# Patient Record
Sex: FEMALE | Race: WHITE | NOT HISPANIC OR LATINO | Employment: OTHER | URBAN - NONMETROPOLITAN AREA
[De-identification: names, ages, dates, MRNs, and addresses within clinical notes are randomized per-mention and may not be internally consistent; named-entity substitution may affect disease eponyms.]

---

## 2017-01-11 RX ORDER — TIOTROPIUM BROMIDE 18 UG/1
CAPSULE ORAL; RESPIRATORY (INHALATION)
Qty: 30 CAPSULE | Refills: 0 | Status: SHIPPED | OUTPATIENT
Start: 2017-01-11 | End: 2017-02-12 | Stop reason: SDUPTHER

## 2017-01-30 RX ORDER — PREDNISONE 10 MG/1
TABLET ORAL
Qty: 21 TABLET | Refills: 0 | OUTPATIENT
Start: 2017-01-30

## 2017-01-30 RX ORDER — AZITHROMYCIN 250 MG/1
TABLET, FILM COATED ORAL
Qty: 6 TABLET | Refills: 0 | OUTPATIENT
Start: 2017-01-30

## 2017-02-01 ENCOUNTER — OFFICE VISIT (OUTPATIENT)
Dept: FAMILY MEDICINE CLINIC | Facility: CLINIC | Age: 62
End: 2017-02-01

## 2017-02-01 VITALS
WEIGHT: 146 LBS | BODY MASS INDEX: 25.87 KG/M2 | HEIGHT: 63 IN | SYSTOLIC BLOOD PRESSURE: 124 MMHG | DIASTOLIC BLOOD PRESSURE: 80 MMHG

## 2017-02-01 DIAGNOSIS — L03.116 CELLULITIS OF LEFT LEG: Primary | ICD-10-CM

## 2017-02-01 PROCEDURE — 96372 THER/PROPH/DIAG INJ SC/IM: CPT | Performed by: NURSE PRACTITIONER

## 2017-02-01 PROCEDURE — 99213 OFFICE O/P EST LOW 20 MIN: CPT | Performed by: NURSE PRACTITIONER

## 2017-02-01 RX ORDER — METHYLPREDNISOLONE 4 MG/1
TABLET ORAL
Qty: 21 TABLET | Refills: 0 | Status: SHIPPED | OUTPATIENT
Start: 2017-02-01 | End: 2017-04-22 | Stop reason: SDUPTHER

## 2017-02-01 RX ORDER — CEPHALEXIN 500 MG/1
500 CAPSULE ORAL 4 TIMES DAILY
Qty: 40 CAPSULE | Refills: 0 | Status: SHIPPED | OUTPATIENT
Start: 2017-02-01 | End: 2017-03-29

## 2017-02-01 RX ORDER — CEFTRIAXONE 1 G/1
1 INJECTION, POWDER, FOR SOLUTION INTRAMUSCULAR; INTRAVENOUS ONCE
Status: COMPLETED | OUTPATIENT
Start: 2017-02-01 | End: 2017-02-01

## 2017-02-01 RX ORDER — PREDNISONE 10 MG/1
10 TABLET ORAL DAILY
Qty: 21 TABLET | Refills: 0 | Status: SHIPPED | OUTPATIENT
Start: 2017-02-01 | End: 2017-03-20 | Stop reason: SDUPTHER

## 2017-02-01 RX ADMIN — CEFTRIAXONE 1 G: 1 INJECTION, POWDER, FOR SOLUTION INTRAMUSCULAR; INTRAVENOUS at 10:24

## 2017-02-01 NOTE — PROGRESS NOTES
"  Chief Complaint   Patient presents with   • Follow-up     left leg lesion , scraped 2 weeks ago     Subjective   Monisha Brasher is a 61 y.o. female.     Abrasion   This is a new (hit leg on the side of the bed 2 weeks ago now painful and swollen ) problem. The current episode started 1 to 4 weeks ago. The problem occurs constantly. The problem has been gradually worsening. Pertinent negatives include no abdominal pain, arthralgias, change in bowel habit, chest pain, chills, congestion, coughing, diaphoresis, fatigue, headaches, joint swelling, myalgias, nausea, neck pain, numbness, rash, urinary symptoms, visual change, vomiting or weakness. Nothing aggravates the symptoms. She has tried nothing for the symptoms.        The following portions of the patient's history were reviewed and updated as appropriate: allergies, current medications, past social history and problem list.    Review of Systems   Constitutional: Negative.  Negative for chills, diaphoresis and fatigue.   HENT: Negative.  Negative for congestion.    Eyes: Negative.    Respiratory: Negative.  Negative for cough.    Cardiovascular: Negative.  Negative for chest pain.   Gastrointestinal: Negative.  Negative for abdominal distention, abdominal pain, anal bleeding, change in bowel habit, nausea and vomiting.   Endocrine: Negative.    Genitourinary: Negative.    Musculoskeletal: Negative.  Negative for arthralgias, joint swelling, myalgias and neck pain.   Skin: Negative.  Negative for rash.        Swollen left lower leg tender and red to touch    Neurological: Negative.  Negative for weakness, numbness and headaches.   Hematological: Negative.    Psychiatric/Behavioral: Negative.        Objective   Visit Vitals   • /80 (BP Location: Left arm, Patient Position: Sitting, Cuff Size: Adult)   • Ht 63\" (160 cm)   • Wt 146 lb (66.2 kg)   • BMI 25.86 kg/m2     Physical Exam   Constitutional: She is oriented to person, place, and time. She appears " well-developed and well-nourished. She is not intubated.   HENT:   Head: Normocephalic and atraumatic.   Eyes: EOM are normal. Pupils are equal, round, and reactive to light.   Neck: Normal range of motion. Neck supple.   Cardiovascular: Normal rate and regular rhythm.    Pulmonary/Chest: No accessory muscle usage. No apnea, no tachypnea and no bradypnea. She is not intubated. No respiratory distress. She has no decreased breath sounds. She has no wheezes. She has no rhonchi. She has no rales.   Abdominal: Soft. Bowel sounds are normal.   Neurological: She is alert and oriented to person, place, and time.   Skin: Skin is warm and dry.   Redness and tender area left lower leg warm tender to touch no distal edema    Nursing note and vitals reviewed.      Assessment/Plan   Problem List Items Addressed This Visit        Other    Cellulitis of left leg - Primary        rx for steriod to hold onto in case she needs it for her copd-leaving for [place] for 1 month      New Medications Ordered This Visit   Medications   • predniSONE (DELTASONE) 10 MG tablet     Sig: Take 1 tablet by mouth Daily.     Dispense:  21 tablet     Refill:  0   • mupirocin (BACTROBAN) 2 % ointment     Sig: Apply  topically 3 (Three) Times a Day.     Dispense:  30 g     Refill:  0   • cephalexin (KEFLEX) 500 MG capsule     Sig: Take 1 capsule by mouth 4 (Four) Times a Day.     Dispense:  40 capsule     Refill:  0   • MethylPREDNISolone (MEDROL, MJ,) 4 MG tablet     Sig: Take as directed on package instructions.     Dispense:  21 tablet     Refill:  0

## 2017-02-13 RX ORDER — TIOTROPIUM BROMIDE 18 UG/1
CAPSULE ORAL; RESPIRATORY (INHALATION)
Qty: 30 CAPSULE | Refills: 0 | Status: SHIPPED | OUTPATIENT
Start: 2017-02-13 | End: 2017-08-17

## 2017-03-20 RX ORDER — ESCITALOPRAM OXALATE 20 MG/1
TABLET ORAL
Qty: 30 TABLET | Refills: 5 | Status: SHIPPED | OUTPATIENT
Start: 2017-03-20 | End: 2017-09-12 | Stop reason: SDUPTHER

## 2017-03-20 RX ORDER — PREDNISONE 10 MG/1
TABLET ORAL
Qty: 21 TABLET | Refills: 0 | Status: SHIPPED | OUTPATIENT
Start: 2017-03-20 | End: 2017-05-31

## 2017-03-27 RX ORDER — AMLODIPINE BESYLATE 10 MG/1
TABLET ORAL
Qty: 30 TABLET | Refills: 5 | Status: SHIPPED | OUTPATIENT
Start: 2017-03-27 | End: 2017-10-23 | Stop reason: SDUPTHER

## 2017-03-29 ENCOUNTER — OFFICE VISIT (OUTPATIENT)
Dept: PULMONOLOGY | Facility: CLINIC | Age: 62
End: 2017-03-29

## 2017-03-29 VITALS
SYSTOLIC BLOOD PRESSURE: 136 MMHG | WEIGHT: 143 LBS | DIASTOLIC BLOOD PRESSURE: 86 MMHG | HEIGHT: 63 IN | BODY MASS INDEX: 25.34 KG/M2

## 2017-03-29 DIAGNOSIS — J20.9 ACUTE BRONCHITIS, UNSPECIFIED ORGANISM: ICD-10-CM

## 2017-03-29 DIAGNOSIS — J44.1 CHRONIC OBSTRUCTIVE PULMONARY DISEASE WITH ACUTE EXACERBATION (HCC): Primary | ICD-10-CM

## 2017-03-29 PROCEDURE — 96372 THER/PROPH/DIAG INJ SC/IM: CPT | Performed by: INTERNAL MEDICINE

## 2017-03-29 PROCEDURE — 99213 OFFICE O/P EST LOW 20 MIN: CPT | Performed by: INTERNAL MEDICINE

## 2017-03-29 RX ORDER — AZITHROMYCIN 250 MG/1
TABLET, FILM COATED ORAL
Qty: 6 TABLET | Refills: 1 | Status: SHIPPED | OUTPATIENT
Start: 2017-03-29 | End: 2017-05-31

## 2017-03-29 RX ORDER — METHYLPREDNISOLONE ACETATE 40 MG/ML
80 INJECTION, SUSPENSION INTRA-ARTICULAR; INTRALESIONAL; INTRAMUSCULAR; SOFT TISSUE ONCE
Status: COMPLETED | OUTPATIENT
Start: 2017-03-29 | End: 2017-03-29

## 2017-03-29 RX ORDER — METHYLPREDNISOLONE 8 MG/1
TABLET ORAL
Qty: 21 TABLET | Refills: 0 | Status: SHIPPED | OUTPATIENT
Start: 2017-03-29 | End: 2017-05-31

## 2017-03-29 RX ADMIN — METHYLPREDNISOLONE ACETATE 80 MG: 40 INJECTION, SUSPENSION INTRA-ARTICULAR; INTRALESIONAL; INTRAMUSCULAR; SOFT TISSUE at 15:02

## 2017-03-29 NOTE — PROGRESS NOTES
"This lady has long-standing COPD and for the last several days has noted cough wheeze shortness of breath and purulent sputum    ROS    Constitutional-no night sweats weight loss headaches  GI no abdominal pain nausea or diarrhea  Neuro no seizure or neurologic deficits  Musculoskeletal no deformity or joint pain   no dysuria or hematuria  Skin no rash or other lesions  All other systems reviewed and were negative except for the above.      Physical Exam  /86  Ht 63\" (160 cm)  Wt 143 lb (64.9 kg)  BMI 25.33 kg/m2  Vital signs as above  Pupils equally round and reactive to light and accommodation, neck no JVD or adenopathy.  Cardiovascular regular rhythm and rate no murmur or gallop.  Abdomen soft no organomegaly tenderness.  Extremities no clubbing cyanosis or edema.  No cervical adenopathy.  No skin rash.  Neurologic good strength bilaterally without deficits  Lungs reveal mild wheeze and rhonchi    Impression COPD exacerbation, acute bronchitis    Plan Depo-Medrol 2 cc IM, Medrol tapering dose, Zithromax, continue present medications, return as needed  "

## 2017-04-24 RX ORDER — METHYLPREDNISOLONE 4 MG/1
TABLET ORAL
Qty: 21 TABLET | Refills: 0 | Status: SHIPPED | OUTPATIENT
Start: 2017-04-24 | End: 2017-05-31

## 2017-05-15 RX ORDER — MONTELUKAST SODIUM 10 MG/1
TABLET ORAL
Qty: 90 TABLET | Refills: 0 | Status: SHIPPED | OUTPATIENT
Start: 2017-05-15 | End: 2017-08-14 | Stop reason: SDUPTHER

## 2017-05-31 ENCOUNTER — OFFICE VISIT (OUTPATIENT)
Dept: PULMONOLOGY | Facility: CLINIC | Age: 62
End: 2017-05-31

## 2017-05-31 VITALS
HEIGHT: 63 IN | SYSTOLIC BLOOD PRESSURE: 121 MMHG | HEART RATE: 95 BPM | BODY MASS INDEX: 25.16 KG/M2 | WEIGHT: 142 LBS | OXYGEN SATURATION: 97 % | DIASTOLIC BLOOD PRESSURE: 78 MMHG

## 2017-05-31 DIAGNOSIS — J44.1 CHRONIC OBSTRUCTIVE PULMONARY DISEASE WITH ACUTE EXACERBATION (HCC): Primary | ICD-10-CM

## 2017-05-31 PROCEDURE — 96372 THER/PROPH/DIAG INJ SC/IM: CPT | Performed by: INTERNAL MEDICINE

## 2017-05-31 PROCEDURE — 99212 OFFICE O/P EST SF 10 MIN: CPT | Performed by: INTERNAL MEDICINE

## 2017-05-31 RX ORDER — AZITHROMYCIN 250 MG/1
TABLET, FILM COATED ORAL
Qty: 6 TABLET | Refills: 1 | Status: SHIPPED | OUTPATIENT
Start: 2017-05-31 | End: 2017-07-26 | Stop reason: SDUPTHER

## 2017-05-31 RX ORDER — PREDNISONE 10 MG/1
TABLET ORAL
Qty: 21 TABLET | Refills: 0 | Status: SHIPPED | OUTPATIENT
Start: 2017-05-31 | End: 2017-06-29 | Stop reason: SDUPTHER

## 2017-05-31 RX ORDER — METHYLPREDNISOLONE ACETATE 40 MG/ML
80 INJECTION, SUSPENSION INTRA-ARTICULAR; INTRALESIONAL; INTRAMUSCULAR; SOFT TISSUE ONCE
Status: COMPLETED | OUTPATIENT
Start: 2017-05-31 | End: 2017-05-31

## 2017-05-31 RX ADMIN — METHYLPREDNISOLONE ACETATE 80 MG: 40 INJECTION, SUSPENSION INTRA-ARTICULAR; INTRALESIONAL; INTRAMUSCULAR; SOFT TISSUE at 13:08

## 2017-06-29 RX ORDER — PREDNISONE 10 MG/1
TABLET ORAL
Qty: 21 TABLET | Refills: 0 | Status: SHIPPED | OUTPATIENT
Start: 2017-06-29 | End: 2017-07-26 | Stop reason: SDUPTHER

## 2017-07-26 RX ORDER — PREDNISONE 10 MG/1
TABLET ORAL
Qty: 21 TABLET | Refills: 0 | Status: SHIPPED | OUTPATIENT
Start: 2017-07-26 | End: 2017-09-19

## 2017-07-26 RX ORDER — AZITHROMYCIN 250 MG/1
TABLET, FILM COATED ORAL
Qty: 6 TABLET | Refills: 0 | Status: SHIPPED | OUTPATIENT
Start: 2017-07-26 | End: 2017-08-17

## 2017-08-14 RX ORDER — MONTELUKAST SODIUM 10 MG/1
TABLET ORAL
Qty: 90 TABLET | Refills: 0 | Status: SHIPPED | OUTPATIENT
Start: 2017-08-14 | End: 2017-11-19 | Stop reason: SDUPTHER

## 2017-08-16 RX ORDER — METHYLPREDNISOLONE 8 MG/1
TABLET ORAL
Qty: 21 TABLET | Refills: 0 | Status: SHIPPED | OUTPATIENT
Start: 2017-08-16 | End: 2017-09-19

## 2017-08-17 ENCOUNTER — OFFICE VISIT (OUTPATIENT)
Dept: CARDIOLOGY | Facility: CLINIC | Age: 62
End: 2017-08-17

## 2017-08-17 VITALS
HEART RATE: 90 BPM | WEIGHT: 143 LBS | DIASTOLIC BLOOD PRESSURE: 78 MMHG | HEIGHT: 63 IN | SYSTOLIC BLOOD PRESSURE: 120 MMHG | BODY MASS INDEX: 25.34 KG/M2

## 2017-08-17 DIAGNOSIS — Z72.0 NICOTINE ABUSE: ICD-10-CM

## 2017-08-17 DIAGNOSIS — R06.02 SOB (SHORTNESS OF BREATH): ICD-10-CM

## 2017-08-17 DIAGNOSIS — J42 CHRONIC BRONCHITIS, UNSPECIFIED CHRONIC BRONCHITIS TYPE (HCC): Primary | ICD-10-CM

## 2017-08-17 DIAGNOSIS — I10 ESSENTIAL HYPERTENSION: ICD-10-CM

## 2017-08-17 PROCEDURE — 99213 OFFICE O/P EST LOW 20 MIN: CPT | Performed by: INTERNAL MEDICINE

## 2017-08-17 NOTE — PROGRESS NOTES
[facility]  OFFICE NOTE    Monisha Brasher  62 y.o. female    08/17/2017  1. Chronic bronchitis, unspecified chronic bronchitis type    2. SOB (shortness of breath)    3. Nicotine abuse    4. Essential hypertension        Chief complaint -Shortness of breath      History of present Illness- 62-year-old lady who has history of COPD with chronic bronchitis still smoking, she had quit smoking and now re started again.  She is on steroids and she is actively wheezing.  She had a nuclear stress test and echo in June 2016 and there were unremarkable.  She had an episode of chest pain 4 months ago and has not had any more episodes.  She is on steroids she is on singular and multiple inhalers and she is seeing Dr. Gigi Ac.  I again reemphasized the need to quit tobacco use as it increases the risk of heart attacks and strokes in addition to the lung problems.              No Known Allergies      Past Medical History:   Diagnosis Date   • Acute bronchitis    • Acute upper respiratory infection    • Adjustment disorder with mixed emotional features    • Agoraphobia with panic attacks    • Allergic rhinitis due to pollen    • Anxiety    • Asthma    • Backache    • Blood in urine    • Candidiasis of mouth    • Chronic bronchitis    • Chronic obstructive lung disease    • Emphysema lung    • Essential hypertension    • Extrinsic asthma with status asthmaticus    • Fatigue    • H/O echocardiogram     EF 55-60%. LV systolic function normal. Impaired LV relaxation. [facility]   • Hypoxemia    • Malaise and fatigue    • Non-IgE mediated allergic asthma    • Nonvenomous insect bite    • Pneumonia    • Postmenopausal state    • Urinary tract infectious disease          Past Surgical History:   Procedure Laterality Date   • INJECTION OF MEDICATION  03/20/2015    celestone(1)   • INJECTION OF MEDICATION  04/05/2016    DEPO MEDROL(16)         Family History   Problem Relation Age of  Onset   • Heart attack Mother    • Heart disease Mother    • Heart disease Father    • Heart disease Paternal Grandmother          Social History     Social History   • Marital status:      Spouse name: N/A   • Number of children: N/A   • Years of education: N/A     Occupational History   • Not on file.     Social History Main Topics   • Smoking status: Current Every Day Smoker   • Smokeless tobacco: Never Used   • Alcohol use Not on file   • Drug use: Not on file   • Sexual activity: Not on file     Other Topics Concern   • Not on file     Social History Narrative         Current Outpatient Prescriptions   Medication Sig Dispense Refill   • ADVAIR DISKUS 500-50 MCG/DOSE DISKUS INHALE 1 PUFF BY MOUTH TWICE DAILY 1 each 5   • albuterol (PROVENTIL) (2.5 MG/3ML) 0.083% nebulizer solution Take 2.5 mg by nebulization Every 4 (Four) Hours As Needed for wheezing. Nebulizer treatments 3 times a day as needed 90 vial 5   • albuterol (VENTOLIN HFA) 108 (90 BASE) MCG/ACT inhaler 2 puffs every 4 hours as needed for breathing 1 inhaler 5   • amLODIPine (NORVASC) 10 MG tablet TAKE 1 TABLET BY MOUTH DAILY 30 tablet 5   • escitalopram (LEXAPRO) 20 MG tablet TAKE 1 TABLET BY MOUTH ONCE DAILY 30 tablet 5   • methylPREDNISolone (MEDROL) 8 MG tablet TAKE 2 TABLETS BY MOUTH EVERY DAY FOR 1 WEEK THEN 1 TABLET DAILY FOR 1 WEEK 21 tablet 0   • montelukast (SINGULAIR) 10 MG tablet TAKE 1 TABLET BY MOUTH DAILY 90 tablet 0   • predniSONE (DELTASONE) 10 MG tablet TAKE 2 TABLETS BY MOUTH DAILY FOR 1 WEEK THEN TAKE 1 TABLET BY MOUTH DAILY FOR 1 WEEK 21 tablet 0   • promethazine (PHENERGAN) 25 MG tablet Take 25 mg by mouth every 6 (six) hours as needed for nausea or vomiting.     • tiotropium (SPIRIVA HANDIHALER) 18 MCG per inhalation capsule Place 1 capsule into inhaler and inhale Daily. 30 capsule 5   • VENTOLIN  (90 BASE) MCG/ACT inhaler Inhale 2 puffs every 4 (four) hours. 1 inhaler 5     No current facility-administered  "medications for this visit.          Review of Systems     Constitution: Denies any fatigue, fever or chills    HENT: Denies any headache, hearing impairment,     Eyes: Denies any blurring of vision, or photophobia     Cardivascular - As per history of present illness     Respiratory system-COPD with chronic bronchitis       Endocrine:  No history of hyperlipidemia, diabetes,                      Hypothyrodism       Musculoskeletal:  Arthritis    Gastrointestinal: No nausea, vomiting, or melena    Genitourinary: No dysuria or hematuria    Neurological:   No history of seizure disorder, stroke, memory problems    Psychiatric/Behavioral:        No history of depression,      Hematological- no history of easy bruising or any bleeding diathesis            OBJECTIVE    /78  Pulse 90  Ht 63\" (160 cm)  Wt 143 lb (64.9 kg)  BMI 25.33 kg/m2      Physical Exam     Constitutional: is oriented to person, place, and time.     Skin-warm and dry    Well developed and nourished in no acute distress      Head: Normocephalic and atraumatic.     Eyes: Pupils are equal, round, and reactive to light.     Neck: Neck supple. No bruit in the carotids, no elevation of JVD    Cardiovascular: [place] in the fifth intercostal space   Regular rate, and  Rhythm,    S1 greater than S2, no S3 or S4, no gallop     Pulmonary/Chest:   Air  Entry is equal on both sides  There is diffuse scattered rhonchi      Abdominal: Soft.  No hepatosplenomegaly    Musculoskeletal: No kyphoscoliosis    Neurological: is alert and oriented to person, place, and time.    cranial nerve are intact .   No motor or sensory deficit    Extremities-no edema, no radial femoral delay      Psychiatric: He has a normal mood and affect.                  His behavior is normal.           Procedures          A/P    Shortness of breath secondary to COPD with active wheezing, on steroids and multiple inhalers I again reemphasized the need to quit tobacco abuse because of the " risk associated with it.    Hypertension we'll continue amlodipine and is controlling the blood pressure.    One episode of chest pain negative stress test a year ago, she does not want to do any further testing at this point that she has been fine for the past 4 months.    Nicotine abuse-needs risk factor modification.    Follow-up in 1 year or earlier if any problems            This document has been electronically signed by Gregg Izaguirre MD on August 17, 2017 8:49 AM       EMR Dragon/Transcription disclaimer:   Some of this note may be an electronic transcription/translation of spoken language to printed text. The electronic translation of spoken language may permit erroneous, or at times, nonsensical words or phrases to be inadvertently transcribed; Although I have reviewed the note for such errors, some may still exist.

## 2017-09-05 RX ORDER — ALBUTEROL SULFATE 2.5 MG/3ML
SOLUTION RESPIRATORY (INHALATION)
Qty: 360 ML | Refills: 0 | Status: SHIPPED | OUTPATIENT
Start: 2017-09-05 | End: 2017-10-21 | Stop reason: SDUPTHER

## 2017-09-05 RX ORDER — AZITHROMYCIN 250 MG/1
TABLET, FILM COATED ORAL
Qty: 6 TABLET | Refills: 0 | Status: SHIPPED | OUTPATIENT
Start: 2017-09-05 | End: 2017-11-06

## 2017-09-13 RX ORDER — TIOTROPIUM BROMIDE 18 UG/1
CAPSULE ORAL; RESPIRATORY (INHALATION)
Qty: 30 CAPSULE | Refills: 0 | Status: SHIPPED | OUTPATIENT
Start: 2017-09-13 | End: 2017-10-23 | Stop reason: SDUPTHER

## 2017-09-13 RX ORDER — ESCITALOPRAM OXALATE 20 MG/1
TABLET ORAL
Qty: 30 TABLET | Refills: 0 | Status: SHIPPED | OUTPATIENT
Start: 2017-09-13 | End: 2017-10-11 | Stop reason: SDUPTHER

## 2017-09-19 ENCOUNTER — OFFICE VISIT (OUTPATIENT)
Dept: PULMONOLOGY | Facility: CLINIC | Age: 62
End: 2017-09-19

## 2017-09-19 VITALS
SYSTOLIC BLOOD PRESSURE: 119 MMHG | WEIGHT: 142 LBS | DIASTOLIC BLOOD PRESSURE: 76 MMHG | HEIGHT: 63 IN | BODY MASS INDEX: 25.16 KG/M2 | HEART RATE: 60 BPM | OXYGEN SATURATION: 93 %

## 2017-09-19 DIAGNOSIS — J41.8 MIXED SIMPLE AND MUCOPURULENT CHRONIC BRONCHITIS (HCC): Primary | ICD-10-CM

## 2017-09-19 DIAGNOSIS — J44.9 CHRONIC OBSTRUCTIVE PULMONARY DISEASE, UNSPECIFIED COPD TYPE (HCC): ICD-10-CM

## 2017-09-19 PROCEDURE — 96372 THER/PROPH/DIAG INJ SC/IM: CPT | Performed by: INTERNAL MEDICINE

## 2017-09-19 PROCEDURE — 99214 OFFICE O/P EST MOD 30 MIN: CPT | Performed by: INTERNAL MEDICINE

## 2017-09-19 RX ORDER — AZITHROMYCIN 250 MG/1
TABLET, FILM COATED ORAL
Qty: 6 TABLET | Refills: 1 | Status: SHIPPED | OUTPATIENT
Start: 2017-09-19 | End: 2017-11-06

## 2017-09-19 RX ORDER — PREDNISONE 10 MG/1
TABLET ORAL
Qty: 21 TABLET | Refills: 0 | Status: SHIPPED | OUTPATIENT
Start: 2017-09-19 | End: 2017-10-03 | Stop reason: SDUPTHER

## 2017-09-19 RX ORDER — METHYLPREDNISOLONE ACETATE 40 MG/ML
80 INJECTION, SUSPENSION INTRA-ARTICULAR; INTRALESIONAL; INTRAMUSCULAR; SOFT TISSUE ONCE
Status: COMPLETED | OUTPATIENT
Start: 2017-09-19 | End: 2017-09-19

## 2017-09-19 RX ADMIN — METHYLPREDNISOLONE ACETATE 80 MG: 40 INJECTION, SUSPENSION INTRA-ARTICULAR; INTRALESIONAL; INTRAMUSCULAR; SOFT TISSUE at 10:18

## 2017-09-19 NOTE — PROGRESS NOTES
"This lady has COPD with chronic bronchitis and asthmatic component.  She is still smoking some.  Over the last few weeks she has noted cough wheeze shortness of breath and dyspnea on exertion.    ROS    Constitutional-no night sweats weight loss headaches  GI no abdominal pain nausea or diarrhea  Neuro no seizure or neurologic deficits  Musculoskeletal no deformity or joint pain   no dysuria or hematuria  Skin no rash or other lesions  All other systems reviewed and were negative except for the above.      Physical Exam  /76 (BP Location: Left arm, Patient Position: Sitting, Cuff Size: Adult)  Pulse 60  Ht 63\" (160 cm)  Wt 142 lb (64.4 kg)  SpO2 93%  BMI 25.15 kg/m2  Vital signs as above  Pupils equally round and reactive to light and accommodation, neck no JVD or adenopathy.  Cardiovascular regular rhythm and rate no murmur or gallop.  Abdomen soft no organomegaly tenderness.  Extremities no clubbing cyanosis or edema.  No cervical adenopathy.  No skin rash.  Neurologic good strength bilaterally without deficits  Dyspneic white female with an active cough, lungs reveal greatly diminished breath sounds with wheezes and rhonchi    Impression COPD exacerbation, acute bronchitis    Plan Depo-Medrol, Zithromax, prednisone if needed, continue bronchodilators, refrain from smoking, return in 2 weeks          This document has been electronically signed by Gigi Ac MD on September 19, 2017 10:05 AM      "

## 2017-10-02 RX ORDER — ALBUTEROL SULFATE 90 UG/1
AEROSOL, METERED RESPIRATORY (INHALATION)
Qty: 18 G | Refills: 0 | Status: SHIPPED | OUTPATIENT
Start: 2017-10-02 | End: 2017-11-06 | Stop reason: SDUPTHER

## 2017-10-04 RX ORDER — PREDNISONE 10 MG/1
TABLET ORAL
Qty: 21 TABLET | Refills: 0 | Status: SHIPPED | OUTPATIENT
Start: 2017-10-04 | End: 2017-10-21 | Stop reason: SDUPTHER

## 2017-10-11 RX ORDER — ESCITALOPRAM OXALATE 20 MG/1
TABLET ORAL
Qty: 30 TABLET | Refills: 1 | Status: SHIPPED | OUTPATIENT
Start: 2017-10-11 | End: 2017-11-15 | Stop reason: SDUPTHER

## 2017-10-23 RX ORDER — TIOTROPIUM BROMIDE 18 UG/1
CAPSULE ORAL; RESPIRATORY (INHALATION)
Qty: 30 CAPSULE | Refills: 0 | Status: SHIPPED | OUTPATIENT
Start: 2017-10-23 | End: 2017-11-26 | Stop reason: SDUPTHER

## 2017-10-23 RX ORDER — PREDNISONE 10 MG/1
TABLET ORAL
Qty: 21 TABLET | Refills: 0 | OUTPATIENT
Start: 2017-10-23 | End: 2017-12-22

## 2017-10-23 RX ORDER — ALBUTEROL SULFATE 90 UG/1
AEROSOL, METERED RESPIRATORY (INHALATION)
Qty: 18 G | Refills: 0 | Status: SHIPPED | OUTPATIENT
Start: 2017-10-23 | End: 2017-11-06 | Stop reason: SDUPTHER

## 2017-10-23 RX ORDER — METHYLPREDNISOLONE 8 MG/1
TABLET ORAL
Qty: 21 TABLET | Refills: 0 | Status: SHIPPED | OUTPATIENT
Start: 2017-10-23 | End: 2017-12-08 | Stop reason: SDUPTHER

## 2017-10-23 RX ORDER — ALBUTEROL SULFATE 2.5 MG/3ML
SOLUTION RESPIRATORY (INHALATION)
Qty: 360 ML | Refills: 0 | Status: SHIPPED | OUTPATIENT
Start: 2017-10-23 | End: 2018-01-31 | Stop reason: SDUPTHER

## 2017-10-24 RX ORDER — AMLODIPINE BESYLATE 10 MG/1
TABLET ORAL
Qty: 30 TABLET | Refills: 0 | Status: SHIPPED | OUTPATIENT
Start: 2017-10-24 | End: 2017-11-06 | Stop reason: SDUPTHER

## 2017-11-03 ENCOUNTER — TRANSCRIBE ORDERS (OUTPATIENT)
Dept: CARDIAC REHAB | Facility: HOSPITAL | Age: 62
End: 2017-11-03

## 2017-11-03 DIAGNOSIS — J44.9 CHRONIC OBSTRUCTIVE PULMONARY DISEASE, UNSPECIFIED COPD TYPE (HCC): Primary | ICD-10-CM

## 2017-11-06 ENCOUNTER — HOSPITAL ENCOUNTER (OUTPATIENT)
Dept: PULMONOLOGY | Facility: HOSPITAL | Age: 62
Discharge: HOME | End: 2017-11-06
Attending: INTERNAL MEDICINE | Admitting: INTERNAL MEDICINE

## 2017-11-06 ENCOUNTER — TRANSCRIBE ORDERS (OUTPATIENT)
Dept: PULMONOLOGY | Facility: HOSPITAL | Age: 62
End: 2017-11-06

## 2017-11-06 ENCOUNTER — OFFICE VISIT (OUTPATIENT)
Dept: FAMILY MEDICINE CLINIC | Facility: CLINIC | Age: 62
End: 2017-11-06

## 2017-11-06 VITALS
WEIGHT: 144 LBS | HEIGHT: 63 IN | SYSTOLIC BLOOD PRESSURE: 120 MMHG | BODY MASS INDEX: 25.52 KG/M2 | DIASTOLIC BLOOD PRESSURE: 78 MMHG

## 2017-11-06 DIAGNOSIS — J44.9 CHRONIC OBSTRUCTIVE PULMONARY DISEASE, UNSPECIFIED COPD TYPE (HCC): ICD-10-CM

## 2017-11-06 DIAGNOSIS — J44.9 CHRONIC OBSTRUCTIVE PULMONARY DISEASE, UNSPECIFIED COPD TYPE (HCC): Primary | ICD-10-CM

## 2017-11-06 DIAGNOSIS — I10 ESSENTIAL HYPERTENSION: Primary | ICD-10-CM

## 2017-11-06 DIAGNOSIS — Z12.31 ENCOUNTER FOR SCREENING MAMMOGRAM FOR MALIGNANT NEOPLASM OF BREAST: ICD-10-CM

## 2017-11-06 PROCEDURE — 90732 PPSV23 VACC 2 YRS+ SUBQ/IM: CPT | Performed by: NURSE PRACTITIONER

## 2017-11-06 PROCEDURE — 94010 BREATHING CAPACITY TEST: CPT

## 2017-11-06 PROCEDURE — 90471 IMMUNIZATION ADMIN: CPT | Performed by: NURSE PRACTITIONER

## 2017-11-06 PROCEDURE — 94010 BREATHING CAPACITY TEST: CPT | Performed by: INTERNAL MEDICINE

## 2017-11-06 PROCEDURE — 99213 OFFICE O/P EST LOW 20 MIN: CPT | Performed by: NURSE PRACTITIONER

## 2017-11-06 RX ORDER — AMLODIPINE BESYLATE 10 MG/1
10 TABLET ORAL DAILY
Qty: 30 TABLET | Refills: 3 | Status: SHIPPED | OUTPATIENT
Start: 2017-11-06 | End: 2018-06-05 | Stop reason: SDUPTHER

## 2017-11-06 RX ORDER — AMLODIPINE BESYLATE 10 MG/1
10 TABLET ORAL DAILY
Qty: 90 TABLET | Refills: 3 | Status: SHIPPED | OUTPATIENT
Start: 2017-11-06 | End: 2017-11-06 | Stop reason: SDUPTHER

## 2017-11-06 NOTE — PROGRESS NOTES
Chief Complaint   Patient presents with   • Follow-up     check up    • Med Refill     Subjective   Monisha Brasher is a 62 y.o. female.     Hypertension   This is a recurrent problem. The current episode started more than 1 year ago. The problem has been rapidly worsening since onset. The problem is controlled. Associated symptoms include malaise/fatigue and shortness of breath. Pertinent negatives include no anxiety, blurred vision, chest pain, orthopnea, palpitations, peripheral edema, PND or sweats. Risk factors for coronary artery disease include sedentary lifestyle. Past treatments include calcium channel blockers. The current treatment provides mild improvement. Compliance problems include diet.  There is no history of angina, kidney disease, CAD/MI, CVA, heart failure, left ventricular hypertrophy, PVD, renovascular disease, retinopathy or a thyroid problem. There is no history of chronic renal disease, coarctation of the aorta, hyperaldosteronism, hypercortisolism, hyperparathyroidism, a hypertension causing med, pheochromocytoma or sleep apnea.        The following portions of the patient's history were reviewed and updated as appropriate: allergies, current medications, past social history and problem list.    Review of Systems   Constitutional: Positive for malaise/fatigue. Negative for activity change, appetite change, chills, diaphoresis, fatigue, fever and unexpected weight change.   HENT: Negative.    Eyes: Negative.  Negative for blurred vision, photophobia, pain, discharge, redness, itching and visual disturbance.   Respiratory: Positive for shortness of breath and wheezing. Negative for apnea, choking, chest tightness and stridor.    Cardiovascular: Negative.  Negative for chest pain, palpitations, orthopnea, leg swelling and PND.   Gastrointestinal: Negative.    Endocrine: Negative.  Negative for cold intolerance, heat intolerance, polydipsia, polyphagia and polyuria.   Genitourinary:  "Negative.    Musculoskeletal: Negative.    Skin: Negative.    Allergic/Immunologic: Negative.  Negative for environmental allergies, food allergies and immunocompromised state.   Neurological: Negative.  Negative for dizziness and facial asymmetry.   Hematological: Negative.  Negative for adenopathy. Does not bruise/bleed easily.   Psychiatric/Behavioral: Negative.  Negative for agitation, behavioral problems and confusion.   All other systems reviewed and are negative.      Objective   /78  Ht 63\" (160 cm)  Wt 144 lb (65.3 kg)  BMI 25.51 kg/m2  Physical Exam   Constitutional: She is oriented to person, place, and time. She appears well-developed and well-nourished. She is not intubated.   HENT:   Head: Normocephalic and atraumatic.   Mouth/Throat: No oropharyngeal exudate.   Eyes: EOM are normal. Pupils are equal, round, and reactive to light. Right eye exhibits no discharge. Left eye exhibits no discharge. No scleral icterus.   Neck: Normal range of motion. Neck supple. No JVD present. No tracheal deviation present. No thyromegaly present.   Cardiovascular: Normal rate and regular rhythm.  Exam reveals no gallop and no friction rub.    No murmur heard.  Pulmonary/Chest: No accessory muscle usage or stridor. No apnea, no tachypnea and no bradypnea. She is not intubated. No respiratory distress. She has no decreased breath sounds. She has no wheezes. She has no rhonchi. She has no rales. She exhibits no tenderness.   Abdominal: Soft. Bowel sounds are normal. She exhibits no distension and no mass. There is no tenderness. There is no rebound and no guarding. No hernia.   Musculoskeletal: Normal range of motion. She exhibits no edema, tenderness or deformity.   Lymphadenopathy:     She has no cervical adenopathy.   Neurological: She is alert and oriented to person, place, and time. She has normal reflexes. She displays normal reflexes. No cranial nerve deficit. She exhibits normal muscle tone. Coordination " normal.   Skin: Skin is warm and dry. No rash noted. No erythema. No pallor.   Nursing note and vitals reviewed.      Assessment/Plan   Problem List Items Addressed This Visit        Cardiovascular and Mediastinum    Essential hypertension - Primary    Relevant Medications    amLODIPine (NORVASC) 10 MG tablet       Other    Encounter for screening mammogram for malignant neoplasm of breast    Relevant Orders    Mammo Screening Digital Tomosynthesis Bilateral With CAD           New Medications Ordered This Visit   Medications   • amLODIPine (NORVASC) 10 MG tablet     Sig: Take 1 tablet by mouth Daily.     Dispense:  30 tablet     Refill:  3       It's not just what you eat, but when you eat  Eat breakfast, and eat smaller meals throughout the day. A healthy breakfast can jumpstart your metabolism, while eating small, healthy meals (rather than the standard three large meals) keeps your energy up.   Avoid eating at night. Try to eat dinner earlier and fast for 14-16 hours until breakfast the next morning. Studies suggest that eating only when you’re most active and giving your digestive system a long break each day may help to regulate weight.

## 2017-11-09 ENCOUNTER — HOSPITAL ENCOUNTER (OUTPATIENT)
Dept: PULMONOLOGY | Facility: HOSPITAL | Age: 62
Setting detail: THERAPIES SERIES
Discharge: HOME | End: 2017-11-09
Attending: INTERNAL MEDICINE

## 2017-11-09 VITALS
BODY MASS INDEX: 25.87 KG/M2 | SYSTOLIC BLOOD PRESSURE: 111 MMHG | WEIGHT: 146 LBS | HEIGHT: 63 IN | HEART RATE: 90 BPM | OXYGEN SATURATION: 95 % | DIASTOLIC BLOOD PRESSURE: 71 MMHG

## 2017-11-09 DIAGNOSIS — J44.9 CHRONIC OBSTRUCTIVE PULMONARY DISEASE, UNSPECIFIED COPD TYPE (HCC): Primary | ICD-10-CM

## 2017-11-09 PROCEDURE — G0424 PULMONARY REHAB W EXER: HCPCS

## 2017-11-09 NOTE — PROGRESS NOTES
"Pulmonary Rehab Initial Assessment      Name: Monisha Brasher  :1955 Allergies:Review of patient's allergies indicates no known allergies.   MRN: [id number] 62 y.o. Physician: HEATHER Johnson   Primary Diagnosis:    Diagnosis Plan   1. Chronic obstructive pulmonary disease, unspecified COPD type      Event Date: 2017 Specialist: Pulmonary Rehab   Secondary Diagnosis: Chronic Bronchitis,   Asthma  Note Author: Vernell Steward RRT     Cardiovascular History: HTN     EXERCISE AT HOME  No  Walks dogs          Ambulatory Status:AMB  Ambulatory Fall Risk Assessed on Initial Visit: Yes  Score:40 6 Minute Walk Pre- Pulmonary Rehab:  [id number]      [id number]        RPD: 1              MPH: 2.1mph  Max. HR: 108        SPO2:92      Resting BP: 111/71     Peak BP: 146/84  Recovery BP: 115/76        NUTRITION      Weight Management:                 Weight:146lb  Height: 63\"                                   BMI: 25.5       Alcohol Use: Yes socially Diabetes:No           SOCIAL HISTORY  Social History     Social History   • Marital status:      Spouse name: N/A   • Number of children: N/A   • Years of education: N/A     Social History Main Topics   • Smoking status: Current Every Day Smoker   • Smokeless tobacco: Current User   • Alcohol use No   • Drug use: No   • Sexual activity: Not on file     Other Topics Concern   • Not on file     Social History Narrative    Learning Barriers: No  Family Support:{Yes  Living Arrangement: Lives at home with  Tobacco Adjunct: Yes, [employer] ecigt    Do you live with a smoker: Yes, who smokes in house     PSYCHOSOCIAL  Clinical Depression: No    Stress: None     Assess presence or absence of depression using a valid screening tool:Yes  PHQ9  Score: 0      Are you being hurt, hit, or freightened by anyone at home or in your life? No    Are you being neglected by a caregiver? No Family attends IA:No      COMORBIDITIES  Sleep Apnea: No    Cancer: No    Stroke: " No   Pneumonia: Yes If yes, how many times 1    Osteoporosis: No    GI Problems: No Frequent colds/allergies: No       PAIN:  Are you having pain? No  If yes where is pain? NA If yes, pain scale: 0      PULMONARY:  Do you use a nebulizer?: Yes  If yes,   Albuterol QID prn    Do you use oxygen at home?: No   Do you have a daily cough?: Yes  If yes, choose: Less than 1 TBSP/day    Do you every notice yourself wheezing?: Yes  If yes, when: Lying down     Breath sounds:Crackles in left base   OTHER:  Do you have physical limitations?: No      Do you need assistance with ADLs?: No Do you climb stairs at home?:Yes  5 steps into house    Have you ever attended a pulmonary rehab?: No [facility] Dyspnea Scale: 0 - 4:  1 = shortness of breath when hurrying on the level or walking up a slight hill     Patient Goals: Increase strength and stamina     DISCHARGE PLANNING:  Do you have any home exercise equipment?: No    What are you plans for continuing exercise after completion of pulmonary rehab? Walk and hike.     EDUCATION:  A/P of the Pulmonary System and Diseases  Breathing Techniques  Equipment safety       PRE-PROGRAM ASSESSMENT:  PFT Date: 11-6-2017    FEV1/FVC: 40% FEV1: 36%    FVC: 73% DLCO: NA     Time of arrival: 1010    Time of departure: 1110           11/9/2017  8:13 AM  Vernell Steward, RRT

## 2017-11-14 ENCOUNTER — APPOINTMENT (OUTPATIENT)
Dept: PULMONOLOGY | Facility: HOSPITAL | Age: 62
End: 2017-11-14
Attending: INTERNAL MEDICINE

## 2017-11-15 RX ORDER — ALBUTEROL SULFATE 90 UG/1
AEROSOL, METERED RESPIRATORY (INHALATION)
Qty: 18 G | Refills: 0 | OUTPATIENT
Start: 2017-11-15 | End: 2017-12-22

## 2017-11-15 RX ORDER — AZITHROMYCIN 250 MG/1
TABLET, FILM COATED ORAL
Qty: 6 TABLET | Refills: 0 | OUTPATIENT
Start: 2017-11-15 | End: 2017-12-22

## 2017-11-15 RX ORDER — ESCITALOPRAM OXALATE 20 MG/1
TABLET ORAL
Qty: 30 TABLET | Refills: 5 | Status: SHIPPED | OUTPATIENT
Start: 2017-11-15 | End: 2018-08-17

## 2017-11-16 ENCOUNTER — APPOINTMENT (OUTPATIENT)
Dept: PULMONOLOGY | Facility: HOSPITAL | Age: 62
End: 2017-11-16
Attending: INTERNAL MEDICINE

## 2017-11-20 RX ORDER — MONTELUKAST SODIUM 10 MG/1
TABLET ORAL
Qty: 90 TABLET | Refills: 3 | Status: SHIPPED | OUTPATIENT
Start: 2017-11-20 | End: 2018-10-29 | Stop reason: SDUPTHER

## 2017-11-21 ENCOUNTER — APPOINTMENT (OUTPATIENT)
Dept: PULMONOLOGY | Facility: HOSPITAL | Age: 62
End: 2017-11-21
Attending: INTERNAL MEDICINE

## 2017-11-23 ENCOUNTER — APPOINTMENT (OUTPATIENT)
Dept: PULMONOLOGY | Facility: HOSPITAL | Age: 62
End: 2017-11-23
Attending: INTERNAL MEDICINE

## 2017-11-27 RX ORDER — AMLODIPINE BESYLATE 10 MG/1
TABLET ORAL
Qty: 30 TABLET | Refills: 3 | OUTPATIENT
Start: 2017-11-27 | End: 2017-12-22

## 2017-11-27 RX ORDER — ALBUTEROL SULFATE 90 UG/1
AEROSOL, METERED RESPIRATORY (INHALATION)
Qty: 18 G | Refills: 0 | Status: SHIPPED | OUTPATIENT
Start: 2017-11-27 | End: 2018-06-05 | Stop reason: SDUPTHER

## 2017-11-27 RX ORDER — TIOTROPIUM BROMIDE 18 UG/1
CAPSULE ORAL; RESPIRATORY (INHALATION)
Qty: 30 CAPSULE | Refills: 0 | Status: SHIPPED | OUTPATIENT
Start: 2017-11-27 | End: 2017-12-20 | Stop reason: SDUPTHER

## 2017-11-28 ENCOUNTER — HOSPITAL ENCOUNTER (OUTPATIENT)
Dept: PULMONOLOGY | Facility: HOSPITAL | Age: 62
Setting detail: THERAPIES SERIES
Discharge: HOME | End: 2017-11-28
Attending: INTERNAL MEDICINE

## 2017-11-28 VITALS — OXYGEN SATURATION: 90 % | HEART RATE: 104 BPM | DIASTOLIC BLOOD PRESSURE: 69 MMHG | SYSTOLIC BLOOD PRESSURE: 146 MMHG

## 2017-11-28 DIAGNOSIS — J44.9 CHRONIC OBSTRUCTIVE PULMONARY DISEASE, UNSPECIFIED COPD TYPE (HCC): Primary | ICD-10-CM

## 2017-11-28 PROCEDURE — G0424 PULMONARY REHAB W EXER: HCPCS

## 2017-11-30 ENCOUNTER — HOSPITAL ENCOUNTER (OUTPATIENT)
Dept: PULMONOLOGY | Facility: HOSPITAL | Age: 62
Setting detail: THERAPIES SERIES
Discharge: HOME | End: 2017-11-30
Attending: INTERNAL MEDICINE

## 2017-11-30 VITALS — SYSTOLIC BLOOD PRESSURE: 136 MMHG | HEART RATE: 104 BPM | DIASTOLIC BLOOD PRESSURE: 80 MMHG | OXYGEN SATURATION: 94 %

## 2017-11-30 DIAGNOSIS — J44.9 CHRONIC OBSTRUCTIVE PULMONARY DISEASE, UNSPECIFIED COPD TYPE (HCC): Primary | ICD-10-CM

## 2017-11-30 PROCEDURE — G0424 PULMONARY REHAB W EXER: HCPCS

## 2017-12-08 RX ORDER — METHYLPREDNISOLONE 8 MG/1
TABLET ORAL
Qty: 21 TABLET | Refills: 0 | OUTPATIENT
Start: 2017-12-08 | End: 2017-12-22

## 2017-12-12 PROCEDURE — 87660 TRICHOMONAS VAGIN DIR PROBE: CPT | Performed by: FAMILY MEDICINE

## 2017-12-12 PROCEDURE — 87086 URINE CULTURE/COLONY COUNT: CPT | Performed by: FAMILY MEDICINE

## 2017-12-12 PROCEDURE — 87480 CANDIDA DNA DIR PROBE: CPT | Performed by: FAMILY MEDICINE

## 2017-12-12 PROCEDURE — 87510 GARDNER VAG DNA DIR PROBE: CPT | Performed by: FAMILY MEDICINE

## 2017-12-20 RX ORDER — TIOTROPIUM BROMIDE 18 UG/1
CAPSULE ORAL; RESPIRATORY (INHALATION)
Qty: 30 CAPSULE | Refills: 0 | Status: SHIPPED | OUTPATIENT
Start: 2017-12-20 | End: 2018-01-14 | Stop reason: SDUPTHER

## 2017-12-21 ENCOUNTER — HOSPITAL ENCOUNTER (OUTPATIENT)
Dept: PULMONOLOGY | Facility: HOSPITAL | Age: 62
Setting detail: THERAPIES SERIES
Discharge: HOME | End: 2017-12-21
Attending: INTERNAL MEDICINE

## 2017-12-21 VITALS — DIASTOLIC BLOOD PRESSURE: 80 MMHG | OXYGEN SATURATION: 97 % | SYSTOLIC BLOOD PRESSURE: 136 MMHG | HEART RATE: 100 BPM

## 2017-12-21 DIAGNOSIS — J44.9 CHRONIC OBSTRUCTIVE PULMONARY DISEASE, UNSPECIFIED COPD TYPE (HCC): Primary | ICD-10-CM

## 2017-12-21 PROCEDURE — G0424 PULMONARY REHAB W EXER: HCPCS

## 2017-12-22 RX ORDER — ALBUTEROL SULFATE 90 UG/1
AEROSOL, METERED RESPIRATORY (INHALATION)
Qty: 18 G | Refills: 0 | Status: SHIPPED | OUTPATIENT
Start: 2017-12-22 | End: 2018-05-01

## 2018-01-04 RX ORDER — AZITHROMYCIN 250 MG/1
TABLET, FILM COATED ORAL
Qty: 6 TABLET | Refills: 0 | Status: SHIPPED | OUTPATIENT
Start: 2018-01-04 | End: 2018-05-01

## 2018-01-04 RX ORDER — METHYLPREDNISOLONE 8 MG/1
TABLET ORAL
Qty: 21 TABLET | Refills: 0 | Status: SHIPPED | OUTPATIENT
Start: 2018-01-04 | End: 2018-05-01

## 2018-01-04 RX ORDER — ALBUTEROL SULFATE 90 UG/1
AEROSOL, METERED RESPIRATORY (INHALATION)
Qty: 18 G | Refills: 0 | Status: SHIPPED | OUTPATIENT
Start: 2018-01-04 | End: 2018-05-01

## 2018-01-15 RX ORDER — TIOTROPIUM BROMIDE 18 UG/1
CAPSULE ORAL; RESPIRATORY (INHALATION)
Qty: 30 CAPSULE | Refills: 0 | Status: SHIPPED | OUTPATIENT
Start: 2018-01-15 | End: 2018-02-09 | Stop reason: SDUPTHER

## 2018-01-17 RX ORDER — ALBUTEROL SULFATE 90 UG/1
AEROSOL, METERED RESPIRATORY (INHALATION)
Qty: 18 G | Refills: 0 | Status: SHIPPED | OUTPATIENT
Start: 2018-01-17 | End: 2018-05-01

## 2018-02-01 ENCOUNTER — OFFICE VISIT (OUTPATIENT)
Dept: PULMONOLOGY | Facility: CLINIC | Age: 63
End: 2018-02-01

## 2018-02-01 VITALS
DIASTOLIC BLOOD PRESSURE: 99 MMHG | OXYGEN SATURATION: 96 % | HEIGHT: 63 IN | WEIGHT: 146.8 LBS | HEART RATE: 93 BPM | BODY MASS INDEX: 26.01 KG/M2 | SYSTOLIC BLOOD PRESSURE: 150 MMHG

## 2018-02-01 DIAGNOSIS — J44.9 CHRONIC OBSTRUCTIVE PULMONARY DISEASE, UNSPECIFIED COPD TYPE (HCC): ICD-10-CM

## 2018-02-01 DIAGNOSIS — J45.40 MODERATE PERSISTENT ASTHMA WITHOUT COMPLICATION: ICD-10-CM

## 2018-02-01 DIAGNOSIS — J41.8 MIXED SIMPLE AND MUCOPURULENT CHRONIC BRONCHITIS (HCC): Primary | ICD-10-CM

## 2018-02-01 PROCEDURE — 96372 THER/PROPH/DIAG INJ SC/IM: CPT | Performed by: INTERNAL MEDICINE

## 2018-02-01 PROCEDURE — 99213 OFFICE O/P EST LOW 20 MIN: CPT | Performed by: INTERNAL MEDICINE

## 2018-02-01 RX ORDER — BETAMETHASONE SODIUM PHOSPHATE AND BETAMETHASONE ACETATE 3; 3 MG/ML; MG/ML
6 INJECTION, SUSPENSION INTRA-ARTICULAR; INTRALESIONAL; INTRAMUSCULAR; SOFT TISSUE ONCE
Status: COMPLETED | OUTPATIENT
Start: 2018-02-01 | End: 2018-02-01

## 2018-02-01 RX ORDER — AZITHROMYCIN 250 MG/1
TABLET, FILM COATED ORAL
Qty: 6 TABLET | Refills: 1 | Status: SHIPPED | OUTPATIENT
Start: 2018-02-01 | End: 2018-04-09 | Stop reason: SDUPTHER

## 2018-02-01 RX ORDER — PREDNISONE 10 MG/1
TABLET ORAL
Qty: 21 TABLET | Refills: 0 | Status: SHIPPED | OUTPATIENT
Start: 2018-02-01 | End: 2018-03-17 | Stop reason: SDUPTHER

## 2018-02-01 RX ORDER — ALBUTEROL SULFATE 2.5 MG/3ML
SOLUTION RESPIRATORY (INHALATION)
Qty: 360 ML | Refills: 0 | Status: SHIPPED | OUTPATIENT
Start: 2018-02-01 | End: 2018-05-06 | Stop reason: SDUPTHER

## 2018-02-01 RX ADMIN — BETAMETHASONE SODIUM PHOSPHATE AND BETAMETHASONE ACETATE 6 MG: 3; 3 INJECTION, SUSPENSION INTRA-ARTICULAR; INTRALESIONAL; INTRAMUSCULAR; SOFT TISSUE at 10:58

## 2018-02-01 NOTE — PROGRESS NOTES
"This lady has COPD with a components of asthma and chronic bronchitis.  She is trying to quit smoking.  She complains of cough chest congestion and purulent drainage for several days    ROS    Constitutional-no night sweats weight loss headaches  GI no abdominal pain nausea or diarrhea  Neuro no seizure or neurologic deficits  Musculoskeletal no deformity or joint pain   no dysuria or hematuria  Skin no rash or other lesions  All other systems reviewed and were negative except for the above.      Physical Exam  /99 (BP Location: Left arm, Patient Position: Sitting, Cuff Size: Small Adult)  Pulse 93  Ht 158.8 cm (62.5\")  Wt 66.6 kg (146 lb 12.8 oz)  SpO2 96%  BMI 26.42 kg/m2  Vital signs as above  Pupils equally round and reactive to light and accommodation, neck no JVD or adenopathy.  Cardiovascular regular rhythm and rate no murmur or gallop.  Abdomen soft no organomegaly tenderness.  Extremities no clubbing cyanosis or edema.  No cervical adenopathy.  No skin rash.  Neurologic good strength bilaterally without deficits  Nose is mildly congested lungs reveal wheezes and rhonchi    Impression COPD and asthma with mild exacerbation    Plan Celestone 2 cc IM, Zithromax if needed, prednisone if needed, continue routine meds, return in 3 months          This document has been electronically signed by Gigi Ac MD on February 1, 2018 10:56 AM      "

## 2018-02-06 RX ORDER — ALBUTEROL SULFATE 90 UG/1
AEROSOL, METERED RESPIRATORY (INHALATION)
Qty: 18 G | Refills: 0 | Status: SHIPPED | OUTPATIENT
Start: 2018-02-06 | End: 2018-05-01

## 2018-02-08 ENCOUNTER — APPOINTMENT (OUTPATIENT)
Dept: PULMONOLOGY | Facility: HOSPITAL | Age: 63
End: 2018-02-08
Attending: INTERNAL MEDICINE

## 2018-02-12 RX ORDER — TIOTROPIUM BROMIDE 18 UG/1
CAPSULE ORAL; RESPIRATORY (INHALATION)
Qty: 30 CAPSULE | Refills: 0 | Status: SHIPPED | OUTPATIENT
Start: 2018-02-12 | End: 2018-03-06 | Stop reason: SDUPTHER

## 2018-02-13 ENCOUNTER — APPOINTMENT (OUTPATIENT)
Dept: PULMONOLOGY | Facility: HOSPITAL | Age: 63
End: 2018-02-13
Attending: INTERNAL MEDICINE

## 2018-02-15 ENCOUNTER — APPOINTMENT (OUTPATIENT)
Dept: PULMONOLOGY | Facility: HOSPITAL | Age: 63
End: 2018-02-15
Attending: INTERNAL MEDICINE

## 2018-02-19 RX ORDER — ALBUTEROL SULFATE 90 UG/1
AEROSOL, METERED RESPIRATORY (INHALATION)
Qty: 18 G | Refills: 0 | Status: SHIPPED | OUTPATIENT
Start: 2018-02-19 | End: 2018-05-01

## 2018-02-20 ENCOUNTER — APPOINTMENT (OUTPATIENT)
Dept: PULMONOLOGY | Facility: HOSPITAL | Age: 63
End: 2018-02-20
Attending: INTERNAL MEDICINE

## 2018-02-22 ENCOUNTER — APPOINTMENT (OUTPATIENT)
Dept: PULMONOLOGY | Facility: HOSPITAL | Age: 63
End: 2018-02-22
Attending: INTERNAL MEDICINE

## 2018-02-27 ENCOUNTER — APPOINTMENT (OUTPATIENT)
Dept: PULMONOLOGY | Facility: HOSPITAL | Age: 63
End: 2018-02-27
Attending: INTERNAL MEDICINE

## 2018-03-01 ENCOUNTER — APPOINTMENT (OUTPATIENT)
Dept: PULMONOLOGY | Facility: HOSPITAL | Age: 63
End: 2018-03-01
Attending: INTERNAL MEDICINE

## 2018-03-06 ENCOUNTER — APPOINTMENT (OUTPATIENT)
Dept: PULMONOLOGY | Facility: HOSPITAL | Age: 63
End: 2018-03-06
Attending: INTERNAL MEDICINE

## 2018-03-06 RX ORDER — ALBUTEROL SULFATE 90 UG/1
AEROSOL, METERED RESPIRATORY (INHALATION)
Qty: 18 G | Refills: 0 | Status: SHIPPED | OUTPATIENT
Start: 2018-03-06 | End: 2018-05-01

## 2018-03-06 RX ORDER — TIOTROPIUM BROMIDE 18 UG/1
CAPSULE ORAL; RESPIRATORY (INHALATION)
Qty: 30 CAPSULE | Refills: 0 | Status: SHIPPED | OUTPATIENT
Start: 2018-03-06 | End: 2018-03-30 | Stop reason: SDUPTHER

## 2018-03-06 RX ORDER — AMLODIPINE BESYLATE 10 MG/1
TABLET ORAL
Qty: 30 TABLET | Refills: 3 | Status: SHIPPED | OUTPATIENT
Start: 2018-03-06 | End: 2018-07-04 | Stop reason: SDUPTHER

## 2018-03-08 ENCOUNTER — APPOINTMENT (OUTPATIENT)
Dept: PULMONOLOGY | Facility: HOSPITAL | Age: 63
End: 2018-03-08
Attending: INTERNAL MEDICINE

## 2018-03-13 ENCOUNTER — APPOINTMENT (OUTPATIENT)
Dept: PULMONOLOGY | Facility: HOSPITAL | Age: 63
End: 2018-03-13
Attending: INTERNAL MEDICINE

## 2018-03-15 ENCOUNTER — APPOINTMENT (OUTPATIENT)
Dept: PULMONOLOGY | Facility: HOSPITAL | Age: 63
End: 2018-03-15
Attending: INTERNAL MEDICINE

## 2018-03-19 RX ORDER — PREDNISONE 10 MG/1
TABLET ORAL
Qty: 21 TABLET | Refills: 0 | Status: SHIPPED | OUTPATIENT
Start: 2018-03-19 | End: 2018-04-09 | Stop reason: SDUPTHER

## 2018-03-23 RX ORDER — ALBUTEROL SULFATE 90 UG/1
AEROSOL, METERED RESPIRATORY (INHALATION)
Qty: 18 G | Refills: 0 | Status: SHIPPED | OUTPATIENT
Start: 2018-03-23 | End: 2018-05-01

## 2018-03-27 RX ORDER — ALBUTEROL SULFATE 90 UG/1
AEROSOL, METERED RESPIRATORY (INHALATION)
Qty: 18 G | Refills: 0 | Status: SHIPPED | OUTPATIENT
Start: 2018-03-27 | End: 2018-05-01

## 2018-03-30 RX ORDER — TIOTROPIUM BROMIDE 18 UG/1
CAPSULE ORAL; RESPIRATORY (INHALATION)
Qty: 30 CAPSULE | Refills: 0 | Status: SHIPPED | OUTPATIENT
Start: 2018-03-30 | End: 2018-05-01 | Stop reason: SDUPTHER

## 2018-04-04 RX ORDER — ALBUTEROL SULFATE 90 UG/1
AEROSOL, METERED RESPIRATORY (INHALATION)
Qty: 18 G | Refills: 0 | Status: SHIPPED | OUTPATIENT
Start: 2018-04-04 | End: 2018-05-01

## 2018-04-10 RX ORDER — PREDNISONE 10 MG/1
TABLET ORAL
Qty: 21 TABLET | Refills: 0 | Status: SHIPPED | OUTPATIENT
Start: 2018-04-10 | End: 2018-04-17 | Stop reason: SDUPTHER

## 2018-04-10 RX ORDER — AZITHROMYCIN 250 MG/1
TABLET, FILM COATED ORAL
Qty: 6 TABLET | Refills: 0 | Status: SHIPPED | OUTPATIENT
Start: 2018-04-10 | End: 2018-04-17 | Stop reason: SDUPTHER

## 2018-04-18 RX ORDER — AZITHROMYCIN 250 MG/1
TABLET, FILM COATED ORAL
Qty: 6 TABLET | Refills: 0 | Status: SHIPPED | OUTPATIENT
Start: 2018-04-18 | End: 2018-05-01

## 2018-04-18 RX ORDER — PREDNISONE 10 MG/1
TABLET ORAL
Qty: 21 TABLET | Refills: 0 | Status: SHIPPED | OUTPATIENT
Start: 2018-04-18 | End: 2018-06-05

## 2018-05-01 ENCOUNTER — OFFICE VISIT (OUTPATIENT)
Dept: PULMONOLOGY | Facility: CLINIC | Age: 63
End: 2018-05-01

## 2018-05-01 VITALS
HEIGHT: 63 IN | SYSTOLIC BLOOD PRESSURE: 150 MMHG | BODY MASS INDEX: 26.06 KG/M2 | HEART RATE: 107 BPM | DIASTOLIC BLOOD PRESSURE: 98 MMHG | OXYGEN SATURATION: 95 % | WEIGHT: 147.1 LBS

## 2018-05-01 DIAGNOSIS — J41.8 MIXED SIMPLE AND MUCOPURULENT CHRONIC BRONCHITIS (HCC): Primary | ICD-10-CM

## 2018-05-01 DIAGNOSIS — J45.21 MILD INTERMITTENT ASTHMA WITH ACUTE EXACERBATION: ICD-10-CM

## 2018-05-01 PROCEDURE — 99214 OFFICE O/P EST MOD 30 MIN: CPT | Performed by: INTERNAL MEDICINE

## 2018-05-01 PROCEDURE — 96372 THER/PROPH/DIAG INJ SC/IM: CPT | Performed by: INTERNAL MEDICINE

## 2018-05-01 RX ORDER — TIOTROPIUM BROMIDE 18 UG/1
CAPSULE ORAL; RESPIRATORY (INHALATION)
Qty: 30 CAPSULE | Refills: 0 | Status: SHIPPED | OUTPATIENT
Start: 2018-05-01 | End: 2018-06-11 | Stop reason: SDUPTHER

## 2018-05-01 RX ORDER — BETAMETHASONE SODIUM PHOSPHATE AND BETAMETHASONE ACETATE 3; 3 MG/ML; MG/ML
6 INJECTION, SUSPENSION INTRA-ARTICULAR; INTRALESIONAL; INTRAMUSCULAR; SOFT TISSUE ONCE
Status: COMPLETED | OUTPATIENT
Start: 2018-05-01 | End: 2018-05-01

## 2018-05-01 RX ADMIN — BETAMETHASONE SODIUM PHOSPHATE AND BETAMETHASONE ACETATE 6 MG: 3; 3 INJECTION, SUSPENSION INTRA-ARTICULAR; INTRALESIONAL; INTRAMUSCULAR; SOFT TISSUE at 10:46

## 2018-05-01 NOTE — PROGRESS NOTES
"This lady has COPD with persistent cough wheeze and dyspnea on minimal exertion.  She is not producing purulent sputum and she denies chest pain.  She has an asthmatic component to her COPD.    ROS    Constitutional-no night sweats weight loss headaches  GI no abdominal pain nausea or diarrhea  Neuro no seizure or neurologic deficits  Musculoskeletal no deformity or joint pain   no dysuria or hematuria  Skin no rash or other lesions  All other systems reviewed and were negative except for the above.      Physical Exam  /98 (BP Location: Left arm, Patient Position: Sitting, Cuff Size: Adult)   Pulse 107   Ht 158.8 cm (62.5\")   Wt 66.7 kg (147 lb 1.6 oz)   SpO2 95%   BMI 26.48 kg/m²   Vital signs as above  Pupils equally round and reactive to light and accommodation, neck no JVD or adenopathy.  Cardiovascular regular rhythm and rate no murmur or gallop.  Abdomen soft no organomegaly tenderness.  Extremities no clubbing cyanosis or edema.  No cervical adenopathy.  No skin rash.  Neurologic good strength bilaterally without deficits  Dyspneic white female lungs reveal mild wheeze diminished breath sounds and prolonged expiration    COPD exacerbation, asthmatic component    Plan refrain from smoking, Celestone 2 cc IM, a trial of Symbicort instead of Advair, return in 2 weeks          This document has been electronically signed by Gigi Ac MD on May 1, 2018 10:42 AM      "

## 2018-05-07 RX ORDER — ALBUTEROL SULFATE 90 UG/1
AEROSOL, METERED RESPIRATORY (INHALATION)
Qty: 18 G | Refills: 0 | Status: SHIPPED | OUTPATIENT
Start: 2018-05-07 | End: 2018-06-05 | Stop reason: SDUPTHER

## 2018-05-07 RX ORDER — AZITHROMYCIN 250 MG/1
TABLET, FILM COATED ORAL
Qty: 6 TABLET | Refills: 0 | Status: SHIPPED | OUTPATIENT
Start: 2018-05-07 | End: 2018-06-05

## 2018-05-07 RX ORDER — ALBUTEROL SULFATE 2.5 MG/3ML
SOLUTION RESPIRATORY (INHALATION)
Qty: 360 ML | Refills: 0 | Status: SHIPPED | OUTPATIENT
Start: 2018-05-07 | End: 2018-06-11 | Stop reason: SDUPTHER

## 2018-05-17 ENCOUNTER — OFFICE VISIT (OUTPATIENT)
Dept: PULMONOLOGY | Facility: CLINIC | Age: 63
End: 2018-05-17

## 2018-05-17 ENCOUNTER — HOSPITAL ENCOUNTER (OUTPATIENT)
Dept: GENERAL RADIOLOGY | Facility: HOSPITAL | Age: 63
Discharge: HOME | End: 2018-05-17
Attending: INTERNAL MEDICINE | Admitting: INTERNAL MEDICINE

## 2018-05-17 VITALS
OXYGEN SATURATION: 96 % | WEIGHT: 148.4 LBS | BODY MASS INDEX: 24.72 KG/M2 | DIASTOLIC BLOOD PRESSURE: 95 MMHG | HEART RATE: 102 BPM | HEIGHT: 65 IN | SYSTOLIC BLOOD PRESSURE: 166 MMHG

## 2018-05-17 DIAGNOSIS — J41.8 MIXED SIMPLE AND MUCOPURULENT CHRONIC BRONCHITIS (HCC): ICD-10-CM

## 2018-05-17 DIAGNOSIS — J43.1 PANLOBULAR EMPHYSEMA (HCC): Primary | ICD-10-CM

## 2018-05-17 DIAGNOSIS — J45.909 PERSISTENT ASTHMA WITHOUT COMPLICATION, UNSPECIFIED ASTHMA SEVERITY: ICD-10-CM

## 2018-05-17 PROCEDURE — 96372 THER/PROPH/DIAG INJ SC/IM: CPT | Performed by: INTERNAL MEDICINE

## 2018-05-17 PROCEDURE — 99214 OFFICE O/P EST MOD 30 MIN: CPT | Performed by: INTERNAL MEDICINE

## 2018-05-17 PROCEDURE — 71046 X-RAY EXAM CHEST 2 VIEWS: CPT

## 2018-05-17 RX ORDER — PREDNISONE 20 MG/1
20 TABLET ORAL DAILY
Qty: 30 TABLET | Refills: 0 | Status: SHIPPED | OUTPATIENT
Start: 2018-05-17 | End: 2018-06-01 | Stop reason: SDUPTHER

## 2018-05-17 RX ORDER — AZITHROMYCIN 250 MG/1
TABLET, FILM COATED ORAL
Qty: 6 TABLET | Refills: 1 | Status: SHIPPED | OUTPATIENT
Start: 2018-05-17 | End: 2018-06-05 | Stop reason: SDUPTHER

## 2018-05-17 RX ORDER — BUDESONIDE AND FORMOTEROL FUMARATE DIHYDRATE 160; 4.5 UG/1; UG/1
AEROSOL RESPIRATORY (INHALATION)
Qty: 1 INHALER | Refills: 5 | Status: SHIPPED | OUTPATIENT
Start: 2018-05-17 | End: 2018-11-23 | Stop reason: HOSPADM

## 2018-05-17 RX ORDER — BETAMETHASONE SODIUM PHOSPHATE AND BETAMETHASONE ACETATE 3; 3 MG/ML; MG/ML
6 INJECTION, SUSPENSION INTRA-ARTICULAR; INTRALESIONAL; INTRAMUSCULAR; SOFT TISSUE ONCE
Status: COMPLETED | OUTPATIENT
Start: 2018-05-17 | End: 2018-05-17

## 2018-05-17 RX ADMIN — BETAMETHASONE SODIUM PHOSPHATE AND BETAMETHASONE ACETATE 6 MG: 3; 3 INJECTION, SUSPENSION INTRA-ARTICULAR; INTRALESIONAL; INTRAMUSCULAR; SOFT TISSUE at 11:12

## 2018-05-17 NOTE — PROGRESS NOTES
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  She complains of persistent cough wheeze purulent sputum and purulent nasal drainage.  She has dyspnea at rest and minimal exertion.  She also complains of a several day history of left pleuritic chest pain after coughing.  She believes Symbicort helped.    ROS    Constitutional-no night sweats weight loss headaches  GI no abdominal pain nausea or diarrhea  Neuro no seizure or neurologic deficits  Musculoskeletal no deformity or joint pain   no dysuria or hematuria  Skin no rash or other lesions  All other systems reviewed and were negative except for the above.      Physical Exam  /95 (BP Location: Left arm, Patient Position: Sitting, Cuff Size: Adult)   Pulse 102   Ht 165.1 cm (65\")   Wt 67.3 kg (148 lb 6.4 oz)   SpO2 96%   BMI 24.70 kg/m²   Vital signs as above  Pupils equally round and reactive to light and accommodation, neck no JVD or adenopathy.  Cardiovascular regular rhythm and rate no murmur or gallop.  Abdomen soft no organomegaly tenderness.  Extremities no clubbing cyanosis or edema.  No cervical adenopathy.  No skin rash.  Neurologic good strength bilaterally without deficits  Dyspneic white female nose is congested lungs reveal wheezes and rhonchi diminished breath sounds or prolonged expiration.  She is splinting her left chest with coughing    Impression emphysema, COPD exacerbation, pleuritic chest pain, acute bronchitis    Plan Celestone 2 cc IM, Zithromax, prednisone 20 mg tapered, Symbicort, chest x-ray today, return in 2 weeks or so          This document has been electronically signed by Gigi Ac MD on May 17, 2018 10:57 AM      "

## 2018-05-29 RX ORDER — ALBUTEROL SULFATE 90 UG/1
AEROSOL, METERED RESPIRATORY (INHALATION)
Qty: 18 G | Refills: 0 | Status: SHIPPED | OUTPATIENT
Start: 2018-05-29 | End: 2018-08-17 | Stop reason: SDUPTHER

## 2018-06-01 RX ORDER — PREDNISONE 20 MG/1
20 TABLET ORAL DAILY
Qty: 30 TABLET | Refills: 0 | Status: SHIPPED | OUTPATIENT
Start: 2018-06-01 | End: 2018-08-15 | Stop reason: SDUPTHER

## 2018-06-05 ENCOUNTER — OFFICE VISIT (OUTPATIENT)
Dept: PULMONOLOGY | Facility: CLINIC | Age: 63
End: 2018-06-05

## 2018-06-05 VITALS
OXYGEN SATURATION: 96 % | HEIGHT: 63 IN | BODY MASS INDEX: 26.19 KG/M2 | SYSTOLIC BLOOD PRESSURE: 132 MMHG | DIASTOLIC BLOOD PRESSURE: 85 MMHG | WEIGHT: 147.8 LBS | HEART RATE: 106 BPM

## 2018-06-05 DIAGNOSIS — J45.41 MODERATE PERSISTENT ASTHMA WITH ACUTE EXACERBATION: Primary | ICD-10-CM

## 2018-06-05 PROCEDURE — 99214 OFFICE O/P EST MOD 30 MIN: CPT | Performed by: INTERNAL MEDICINE

## 2018-06-05 PROCEDURE — 96372 THER/PROPH/DIAG INJ SC/IM: CPT | Performed by: INTERNAL MEDICINE

## 2018-06-05 RX ORDER — PREDNISONE 20 MG/1
20 TABLET ORAL DAILY
Qty: 30 TABLET | Refills: 0 | Status: SHIPPED | OUTPATIENT
Start: 2018-06-05 | End: 2018-08-17

## 2018-06-05 RX ORDER — METHYLPREDNISOLONE ACETATE 40 MG/ML
80 INJECTION, SUSPENSION INTRA-ARTICULAR; INTRALESIONAL; INTRAMUSCULAR; SOFT TISSUE ONCE
Status: COMPLETED | OUTPATIENT
Start: 2018-06-05 | End: 2018-06-05

## 2018-06-05 RX ADMIN — METHYLPREDNISOLONE ACETATE 80 MG: 40 INJECTION, SUSPENSION INTRA-ARTICULAR; INTRALESIONAL; INTRAMUSCULAR; SOFT TISSUE at 11:18

## 2018-06-05 NOTE — PROGRESS NOTES
"This lady has COPD and emphysema with an asthmatic component and allergic rhinitis.  For the past several days she has noted increasing shortness of breath cough wheeze dyspnea on exertion and increased nasal congestion.  She denies purulent nasal drainage or chest pain.    ROS    Constitutional-no night sweats weight loss headaches  GI no abdominal pain nausea or diarrhea  Neuro no seizure or neurologic deficits  Musculoskeletal no deformity or joint pain   no dysuria or hematuria  Skin no rash or other lesions  All other systems reviewed and were negative except for the above.      Physical Exam  /85 (BP Location: Left arm, Patient Position: Sitting, Cuff Size: Adult)   Pulse 106   Ht 158.8 cm (62.5\")   Wt 67 kg (147 lb 12.8 oz)   SpO2 96%   BMI 26.60 kg/m²   Vital signs as above  Pupils equally round and reactive to light and accommodation, neck no JVD or adenopathy.  Cardiovascular regular rhythm and rate no murmur or gallop.  Abdomen soft no organomegaly tenderness.  Extremities no clubbing cyanosis or edema.  No cervical adenopathy.  No skin rash.  Neurologic good strength bilaterally without deficits  Lungs reveal diminished breath sounds with wheezes and rhonchi, nose is congested    Impression asthma with status asthmaticus, COPD, rhinitis exacerbation    Celestone 2 cc IM, prednisone 20 mg take until improved then discontinue, continue routine medications, return in 3 weeks          This document has been electronically signed by Gigi Ac MD on June 5, 2018 11:04 AM      "

## 2018-06-06 RX ORDER — ALBUTEROL SULFATE 90 UG/1
AEROSOL, METERED RESPIRATORY (INHALATION)
Qty: 18 G | Refills: 0 | Status: SHIPPED | OUTPATIENT
Start: 2018-06-06 | End: 2018-08-17 | Stop reason: SDUPTHER

## 2018-06-11 RX ORDER — ALBUTEROL SULFATE 2.5 MG/3ML
SOLUTION RESPIRATORY (INHALATION)
Qty: 360 ML | Refills: 0 | Status: SHIPPED | OUTPATIENT
Start: 2018-06-11 | End: 2018-08-16 | Stop reason: SDUPTHER

## 2018-06-11 RX ORDER — TIOTROPIUM BROMIDE 18 UG/1
CAPSULE ORAL; RESPIRATORY (INHALATION)
Qty: 30 CAPSULE | Refills: 5 | Status: SHIPPED | OUTPATIENT
Start: 2018-06-11 | End: 2018-11-30 | Stop reason: SDUPTHER

## 2018-06-11 RX ORDER — AZITHROMYCIN 250 MG/1
TABLET, FILM COATED ORAL
Qty: 6 TABLET | Refills: 0 | Status: SHIPPED | OUTPATIENT
Start: 2018-06-11 | End: 2018-07-05 | Stop reason: SDUPTHER

## 2018-06-26 RX ORDER — ALBUTEROL SULFATE 90 UG/1
AEROSOL, METERED RESPIRATORY (INHALATION)
Qty: 18 G | Refills: 0 | Status: SHIPPED | OUTPATIENT
Start: 2018-06-26 | End: 2018-08-17 | Stop reason: SDUPTHER

## 2018-07-05 RX ORDER — AMLODIPINE BESYLATE 10 MG/1
10 TABLET ORAL DAILY
Qty: 30 TABLET | Refills: 0 | Status: SHIPPED | OUTPATIENT
Start: 2018-07-05 | End: 2018-08-06 | Stop reason: SDUPTHER

## 2018-07-05 RX ORDER — AMLODIPINE BESYLATE 10 MG/1
TABLET ORAL
Qty: 30 TABLET | Refills: 0 | Status: SHIPPED | OUTPATIENT
Start: 2018-07-05 | End: 2018-07-05 | Stop reason: SDUPTHER

## 2018-07-09 RX ORDER — ALBUTEROL SULFATE 90 UG/1
AEROSOL, METERED RESPIRATORY (INHALATION)
Qty: 18 G | Refills: 0 | Status: SHIPPED | OUTPATIENT
Start: 2018-07-09 | End: 2018-08-17 | Stop reason: SDUPTHER

## 2018-07-09 RX ORDER — AZITHROMYCIN 250 MG/1
TABLET, FILM COATED ORAL
Qty: 6 TABLET | Refills: 0 | Status: SHIPPED | OUTPATIENT
Start: 2018-07-09 | End: 2018-08-17

## 2018-08-06 RX ORDER — AMLODIPINE BESYLATE 10 MG/1
TABLET ORAL
Qty: 30 TABLET | Refills: 0 | Status: SHIPPED | OUTPATIENT
Start: 2018-08-06 | End: 2018-09-02 | Stop reason: SDUPTHER

## 2018-08-08 RX ORDER — ALBUTEROL SULFATE 90 UG/1
AEROSOL, METERED RESPIRATORY (INHALATION)
Qty: 18 G | Refills: 0 | Status: SHIPPED | OUTPATIENT
Start: 2018-08-08 | End: 2018-11-23 | Stop reason: HOSPADM

## 2018-08-16 ENCOUNTER — TELEPHONE (OUTPATIENT)
Dept: FAMILY MEDICINE CLINIC | Facility: CLINIC | Age: 63
End: 2018-08-16

## 2018-08-16 RX ORDER — PREDNISONE 20 MG/1
20 TABLET ORAL DAILY
Qty: 30 TABLET | Refills: 0 | Status: SHIPPED | OUTPATIENT
Start: 2018-08-16 | End: 2018-11-23 | Stop reason: HOSPADM

## 2018-08-17 ENCOUNTER — APPOINTMENT (OUTPATIENT)
Dept: LAB | Facility: HOSPITAL | Age: 63
End: 2018-08-17

## 2018-08-17 ENCOUNTER — OFFICE VISIT (OUTPATIENT)
Dept: FAMILY MEDICINE CLINIC | Facility: CLINIC | Age: 63
End: 2018-08-17

## 2018-08-17 VITALS
DIASTOLIC BLOOD PRESSURE: 90 MMHG | SYSTOLIC BLOOD PRESSURE: 130 MMHG | WEIGHT: 152 LBS | BODY MASS INDEX: 26.93 KG/M2 | HEIGHT: 63 IN

## 2018-08-17 DIAGNOSIS — Z13.820 SCREENING FOR OSTEOPOROSIS: ICD-10-CM

## 2018-08-17 DIAGNOSIS — R73.9 HYPERGLYCEMIA: ICD-10-CM

## 2018-08-17 DIAGNOSIS — Z00.00 GENERAL MEDICAL EXAM: ICD-10-CM

## 2018-08-17 DIAGNOSIS — Z12.31 ENCOUNTER FOR SCREENING MAMMOGRAM FOR MALIGNANT NEOPLASM OF BREAST: ICD-10-CM

## 2018-08-17 DIAGNOSIS — F41.9 ANXIETY: Primary | ICD-10-CM

## 2018-08-17 DIAGNOSIS — R53.81 MALAISE: ICD-10-CM

## 2018-08-17 LAB
25(OH)D3 SERPL-MCNC: 41.8 NG/ML (ref 30–100)
ALBUMIN SERPL-MCNC: 4.6 G/DL (ref 3.4–4.8)
ALBUMIN/GLOB SERPL: 1.4 G/DL (ref 1.1–1.8)
ALP SERPL-CCNC: 79 U/L (ref 38–126)
ALT SERPL W P-5'-P-CCNC: 51 U/L (ref 9–52)
ANION GAP SERPL CALCULATED.3IONS-SCNC: 8 MMOL/L (ref 5–15)
ARTICHOKE IGE QN: 145 MG/DL (ref 1–129)
AST SERPL-CCNC: 32 U/L (ref 14–36)
BASOPHILS # BLD AUTO: 0.01 10*3/MM3 (ref 0–0.2)
BASOPHILS NFR BLD AUTO: 0.1 % (ref 0–2)
BILIRUB SERPL-MCNC: 0.8 MG/DL (ref 0.2–1.3)
BUN BLD-MCNC: 11 MG/DL (ref 7–21)
BUN/CREAT SERPL: 19.6 (ref 7–25)
CALCIUM SPEC-SCNC: 9.2 MG/DL (ref 8.4–10.2)
CHLORIDE SERPL-SCNC: 102 MMOL/L (ref 95–110)
CHOLEST SERPL-MCNC: 238 MG/DL (ref 0–199)
CO2 SERPL-SCNC: 28 MMOL/L (ref 22–31)
CREAT BLD-MCNC: 0.56 MG/DL (ref 0.5–1)
DEPRECATED RDW RBC AUTO: 48.7 FL (ref 36.4–46.3)
EOSINOPHIL # BLD AUTO: 0.09 10*3/MM3 (ref 0–0.7)
EOSINOPHIL NFR BLD AUTO: 1 % (ref 0–7)
ERYTHROCYTE [DISTWIDTH] IN BLOOD BY AUTOMATED COUNT: 13.8 % (ref 11.5–14.5)
GFR SERPL CREATININE-BSD FRML MDRD: 110 ML/MIN/1.73 (ref 45–104)
GLOBULIN UR ELPH-MCNC: 3.2 GM/DL (ref 2.3–3.5)
GLUCOSE BLD-MCNC: 128 MG/DL (ref 60–100)
HBA1C MFR BLD: 6.3 % (ref 4–5.6)
HCT VFR BLD AUTO: 48.4 % (ref 35–45)
HCV AB SER DONR QL: NEGATIVE
HDLC SERPL-MCNC: 55 MG/DL (ref 60–200)
HGB BLD-MCNC: 16.3 G/DL (ref 12–15.5)
IMM GRANULOCYTES # BLD: 0.04 10*3/MM3 (ref 0–0.02)
IMM GRANULOCYTES NFR BLD: 0.4 % (ref 0–0.5)
IRON 24H UR-MRATE: 83 MCG/DL (ref 37–170)
LDLC/HDLC SERPL: 2.45 {RATIO} (ref 0–3.22)
LYMPHOCYTES # BLD AUTO: 1.25 10*3/MM3 (ref 0.6–4.2)
LYMPHOCYTES NFR BLD AUTO: 13.9 % (ref 10–50)
MAGNESIUM SERPL-MCNC: 2.3 MG/DL (ref 1.6–2.3)
MCH RBC QN AUTO: 32.3 PG (ref 26.5–34)
MCHC RBC AUTO-ENTMCNC: 33.7 G/DL (ref 31.4–36)
MCV RBC AUTO: 96 FL (ref 80–98)
MONOCYTES # BLD AUTO: 0.58 10*3/MM3 (ref 0–0.9)
MONOCYTES NFR BLD AUTO: 6.5 % (ref 0–12)
NEUTROPHILS # BLD AUTO: 7.02 10*3/MM3 (ref 2–8.6)
NEUTROPHILS NFR BLD AUTO: 78.1 % (ref 37–80)
NRBC BLD MANUAL-RTO: 0 /100 WBC (ref 0–0)
PLATELET # BLD AUTO: 272 10*3/MM3 (ref 150–450)
PMV BLD AUTO: 8.9 FL (ref 8–12)
POTASSIUM BLD-SCNC: 3.9 MMOL/L (ref 3.5–5.1)
PROT SERPL-MCNC: 7.8 G/DL (ref 6.3–8.6)
RBC # BLD AUTO: 5.04 10*6/MM3 (ref 3.77–5.16)
SODIUM BLD-SCNC: 138 MMOL/L (ref 137–145)
TRIGL SERPL-MCNC: 241 MG/DL (ref 20–199)
TSH SERPL DL<=0.05 MIU/L-ACNC: 0.63 MIU/ML (ref 0.46–4.68)
VIT B12 BLD-MCNC: 753 PG/ML (ref 239–931)
WBC NRBC COR # BLD: 8.99 10*3/MM3 (ref 3.2–9.8)

## 2018-08-17 PROCEDURE — 85025 COMPLETE CBC W/AUTO DIFF WBC: CPT | Performed by: NURSE PRACTITIONER

## 2018-08-17 PROCEDURE — 80061 LIPID PANEL: CPT | Performed by: NURSE PRACTITIONER

## 2018-08-17 PROCEDURE — 86803 HEPATITIS C AB TEST: CPT | Performed by: NURSE PRACTITIONER

## 2018-08-17 PROCEDURE — 83540 ASSAY OF IRON: CPT | Performed by: NURSE PRACTITIONER

## 2018-08-17 PROCEDURE — 83036 HEMOGLOBIN GLYCOSYLATED A1C: CPT | Performed by: NURSE PRACTITIONER

## 2018-08-17 PROCEDURE — 36415 COLL VENOUS BLD VENIPUNCTURE: CPT | Performed by: NURSE PRACTITIONER

## 2018-08-17 PROCEDURE — 99213 OFFICE O/P EST LOW 20 MIN: CPT | Performed by: NURSE PRACTITIONER

## 2018-08-17 PROCEDURE — 80053 COMPREHEN METABOLIC PANEL: CPT | Performed by: NURSE PRACTITIONER

## 2018-08-17 PROCEDURE — 83735 ASSAY OF MAGNESIUM: CPT | Performed by: NURSE PRACTITIONER

## 2018-08-17 PROCEDURE — 82607 VITAMIN B-12: CPT | Performed by: NURSE PRACTITIONER

## 2018-08-17 PROCEDURE — 84443 ASSAY THYROID STIM HORMONE: CPT | Performed by: NURSE PRACTITIONER

## 2018-08-17 PROCEDURE — 82306 VITAMIN D 25 HYDROXY: CPT | Performed by: NURSE PRACTITIONER

## 2018-08-17 RX ORDER — PAROXETINE 10 MG/1
10 TABLET, FILM COATED ORAL EVERY MORNING
Qty: 30 TABLET | Refills: 11 | Status: SHIPPED | OUTPATIENT
Start: 2018-08-17 | End: 2019-02-06

## 2018-08-17 RX ORDER — BLOOD-GLUCOSE METER
1 KIT MISCELLANEOUS DAILY
Qty: 1 EACH | Refills: 0 | Status: SHIPPED | OUTPATIENT
Start: 2018-08-17

## 2018-08-17 RX ORDER — ALPRAZOLAM 0.25 MG/1
0.25 TABLET ORAL 3 TIMES DAILY PRN
Qty: 90 TABLET | Refills: 0 | Status: SHIPPED | OUTPATIENT
Start: 2018-08-17 | End: 2019-01-28 | Stop reason: SDUPTHER

## 2018-08-17 RX ORDER — ALBUTEROL SULFATE 2.5 MG/3ML
SOLUTION RESPIRATORY (INHALATION)
Qty: 360 ML | Refills: 0 | Status: SHIPPED | OUTPATIENT
Start: 2018-08-17 | End: 2018-09-18 | Stop reason: SDUPTHER

## 2018-08-17 RX ORDER — LANCETS 28 GAUGE
EACH MISCELLANEOUS
Qty: 100 EACH | Refills: 12 | Status: SHIPPED | OUTPATIENT
Start: 2018-08-17 | End: 2019-11-06 | Stop reason: SDUPTHER

## 2018-08-17 NOTE — PROGRESS NOTES
Chief Complaint   Patient presents with   • Anxiety     wanting to go back on meds     Subjective   Monisha Brasher is a 63 y.o. female.     Anxiety   Presents for follow-up visit. Symptoms include decreased concentration, depressed mood, excessive worry, irritability, nervous/anxious behavior and panic. Patient reports no chest pain, compulsions, confusion, dizziness, feeling of choking, hyperventilation, impotence, insomnia, malaise, muscle tension, obsessions, palpitations, restlessness, shortness of breath or suicidal ideas. Symptoms occur most days. The severity of symptoms is moderate. The quality of sleep is good. Nighttime awakenings: none.     Compliance with medications is %.   Blood Sugar Problem   This is a recurrent problem. The current episode started 1 to 4 weeks ago. The problem occurs every several days. The problem has been gradually worsening. Pertinent negatives include no abdominal pain, anorexia, arthralgias, change in bowel habit, chest pain, chills, coughing, diaphoresis, fatigue, fever, headaches, joint swelling, rash, sore throat, swollen glands or urinary symptoms. Exacerbated by: stress  The treatment provided mild relief.        The following portions of the patient's history were reviewed and updated as appropriate: allergies, current medications, past social history and problem list.    Review of Systems   Constitutional: Positive for activity change and irritability. Negative for appetite change, chills, diaphoresis, fatigue and fever.   HENT: Negative.  Negative for sore throat.    Eyes: Negative.  Negative for photophobia, pain, discharge, redness, itching and visual disturbance.   Respiratory: Negative.  Negative for apnea, cough, choking, chest tightness, shortness of breath and stridor.    Cardiovascular: Negative.  Negative for chest pain and palpitations.   Gastrointestinal: Negative.  Negative for abdominal pain, anorexia and change in bowel habit.   Endocrine:  "Negative.    Genitourinary: Negative.  Negative for impotence.   Musculoskeletal: Negative.  Negative for arthralgias and joint swelling.   Skin: Negative.  Negative for rash.   Allergic/Immunologic: Negative.    Neurological: Negative.  Negative for dizziness and headaches.   Hematological: Negative.    Psychiatric/Behavioral: Positive for decreased concentration and sleep disturbance. Negative for agitation, behavioral problems, confusion, dysphoric mood, hallucinations, self-injury and suicidal ideas. The patient is nervous/anxious. The patient does not have insomnia and is not hyperactive.        Objective   /90   Ht 158.8 cm (62.5\")   Wt 68.9 kg (152 lb)   BMI 27.36 kg/m²   Physical Exam   Constitutional: She is oriented to person, place, and time. She appears well-developed and well-nourished.   HENT:   Head: Normocephalic and atraumatic.   Eyes: Pupils are equal, round, and reactive to light. EOM are normal.   Neck: Normal range of motion. Neck supple.   Cardiovascular: Normal rate, regular rhythm and normal heart sounds.    Pulmonary/Chest: Effort normal and breath sounds normal.   Abdominal: Soft. Bowel sounds are normal.   Musculoskeletal: Normal range of motion.   Neurological: She is alert and oriented to person, place, and time. No cranial nerve deficit. Coordination normal.   Skin: Skin is warm.   Nursing note and vitals reviewed.      Assessment/Plan   Problem List Items Addressed This Visit        Other    Screening for osteoporosis    Relevant Orders    dexa bone density axial    Anxiety - Primary    Relevant Orders    Ambulatory Referral to Behavioral Health    CBC & Differential (Completed)    Comprehensive Metabolic Panel (Completed)    Hemoglobin A1c (Completed)    Hepatitis C Antibody (Completed)    Iron (Completed)    Lipid Panel (Completed)    Vitamin D 25 Hydroxy (Completed)    Vitamin B12 (Completed)    TSH (Completed)    Magnesium (Completed)    CBC Auto Differential (Completed) "    General medical exam    Relevant Orders    CBC & Differential (Completed)    Comprehensive Metabolic Panel (Completed)    Hemoglobin A1c (Completed)    Hepatitis C Antibody (Completed)    Iron (Completed)    Lipid Panel (Completed)    Vitamin D 25 Hydroxy (Completed)    Vitamin B12 (Completed)    TSH (Completed)    Magnesium (Completed)    CBC Auto Differential (Completed)    Malaise    Relevant Orders    CBC & Differential (Completed)    Comprehensive Metabolic Panel (Completed)    Hemoglobin A1c (Completed)    Hepatitis C Antibody (Completed)    Iron (Completed)    Lipid Panel (Completed)    Vitamin D 25 Hydroxy (Completed)    Vitamin B12 (Completed)    TSH (Completed)    Magnesium (Completed)    CBC Auto Differential (Completed)    Hyperglycemia           New Medications Ordered This Visit   Medications   • PARoxetine (PAXIL) 10 MG tablet     Sig: Take 1 tablet by mouth Every Morning.     Dispense:  30 tablet     Refill:  11   • ALPRAZolam (XANAX) 0.25 MG tablet     Sig: Take 1 tablet by mouth 3 (Three) Times a Day As Needed for Anxiety.     Dispense:  90 tablet     Refill:  0   • metFORMIN (GLUCOPHAGE) 500 MG tablet     Sig: Take 1 tablet by mouth Daily With Breakfast.     Dispense:  30 tablet     Refill:  11       Patient understands the risks associated with this controlled medication, including tolerance and addiction.  she also agrees to only obtain this medication from me, and not from a another provider, unless that provider is covering for me in my absence.  she also agrees to be compliant in dosing, and not self adjust the dose of medication.  A signed controlled substance agreement is on file, and she has received a controlled substance education sheet at this a previous visit.  she has also signed a consent for treatment with a controlled substance as per [facility] policy. KAYLAN was obtained.      Stress relief discussed in length. Consider therapy, suggest yoga, exercise, meditation -patient  is agrees-will call back if worsen for referral

## 2018-09-04 RX ORDER — AMLODIPINE BESYLATE 10 MG/1
TABLET ORAL
Qty: 30 TABLET | Refills: 3 | Status: SHIPPED | OUTPATIENT
Start: 2018-09-04 | End: 2018-11-23 | Stop reason: HOSPADM

## 2018-09-04 RX ORDER — ALBUTEROL SULFATE 90 UG/1
AEROSOL, METERED RESPIRATORY (INHALATION)
Qty: 18 G | Refills: 0 | Status: SHIPPED | OUTPATIENT
Start: 2018-09-04 | End: 2018-11-23 | Stop reason: HOSPADM

## 2018-09-05 ENCOUNTER — OFFICE VISIT (OUTPATIENT)
Dept: PULMONOLOGY | Facility: CLINIC | Age: 63
End: 2018-09-05

## 2018-09-05 VITALS
OXYGEN SATURATION: 90 % | WEIGHT: 152.7 LBS | DIASTOLIC BLOOD PRESSURE: 88 MMHG | HEIGHT: 63 IN | BODY MASS INDEX: 27.05 KG/M2 | HEART RATE: 109 BPM | SYSTOLIC BLOOD PRESSURE: 137 MMHG

## 2018-09-05 DIAGNOSIS — J43.1 PANLOBULAR EMPHYSEMA (HCC): Primary | ICD-10-CM

## 2018-09-05 DIAGNOSIS — J45.30 MILD PERSISTENT ASTHMA WITHOUT COMPLICATION: ICD-10-CM

## 2018-09-05 PROCEDURE — 99213 OFFICE O/P EST LOW 20 MIN: CPT | Performed by: INTERNAL MEDICINE

## 2018-09-05 RX ORDER — PREDNISONE 10 MG/1
10 TABLET ORAL DAILY
Qty: 30 TABLET | Refills: 0 | Status: SHIPPED | OUTPATIENT
Start: 2018-09-05 | End: 2018-10-05

## 2018-09-05 NOTE — PROGRESS NOTES
"This lady has emphysema with an asthmatic component.  She has chronic wheezing.  She is also trying to quit smoking.  She is not producing any purulent sputum    ROS    Constitutional-no night sweats weight loss headaches  GI no abdominal pain nausea or diarrhea  Neuro no seizure or neurologic deficits  Musculoskeletal no deformity or joint pain   no dysuria or hematuria  Skin no rash or other lesions  All other systems reviewed and were negative except for the above.      Physical Exam  /88 (BP Location: Left arm, Patient Position: Sitting, Cuff Size: Adult)   Pulse 109   Ht 158.8 cm (62.5\")   Wt 69.3 kg (152 lb 11.2 oz)   SpO2 90%   BMI 27.48 kg/m²   Vital signs as above  Pupils equally round and reactive to light and accommodation, neck no JVD or adenopathy.  Cardiovascular regular rhythm and rate no murmur or gallop.  Abdomen soft no organomegaly tenderness.  Extremities no clubbing cyanosis or edema.  No cervical adenopathy.  No skin rash.  Neurologic good strength bilaterally without deficits  Alert no distress, lungs reveal mild wheezes    Impression COPD with chronic status asthmaticus improving    Recommendations cessation of cigarette smoking, continue present medications, prednisone 10 mg a day, return in 1 month        This document has been produced with the assistance of Dragon dictation  This document has been electronically signed by Gigi Ac MD on September 5, 2018 11:17 AM      "

## 2018-09-18 RX ORDER — ALBUTEROL SULFATE 90 UG/1
AEROSOL, METERED RESPIRATORY (INHALATION)
Qty: 18 G | Refills: 0 | Status: SHIPPED | OUTPATIENT
Start: 2018-09-18 | End: 2018-11-23 | Stop reason: HOSPADM

## 2018-09-18 RX ORDER — ALBUTEROL SULFATE 2.5 MG/3ML
SOLUTION RESPIRATORY (INHALATION)
Qty: 360 ML | Refills: 0 | Status: SHIPPED | OUTPATIENT
Start: 2018-09-18 | End: 2018-10-13 | Stop reason: SDUPTHER

## 2018-09-19 ENCOUNTER — OFFICE VISIT (OUTPATIENT)
Dept: FAMILY MEDICINE CLINIC | Facility: CLINIC | Age: 63
End: 2018-09-19

## 2018-09-19 VITALS
DIASTOLIC BLOOD PRESSURE: 80 MMHG | BODY MASS INDEX: 26.93 KG/M2 | HEIGHT: 63 IN | WEIGHT: 152 LBS | SYSTOLIC BLOOD PRESSURE: 110 MMHG

## 2018-09-19 DIAGNOSIS — F41.9 ANXIETY: Primary | ICD-10-CM

## 2018-09-19 PROCEDURE — 99213 OFFICE O/P EST LOW 20 MIN: CPT | Performed by: NURSE PRACTITIONER

## 2018-09-19 NOTE — PROGRESS NOTES
Chief Complaint   Patient presents with   • Follow-up     1 month check      Subjective   oMnisha Brasher is a 63 y.o. female.     Anxiety   Presents for follow-up visit. Patient reports no chest pain, compulsions, confusion, decreased concentration, depressed mood, dizziness, dry mouth, excessive worry, feeling of choking, hyperventilation, impotence, insomnia, irritability, malaise, muscle tension, nausea, nervous/anxious behavior, obsessions, palpitations, panic, restlessness, shortness of breath or suicidal ideas. Symptoms occur most days. The severity of symptoms is moderate. The quality of sleep is good. Nighttime awakenings: none.     Compliance with medications is %.   Blood Sugar Problem   This is a recurrent problem. The current episode started 1 to 4 weeks ago. The problem occurs every several days. The problem has been gradually improving. Pertinent negatives include no abdominal pain, anorexia, arthralgias, change in bowel habit, chest pain, chills, congestion, coughing, diaphoresis, fatigue, fever, headaches, joint swelling, myalgias, nausea, neck pain, numbness, rash, sore throat, swollen glands, urinary symptoms, vertigo, visual change, vomiting or weakness. Exacerbated by: stress  The treatment provided mild relief.        The following portions of the patient's history were reviewed and updated as appropriate: allergies, current medications, past social history and problem list.    Review of Systems   Constitutional: Negative for activity change, appetite change, chills, diaphoresis, fatigue, fever and irritability.   HENT: Negative.  Negative for congestion, dental problem and sore throat.    Respiratory: Negative.  Negative for cough and shortness of breath.    Cardiovascular: Negative for chest pain and palpitations.   Gastrointestinal: Negative for abdominal pain, anorexia, change in bowel habit, nausea and vomiting.   Genitourinary: Negative for impotence.   Musculoskeletal:  "Negative for arthralgias, back pain, gait problem, joint swelling, myalgias, neck pain and neck stiffness.   Skin: Negative for rash.   Allergic/Immunologic: Negative.  Negative for environmental allergies, food allergies and immunocompromised state.   Neurological: Negative.  Negative for dizziness, vertigo, weakness, numbness and headaches.   Hematological: Negative.    Psychiatric/Behavioral: Positive for agitation, dysphoric mood and sleep disturbance. Negative for confusion, decreased concentration and suicidal ideas. The patient is not nervous/anxious and does not have insomnia.        Objective   /80   Ht 158.8 cm (62.5\")   Wt 68.9 kg (152 lb)   BMI 27.36 kg/m²   Physical Exam   Constitutional: She is oriented to person, place, and time. She appears well-developed and well-nourished. No distress.   HENT:   Head: Normocephalic and atraumatic.   Right Ear: External ear normal.   Left Ear: External ear normal.   Mouth/Throat: Oropharynx is clear and moist. No oropharyngeal exudate.   Eyes: Pupils are equal, round, and reactive to light. EOM are normal. Right eye exhibits no discharge. Left eye exhibits no discharge. No scleral icterus.   Neck: Normal range of motion. Neck supple.   Cardiovascular: Normal rate, regular rhythm, normal heart sounds and intact distal pulses.  Exam reveals no gallop and no friction rub.    No murmur heard.  Pulmonary/Chest: Effort normal and breath sounds normal. No respiratory distress. She has no wheezes. She has no rales. She exhibits no tenderness.   Abdominal: Soft. Bowel sounds are normal. She exhibits no distension and no mass. There is no tenderness. There is no rebound and no guarding. No hernia.   Musculoskeletal: Normal range of motion. She exhibits no edema, tenderness or deformity.   Neurological: She is alert and oriented to person, place, and time. She displays normal reflexes. No cranial nerve deficit or sensory deficit. She exhibits normal muscle tone. " Coordination normal.   Skin: Skin is warm and dry. No rash noted. She is not diaphoretic. No erythema. No pallor.   Nursing note and vitals reviewed.      Assessment/Plan   Problem List Items Addressed This Visit        Other    Anxiety - Primary         No orders of the defined types were placed in this encounter.      It's not just what you eat, but when you eat  Eat breakfast, and eat smaller meals throughout the day. A healthy breakfast can jumpstart your metabolism, while eating small, healthy meals (rather than the standard three large meals) keeps your energy up.   Avoid eating at night. Try to eat dinner earlier and fast for 14-16 hours until breakfast the next morning. Studies suggest that eating only when you’re most active and giving your digestive system a long break each day may help to regulate weight.     Continue meds as directed, no changes made at this time

## 2018-09-20 RX ORDER — AZITHROMYCIN 250 MG/1
TABLET, FILM COATED ORAL
Qty: 6 TABLET | Refills: 0 | Status: SHIPPED | OUTPATIENT
Start: 2018-09-20 | End: 2018-11-23 | Stop reason: HOSPADM

## 2018-10-11 RX ORDER — AZITHROMYCIN 250 MG/1
TABLET, FILM COATED ORAL
Qty: 6 TABLET | Refills: 0 | OUTPATIENT
Start: 2018-10-11

## 2018-10-11 RX ORDER — ALBUTEROL SULFATE 90 UG/1
AEROSOL, METERED RESPIRATORY (INHALATION)
Qty: 18 G | Refills: 0 | Status: SHIPPED | OUTPATIENT
Start: 2018-10-11 | End: 2018-11-23 | Stop reason: HOSPADM

## 2018-10-15 RX ORDER — ALBUTEROL SULFATE 2.5 MG/3ML
SOLUTION RESPIRATORY (INHALATION)
Qty: 360 ML | Refills: 0 | Status: SHIPPED | OUTPATIENT
Start: 2018-10-15 | End: 2018-12-11 | Stop reason: SDUPTHER

## 2018-10-15 RX ORDER — AZITHROMYCIN 250 MG/1
TABLET, FILM COATED ORAL
Qty: 6 TABLET | Refills: 0 | Status: SHIPPED | OUTPATIENT
Start: 2018-10-15 | End: 2018-11-23 | Stop reason: HOSPADM

## 2018-10-23 ENCOUNTER — OFFICE VISIT (OUTPATIENT)
Dept: PULMONOLOGY | Facility: CLINIC | Age: 63
End: 2018-10-23

## 2018-10-23 VITALS
OXYGEN SATURATION: 98 % | BODY MASS INDEX: 26.65 KG/M2 | DIASTOLIC BLOOD PRESSURE: 80 MMHG | HEIGHT: 63 IN | WEIGHT: 150.4 LBS | SYSTOLIC BLOOD PRESSURE: 120 MMHG | HEART RATE: 88 BPM

## 2018-10-23 DIAGNOSIS — J45.40 MODERATE PERSISTENT ASTHMA, UNSPECIFIED WHETHER COMPLICATED: Primary | ICD-10-CM

## 2018-10-23 PROCEDURE — 99213 OFFICE O/P EST LOW 20 MIN: CPT | Performed by: INTERNAL MEDICINE

## 2018-10-23 RX ORDER — PREDNISONE 10 MG/1
10 TABLET ORAL DAILY
Qty: 30 TABLET | Refills: 3 | Status: SHIPPED | OUTPATIENT
Start: 2018-10-23 | End: 2018-11-23 | Stop reason: HOSPADM

## 2018-10-23 RX ORDER — AZITHROMYCIN 250 MG/1
TABLET, FILM COATED ORAL
Qty: 6 TABLET | Refills: 1 | Status: SHIPPED | OUTPATIENT
Start: 2018-10-23 | End: 2018-11-23 | Stop reason: HOSPADM

## 2018-10-23 RX ORDER — ALBUTEROL SULFATE 90 UG/1
AEROSOL, METERED RESPIRATORY (INHALATION)
Qty: 1 INHALER | Refills: 5 | Status: SHIPPED | OUTPATIENT
Start: 2018-10-23 | End: 2019-02-15 | Stop reason: SDUPTHER

## 2018-10-23 NOTE — PROGRESS NOTES
"This lady has COPD with prolonged status asthmaticus.  Her breathing has improved.  She still short of breath and is been unable to taper off of prednisone entirely.  She's taking 10 mg a day.  She is not producing discolored sputum at this time    ROS    Constitutional-no night sweats weight loss headaches  GI no abdominal pain nausea or diarrhea  Neuro no seizure or neurologic deficits  Musculoskeletal no deformity or joint pain   no dysuria or hematuria  Skin no rash or other lesions  All other systems reviewed and were negative except for the above.      Physical Exam  /80   Pulse 88   Ht 158.8 cm (62.5\")   Wt 68.2 kg (150 lb 6.4 oz)   SpO2 98%   BMI 27.07 kg/m²   Vital signs as above  Pupils equally round and reactive to light and accommodation, neck no JVD or adenopathy.  Cardiovascular regular rhythm and rate no murmur or gallop.  Abdomen soft no organomegaly tenderness.  Extremities no clubbing cyanosis or edema.  No cervical adenopathy.  No skin rash.  Neurologic good strength bilaterally without deficits  Lungs reveal minimal wheeze with prolonged expiration    Impression COPD with chronic status asthmaticus improving    Plan continue routine meds including prednisone 10 mg a day.  She will attempt to wean off of prednisone depending on her breathing.  Return in 3 months        This document has been produced with the assistance of Harry dictation  This document has been electronically signed by Gigi Ac MD on October 23, 2018 11:25 AM      "

## 2018-10-29 RX ORDER — MONTELUKAST SODIUM 10 MG/1
TABLET ORAL
Qty: 90 TABLET | Refills: 1 | Status: SHIPPED | OUTPATIENT
Start: 2018-10-29 | End: 2019-04-29 | Stop reason: SDUPTHER

## 2018-10-29 RX ORDER — MONTELUKAST SODIUM 10 MG/1
TABLET ORAL
Qty: 90 TABLET | Refills: 0 | OUTPATIENT
Start: 2018-10-29

## 2018-11-06 RX ORDER — AZITHROMYCIN 250 MG/1
TABLET, FILM COATED ORAL
Qty: 6 TABLET | Refills: 0 | Status: SHIPPED | OUTPATIENT
Start: 2018-11-06 | End: 2018-11-23 | Stop reason: HOSPADM

## 2018-11-15 ENCOUNTER — APPOINTMENT (OUTPATIENT)
Dept: GENERAL RADIOLOGY | Facility: HOSPITAL | Age: 63
End: 2018-11-15

## 2018-11-15 ENCOUNTER — HOSPITAL ENCOUNTER (INPATIENT)
Facility: HOSPITAL | Age: 63
LOS: 8 days | Discharge: HOME HEALTH | End: 2018-11-23
Attending: EMERGENCY MEDICINE | Admitting: INTERNAL MEDICINE

## 2018-11-15 DIAGNOSIS — J42 CHRONIC BRONCHITIS WITH ACUTE EXACERBATION (HCC): Chronic | ICD-10-CM

## 2018-11-15 DIAGNOSIS — I21.29: ICD-10-CM

## 2018-11-15 DIAGNOSIS — Z78.9 IMPAIRED MOBILITY AND ACTIVITIES OF DAILY LIVING: ICD-10-CM

## 2018-11-15 DIAGNOSIS — Z74.09 IMPAIRED FUNCTIONAL MOBILITY, BALANCE, GAIT, AND ENDURANCE: ICD-10-CM

## 2018-11-15 DIAGNOSIS — I10 HTN (HYPERTENSION), BENIGN: ICD-10-CM

## 2018-11-15 DIAGNOSIS — Z74.09 IMPAIRED MOBILITY AND ACTIVITIES OF DAILY LIVING: ICD-10-CM

## 2018-11-15 DIAGNOSIS — J20.9 CHRONIC BRONCHITIS WITH ACUTE EXACERBATION (HCC): Chronic | ICD-10-CM

## 2018-11-15 DIAGNOSIS — I21.3 ACUTE ST ELEVATION MYOCARDIAL INFARCTION (STEMI), UNSPECIFIED ARTERY (HCC): Primary | ICD-10-CM

## 2018-11-15 LAB
ANION GAP SERPL CALCULATED.3IONS-SCNC: 10 MMOL/L (ref 5–15)
BASOPHILS # BLD AUTO: 0.05 10*3/MM3 (ref 0–0.2)
BASOPHILS NFR BLD AUTO: 0.5 % (ref 0–2)
BUN BLD-MCNC: 15 MG/DL (ref 7–21)
BUN/CREAT SERPL: 18.5 (ref 7–25)
CALCIUM SPEC-SCNC: 8.2 MG/DL (ref 8.4–10.2)
CHLORIDE SERPL-SCNC: 102 MMOL/L (ref 95–110)
CK MB SERPL-CCNC: 303 NG/ML (ref 0–5)
CK SERPL-CCNC: 3625 U/L (ref 30–135)
CO2 SERPL-SCNC: 23 MMOL/L (ref 22–31)
CREAT BLD-MCNC: 0.81 MG/DL (ref 0.5–1)
DEPRECATED RDW RBC AUTO: 44.4 FL (ref 36.4–46.3)
DEPRECATED RDW RBC AUTO: 44.5 FL (ref 36.4–46.3)
EOSINOPHIL # BLD AUTO: 0.43 10*3/MM3 (ref 0–0.7)
EOSINOPHIL NFR BLD AUTO: 4.2 % (ref 0–7)
ERYTHROCYTE [DISTWIDTH] IN BLOOD BY AUTOMATED COUNT: 13.6 % (ref 11.5–14.5)
ERYTHROCYTE [DISTWIDTH] IN BLOOD BY AUTOMATED COUNT: 13.7 % (ref 11.5–14.5)
GFR SERPL CREATININE-BSD FRML MDRD: 72 ML/MIN/1.73 (ref 45–104)
GLUCOSE BLD-MCNC: 200 MG/DL (ref 60–100)
HCT VFR BLD AUTO: 41.9 % (ref 35–45)
HCT VFR BLD AUTO: 42.2 % (ref 35–45)
HGB BLD-MCNC: 14.3 G/DL (ref 12–15.5)
HGB BLD-MCNC: 14.4 G/DL (ref 12–15.5)
HOLD SPECIMEN: NORMAL
HOLD SPECIMEN: NORMAL
IMM GRANULOCYTES # BLD: 0.05 10*3/MM3 (ref 0–0.02)
IMM GRANULOCYTES NFR BLD: 0.5 % (ref 0–0.5)
INR PPP: 0.92 (ref 0.8–1.2)
LYMPHOCYTES # BLD AUTO: 2.52 10*3/MM3 (ref 0.6–4.2)
LYMPHOCYTES NFR BLD AUTO: 24.3 % (ref 10–50)
MCH RBC QN AUTO: 30.9 PG (ref 26.5–34)
MCH RBC QN AUTO: 31.2 PG (ref 26.5–34)
MCHC RBC AUTO-ENTMCNC: 33.9 G/DL (ref 31.4–36)
MCHC RBC AUTO-ENTMCNC: 34.4 G/DL (ref 31.4–36)
MCV RBC AUTO: 90.9 FL (ref 80–98)
MCV RBC AUTO: 91.1 FL (ref 80–98)
MONOCYTES # BLD AUTO: 0.83 10*3/MM3 (ref 0–0.9)
MONOCYTES NFR BLD AUTO: 8 % (ref 0–12)
NEUTROPHILS # BLD AUTO: 6.48 10*3/MM3 (ref 2–8.6)
NEUTROPHILS NFR BLD AUTO: 62.5 % (ref 37–80)
NT-PROBNP SERPL-MCNC: 34.8 PG/ML (ref 0–900)
PLATELET # BLD AUTO: 271 10*3/MM3 (ref 150–450)
PLATELET # BLD AUTO: 285 10*3/MM3 (ref 150–450)
PMV BLD AUTO: 8.9 FL (ref 8–12)
PMV BLD AUTO: 9 FL (ref 8–12)
POTASSIUM BLD-SCNC: 4.3 MMOL/L (ref 3.5–5.1)
PROTHROMBIN TIME: 12.2 SECONDS (ref 11.1–15.3)
RBC # BLD AUTO: 4.61 10*6/MM3 (ref 3.77–5.16)
RBC # BLD AUTO: 4.63 10*6/MM3 (ref 3.77–5.16)
SODIUM BLD-SCNC: 135 MMOL/L (ref 137–145)
TROPONIN I SERPL-MCNC: 66.1 NG/ML
TROPONIN I SERPL-MCNC: 69.8 NG/ML
TROPONIN I SERPL-MCNC: 78.5 NG/ML
TROPONIN I SERPL-MCNC: <0.012 NG/ML
WBC NRBC COR # BLD: 10.36 10*3/MM3 (ref 3.2–9.8)
WBC NRBC COR # BLD: 12.3 10*3/MM3 (ref 3.2–9.8)

## 2018-11-15 PROCEDURE — C1887 CATHETER, GUIDING: HCPCS | Performed by: INTERNAL MEDICINE

## 2018-11-15 PROCEDURE — 4A023N7 MEASUREMENT OF CARDIAC SAMPLING AND PRESSURE, LEFT HEART, PERCUTANEOUS APPROACH: ICD-10-PCS | Performed by: INTERNAL MEDICINE

## 2018-11-15 PROCEDURE — 94640 AIRWAY INHALATION TREATMENT: CPT

## 2018-11-15 PROCEDURE — 25010000002 ONDANSETRON PER 1 MG: Performed by: INTERNAL MEDICINE

## 2018-11-15 PROCEDURE — 25010000002 MAGNESIUM SULFATE 2 GM/50ML SOLUTION: Performed by: EMERGENCY MEDICINE

## 2018-11-15 PROCEDURE — C9601 PERC DRUG-EL COR STENT BRAN: HCPCS | Performed by: INTERNAL MEDICINE

## 2018-11-15 PROCEDURE — 82550 ASSAY OF CK (CPK): CPT | Performed by: INTERNAL MEDICINE

## 2018-11-15 PROCEDURE — 25010000002 ONDANSETRON PER 1 MG: Performed by: EMERGENCY MEDICINE

## 2018-11-15 PROCEDURE — 84484 ASSAY OF TROPONIN QUANT: CPT | Performed by: INTERNAL MEDICINE

## 2018-11-15 PROCEDURE — C1874 STENT, COATED/COV W/DEL SYS: HCPCS | Performed by: INTERNAL MEDICINE

## 2018-11-15 PROCEDURE — 82553 CREATINE MB FRACTION: CPT | Performed by: INTERNAL MEDICINE

## 2018-11-15 PROCEDURE — C1769 GUIDE WIRE: HCPCS | Performed by: INTERNAL MEDICINE

## 2018-11-15 PROCEDURE — 25010000002 AMIODARONE IN DEXTROSE 5% 150-4.21 MG/100ML-% SOLUTION: Performed by: EMERGENCY MEDICINE

## 2018-11-15 PROCEDURE — 93010 ELECTROCARDIOGRAM REPORT: CPT | Performed by: INTERNAL MEDICINE

## 2018-11-15 PROCEDURE — 94799 UNLISTED PULMONARY SVC/PX: CPT

## 2018-11-15 PROCEDURE — C1894 INTRO/SHEATH, NON-LASER: HCPCS | Performed by: INTERNAL MEDICINE

## 2018-11-15 PROCEDURE — 0 IOPAMIDOL PER 1 ML: Performed by: INTERNAL MEDICINE

## 2018-11-15 PROCEDURE — 93454 CORONARY ARTERY ANGIO S&I: CPT | Performed by: INTERNAL MEDICINE

## 2018-11-15 PROCEDURE — 25010000002 PHENYLEPHRINE PER 1 ML: Performed by: INTERNAL MEDICINE

## 2018-11-15 PROCEDURE — 85027 COMPLETE CBC AUTOMATED: CPT | Performed by: INTERNAL MEDICINE

## 2018-11-15 PROCEDURE — 027136Z DILATION OF CORONARY ARTERY, TWO ARTERIES WITH THREE DRUG-ELUTING INTRALUMINAL DEVICES, PERCUTANEOUS APPROACH: ICD-10-PCS | Performed by: INTERNAL MEDICINE

## 2018-11-15 PROCEDURE — 85610 PROTHROMBIN TIME: CPT | Performed by: EMERGENCY MEDICINE

## 2018-11-15 PROCEDURE — 25010000002 BIVALIRUDIN TRIFLUOROACETATE 250 MG RECONSTITUTED SOLUTION: Performed by: INTERNAL MEDICINE

## 2018-11-15 PROCEDURE — 02C03ZZ EXTIRPATION OF MATTER FROM CORONARY ARTERY, ONE ARTERY, PERCUTANEOUS APPROACH: ICD-10-PCS | Performed by: INTERNAL MEDICINE

## 2018-11-15 PROCEDURE — 25010000002 DOPAMINE PER 40 MG: Performed by: INTERNAL MEDICINE

## 2018-11-15 PROCEDURE — B2111ZZ FLUOROSCOPY OF MULTIPLE CORONARY ARTERIES USING LOW OSMOLAR CONTRAST: ICD-10-PCS | Performed by: INTERNAL MEDICINE

## 2018-11-15 PROCEDURE — 83880 ASSAY OF NATRIURETIC PEPTIDE: CPT | Performed by: EMERGENCY MEDICINE

## 2018-11-15 PROCEDURE — 80048 BASIC METABOLIC PNL TOTAL CA: CPT | Performed by: INTERNAL MEDICINE

## 2018-11-15 PROCEDURE — C1725 CATH, TRANSLUMIN NON-LASER: HCPCS | Performed by: INTERNAL MEDICINE

## 2018-11-15 PROCEDURE — C1757 CATH, THROMBECTOMY/EMBOLECT: HCPCS | Performed by: INTERNAL MEDICINE

## 2018-11-15 PROCEDURE — C9606 PERC D-E COR REVASC W AMI S: HCPCS | Performed by: INTERNAL MEDICINE

## 2018-11-15 PROCEDURE — 25010000002 HYDRALAZINE PER 20 MG: Performed by: INTERNAL MEDICINE

## 2018-11-15 PROCEDURE — 93005 ELECTROCARDIOGRAM TRACING: CPT | Performed by: EMERGENCY MEDICINE

## 2018-11-15 PROCEDURE — 25010000002 EPTIFIBATIDE 20 MG/10ML SOLUTION: Performed by: INTERNAL MEDICINE

## 2018-11-15 PROCEDURE — 84484 ASSAY OF TROPONIN QUANT: CPT | Performed by: EMERGENCY MEDICINE

## 2018-11-15 PROCEDURE — 71045 X-RAY EXAM CHEST 1 VIEW: CPT

## 2018-11-15 PROCEDURE — 94760 N-INVAS EAR/PLS OXIMETRY 1: CPT

## 2018-11-15 PROCEDURE — 25010000002 BIVALIRUDIN TRIFLUOROACETATE 250 MG RECONSTITUTED SOLUTION 1 EACH VIAL: Performed by: INTERNAL MEDICINE

## 2018-11-15 PROCEDURE — 85025 COMPLETE CBC W/AUTO DIFF WBC: CPT | Performed by: EMERGENCY MEDICINE

## 2018-11-15 PROCEDURE — 99285 EMERGENCY DEPT VISIT HI MDM: CPT

## 2018-11-15 DEVICE — XIENCE SIERRA™ EVEROLIMUS ELUTING CORONARY STENT SYSTEM 3.00 MM X 12 MM / RAPID-EXCHANGE
Type: IMPLANTABLE DEVICE | Status: FUNCTIONAL
Brand: XIENCE SIERRA™

## 2018-11-15 DEVICE — XIENCE SIERRA™ EVEROLIMUS ELUTING CORONARY STENT SYSTEM 2.50 MM X 15 MM / RAPID-EXCHANGE
Type: IMPLANTABLE DEVICE | Status: FUNCTIONAL
Brand: XIENCE SIERRA™

## 2018-11-15 DEVICE — XIENCE SIERRA™ EVEROLIMUS ELUTING CORONARY STENT SYSTEM 2.75 MM X 08 MM / RAPID-EXCHANGE
Type: IMPLANTABLE DEVICE | Status: FUNCTIONAL
Brand: XIENCE SIERRA™

## 2018-11-15 RX ORDER — NITROGLYCERIN 20 MG/100ML
INJECTION INTRAVENOUS
Status: DISCONTINUED
Start: 2018-11-15 | End: 2018-11-23 | Stop reason: HOSPADM

## 2018-11-15 RX ORDER — ASPIRIN 81 MG/1
81 TABLET, CHEWABLE ORAL DAILY
Status: DISCONTINUED | OUTPATIENT
Start: 2018-11-15 | End: 2018-11-23 | Stop reason: HOSPADM

## 2018-11-15 RX ORDER — BIVALIRUDIN 250 MG/5ML
INJECTION, POWDER, LYOPHILIZED, FOR SOLUTION INTRAVENOUS AS NEEDED
Status: DISCONTINUED | OUTPATIENT
Start: 2018-11-15 | End: 2018-11-15 | Stop reason: HOSPADM

## 2018-11-15 RX ORDER — IPRATROPIUM BROMIDE AND ALBUTEROL SULFATE 2.5; .5 MG/3ML; MG/3ML
3 SOLUTION RESPIRATORY (INHALATION) EVERY 6 HOURS PRN
Status: DISCONTINUED | OUTPATIENT
Start: 2018-11-15 | End: 2018-11-17 | Stop reason: SDUPTHER

## 2018-11-15 RX ORDER — ONDANSETRON 2 MG/ML
INJECTION INTRAMUSCULAR; INTRAVENOUS AS NEEDED
Status: DISCONTINUED | OUTPATIENT
Start: 2018-11-15 | End: 2018-11-15 | Stop reason: HOSPADM

## 2018-11-15 RX ORDER — MAGNESIUM SULFATE HEPTAHYDRATE 40 MG/ML
2 INJECTION, SOLUTION INTRAVENOUS ONCE
Status: COMPLETED | OUTPATIENT
Start: 2018-11-15 | End: 2018-11-15

## 2018-11-15 RX ORDER — CARVEDILOL 3.12 MG/1
3.12 TABLET ORAL EVERY 12 HOURS SCHEDULED
Status: DISCONTINUED | OUTPATIENT
Start: 2018-11-15 | End: 2018-11-15

## 2018-11-15 RX ORDER — BUDESONIDE AND FORMOTEROL FUMARATE DIHYDRATE 160; 4.5 UG/1; UG/1
2 AEROSOL RESPIRATORY (INHALATION)
Status: DISCONTINUED | OUTPATIENT
Start: 2018-11-15 | End: 2018-11-17

## 2018-11-15 RX ORDER — DILTIAZEM HYDROCHLORIDE 5 MG/ML
10 INJECTION INTRAVENOUS ONCE
Status: COMPLETED | OUTPATIENT
Start: 2018-11-15 | End: 2018-11-15

## 2018-11-15 RX ORDER — ONDANSETRON 2 MG/ML
4 INJECTION INTRAMUSCULAR; INTRAVENOUS ONCE
Status: COMPLETED | OUTPATIENT
Start: 2018-11-15 | End: 2018-11-15

## 2018-11-15 RX ORDER — SODIUM CHLORIDE 9 MG/ML
INJECTION, SOLUTION INTRAVENOUS
Status: DISPENSED
Start: 2018-11-15 | End: 2018-11-15

## 2018-11-15 RX ORDER — ATORVASTATIN CALCIUM 40 MG/1
40 TABLET, FILM COATED ORAL NIGHTLY
Status: DISCONTINUED | OUTPATIENT
Start: 2018-11-15 | End: 2018-11-23 | Stop reason: HOSPADM

## 2018-11-15 RX ORDER — ALBUTEROL SULFATE 2.5 MG/3ML
2.5 SOLUTION RESPIRATORY (INHALATION) EVERY 4 HOURS PRN
Status: DISCONTINUED | OUTPATIENT
Start: 2018-11-15 | End: 2018-11-15 | Stop reason: SDUPTHER

## 2018-11-15 RX ORDER — SODIUM CHLORIDE 9 MG/ML
100 INJECTION, SOLUTION INTRAVENOUS CONTINUOUS
Status: DISCONTINUED | OUTPATIENT
Start: 2018-11-15 | End: 2018-11-16

## 2018-11-15 RX ORDER — EPTIFIBATIDE 2 MG/ML
INJECTION, SOLUTION INTRAVENOUS AS NEEDED
Status: DISCONTINUED | OUTPATIENT
Start: 2018-11-15 | End: 2018-11-15 | Stop reason: HOSPADM

## 2018-11-15 RX ORDER — DOPAMINE HYDROCHLORIDE 160 MG/100ML
INJECTION, SOLUTION INTRAVENOUS CONTINUOUS PRN
Status: COMPLETED | OUTPATIENT
Start: 2018-11-15 | End: 2018-11-15

## 2018-11-15 RX ORDER — SODIUM CHLORIDE 9 MG/ML
INJECTION, SOLUTION INTRAVENOUS
Status: DISCONTINUED
Start: 2018-11-15 | End: 2018-11-23 | Stop reason: HOSPADM

## 2018-11-15 RX ORDER — HYDRALAZINE HYDROCHLORIDE 20 MG/ML
10 INJECTION INTRAMUSCULAR; INTRAVENOUS ONCE AS NEEDED
Status: COMPLETED | OUTPATIENT
Start: 2018-11-15 | End: 2018-11-15

## 2018-11-15 RX ORDER — LOSARTAN POTASSIUM 25 MG/1
12.5 TABLET ORAL DAILY
Status: DISCONTINUED | OUTPATIENT
Start: 2018-11-15 | End: 2018-11-23 | Stop reason: HOSPADM

## 2018-11-15 RX ORDER — AMIODARONE HYDROCHLORIDE 200 MG/1
200 TABLET ORAL EVERY 12 HOURS SCHEDULED
Status: DISCONTINUED | OUTPATIENT
Start: 2018-11-15 | End: 2018-11-19

## 2018-11-15 RX ORDER — LIDOCAINE HYDROCHLORIDE 20 MG/ML
INJECTION, SOLUTION INFILTRATION; PERINEURAL AS NEEDED
Status: DISCONTINUED | OUTPATIENT
Start: 2018-11-15 | End: 2018-11-15 | Stop reason: HOSPADM

## 2018-11-15 RX ADMIN — HYDRALAZINE HYDROCHLORIDE 10 MG: 20 INJECTION INTRAMUSCULAR; INTRAVENOUS at 17:58

## 2018-11-15 RX ADMIN — Medication 12.5 MG: at 08:36

## 2018-11-15 RX ADMIN — AMIODARONE HYDROCHLORIDE 150 MG: 1.5 INJECTION, SOLUTION INTRAVENOUS at 03:54

## 2018-11-15 RX ADMIN — METOPROLOL TARTRATE 25 MG: 25 TABLET ORAL at 17:57

## 2018-11-15 RX ADMIN — DILTIAZEM HYDROCHLORIDE 5 MG: 5 INJECTION INTRAVENOUS at 02:30

## 2018-11-15 RX ADMIN — SODIUM CHLORIDE 100 ML/HR: 9 INJECTION, SOLUTION INTRAVENOUS at 08:37

## 2018-11-15 RX ADMIN — ASPIRIN 81 MG CHEWABLE TABLET 81 MG: 81 TABLET CHEWABLE at 08:36

## 2018-11-15 RX ADMIN — CARVEDILOL 3.12 MG: 3.12 TABLET, FILM COATED ORAL at 08:37

## 2018-11-15 RX ADMIN — TICAGRELOR 90 MG: 90 TABLET ORAL at 21:10

## 2018-11-15 RX ADMIN — MAGNESIUM SULFATE HEPTAHYDRATE 2 G: 40 INJECTION, SOLUTION INTRAVENOUS at 03:40

## 2018-11-15 RX ADMIN — IPRATROPIUM BROMIDE AND ALBUTEROL SULFATE 3 ML: 2.5; .5 SOLUTION RESPIRATORY (INHALATION) at 20:28

## 2018-11-15 RX ADMIN — ALBUTEROL SULFATE 2.5 MG: 2.5 SOLUTION RESPIRATORY (INHALATION) at 12:23

## 2018-11-15 RX ADMIN — SODIUM CHLORIDE 100 ML/HR: 9 INJECTION, SOLUTION INTRAVENOUS at 19:30

## 2018-11-15 RX ADMIN — SODIUM CHLORIDE 500 ML: 9 INJECTION, SOLUTION INTRAVENOUS at 02:15

## 2018-11-15 RX ADMIN — BUDESONIDE AND FORMOTEROL FUMARATE DIHYDRATE 2 PUFF: 160; 4.5 AEROSOL RESPIRATORY (INHALATION) at 21:11

## 2018-11-15 RX ADMIN — AMIODARONE HYDROCHLORIDE 200 MG: 200 TABLET ORAL at 17:56

## 2018-11-15 RX ADMIN — SODIUM CHLORIDE 100 ML/HR: 9 INJECTION, SOLUTION INTRAVENOUS at 05:23

## 2018-11-15 RX ADMIN — TICAGRELOR 90 MG: 90 TABLET ORAL at 08:36

## 2018-11-15 RX ADMIN — ONDANSETRON 4 MG: 2 INJECTION INTRAMUSCULAR; INTRAVENOUS at 02:41

## 2018-11-15 RX ADMIN — DILTIAZEM HYDROCHLORIDE 10 MG: 5 INJECTION INTRAVENOUS at 02:14

## 2018-11-15 RX ADMIN — AMIODARONE HYDROCHLORIDE 150 MG: 1.5 INJECTION, SOLUTION INTRAVENOUS at 03:37

## 2018-11-15 RX ADMIN — SODIUM CHLORIDE 500 ML: 9 INJECTION, SOLUTION INTRAVENOUS at 02:48

## 2018-11-15 RX ADMIN — ATORVASTATIN CALCIUM 40 MG: 40 TABLET, FILM COATED ORAL at 21:10

## 2018-11-15 NOTE — H&P
Cardiology History and Physical Note        Patient Name: Monisha Brasher  Age/Sex: 63 y.o. female  : 1955  MRN: [id number]    Date of Admission : 11/15/2018    Primary care Physician: Provider, No Known    Reason for Admission:  Chest pain and ST segment depression in lead V2 and V3 suggestive of posterolateral wall myocardial infarction  Subjective:       Chief Complaint: Chest pain.    History of Present Illness:  Monisha Brasher is a 63 y.o. female     Body mass index is 28.72 kg/m².  With a past medical history significant for chronic obstructive lung disease, tobacco abuse, allergic rhinitis, anxiety, preserved left ventricular systolic function with an ejection fraction of 55-60%, negative dobutamine stress strong family history for coronary artery disease and previous history of non-IgE mediated allergic asthmatic bronchitis.    Patient presented to the emergency room waking up from sleep with symptoms of chest pain.  Patient had associated symptoms of shortness of breath and dizziness.  Patient initial evaluation in the emergency room revealed a resting electrocardiogram with atrial fibrillation with a rapid ventricular response along with ST segment depression in lead V1 and V2 V3 along with subtle ST elevation in lead V5 and V6.  Patient had ongoing symptoms of chest pain.  Patient repeat electrocardiogram after the administration of IV fluids and Cardizem had revealed persistent ST segment depression in lead V1 and V2 V3 suggestive of posterior MI injury current patent..    Patient on further questioning denies previous history of documented atrial fibrillation.  Patient denies recent cardiac evaluation.  Patient has not had any symptoms of severe substernal chest pain prior to this episode.  Patient denies any nausea or vomiting.    Patient denies any bleeding episodes of any bleeding diastasis.    Patient 10 point review of system except for what is stated in the history of present  illness is negative.      Past medical history:   1.  Chest pain.  2.  Negative dobutamine stress echocardiogram done in 2016 for any evidence of an stress-induced ischemia    3.  Arterial hypertension.  4.  Preserved left ventricular systolic function with an ejection fraction of 55%.  5.  Chronic back pain.  6.  Allergic rhinitis.  7.  Chronic obstructive lung disease with tobacco abuse.  8.  Non-IgE mediated allergic asthmatic bronchitis.  9.  Strong family history for coronary artery disease.  10.  History of emphysema.        Past Surgical History:  1.  Bilateral ankle surgery.  2.   section.  3.  Hysterectomy.  4.  Kenalog injection.        Family History: Mother and father with coronary artery disease      Social History: Smokes up to half to one pack per day social alcohol intake patient is not working       Cardiac Risk factor:   1.  Postmenopausal.  2.  Tobacco abuse.  3.  Arterial hypertension.  4.  Family history for coronary artery disease    Allergies:  No Known Allergies    Home Medication::  Prior to Admission medications    Medication Sig Start Date End Date Taking? Authorizing Provider   ADVAIR DISKUS 500-50 MCG/DOSE DISKUS INHALE 1 PUFF BY MOUTH TWICE DAILY 18   Gigi Ac MD   albuterol (PROVENTIL) (2.5 MG/3ML) 0.083% nebulizer solution USE 1 VIAL PER NEBULIZER EVERY 4 HOURS AS NEEDED FOR WHEEZING 10/15/18   Gigi Ac MD   albuterol (VENTOLIN HFA) 108 (90 Base) MCG/ACT inhaler 2 puffs every 4 hours as needed for breathing 10/23/18   Gigi Ac MD   ALPRAZolam (XANAX) 0.25 MG tablet Take 1 tablet by mouth 3 (Three) Times a Day As Needed for Anxiety. 18   Tiffanie Elise APRN   amLODIPine (NORVASC) 10 MG tablet TAKE 1 TABLET BY MOUTH EVERY DAY 18   Tiffanie Elise APRN   azithromycin (ZITHROMAX) 250 MG tablet TAKE AS DIRECTED 18   Gigi Ac MD   azithromycin (ZITHROMAX) 250 MG tablet TAKE 2 TABLETS BY MOUTH ON DAY 1, THEN 1 TABLET DAILY FOR 4 DAYS  10/15/18   Gigi Ac MD   azithromycin (ZITHROMAX) 250 MG tablet Take 2 tablets the first day, then 1 tablet daily for 4 days. 10/23/18   Gigi Ac MD   azithromycin (ZITHROMAX) 250 MG tablet TAKE 2 TABLET BY MOUTH THE FIRST DAY THEN1 TABLET BY MOUTH DAILY FOR 4 DAYS 11/6/18   Gigi Ac MD   budesonide-formoterol (SYMBICORT) 160-4.5 MCG/ACT inhaler 2 puffs twice a day 5/17/18   Gigi Ac MD   glucose blood test strip Use as instructed 8/17/18   Tiffanie Elise APRN   glucose monitor monitoring kit 1 each Daily. Testing blood sugar once a day    ICD10 - R73.9 8/17/18   Tiffanie Elise APRN   Lancets (FREESTYLE) lancets Testing blood sugar once a day 8/17/18   Tiffanie Elise APRN   metFORMIN (GLUCOPHAGE) 500 MG tablet Take 1 tablet by mouth Daily With Breakfast. 8/17/18   Tiffanie Elise APRN   montelukast (SINGULAIR) 10 MG tablet TAKE 1 TABLET BY MOUTH DAILY 10/29/18   Tiffanie Elise APRN   nystatin (MYCOSTATIN) 991335 UNIT/ML suspension Take 5 mL by mouth 4 (Four) Times a Day. 5/1/18   Gigi Ac MD   PARoxetine (PAXIL) 10 MG tablet Take 1 tablet by mouth Every Morning. 8/17/18   Tiffanie Elise APRN   predniSONE (DELTASONE) 10 MG tablet Take 1 tablet by mouth Daily for 30 doses. 1 by mouth daily 10/23/18 11/22/18  Gigi Ac MD   predniSONE (DELTASONE) 20 MG tablet TAKE 1 TABLET BY MOUTH DAILY 8/16/18   Gigi Ac MD   SPIRIVA HANDIHALER 18 MCG per inhalation capsule INHALE 1 PUFF BY MOUTH DAILY 6/11/18   Gigi Ac MD   VENTOLIN  (90 Base) MCG/ACT inhaler INHALE 2 PUFFS BY MOUTH EVERY 4 HOURS AS NEEDED FOR BREATHING 8/8/18   Gigi Ac MD   VENTOLIN  (90 Base) MCG/ACT inhaler INHALE 2 PUFFS BY MOUTH EVERY 4 HOURS AS NEEDED FOR BREATHING 9/4/18   Gigi Ac MD VENTOLIN  (90 Base) MCG/ACT inhaler INHALE 2 PUFFS BY MOUTH EVERY 4 HOURS AS NEEDED FOR BREATHING 9/18/18   Gigi Ac MD   VENTROBERTO  (90 Base) MCG/ACT  inhaler INHALE 2 PUFFS BY MOUTH EVERY 4 HOURS AS NEEDED FOR BREATHING 10/11/18   Gigi Ac MD         Review of Systems   Constitutional: Negative for chills, fever and unexpected weight change.   HENT: Negative for hearing loss and nosebleeds.    Eyes: Negative for visual disturbance.   Respiratory: Positive for chest tightness and shortness of breath. Negative for cough and wheezing.    Cardiovascular: Positive for chest pain and palpitations. Negative for leg swelling.   Gastrointestinal: Negative for abdominal pain, blood in stool, constipation, diarrhea, nausea and vomiting.   Endocrine: Negative for cold intolerance, heat intolerance, polydipsia, polyphagia and polyuria.   Genitourinary: Negative for hematuria.   Musculoskeletal: Negative for joint swelling, myalgias and neck pain.   Skin: Negative for color change, rash and wound.   Neurological: Positive for light-headedness. Negative for dizziness, seizures, syncope, numbness and headaches.   Hematological: Does not bruise/bleed easily.         Objective:     Objective:  Temp:  [98.2 °F (36.8 °C)] 98.2 °F (36.8 °C)  Heart Rate:  [] 54  Resp:  [14] 14  BP: ()/(50-76) 83/50      Body mass index is 28.72 kg/m².       Physical Exam   Constitutional: She is oriented to person, place, and time. She appears well-developed and well-nourished.   HENT:   Head: Normocephalic and atraumatic.   Left Ear: External ear normal.   Nose: Nose normal.   Mouth/Throat: Oropharynx is clear and moist.   Eyes: Conjunctivae and EOM are normal. Pupils are equal, round, and reactive to light. No scleral icterus.   Neck: Normal range of motion. Neck supple. No JVD present. No tracheal deviation present. No thyromegaly present.   Cardiovascular: Normal rate and regular rhythm.   Irregularly irregular S1 and S2 with a soft systolic murmur best heard at the apex.   Pulmonary/Chest: Breath sounds normal. No stridor.   Abdominal: Bowel sounds are normal.    Musculoskeletal: Normal range of motion.   Lymphadenopathy:     She has no cervical adenopathy.   Neurological: She is alert and oriented to person, place, and time. She has normal reflexes.   Skin: Skin is warm and dry.   Psychiatric: She has a normal mood and affect. Her behavior is normal. Judgment and thought content normal.         Lab Review:           Invalid input(s): LABALBU, PROT          Results from last 7 days   Lab Units  11/15/18   0214   WBC 10*3/mm3  10.36*   HEMOGLOBIN g/dL  14.4   HEMATOCRIT %  41.9   PLATELETS 10*3/mm3  271     Results from last 7 days   Lab Units  11/15/18   0214   INR   0.92                       EKG:   ECG/EMG Results (last 24 hours)     Procedure Component Value Units Date/Time    ECG 12 Lead [[id number]] Collected:  11/15/18 0246     Updated:  11/15/18 0248    ECG 12 Lead [[id number]] Resulted:  11/15/18 0253     Updated:  11/15/18 0253          Imaging:  Imaging Results (last 24 hours)     Procedure Component Value Units Date/Time    XR Chest 1 View [[id number]] Collected:  11/15/18 0227     Updated:  11/15/18 0239    Narrative:       Exam: AP portable chest    INDICATION: Chest pain    COMPARISON: 5/17/2018    FINDINGS: AP portable chest. The bony structures are intact. The  cardiomediastinal silhouette is unremarkable. Mild plaque is  present in the aorta. Mild parenchymal scarring is present in the  right lung base. No acute infiltrate, pneumothorax or pleural  effusion.      Impression:       No acute cardiopulmonary abnormality.    Electronically signed by:  Maicol Oliver MD  11/15/2018 2:38 AM  CST Workstation: [id number]          I personally viewed and interpreted the patient's EKG/Telemetry data.    Assessment:   1.  Chest pain with electrocardiographic changes suggestive of posterior wall myocardial infarction.  2.  Hypotension.  3.  Chronic obstructive lung disease with tobacco abuse.          Plan:   1.  Chest pain with electrocardiographic changes  suggestive of ischemia.  Patient on initial presentation was in atrial fibrillation with subsequent EKG which had revealed sinus rhythm with persistent ST segment depression in lead V1 and V2 V3 suggestive of true posterior wall myocardial infarction versus anterior wall sub endocardial ischemia.  Patient has been recommended an emergent coronary angiogram and rescue PTCA.  Risk-benefit treatment option were discussed with the patient and the family and an informed consent was obtained.  Patient was continued on IV fluids and taken emergently to the cardiac catheterization.  2.  Paroxysmal atrial fibrillation.  Patient has no previous history of documented atrial fibrillation.  Patient would be started on IV amiodarone infusion.  Patient would undergo a transthoracic echocardiogram.    Further recommendations to follow after the coronary angiogram.      Time: time spent in face-to-face evaluation of greater than 55  minutes and interacting and formulating examining and discussing the plan with the patient with 50% of greater time spent in face-to-face interaction.    Thaddeus Huber MD  11/15/18  2:56 AM    Dictated utilizing Dragon dictation.

## 2018-11-15 NOTE — ED PROVIDER NOTES
Subjective   63-year-old white female arrives to the emergency department via EMS with chief complaint of chest pain.  Patient complains of a severe aching pain in her chest that radiates to her neck both arms and back that woke her up from sleep approximately one hour ago.  She's had shortness of breath nausea vomiting and diaphoresis associated with it.  She relates she had similar symptoms once before 6 months ago but the symptoms were not as bad and she did not seek medical attention at that time.  Patient was given aspirin and nitroglycerin prior to arrival by EMS.  Patient denies history of heart disease.            Review of Systems   Unable to perform ROS: Acuity of condition       Past Medical History:   Diagnosis Date   • Acute bronchitis    • Acute upper respiratory infection    • Adjustment disorder with mixed emotional features    • Agoraphobia with panic attacks    • Allergic rhinitis due to pollen    • Anxiety    • Asthma    • Backache    • Blood in urine    • Candidiasis of mouth    • Chronic bronchitis (CMS/HCC)    • Chronic obstructive lung disease (CMS/HCC)    • Emphysema lung (CMS/HCC)    • Essential hypertension    • Extrinsic asthma with status asthmaticus    • Fatigue    • H/O echocardiogram     EF 55-60%. LV systolic function normal. Impaired LV relaxation. [facility]   • Hypoxemia    • Malaise and fatigue    • Non-IgE mediated allergic asthma    • Nonvenomous insect bite    • Pneumonia    • Postmenopausal state    • Urinary tract infectious disease        No Known Allergies    Past Surgical History:   Procedure Laterality Date   • INJECTION OF MEDICATION  03/20/2015    celestone(1)   • INJECTION OF MEDICATION  04/05/2016    DEPO MEDROL(16)       Family History   Problem Relation Age of Onset   • Heart attack Mother    • Heart disease Mother    • Heart disease Father    • Heart disease Paternal Grandmother        Social History     Socioeconomic History   • Marital status:       Spouse name: Not on file   • Number of children: Not on file   • Years of education: Not on file   • Highest education level: Not on file   Tobacco Use   • Smoking status: Former Smoker     Packs/day: 1.00     Years: 48.00     Pack years: 48.00     Types: Cigarettes     Start date:      Last attempt to quit: 2017     Years since quittin.2   • Smokeless tobacco: Current User   Substance and Sexual Activity   • Alcohol use: Yes     Alcohol/week: 1.2 - 1.8 oz     Types: 1 - 2 Glasses of wine, 1 Cans of beer per week     Comment: Socially   • Drug use: No           Objective   Physical Exam   Constitutional: She is oriented to person, place, and time. She appears well-developed and well-nourished. She appears distressed.   HENT:   Head: Normocephalic and atraumatic.   Left Ear: External ear normal.   Nose: Nose normal.   Mouth/Throat: Oropharynx is clear and moist.   Eyes: Conjunctivae and EOM are normal. Pupils are equal, round, and reactive to light.   Neck: Normal range of motion. Neck supple.   Cardiovascular:   Tachycardic with irregular rhythm.  Heart sounds normal.  Distal pulses are intact.   Pulmonary/Chest: Effort normal and breath sounds normal.   Abdominal: Soft. She exhibits no distension and no mass. There is no tenderness. There is no guarding.   Musculoskeletal: She exhibits no edema or tenderness.   Neurological: She is alert and oriented to person, place, and time. No cranial nerve deficit or sensory deficit. She exhibits normal muscle tone.   Skin: Skin is warm and dry.   Psychiatric: She has a normal mood and affect. Her behavior is normal.   Nursing note and vitals reviewed.      ECG 12 Lead    Date/Time: 11/15/2018 2:01 AM  Performed by: Reese Greer MD  Authorized by: Reese Greer MD   Interpreted by physician  Comments: Atrial fibrillation with rapid ventricular response rate of 136.  There is ST depression V1 through V4 and ST elevation in V5 and V6 which appears to be  consistent with acute postero-lateral STEMI      Critical Care  Performed by: Reese Greer MD  Authorized by: Reese Greer MD     Critical care provider statement:     Critical care time (minutes):  30    Critical care time was exclusive of:  Separately billable procedures and treating other patients    Critical care was necessary to treat or prevent imminent or life-threatening deterioration of the following conditions:  Cardiac failure    Critical care was time spent personally by me on the following activities:  Discussions with consultants, evaluation of patient's response to treatment, examination of patient, obtaining history from patient or surrogate, ordering and performing treatments and interventions, ordering and review of laboratory studies, ordering and review of radiographic studies, re-evaluation of patient's condition and review of old charts    I assumed direction of critical care for this patient from another provider in my specialty: no                 ED Course  ED Course as of Nov 15 0253   Thu Nov 15, 2018   0213 STEMI protocol activated immediately after I reviewed the EKG.  Case discussed with cardiologist Dr. Huber and he is en route to the emergency department  [DR]   0248  is here and he will be taking the patient to the cardiac Cath Lab  [DR]      ED Course User Index  [DR] Reese Greer MD        Labs Reviewed   CBC WITH AUTO DIFFERENTIAL - Abnormal; Notable for the following components:       Result Value    WBC 10.36 (*)     Immature Grans, Absolute 0.05 (*)     All other components within normal limits   PROTIME-INR - Normal    Narrative:     Therapeutic range for most indications is 2.0-3.0 INR,  or 2.5-3.5 for mechanical heart valves.   TROPONIN (IN-HOUSE)   BNP (IN-HOUSE)   CBC AND DIFFERENTIAL    Narrative:     The following orders were created for panel order CBC & Differential.  Procedure                               Abnormality         Status                      ---------                               -----------         ------                     CBC Auto Differential[[id number]]        Abnormal            Final result                 Please view results for these tests on the individual orders.   EXTRA TUBES    Narrative:     The following orders were created for panel order Extra Tubes.  Procedure                               Abnormality         Status                     ---------                               -----------         ------                     Gold Top - SST[[id number]]                                   In process                   Please view results for these tests on the individual orders.   GOLD TOP - SST     Xr Chest 1 View    Result Date: 11/15/2018  Narrative: Exam: AP portable chest INDICATION: Chest pain COMPARISON: 5/17/2018 FINDINGS: AP portable chest. The bony structures are intact. The cardiomediastinal silhouette is unremarkable. Mild plaque is present in the aorta. Mild parenchymal scarring is present in the right lung base. No acute infiltrate, pneumothorax or pleural effusion.     Impression: No acute cardiopulmonary abnormality. Electronically signed by:  Maicol Oliver MD  11/15/2018 2:38 AM [facility] Workstation: [id number]            [facility]      Final diagnoses:   Acute ST elevation myocardial infarction (STEMI), unspecified artery (CMS/HCC)            Reese Greer MD  11/15/18 0253

## 2018-11-15 NOTE — CONSULTS
[facility] ER Medicine Admission      Date of Admission: 11/15/2018      Primary Care Physician: Provider, No Known      Chief Complaint: Consult for medical management    HPI: 63-year-old  female who presented to the ER with intermittent symptoms of chest pain.  EKG revealed posterior lateral wall MI.  Patient was urgently taken to the catheter lab and received 3 stents.  Hospitalist team was consulted for medical management    Concurrent Medical History:  has a past medical history of Acute bronchitis, Acute upper respiratory infection, Adjustment disorder with mixed emotional features, Agoraphobia with panic attacks, Allergic rhinitis due to pollen, Anxiety, Asthma, Backache, Blood in urine, Candidiasis of mouth, Chronic bronchitis (CMS/HCC), Chronic obstructive lung disease (CMS/HCC), Emphysema lung (CMS/HCC), Essential hypertension, Extrinsic asthma with status asthmaticus, Fatigue, H/O echocardiogram, Hypoxemia, Malaise and fatigue, Non-IgE mediated allergic asthma, Nonvenomous insect bite, Pneumonia, Postmenopausal state, and Urinary tract infectious disease.    Past Surgical History:  has a past surgical history that includes Injection of Medication (03/20/2015); Injection of Medication (04/05/2016); and Left Heart Cath (N/A, 11/15/2018).    Family History: family history includes Heart attack in her mother; Heart disease in her father, mother, and paternal grandmother.     Social History:  reports that she quit smoking about 15 months ago. Her smoking use included cigarettes. She started smoking about 49 years ago. She has a 48.00 pack-year smoking history. She uses smokeless tobacco. She reports that she drinks about 1.2 - 1.8 oz of alcohol per week. She reports that she does not use drugs.    Allergies: No Known Allergies    Medications:   Medications Prior to Admission   Medication Sig Dispense Refill Last Dose   • ADVAIR DISKUS 500-50 MCG/DOSE DISKUS  INHALE 1 PUFF BY MOUTH TWICE DAILY 1 each 5 Taking   • albuterol (PROVENTIL) (2.5 MG/3ML) 0.083% nebulizer solution USE 1 VIAL PER NEBULIZER EVERY 4 HOURS AS NEEDED FOR WHEEZING 360 mL 0 Taking   • albuterol (VENTOLIN HFA) 108 (90 Base) MCG/ACT inhaler 2 puffs every 4 hours as needed for breathing 1 inhaler 5    • ALPRAZolam (XANAX) 0.25 MG tablet Take 1 tablet by mouth 3 (Three) Times a Day As Needed for Anxiety. 90 tablet 0 Taking   • amLODIPine (NORVASC) 10 MG tablet TAKE 1 TABLET BY MOUTH EVERY DAY 30 tablet 3 Taking   • azithromycin (ZITHROMAX) 250 MG tablet TAKE AS DIRECTED 6 tablet 0 Taking   • azithromycin (ZITHROMAX) 250 MG tablet TAKE 2 TABLETS BY MOUTH ON DAY 1, THEN 1 TABLET DAILY FOR 4 DAYS 6 tablet 0 Not Taking   • azithromycin (ZITHROMAX) 250 MG tablet Take 2 tablets the first day, then 1 tablet daily for 4 days. 6 tablet 1    • azithromycin (ZITHROMAX) 250 MG tablet TAKE 2 TABLET BY MOUTH THE FIRST DAY THEN1 TABLET BY MOUTH DAILY FOR 4 DAYS 6 tablet 0    • budesonide-formoterol (SYMBICORT) 160-4.5 MCG/ACT inhaler 2 puffs twice a day 1 inhaler 5 Taking   • glucose blood test strip Use as instructed 50 each 12 Taking   • glucose monitor monitoring kit 1 each Daily. Testing blood sugar once a day    ICD10 - R73.9 1 each 0 Taking   • Lancets (FREESTYLE) lancets Testing blood sugar once a day 100 each 12 Taking   • metFORMIN (GLUCOPHAGE) 500 MG tablet Take 1 tablet by mouth Daily With Breakfast. 30 tablet 11 Taking   • montelukast (SINGULAIR) 10 MG tablet TAKE 1 TABLET BY MOUTH DAILY 90 tablet 1    • nystatin (MYCOSTATIN) 123193 UNIT/ML suspension Take 5 mL by mouth 4 (Four) Times a Day. 280 mL 0 Taking   • PARoxetine (PAXIL) 10 MG tablet Take 1 tablet by mouth Every Morning. 30 tablet 11 Taking   • predniSONE (DELTASONE) 10 MG tablet Take 1 tablet by mouth Daily for 30 doses. 1 by mouth daily 30 tablet 3    • predniSONE (DELTASONE) 20 MG tablet TAKE 1 TABLET BY MOUTH DAILY 30 tablet 0 Taking   • SPIRIVA  HANDIHALER 18 MCG per inhalation capsule INHALE 1 PUFF BY MOUTH DAILY 30 capsule 5 Taking   • VENTOLIN  (90 Base) MCG/ACT inhaler INHALE 2 PUFFS BY MOUTH EVERY 4 HOURS AS NEEDED FOR BREATHING 18 g 0 Taking   • VENTOLIN  (90 Base) MCG/ACT inhaler INHALE 2 PUFFS BY MOUTH EVERY 4 HOURS AS NEEDED FOR BREATHING 18 g 0 Taking   • VENTOLIN  (90 Base) MCG/ACT inhaler INHALE 2 PUFFS BY MOUTH EVERY 4 HOURS AS NEEDED FOR BREATHING 18 g 0 Taking   • VENTOLIN  (90 Base) MCG/ACT inhaler INHALE 2 PUFFS BY MOUTH EVERY 4 HOURS AS NEEDED FOR BREATHING 18 g 0 Taking       Review of Systems:  Review of Systems   Constitutional: Negative for fever.   HENT: Negative for ear pain.    Eyes: Negative for pain.   Respiratory: Negative for shortness of breath and wheezing.    Cardiovascular: Negative for chest pain.   Gastrointestinal: Negative for abdominal pain.   Genitourinary: Negative for dysuria.   Musculoskeletal: Negative for neck pain.   Skin: Negative for rash.   Neurological: Negative for headaches.   Psychiatric/Behavioral: Negative for agitation.      Otherwise complete ROS is negative except as mentioned above.    Physical Exam:   Temp:  [97.5 °F (36.4 °C)-98.9 °F (37.2 °C)] 98.4 °F (36.9 °C)  Heart Rate:  [] 97  Resp:  [14-20] 20  BP: ()/(50-91) 142/86  Arterial Line BP: (125-161)/(70-91) 149/82  Physical Exam   Constitutional: She appears well-developed.   HENT:   Head: Normocephalic and atraumatic.   Eyes: Pupils are equal, round, and reactive to light.   Neck: Normal range of motion.   Cardiovascular: Normal rate.   Pulmonary/Chest: She has decreased breath sounds. She has no wheezes.   Abdominal: Soft. There is no tenderness.   Musculoskeletal: She exhibits no edema.   Neurological: She is alert.   Skin: Skin is warm and dry.   Psychiatric: She has a normal mood and affect.         Results Reviewed:  I have personally reviewed current lab, radiology, and data and agree with  results.  Lab Results (last 24 hours)     Procedure Component Value Units Date/Time    Troponin [[id number]]  (Abnormal) Collected:  11/15/18 1358    Specimen:  Blood Updated:  11/15/18 1433     Troponin I 78.500 ng/mL     CK [[id number]]  (Abnormal) Collected:  11/15/18 1041    Specimen:  Blood Updated:  11/15/18 1113     Creatine Kinase 3,625 U/L     Troponin [[id number]]  (Abnormal) Collected:  11/15/18 1041    Specimen:  Blood Updated:  11/15/18 1113     Troponin I 69.800 ng/mL     CK-MB [[id number]]  (Abnormal) Collected:  11/15/18 1041    Specimen:  Blood Updated:  11/15/18 1111     CKMB 303.00 ng/mL     Extra Tubes [[id number]] Collected:  11/15/18 0556    Specimen:  Blood, Venous Line Updated:  11/15/18 0700    Narrative:       The following orders were created for panel order Extra Tubes.  Procedure                               Abnormality         Status                     ---------                               -----------         ------                     Gold Top - SST[[id number]]                                   Final result                 Please view results for these tests on the individual orders.    Gold Top - SST [[id number]] Collected:  11/15/18 0556    Specimen:  Blood Updated:  11/15/18 0700     Extra Tube Hold for add-ons.     Comment: Auto resulted.       Basic Metabolic Panel [[id number]]  (Abnormal) Collected:  11/15/18 0556    Specimen:  Blood Updated:  11/15/18 0652     Glucose 200 mg/dL      BUN 15 mg/dL      Creatinine 0.81 mg/dL      Sodium 135 mmol/L      Potassium 4.3 mmol/L      Chloride 102 mmol/L      CO2 23.0 mmol/L      Calcium 8.2 mg/dL      eGFR Non African Amer 72 mL/min/1.73      BUN/Creatinine Ratio 18.5     Anion Gap 10.0 mmol/L     CBC (No Diff) [[id number]]  (Abnormal) Collected:  11/15/18 0555    Specimen:  Blood Updated:  11/15/18 0612     WBC 12.30 10*3/mm3      RBC 4.63 10*6/mm3      Hemoglobin 14.3 g/dL      Hematocrit 42.2 %      MCV 91.1 fL      MCH 30.9 pg      MCHC  33.9 g/dL      RDW 13.7 %      RDW-SD 44.4 fl      MPV 9.0 fL      Platelets 285 10*3/mm3     Extra Tubes [[id number]] Collected:  11/15/18 0214    Specimen:  Blood, Venous Line Updated:  11/15/18 0315    Narrative:       The following orders were created for panel order Extra Tubes.  Procedure                               Abnormality         Status                     ---------                               -----------         ------                     Gold Top - SST[[id number]]                                   Final result                 Please view results for these tests on the individual orders.    Gold Top - SST [[id number]] Collected:  11/15/18 0214    Specimen:  Blood Updated:  11/15/18 0315     Extra Tube Hold for add-ons.     Comment: Auto resulted.       BNP [[id number]]  (Normal) Collected:  11/15/18 0214    Specimen:  Blood from Arm, Right Updated:  11/15/18 0256     proBNP 34.8 pg/mL     Troponin [[id number]]  (Normal) Collected:  11/15/18 0214    Specimen:  Blood from Arm, Right Updated:  11/15/18 0256     Troponin I <0.012 ng/mL     Protime-INR [[id number]]  (Normal) Collected:  11/15/18 0214    Specimen:  Blood from Arm, Right Updated:  11/15/18 0244     Protime 12.2 Seconds      INR 0.92    Narrative:       Therapeutic range for most indications is 2.0-3.0 INR,  or 2.5-3.5 for mechanical heart valves.    CBC & Differential [[id number]] Collected:  11/15/18 0214    Specimen:  Blood Updated:  11/15/18 0229    Narrative:       The following orders were created for panel order CBC & Differential.  Procedure                               Abnormality         Status                     ---------                               -----------         ------                     CBC Auto Differential[[id number]]        Abnormal            Final result                 Please view results for these tests on the individual orders.    CBC Auto Differential [[id number]]  (Abnormal) Collected:  11/15/18 0214    Specimen:   Blood from Arm, Right Updated:  11/15/18 0229     WBC 10.36 10*3/mm3      RBC 4.61 10*6/mm3      Hemoglobin 14.4 g/dL      Hematocrit 41.9 %      MCV 90.9 fL      MCH 31.2 pg      MCHC 34.4 g/dL      RDW 13.6 %      RDW-SD 44.5 fl      MPV 8.9 fL      Platelets 271 10*3/mm3      Neutrophil % 62.5 %      Lymphocyte % 24.3 %      Monocyte % 8.0 %      Eosinophil % 4.2 %      Basophil % 0.5 %      Immature Grans % 0.5 %      Neutrophils, Absolute 6.48 10*3/mm3      Lymphocytes, Absolute 2.52 10*3/mm3      Monocytes, Absolute 0.83 10*3/mm3      Eosinophils, Absolute 0.43 10*3/mm3      Basophils, Absolute 0.05 10*3/mm3      Immature Grans, Absolute 0.05 10*3/mm3         Imaging Results (last 24 hours)     Procedure Component Value Units Date/Time    XR Chest 1 View [[id number]] Collected:  11/15/18 0227     Updated:  11/15/18 0239    Narrative:       Exam: AP portable chest    INDICATION: Chest pain    COMPARISON: 5/17/2018    FINDINGS: AP portable chest. The bony structures are intact. The  cardiomediastinal silhouette is unremarkable. Mild plaque is  present in the aorta. Mild parenchymal scarring is present in the  right lung base. No acute infiltrate, pneumothorax or pleural  effusion.      Impression:       No acute cardiopulmonary abnormality.    Electronically signed by:  Maicol Oliver MD  11/15/2018 2:38 AM  CST Workstation: [id number]            Assessment:    Active Hospital Problems    Diagnosis   • Acute ST elevation myocardial infarction (STEMI) (CMS/[facility])             Plan:  #1 chest pain with status post PTCA for posterior wall MI: New, improved.  Received 3 stents.  Treatment per primary  #2 COPD: stable.  start pt on home meds and prn meds  #3 PAF: new. Echo per cards    Signed     Dr Danyel Jacobs DO   11/15/2018  3:03 PM

## 2018-11-15 NOTE — PROGRESS NOTES
Discharge Planning Assessment  [facility]     Patient Name: Monisha Brasher  MRN: [id number]  Today's Date: 11/15/2018    Admit Date: 11/15/2018    Discharge Needs Assessment     Row Name 11/15/18 1435       Living Environment    Lives With  spouse    Current Living Arrangements  home/apartment/condo    Primary Care Provided by  self    Provides Primary Care For  no one    Family Caregiver if Needed  none    Quality of Family Relationships  helpful;involved;supportive    Able to Return to Prior Arrangements  yes    Living Arrangement Comments  Pt resides at home with spouse. Pt appears to have good support system.        Resource/Environmental Concerns    Resource/Environmental Concerns  none    Transportation Concerns  car, none       Transition Planning    Patient/Family Anticipates Transition to  home    Patient/Family Anticipated Services at Transition  none    Transportation Anticipated  family or friend will provide       Discharge Needs Assessment    Concerns to be Addressed  adjustment to diagnosis/illness    Equipment Currently Used at Home  nebulizer    Anticipated Changes Related to Illness  none    Equipment Needed After Discharge  -- Awaiting therapy recomendations    Discharge Facility/Level of Care Needs  home with home health    Current Discharge Risk  chronically ill        Discharge Plan     Row Name 11/15/18 0328       Plan    Plan Comments  LSW assesment complete. Pt resides at home with spouse. pt has good support system. Pt reports being independent prior to hospitalization. Her goal is to return home at d/c. She does not anticipate any needs at this time however may benefit from transition visit. LSW awaiting additional recomendations from MD and therapy. LSW/case mgt will follow up as consulted and complete arrangements as ordered.         Destination      No service coordination in this encounter.      Durable Medical Equipment      No service coordination in this encounter.       Dialysis/Infusion      No service coordination in this encounter.      Home Medical Care      No service coordination in this encounter.      Community Resources      No service coordination in this encounter.          Demographic Summary     Row Name 11/15/18 1434       General Information    Admission Type  inpatient    Referral Source  high risk screening    Reason for Consult  discharge planning    Preferred Language  English     Used During This Interaction  no       Contact Information    Contact Information Obtained for          Functional Status     Row Name 11/15/18 1435       Functional Status    Usual Activity Tolerance  moderate    Current Activity Tolerance  fair       Functional Status, IADL    Medications  independent    Meal Preparation  independent    Housekeeping  independent    Laundry  independent    Shopping  independent       Mental Status Summary    Recent Changes in Mental Status/Cognitive Functioning  no changes        Psychosocial    No documentation.       Abuse/Neglect    No documentation.       Legal    No documentation.       Substance Abuse    No documentation.       Patient Forms    No documentation.           COMPA Pineda

## 2018-11-15 NOTE — PROGRESS NOTES
Cardiology Progress Note     LOS: 0 days   Patient Care Team:  Provider, No Known as PCP - Tiffanie Ivan APRN as PCP - Family Medicine (Family Medicine)    Subjective:    Chart reviewed. Patient seen and examined. Patient denies any chest pain, shortness of breath, or palpitation.  Patient is status post aborted posterior wall myocardial infarction with Pronto thrombectomy and PTCA and stenting of the circumflex obtuse marginal branch.  Patient has had recurrence of atrial fibrillation.  Patient has not had any symptoms of chest pain.  Patient troponin is trending down.  Have discussed with the patient the possible need for percutaneous intervention to the left anterior descending artery.  Patient currently is not having any symptoms of chest pain.      Objective:  Temp:  [97.5 °F (36.4 °C)-98.9 °F (37.2 °C)] 98.4 °F (36.9 °C)  Heart Rate:  [] 97  Resp:  [14-20] 20  BP: ()/(50-91) 142/86  Arterial Line BP: (125-161)/(70-91) 149/82    Intake/Output Summary (Last 24 hours) at 11/15/2018 1708  Last data filed at 11/15/2018 1600  Gross per 24 hour   Intake 1481 ml   Output 1900 ml   Net -419 ml       Physical Exam:   General Appearance:    Alert, oriented, cooperative, in no acute distress.   Head:    Normocephalic, atraumatic, without obvious abnormality   Eyes:              ARI. Lids and lashes normal, conjunctivae and sclerae normal, no icterus, no pallor.   Ears:    Ears appear intact with no abnormalities noted.   Throat:   Mucous membranes pink and moist.   Neck:   Supple, trachea midline, no carotid bruit, no organomegaly or JVD.   Lungs:     Clear to auscultation and percussion, respirations. regular, even and unlabored. No wheezes, rales, or rhonchi.    Heart:    Regular rhythm and normal rate, normal S1 and S2, no      murmur, no gallop, no rub, no click.   Abdomen:     Soft, non-tender, non-distended, no guarding, no rebound tenderness. Normal bowel sounds in all four quadrants, no  masses, liver and spleen nonpalpable.    Genitalia:    Deferred.   Extremities:   Moves all extremities well, no edema, no cyanosis, no       redness, no clubbing.   Pulses:   Pulses palpable and equal bilaterally.   Skin:   Moist and warm. No bleeding, bruising or rash.   Neurologic/Psychiatric:   Alert and oriented to person, place, and time.  Motor, power and tone in upper and lower extremities are grossly intact.  No focal neurological deficits. Normal cognitive function. No psychomotor reaction or tangential thought. No depression, homicidal ideations and suicidal ideations.          Results Review:    Results from last 7 days   Lab Units  11/15/18   0556   SODIUM mmol/L  135*   POTASSIUM mmol/L  4.3   CHLORIDE mmol/L  102   CO2 mmol/L  23.0   BUN mg/dL  15   CREATININE mg/dL  0.81   CALCIUM mg/dL  8.2*   GLUCOSE mg/dL  200*     Results from last 7 days   Lab Units  11/15/18   1624  11/15/18   1358  11/15/18   1041   CK TOTAL U/L   --    --   3,625*   TROPONIN I ng/mL  66.100*  78.500*  69.800*         Results from last 7 days   Lab Units  11/15/18   0555   WBC 10*3/mm3  12.30*   HEMOGLOBIN g/dL  14.3   HEMATOCRIT %  42.2   PLATELETS 10*3/mm3  285     Results from last 7 days   Lab Units  11/15/18   0214   INR   0.92                       ECHO:  Results for orders placed in visit on 06/15/16   SCANNED - ECHOCARDIOGRAM       ECG 12 Lead   Final Result   Test Reason : CP   Blood Pressure : **/** mmHG   Vent. Rate : 053 BPM     Atrial Rate : 053 BPM      P-R Int : 080 ms          QRS Dur : 088 ms       QT Int : 468 ms       P-R-T Axes : 068 041 066 degrees      QTc Int : 439 ms      Sinus bradycardia with short ND   Marked ST abnormality, possible septal subendocardial injury   Posterior myocardial infarction   ** ** ACUTE MI ** **   Abnormal ECG      Confirmed by NEVAEH COWAN MD ([id number]) on 11/15/2018 8:21:50 AM      Referred By:             Confirmed By:NEVAEH COWAN MD      ECG 12 Lead   ED  Interpretation   Reese Greer MD     11/15/2018  2:53 AM   ECG 12 Lead      Date/Time: 11/15/2018 2:01 AM   Performed by: Reese Greer MD   Authorized by: Reese Greer MD    Interpreted by physician   Comments: Atrial fibrillation with rapid ventricular response rate of 136.     There is ST depression V1 through V4 and ST elevation in V5 and V6 which    appears to be consistent with acute postero-lateral STEMI         Final Result   Test Reason : chest pain   Blood Pressure : **/** mmHG   Vent. Rate : 136 BPM     Atrial Rate : 147 BPM      P-R Int : 000 ms          QRS Dur : 086 ms       QT Int : 280 ms       P-R-T Axes : 000 026 069 degrees      QTc Int : 421 ms      Atrial fibrillation with rapid ventricular response   ST elevation, consider lateral injury or acute infarct   Posterior myocardial injury   ** ** ACUTE MI ** **   Abnormal ECG      Confirmed by NEVAEH COWAN MD ([id number]) on 11/15/2018 8:21:25 AM      Referred By:             Confirmed By:NEVAEH COWAN MD           Medication Review:   Current Facility-Administered Medications   Medication Dose Route Frequency Provider Last Rate Last Dose   • albuterol (PROVENTIL) nebulizer solution 0.083% 2.5 mg/3mL  2.5 mg Nebulization Q4H PRN Thaddeus Huber MD   2.5 mg at 11/15/18 1223   • amiodarone (PACERONE) tablet 200 mg  200 mg Oral Q12H Thaddeus Huber MD       • aspirin chewable tablet 81 mg  81 mg Oral Daily Thaddeus Huber MD   81 mg at 11/15/18 0836   • atorvastatin (LIPITOR) tablet 40 mg  40 mg Oral Nightly Thaddeus Huber MD       • budesonide-formoterol (SYMBICORT) 160-4.5 MCG/ACT inhaler 2 puff  2 puff Inhalation BID - RT Danyel Jacobs, DO       • hydrALAZINE (APRESOLINE) injection 10 mg  10 mg Intravenous Once PRN Thaddeus Huber MD       • ipratropium-albuterol (DUO-NEB) nebulizer solution 3 mL  3 mL Nebulization Q6H PRN Danyel Jacobs, DO       • losartan (COZAAR) half tablet 12.5 mg  12.5 mg Oral Daily Thaddeus Huber MD    12.5 mg at 11/15/18 0836   • metoprolol tartrate (LOPRESSOR) tablet 25 mg  25 mg Oral Q12H Thaddeus Huber MD       • nitroglycerin (TRIDIL) 200-5 MCG/ML-% infusion  - ADS Override Pull            • sodium chloride 0.9 % infusion  100 mL/hr Intravenous Continuous Thaddeus Huber  mL/hr at 11/15/18 0837 100 mL/hr at 11/15/18 0837   • ticagrelor (BRILINTA) tablet 90 mg  90 mg Oral BID Thaddeus Huber MD   90 mg at 11/15/18 0836       Assessment and Plan:      Acute ST elevation myocardial infarction (STEMI) (CMS/[facility])  1.  Atherosclerotic coronary artery disease.  Patient is status post PTCA and stenting of the circumflex artery.  Patient also has proximal left anterior descending artery stenosis which would be intervened at a later time after the patient recovers from this posterior wall myocardial infarction.  Patient arterial line would be pulled tonight.  Patient would be continued on aspirin and Brilinta.  2.  Paroxysmal atrial fibrillation.  Patient would be started on oral amiodarone and would change the Coreg to metoprolol.  3.  Arterial hypertension.  Patient blood pressure has been elevated.  Would use hydralazine on a when necessary basis.  4.  Risk factor modification.  Patient has been counseled to quit smoking.  Patient would undergo a lipid profile check.    The above plan of management was discussed with the patient and the nursing staff.  If the patient is stable overnight patient would be transferred to the floor.            Thaddeus Huber MD  11/15/18  5:38 PM      Time: Time spent on face-to-face interaction 20 minutes    Dictated utilizing Dragon dictation.

## 2018-11-15 NOTE — PAYOR COMM NOTE
"Monisha Brasher (63 y.o. Female)     Date of Birth Social Security Number Address Home Phone MRN    1955  [address] [place] [address] [phone] [id number]    Scientologist Marital Status          Christian        Admission Date Admission Type Admitting Provider Attending Provider Department, Room/Bed    11/15/18 Emergency Thaddeus Huber MD Kapadia, Deepak, MD [facility] CRITICAL CARE, 16/A    Discharge Date Discharge Disposition Discharge Destination                       Attending Provider:  Thaddeus Huber MD    Allergies:  No Known Allergies    Isolation:  None   Infection:  None   Code Status:  CPR    Ht:  157.5 cm (62\")   Wt:  70.5 kg (155 lb 6.8 oz)    Admission Cmt:  None   Principal Problem:  None                Active Insurance as of 11/15/2018     Primary Coverage     Payor Plan Insurance Group Employer/Plan Group    [facility]     Payor Plan Address Payor Plan Phone Number Payor Plan Fax Number Effective Dates    [address] [address]   2017 - None Entered    [facility] [phone]       Subscriber Name Subscriber Birth Date Member ID       SURJIT BRASHER 1952 [id number]                 Emergency Contacts      (Rel.) Home Phone Work Phone Mobile Phone    Parish Brasher (Spouse) [phone] -- --               History & Physical      Thaddeus Huber MD at 11/15/2018  2:56 AM          Cardiology History and Physical Note        Patient Name: Monisha Brasher  Age/Sex: 63 y.o. female  : 1955  MRN: [id number]    Date of Admission : 11/15/2018    Primary care Physician: Provider, No Known    Reason for Admission:  Chest pain and ST segment depression in lead V2 and V3 suggestive of posterolateral wall myocardial infarction  Subjective:       Chief Complaint: Chest pain.    History of Present Illness:  Monisha Brasher is a 63 y.o. female     Body mass index is 28.72 kg/m².  With a past medical history significant for " chronic obstructive lung disease, tobacco abuse, allergic rhinitis, anxiety, preserved left ventricular systolic function with an ejection fraction of 55-60%, negative dobutamine stress strong family history for coronary artery disease and previous history of non-IgE mediated allergic asthmatic bronchitis.    Patient presented to the emergency room waking up from sleep with symptoms of chest pain.  Patient had associated symptoms of shortness of breath and dizziness.  Patient initial evaluation in the emergency room revealed a resting electrocardiogram with atrial fibrillation with a rapid ventricular response along with ST segment depression in lead V1 and V2 V3 along with subtle ST elevation in lead V5 and V6.  Patient had ongoing symptoms of chest pain.  Patient repeat electrocardiogram after the administration of IV fluids and Cardizem had revealed persistent ST segment depression in lead V1 and V2 V3 suggestive of posterior MI injury current patent..    Patient on further questioning denies previous history of documented atrial fibrillation.  Patient denies recent cardiac evaluation.  Patient has not had any symptoms of severe substernal chest pain prior to this episode.  Patient denies any nausea or vomiting.    Patient denies any bleeding episodes of any bleeding diastasis.    Patient 10 point review of system except for what is stated in the history of present illness is negative.      Past medical history:   1.  Chest pain.  2.  Negative dobutamine stress echocardiogram done in 2016 for any evidence of an stress-induced ischemia    3.  Arterial hypertension.  4.  Preserved left ventricular systolic function with an ejection fraction of 55%.  5.  Chronic back pain.  6.  Allergic rhinitis.  7.  Chronic obstructive lung disease with tobacco abuse.  8.  Non-IgE mediated allergic asthmatic bronchitis.  9.  Strong family history for coronary artery disease.  10.  History of emphysema.        Past Surgical  History:  1.  Bilateral ankle surgery.  2.   section.  3.  Hysterectomy.  4.  Kenalog injection.        Family History: Mother and father with coronary artery disease      Social History: Smokes up to half to one pack per day social alcohol intake patient is not working       Cardiac Risk factor:   1.  Postmenopausal.  2.  Tobacco abuse.  3.  Arterial hypertension.  4.  Family history for coronary artery disease    Allergies:  No Known Allergies    Home Medication::  Prior to Admission medications    Medication Sig Start Date End Date Taking? Authorizing Provider   ADVAIR DISKUS 500-50 MCG/DOSE DISKUS INHALE 1 PUFF BY MOUTH TWICE DAILY 18   Gigi Ac MD   albuterol (PROVENTIL) (2.5 MG/3ML) 0.083% nebulizer solution USE 1 VIAL PER NEBULIZER EVERY 4 HOURS AS NEEDED FOR WHEEZING 10/15/18   Gigi Ac MD   albuterol (VENTOLIN HFA) 108 (90 Base) MCG/ACT inhaler 2 puffs every 4 hours as needed for breathing 10/23/18   Gigi Ac MD   ALPRAZolam (XANAX) 0.25 MG tablet Take 1 tablet by mouth 3 (Three) Times a Day As Needed for Anxiety. 18   Tiffanie Elise APRN   amLODIPine (NORVASC) 10 MG tablet TAKE 1 TABLET BY MOUTH EVERY DAY 18   Tiffanie Elise APRN   azithromycin (ZITHROMAX) 250 MG tablet TAKE AS DIRECTED 18   Gigi Ac MD   azithromycin (ZITHROMAX) 250 MG tablet TAKE 2 TABLETS BY MOUTH ON DAY 1, THEN 1 TABLET DAILY FOR 4 DAYS 10/15/18   Gigi Ac MD   azithromycin (ZITHROMAX) 250 MG tablet Take 2 tablets the first day, then 1 tablet daily for 4 days. 10/23/18   Gigi Ac MD   azithromycin (ZITHROMAX) 250 MG tablet TAKE 2 TABLET BY MOUTH THE FIRST DAY THEN1 TABLET BY MOUTH DAILY FOR 4 DAYS 18   Gigi Ac MD   budesonide-formoterol (SYMBICORT) 160-4.5 MCG/ACT inhaler 2 puffs twice a day 18   Gigi Ac MD   glucose blood test strip Use as instructed 18   Tiffanie Elise APRN   glucose monitor monitoring kit 1 each Daily.  Testing blood sugar once a day    ICD10 - R73.9 8/17/18   Tiffanie Elise APRN   Lancets (FREESTYLE) lancets Testing blood sugar once a day 8/17/18   Tiffanie Elise APRN   metFORMIN (GLUCOPHAGE) 500 MG tablet Take 1 tablet by mouth Daily With Breakfast. 8/17/18   Tiffanie Elise APRN   montelukast (SINGULAIR) 10 MG tablet TAKE 1 TABLET BY MOUTH DAILY 10/29/18   Tiffanie Elise APRN   nystatin (MYCOSTATIN) 913317 UNIT/ML suspension Take 5 mL by mouth 4 (Four) Times a Day. 5/1/18   Gigi Ac MD   PARoxetine (PAXIL) 10 MG tablet Take 1 tablet by mouth Every Morning. 8/17/18   Tiffanie Elise APRN   predniSONE (DELTASONE) 10 MG tablet Take 1 tablet by mouth Daily for 30 doses. 1 by mouth daily 10/23/18 11/22/18  Gigi Ac MD   predniSONE (DELTASONE) 20 MG tablet TAKE 1 TABLET BY MOUTH DAILY 8/16/18   Gigi Ac MD   SPIRIVA HANDIHALER 18 MCG per inhalation capsule INHALE 1 PUFF BY MOUTH DAILY 6/11/18   Gigi Ac MD   VENTOLIN  (90 Base) MCG/ACT inhaler INHALE 2 PUFFS BY MOUTH EVERY 4 HOURS AS NEEDED FOR BREATHING 8/8/18   Gigi Ac MD   VENTOLIN  (90 Base) MCG/ACT inhaler INHALE 2 PUFFS BY MOUTH EVERY 4 HOURS AS NEEDED FOR BREATHING 9/4/18   Gigi Ac MD   VENTOLIN  (90 Base) MCG/ACT inhaler INHALE 2 PUFFS BY MOUTH EVERY 4 HOURS AS NEEDED FOR BREATHING 9/18/18   Gigi Ac MD   VENTOLIN  (90 Base) MCG/ACT inhaler INHALE 2 PUFFS BY MOUTH EVERY 4 HOURS AS NEEDED FOR BREATHING 10/11/18   Gigi Ac MD         Review of Systems   Constitutional: Negative for chills, fever and unexpected weight change.   HENT: Negative for hearing loss and nosebleeds.    Eyes: Negative for visual disturbance.   Respiratory: Positive for chest tightness and shortness of breath. Negative for cough and wheezing.    Cardiovascular: Positive for chest pain and palpitations. Negative for leg swelling.   Gastrointestinal: Negative for abdominal pain, blood in stool,  constipation, diarrhea, nausea and vomiting.   Endocrine: Negative for cold intolerance, heat intolerance, polydipsia, polyphagia and polyuria.   Genitourinary: Negative for hematuria.   Musculoskeletal: Negative for joint swelling, myalgias and neck pain.   Skin: Negative for color change, rash and wound.   Neurological: Positive for light-headedness. Negative for dizziness, seizures, syncope, numbness and headaches.   Hematological: Does not bruise/bleed easily.         Objective:     Objective:  Temp:  [98.2 °F (36.8 °C)] 98.2 °F (36.8 °C)  Heart Rate:  [] 54  Resp:  [14] 14  BP: ()/(50-76) 83/50      Body mass index is 28.72 kg/m².       Physical Exam   Constitutional: She is oriented to person, place, and time. She appears well-developed and well-nourished.   HENT:   Head: Normocephalic and atraumatic.   Left Ear: External ear normal.   Nose: Nose normal.   Mouth/Throat: Oropharynx is clear and moist.   Eyes: Conjunctivae and EOM are normal. Pupils are equal, round, and reactive to light. No scleral icterus.   Neck: Normal range of motion. Neck supple. No JVD present. No tracheal deviation present. No thyromegaly present.   Cardiovascular: Normal rate and regular rhythm.   Irregularly irregular S1 and S2 with a soft systolic murmur best heard at the apex.   Pulmonary/Chest: Breath sounds normal. No stridor.   Abdominal: Bowel sounds are normal.   Musculoskeletal: Normal range of motion.   Lymphadenopathy:     She has no cervical adenopathy.   Neurological: She is alert and oriented to person, place, and time. She has normal reflexes.   Skin: Skin is warm and dry.   Psychiatric: She has a normal mood and affect. Her behavior is normal. Judgment and thought content normal.         Lab Review:           Invalid input(s): LABALBU, PROT          Results from last 7 days   Lab Units  11/15/18   0214   WBC 10*3/mm3  10.36*   HEMOGLOBIN g/dL  14.4   HEMATOCRIT %  41.9   PLATELETS 10*3/mm3  271     Results  from last 7 days   Lab Units  11/15/18   0214   INR   0.92                       EKG:   ECG/EMG Results (last 24 hours)     Procedure Component Value Units Date/Time    ECG 12 Lead [[id number]] Collected:  11/15/18 0246     Updated:  11/15/18 0248    ECG 12 Lead [[id number]] Resulted:  11/15/18 0253     Updated:  11/15/18 0253          Imaging:  Imaging Results (last 24 hours)     Procedure Component Value Units Date/Time    XR Chest 1 View [[id number]] Collected:  11/15/18 0227     Updated:  11/15/18 0239    Narrative:       Exam: AP portable chest    INDICATION: Chest pain    COMPARISON: 5/17/2018    FINDINGS: AP portable chest. The bony structures are intact. The  cardiomediastinal silhouette is unremarkable. Mild plaque is  present in the aorta. Mild parenchymal scarring is present in the  right lung base. No acute infiltrate, pneumothorax or pleural  effusion.      Impression:       No acute cardiopulmonary abnormality.    Electronically signed by:  Maicol Oliver MD  11/15/2018 2:38 AM  CST Workstation: [employer] personally viewed and interpreted the patient's EKG/Telemetry data.    Assessment:   1.  Chest pain with electrocardiographic changes suggestive of posterior wall myocardial infarction.  2.  Hypotension.  3.  Chronic obstructive lung disease with tobacco abuse.          Plan:   1.  Chest pain with electrocardiographic changes suggestive of ischemia.  Patient on initial presentation was in atrial fibrillation with subsequent EKG which had revealed sinus rhythm with persistent ST segment depression in lead V1 and V2 V3 suggestive of true posterior wall myocardial infarction versus anterior wall sub endocardial ischemia.  Patient has been recommended an emergent coronary angiogram and rescue PTCA.  Risk-benefit treatment option were discussed with the patient and the family and an informed consent was obtained.  Patient was continued on IV fluids and taken emergently to the cardiac  catheterization.  2.  Paroxysmal atrial fibrillation.  Patient has no previous history of documented atrial fibrillation.  Patient would be started on IV amiodarone infusion.  Patient would undergo a transthoracic echocardiogram.    Further recommendations to follow after the coronary angiogram.      Time: time spent in face-to-face evaluation of greater than 55  minutes and interacting and formulating examining and discussing the plan with the patient with 50% of greater time spent in face-to-face interaction.    Thaddeus Huber MD  11/15/18  2:56 AM    Dictated utilizing Dragon dictation.         Electronically signed by Thaddeus Huber MD at 11/15/2018  4:45 AM          Emergency Department Notes      Reese Greer MD at 11/15/2018  2:16 AM      Procedure Orders    1. ECG 12 Lead [[id number]] ordered by Reese Greer MD at 11/15/18 0211     2. Critical Care [[id number]] ordered by Reese Greer MD at 11/15/18 0252                Subjective   63-year-old white female arrives to the emergency department via EMS with chief complaint of chest pain.  Patient complains of a severe aching pain in her chest that radiates to her neck both arms and back that woke her up from sleep approximately one hour ago.  She's had shortness of breath nausea vomiting and diaphoresis associated with it.  She relates she had similar symptoms once before 6 months ago but the symptoms were not as bad and she did not seek medical attention at that time.  Patient was given aspirin and nitroglycerin prior to arrival by EMS.  Patient denies history of heart disease.            Review of Systems   Unable to perform ROS: Acuity of condition       Past Medical History:   Diagnosis Date   • Acute bronchitis    • Acute upper respiratory infection    • Adjustment disorder with mixed emotional features    • Agoraphobia with panic attacks    • Allergic rhinitis due to pollen    • Anxiety    • Asthma    • Backache    • Blood in urine    • Candidiasis of  mouth    • Chronic bronchitis (CMS/HCC)    • Chronic obstructive lung disease (CMS/HCC)    • Emphysema lung (CMS/HCC)    • Essential hypertension    • Extrinsic asthma with status asthmaticus    • Fatigue    • H/O echocardiogram     EF 55-60%. LV systolic function normal. Impaired LV relaxation. [facility]   • Hypoxemia    • Malaise and fatigue    • Non-IgE mediated allergic asthma    • Nonvenomous insect bite    • Pneumonia    • Postmenopausal state    • Urinary tract infectious disease        No Known Allergies    Past Surgical History:   Procedure Laterality Date   • INJECTION OF MEDICATION  2015    celestone(1)   • INJECTION OF MEDICATION  2016    DEPO MEDROL(16)       Family History   Problem Relation Age of Onset   • Heart attack Mother    • Heart disease Mother    • Heart disease Father    • Heart disease Paternal Grandmother        Social History     Socioeconomic History   • Marital status:      Spouse name: Not on file   • Number of children: Not on file   • Years of education: Not on file   • Highest education level: Not on file   Tobacco Use   • Smoking status: Former Smoker     Packs/day: 1.00     Years: 48.00     Pack years: 48.00     Types: Cigarettes     Start date:      Last attempt to quit: 2017     Years since quittin.2   • Smokeless tobacco: Current User   Substance and Sexual Activity   • Alcohol use: Yes     Alcohol/week: 1.2 - 1.8 oz     Types: 1 - 2 Glasses of wine, 1 Cans of beer per week     Comment: Socially   • Drug use: No           Objective   Physical Exam   Constitutional: She is oriented to person, place, and time. She appears well-developed and well-nourished. She appears distressed.   HENT:   Head: Normocephalic and atraumatic.   Left Ear: External ear normal.   Nose: Nose normal.   Mouth/Throat: Oropharynx is clear and moist.   Eyes: Conjunctivae and EOM are normal. Pupils are equal, round, and reactive to light.   Neck: Normal range of  motion. Neck supple.   Cardiovascular:   Tachycardic with irregular rhythm.  Heart sounds normal.  Distal pulses are intact.   Pulmonary/Chest: Effort normal and breath sounds normal.   Abdominal: Soft. She exhibits no distension and no mass. There is no tenderness. There is no guarding.   Musculoskeletal: She exhibits no edema or tenderness.   Neurological: She is alert and oriented to person, place, and time. No cranial nerve deficit or sensory deficit. She exhibits normal muscle tone.   Skin: Skin is warm and dry.   Psychiatric: She has a normal mood and affect. Her behavior is normal.   Nursing note and vitals reviewed.      ECG 12 Lead    Date/Time: 11/15/2018 2:01 AM  Performed by: Reese Greer MD  Authorized by: Reese Greer MD   Interpreted by physician  Comments: Atrial fibrillation with rapid ventricular response rate of 136.  There is ST depression V1 through V4 and ST elevation in V5 and V6 which appears to be consistent with acute postero-lateral STEMI      Critical Care  Performed by: Reese Greer MD  Authorized by: Reese Greer MD     Critical care provider statement:     Critical care time (minutes):  30    Critical care time was exclusive of:  Separately billable procedures and treating other patients    Critical care was necessary to treat or prevent imminent or life-threatening deterioration of the following conditions:  Cardiac failure    Critical care was time spent personally by me on the following activities:  Discussions with consultants, evaluation of patient's response to treatment, examination of patient, obtaining history from patient or surrogate, ordering and performing treatments and interventions, ordering and review of laboratory studies, ordering and review of radiographic studies, re-evaluation of patient's condition and review of old charts    I assumed direction of critical care for this patient from another provider in my specialty: no                ED Course  ED Course as   Nov 15 0253   Thu Nov 15, 2018   0213 STEMI protocol activated immediately after I reviewed the EKG.  Case discussed with cardiologist Dr. Huber and he is en route to the emergency department  [DR]   0248  is here and he will be taking the patient to the cardiac Cath Lab  [DR]      ED Course User Index  [DR] Reese Greer MD        Labs Reviewed   CBC WITH AUTO DIFFERENTIAL - Abnormal; Notable for the following components:       Result Value    WBC 10.36 (*)     Immature Grans, Absolute 0.05 (*)     All other components within normal limits   PROTIME-INR - Normal    Narrative:     Therapeutic range for most indications is 2.0-3.0 INR,  or 2.5-3.5 for mechanical heart valves.   TROPONIN (IN-HOUSE)   BNP (IN-HOUSE)   CBC AND DIFFERENTIAL    Narrative:     The following orders were created for panel order CBC & Differential.  Procedure                               Abnormality         Status                     ---------                               -----------         ------                     CBC Auto Differential[[id number]]        Abnormal            Final result                 Please view results for these tests on the individual orders.   EXTRA TUBES    Narrative:     The following orders were created for panel order Extra Tubes.  Procedure                               Abnormality         Status                     ---------                               -----------         ------                     Gold Top - SST[[id number]]                                   In process                   Please view results for these tests on the individual orders.   GOLD TOP - SST     Xr Chest 1 View    Result Date: 11/15/2018  Narrative: Exam: AP portable chest INDICATION: Chest pain COMPARISON: 5/17/2018 FINDINGS: AP portable chest. The bony structures are intact. The cardiomediastinal silhouette is unremarkable. Mild plaque is present in the aorta. Mild parenchymal scarring is present in the right lung base.  No acute infiltrate, pneumothorax or pleural effusion.     Impression: No acute cardiopulmonary abnormality. Electronically signed by:  Maicol Oliver MD  11/15/2018 2:38 AM CST Workstation: [id number]            [facility]      Final diagnoses:   Acute ST elevation myocardial infarction (STEMI), unspecified artery (CMS/HCC)            Reese Greer MD  11/15/18 0253      Electronically signed by Reese Greer MD at 11/15/2018  2:53 AM     Rosa Marr, RN at 11/15/2018  2:43 AM        Dr Huber at pt bedside at this time.      Rosa Marr, RN  11/15/18 0243      Electronically signed by Rosa Marr, RN at 11/15/2018  2:43 AM       Hospital Medications (all)       Dose Frequency Start End    amiodarone in dextrose 5% (NEXTERONE) loading dose 150mg/100mL 150 mg Once 11/15/2018 11/15/2018    Sig - Route: Infuse 100 mL into a venous catheter 1 (One) Time. - Intravenous    amiodarone in dextrose 5% (NEXTERONE) loading dose 150mg/100mL 150 mg Once 11/15/2018 11/15/2018    Sig - Route: Infuse 100 mL into a venous catheter 1 (One) Time. - Intravenous    aspirin chewable tablet 81 mg 81 mg Daily 11/15/2018     Sig - Route: Chew 1 tablet Daily. - Oral    atorvastatin (LIPITOR) tablet 40 mg 40 mg Nightly 11/15/2018     Sig - Route: Take 1 tablet by mouth Every Night. - Oral    Bivalirudin Trifluoroacetate (ANGIOMAX) 250 mg in sodium chloride 0.9 % 50 mL (5 mg/mL) infusion  Continuous PRN 11/15/2018 11/15/2018    Sig: Continuous As Needed.    carvedilol (COREG) tablet 3.125 mg 3.125 mg Every 12 Hours Scheduled 11/15/2018     Sig - Route: Take 1 tablet by mouth Every 12 (Twelve) Hours. - Oral    diltiaZEM (CARDIZEM) injection 10 mg 10 mg Once 11/15/2018 11/15/2018    Sig - Route: Infuse 2 mL into a venous catheter 1 (One) Time. - Intravenous    diltiaZEM (CARDIZEM) injection 10 mg 10 mg Once 11/15/2018 11/15/2018    Sig - Route: Infuse 2 mL into a venous catheter 1 (One) Time. - Intravenous    DOPamine 400 mg/250 mL (1.6  mg/mL) infusion  Continuous PRN 11/15/2018 11/15/2018    Sig: Continuous As Needed.    losartan (COZAAR) half tablet 12.5 mg 12.5 mg Daily 11/15/2018     Sig - Route: Take 1 half tablet by mouth Daily. - Oral    magnesium sulfate 2g/50 mL (PREMIX) infusion 2 g Once 11/15/2018 11/15/2018    Sig - Route: Infuse 50 mL into a venous catheter 1 (One) Time. - Intravenous    Notes to Pharmacy: Given in cath lab    nitroglycerin (TRIDIL) 200-5 MCG/ML-% infusion  - ADS Override Pull   11/15/2018     Notes to Pharmacy: VAZQUEZ LAMBERT: cabinet override    ondansetron (ZOFRAN) injection 4 mg 4 mg Once 11/15/2018 11/15/2018    Sig - Route: Infuse 2 mL into a venous catheter 1 (One) Time. - Intravenous    sodium chloride 0.9 % bolus 500 mL 500 mL Once 11/15/2018 11/15/2018    Sig - Route: Infuse 500 mL into a venous catheter 1 (One) Time. - Intravenous    sodium chloride 0.9 % bolus 500 mL 500 mL Once 11/15/2018 11/15/2018    Sig - Route: Infuse 500 mL into a venous catheter 1 (One) Time. - Intravenous    sodium chloride 0.9 % infusion  - ADS Override Pull   11/15/2018 11/15/2018    Notes to Pharmacy: PILLO BANEGAS: cabinet override    sodium chloride 0.9 % infusion 100 mL/hr Continuous 11/15/2018     Sig - Route: Infuse 100 mL/hr into a venous catheter Continuous. - Intravenous    ticagrelor (BRILINTA) tablet 90 mg 90 mg 2 Times Daily 11/15/2018     Sig - Route: Take 1 tablet by mouth 2 (Two) Times a Day. - Oral    Bivalirudin Trifluoroacetate (ANGIOMAX) injection (Discontinued)  As Needed 11/15/2018 11/15/2018    Sig: As Needed.    Reason for Discontinue: Patient Discharge    diltiaZEM (CARDIZEM) 125 mg in 100 mL sodium chloride 0.9% (total volume 125 mL) (Discontinued) 5-15 mg/hr Titrated 11/15/2018 11/15/2018    Sig - Route: Infuse 5-15 mg/hr into a venous catheter Dose Adjusted By Provider As Needed. - Intravenous    eptifibatide (INTEGRILIN) injection (Discontinued)  As Needed 11/15/2018 11/15/2018    Sig: As Needed.     Reason for Discontinue: Patient Discharge    iopamidol (ISOVUE-370) 76 % injection (Discontinued)  As Needed 11/15/2018 11/15/2018    Sig: As Needed.    Reason for Discontinue: Patient Discharge    lidocaine (XYLOCAINE) 2% injection (Discontinued)  As Needed 11/15/2018 11/15/2018    Sig: As Needed.    Reason for Discontinue: Patient Discharge    ondansetron (ZOFRAN) injection (Discontinued)  As Needed 11/15/2018 11/15/2018    Sig: As Needed.    Reason for Discontinue: Patient Discharge    phenylephrine (BECKY-SYNEPHRINE) injection (Discontinued)  As Needed 11/15/2018 11/15/2018    Sig: As Needed.    Reason for Discontinue: Patient Discharge    phenylephrine (BECKY-SYNEPHRINE) injection (Discontinued)  As Needed 11/15/2018 11/15/2018    Sig: As Needed.    Reason for Discontinue: Patient Discharge            Lab Results (last 24 hours)     Procedure Component Value Units Date/Time    Troponin [[id number]] Collected:  11/15/18 1041    Specimen:  Blood Updated:  11/15/18 1043    CK [[id number]] Collected:  11/15/18 1041    Specimen:  Blood Updated:  11/15/18 1043    CK-MB [[id number]] Collected:  11/15/18 1041    Specimen:  Blood Updated:  11/15/18 1043    Extra Tubes [[id number]] Collected:  11/15/18 0556    Specimen:  Blood, Venous Line Updated:  11/15/18 0700    Narrative:       The following orders were created for panel order Extra Tubes.  Procedure                               Abnormality         Status                     ---------                               -----------         ------                     Gold Top - SST[[id number]]                                   Final result                 Please view results for these tests on the individual orders.    Gold Top - SST [[id number]] Collected:  11/15/18 0556    Specimen:  Blood Updated:  11/15/18 0700     Extra Tube Hold for add-ons.     Comment: Auto resulted.       Basic Metabolic Panel [[id number]]  (Abnormal) Collected:  11/15/18 0556    Specimen:  Blood Updated:   11/15/18 0652     Glucose 200 mg/dL      BUN 15 mg/dL      Creatinine 0.81 mg/dL      Sodium 135 mmol/L      Potassium 4.3 mmol/L      Chloride 102 mmol/L      CO2 23.0 mmol/L      Calcium 8.2 mg/dL      eGFR Non African Amer 72 mL/min/1.73      BUN/Creatinine Ratio 18.5     Anion Gap 10.0 mmol/L     CBC (No Diff) [[id number]]  (Abnormal) Collected:  11/15/18 0555    Specimen:  Blood Updated:  11/15/18 0612     WBC 12.30 10*3/mm3      RBC 4.63 10*6/mm3      Hemoglobin 14.3 g/dL      Hematocrit 42.2 %      MCV 91.1 fL      MCH 30.9 pg      MCHC 33.9 g/dL      RDW 13.7 %      RDW-SD 44.4 fl      MPV 9.0 fL      Platelets 285 10*3/mm3     Extra Tubes [[id number]] Collected:  11/15/18 0214    Specimen:  Blood, Venous Line Updated:  11/15/18 0315    Narrative:       The following orders were created for panel order Extra Tubes.  Procedure                               Abnormality         Status                     ---------                               -----------         ------                     Gold Top - SST[[id number]]                                   Final result                 Please view results for these tests on the individual orders.    Gold Top - SST [[id number]] Collected:  11/15/18 0214    Specimen:  Blood Updated:  11/15/18 0315     Extra Tube Hold for add-ons.     Comment: Auto resulted.       BNP [[id number]]  (Normal) Collected:  11/15/18 0214    Specimen:  Blood from Arm, Right Updated:  11/15/18 0256     proBNP 34.8 pg/mL     Troponin [[id number]]  (Normal) Collected:  11/15/18 0214    Specimen:  Blood from Arm, Right Updated:  11/15/18 0256     Troponin I <0.012 ng/mL     Protime-INR [[id number]]  (Normal) Collected:  11/15/18 0214    Specimen:  Blood from Arm, Right Updated:  11/15/18 0244     Protime 12.2 Seconds      INR 0.92    Narrative:       Therapeutic range for most indications is 2.0-3.0 INR,  or 2.5-3.5 for mechanical heart valves.    CBC & Differential [[id number]] Collected:  11/15/18 0214     Specimen:  Blood Updated:  11/15/18 0229    Narrative:       The following orders were created for panel order CBC & Differential.  Procedure                               Abnormality         Status                     ---------                               -----------         ------                     CBC Auto Differential[[id number]]        Abnormal            Final result                 Please view results for these tests on the individual orders.    CBC Auto Differential [[id number]]  (Abnormal) Collected:  11/15/18 0214    Specimen:  Blood from Arm, Right Updated:  11/15/18 0229     WBC 10.36 10*3/mm3      RBC 4.61 10*6/mm3      Hemoglobin 14.4 g/dL      Hematocrit 41.9 %      MCV 90.9 fL      MCH 31.2 pg      MCHC 34.4 g/dL      RDW 13.6 %      RDW-SD 44.5 fl      MPV 8.9 fL      Platelets 271 10*3/mm3      Neutrophil % 62.5 %      Lymphocyte % 24.3 %      Monocyte % 8.0 %      Eosinophil % 4.2 %      Basophil % 0.5 %      Immature Grans % 0.5 %      Neutrophils, Absolute 6.48 10*3/mm3      Lymphocytes, Absolute 2.52 10*3/mm3      Monocytes, Absolute 0.83 10*3/mm3      Eosinophils, Absolute 0.43 10*3/mm3      Basophils, Absolute 0.05 10*3/mm3      Immature Grans, Absolute 0.05 10*3/mm3         Imaging Results (last 24 hours)     Procedure Component Value Units Date/Time    XR Chest 1 View [[id number]] Collected:  11/15/18 0227     Updated:  11/15/18 0239    Narrative:       Exam: AP portable chest    INDICATION: Chest pain    COMPARISON: 5/17/2018    FINDINGS: AP portable chest. The bony structures are intact. The  cardiomediastinal silhouette is unremarkable. Mild plaque is  present in the aorta. Mild parenchymal scarring is present in the  right lung base. No acute infiltrate, pneumothorax or pleural  effusion.      Impression:       No acute cardiopulmonary abnormality.    Electronically signed by:  Maicol Oliver MD  11/15/2018 2:38 AM  [facility] Workstation: [id number]        ECG/EMG Results (last 24  hours)     Procedure Component Value Units Date/Time    ECG 12 Lead [[id number]] Collected:  11/15/18 0201     Updated:  11/15/18 0821    Narrative:       Test Reason : chest pain  Blood Pressure : **/** mmHG  Vent. Rate : 136 BPM     Atrial Rate : 147 BPM     P-R Int : 000 ms          QRS Dur : 086 ms      QT Int : 280 ms       P-R-T Axes : 000 026 069 degrees     QTc Int : 421 ms    Atrial fibrillation with rapid ventricular response  ST elevation, consider lateral injury or acute infarct  Posterior myocardial injury  ** ** ACUTE MI ** **  Abnormal ECG    Confirmed by NEVAEH COWAN MD ([id number]) on 11/15/2018 8:21:25 AM    Referred By:             Confirmed By:NEVAEH COWAN MD    ECG 12 Lead [[id number]] Collected:  11/15/18 0246     Updated:  11/15/18 0822    Narrative:       Test Reason : CP  Blood Pressure : **/** mmHG  Vent. Rate : 053 BPM     Atrial Rate : 053 BPM     P-R Int : 080 ms          QRS Dur : 088 ms      QT Int : 468 ms       P-R-T Axes : 068 041 066 degrees     QTc Int : 439 ms    Sinus bradycardia with short CO  Marked ST abnormality, possible septal subendocardial injury  Posterior myocardial infarction  ** ** ACUTE MI ** **  Abnormal ECG    Confirmed by NEVAEH COWAN MD ([id number]) on 11/15/2018 8:21:50 AM    Referred By:             Confirmed By:NEVAEH COWAN MD

## 2018-11-16 ENCOUNTER — APPOINTMENT (OUTPATIENT)
Dept: CARDIOLOGY | Facility: HOSPITAL | Age: 63
End: 2018-11-16
Attending: INTERNAL MEDICINE

## 2018-11-16 LAB
ARTICHOKE IGE QN: 89 MG/DL (ref 1–129)
CHOLEST SERPL-MCNC: 137 MG/DL (ref 0–199)
HDLC SERPL-MCNC: 33 MG/DL (ref 60–200)
LDLC/HDLC SERPL: 1.99 {RATIO} (ref 0–3.22)
MAXIMAL PREDICTED HEART RATE: 159 BPM
STRESS TARGET HR: 135 BPM
TRIGL SERPL-MCNC: 191 MG/DL (ref 20–199)
TROPONIN I SERPL-MCNC: 39.9 NG/ML
WHOLE BLOOD HOLD SPECIMEN: NORMAL

## 2018-11-16 PROCEDURE — 93010 ELECTROCARDIOGRAM REPORT: CPT | Performed by: INTERNAL MEDICINE

## 2018-11-16 PROCEDURE — 94760 N-INVAS EAR/PLS OXIMETRY 1: CPT

## 2018-11-16 PROCEDURE — 25010000002 ENOXAPARIN PER 10 MG: Performed by: HOSPITALIST

## 2018-11-16 PROCEDURE — 25010000002 THIAMINE PER 100 MG: Performed by: HOSPITALIST

## 2018-11-16 PROCEDURE — 94799 UNLISTED PULMONARY SVC/PX: CPT

## 2018-11-16 PROCEDURE — 84484 ASSAY OF TROPONIN QUANT: CPT | Performed by: INTERNAL MEDICINE

## 2018-11-16 PROCEDURE — 25010000002 LORAZEPAM PER 2 MG: Performed by: HOSPITALIST

## 2018-11-16 PROCEDURE — 93005 ELECTROCARDIOGRAM TRACING: CPT | Performed by: INTERNAL MEDICINE

## 2018-11-16 PROCEDURE — 80061 LIPID PANEL: CPT | Performed by: INTERNAL MEDICINE

## 2018-11-16 PROCEDURE — 93306 TTE W/DOPPLER COMPLETE: CPT

## 2018-11-16 RX ORDER — LORAZEPAM 2 MG/ML
1 INJECTION INTRAMUSCULAR EVERY 8 HOURS
Status: COMPLETED | OUTPATIENT
Start: 2018-11-17 | End: 2018-11-18

## 2018-11-16 RX ORDER — LORAZEPAM 2 MG/ML
1 INJECTION INTRAMUSCULAR EVERY 6 HOURS
Status: DISPENSED | OUTPATIENT
Start: 2018-11-16 | End: 2018-11-17

## 2018-11-16 RX ADMIN — Medication 12.5 MG: at 08:49

## 2018-11-16 RX ADMIN — TICAGRELOR 90 MG: 90 TABLET ORAL at 08:48

## 2018-11-16 RX ADMIN — FOLIC ACID 100 ML/HR: 5 INJECTION, SOLUTION INTRAMUSCULAR; INTRAVENOUS; SUBCUTANEOUS at 21:20

## 2018-11-16 RX ADMIN — TICAGRELOR 90 MG: 90 TABLET ORAL at 20:45

## 2018-11-16 RX ADMIN — AMIODARONE HYDROCHLORIDE 200 MG: 200 TABLET ORAL at 08:48

## 2018-11-16 RX ADMIN — ATORVASTATIN CALCIUM 40 MG: 40 TABLET, FILM COATED ORAL at 20:45

## 2018-11-16 RX ADMIN — METOPROLOL TARTRATE 25 MG: 25 TABLET ORAL at 08:48

## 2018-11-16 RX ADMIN — ASPIRIN 81 MG CHEWABLE TABLET 81 MG: 81 TABLET CHEWABLE at 08:48

## 2018-11-16 RX ADMIN — AMIODARONE HYDROCHLORIDE 200 MG: 200 TABLET ORAL at 20:45

## 2018-11-16 RX ADMIN — SODIUM CHLORIDE 100 ML/HR: 9 INJECTION, SOLUTION INTRAVENOUS at 03:38

## 2018-11-16 RX ADMIN — LORAZEPAM 1 MG: 2 INJECTION INTRAMUSCULAR; INTRAVENOUS at 20:45

## 2018-11-16 RX ADMIN — ENOXAPARIN SODIUM 40 MG: 40 INJECTION SUBCUTANEOUS at 20:47

## 2018-11-16 RX ADMIN — IPRATROPIUM BROMIDE AND ALBUTEROL SULFATE 3 ML: 2.5; .5 SOLUTION RESPIRATORY (INHALATION) at 08:26

## 2018-11-16 RX ADMIN — IPRATROPIUM BROMIDE AND ALBUTEROL SULFATE 3 ML: 2.5; .5 SOLUTION RESPIRATORY (INHALATION) at 02:39

## 2018-11-16 RX ADMIN — BUDESONIDE AND FORMOTEROL FUMARATE DIHYDRATE 2 PUFF: 160; 4.5 AEROSOL RESPIRATORY (INHALATION) at 19:38

## 2018-11-16 RX ADMIN — METOPROLOL TARTRATE 25 MG: 25 TABLET ORAL at 20:45

## 2018-11-16 RX ADMIN — IPRATROPIUM BROMIDE AND ALBUTEROL SULFATE 3 ML: 2.5; .5 SOLUTION RESPIRATORY (INHALATION) at 19:44

## 2018-11-16 RX ADMIN — BUDESONIDE AND FORMOTEROL FUMARATE DIHYDRATE 2 PUFF: 160; 4.5 AEROSOL RESPIRATORY (INHALATION) at 07:58

## 2018-11-16 NOTE — PROGRESS NOTES
Cardiology Progress Note     LOS: 1 day   Patient Care Team:  Provider, No Known as PCP - Tiffanie Ivan APRN as PCP - Family Medicine (Family Medicine)    Subjective:    Chart reviewed. Patient seen and examined. Patient complains of symptoms of dizziness and shortness of breath.  Patient blood pressure has remained stable.  Discussing with the family it is apparent that patient does have history of heavy alcohol abuse prior to coming in to the hospital.  On my questioning on admission) to the coronary angiogram patient had denied any alcohol abuse.  Family has requested further psychiatric evaluation to help the patient from alcohol abuse.  Clinically at the present time patient does not show any sign of alcohol withdrawal.  Patient has not complained of having any symptoms of chest pain.        Objective:  Temp:  [97.8 °F (36.6 °C)-99.5 °F (37.5 °C)] 99.5 °F (37.5 °C)  Heart Rate:  [] 97  Resp:  [15-24] 20  BP: ()/(55-90) 142/75  Arterial Line BP: (143-146)/(79-80) 143/79    Intake/Output Summary (Last 24 hours) at 11/16/2018 1727  Last data filed at 11/16/2018 0500  Gross per 24 hour   Intake 945 ml   Output 1275 ml   Net -330 ml       Physical Exam:   General Appearance:    Alert, oriented, cooperative, in no acute distress.   Head:    Normocephalic, atraumatic, without obvious abnormality   Eyes:              ARI. Lids and lashes normal, conjunctivae and sclerae normal, no icterus, no pallor.   Ears:    Ears appear intact with no abnormalities noted.   Throat:   Mucous membranes pink and moist.   Neck:   Supple, trachea midline, no carotid bruit, no organomegaly or JVD.   Lungs:     Clear to auscultation and percussion, respirations. regular, even and unlabored. No wheezes, rales, or rhonchi.    Heart:    Regular rhythm and normal rate, normal S1 and S2, no      murmur, no gallop, no rub, no click.   Abdomen:     Soft, non-tender, non-distended, no guarding, no rebound tenderness.  Normal bowel sounds in all four quadrants, no masses, liver and spleen nonpalpable.    Genitalia:    Deferred.   Extremities:   Moves all extremities well, no edema, no cyanosis, no       redness, no clubbing.   Pulses:   Pulses palpable and equal bilaterally.   Skin:   Moist and warm. No bleeding, bruising or rash.   Neurologic/Psychiatric:   Alert and oriented to person, place, and time.  Motor, power and tone in upper and lower extremities are grossly intact.  No focal neurological deficits. Normal cognitive function. No psychomotor reaction or tangential thought. No depression, homicidal ideations and suicidal ideations.          Results Review:    Results from last 7 days   Lab Units  11/15/18   0556   SODIUM mmol/L  135*   POTASSIUM mmol/L  4.3   CHLORIDE mmol/L  102   CO2 mmol/L  23.0   BUN mg/dL  15   CREATININE mg/dL  0.81   CALCIUM mg/dL  8.2*   GLUCOSE mg/dL  200*     Results from last 7 days   Lab Units  11/16/18   0333  11/15/18   1624  11/15/18   1358  11/15/18   1041   CK TOTAL U/L   --    --    --   3,625*   TROPONIN I ng/mL  39.900*  66.100*  78.500*  69.800*         Results from last 7 days   Lab Units  11/15/18   0555   WBC 10*3/mm3  12.30*   HEMOGLOBIN g/dL  14.3   HEMATOCRIT %  42.2   PLATELETS 10*3/mm3  285     Results from last 7 days   Lab Units  11/15/18   0214   INR   0.92         Results from last 7 days   Lab Units  11/16/18   0333   CHOLESTEROL mg/dL  137   TRIGLYCERIDES mg/dL  191   HDL CHOL mg/dL  33*   LDL CHOL mg/dL  89               ECHO:  Results for orders placed in visit on 06/15/16   SCANNED - ECHOCARDIOGRAM       SCANNED EKG   Final Result      SCANNED EKG   Final Result      SCANNED EKG   Final Result      ECG 12 Lead   Final Result   Test Reason : CP   Blood Pressure : **/** mmHG   Vent. Rate : 053 BPM     Atrial Rate : 053 BPM      P-R Int : 080 ms          QRS Dur : 088 ms       QT Int : 468 ms       P-R-T Axes : 068 041 066 degrees      QTc Int : 439 ms      Sinus  bradycardia with short GA   Marked ST abnormality, possible septal subendocardial injury   Posterior myocardial infarction   ** ** ACUTE MI ** **   Abnormal ECG      Confirmed by NEVAEH COWAN MD ([id number]) on 11/15/2018 8:21:50 AM      Referred By:             Confirmed By:NEVAEH COWAN MD      ECG 12 Lead   ED Interpretation   Reese Greer MD     11/15/2018  2:53 AM   ECG 12 Lead      Date/Time: 11/15/2018 2:01 AM   Performed by: Reese Greer MD   Authorized by: Reese Greer MD    Interpreted by physician   Comments: Atrial fibrillation with rapid ventricular response rate of 136.     There is ST depression V1 through V4 and ST elevation in V5 and V6 which    appears to be consistent with acute postero-lateral STEMI         Final Result   Test Reason : chest pain   Blood Pressure : **/** mmHG   Vent. Rate : 136 BPM     Atrial Rate : 147 BPM      P-R Int : 000 ms          QRS Dur : 086 ms       QT Int : 280 ms       P-R-T Axes : 000 026 069 degrees      QTc Int : 421 ms      Atrial fibrillation with rapid ventricular response   ST elevation, consider lateral injury or acute infarct   Posterior myocardial injury   ** ** ACUTE MI ** **   Abnormal ECG      Confirmed by NEVAEH COWAN MD ([id number]) on 11/15/2018 8:21:25 AM      Referred By:             Confirmed By:NEVAEH COWAN MD           Medication Review:   Current Facility-Administered Medications   Medication Dose Route Frequency Provider Last Rate Last Dose   • amiodarone (PACERONE) tablet 200 mg  200 mg Oral Q12H Thaddeus Huber MD   200 mg at 11/16/18 0848   • aspirin chewable tablet 81 mg  81 mg Oral Daily Thaddeus Huber MD   81 mg at 11/16/18 0848   • atorvastatin (LIPITOR) tablet 40 mg  40 mg Oral Nightly Thaddeus Huber MD   40 mg at 11/15/18 2110   • budesonide-formoterol (SYMBICORT) 160-4.5 MCG/ACT inhaler 2 puff  2 puff Inhalation BID - RT Danyel Jacobs, DO   2 puff at 11/16/18 0758   • ipratropium-albuterol (DUO-NEB) nebulizer  solution 3 mL  3 mL Nebulization Q6H PRN Danyel Jacobs DO   3 mL at 11/16/18 0826   • losartan (COZAAR) half tablet 12.5 mg  12.5 mg Oral Daily Thaddeus Huber MD   12.5 mg at 11/16/18 0849   • metoprolol tartrate (LOPRESSOR) tablet 25 mg  25 mg Oral Q12H Thaddeus Huber MD   25 mg at 11/16/18 0848   • nitroglycerin (TRIDIL) 200-5 MCG/ML-% infusion  - ADS Override Pull            • ticagrelor (BRILINTA) tablet 90 mg  90 mg Oral BID Thaddeus Huber MD   90 mg at 11/16/18 0848       Assessment and Plan:      Acute ST elevation myocardial infarction (STEMI) (CMS/[facility])  1.  Chest pain.  Aborted posterior wall myocardial infarction with a rescue PTCA and stenting of the 100% occluded circumflex artery.  Patient currently is not having any symptoms of chest pain.  Patient does have proximal left anterior descending artery stenosis along with the diagonal branch stenosis which would be intervened at a later time.  Patient transthoracic echocardiogram had revealed preserved left ventricular systolic function.  Patient would be continued on the aspirin and the Brilinta.  2.  Arterial hypertension.  Patient blood pressure is mildly elevated along with elevated heart rate.  Patient metoprolol would be increased to 50 mg twice a day for blood pressure and heart rate control.  3.  Paroxysmal atrial fibrillation.  Patient has remained in normal sinus rhythm patient would be continued on the amiodarone 200 mg twice a day.  Patient will be started on magnesium oxide 400 mg twice a day.   4.  Alcohol abuse.  Patient would be evaluated by the psychiatrist for further rehabilitation.  5.  Chronic obstructive lung disease with tobacco abuse.  Patient has been counseled to quit smoking.    The above plan of management was discussed with the patient and the family at length.            Thaddeus Huber MD  11/16/18  5:27 PM      Time: Time spent on face-to-face interaction 20 minutes      Dr. Toribio to cover for cardiology if  needed.

## 2018-11-16 NOTE — PLAN OF CARE
Problem: Patient Care Overview  Goal: Plan of Care Review  Outcome: Ongoing (interventions implemented as appropriate)   11/16/18 0454   Coping/Psychosocial   Plan of Care Reviewed With patient;daughter   Plan of Care Review   Progress improving   OTHER   Outcome Summary  11/16/18 0454   Coping/Psychosocial   Plan of Care Reviewed With patient;daughter   Plan of Care Review   Progress improving   OTHER   Outcome Summary Patient alert and oriented, vitals stable throughout shift. Angioseal procedure completed at bedside at around 1900 with no issues throughout shift. Patient was able to void using bedside commode, so no li catheter was needed for previous urine hesitancy issues. Patient has had some bilateral wheezing present, increased more so with activity. RT adminstered two duo-neb treatments along with scheduled symbicort. Continue to monitor.          Problem: Skin Injury Risk (Adult)  Goal: Identify Related Risk Factors and Signs and Symptoms  Outcome: Ongoing (interventions implemented as appropriate)    Goal: Skin Health and Integrity  Outcome: Outcome(s) achieved Date Met: 11/16/18      Problem: Cardiac: ACS (Acute Coronary Syndrome) (Adult)  Goal: Signs and Symptoms of Listed Potential Problems Will be Absent, Minimized or Managed (Cardiac: ACS)  Outcome: Ongoing (interventions implemented as appropriate)      Problem: Fall Risk (Adult)  Goal: Identify Related Risk Factors and Signs and Symptoms  Outcome: Ongoing (interventions implemented as appropriate)    Goal: Absence of Fall  Outcome: Outcome(s) achieved Date Met: 11/16/18      Problem: Chronic Obstructive Pulmonary Disease (Adult)  Goal: Signs and Symptoms of Listed Potential Problems Will be Absent, Minimized or Managed (Chronic Obstructive Pulmonary Disease)  Outcome: Ongoing (interventions implemented as appropriate)

## 2018-11-16 NOTE — PROGRESS NOTES
[facility] Medicine Services  INPATIENT PROGRESS NOTE    Length of Stay: 1  Date of Admission: 11/15/2018  Primary Care Physician: Provider, No Known    Subjective   Chief Complaint: Consult for medical management, ST elevation MI  HPI:  Patient denies any chest pain.  Patient hypoxic on ambulation but asymptomatic.  The patient has been told in the past that she needs oxygen she never bothered about it.  Cardiology okay with transfer from ICU.    Review of Systems   Respiratory: Positive for shortness of breath.    Cardiovascular: Negative for chest pain.   Gastrointestinal: Negative for abdominal pain.        All pertinent negatives and positives are as above. All other systems have been reviewed and are negative unless otherwise stated.     Objective    Temp:  [98.3 °F (36.8 °C)-99.5 °F (37.5 °C)] 98.3 °F (36.8 °C)  Heart Rate:  [] 83  Resp:  [15-24] 18  BP: ()/(55-90) 143/82  Arterial Line BP: (143-149)/(79-81) 143/79    Physical Exam   Constitutional: She appears well-developed.   HENT:   Head: Normocephalic and atraumatic.   Eyes: Pupils are equal, round, and reactive to light.   Neck: Normal range of motion.   Cardiovascular: Normal rate.   Pulmonary/Chest: She has decreased breath sounds. She has wheezes.   Abdominal: Soft. There is no tenderness.   Musculoskeletal: She exhibits no edema.   Neurological: She is alert.   Skin: Skin is warm and dry.   Psychiatric: She has a normal mood and affect.           Results Review:  I have reviewed the labs, radiology results, and diagnostic studies.    Laboratory Data:   Results from last 7 days   Lab Units  11/15/18   0556   SODIUM mmol/L  135*   POTASSIUM mmol/L  4.3   CHLORIDE mmol/L  102   CO2 mmol/L  23.0   BUN mg/dL  15   CREATININE mg/dL  0.81   GLUCOSE mg/dL  200*   CALCIUM mg/dL  8.2*   ANION GAP mmol/L  10.0     Estimated Creatinine Clearance: 67.9 mL/min (by C-G formula based on SCr of 0.81 mg/dL).           Results from last 7 days   Lab Units  11/15/18   0555  11/15/18   0214   WBC 10*3/mm3  12.30*  10.36*   HEMOGLOBIN g/dL  14.3  14.4   HEMATOCRIT %  42.2  41.9   PLATELETS 10*3/mm3  285  271     Results from last 7 days   Lab Units  11/15/18   0214   INR   0.92       Culture Data:   No results found for: BLOODCX  No results found for: URINECX  No results found for: RESPCX  No results found for: WOUNDCX  No results found for: STOOLCX  No components found for: BODYFLD    Radiology Data:   Imaging Results (last 24 hours)     ** No results found for the last 24 hours. **          I have reviewed the patient's current medications.     Assessment/Plan     Active Hospital Problems    Diagnosis   • Acute ST elevation myocardial infarction (STEMI) (CMS/[facility])       Plan:    #1 Acute ST elevation MI: Improved.  Status post PTCA with 3 stents.  Per primary.  #2 COPD: Stable.  Continue current treatment for now.  Patient will need to be evaluated for oxygen before discharge.  #3 paroxysmal atrial fibrillation: New.  Per cardiology     Signed     Dr Danyel Jacobs DO   11/16/2018  2:47 PM

## 2018-11-17 ENCOUNTER — APPOINTMENT (OUTPATIENT)
Dept: GENERAL RADIOLOGY | Facility: HOSPITAL | Age: 63
End: 2018-11-17

## 2018-11-17 LAB
DEPRECATED RDW RBC AUTO: 46 FL (ref 36.4–46.3)
ERYTHROCYTE [DISTWIDTH] IN BLOOD BY AUTOMATED COUNT: 13.8 % (ref 11.5–14.5)
HCT VFR BLD AUTO: 35.9 % (ref 35–45)
HGB BLD-MCNC: 11.9 G/DL (ref 12–15.5)
MCH RBC QN AUTO: 30.5 PG (ref 26.5–34)
MCHC RBC AUTO-ENTMCNC: 33.1 G/DL (ref 31.4–36)
MCV RBC AUTO: 92.1 FL (ref 80–98)
PLATELET # BLD AUTO: 161 10*3/MM3 (ref 150–450)
PMV BLD AUTO: 9 FL (ref 8–12)
RBC # BLD AUTO: 3.9 10*6/MM3 (ref 3.77–5.16)
WBC NRBC COR # BLD: 10.57 10*3/MM3 (ref 3.2–9.8)

## 2018-11-17 PROCEDURE — 25010000002 CEFTRIAXONE PER 250 MG: Performed by: FAMILY MEDICINE

## 2018-11-17 PROCEDURE — 94799 UNLISTED PULMONARY SVC/PX: CPT

## 2018-11-17 PROCEDURE — 25010000002 THIAMINE PER 100 MG: Performed by: HOSPITALIST

## 2018-11-17 PROCEDURE — 99232 SBSQ HOSP IP/OBS MODERATE 35: CPT | Performed by: PSYCHIATRY & NEUROLOGY

## 2018-11-17 PROCEDURE — 25010000002 FUROSEMIDE PER 20 MG: Performed by: FAMILY MEDICINE

## 2018-11-17 PROCEDURE — 25010000002 LORAZEPAM PER 2 MG: Performed by: HOSPITALIST

## 2018-11-17 PROCEDURE — 99232 SBSQ HOSP IP/OBS MODERATE 35: CPT | Performed by: INTERNAL MEDICINE

## 2018-11-17 PROCEDURE — 94760 N-INVAS EAR/PLS OXIMETRY 1: CPT

## 2018-11-17 PROCEDURE — 25010000002 ENOXAPARIN PER 10 MG: Performed by: HOSPITALIST

## 2018-11-17 PROCEDURE — 85027 COMPLETE CBC AUTOMATED: CPT | Performed by: FAMILY MEDICINE

## 2018-11-17 PROCEDURE — 71046 X-RAY EXAM CHEST 2 VIEWS: CPT

## 2018-11-17 PROCEDURE — 25010000002 METHYLPREDNISOLONE PER 125 MG: Performed by: FAMILY MEDICINE

## 2018-11-17 PROCEDURE — 71045 X-RAY EXAM CHEST 1 VIEW: CPT

## 2018-11-17 RX ORDER — MONTELUKAST SODIUM 10 MG/1
10 TABLET ORAL NIGHTLY
Status: DISCONTINUED | OUTPATIENT
Start: 2018-11-17 | End: 2018-11-23 | Stop reason: HOSPADM

## 2018-11-17 RX ORDER — IPRATROPIUM BROMIDE AND ALBUTEROL SULFATE 2.5; .5 MG/3ML; MG/3ML
3 SOLUTION RESPIRATORY (INHALATION)
Status: DISCONTINUED | OUTPATIENT
Start: 2018-11-17 | End: 2018-11-23 | Stop reason: HOSPADM

## 2018-11-17 RX ORDER — METHYLPREDNISOLONE SODIUM SUCCINATE 125 MG/2ML
60 INJECTION, POWDER, LYOPHILIZED, FOR SOLUTION INTRAMUSCULAR; INTRAVENOUS EVERY 8 HOURS
Status: DISCONTINUED | OUTPATIENT
Start: 2018-11-17 | End: 2018-11-20

## 2018-11-17 RX ORDER — BUDESONIDE 0.5 MG/2ML
0.5 INHALANT ORAL
Status: DISCONTINUED | OUTPATIENT
Start: 2018-11-17 | End: 2018-11-23 | Stop reason: HOSPADM

## 2018-11-17 RX ORDER — FUROSEMIDE 10 MG/ML
40 INJECTION INTRAMUSCULAR; INTRAVENOUS ONCE
Status: COMPLETED | OUTPATIENT
Start: 2018-11-17 | End: 2018-11-17

## 2018-11-17 RX ORDER — CHLORDIAZEPOXIDE HYDROCHLORIDE 5 MG/1
5 CAPSULE, GELATIN COATED ORAL
Status: DISCONTINUED | OUTPATIENT
Start: 2018-11-17 | End: 2018-11-18

## 2018-11-17 RX ADMIN — FOLIC ACID 100 ML/HR: 5 INJECTION, SOLUTION INTRAMUSCULAR; INTRAVENOUS; SUBCUTANEOUS at 08:25

## 2018-11-17 RX ADMIN — TICAGRELOR 90 MG: 90 TABLET ORAL at 08:24

## 2018-11-17 RX ADMIN — IPRATROPIUM BROMIDE AND ALBUTEROL SULFATE 3 ML: 2.5; .5 SOLUTION RESPIRATORY (INHALATION) at 15:15

## 2018-11-17 RX ADMIN — FUROSEMIDE 40 MG: 10 INJECTION, SOLUTION INTRAMUSCULAR; INTRAVENOUS at 14:44

## 2018-11-17 RX ADMIN — IPRATROPIUM BROMIDE AND ALBUTEROL SULFATE 3 ML: 2.5; .5 SOLUTION RESPIRATORY (INHALATION) at 11:19

## 2018-11-17 RX ADMIN — ASPIRIN 81 MG CHEWABLE TABLET 81 MG: 81 TABLET CHEWABLE at 08:24

## 2018-11-17 RX ADMIN — LORAZEPAM 1 MG: 2 INJECTION INTRAMUSCULAR; INTRAVENOUS at 03:18

## 2018-11-17 RX ADMIN — TICAGRELOR 90 MG: 90 TABLET ORAL at 20:33

## 2018-11-17 RX ADMIN — BUDESONIDE AND FORMOTEROL FUMARATE DIHYDRATE 2 PUFF: 160; 4.5 AEROSOL RESPIRATORY (INHALATION) at 07:43

## 2018-11-17 RX ADMIN — CHLORDIAZEPOXIDE HYDROCHLORIDE 5 MG: 5 CAPSULE ORAL at 17:23

## 2018-11-17 RX ADMIN — LORAZEPAM 1 MG: 2 INJECTION, SOLUTION INTRAMUSCULAR; INTRAVENOUS at 20:35

## 2018-11-17 RX ADMIN — IPRATROPIUM BROMIDE AND ALBUTEROL SULFATE 3 ML: 2.5; .5 SOLUTION RESPIRATORY (INHALATION) at 07:43

## 2018-11-17 RX ADMIN — METHYLPREDNISOLONE SODIUM SUCCINATE 60 MG: 125 INJECTION, POWDER, FOR SOLUTION INTRAMUSCULAR; INTRAVENOUS at 20:34

## 2018-11-17 RX ADMIN — Medication 12.5 MG: at 08:24

## 2018-11-17 RX ADMIN — CEFTRIAXONE SODIUM 1 G: 1 INJECTION, POWDER, FOR SOLUTION INTRAMUSCULAR; INTRAVENOUS at 14:53

## 2018-11-17 RX ADMIN — MONTELUKAST SODIUM 10 MG: 10 TABLET, FILM COATED ORAL at 20:33

## 2018-11-17 RX ADMIN — AMIODARONE HYDROCHLORIDE 200 MG: 200 TABLET ORAL at 20:33

## 2018-11-17 RX ADMIN — METOPROLOL TARTRATE 25 MG: 25 TABLET ORAL at 20:33

## 2018-11-17 RX ADMIN — LORAZEPAM 1 MG: 2 INJECTION INTRAMUSCULAR; INTRAVENOUS at 13:10

## 2018-11-17 RX ADMIN — AMIODARONE HYDROCHLORIDE 200 MG: 200 TABLET ORAL at 08:24

## 2018-11-17 RX ADMIN — ENOXAPARIN SODIUM 40 MG: 40 INJECTION SUBCUTANEOUS at 20:34

## 2018-11-17 RX ADMIN — ATORVASTATIN CALCIUM 40 MG: 40 TABLET, FILM COATED ORAL at 20:33

## 2018-11-17 RX ADMIN — BUDESONIDE 0.5 MG: 0.5 INHALANT RESPIRATORY (INHALATION) at 20:49

## 2018-11-17 RX ADMIN — IPRATROPIUM BROMIDE AND ALBUTEROL SULFATE 3 ML: 2.5; .5 SOLUTION RESPIRATORY (INHALATION) at 20:49

## 2018-11-17 RX ADMIN — METOPROLOL TARTRATE 25 MG: 25 TABLET ORAL at 08:24

## 2018-11-17 RX ADMIN — METHYLPREDNISOLONE SODIUM SUCCINATE 60 MG: 125 INJECTION, POWDER, FOR SOLUTION INTRAMUSCULAR; INTRAVENOUS at 14:44

## 2018-11-17 NOTE — PLAN OF CARE
Problem: Patient Care Overview  Goal: Plan of Care Review  Outcome: Ongoing (interventions implemented as appropriate)   11/17/18 0632   Coping/Psychosocial   Plan of Care Reviewed With patient   Plan of Care Review   Progress improving       Problem: Cardiac: ACS (Acute Coronary Syndrome) (Adult)  Goal: Signs and Symptoms of Listed Potential Problems Will be Absent, Minimized or Managed (Cardiac: ACS)  Outcome: Ongoing (interventions implemented as appropriate)      Problem: Fall Risk (Adult)  Goal: Identify Related Risk Factors and Signs and Symptoms  Outcome: Ongoing (interventions implemented as appropriate)      Problem: Chronic Obstructive Pulmonary Disease (Adult)  Goal: Signs and Symptoms of Listed Potential Problems Will be Absent, Minimized or Managed (Chronic Obstructive Pulmonary Disease)  Outcome: Ongoing (interventions implemented as appropriate)

## 2018-11-17 NOTE — PROGRESS NOTES
[facility] Medicine Services  INPATIENT PROGRESS NOTE    Length of Stay: 2  Date of Admission: 11/15/2018  Primary Care Physician: Provider, No Known    Subjective   Chief Complaint: shortness of air  HPI:    Has some wheezing and shortness of air.  Has been coughing.      Review of Systems   Gastrointestinal: Negative for diarrhea.        All pertinent negatives and positives are as above. All other systems have been reviewed and are negative unless otherwise stated.     Objective    Temp:  [97.8 °F (36.6 °C)-101.5 °F (38.6 °C)] 98.8 °F (37.1 °C)  Heart Rate:  [83-97] 87  Resp:  [18-20] 18  BP: (124-144)/(58-86) 129/81  Physical Exam   Constitutional: She appears well-developed and well-nourished. No distress.   HENT:   Head: Normocephalic and atraumatic.   Cardiovascular: Normal rate.   Pulmonary/Chest: Effort normal. No respiratory distress. She has no wheezes.   Abdominal: Soft. She exhibits no distension.   Musculoskeletal: Normal range of motion. She exhibits no edema.   Neurological: She is alert. No cranial nerve deficit.   Skin: Skin is warm and dry. She is not diaphoretic.   Psychiatric: She has a normal mood and affect. Her behavior is normal. Judgment and thought content normal.   Vitals reviewed.          Results Review:  I have reviewed the labs, radiology results, and diagnostic studies.    Laboratory Data:   Lab Results (last 24 hours)     Procedure Component Value Units Date/Time    CBC (No Diff) [[id number]]  (Abnormal) Collected:  11/17/18 0139    Specimen:  Blood Updated:  11/17/18 0147     WBC 10.57 10*3/mm3      RBC 3.90 10*6/mm3      Hemoglobin 11.9 g/dL      Hematocrit 35.9 %      MCV 92.1 fL      MCH 30.5 pg      MCHC 33.1 g/dL      RDW 13.8 %      RDW-SD 46.0 fl      MPV 9.0 fL      Platelets 161 10*3/mm3           Culture Data:   No results found for: BLOODCX  No results found for: URINECX  No results found for: RESPCX  No results found for:  WOUNDCX  No results found for: STOOLCX  No components found for: BODYFLD    Radiology Data:   Imaging Results (last 24 hours)     Procedure Component Value Units Date/Time    XR Chest PA & Lateral [[id number]] Updated:  11/17/18 1356    XR Chest 1 View [[id number]] Collected:  11/17/18 0120     Updated:  11/17/18 0204    Narrative:       Exam: AP portable chest    INDICATION: shortness of breath    COMPARISON: 11/15/2018    FINDINGS: The bony structures are intact. The cardiomediastinal  silhouette is unremarkable. There is questionable increased  opacity in retrocardiac region. No pneumothorax or pleural  effusion.      Impression:       Questionable increased opacity retrocardiac region,  atelectasis and/or infiltrate are not excluded    Electronically signed by:  Maicol Oliver MD  11/17/2018 2:03 AM  CST Workstation: [employer]          I have reviewed the patient's current medications.     Assessment/Plan     Active Hospital Problems    Diagnosis   • Acute ST elevation myocardial infarction (STEMI) (CMS/[facility])     #1 Acute ST elevation MI: Improved.  Status post PTCA with 3 stents.  Per primary.  #2 COPD: exacerbation - start solumedrol  #3 paroxysmal atrial fibrillation: New.  Per cardiology   #4 pneumonia - start antibiotics                 Sanjeev Betancourt MD   11/17/18   2:07 PM

## 2018-11-17 NOTE — PROGRESS NOTES
CARDIOLOGY PROGRESS NOTE      LOS: 2 days   Patient Care Team:  Provider, No Known as PCP - Tiffanie Ivan APRN as PCP - Family Medicine (Family Medicine)    Problems/Diagnoses: Acute MI    Subjective     Interval History: We are seeing the patient for an acute MI. Pt is s/p PCI of LCx. Pt also has COPD and has been hypoxic with exertion post MI. Pt denies angina. Pt does report SOB with activity. Pt currently on nasal cannula.       Objective     Vital Signs  Temp:  [97.8 °F (36.6 °C)-101.5 °F (38.6 °C)] 98.8 °F (37.1 °C)  Heart Rate:  [80-97] 89  Resp:  [18-20] 18  BP: (114-144)/(58-86) 133/72    Physical Exam:    Constitutional: Cooperative, alert and oriented, well-developed, well-nourished, in no acute distress.       Cardiovascular: Regular rhythm, S1 and S2 normal, no S3 or S4. Apical impulse not displaced. No murmurs, gallops, or rubs detected.     Pulmonary/Chest: Chest: normal symmetry, no tenderness to palpation, normal respiratory excursion, no intercostal retraction, no use of accessory muscles.              Pulmonary: Normal breath sounds. No rales or rhonchi.    Abdominal: Abdomen soft, bowel sounds normoactive, no masses, no hepatosplenomegaly, non-tender, no bruits.     Musculoskeletal: No deformities, clubbing, cyanosis, erythema, or edema observed.     Neurological: No gross motor or sensory deficits noted, affect appropriate, oriented to time, person, place.     Skin: Warm and dry to the touch, no apparent skin lesions or masses noted.     Psychiatric: She has a normal mood and affect. Her behavior is normal. Judgment and thought content normal.     Results Review:    Lab Results (last 24 hours)     Procedure Component Value Units Date/Time    CBC (No Diff) [[id number]]  (Abnormal) Collected:  11/17/18 0139    Specimen:  Blood Updated:  11/17/18 0147     WBC 10.57 10*3/mm3      RBC 3.90 10*6/mm3      Hemoglobin 11.9 g/dL      Hematocrit 35.9 %      MCV 92.1 fL      MCH 30.5 pg      MCHC  33.1 g/dL      RDW 13.8 %      RDW-SD 46.0 fl      MPV 9.0 fL      Platelets 161 10*3/mm3         Imaging Results (last 24 hours)     Procedure Component Value Units Date/Time    XR Chest 1 View [[id number]] Collected:  11/17/18 0120     Updated:  11/17/18 0204    Narrative:       Exam: AP portable chest    INDICATION: shortness of breath    COMPARISON: 11/15/2018    FINDINGS: The bony structures are intact. The cardiomediastinal  silhouette is unremarkable. There is questionable increased  opacity in retrocardiac region. No pneumothorax or pleural  effusion.      Impression:       Questionable increased opacity retrocardiac region,  atelectasis and/or infiltrate are not excluded    Electronically signed by:  Maicol Oliver MD  11/17/2018 2:03 AM  [employer] Workstation: [id number]          Medication Review:   Current Facility-Administered Medications   Medication Dose Route Frequency Provider Last Rate Last Dose   • amiodarone (PACERONE) tablet 200 mg  200 mg Oral Q12H Thaddeus Huber MD   200 mg at 11/17/18 0824   • aspirin chewable tablet 81 mg  81 mg Oral Daily Thaddeus Huber MD   81 mg at 11/17/18 0824   • atorvastatin (LIPITOR) tablet 40 mg  40 mg Oral Nightly Thaddeus Huber MD   40 mg at 11/16/18 2045   • budesonide-formoterol (SYMBICORT) 160-4.5 MCG/ACT inhaler 2 puff  2 puff Inhalation BID - RT Danyel Jacobs DO   2 puff at 11/17/18 0743   • enoxaparin (LOVENOX) syringe 40 mg  40 mg Subcutaneous Nightly Danyel Jacobs DO   40 mg at 11/16/18 2047   • ipratropium-albuterol (DUO-NEB) nebulizer solution 3 mL  3 mL Nebulization Q6H PRN Danyel Jacobs DO   3 mL at 11/17/18 0743   • LORazepam (ATIVAN) injection 1 mg  1 mg Intravenous Q6H Danyel Jacobs DO   1 mg at 11/17/18 0318    Followed by   • LORazepam (ATIVAN) injection 1 mg  1 mg Intravenous Q8H Danyel Jacobs DO       • losartan (COZAAR) half tablet 12.5 mg  12.5 mg Oral Daily Thaddeus Huber MD   12.5 mg at 11/17/18 0824   •  metoprolol tartrate (LOPRESSOR) tablet 25 mg  25 mg Oral Q12H Thaddeus Huber MD   25 mg at 11/17/18 0824   • multiple vitamin (M.V.I. Adult) 10 mL, thiamine (B-1) 100 mg, folic acid 1 mg in sodium chloride 0.9 % 1,000 mL infusion  100 mL/hr Intravenous Daily Danyel Jacobs  mL/hr at 11/17/18 0825 100 mL/hr at 11/17/18 0825   • nitroglycerin (TRIDIL) 200-5 MCG/ML-% infusion  - ADS Override Pull            • ticagrelor (BRILINTA) tablet 90 mg  90 mg Oral BID Thaddeus Huber MD   90 mg at 11/17/18 0824         Assessment/Plan       Acute ST elevation myocardial infarction (STEMI) (CMS/[facility])        Acute ST elevation myocardial infarction (STEMI) (CMS/[facility])  1.  Chest pain.  Aborted posterior wall myocardial infarction with a rescue PTCA and stenting of the 100% occluded circumflex artery.  Patient currently is not having any symptoms of chest pain.  Patient does have proximal left anterior descending artery stenosis along with the diagonal branch stenosis which would be intervened at a later time.  Patient transthoracic echocardiogram had revealed preserved left ventricular systolic function. Patient will be continued on the aspirin and the Brilinta.  Selective beta blocker therapy.  Statin therapy.    2.  Arterial hypertension.   clinically stable, no change in treatment plan.    3.  Paroxysmal atrial fibrillation.  Patient has remained in normal sinus rhythm patient would be continued on the amiodarone 200 mg twice a day.      4.  Chronic obstructive lung disease with tobacco abuse.  Patient has been counseled to quit smoking.  Will be evaluated for home O2 requirements.        Paul Toribio MD  11/17/18  10:08 AM

## 2018-11-18 LAB
ALBUMIN SERPL-MCNC: 3.3 G/DL (ref 3.4–4.8)
ALBUMIN/GLOB SERPL: 1.3 G/DL (ref 1.1–1.8)
ALP SERPL-CCNC: 70 U/L (ref 38–126)
ALT SERPL W P-5'-P-CCNC: 46 U/L (ref 9–52)
ANION GAP SERPL CALCULATED.3IONS-SCNC: 6 MMOL/L (ref 5–15)
AST SERPL-CCNC: 64 U/L (ref 14–36)
BASOPHILS # BLD AUTO: 0 10*3/MM3 (ref 0–0.2)
BASOPHILS NFR BLD AUTO: 0 % (ref 0–2)
BILIRUB SERPL-MCNC: 0.6 MG/DL (ref 0.2–1.3)
BILIRUB UR QL STRIP: NEGATIVE
BUN BLD-MCNC: 10 MG/DL (ref 7–21)
BUN/CREAT SERPL: 16.7 (ref 7–25)
CALCIUM SPEC-SCNC: 8.3 MG/DL (ref 8.4–10.2)
CHLORIDE SERPL-SCNC: 100 MMOL/L (ref 95–110)
CLARITY UR: CLEAR
CO2 SERPL-SCNC: 28 MMOL/L (ref 22–31)
COLOR UR: YELLOW
CREAT BLD-MCNC: 0.6 MG/DL (ref 0.5–1)
DEPRECATED RDW RBC AUTO: 43.8 FL (ref 36.4–46.3)
EOSINOPHIL # BLD AUTO: 0 10*3/MM3 (ref 0–0.7)
EOSINOPHIL NFR BLD AUTO: 0 % (ref 0–7)
ERYTHROCYTE [DISTWIDTH] IN BLOOD BY AUTOMATED COUNT: 13.4 % (ref 11.5–14.5)
GFR SERPL CREATININE-BSD FRML MDRD: 102 ML/MIN/1.73 (ref 45–104)
GLOBULIN UR ELPH-MCNC: 2.5 GM/DL (ref 2.3–3.5)
GLUCOSE BLD-MCNC: 165 MG/DL (ref 60–100)
GLUCOSE UR STRIP-MCNC: NEGATIVE MG/DL
HCT VFR BLD AUTO: 33.7 % (ref 35–45)
HGB BLD-MCNC: 11.4 G/DL (ref 12–15.5)
HGB UR QL STRIP.AUTO: NEGATIVE
IMM GRANULOCYTES # BLD: 0.02 10*3/MM3 (ref 0–0.02)
IMM GRANULOCYTES NFR BLD: 0.2 % (ref 0–0.5)
KETONES UR QL STRIP: NEGATIVE
LEUKOCYTE ESTERASE UR QL STRIP.AUTO: NEGATIVE
LYMPHOCYTES # BLD AUTO: 0.37 10*3/MM3 (ref 0.6–4.2)
LYMPHOCYTES NFR BLD AUTO: 3.5 % (ref 10–50)
MCH RBC QN AUTO: 30.6 PG (ref 26.5–34)
MCHC RBC AUTO-ENTMCNC: 33.8 G/DL (ref 31.4–36)
MCV RBC AUTO: 90.3 FL (ref 80–98)
MONOCYTES # BLD AUTO: 0.24 10*3/MM3 (ref 0–0.9)
MONOCYTES NFR BLD AUTO: 2.3 % (ref 0–12)
NEUTROPHILS # BLD AUTO: 9.85 10*3/MM3 (ref 2–8.6)
NEUTROPHILS NFR BLD AUTO: 94 % (ref 37–80)
NITRITE UR QL STRIP: NEGATIVE
PH UR STRIP.AUTO: 6 [PH] (ref 5–9)
PLATELET # BLD AUTO: 160 10*3/MM3 (ref 150–450)
PMV BLD AUTO: 9.4 FL (ref 8–12)
POTASSIUM BLD-SCNC: 4 MMOL/L (ref 3.5–5.1)
PROT SERPL-MCNC: 5.8 G/DL (ref 6.3–8.6)
PROT UR QL STRIP: NEGATIVE
RBC # BLD AUTO: 3.73 10*6/MM3 (ref 3.77–5.16)
SODIUM BLD-SCNC: 134 MMOL/L (ref 137–145)
SP GR UR STRIP: 1.01 (ref 1–1.03)
UROBILINOGEN UR QL STRIP: NORMAL
WBC NRBC COR # BLD: 10.48 10*3/MM3 (ref 3.2–9.8)

## 2018-11-18 PROCEDURE — 94799 UNLISTED PULMONARY SVC/PX: CPT

## 2018-11-18 PROCEDURE — 25010000002 LORAZEPAM PER 2 MG: Performed by: HOSPITALIST

## 2018-11-18 PROCEDURE — 80053 COMPREHEN METABOLIC PANEL: CPT | Performed by: FAMILY MEDICINE

## 2018-11-18 PROCEDURE — 94760 N-INVAS EAR/PLS OXIMETRY 1: CPT

## 2018-11-18 PROCEDURE — 25010000002 ENOXAPARIN PER 10 MG: Performed by: HOSPITALIST

## 2018-11-18 PROCEDURE — 85025 COMPLETE CBC W/AUTO DIFF WBC: CPT | Performed by: FAMILY MEDICINE

## 2018-11-18 PROCEDURE — 25010000002 FUROSEMIDE PER 20 MG: Performed by: FAMILY MEDICINE

## 2018-11-18 PROCEDURE — 25010000002 CEFTRIAXONE PER 250 MG: Performed by: FAMILY MEDICINE

## 2018-11-18 PROCEDURE — 81003 URINALYSIS AUTO W/O SCOPE: CPT | Performed by: FAMILY MEDICINE

## 2018-11-18 PROCEDURE — 94618 PULMONARY STRESS TESTING: CPT

## 2018-11-18 PROCEDURE — 25010000002 METHYLPREDNISOLONE PER 125 MG: Performed by: FAMILY MEDICINE

## 2018-11-18 RX ORDER — CHLORDIAZEPOXIDE HYDROCHLORIDE 5 MG/1
5 CAPSULE, GELATIN COATED ORAL EVERY 8 HOURS SCHEDULED
Status: DISCONTINUED | OUTPATIENT
Start: 2018-11-18 | End: 2018-11-20

## 2018-11-18 RX ORDER — FUROSEMIDE 10 MG/ML
20 INJECTION INTRAMUSCULAR; INTRAVENOUS DAILY
Status: DISCONTINUED | OUTPATIENT
Start: 2018-11-18 | End: 2018-11-19

## 2018-11-18 RX ADMIN — METHYLPREDNISOLONE SODIUM SUCCINATE 60 MG: 125 INJECTION, POWDER, FOR SOLUTION INTRAMUSCULAR; INTRAVENOUS at 05:45

## 2018-11-18 RX ADMIN — LORAZEPAM 1 MG: 2 INJECTION, SOLUTION INTRAMUSCULAR; INTRAVENOUS at 13:45

## 2018-11-18 RX ADMIN — Medication 12.5 MG: at 08:52

## 2018-11-18 RX ADMIN — TICAGRELOR 90 MG: 90 TABLET ORAL at 20:35

## 2018-11-18 RX ADMIN — IPRATROPIUM BROMIDE AND ALBUTEROL SULFATE 3 ML: 2.5; .5 SOLUTION RESPIRATORY (INHALATION) at 15:00

## 2018-11-18 RX ADMIN — BUDESONIDE 0.5 MG: 0.5 INHALANT RESPIRATORY (INHALATION) at 07:13

## 2018-11-18 RX ADMIN — IPRATROPIUM BROMIDE AND ALBUTEROL SULFATE 3 ML: 2.5; .5 SOLUTION RESPIRATORY (INHALATION) at 10:47

## 2018-11-18 RX ADMIN — LORAZEPAM 1 MG: 2 INJECTION, SOLUTION INTRAMUSCULAR; INTRAVENOUS at 05:46

## 2018-11-18 RX ADMIN — CHLORDIAZEPOXIDE HYDROCHLORIDE 5 MG: 5 CAPSULE ORAL at 13:45

## 2018-11-18 RX ADMIN — METOPROLOL TARTRATE 25 MG: 25 TABLET ORAL at 20:35

## 2018-11-18 RX ADMIN — AMIODARONE HYDROCHLORIDE 200 MG: 200 TABLET ORAL at 08:52

## 2018-11-18 RX ADMIN — BUDESONIDE 0.5 MG: 0.5 INHALANT RESPIRATORY (INHALATION) at 19:52

## 2018-11-18 RX ADMIN — FUROSEMIDE 20 MG: 10 INJECTION, SOLUTION INTRAMUSCULAR; INTRAVENOUS at 11:10

## 2018-11-18 RX ADMIN — CHLORDIAZEPOXIDE HYDROCHLORIDE 5 MG: 5 CAPSULE ORAL at 08:55

## 2018-11-18 RX ADMIN — TICAGRELOR 90 MG: 90 TABLET ORAL at 08:52

## 2018-11-18 RX ADMIN — METHYLPREDNISOLONE SODIUM SUCCINATE 60 MG: 125 INJECTION, POWDER, FOR SOLUTION INTRAMUSCULAR; INTRAVENOUS at 13:45

## 2018-11-18 RX ADMIN — METOPROLOL TARTRATE 25 MG: 25 TABLET ORAL at 08:53

## 2018-11-18 RX ADMIN — ENOXAPARIN SODIUM 40 MG: 40 INJECTION SUBCUTANEOUS at 20:35

## 2018-11-18 RX ADMIN — AMIODARONE HYDROCHLORIDE 200 MG: 200 TABLET ORAL at 20:35

## 2018-11-18 RX ADMIN — ATORVASTATIN CALCIUM 40 MG: 40 TABLET, FILM COATED ORAL at 20:35

## 2018-11-18 RX ADMIN — ASPIRIN 81 MG CHEWABLE TABLET 81 MG: 81 TABLET CHEWABLE at 08:53

## 2018-11-18 RX ADMIN — MONTELUKAST SODIUM 10 MG: 10 TABLET, FILM COATED ORAL at 20:35

## 2018-11-18 RX ADMIN — METHYLPREDNISOLONE SODIUM SUCCINATE 60 MG: 125 INJECTION, POWDER, FOR SOLUTION INTRAMUSCULAR; INTRAVENOUS at 22:10

## 2018-11-18 RX ADMIN — IPRATROPIUM BROMIDE AND ALBUTEROL SULFATE 3 ML: 2.5; .5 SOLUTION RESPIRATORY (INHALATION) at 07:13

## 2018-11-18 RX ADMIN — CEFTRIAXONE SODIUM 1 G: 1 INJECTION, POWDER, FOR SOLUTION INTRAMUSCULAR; INTRAVENOUS at 13:45

## 2018-11-18 RX ADMIN — IPRATROPIUM BROMIDE AND ALBUTEROL SULFATE 3 ML: 2.5; .5 SOLUTION RESPIRATORY (INHALATION) at 19:52

## 2018-11-18 RX ADMIN — CHLORDIAZEPOXIDE HYDROCHLORIDE 5 MG: 5 CAPSULE ORAL at 20:35

## 2018-11-18 NOTE — PLAN OF CARE
Problem: Patient Care Overview  Goal: Plan of Care Review  Outcome: Ongoing (interventions implemented as appropriate)   11/17/18 9538   Coping/Psychosocial   Plan of Care Reviewed With patient   Plan of Care Review   Progress declining   OTHER   Outcome Summary Patient has been having a lot of shortness of breath, expiratory wheezes, and has been coughing. A chest x ray was done and was shown to have pleural effusions and some vascular congestion. 40 of lasix was given, solumedrol and antibiotics. Breathing treatments were changed to scheduled and singulair was restarted for tonight. Patient's resp status seems improved.      Goal: Individualization and Mutuality  Outcome: Ongoing (interventions implemented as appropriate)    Goal: Discharge Needs Assessment  Outcome: Ongoing (interventions implemented as appropriate)      Problem: Skin Injury Risk (Adult)  Goal: Identify Related Risk Factors and Signs and Symptoms  Outcome: Ongoing (interventions implemented as appropriate)      Problem: Cardiac: ACS (Acute Coronary Syndrome) (Adult)  Goal: Signs and Symptoms of Listed Potential Problems Will be Absent, Minimized or Managed (Cardiac: ACS)  Outcome: Ongoing (interventions implemented as appropriate)      Problem: Fall Risk (Adult)  Goal: Identify Related Risk Factors and Signs and Symptoms  Outcome: Ongoing (interventions implemented as appropriate)

## 2018-11-18 NOTE — PROGRESS NOTES
Exercise Oximetry    Patient Name:Monisha Brasher   MRN: [id number]   Date: 11/18/18             ROOM AIR BASELINE   SpO2%    90%   Heart Rate    85   Blood Pressure      EXERCISE ON ROOM AIR SpO2% EXERCISE ON O2 @  2   LPM SpO2%   1 MINUTE 90 1 MINUTE 90%   2 MINUTES 88 2 MINUTES 90%   3 MINUTES 89 3 MINUTES 91%   4 MINUTES 87 4 MINUTES 90%   5 MINUTES 89 5 MINUTES 90%   6 MINUTES 88 6 MINUTES 90%              Distance Walked    Distance Walked                  358 feet   Dyspnea (Yohana Scale) Dyspnea (Yohana Scale)   Fatigue (Yohana Scale)   Fatigue (Yohana Scale)   SpO2% Post Exercise   SpO2% Post Exercise   HR Post Exercise   HR Post Exercise   Time to Recovery   Time to Recovery     2-3 minutes     Comments:   Resting SPO2 on 2lpm = 93%  Resting SPO2 om Room Air = 90%  Pt ambulated in hallway approximately 358 feet - SPO2 dropped to 87% on Room Air - placed back on O2 @ 2lpm, pt rested for about 3 minutes, SPO2 returned to 93% @ rest on O2 @ 2lpm. SPO2 90% on O2 @ 2lpm while ambulating. SPO2 returned to 93% @ rest on O2 @ 2lpm after 2 minutes.    Pt tolerated well, stopping to rest frequently - minimal SOB reported.    RStallins, RRT

## 2018-11-18 NOTE — PLAN OF CARE
Problem: Patient Care Overview  Goal: Plan of Care Review  Outcome: Ongoing (interventions implemented as appropriate)   11/18/18 1245   Coping/Psychosocial   Plan of Care Reviewed With patient;family   Plan of Care Review   Progress no change   OTHER   Outcome Summary pt oxygen was able to be decreased today and pt went for a walk; no c/o chest pain     Goal: Individualization and Mutuality  Outcome: Ongoing (interventions implemented as appropriate)    Goal: Discharge Needs Assessment  Outcome: Ongoing (interventions implemented as appropriate)    Goal: Interprofessional Rounds/Family Conf  Outcome: Ongoing (interventions implemented as appropriate)      Problem: Skin Injury Risk (Adult)  Goal: Identify Related Risk Factors and Signs and Symptoms  Outcome: Ongoing (interventions implemented as appropriate)      Problem: Cardiac: ACS (Acute Coronary Syndrome) (Adult)  Goal: Signs and Symptoms of Listed Potential Problems Will be Absent, Minimized or Managed (Cardiac: ACS)  Outcome: Ongoing (interventions implemented as appropriate)      Problem: Fall Risk (Adult)  Goal: Identify Related Risk Factors and Signs and Symptoms  Outcome: Ongoing (interventions implemented as appropriate)      Problem: Chronic Obstructive Pulmonary Disease (Adult)  Goal: Signs and Symptoms of Listed Potential Problems Will be Absent, Minimized or Managed (Chronic Obstructive Pulmonary Disease)  Outcome: Ongoing (interventions implemented as appropriate)

## 2018-11-18 NOTE — PLAN OF CARE
Problem: Patient Care Overview  Goal: Plan of Care Review  Outcome: Ongoing (interventions implemented as appropriate)   11/18/18 0320   Coping/Psychosocial   Plan of Care Reviewed With patient   Plan of Care Review   Progress declining   OTHER   Outcome Summary sats 94-95 on 4L        Problem: Chronic Obstructive Pulmonary Disease (Adult)  Goal: Signs and Symptoms of Listed Potential Problems Will be Absent, Minimized or Managed (Chronic Obstructive Pulmonary Disease)  Outcome: Ongoing (interventions implemented as appropriate)

## 2018-11-18 NOTE — CONSULTS
Inpatient Consult to Psychiatry    2018    Referring Provider: Dr. Huber  Reason for Consultation: alcohol addiction    Source of History:  the patient    HPI:    Patient is a 63 y.o. female who seen for concerns by daughter about her excessive drinking.  She notes she is not addicted to ETOH.  She minimizes use and stated she has not drank in 6 months but then later states some drink with  watching a horse race.    She does not think she has an issue now.  She believes she did in the past when her  had an affair.    She denies any other mental health concerns currently.    Psychiatric Review Of Systems:  etoh use    History  Past psychiatric history:    Psychiatric Hospitalizations: Patient has had no prior hospitalizations.    Suicide Attempts: Patient has had no prior suicide attempts.    Prior Treatment and Medications Tried: paxil for possibly the last 3 months currently for depression and anxiety.  Xanax in the past.  No rehab history.    Social History:    Social History     Socioeconomic History   • Marital status:      Spouse name: Not on file   • Number of children: Not on file   • Years of education: Not on file   • Highest education level: Not on file   Social Needs   • Financial resource strain: Not on file   • Food insecurity - worry: Not on file   • Food insecurity - inability: Not on file   • Transportation needs - medical: Not on file   • Transportation needs - non-medical: Not on file   Occupational History   • Not on file   Tobacco Use   • Smoking status: Former Smoker     Packs/day: 1.00     Years: 48.00     Pack years: 48.00     Types: Cigarettes     Start date:      Last attempt to quit: 2017     Years since quittin.2   • Smokeless tobacco: Current User   Substance and Sexual Activity   • Alcohol use: Yes     Alcohol/week: 1.2 - 1.8 oz     Types: 1 - 2 Glasses of wine, 1 Cans of beer per week     Comment: Socially   • Drug use: No   • Sexual activity: Not  on file   Other Topics Concern   • Not on file   Social History Narrative   • Not on file         Family History:    Family History   Problem Relation Age of Onset   • Heart attack Mother    • Heart disease Mother    • Heart disease Father    • Heart disease Paternal Grandmother        Past Medical and Surgical History:    Past Medical History:   Diagnosis Date   • Acute bronchitis    • Acute upper respiratory infection    • Adjustment disorder with mixed emotional features    • Agoraphobia with panic attacks    • Allergic rhinitis due to pollen    • Anxiety    • Asthma    • Backache    • Blood in urine    • Candidiasis of mouth    • Chronic bronchitis (CMS/HCC)    • Chronic obstructive lung disease (CMS/HCC)    • Emphysema lung (CMS/HCC)    • Essential hypertension    • Extrinsic asthma with status asthmaticus    • Fatigue    • H/O echocardiogram     EF 55-60%. LV systolic function normal. Impaired LV relaxation. [facility]   • Hypoxemia    • Malaise and fatigue    • Non-IgE mediated allergic asthma    • Nonvenomous insect bite    • Pneumonia    • Postmenopausal state    • Urinary tract infectious disease      Past Surgical History:   Procedure Laterality Date   • INJECTION OF MEDICATION  03/20/2015    celestone(1)   • INJECTION OF MEDICATION  04/05/2016    DEPO MEDROL(16)     Allergies:  Patient has no known allergies.  Medications Prior to Admission   Medication Sig Dispense Refill Last Dose   • ADVAIR DISKUS 500-50 MCG/DOSE DISKUS INHALE 1 PUFF BY MOUTH TWICE DAILY 1 each 5 Taking   • albuterol (PROVENTIL) (2.5 MG/3ML) 0.083% nebulizer solution USE 1 VIAL PER NEBULIZER EVERY 4 HOURS AS NEEDED FOR WHEEZING 360 mL 0 Taking   • albuterol (VENTOLIN HFA) 108 (90 Base) MCG/ACT inhaler 2 puffs every 4 hours as needed for breathing 1 inhaler 5    • ALPRAZolam (XANAX) 0.25 MG tablet Take 1 tablet by mouth 3 (Three) Times a Day As Needed for Anxiety. 90 tablet 0 Taking   • amLODIPine (NORVASC) 10 MG tablet TAKE  1 TABLET BY MOUTH EVERY DAY 30 tablet 3 Taking   • azithromycin (ZITHROMAX) 250 MG tablet TAKE AS DIRECTED 6 tablet 0 Taking   • azithromycin (ZITHROMAX) 250 MG tablet TAKE 2 TABLETS BY MOUTH ON DAY 1, THEN 1 TABLET DAILY FOR 4 DAYS 6 tablet 0 Not Taking   • azithromycin (ZITHROMAX) 250 MG tablet Take 2 tablets the first day, then 1 tablet daily for 4 days. 6 tablet 1    • azithromycin (ZITHROMAX) 250 MG tablet TAKE 2 TABLET BY MOUTH THE FIRST DAY THEN1 TABLET BY MOUTH DAILY FOR 4 DAYS 6 tablet 0    • budesonide-formoterol (SYMBICORT) 160-4.5 MCG/ACT inhaler 2 puffs twice a day 1 inhaler 5 Taking   • glucose blood test strip Use as instructed 50 each 12 Taking   • glucose monitor monitoring kit 1 each Daily. Testing blood sugar once a day    ICD10 - R73.9 1 each 0 Taking   • Lancets (FREESTYLE) lancets Testing blood sugar once a day 100 each 12 Taking   • metFORMIN (GLUCOPHAGE) 500 MG tablet Take 1 tablet by mouth Daily With Breakfast. 30 tablet 11 Taking   • montelukast (SINGULAIR) 10 MG tablet TAKE 1 TABLET BY MOUTH DAILY 90 tablet 1    • nystatin (MYCOSTATIN) 813257 UNIT/ML suspension Take 5 mL by mouth 4 (Four) Times a Day. 280 mL 0 Taking   • PARoxetine (PAXIL) 10 MG tablet Take 1 tablet by mouth Every Morning. 30 tablet 11 Taking   • predniSONE (DELTASONE) 10 MG tablet Take 1 tablet by mouth Daily for 30 doses. 1 by mouth daily 30 tablet 3    • predniSONE (DELTASONE) 20 MG tablet TAKE 1 TABLET BY MOUTH DAILY 30 tablet 0 Taking   • SPIRIVA HANDIHALER 18 MCG per inhalation capsule INHALE 1 PUFF BY MOUTH DAILY 30 capsule 5 Taking   • VENTOLIN  (90 Base) MCG/ACT inhaler INHALE 2 PUFFS BY MOUTH EVERY 4 HOURS AS NEEDED FOR BREATHING 18 g 0 Taking   • VENTOLIN  (90 Base) MCG/ACT inhaler INHALE 2 PUFFS BY MOUTH EVERY 4 HOURS AS NEEDED FOR BREATHING 18 g 0 Taking   • VENTOLIN  (90 Base) MCG/ACT inhaler INHALE 2 PUFFS BY MOUTH EVERY 4 HOURS AS NEEDED FOR BREATHING 18 g 0 Taking   • VENTOLIN   (90 Base) MCG/ACT inhaler INHALE 2 PUFFS BY MOUTH EVERY 4 HOURS AS NEEDED FOR BREATHING 18 g 0 Taking       Medical Review Of Systems:  Reviewed review of systems from  Dr. Betancourt's progress note from today:  Gastrointestinal: Negative for diarrhea.         All pertinent negatives and positives are as above. All other systems have been reviewed and are negative unless otherwise stated.         Objective     Vital Signs    Temp:  [98.8 °F (37.1 °C)-101.5 °F (38.6 °C)] 99.4 °F (37.4 °C)  Heart Rate:  [] 90  Resp:  [18-20] 18  BP: (123-135)/(58-81) 123/70    Physical Exam:   General Appearance: alert, appears stated age and cooperative,  Hygiene:   good  Gait & Station: in bed  Musculoskeletal: No tremors or abnormal involuntary movements    Mental Status Exam:   Cooperation:  Cooperative  Eye Contact:  Good  Psychomotor Behavior:  Appropriate  Mood: Euthymic  Affect:  normal  Speech:  Normal  Thought Process:  Coherent  Associations: Goal Directed  Thought Content:     Normal   Suicidal:  None   Homicidal:  None   Hallucinations:  None   Delusion:  None  Cognitive Functioning:   Consciousness: awake and alert   Orientation:  Person, Place, Time and Situation   Attention: normal Concentration: Normal   Language:  Intact Vocabulary: Average   Short Term Memory: Intact   Long Term Memory: Intact   Fund of Knowledge: Average  Reliability:  fair  Insight:  Fair  Judgement:  Fair  Impulse Control:  Good    Diagnostic Data:    Lab Results (last 72 hours)     Procedure Component Value Units Date/Time    CBC (No Diff) [[id number]]  (Abnormal) Collected:  11/17/18 0139    Specimen:  Blood Updated:  11/17/18 0147     WBC 10.57 10*3/mm3      RBC 3.90 10*6/mm3      Hemoglobin 11.9 g/dL      Hematocrit 35.9 %      MCV 92.1 fL      MCH 30.5 pg      MCHC 33.1 g/dL      RDW 13.8 %      RDW-SD 46.0 fl      MPV 9.0 fL      Platelets 161 10*3/mm3     Troponin [[id number]]  (Abnormal) Collected:  11/16/18 0333    Specimen:  Blood  Updated:  11/16/18 0616     Troponin I 39.900 ng/mL     Lipid Panel [[id number]]  (Abnormal) Collected:  11/16/18 0333    Specimen:  Blood Updated:  11/16/18 0542     Total Cholesterol 137 mg/dL      Triglycerides 191 mg/dL      HDL Cholesterol 33 mg/dL      LDL Cholesterol  89 mg/dL      LDL/HDL Ratio 1.99    Extra Tubes [[id number]] Collected:  11/16/18 0333    Specimen:  Blood, Venous Line Updated:  11/16/18 0445    Narrative:       The following orders were created for panel order Extra Tubes.  Procedure                               Abnormality         Status                     ---------                               -----------         ------                     Lavender Top[[id number]]                                     Final result                 Please view results for these tests on the individual orders.    Lavender Top [[id number]] Collected:  11/16/18 0333    Specimen:  Blood Updated:  11/16/18 0445     Extra Tube hold for add-on     Comment: Auto resulted       Troponin [[id number]]  (Abnormal) Collected:  11/15/18 1624    Specimen:  Blood Updated:  11/15/18 1713     Troponin I 66.100 ng/mL     Troponin [[id number]]  (Abnormal) Collected:  11/15/18 1358    Specimen:  Blood Updated:  11/15/18 1433     Troponin I 78.500 ng/mL     CK [[id number]]  (Abnormal) Collected:  11/15/18 1041    Specimen:  Blood Updated:  11/15/18 1113     Creatine Kinase 3,625 U/L     Troponin [[id number]]  (Abnormal) Collected:  11/15/18 1041    Specimen:  Blood Updated:  11/15/18 1113     Troponin I 69.800 ng/mL     CK-MB [[id number]]  (Abnormal) Collected:  11/15/18 1041    Specimen:  Blood Updated:  11/15/18 1111     CKMB 303.00 ng/mL     Extra Tubes [[id number]] Collected:  11/15/18 0556    Specimen:  Blood, Venous Line Updated:  11/15/18 0700    Narrative:       The following orders were created for panel order Extra Tubes.  Procedure                               Abnormality         Status                     ---------                                -----------         ------                     Gold Top - SST[[id number]]                                   Final result                 Please view results for these tests on the individual orders.    [facility] - SST [[id number]] Collected:  11/15/18 0556    Specimen:  Blood Updated:  11/15/18 0700     Extra Tube Hold for add-ons.     Comment: Auto resulted.       Basic Metabolic Panel [[id number]]  (Abnormal) Collected:  11/15/18 0556    Specimen:  Blood Updated:  11/15/18 0652     Glucose 200 mg/dL      BUN 15 mg/dL      Creatinine 0.81 mg/dL      Sodium 135 mmol/L      Potassium 4.3 mmol/L      Chloride 102 mmol/L      CO2 23.0 mmol/L      Calcium 8.2 mg/dL      eGFR Non African Amer 72 mL/min/1.73      BUN/Creatinine Ratio 18.5     Anion Gap 10.0 mmol/L     CBC (No Diff) [[id number]]  (Abnormal) Collected:  11/15/18 0555    Specimen:  Blood Updated:  11/15/18 0612     WBC 12.30 10*3/mm3      RBC 4.63 10*6/mm3      Hemoglobin 14.3 g/dL      Hematocrit 42.2 %      MCV 91.1 fL      MCH 30.9 pg      MCHC 33.9 g/dL      RDW 13.7 %      RDW-SD 44.4 fl      MPV 9.0 fL      Platelets 285 10*3/mm3     Extra Tubes [[id number]] Collected:  11/15/18 0214    Specimen:  Blood, Venous Line Updated:  11/15/18 0315    Narrative:       The following orders were created for panel order Extra Tubes.  Procedure                               Abnormality         Status                     ---------                               -----------         ------                     Gold Top - SST[[id number]]                                   Final result                 Please view results for these tests on the individual orders.    Gold Top - SST [[id number]] Collected:  11/15/18 0214    Specimen:  Blood Updated:  11/15/18 0315     Extra Tube Hold for add-ons.     Comment: Auto resulted.       BNP [[id number]]  (Normal) Collected:  11/15/18 0214    Specimen:  Blood from Arm, Right Updated:  11/15/18 0256     proBNP 34.8 pg/mL      Troponin [[id number]]  (Normal) Collected:  11/15/18 0214    Specimen:  Blood from Arm, Right Updated:  11/15/18 0256     Troponin I <0.012 ng/mL     Protime-INR [[id number]]  (Normal) Collected:  11/15/18 0214    Specimen:  Blood from Arm, Right Updated:  11/15/18 0244     Protime 12.2 Seconds      INR 0.92    Narrative:       Therapeutic range for most indications is 2.0-3.0 INR,  or 2.5-3.5 for mechanical heart valves.    CBC & Differential [[id number]] Collected:  11/15/18 0214    Specimen:  Blood Updated:  11/15/18 0229    Narrative:       The following orders were created for panel order CBC & Differential.  Procedure                               Abnormality         Status                     ---------                               -----------         ------                     CBC Auto Differential[[id number]]        Abnormal            Final result                 Please view results for these tests on the individual orders.    CBC Auto Differential [[id number]]  (Abnormal) Collected:  11/15/18 0214    Specimen:  Blood from Arm, Right Updated:  11/15/18 0229     WBC 10.36 10*3/mm3      RBC 4.61 10*6/mm3      Hemoglobin 14.4 g/dL      Hematocrit 41.9 %      MCV 90.9 fL      MCH 31.2 pg      MCHC 34.4 g/dL      RDW 13.6 %      RDW-SD 44.5 fl      MPV 8.9 fL      Platelets 271 10*3/mm3      Neutrophil % 62.5 %      Lymphocyte % 24.3 %      Monocyte % 8.0 %      Eosinophil % 4.2 %      Basophil % 0.5 %      Immature Grans % 0.5 %      Neutrophils, Absolute 6.48 10*3/mm3      Lymphocytes, Absolute 2.52 10*3/mm3      Monocytes, Absolute 0.83 10*3/mm3      Eosinophils, Absolute 0.43 10*3/mm3      Basophils, Absolute 0.05 10*3/mm3      Immature Grans, Absolute 0.05 10*3/mm3         Imaging Results (last 72 hours)     Procedure Component Value Units Date/Time    XR Chest PA & Lateral [[id number]] Collected:  11/17/18 1350     Updated:  11/17/18 1409    Narrative:         TWO VIEW CHEST    HISTORY: Shortness of  air    Frontal and lateral films of the chest were obtained.  COMPARISON: Portable chest 1:13 AM November 17, 2018    EKG leads.  Cardiomegaly with minimal pulmonary vascular congestion and  interstitial edema.  Small bilateral pleural effusions.  There may be underlying subsegmental atelectasis, infiltrate or  edema in the lower lobes.  No pneumothorax.  No acute osseous abnormality.  Degenerative changes are present in the thoracic spine.      Impression:       CONCLUSION:  Cardiomegaly with minimal pulmonary vascular congestion and  interstitial edema.  Small bilateral pleural effusions.  There may be underlying subsegmental atelectasis, infiltrate or  edema in the lower lobes.    [id number]    Electronically signed by:  Jg Callahan MD  11/17/2018 2:08 PM  CST Workstation: [employer]    XR Chest 1 View [[id number]] Collected:  11/17/18 0120     Updated:  11/17/18 0204    Narrative:       Exam: AP portable chest    INDICATION: shortness of breath    COMPARISON: 11/15/2018    FINDINGS: The bony structures are intact. The cardiomediastinal  silhouette is unremarkable. There is questionable increased  opacity in retrocardiac region. No pneumothorax or pleural  effusion.      Impression:       Questionable increased opacity retrocardiac region,  atelectasis and/or infiltrate are not excluded    Electronically signed by:  Maicol Oliver MD  11/17/2018 2:03 AM  CST Workstation: [id number]    XR Chest 1 View [[id number]] Collected:  11/15/18 0227     Updated:  11/15/18 0239    Narrative:       Exam: AP portable chest    INDICATION: Chest pain    COMPARISON: 5/17/2018    FINDINGS: AP portable chest. The bony structures are intact. The  cardiomediastinal silhouette is unremarkable. Mild plaque is  present in the aorta. Mild parenchymal scarring is present in the  right lung base. No acute infiltrate, pneumothorax or pleural  effusion.      Impression:       No acute cardiopulmonary abnormality.    Electronically signed  by:  Maicol Oliver MD  11/15/2018 2:38 AM  CST Workstation: [id number]          Assessment/Plan       Acute ST elevation myocardial infarction (STEMI) (CMS/HCC)  Depression unspecified  ETOH use disorder    Recommendations:    Patient is not currently believing that she has an issue.  If there is evidence of current use then encouraging her to recognize the consequences of her drinking would be helpful.  Would recommend continuing the paxil.    Eliazar Ojeda MD  11/17/18  6:44 PM

## 2018-11-18 NOTE — PROGRESS NOTES
[facility] Medicine Services  INPATIENT PROGRESS NOTE    Length of Stay: 3  Date of Admission: 11/15/2018  Primary Care Physician: Provider, No Known    Subjective   Chief Complaint: wheezing  HPI:    Has some cough, wheezing.  Still on supplemental oxygen.  Reports breathing is slightly improved today.  Requiring less oxygen today at 3 L.      Review of Systems   Respiratory: Positive for shortness of breath and wheezing.         All pertinent negatives and positives are as above. All other systems have been reviewed and are negative unless otherwise stated.     Objective    Temp:  [96.9 °F (36.1 °C)-99.4 °F (37.4 °C)] 97 °F (36.1 °C)  Heart Rate:  [] 77  Resp:  [16-18] 16  BP: (109-129)/(62-81) 122/68  Physical Exam   Constitutional: She appears well-developed and well-nourished. No distress.   HENT:   Head: Normocephalic and atraumatic.   Cardiovascular: Normal rate.   Pulmonary/Chest: Effort normal. No respiratory distress. She has wheezes.   Abdominal: Soft. She exhibits no distension.   Musculoskeletal: Normal range of motion. She exhibits no edema.   Neurological: She is alert. No cranial nerve deficit.   Skin: Skin is warm and dry. She is not diaphoretic.   Psychiatric: She has a normal mood and affect. Her behavior is normal. Judgment and thought content normal.   Vitals reviewed.          Results Review:  I have reviewed the labs, radiology results, and diagnostic studies.    Laboratory Data:   Lab Results (last 24 hours)     Procedure Component Value Units Date/Time    Urinalysis With Culture If Indicated - Urine, Clean Catch [[id number]]  (Normal) Collected:  11/18/18 0859    Specimen:  Urine, Clean Catch Updated:  11/18/18 0908     Color, UA Yellow     Appearance, UA Clear     pH, UA 6.0     Specific Gravity, UA 1.009     Glucose, UA Negative     Ketones, UA Negative     Bilirubin, UA Negative     Blood, UA Negative     Protein, UA Negative     Leuk  Esterase, UA Negative     Nitrite, UA Negative     Urobilinogen, UA 1.0 E.U./dL    Narrative:       Urine microscopic not indicated.    Comprehensive Metabolic Panel [[id number]]  (Abnormal) Collected:  11/18/18 0621    Specimen:  Blood Updated:  11/18/18 0718     Glucose 165 mg/dL      BUN 10 mg/dL      Creatinine 0.60 mg/dL      Sodium 134 mmol/L      Potassium 4.0 mmol/L      Chloride 100 mmol/L      CO2 28.0 mmol/L      Calcium 8.3 mg/dL      Total Protein 5.8 g/dL      Albumin 3.30 g/dL      ALT (SGPT) 46 U/L      AST (SGOT) 64 U/L      Alkaline Phosphatase 70 U/L      Total Bilirubin 0.6 mg/dL      eGFR Non African Amer 102 mL/min/1.73      Globulin 2.5 gm/dL      A/G Ratio 1.3 g/dL      BUN/Creatinine Ratio 16.7     Anion Gap 6.0 mmol/L     CBC & Differential [[id number]] Collected:  11/18/18 0621    Specimen:  Blood Updated:  11/18/18 0708    Narrative:       The following orders were created for panel order CBC & Differential.  Procedure                               Abnormality         Status                     ---------                               -----------         ------                     CBC Auto Differential[[id number]]        Abnormal            Final result                 Please view results for these tests on the individual orders.    CBC Auto Differential [[id number]]  (Abnormal) Collected:  11/18/18 0621    Specimen:  Blood Updated:  11/18/18 0708     WBC 10.48 10*3/mm3      RBC 3.73 10*6/mm3      Hemoglobin 11.4 g/dL      Hematocrit 33.7 %      MCV 90.3 fL      MCH 30.6 pg      MCHC 33.8 g/dL      RDW 13.4 %      RDW-SD 43.8 fl      MPV 9.4 fL      Platelets 160 10*3/mm3      Neutrophil % 94.0 %      Lymphocyte % 3.5 %      Monocyte % 2.3 %      Eosinophil % 0.0 %      Basophil % 0.0 %      Immature Grans % 0.2 %      Neutrophils, Absolute 9.85 10*3/mm3      Lymphocytes, Absolute 0.37 10*3/mm3      Monocytes, Absolute 0.24 10*3/mm3      Eosinophils, Absolute 0.00 10*3/mm3      Basophils,  Absolute 0.00 10*3/mm3      Immature Grans, Absolute 0.02 10*3/mm3           Culture Data:   No results found for: BLOODCX  No results found for: URINECX  No results found for: RESPCX  No results found for: WOUNDCX  No results found for: STOOLCX  No components found for: BODYFLD    Radiology Data:   Imaging Results (last 24 hours)     Procedure Component Value Units Date/Time    XR Chest PA & Lateral [[id number]] Collected:  11/17/18 1350     Updated:  11/17/18 1409    Narrative:         TWO VIEW CHEST    HISTORY: Shortness of air    Frontal and lateral films of the chest were obtained.  COMPARISON: Portable chest 1:13 AM November 17, 2018    EKG leads.  Cardiomegaly with minimal pulmonary vascular congestion and  interstitial edema.  Small bilateral pleural effusions.  There may be underlying subsegmental atelectasis, infiltrate or  edema in the lower lobes.  No pneumothorax.  No acute osseous abnormality.  Degenerative changes are present in the thoracic spine.      Impression:       CONCLUSION:  Cardiomegaly with minimal pulmonary vascular congestion and  interstitial edema.  Small bilateral pleural effusions.  There may be underlying subsegmental atelectasis, infiltrate or  edema in the lower lobes.    [id number]    Electronically signed by:  Jg Callahan MD  11/17/2018 2:08 PM  CST Workstation: [id number]          I have reviewed the patient's current medications.     Assessment/Plan     Active Hospital Problems    Diagnosis   • Acute ST elevation myocardial infarction (STEMI) (CMS/[facility])     #1 Acute ST elevation MI: Improved.  Status post PTCA with 3 stents.    #2 COPD: exacerbation - solumedrol, nebulizer treatments  #3 paroxysmal atrial fibrillation: New.  Per cardiology   #4 pneumonia - rocephin   #5 deconditioned  - PTOT            Sanjeev Betancourt MD   11/18/18   9:36 AM

## 2018-11-19 ENCOUNTER — APPOINTMENT (OUTPATIENT)
Dept: GENERAL RADIOLOGY | Facility: HOSPITAL | Age: 63
End: 2018-11-19

## 2018-11-19 PROBLEM — J20.9 CHRONIC BRONCHITIS WITH ACUTE EXACERBATION (HCC): Chronic | Status: ACTIVE | Noted: 2017-08-17

## 2018-11-19 PROBLEM — I10 HYPERTENSION: Chronic | Status: ACTIVE | Noted: 2017-08-17

## 2018-11-19 PROBLEM — J42 CHRONIC BRONCHITIS WITH ACUTE EXACERBATION: Chronic | Status: ACTIVE | Noted: 2017-08-17

## 2018-11-19 PROBLEM — F10.10 CHRONIC ALCOHOL ABUSE: Chronic | Status: ACTIVE | Noted: 2018-11-19

## 2018-11-19 PROBLEM — J20.9 CHRONIC BRONCHITIS WITH ACUTE EXACERBATION: Status: ACTIVE | Noted: 2017-08-17

## 2018-11-19 PROBLEM — I25.10 CAD (CORONARY ARTERY DISEASE): Chronic | Status: ACTIVE | Noted: 2018-11-19

## 2018-11-19 PROBLEM — F32.A DEPRESSION: Chronic | Status: ACTIVE | Noted: 2018-11-19

## 2018-11-19 PROBLEM — J96.21 ACUTE ON CHRONIC RESPIRATORY FAILURE WITH HYPOXIA: Chronic | Status: ACTIVE | Noted: 2018-11-19

## 2018-11-19 LAB
ALBUMIN SERPL-MCNC: 3.6 G/DL (ref 3.4–4.8)
ALBUMIN/GLOB SERPL: 1.4 G/DL (ref 1.1–1.8)
ALP SERPL-CCNC: 75 U/L (ref 38–126)
ALT SERPL W P-5'-P-CCNC: 47 U/L (ref 9–52)
ANION GAP SERPL CALCULATED.3IONS-SCNC: 9 MMOL/L (ref 5–15)
AST SERPL-CCNC: 55 U/L (ref 14–36)
BASOPHILS # BLD AUTO: 0.02 10*3/MM3 (ref 0–0.2)
BASOPHILS NFR BLD AUTO: 0.1 % (ref 0–2)
BILIRUB SERPL-MCNC: 0.6 MG/DL (ref 0.2–1.3)
BUN BLD-MCNC: 12 MG/DL (ref 7–21)
BUN/CREAT SERPL: 19.7 (ref 7–25)
CALCIUM SPEC-SCNC: 8.9 MG/DL (ref 8.4–10.2)
CHLORIDE SERPL-SCNC: 99 MMOL/L (ref 95–110)
CO2 SERPL-SCNC: 30 MMOL/L (ref 22–31)
CREAT BLD-MCNC: 0.61 MG/DL (ref 0.5–1)
DEPRECATED RDW RBC AUTO: 45.8 FL (ref 36.4–46.3)
EOSINOPHIL # BLD AUTO: 0 10*3/MM3 (ref 0–0.7)
EOSINOPHIL NFR BLD AUTO: 0 % (ref 0–7)
ERYTHROCYTE [DISTWIDTH] IN BLOOD BY AUTOMATED COUNT: 13.9 % (ref 11.5–14.5)
GFR SERPL CREATININE-BSD FRML MDRD: 100 ML/MIN/1.73 (ref 45–104)
GLOBULIN UR ELPH-MCNC: 2.6 GM/DL (ref 2.3–3.5)
GLUCOSE BLD-MCNC: 166 MG/DL (ref 60–100)
HCT VFR BLD AUTO: 35.1 % (ref 35–45)
HGB BLD-MCNC: 11.8 G/DL (ref 12–15.5)
IMM GRANULOCYTES # BLD: 0.05 10*3/MM3 (ref 0–0.02)
IMM GRANULOCYTES NFR BLD: 0.2 % (ref 0–0.5)
LYMPHOCYTES # BLD AUTO: 0.4 10*3/MM3 (ref 0.6–4.2)
LYMPHOCYTES NFR BLD AUTO: 2 % (ref 10–50)
MCH RBC QN AUTO: 30.7 PG (ref 26.5–34)
MCHC RBC AUTO-ENTMCNC: 33.6 G/DL (ref 31.4–36)
MCV RBC AUTO: 91.4 FL (ref 80–98)
MONOCYTES # BLD AUTO: 0.8 10*3/MM3 (ref 0–0.9)
MONOCYTES NFR BLD AUTO: 4 % (ref 0–12)
NEUTROPHILS # BLD AUTO: 18.87 10*3/MM3 (ref 2–8.6)
NEUTROPHILS NFR BLD AUTO: 93.7 % (ref 37–80)
NRBC BLD MANUAL-RTO: 0 /100 WBC (ref 0–0)
PLATELET # BLD AUTO: 231 10*3/MM3 (ref 150–450)
PMV BLD AUTO: 9 FL (ref 8–12)
POTASSIUM BLD-SCNC: 3.8 MMOL/L (ref 3.5–5.1)
PROT SERPL-MCNC: 6.2 G/DL (ref 6.3–8.6)
RBC # BLD AUTO: 3.84 10*6/MM3 (ref 3.77–5.16)
SODIUM BLD-SCNC: 138 MMOL/L (ref 137–145)
WBC NRBC COR # BLD: 20.14 10*3/MM3 (ref 3.2–9.8)

## 2018-11-19 PROCEDURE — 94799 UNLISTED PULMONARY SVC/PX: CPT

## 2018-11-19 PROCEDURE — 97166 OT EVAL MOD COMPLEX 45 MIN: CPT

## 2018-11-19 PROCEDURE — 25010000002 FUROSEMIDE PER 20 MG: Performed by: FAMILY MEDICINE

## 2018-11-19 PROCEDURE — G8979 MOBILITY GOAL STATUS: HCPCS

## 2018-11-19 PROCEDURE — 80053 COMPREHEN METABOLIC PANEL: CPT | Performed by: FAMILY MEDICINE

## 2018-11-19 PROCEDURE — G8987 SELF CARE CURRENT STATUS: HCPCS

## 2018-11-19 PROCEDURE — 94760 N-INVAS EAR/PLS OXIMETRY 1: CPT

## 2018-11-19 PROCEDURE — 63710000001 DIPHENHYDRAMINE PER 50 MG: Performed by: FAMILY MEDICINE

## 2018-11-19 PROCEDURE — 85025 COMPLETE CBC W/AUTO DIFF WBC: CPT | Performed by: FAMILY MEDICINE

## 2018-11-19 PROCEDURE — G8988 SELF CARE GOAL STATUS: HCPCS

## 2018-11-19 PROCEDURE — 97162 PT EVAL MOD COMPLEX 30 MIN: CPT

## 2018-11-19 PROCEDURE — 71046 X-RAY EXAM CHEST 2 VIEWS: CPT

## 2018-11-19 PROCEDURE — 25010000002 CEFTRIAXONE PER 250 MG: Performed by: FAMILY MEDICINE

## 2018-11-19 PROCEDURE — 25010000002 METHYLPREDNISOLONE PER 125 MG: Performed by: FAMILY MEDICINE

## 2018-11-19 PROCEDURE — G8978 MOBILITY CURRENT STATUS: HCPCS

## 2018-11-19 PROCEDURE — 25010000002 ENOXAPARIN PER 10 MG: Performed by: HOSPITALIST

## 2018-11-19 PROCEDURE — 25010000002 FUROSEMIDE PER 20 MG: Performed by: INTERNAL MEDICINE

## 2018-11-19 RX ORDER — DIPHENHYDRAMINE HCL 25 MG
25 CAPSULE ORAL ONCE
Status: COMPLETED | OUTPATIENT
Start: 2018-11-19 | End: 2018-11-19

## 2018-11-19 RX ORDER — FUROSEMIDE 10 MG/ML
40 INJECTION INTRAMUSCULAR; INTRAVENOUS DAILY
Status: DISCONTINUED | OUTPATIENT
Start: 2018-11-20 | End: 2018-11-20

## 2018-11-19 RX ORDER — LABETALOL 100 MG/1
50 TABLET, FILM COATED ORAL EVERY 12 HOURS SCHEDULED
Status: DISCONTINUED | OUTPATIENT
Start: 2018-11-19 | End: 2018-11-23 | Stop reason: HOSPADM

## 2018-11-19 RX ORDER — AMIODARONE HYDROCHLORIDE 200 MG/1
100 TABLET ORAL EVERY 12 HOURS SCHEDULED
Status: DISCONTINUED | OUTPATIENT
Start: 2018-11-19 | End: 2018-11-23 | Stop reason: HOSPADM

## 2018-11-19 RX ORDER — FUROSEMIDE 10 MG/ML
40 INJECTION INTRAMUSCULAR; INTRAVENOUS ONCE
Status: COMPLETED | OUTPATIENT
Start: 2018-11-19 | End: 2018-11-19

## 2018-11-19 RX ADMIN — ENOXAPARIN SODIUM 40 MG: 40 INJECTION SUBCUTANEOUS at 21:51

## 2018-11-19 RX ADMIN — IPRATROPIUM BROMIDE AND ALBUTEROL SULFATE 3 ML: 2.5; .5 SOLUTION RESPIRATORY (INHALATION) at 14:28

## 2018-11-19 RX ADMIN — ATORVASTATIN CALCIUM 40 MG: 40 TABLET, FILM COATED ORAL at 21:50

## 2018-11-19 RX ADMIN — METOPROLOL TARTRATE 25 MG: 25 TABLET ORAL at 08:40

## 2018-11-19 RX ADMIN — BUDESONIDE 0.5 MG: 0.5 INHALANT RESPIRATORY (INHALATION) at 19:55

## 2018-11-19 RX ADMIN — METHYLPREDNISOLONE SODIUM SUCCINATE 60 MG: 125 INJECTION, POWDER, FOR SOLUTION INTRAMUSCULAR; INTRAVENOUS at 05:49

## 2018-11-19 RX ADMIN — CHLORDIAZEPOXIDE HYDROCHLORIDE 5 MG: 5 CAPSULE ORAL at 21:49

## 2018-11-19 RX ADMIN — METHYLPREDNISOLONE SODIUM SUCCINATE 60 MG: 125 INJECTION, POWDER, FOR SOLUTION INTRAMUSCULAR; INTRAVENOUS at 21:50

## 2018-11-19 RX ADMIN — METHYLPREDNISOLONE SODIUM SUCCINATE 60 MG: 125 INJECTION, POWDER, FOR SOLUTION INTRAMUSCULAR; INTRAVENOUS at 14:08

## 2018-11-19 RX ADMIN — FUROSEMIDE 20 MG: 10 INJECTION, SOLUTION INTRAMUSCULAR; INTRAVENOUS at 08:40

## 2018-11-19 RX ADMIN — LABETALOL HYDROCHLORIDE 50 MG: 100 TABLET, FILM COATED ORAL at 21:49

## 2018-11-19 RX ADMIN — IPRATROPIUM BROMIDE AND ALBUTEROL SULFATE 3 ML: 2.5; .5 SOLUTION RESPIRATORY (INHALATION) at 07:35

## 2018-11-19 RX ADMIN — CHLORDIAZEPOXIDE HYDROCHLORIDE 5 MG: 5 CAPSULE ORAL at 05:49

## 2018-11-19 RX ADMIN — TICAGRELOR 90 MG: 90 TABLET ORAL at 08:40

## 2018-11-19 RX ADMIN — ASPIRIN 81 MG CHEWABLE TABLET 81 MG: 81 TABLET CHEWABLE at 08:40

## 2018-11-19 RX ADMIN — MONTELUKAST SODIUM 10 MG: 10 TABLET, FILM COATED ORAL at 21:50

## 2018-11-19 RX ADMIN — TICAGRELOR 90 MG: 90 TABLET ORAL at 21:49

## 2018-11-19 RX ADMIN — Medication 100 MG: at 21:49

## 2018-11-19 RX ADMIN — DIPHENHYDRAMINE HYDROCHLORIDE 25 MG: 25 CAPSULE ORAL at 22:20

## 2018-11-19 RX ADMIN — IPRATROPIUM BROMIDE AND ALBUTEROL SULFATE 3 ML: 2.5; .5 SOLUTION RESPIRATORY (INHALATION) at 11:06

## 2018-11-19 RX ADMIN — BUDESONIDE 0.5 MG: 0.5 INHALANT RESPIRATORY (INHALATION) at 07:35

## 2018-11-19 RX ADMIN — IPRATROPIUM BROMIDE AND ALBUTEROL SULFATE 3 ML: 2.5; .5 SOLUTION RESPIRATORY (INHALATION) at 19:55

## 2018-11-19 RX ADMIN — Medication 12.5 MG: at 08:40

## 2018-11-19 RX ADMIN — CEFTRIAXONE SODIUM 1 G: 1 INJECTION, POWDER, FOR SOLUTION INTRAMUSCULAR; INTRAVENOUS at 14:15

## 2018-11-19 RX ADMIN — CHLORDIAZEPOXIDE HYDROCHLORIDE 5 MG: 5 CAPSULE ORAL at 14:13

## 2018-11-19 RX ADMIN — FUROSEMIDE 40 MG: 10 INJECTION, SOLUTION INTRAMUSCULAR; INTRAVENOUS at 14:02

## 2018-11-19 RX ADMIN — AMIODARONE HYDROCHLORIDE 200 MG: 200 TABLET ORAL at 08:40

## 2018-11-19 NOTE — PLAN OF CARE
Problem: Patient Care Overview  Goal: Plan of Care Review  Outcome: Ongoing (interventions implemented as appropriate)   11/19/18 0449   Coping/Psychosocial   Plan of Care Reviewed With patient   Plan of Care Review   Progress improving   OTHER   Outcome Summary pt wants to go home, pt did not sleep during the night     Goal: Individualization and Mutuality  Outcome: Ongoing (interventions implemented as appropriate)      Problem: Skin Injury Risk (Adult)  Goal: Identify Related Risk Factors and Signs and Symptoms  Outcome: Ongoing (interventions implemented as appropriate)      Problem: Cardiac: ACS (Acute Coronary Syndrome) (Adult)  Goal: Signs and Symptoms of Listed Potential Problems Will be Absent, Minimized or Managed (Cardiac: ACS)  Outcome: Ongoing (interventions implemented as appropriate)      Problem: Fall Risk (Adult)  Goal: Absence of Fall  Outcome: Ongoing (interventions implemented as appropriate)      Problem: Chronic Obstructive Pulmonary Disease (Adult)  Goal: Signs and Symptoms of Listed Potential Problems Will be Absent, Minimized or Managed (Chronic Obstructive Pulmonary Disease)  Outcome: Ongoing (interventions implemented as appropriate)

## 2018-11-19 NOTE — THERAPY EVALUATION
Acute Care - Occupational Therapy Initial Evaluation  [facility]     Patient Name: Monisha Brasher  : 1955  MRN: [id number]  Today's Date: 2018  Onset of Illness/Injury or Date of Surgery: 11/15/18  Date of Referral to OT: 18  Referring Physician: BLAYNE Lomax MD    Admit Date: 11/15/2018       ICD-10-CM ICD-9-CM   1. Acute ST elevation myocardial infarction (STEMI), unspecified artery (CMS/HCC) I21.3 410.90   2. Acute myocardial infarction of posterolateral wall (CMS/HCC) I21.29 410.50   3. HTN (hypertension), benign I10 401.1   4. Impaired functional mobility, balance, gait, and endurance Z74.09 V49.89   5. Impaired mobility and activities of daily living Z74.09 799.89     Patient Active Problem List   Diagnosis   • COPD bronchitis   • Cellulitis of left leg   • Asthma   • SOB (shortness of breath)   • Chronic bronchitis with acute exacerbation (CMS/[facility])   • Nicotine abuse   • Hypertension   • Screening for osteoporosis   • Panlobular emphysema (CMS/HCC)   • Anxiety   • General medical exam   • Malaise   • Hyperglycemia   • Acute ST elevation myocardial infarction (STEMI) (CMS/HCC)   • CAD (coronary artery disease)   • Acute on chronic respiratory failure with hypoxia (CMS/HCC)   • Depression   • Chronic alcohol abuse     Past Medical History:   Diagnosis Date   • Acute bronchitis    • Acute upper respiratory infection    • Adjustment disorder with mixed emotional features    • Agoraphobia with panic attacks    • Allergic rhinitis due to pollen    • Anxiety    • Asthma    • Backache    • Blood in urine    • Candidiasis of mouth    • Chronic bronchitis (CMS/HCC)    • Chronic obstructive lung disease (CMS/HCC)    • Emphysema lung (CMS/HCC)    • Essential hypertension    • Extrinsic asthma with status asthmaticus    • Fatigue    • H/O echocardiogram     EF 55-60%. LV systolic function normal. Impaired LV relaxation. [facility]   • Hypoxemia    • Malaise and fatigue    • Non-IgE  mediated allergic asthma    • Nonvenomous insect bite    • Pneumonia    • Postmenopausal state    • Urinary tract infectious disease      Past Surgical History:   Procedure Laterality Date   • INJECTION OF MEDICATION  03/20/2015    celestone(1)   • INJECTION OF MEDICATION  04/05/2016    DEPO MEDROL(16)          OT ASSESSMENT FLOWSHEET (last 72 hours)      Occupational Therapy Evaluation     Row Name 11/19/18 1500                   OT Evaluation Time/Intention    Subjective Information  no complaints  -        Document Type  evaluation  -        Mode of Treatment  co-treatment;occupational therapy;physical therapy  -        Total Evaluation Minutes, Occupational Therapy  19  -        Patient Effort  good  -        Symptoms Noted During/After Treatment  dizziness  -           General Information    Patient Profile Reviewed?  yes  -        Onset of Illness/Injury or Date of Surgery  11/15/18  -        Referring Physician  Monie Johnson MD  -        Patient Observations  alert;cooperative;agree to therapy  -        Patient/Family Observations  No family present   -        General Observations of Patient  Pt supine in bed with telemetry and 3L O2  -        Prior Level of Function  independent:;all household mobility;community mobility;gait;transfer;bed mobility;ADL's;home management;cooking;cleaning;driving;shopping;using stairs  -        Equipment Currently Used at Home  none  -        Pertinent History of Current Functional Problem  Pt is a 62yo female admitted s/p MI and 3 stent placement.  -        Equipment Issued to Patient  gait belt  -        Risks Reviewed  patient:;dizziness;increased discomfort;change in vital signs  -        Benefits Reviewed  patient:;improve function;increase independence;increase strength;increase balance;increase knowledge  -        Barriers to Rehab  none identified  -           Relationship/Environment    Primary Source of Support/Comfort  spouse   -        Lives With  spouse  -           Resource/Environmental Concerns    Current Living Arrangements  home/apartment/condo  -           Home Main Entrance    Number of Stairs, Main Entrance  four  -        Stair Railings, Main Entrance  none  -           Cognitive Assessment/Intervention- PT/OT    Orientation Status (Cognition)  oriented x 4  -MH        Follows Commands (Cognition)  WF  -           Safety Issues, Functional Mobility    Impairments Affecting Function (Mobility)  endurance/activity tolerance;shortness of breath  -           Bed Mobility Assessment/Treatment    Bed Mobility Assessment/Treatment  supine-sit;sit-supine  -        Supine-Sit [place] (Bed Mobility)  supervision  -        Sit-Supine [place] (Bed Mobility)  supervision  -           Functional Mobility    Functional Mobility- Ind. Level  contact guard assist  -        Functional Mobility- Comment  Pt ambulated entire length of hallway with CGA, holding onto rail and with O2 3L.   -           Transfer Assessment/Treatment    Transfer Assessment/Treatment  sit-stand transfer;stand-sit transfer  -           Sit-Stand Transfer    Sit-Stand [place] (Transfers)  contact guard;1 person assist  -           Stand-Sit Transfer    Stand-Sit [place] (Transfers)  contact guard;1 person assist  -           ADL Assessment/Intervention    BADL Assessment/Intervention  lower body dressing  -           Lower Body Dressing Assessment/Training    Lower Body Dressing [place] Level  don;shoes/slippers;supervision  -        Lower Body Dressing Position  edge of bed sitting  -           BADL Safety/Performance    Impairments, BADL Safety/Performance  balance;endurance/activity tolerance;shortness of breath  -           General ROM    GENERAL ROM COMMENTS  [facility]  -           MMT (Manual Muscle Testing)    General MMT Comments  [facility]           Sensory Assessment/Intervention    Sensory  General Assessment  no sensation deficits identified  -           Positioning and Restraints    Pre-Treatment Position  in bed  -        Post Treatment Position  bed  -        In Bed  supine;call light within reach;encouraged to call for assist  -           Pain Assessment    Additional Documentation  Pain Scale: Numbers Pre/Post-Treatment (Group)  -           Pain Scale: Numbers Pre/Post-Treatment    Pain Scale: Numbers, Pretreatment  0/10 - no pain  -        Pain Scale: Numbers, Post-Treatment  0/10 - no pain  -           Plan of Care Review    Plan of Care Reviewed With  patient  -           Clinical Impression (OT)    Date of Referral to OT  11/19/18  -        OT Diagnosis  Impaired mobility and ADLs  -        Patient/Family Goals Statement (OT Eval)  Return home and to PLOF  -        Criteria for Skilled Therapeutic Interventions Met (OT Eval)  yes;treatment indicated  -        Rehab Potential (OT Eval)  good, to achieve stated therapy goals  [facility]        Therapy Frequency (OT Eval)  -- 5-7 days/week  -        Predicted Duration of Therapy Intervention (Therapy Eval)  until all goals met or d/c from facility   -        Care Plan Review (OT)  evaluation/treatment results reviewed  -        Anticipated Discharge Disposition (OT)  home with assist  -           Vital Signs    Pre Systolic BP Rehab  132  -MH        Pre Treatment Diastolic BP  65  -MH        Post Systolic BP Rehab  136  -MH        Post Treatment Diastolic BP  70  -MH        Pretreatment Heart Rate (beats/min)  82  -MH        Posttreatment Heart Rate (beats/min)  91  -MH        Pre SpO2 (%)  90  -MH        Intra SpO2 (%)  86  -MH        O2 Delivery Intra Treatment  supplemental O2  -        Post SpO2 (%)  90  -MH           Planned OT Interventions    Planned Therapy Interventions (OT Eval)  activity tolerance training;BADL retraining;ROM/therapeutic exercise;strengthening exercise  -           OT Goals    Bathing Goal  Selection (OT)  bathing, OT goal 1  -        Toileting Goal Selection (OT)  toileting, OT goal 1  -        Activity Tolerance Goal Selection (OT)  activity tolerance, OT goal 1  -        Additional Documentation  Activity Tolerance Goal Selection (OT) (Row)  -           Bathing Goal 1 (OT)    Activity/Assistive Device (Bathing Goal 1, OT)  bathing skills, all  -MH        [place] Level/Cues Needed (Bathing Goal 1, OT)  independent  -        Time Frame (Bathing Goal 1, OT)  long term goal (LTG);by discharge  -        Progress/Outcomes (Bathing Goal 1, OT)  goal not met  -           Toileting Goal 1 (OT)    Activity/Device (Toileting Goal 1, OT)  toileting skills, all  -        [place] Level/Cues Needed (Toileting Goal 1, OT)  independent  -        Time Frame (Toileting Goal 1, OT)  long term goal (LTG);by discharge  -        Progress/Outcome (Toileting Goal 1, OT)  goal not met  -            Activity Tolerance Goal 1 (OT)    Activity Level (Endurance Goal 1, OT)  15 min activity O2 sat > 90%  -        Time Frame (Activity Tolerance Goal 1, OT)  long term goal (LTG);by discharge  -        Progress/Outcome (Activity Tolerance Goal 1, OT)  goal not met  -           Living Environment    Home Accessibility  tub/shower is not walk in;stairs to enter home  -          User Key  (r) = Recorded By, (t) = Taken By, (c) = Cosigned By    Initials Name Effective Dates     Melissa Santillan 10/12/18 -          Occupational Therapy Education     Title: PT OT SLP Therapies (Active)     Topic: Occupational Therapy (Active)     Point: Precautions (Done)     Description: Instruct learner(s) on prescribed precautions during self-care and functional transfers.    Learning Progress Summary           Patient Acceptance, E, VU by  at 11/19/2018  3:52 PM    Comment:  OT role, OT POC,                               User Key     Initials Effective Dates Name Provider Type Discipline     10/12/18 -   Melissa Santillan Occupational Therapist OT                  OT Recommendation and Plan  Outcome Summary/Treatment Plan (OT)  Anticipated Discharge Disposition (OT): home with assist  Planned Therapy Interventions (OT Eval): activity tolerance training, BADL retraining, ROM/therapeutic exercise, strengthening exercise  Therapy Frequency (OT Eval): (5-7 days/week)  Plan of Care Review  Plan of Care Reviewed With: patient  Plan of Care Reviewed With: patient  Outcome Summary: OT eval completed as co-eval with PT. Pt completed sit<> supine with supervision and sit<>stand with CGA. Pt fatigued and SOB with activity. Pt's O2 decreasing to 86% while on 3L O2. Pt presents with decreased activity tolerance, functional mobility and independence in ADLs. Pt would benefit from skilled OT services in order to address these deficits. Recommend home with assist upon d/c.     Outcome Measures     Row Name 11/19/18 1504 11/19/18 1500          How much help from another person do you currently need...    Turning from your back to your side while in flat bed without using bedrails?  4  -KW  --     Moving from lying on back to sitting on the side of a flat bed without bedrails?  4  -KW  --     Moving to and from a bed to a chair (including a wheelchair)?  4  -KW  --     Standing up from a chair using your arms (e.g., wheelchair, bedside chair)?  4  -KW  --     Climbing 3-5 steps with a railing?  3  -KW  --     To walk in hospital room?  3  -KW  --     AM-PAC 6 Clicks Score  22  -KW  --        How much help from another is currently needed...    Putting on and taking off regular lower body clothing?  --  3  -MH     Bathing (including washing, rinsing, and drying)  --  3  -MH     Toileting (which includes using toilet bed pan or urinal)  --  3  -MH     Putting on and taking off regular upper body clothing  --  4  -MH     Taking care of personal grooming (such as brushing teeth)  --  4  -MH     Eating meals  --  4  -MH     Score  --   21  -        Functional Assessment    Outcome Measure Options  AM-PAC 6 Clicks Basic Mobility (PT)  -  AM-PAC 6 Clicks Daily Activity (OT)  -       User Key  (r) = Recorded By, (t) = Taken By, (c) = Cosigned By    Initials Name Provider Type    Joan Corley, PT Physical Therapist     Melissa Santillan Occupational Therapist          Time Calculation:   Time Calculation- OT     Row Name 11/19/18 1553             Time Calculation- OT    OT Start Time  1500  -      OT Stop Time  1519  -      OT Time Calculation (min)  19 min  -      OT Received On  11/19/18  -      OT Goal Re-Cert Due Date  12/02/18  -        User Key  (r) = Recorded By, (t) = Taken By, (c) = Cosigned By    Initials Name Provider Type    Melissa Ellis Occupational Therapist        Therapy Suggested Charges     Code   Minutes Charges    None           Therapy Charges for Today     Code Description Service Date Service Provider Modifiers Qty    48625069823 HC OT SELFCARE CURRENT 11/19/2018 Melissa Santillan CJ 1    08673888163 HC OT SELFCARE PROJECTED 11/19/2018 Melissa Santillan, CI 1    11651370828 HC OT EVAL MOD COMPLEXITY 1 11/19/2018 Melissa Santillan 1          OT G-codes  OT Professional Judgement Used?: Yes  OT Functional Scales Options: AM-PAC 6 Clicks Daily Activity (OT)  Score: 21  Functional Limitation: Self care  Self Care Current Status (): At least 20 percent but less than 40 percent impaired, limited or restricted  Self Care Goal Status (): At least 1 percent but less than 20 percent impaired, limited or restricted    Melissa Santillan  11/19/2018

## 2018-11-19 NOTE — PLAN OF CARE
Problem: Patient Care Overview  Goal: Plan of Care Review  Outcome: Ongoing (interventions implemented as appropriate)   11/19/18 5685   Coping/Psychosocial   Plan of Care Reviewed With patient   OTHER   Outcome Summary OT eval completed as co-eval with PT. Pt completed sit<> supine with supervision and sit<>stand with CGA. Pt fatigued and SOB with activity. Pt's O2 decreasing to 86% while on 3L O2. Pt presents with decreased activity tolerance, functional mobility and independence in ADLs. Pt would benefit from skilled OT services in order to address these deficits. Recommend home with assist upon d/c.

## 2018-11-19 NOTE — THERAPY EVALUATION
Acute Care - Physical Therapy Initial Evaluation  [facility]     Patient Name: Monisha Brasher  : 1955  MRN: [id number]  Today's Date: 2018   Onset of Illness/Injury or Date of Surgery: 11/15/18  Date of Referral to PT: 18  Referring Physician: BLAYNE Lomax MD      Admit Date: 11/15/2018    Visit Dx:     ICD-10-CM ICD-9-CM   1. Acute ST elevation myocardial infarction (STEMI), unspecified artery (CMS/HCC) I21.3 410.90   2. Acute myocardial infarction of posterolateral wall (CMS/HCC) I21.29 410.50   3. HTN (hypertension), benign I10 401.1   4. Impaired functional mobility, balance, gait, and endurance Z74.09 V49.89     Patient Active Problem List   Diagnosis   • COPD bronchitis   • Cellulitis of left leg   • Asthma   • SOB (shortness of breath)   • Chronic bronchitis with acute exacerbation (CMS/[facility])   • Nicotine abuse   • Hypertension   • Screening for osteoporosis   • Panlobular emphysema (CMS/[facility])   • Anxiety   • General medical exam   • Malaise   • Hyperglycemia   • Acute ST elevation myocardial infarction (STEMI) (CMS/[facility])   • CAD (coronary artery disease)   • Acute on chronic respiratory failure with hypoxia (CMS/[facility])   • Depression   • Chronic alcohol abuse     Past Medical History:   Diagnosis Date   • Acute bronchitis    • Acute upper respiratory infection    • Adjustment disorder with mixed emotional features    • Agoraphobia with panic attacks    • Allergic rhinitis due to pollen    • Anxiety    • Asthma    • Backache    • Blood in urine    • Candidiasis of mouth    • Chronic bronchitis (CMS/[facility])    • Chronic obstructive lung disease (CMS/[facility])    • Emphysema lung (CMS/[facility])    • Essential hypertension    • Extrinsic asthma with status asthmaticus    • Fatigue    • H/O echocardiogram     EF 55-60%. LV systolic function normal. Impaired LV relaxation. [facility]   • Hypoxemia    • Malaise and fatigue    • Non-IgE mediated allergic asthma    • Nonvenomous insect bite     • Pneumonia    • Postmenopausal state    • Urinary tract infectious disease      Past Surgical History:   Procedure Laterality Date   • INJECTION OF MEDICATION  03/20/2015    celestone(1)   • INJECTION OF MEDICATION  04/05/2016    DEPO MEDROL(16)        PT ASSESSMENT (last 12 hours)      Physical Therapy Evaluation     Row Name 11/19/18 1504          PT Evaluation Time/Intention    Subjective Information  no complaints  -KW     Document Type  evaluation  -KW     Mode of Treatment  co-treatment;physical therapy;occupational therapy  -KW     Patient Effort  good  -KW     Symptoms Noted During/After Treatment  dizziness  -KW     Row Name 11/19/18 1504          General Information    Patient Profile Reviewed?  yes  -KW     Onset of Illness/Injury or Date of Surgery  11/15/18  -KW     Referring Physician  BLAYNE Lomax MD  -KW     Patient Observations  alert;cooperative;agree to therapy  -KW     Patient/Family Observations  No family present  -KW     General Observations of Patient  pt supine in bed, telemetry, 3L supplemental O2 via NC  -KW     Prior Level of Function  independent:;all household mobility;community mobility;ADL's;cleaning;cooking;shopping;using stairs  -KW     Equipment Currently Used at Home  none  -KW     Pertinent History of Current Functional Problem  Pt is a 62yo female admitted s/p MI and 3 stent placement.  -KW     Existing Precautions/Restrictions  fall  -KW     Equipment Issued to Patient  gait belt  -KW     Risks Reviewed  patient:;LOB;nausea/vomiting;dizziness;increased discomfort;change in vital signs;increased drainage;lines disloged  -KW     Benefits Reviewed  patient:;improve function;increase independence;increase strength;increase balance;decrease pain;decrease risk of DVT;improve skin integrity;increase knowledge  -KW     Barriers to Rehab  none identified  -KW     Row Name 11/19/18 1501          Relationship/Environment    Lives With  spouse  -KW     Row Name 11/19/18 150           Resource/Environmental Concerns    Current Living Arrangements  home/apartment/condo  -KW     Row Name 11/19/18 1504          Home Main Entrance    Number of Stairs, Main Entrance  four  -KW     Stair Railings, Main Entrance  none  -KW     Row Name 11/19/18 1504          Cognitive Assessment/Interventions    Additional Documentation  Cognitive Assessment/Intervention (Group)  -KW     Row Name 11/19/18 1504          Cognitive Assessment/Intervention- PT/OT    Affect/Mental Status (Cognitive)  WFL  -KW     Orientation Status (Cognition)  oriented x 4  -KW     Follows Commands (Cognition)  WFL  -KW     Cognitive Function (Cognitive)  WFL  -KW     Personal Safety Interventions  fall prevention program maintained;gait belt;nonskid shoes/slippers when out of bed;supervised activity  -KW     Row Name 11/19/18 1504          Safety Issues, Functional Mobility    Impairments Affecting Function (Mobility)  balance;endurance/activity tolerance;shortness of breath  -KW     Row Name 11/19/18 1504          Bed Mobility Assessment/Treatment    Bed Mobility Assessment/Treatment  sit-supine;supine-sit  -KW     Supine-Sit [place] (Bed Mobility)  supervision  -KW     Sit-Supine [place] (Bed Mobility)  supervision  -KW     Assistive Device (Bed Mobility)  bed rails;head of bed elevated  -KW     Row Name 11/19/18 1504          Transfer Assessment/Treatment    Transfer Assessment/Treatment  sit-stand transfer;stand-sit transfer  -KW     Sit-Stand [place] (Transfers)  contact guard  -KW     Stand-Sit [place] (Transfers)  contact guard  -KW     Row Name 11/19/18 1504          Gait/Stairs Assessment/Training    Gait/Stairs Assessment/Training  gait/ambulation independence;distance ambulated;gait pattern;gait deviations  -KW     [place] Level (Gait)  contact guard  -KW     Distance in Feet (Gait)  100  -KW     Pattern (Gait)  step-through  -KW     Deviations/Abnormal Patterns (Gait)  gait speed decreased gait speed  further decreased as pt fatigued  -KW     Row Name 11/19/18 1504          General ROM    GENERAL ROM COMMENTS  BLE AROM WFL  -KW     Row Name 11/19/18 1504          MMT (Manual Muscle Testing)    General MMT Comments  BLE grossly 4+/5  -KW     Row Name 11/19/18 1504          Sensory Assessment/Intervention    Sensory General Assessment  no sensation deficits identified BLE light touch assessed   -KW     Row Name 11/19/18 1504          Pain Assessment    Additional Documentation  Pain Scale: Numbers Pre/Post-Treatment (Group)  -KW     Row Name 11/19/18 1504          Pain Scale: Numbers Pre/Post-Treatment    Pain Scale: Numbers, Pretreatment  0/10 - no pain  -KW     Pain Scale: Numbers, Post-Treatment  0/10 - no pain  -KW     Row Name 11/19/18 1504          Plan of Care Review    Plan of Care Reviewed With  patient  -KW     Row Name 11/19/18 1504          Physical Therapy Clinical Impression    Date of Referral to PT  11/19/18  -KW     PT Diagnosis (PT Clinical Impression)  Impaired functional mobility, balance, gait, and endurance  -KW     Prognosis (PT Clinical Impression)  good  -KW     Patient/Family Goals Statement (PT Clinical Impression)  return home  -KW     Criteria for Skilled Interventions Met (PT Clinical Impression)  yes;treatment indicated  -KW     Pathology/Pathophysiology Noted (Describe Specifically for Each System)  musculoskeletal;cardiovascular;pulmonary  -KW     Impairments Found (describe specific impairments)  aerobic capacity/endurance;ventilation and respiration/gas exchange;gait, locomotion, and balance  -KW     Functional Limitations in Following Categories (Describe Specific Limitations)  self-care;home management;community/leisure  -KW     Disability: Inability to Perform Actions/Activities of Required Roles (describe specific disability)  community/leisure  -KW     Rehab Potential (PT Clinical Summary)  good, to achieve stated therapy goals  -KW     Predicted Duration of Therapy (PT)   until goals met or d/c from acute care  -KW     Care Plan Review (PT)  evaluation/treatment results reviewed;care plan/treatment goals reviewed;current/potential barriers reviewed;patient/other agree to care plan  -KW     Row Name 11/19/18 1504          Vital Signs    Pre Systolic BP Rehab  132  -KW     Pre Treatment Diastolic BP  65  -KW     Post Systolic BP Rehab  136  -KW     Post Treatment Diastolic BP  70  -KW     Pretreatment Heart Rate (beats/min)  82  -KW     Intratreatment Heart Rate (beats/min)  100  -KW     Posttreatment Heart Rate (beats/min)  91  -KW     Pre SpO2 (%)  90  -KW     O2 Delivery Pre Treatment  supplemental O2  -KW     Intra SpO2 (%)  86  -KW     O2 Delivery Intra Treatment  supplemental O2  -KW     Post SpO2 (%)  90  -KW     O2 Delivery Post Treatment  supplemental O2  -KW     Pre Patient Position  Supine  -KW     Intra Patient Position  Standing  -KW     Post Patient Position  Supine  -KW     Row Name 11/19/18 1504          Physical Therapy Goals    Bed Mobility Goal Selection (PT)  bed mobility, PT goal 1  -KW     Transfer Goal Selection (PT)  transfer, PT goal 1  -KW     Gait Training Goal Selection (PT)  gait training, PT goal 1  -KW     Stairs Goal Selection (PT)  stairs, PT goal 1  -KW     Additional Documentation  Stairs Goal Selection (PT) (Row)  -KW     Row Name 11/19/18 1504          Bed Mobility Goal 1 (PT)    Activity/Assistive Device (Bed Mobility Goal 1, PT)  rolling to left;rolling to right;sit to supine;scooting;supine to sit  -KW     [place] Level/Cues Needed (Bed Mobility Goal 1, PT)  independent  -KW     Time Frame (Bed Mobility Goal 1, PT)  2 days  -KW     Progress/Outcomes (Bed Mobility Goal 1, PT)  goal not met  -KW     Row Name 11/19/18 1504          Transfer Goal 1 (PT)    Activity/Assistive Device (Transfer Goal 1, PT)  sit-to-stand/stand-to-sit;bed-to-chair/chair-to-bed  -KW     [place] Level/Cues Needed (Transfer Goal 1, PT)  conditional independence   -KW     Time Frame (Transfer Goal 1, PT)  3 days  -KW     Progress/Outcome (Transfer Goal 1, PT)  goal not met  -KW     Row Name 11/19/18 1504          Gait Training Goal 1 (PT)    Activity/Assistive Device (Gait Training Goal 1, PT)  gait (walking locomotion);increase endurance/gait distance  -KW     [place] Level (Gait Training Goal 1, PT)  conditional independence  -KW     Distance (Gait Goal 1, PT)  150 ft or more with SpO2 >90  -KW     Time Frame (Gait Training Goal 1, PT)  by discharge  -KW     Progress/Outcome (Gait Training Goal 1, PT)  goal not met  -KW     Row Name 11/19/18 1504          Stairs Goal 1 (PT)    Activity/Assistive Device (Stairs Goal 1, PT)  ascending stairs;descending stairs  -KW     [place] Level/Cues Needed (Stairs Goal 1, PT)  conditional independence  -KW     Number of Stairs (Stairs Goal 1, PT)  4  -KW     Time Frame (Stairs Goal 1, PT)  by discharge  -KW     Progress/Outcome (Stairs Goal 1, PT)  goal not met  -KW     Row Name 11/19/18 1504          Positioning and Restraints    Pre-Treatment Position  in bed  -KW     Post Treatment Position  bed  -KW     In Bed  supine;call light within reach;encouraged to call for assist;side rails up x2  -KW     Row Name 11/19/18 1504          Living Environment    Home Accessibility  tub/shower is not walk in;stairs to enter home  -KW       User Key  (r) = Recorded By, (t) = Taken By, (c) = Cosigned By    Initials Name Provider Type    Joan Corley PT Physical Therapist        Physical Therapy Education     Title: PT OT SLP Therapies (Active)     Topic: Physical Therapy (Active)     Point: Mobility training (Done)     Learning Progress Summary           Patient Acceptance, E, VU by SAUD at 11/19/2018  3:46 PM    Comment:  Role of PT, POC, use of gait belt                   Point: Precautions (Done)     Learning Progress Summary           Patient Acceptance, E, VU by SAUD at 11/19/2018  3:46 PM    Comment:  Role of PT, POC, use of gait  belt                               User Key     Initials Effective Dates Name Provider Type Discipline    KW 07/23/18 -  Joan Dueñas, PT Physical Therapist PT              PT Recommendation and Plan  Anticipated Discharge Disposition (PT): home with assist  Planned Therapy Interventions (PT Eval): balance training, bed mobility training, gait training, patient/family education, ROM (range of motion), stair training, strengthening, stretching, transfer training  Therapy Frequency (PT Clinical Impression): other (see comments)(5-7x/week)  Outcome Summary/Treatment Plan (PT)  Anticipated Discharge Disposition (PT): home with assist  Plan of Care Reviewed With: patient  Outcome Summary: PT evaluation completed as co-eval with OT this date. Pt able to perform sit<>supine with supervision and sit<>stand with CGA. Pt able to ambulate 100ft with CGA but became fatigued and SOB with ambulation. Pt's SpO2 decreasing to 86% with ambulation on 3L supplemental O2. SpO2 recovered to 90% with 2-3 mins PLB and seated rest break. Pt would benefit from further skilled PT to increase endurance, strength, functional mobility, and to achieve the highest level of independence. Upon d/c from acute care, recommend home with assist as needed.   Outcome Measures     Row Name 11/19/18 1504             How much help from another person do you currently need...    Turning from your back to your side while in flat bed without using bedrails?  4  -KW      Moving from lying on back to sitting on the side of a flat bed without bedrails?  4  -KW      Moving to and from a bed to a chair (including a wheelchair)?  4  -KW      Standing up from a chair using your arms (e.g., wheelchair, bedside chair)?  4  -KW      Climbing 3-5 steps with a railing?  3  -KW      To walk in hospital room?  3  -KW      AM-PAC 6 Clicks Score  22  -KW         Functional Assessment    Outcome Measure Options  AM-PAC 6 Clicks Basic Mobility (PT)  -KW        User Key  (r) =  Recorded By, (t) = Taken By, (c) = Cosigned By    Initials Name Provider Type    Joan Corley, PT Physical Therapist         Time Calculation:   PT Charges     Row Name 11/19/18 1550             Time Calculation    Start Time  1504  -KW      Stop Time  1519  -KW      Time Calculation (min)  15 min  -KW      PT Received On  11/19/18  -KW      PT Goal Re-Cert Due Date  12/02/18  -KW        User Key  (r) = Recorded By, (t) = Taken By, (c) = Cosigned By    Initials Name Provider Type    Joan Corley PT Physical Therapist        Therapy Suggested Charges     Code   Minutes Charges    None           Therapy Charges for Today     Code Description Service Date Service Provider Modifiers Qty    24296813902 HC PT MOBILITY CURRENT 11/19/2018 Joan Dueñas, PT GP, CJ 1    97782571885 HC PT MOBILITY PROJECTED 11/19/2018 Joan Dueñas, PT GP, CI 1    48841521353 HC PT EVAL MOD COMPLEXITY 1 11/19/2018 Joan Dueñas, PT GP 1          PT G-Codes  PT Professional Judgement Used?: Yes  Outcome Measure Options: AM-PAC 6 Clicks Basic Mobility (PT)  AM-PAC 6 Clicks Score: 22  Functional Limitation: Mobility: Walking and moving around  Mobility: Walking and Moving Around Current Status (): At least 20 percent but less than 40 percent impaired, limited or restricted  Mobility: Walking and Moving Around Goal Status (): At least 1 percent but less than 20 percent impaired, limited or restricted      Joan Dueñas PT  11/19/2018

## 2018-11-19 NOTE — PROGRESS NOTES
Cardiology Progress Note     LOS: 4 days   Patient Care Team:  Provider, Madeleine Known as PCP - Tiffanie Ivan APRN as PCP - Family Medicine (Family Medicine)    Subjective:      Chart reviewed. Patient seen and examined. Patient denies any chest pain, or palpitation.  Patient has had symptoms of shortness of breath with ambulation with the drop in the oxygen saturation to less than 90% with minimal activity.  Patient has an elevated white blood cell count.  Patient is currently on Rocephin.  Due to the patient previous history of chronic obstructive lung disease patient metoprolol would be changed to labetalol for blood pressure and heart rate control.  Patient Lasix would be increased to 40 mg daily.  Patient had not complained of having any symptoms of substernal chest pain.    Objective:  Temp:  [97.2 °F (36.2 °C)-98.2 °F (36.8 °C)] 97.2 °F (36.2 °C)  Heart Rate:  [77-89] 77  Resp:  [18-20] 20  BP: (109-138)/(55-90) 138/67    Intake/Output Summary (Last 24 hours) at 11/19/2018 1219  Last data filed at 11/19/2018 0800  Gross per 24 hour   Intake 1700 ml   Output 300 ml   Net 1400 ml       Physical Exam:   General Appearance:    Alert, oriented, cooperative, in no acute distress.   Head:    Normocephalic, atraumatic, without obvious abnormality   Eyes:              ARI. Lids and lashes normal, conjunctivae and sclerae normal, no icterus, no pallor.   Ears:    Ears appear intact with no abnormalities noted.   Throat:   Mucous membranes pink and moist.   Neck:   Supple, trachea midline, no carotid bruit, no organomegaly or JVD.   Lungs:   Decrease air entry bilaterally with scattered wheezingmore on the right side with a few basilar rales     Heart:    Regular rhythm and normal rate, normal S1 and S2, no      murmur, no gallop, no rub, no click.   Abdomen:     Soft, non-tender, non-distended, no guarding, no rebound tenderness. Normal bowel sounds in all four quadrants, no masses, liver and spleen  nonpalpable.    Genitalia:    Deferred.   Extremities:   Moves all extremities well, no edema, no cyanosis, no       redness, no clubbing.   Pulses:   Pulses palpable and equal bilaterally.   Skin:   Moist and warm. No bleeding, bruising or rash.   Neurologic/Psychiatric:   Alert and oriented to person, place, and time.  Motor, power and tone in upper and lower extremities are grossly intact.  No focal neurological deficits. Normal cognitive function. No psychomotor reaction or tangential thought. No depression, homicidal ideations and suicidal ideations.          Results Review:    Results from last 7 days   Lab Units  11/19/18   0601   SODIUM mmol/L  138   POTASSIUM mmol/L  3.8   CHLORIDE mmol/L  99   CO2 mmol/L  30.0   BUN mg/dL  12   CREATININE mg/dL  0.61   CALCIUM mg/dL  8.9   BILIRUBIN mg/dL  0.6   ALK PHOS U/L  75   ALT (SGPT) U/L  47   AST (SGOT) U/L  55*   GLUCOSE mg/dL  166*     Results from last 7 days   Lab Units  11/16/18   0333  11/15/18   1624  11/15/18   1358  11/15/18   1041   CK TOTAL U/L   --    --    --   3,625*   TROPONIN I ng/mL  39.900*  66.100*  78.500*  69.800*         Results from last 7 days   Lab Units  11/19/18   0601   WBC 10*3/mm3  20.14*   HEMOGLOBIN g/dL  11.8*   HEMATOCRIT %  35.1   PLATELETS 10*3/mm3  231     Results from last 7 days   Lab Units  11/15/18   0214   INR   0.92         Results from last 7 days   Lab Units  11/16/18   0333   CHOLESTEROL mg/dL  137   TRIGLYCERIDES mg/dL  191   HDL CHOL mg/dL  33*   LDL CHOL mg/dL  89               ECHO:  Results for orders placed during the hospital encounter of 11/15/18   Adult Transthoracic Echo Complete W/ Cont if Necessary Per Protocol    Narrative · Left ventricular wall thickness is consistent with borderline concentric   hypertrophy.  · Mild mitral valve regurgitation is present  · Mild tricuspid valve regurgitation is present.          ECG 12 Lead   Final Result   Test Reason : Paroxysmal atrial fibrillation   Blood Pressure :  **/** mmHG   Vent. Rate : 097 BPM     Atrial Rate : 097 BPM      P-R Int : 130 ms          QRS Dur : 086 ms       QT Int : 354 ms       P-R-T Axes : 088 -04 094 degrees      QTc Int : 449 ms      Normal sinus rhythm   Abnormal QRS-T angle, consider primary T wave abnormality   Abnormal ECG   When compared with ECG of 15-NOV-2018 02:46,   Vent. rate has increased BY  44 BPM   ST no longer depressed in Anterior leads   Nonspecific T wave abnormality now evident in Lateral leads   Confirmed by RANI DE ANDA ([id number]) on 11/17/2018 4:09:19 PM      Referred By:             Confirmed By:RANI DE ANDA      SCANNED EKG   Final Result      SCANNED EKG   Final Result      SCANNED EKG   Final Result      ECG 12 Lead   Final Result   Test Reason : CP   Blood Pressure : **/** mmHG   Vent. Rate : 053 BPM     Atrial Rate : 053 BPM      P-R Int : 080 ms          QRS Dur : 088 ms       QT Int : 468 ms       P-R-T Axes : 068 041 066 degrees      QTc Int : 439 ms      Sinus bradycardia with short [place]   Marked ST abnormality, possible septal subendocardial injury   Posterior myocardial infarction   ** ** ACUTE MI ** **   Abnormal ECG      Confirmed by NEVAEH COWAN MD ([id number]) on 11/15/2018 8:21:50 AM      Referred By:             Confirmed By:NEVAEH COWAN MD      ECG 12 Lead   ED Interpretation   Reese Greer MD     11/15/2018  2:53 AM   ECG 12 Lead      Date/Time: 11/15/2018 2:01 AM   Performed by: Reese Greer MD   Authorized by: Reese Greer MD    Interpreted by physician   Comments: Atrial fibrillation with rapid ventricular response rate of 136.     There is ST depression V1 through V4 and ST elevation in V5 and V6 which    appears to be consistent with acute postero-lateral STEMI         Final Result   Test Reason : chest pain   Blood Pressure : **/** mmHG   Vent. Rate : 136 BPM     Atrial Rate : 147 BPM      P-R Int : 000 ms          QRS Dur : 086 ms       QT Int : 280 ms       P-R-T Axes : 000 026 069 degrees       QTc Int : 421 ms      Atrial fibrillation with rapid ventricular response   ST elevation, consider lateral injury or acute infarct   Posterior myocardial injury   ** ** ACUTE MI ** **   Abnormal ECG      Confirmed by NEVAEH COWAN MD ([id number]) on 11/15/2018 8:21:25 AM      Referred By:             Confirmed By:NEVAEH COWAN MD           Medication Review:   Current Facility-Administered Medications   Medication Dose Route Frequency Provider Last Rate Last Dose   • amiodarone (PACERONE) tablet 200 mg  200 mg Oral Q12H Thaddeus Huber MD   200 mg at 11/19/18 0840   • aspirin chewable tablet 81 mg  81 mg Oral Daily Thaddeus Huber MD   81 mg at 11/19/18 0840   • atorvastatin (LIPITOR) tablet 40 mg  40 mg Oral Nightly Thaddeus Huber MD   40 mg at 11/18/18 2035   • budesonide (PULMICORT) nebulizer solution 0.5 mg  0.5 mg Nebulization BID - RT Sanjeev Betancourt MD   0.5 mg at 11/19/18 0735   • cefTRIAXone (ROCEPHIN) 1 g/100 mL 0.9% NS (MBP)  1 g Intravenous Q24H Sanjeev Betancourt MD   1 g at 11/18/18 1345   • chlordiazePOXIDE (LIBRIUM) capsule 5 mg  5 mg Oral Q8H Sanjeev Betancourt MD   5 mg at 11/19/18 0549   • enoxaparin (LOVENOX) syringe 40 mg  40 mg Subcutaneous Nightly Danyel Jacobs DO   40 mg at 11/18/18 2035   • furosemide (LASIX) injection 20 mg  20 mg Intravenous Daily Sanjeev Betancourt MD   20 mg at 11/19/18 0840   • ipratropium-albuterol (DUO-NEB) nebulizer solution 3 mL  3 mL Nebulization 4x Daily - RT Sanjeev Betancourt MD   3 mL at 11/19/18 1106   • losartan (COZAAR) half tablet 12.5 mg  12.5 mg Oral Daily Thaddeus Huber MD   12.5 mg at 11/19/18 0840   • methylPREDNISolone sodium succinate (SOLU-Medrol) injection 60 mg  60 mg Intravenous Q8H Sanjeev Betancourt MD   60 mg at 11/19/18 0549   • metoprolol tartrate (LOPRESSOR) tablet 25 mg  25 mg Oral Q12H Thaddeus Huber MD   25 mg at 11/19/18 0840   • montelukast (SINGULAIR) tablet 10 mg  10 mg Oral Nightly Sanjeev Betancourt MD   10 mg at  11/18/18 2035   • nitroglycerin (TRIDIL) 200-5 MCG/ML-% infusion  - ADS Override Pull            • ticagrelor (BRILINTA) tablet 90 mg  90 mg Oral BID Thaddeus Huber MD   90 mg at 11/19/18 0840       Assessment and Plan:      Acute ST elevation myocardial infarction (STEMI) (CMS/[facility])    Chronic bronchitis with acute exacerbation (CMS/[facility])    Hypertension    Anxiety    CAD (coronary artery disease)    Acute on chronic respiratory failure with hypoxia (CMS/[facility])    Depression    Chronic alcohol abuse  1.  Atherosclerotic coronary artery disease.  Status post aborted posterior wall myocardial infarction.  Patient underwent rescue PTCA and Pronto thrombectomy of the 100% occluded circumflex artery.  Patient has a left anterior descending artery stenosis which was not intervened.  Patient currently is not having any symptoms of substernal chest pain suggestive of angina.  Patient at the present time has been recommended to undergo stage PTCA of the left anterior descending artery.  Patient would be continued on dual antiplatelet aspirin and Brilinta.  2.  Shortness of breath.  Patient does have history of chronic obstructive lung disease.  Due to the patient history of bilateral knee swelling at the present time would change metoprolol to labetalol.  Patient has a few basilar rales.  Patient Lasix would be increased to 40 mg daily.  Patient does require oxygen with ambulation.  Would follow oxygen saturation.  3.  Arterial hypertension.  Patient blood pressure has remained stable.  Patient would be continued on the present dose of the losartan and will follow the blood pressure on labetalol.   4.  Paroxysmal atrial fibrillation.  Patient is currently in normal sinus rhythm.  Patient clearly had atrial fibrillation on admission.  Patient is not a good candidate for long-term amiodarone with her history of chronic obstructive lung disease and tobacco abuse.  Would decrease the amiodarone 200 mg twice a day.  Should the  patient have recurrence of atrial fibrillation patient would be evaluated for anticoagulation.  5.  Chronic obstructive lung disease with tobacco abuse.  Patient is currently on Rocephin and inhale.  6.  Alcohol abuse.  Patient is currently on Librium 5 mg every 8 hours.    The above plan of management were discussed with the patient.            Thaddeus Huber MD  11/19/18  12:19 PM      Time: Time spent on face-to-face interaction 20 minutes      Dictated utilizing Dragon dictation.

## 2018-11-19 NOTE — PLAN OF CARE
Problem: Patient Care Overview  Goal: Plan of Care Review  Outcome: Ongoing (interventions implemented as appropriate)   11/19/18 4027   Coping/Psychosocial   Plan of Care Reviewed With patient   Plan of Care Review   Progress no change   OTHER   Outcome Summary pt has trouble breathing especially when out of bed; she desat to 70% on room air with ambulation to just the bathroom       Problem: Skin Injury Risk (Adult)  Goal: Identify Related Risk Factors and Signs and Symptoms  Outcome: Ongoing (interventions implemented as appropriate)      Problem: Cardiac: ACS (Acute Coronary Syndrome) (Adult)  Goal: Signs and Symptoms of Listed Potential Problems Will be Absent, Minimized or Managed (Cardiac: ACS)  Outcome: Ongoing (interventions implemented as appropriate)      Problem: Fall Risk (Adult)  Goal: Identify Related Risk Factors and Signs and Symptoms  Outcome: Outcome(s) achieved Date Met: 11/19/18    Goal: Absence of Fall  Outcome: Ongoing (interventions implemented as appropriate)      Problem: Chronic Obstructive Pulmonary Disease (Adult)  Goal: Signs and Symptoms of Listed Potential Problems Will be Absent, Minimized or Managed (Chronic Obstructive Pulmonary Disease)  Outcome: Ongoing (interventions implemented as appropriate)

## 2018-11-19 NOTE — PROGRESS NOTES
Progress Note  Alex Lomax MD  Hospitalist    Date of visit: 11/19/2018     LOS: 4 days   Patient Care Team:  Provider, No Known as PCP - Tiffanie Ivan APRN as PCP - Family Medicine (Family Medicine)    Chief Complaint: dyspnea    Subjective     Interval History:     Patient Complaints: chest pain / dyspnea improved. She remains hypoxic - at times as low as 70% on room air.    History taken from: patient / nursing    Medication Review:   Current Facility-Administered Medications   Medication Dose Route Frequency Provider Last Rate Last Dose   • amiodarone (PACERONE) half tablet 100 mg  100 mg Oral Q12H Thaddeus Huber MD       • aspirin chewable tablet 81 mg  81 mg Oral Daily Thaddeus Huber MD   81 mg at 11/19/18 0840   • atorvastatin (LIPITOR) tablet 40 mg  40 mg Oral Nightly Thaddeus Huber MD   40 mg at 11/18/18 2035   • budesonide (PULMICORT) nebulizer solution 0.5 mg  0.5 mg Nebulization BID - RT Sanjeev Betancourt MD   0.5 mg at 11/19/18 0735   • cefTRIAXone (ROCEPHIN) 1 g/100 mL 0.9% NS (MBP)  1 g Intravenous Q24H Sanjeev Betancourt MD   1 g at 11/19/18 1415   • chlordiazePOXIDE (LIBRIUM) capsule 5 mg  5 mg Oral Q8H Sanjeev Betancourt MD   5 mg at 11/19/18 1413   • enoxaparin (LOVENOX) syringe 40 mg  40 mg Subcutaneous Nightly Danyel Jacobs DO   40 mg at 11/18/18 2035   • [START ON 11/20/2018] furosemide (LASIX) injection 40 mg  40 mg Intravenous Daily Thaddeus Huber MD       • ipratropium-albuterol (DUO-NEB) nebulizer solution 3 mL  3 mL Nebulization 4x Daily - RT Sanjeev Betancourt MD   3 mL at 11/19/18 1428   • labetalol (NORMODYNE) tablet 50 mg  50 mg Oral Q12H Thaddeus Huber MD       • losartan (COZAAR) half tablet 12.5 mg  12.5 mg Oral Daily Thaddeus Huber MD   12.5 mg at 11/19/18 0840   • methylPREDNISolone sodium succinate (SOLU-Medrol) injection 60 mg  60 mg Intravenous Q8H Sanjeev Betancourt MD   60 mg at 11/19/18 1408   • montelukast (SINGULAIR) tablet 10 mg  10 mg Oral  Nightly Sanjeev Betancourt MD   10 mg at 11/18/18 2035   • nitroglycerin (TRIDIL) 200-5 MCG/ML-% infusion  - ADS Override Pull            • ticagrelor (BRILINTA) tablet 90 mg  90 mg Oral BID Thaddeus Huber MD   90 mg at 11/19/18 0840       Review of Systems:   Review of Systems   Constitutional: Positive for fatigue. Negative for fever.   Respiratory: Positive for cough and shortness of breath. Negative for wheezing.    Cardiovascular: Negative for chest pain, palpitations and leg swelling.   Gastrointestinal: Negative for abdominal distention, abdominal pain and constipation.   Genitourinary: Negative for dysuria, frequency, hematuria and urgency.   Musculoskeletal: Negative for arthralgias, back pain and gait problem.   Skin: Positive for pallor.   Neurological: Positive for weakness. Negative for syncope, facial asymmetry and light-headedness.   Psychiatric/Behavioral: Positive for dysphoric mood. Negative for agitation, behavioral problems and confusion. The patient is nervous/anxious.    All other systems reviewed and are negative.      Objective     Vital Signs  Temp:  [97.2 °F (36.2 °C)-98.3 °F (36.8 °C)] 98 °F (36.7 °C)  Heart Rate:  [68-89] 84  Resp:  [18-20] 18  BP: (121-138)/(65-90) 132/65    Physical Exam:  Physical Exam   Constitutional: She is oriented to person, place, and time. She appears cachectic. No distress.   HENT:   Head: Normocephalic and atraumatic.   Eyes: EOM are normal. Pupils are equal, round, and reactive to light. No scleral icterus.   Neck: Normal range of motion. Neck supple.   Cardiovascular: Normal rate and regular rhythm.   Pulmonary/Chest: Effort normal. No respiratory distress. She has wheezes.   Abdominal: Soft. Bowel sounds are normal. She exhibits no distension. There is no tenderness. There is no guarding.   Musculoskeletal: She exhibits no edema.   Neurological: She is alert and oriented to person, place, and time. No cranial nerve deficit. Coordination normal.   Skin:  Skin is warm and dry. No rash noted. There is pallor.   Psychiatric: She has a normal mood and affect. Her behavior is normal.   Vitals reviewed.       Results Review:    Lab Results (last 24 hours)     Procedure Component Value Units Date/Time    Comprehensive Metabolic Panel [[id number]]  (Abnormal) Collected:  11/19/18 0601    Specimen:  Blood Updated:  11/19/18 0647     Glucose 166 mg/dL      BUN 12 mg/dL      Creatinine 0.61 mg/dL      Sodium 138 mmol/L      Potassium 3.8 mmol/L      Chloride 99 mmol/L      CO2 30.0 mmol/L      Calcium 8.9 mg/dL      Total Protein 6.2 g/dL      Albumin 3.60 g/dL      ALT (SGPT) 47 U/L      AST (SGOT) 55 U/L      Alkaline Phosphatase 75 U/L      Total Bilirubin 0.6 mg/dL      eGFR Non African Amer 100 mL/min/1.73      Globulin 2.6 gm/dL      A/G Ratio 1.4 g/dL      BUN/Creatinine Ratio 19.7     Anion Gap 9.0 mmol/L     CBC & Differential [[id number]] Collected:  11/19/18 0601    Specimen:  Blood Updated:  11/19/18 0636    Narrative:       The following orders were created for panel order CBC & Differential.  Procedure                               Abnormality         Status                     ---------                               -----------         ------                     CBC Auto Differential[[id number]]        Abnormal            Final result                 Please view results for these tests on the individual orders.    CBC Auto Differential [[id number]]  (Abnormal) Collected:  11/19/18 0601    Specimen:  Blood Updated:  11/19/18 0636     WBC 20.14 10*3/mm3      RBC 3.84 10*6/mm3      Hemoglobin 11.8 g/dL      Hematocrit 35.1 %      MCV 91.4 fL      MCH 30.7 pg      MCHC 33.6 g/dL      RDW 13.9 %      RDW-SD 45.8 fl      MPV 9.0 fL      Platelets 231 10*3/mm3      Neutrophil % 93.7 %      Lymphocyte % 2.0 %      Monocyte % 4.0 %      Eosinophil % 0.0 %      Basophil % 0.1 %      Immature Grans % 0.2 %      Neutrophils, Absolute 18.87 10*3/mm3      Lymphocytes, Absolute  0.40 10*3/mm3      Monocytes, Absolute 0.80 10*3/mm3      Eosinophils, Absolute 0.00 10*3/mm3      Basophils, Absolute 0.02 10*3/mm3      Immature Grans, Absolute 0.05 10*3/mm3      nRBC 0.0 /100 WBC           Imaging Results (last 24 hours)     Procedure Component Value Units Date/Time    XR Chest PA & Lateral [[id number]] Collected:  11/19/18 1330     Updated:  11/19/18 1352    Narrative:           PROCEDURE: Chest PA and lateral    REASON FOR EXAM: hypoxia, I21.3 ST elevation (STEMI) myocardial  infarction of unspecified site I21.29 ST elevation (STEMI)  myocardial infarction involving other sites I10 Essential  (primary) hypertension    FINDINGS: Comparison study dated November 17, 2018. Cardiac size  normal. Bilateral perihilar haziness as well as bilateral  perihilar and bibasilar interstitial opacities. Lungs are  otherwise clear. Small bilateral pleural effusions.. No acute  osseous abnormality.      Impression:       1.  Bilateral perihilar as well as bibasilar interstitial  opacities suspicious for pulmonary edema and/or pneumonia.  2.  Small bilateral pleural effusions.    Electronically signed by:  Cale Burger MD  11/19/2018 1:51 PM CST  Workstation: [id number]          Assessment/Plan       Acute ST elevation myocardial infarction (STEMI) (CMS/HCC)    CAD (coronary artery disease)    Chronic bronchitis with acute exacerbation (CMS/HCC)    Acute on chronic respiratory failure with hypoxia (CMS/HCC)    Depression    Hypertension    Anxiety    Chronic alcohol abuse    Improved after the coronary angioplasty with stenting but she remains hypoxic. Continue with the diuresis, nebulized treatments, supplemental Oxygen.    She has been seen by the psychiatrist as well.    Alex Lomax MD  11/19/18  5:48 PM

## 2018-11-19 NOTE — PLAN OF CARE
Problem: Patient Care Overview  Goal: Plan of Care Review  Outcome: Ongoing (interventions implemented as appropriate)   11/19/18 8394   Coping/Psychosocial   Plan of Care Reviewed With patient   OTHER   Outcome Summary PT evaluation completed as co-eval with OT this date. Pt able to perform sit<>supine with supervision and sit<>stand with CGA. Pt able to ambulate 100ft with CGA but became fatigued and SOB with ambulation. Pt's SpO2 decreasing to 86% with ambulation on 3L supplemental O2. SpO2 recovered to 90% with 2-3 mins PLB and seated rest break. Pt would benefit from further skilled PT to increase endurance, strength, functional mobility, and to achieve the highest level of independence. Upon d/c from acute care, recommend home with assist as needed.

## 2018-11-20 ENCOUNTER — APPOINTMENT (OUTPATIENT)
Dept: CT IMAGING | Facility: HOSPITAL | Age: 63
End: 2018-11-20

## 2018-11-20 PROBLEM — J18.9 PNEUMONIA OF BOTH UPPER LOBES: Status: ACTIVE | Noted: 2018-11-20

## 2018-11-20 LAB
ANION GAP SERPL CALCULATED.3IONS-SCNC: 8 MMOL/L (ref 5–15)
ARTERIAL PATENCY WRIST A: ABNORMAL
ATMOSPHERIC PRESS: 756 MMHG
BASE EXCESS BLDA CALC-SCNC: 9.7 MMOL/L (ref 0–2)
BASOPHILS # BLD AUTO: 0 10*3/MM3 (ref 0–0.2)
BASOPHILS NFR BLD AUTO: 0 % (ref 0–2)
BDY SITE: ABNORMAL
BUN BLD-MCNC: 20 MG/DL (ref 7–21)
BUN/CREAT SERPL: 30.8 (ref 7–25)
CALCIUM SPEC-SCNC: 8.8 MG/DL (ref 8.4–10.2)
CHLORIDE SERPL-SCNC: 96 MMOL/L (ref 95–110)
CK MB SERPL-CCNC: 2.03 NG/ML (ref 0–5)
CK SERPL-CCNC: 179 U/L (ref 30–135)
CO2 SERPL-SCNC: 34 MMOL/L (ref 22–31)
CREAT BLD-MCNC: 0.65 MG/DL (ref 0.5–1)
DEPRECATED RDW RBC AUTO: 45.3 FL (ref 36.4–46.3)
EOSINOPHIL # BLD AUTO: 0 10*3/MM3 (ref 0–0.7)
EOSINOPHIL NFR BLD AUTO: 0 % (ref 0–7)
ERYTHROCYTE [DISTWIDTH] IN BLOOD BY AUTOMATED COUNT: 13.8 % (ref 11.5–14.5)
GAS FLOW AIRWAY: 5 LPM
GFR SERPL CREATININE-BSD FRML MDRD: 93 ML/MIN/1.73 (ref 45–104)
GLUCOSE BLD-MCNC: 159 MG/DL (ref 60–100)
HCO3 BLDA-SCNC: 33.3 MMOL/L (ref 20–26)
HCT VFR BLD AUTO: 34.5 % (ref 35–45)
HGB BLD-MCNC: 11.6 G/DL (ref 12–15.5)
IMM GRANULOCYTES # BLD: 0.07 10*3/MM3 (ref 0–0.02)
IMM GRANULOCYTES NFR BLD: 0.5 % (ref 0–0.5)
LYMPHOCYTES # BLD AUTO: 0.66 10*3/MM3 (ref 0.6–4.2)
LYMPHOCYTES NFR BLD AUTO: 4.3 % (ref 10–50)
Lab: ABNORMAL
MAGNESIUM SERPL-MCNC: 2.2 MG/DL (ref 1.6–2.3)
MCH RBC QN AUTO: 30.5 PG (ref 26.5–34)
MCHC RBC AUTO-ENTMCNC: 33.6 G/DL (ref 31.4–36)
MCV RBC AUTO: 90.8 FL (ref 80–98)
MODALITY: ABNORMAL
MONOCYTES # BLD AUTO: 0.5 10*3/MM3 (ref 0–0.9)
MONOCYTES NFR BLD AUTO: 3.3 % (ref 0–12)
NEUTROPHILS # BLD AUTO: 14.15 10*3/MM3 (ref 2–8.6)
NEUTROPHILS NFR BLD AUTO: 91.9 % (ref 37–80)
PCO2 BLDA: 40 MM HG (ref 35–45)
PH BLDA: 7.53 PH UNITS (ref 7.35–7.45)
PLATELET # BLD AUTO: 233 10*3/MM3 (ref 150–450)
PMV BLD AUTO: 9.5 FL (ref 8–12)
PO2 BLDA: 60.4 MM HG (ref 83–108)
POTASSIUM BLD-SCNC: 3.3 MMOL/L (ref 3.5–5.1)
POTASSIUM BLD-SCNC: 3.3 MMOL/L (ref 3.5–5.1)
RBC # BLD AUTO: 3.8 10*6/MM3 (ref 3.77–5.16)
SAO2 % BLDCOA: 92.4 % (ref 94–99)
SODIUM BLD-SCNC: 138 MMOL/L (ref 137–145)
TROPONIN I SERPL-MCNC: 2.4 NG/ML
TROPONIN I SERPL-MCNC: 2.44 NG/ML
TROPONIN I SERPL-MCNC: 2.48 NG/ML
VENTILATOR MODE: ABNORMAL
WBC NRBC COR # BLD: 15.38 10*3/MM3 (ref 3.2–9.8)

## 2018-11-20 PROCEDURE — 71275 CT ANGIOGRAPHY CHEST: CPT

## 2018-11-20 PROCEDURE — 84132 ASSAY OF SERUM POTASSIUM: CPT | Performed by: INTERNAL MEDICINE

## 2018-11-20 PROCEDURE — 94799 UNLISTED PULMONARY SVC/PX: CPT

## 2018-11-20 PROCEDURE — 82550 ASSAY OF CK (CPK): CPT | Performed by: INTERNAL MEDICINE

## 2018-11-20 PROCEDURE — 93010 ELECTROCARDIOGRAM REPORT: CPT | Performed by: INTERNAL MEDICINE

## 2018-11-20 PROCEDURE — 25010000002 METHYLPREDNISOLONE PER 125 MG: Performed by: INTERNAL MEDICINE

## 2018-11-20 PROCEDURE — 25010000002 FUROSEMIDE PER 20 MG: Performed by: INTERNAL MEDICINE

## 2018-11-20 PROCEDURE — 93005 ELECTROCARDIOGRAM TRACING: CPT | Performed by: FAMILY MEDICINE

## 2018-11-20 PROCEDURE — 25010000002 CEFTRIAXONE PER 250 MG: Performed by: FAMILY MEDICINE

## 2018-11-20 PROCEDURE — 83735 ASSAY OF MAGNESIUM: CPT | Performed by: INTERNAL MEDICINE

## 2018-11-20 PROCEDURE — 0 IOPAMIDOL PER 1 ML: Performed by: INTERNAL MEDICINE

## 2018-11-20 PROCEDURE — 36600 WITHDRAWAL OF ARTERIAL BLOOD: CPT

## 2018-11-20 PROCEDURE — 80048 BASIC METABOLIC PNL TOTAL CA: CPT | Performed by: INTERNAL MEDICINE

## 2018-11-20 PROCEDURE — 25010000002 METHYLPREDNISOLONE PER 125 MG: Performed by: FAMILY MEDICINE

## 2018-11-20 PROCEDURE — 84484 ASSAY OF TROPONIN QUANT: CPT | Performed by: INTERNAL MEDICINE

## 2018-11-20 PROCEDURE — 82803 BLOOD GASES ANY COMBINATION: CPT

## 2018-11-20 PROCEDURE — 94760 N-INVAS EAR/PLS OXIMETRY 1: CPT

## 2018-11-20 PROCEDURE — 25010000002 PIPERACILLIN SOD-TAZOBACTAM PER 1 G: Performed by: INTERNAL MEDICINE

## 2018-11-20 PROCEDURE — 25010000002 ENOXAPARIN PER 10 MG: Performed by: HOSPITALIST

## 2018-11-20 PROCEDURE — 85025 COMPLETE CBC W/AUTO DIFF WBC: CPT | Performed by: INTERNAL MEDICINE

## 2018-11-20 PROCEDURE — 82553 CREATINE MB FRACTION: CPT | Performed by: INTERNAL MEDICINE

## 2018-11-20 RX ORDER — METHYLPREDNISOLONE SODIUM SUCCINATE 125 MG/2ML
60 INJECTION, POWDER, LYOPHILIZED, FOR SOLUTION INTRAMUSCULAR; INTRAVENOUS EVERY 6 HOURS
Status: DISCONTINUED | OUTPATIENT
Start: 2018-11-20 | End: 2018-11-23 | Stop reason: HOSPADM

## 2018-11-20 RX ORDER — POTASSIUM CHLORIDE 1.5 G/1.77G
40 POWDER, FOR SOLUTION ORAL AS NEEDED
Status: DISCONTINUED | OUTPATIENT
Start: 2018-11-20 | End: 2018-11-23 | Stop reason: HOSPADM

## 2018-11-20 RX ORDER — CHLORDIAZEPOXIDE HYDROCHLORIDE 5 MG/1
5 CAPSULE, GELATIN COATED ORAL 4 TIMES DAILY PRN
Status: DISCONTINUED | OUTPATIENT
Start: 2018-11-20 | End: 2018-11-23 | Stop reason: HOSPADM

## 2018-11-20 RX ORDER — FUROSEMIDE 10 MG/ML
40 INJECTION INTRAMUSCULAR; INTRAVENOUS EVERY 12 HOURS
Status: DISCONTINUED | OUTPATIENT
Start: 2018-11-20 | End: 2018-11-23 | Stop reason: HOSPADM

## 2018-11-20 RX ORDER — ECHINACEA PURPUREA EXTRACT 125 MG
2 TABLET ORAL AS NEEDED
Status: DISCONTINUED | OUTPATIENT
Start: 2018-11-20 | End: 2018-11-23 | Stop reason: HOSPADM

## 2018-11-20 RX ORDER — MAGNESIUM SULFATE HEPTAHYDRATE 40 MG/ML
4 INJECTION, SOLUTION INTRAVENOUS AS NEEDED
Status: DISCONTINUED | OUTPATIENT
Start: 2018-11-20 | End: 2018-11-23 | Stop reason: HOSPADM

## 2018-11-20 RX ORDER — MAGNESIUM SULFATE HEPTAHYDRATE 40 MG/ML
2 INJECTION, SOLUTION INTRAVENOUS AS NEEDED
Status: DISCONTINUED | OUTPATIENT
Start: 2018-11-20 | End: 2018-11-23 | Stop reason: HOSPADM

## 2018-11-20 RX ORDER — POTASSIUM CHLORIDE 750 MG/1
40 CAPSULE, EXTENDED RELEASE ORAL AS NEEDED
Status: DISCONTINUED | OUTPATIENT
Start: 2018-11-20 | End: 2018-11-23 | Stop reason: HOSPADM

## 2018-11-20 RX ORDER — ZOLPIDEM TARTRATE 5 MG/1
5 TABLET ORAL NIGHTLY PRN
Status: DISCONTINUED | OUTPATIENT
Start: 2018-11-20 | End: 2018-11-23 | Stop reason: HOSPADM

## 2018-11-20 RX ADMIN — BUDESONIDE 0.5 MG: 0.5 INHALANT RESPIRATORY (INHALATION) at 07:00

## 2018-11-20 RX ADMIN — Medication 100 MG: at 22:01

## 2018-11-20 RX ADMIN — CHLORDIAZEPOXIDE HYDROCHLORIDE 5 MG: 5 CAPSULE ORAL at 06:28

## 2018-11-20 RX ADMIN — IPRATROPIUM BROMIDE AND ALBUTEROL SULFATE 3 ML: 2.5; .5 SOLUTION RESPIRATORY (INHALATION) at 06:52

## 2018-11-20 RX ADMIN — FUROSEMIDE 40 MG: 10 INJECTION, SOLUTION INTRAMUSCULAR; INTRAVENOUS at 08:42

## 2018-11-20 RX ADMIN — MONTELUKAST SODIUM 10 MG: 10 TABLET, FILM COATED ORAL at 22:01

## 2018-11-20 RX ADMIN — LABETALOL HYDROCHLORIDE 50 MG: 100 TABLET, FILM COATED ORAL at 22:01

## 2018-11-20 RX ADMIN — CEFTRIAXONE SODIUM 1 G: 1 INJECTION, POWDER, FOR SOLUTION INTRAMUSCULAR; INTRAVENOUS at 14:42

## 2018-11-20 RX ADMIN — ZOLPIDEM TARTRATE 5 MG: 5 TABLET ORAL at 23:05

## 2018-11-20 RX ADMIN — IPRATROPIUM BROMIDE AND ALBUTEROL SULFATE 3 ML: 2.5; .5 SOLUTION RESPIRATORY (INHALATION) at 20:09

## 2018-11-20 RX ADMIN — BUDESONIDE 0.5 MG: 0.5 INHALANT RESPIRATORY (INHALATION) at 20:09

## 2018-11-20 RX ADMIN — IPRATROPIUM BROMIDE AND ALBUTEROL SULFATE 3 ML: 2.5; .5 SOLUTION RESPIRATORY (INHALATION) at 11:17

## 2018-11-20 RX ADMIN — Medication 100 MG: at 08:40

## 2018-11-20 RX ADMIN — TICAGRELOR 90 MG: 90 TABLET ORAL at 08:40

## 2018-11-20 RX ADMIN — PIPERACILLIN SODIUM,TAZOBACTAM SODIUM 3.38 G: 3; .375 INJECTION, POWDER, FOR SOLUTION INTRAVENOUS at 16:20

## 2018-11-20 RX ADMIN — IOPAMIDOL 60 ML: 755 INJECTION, SOLUTION INTRAVENOUS at 14:07

## 2018-11-20 RX ADMIN — IPRATROPIUM BROMIDE AND ALBUTEROL SULFATE 3 ML: 2.5; .5 SOLUTION RESPIRATORY (INHALATION) at 14:51

## 2018-11-20 RX ADMIN — LABETALOL HYDROCHLORIDE 50 MG: 100 TABLET, FILM COATED ORAL at 08:40

## 2018-11-20 RX ADMIN — METHYLPREDNISOLONE SODIUM SUCCINATE 60 MG: 125 INJECTION, POWDER, FOR SOLUTION INTRAMUSCULAR; INTRAVENOUS at 06:28

## 2018-11-20 RX ADMIN — Medication 12.5 MG: at 08:40

## 2018-11-20 RX ADMIN — POTASSIUM CHLORIDE 40 MEQ: 1.5 POWDER, FOR SOLUTION ORAL at 16:21

## 2018-11-20 RX ADMIN — METHYLPREDNISOLONE SODIUM SUCCINATE 60 MG: 125 INJECTION, POWDER, FOR SOLUTION INTRAMUSCULAR; INTRAVENOUS at 22:00

## 2018-11-20 RX ADMIN — METHYLPREDNISOLONE SODIUM SUCCINATE 60 MG: 125 INJECTION, POWDER, FOR SOLUTION INTRAMUSCULAR; INTRAVENOUS at 14:42

## 2018-11-20 RX ADMIN — ENOXAPARIN SODIUM 40 MG: 40 INJECTION SUBCUTANEOUS at 22:00

## 2018-11-20 RX ADMIN — TICAGRELOR 90 MG: 90 TABLET ORAL at 22:01

## 2018-11-20 RX ADMIN — ASPIRIN 81 MG CHEWABLE TABLET 81 MG: 81 TABLET CHEWABLE at 08:40

## 2018-11-20 RX ADMIN — FUROSEMIDE 40 MG: 10 INJECTION, SOLUTION INTRAMUSCULAR; INTRAVENOUS at 22:00

## 2018-11-20 RX ADMIN — ATORVASTATIN CALCIUM 40 MG: 40 TABLET, FILM COATED ORAL at 22:01

## 2018-11-20 RX ADMIN — PIPERACILLIN SODIUM,TAZOBACTAM SODIUM 3.38 G: 3; .375 INJECTION, POWDER, FOR SOLUTION INTRAVENOUS at 23:05

## 2018-11-20 NOTE — SIGNIFICANT NOTE
11/20/18 0925   Rehab Treatment   Discipline physical therapy assistant   Reason Treatment Not Performed other (see comments)  (nsg defers tx this date, states pt's O2 dropped to 70's w/ bathroom t/di, pt to have CTA today, check w/ nsg prior to tx tomorrow)

## 2018-11-20 NOTE — PROGRESS NOTES
Progress Note  Alex Lomax MD  Hospitalist    Date of visit: 11/20/2018     LOS: 5 days   Patient Care Team:  Provider, No Known as PCP - Tiffanie Ivan APRN as PCP - Family Medicine (Family Medicine)    Chief Complaint: dyspnea    Subjective     Interval History:     Patient Complaints: chest pain / dyspnea improved. She remains hypoxic - at times as low as 70% on room air despite the current treatment.    History taken from: patient / nursing    Medication Review:   Current Facility-Administered Medications   Medication Dose Route Frequency Provider Last Rate Last Dose   • amiodarone (PACERONE) half tablet 100 mg  100 mg Oral Q12H Thaddeus Huber MD   100 mg at 11/20/18 0840   • aspirin chewable tablet 81 mg  81 mg Oral Daily Thaddeus Huber MD   81 mg at 11/20/18 0840   • atorvastatin (LIPITOR) tablet 40 mg  40 mg Oral Nightly Thaddeus Huber MD   40 mg at 11/19/18 2150   • budesonide (PULMICORT) nebulizer solution 0.5 mg  0.5 mg Nebulization BID - RT Sanjeev Betancourt MD   0.5 mg at 11/20/18 0700   • chlordiazePOXIDE (LIBRIUM) capsule 5 mg  5 mg Oral 4x Daily PRN Alex Lomax MD       • enoxaparin (LOVENOX) syringe 40 mg  40 mg Subcutaneous Nightly ReynaldoDanyel T, DO   40 mg at 11/19/18 2151   • furosemide (LASIX) injection 40 mg  40 mg Intravenous Q12H Alex Lomax MD       • ipratropium-albuterol (DUO-NEB) nebulizer solution 3 mL  3 mL Nebulization 4x Daily - RT Sanjeev Betancourt MD   3 mL at 11/20/18 1451   • labetalol (NORMODYNE) tablet 50 mg  50 mg Oral Q12H Thaddeus Huber MD   50 mg at 11/20/18 0840   • losartan (COZAAR) half tablet 12.5 mg  12.5 mg Oral Daily Thaddeus Huber MD   12.5 mg at 11/20/18 0840   • Magnesium Sulfate 2 gram Bolus, followed by 8 gram infusion (total Mg dose 10 grams)- Mg less than or equal to 1mg/dL  2 g Intravenous PRN Alex Lomax MD        Or   • Magnesium Sulfate 2 gram / 50mL Infusion (GIVE X 3 BAGS TO EQUAL 6GM TOTAL DOSE) - Mg 1.1 - 1.5  mg/dl  2 g Intravenous PRN Alex Lomax MD        Or   • Magnesium Sulfate 4 gram infusion- Mg 1.6-1.9 mg/dL  4 g Intravenous PRN Alex Lomax MD       • methylPREDNISolone sodium succinate (SOLU-Medrol) injection 60 mg  60 mg Intravenous Q6H Alex Lomax MD       • montelukast (SINGULAIR) tablet 10 mg  10 mg Oral Nightly Sanjeev Betancourt MD   10 mg at 11/19/18 2150   • nitroglycerin (TRIDIL) 200-5 MCG/ML-% infusion  - ADS Override Pull            • piperacillin-tazobactam (ZOSYN) 3.375 g/100 mL 0.9% NS IVPB (mbp)  3.375 g Intravenous Once Alex Lomax MD       • piperacillin-tazobactam (ZOSYN) 3.375 g/100 mL 0.9% NS IVPB (mbp)  3.375 g Intravenous Q8H Alex Lomax MD       • potassium chloride (KLOR-CON) packet 40 mEq  40 mEq Oral PRN Alex Lomax MD       • potassium chloride (MICRO-K) CR capsule 40 mEq  40 mEq Oral PRN Alex Lomax MD       • ticagrelor (BRILINTA) tablet 90 mg  90 mg Oral BID Thaddeus Huber MD   90 mg at 11/20/18 0840       Review of Systems:   Review of Systems   Constitutional: Positive for fatigue. Negative for fever.   Respiratory: Positive for cough and shortness of breath. Negative for wheezing.    Cardiovascular: Negative for chest pain, palpitations and leg swelling.   Gastrointestinal: Negative for abdominal distention, abdominal pain and constipation.   Genitourinary: Negative for dysuria, frequency, hematuria and urgency.   Musculoskeletal: Negative for arthralgias, back pain and gait problem.   Skin: Positive for pallor.   Neurological: Positive for weakness. Negative for syncope, facial asymmetry and light-headedness.   Psychiatric/Behavioral: Positive for dysphoric mood. Negative for agitation, behavioral problems and confusion. The patient is nervous/anxious.    All other systems reviewed and are negative.      Objective     Vital Signs  Temp:  [96.9 °F (36.1 °C)-97.7 °F (36.5 °C)] 96.9 °F (36.1 °C)  Heart Rate:  [68-86] 82  Resp:  [18-20] 18  BP:  (138-145)/(76-78) 138/76    Physical Exam:  Physical Exam   Constitutional: She is oriented to person, place, and time. She appears cachectic. No distress.   HENT:   Head: Normocephalic and atraumatic.   Eyes: EOM are normal. Pupils are equal, round, and reactive to light. No scleral icterus.   Neck: Normal range of motion. Neck supple.   Cardiovascular: Normal rate and regular rhythm.   Pulmonary/Chest: Effort normal. No respiratory distress. She has wheezes.   Abdominal: Soft. Bowel sounds are normal. She exhibits no distension. There is no tenderness. There is no guarding.   Musculoskeletal: She exhibits no edema.   Neurological: She is alert and oriented to person, place, and time. No cranial nerve deficit. Coordination normal.   Skin: Skin is warm and dry. No rash noted. There is pallor.   Psychiatric: She has a normal mood and affect. Her behavior is normal.   Vitals reviewed.       Results Review:    Lab Results (last 24 hours)     Procedure Component Value Units Date/Time    Potassium [[id number]] Collected:  11/20/18 1343    Specimen:  Blood Updated:  11/20/18 1443    Magnesium [[id number]] Collected:  11/20/18 1343    Specimen:  Blood Updated:  11/20/18 1443    Troponin [[id number]]  (Abnormal) Collected:  11/20/18 1343    Specimen:  Blood Updated:  11/20/18 1438     Troponin I 2.480 ng/mL     CBC & Differential [[id number]] Collected:  11/20/18 0815    Specimen:  Blood Updated:  11/20/18 0906    Narrative:       The following orders were created for panel order CBC & Differential.  Procedure                               Abnormality         Status                     ---------                               -----------         ------                     CBC Auto Differential[[id number]]        Abnormal            Final result                 Please view results for these tests on the individual orders.    CBC Auto Differential [[id number]]  (Abnormal) Collected:  11/20/18 0815    Specimen:  Blood Updated:   11/20/18 0906     WBC 15.38 10*3/mm3      RBC 3.80 10*6/mm3      Hemoglobin 11.6 g/dL      Hematocrit 34.5 %      MCV 90.8 fL      MCH 30.5 pg      MCHC 33.6 g/dL      RDW 13.8 %      RDW-SD 45.3 fl      MPV 9.5 fL      Platelets 233 10*3/mm3      Neutrophil % 91.9 %      Lymphocyte % 4.3 %      Monocyte % 3.3 %      Eosinophil % 0.0 %      Basophil % 0.0 %      Immature Grans % 0.5 %      Neutrophils, Absolute 14.15 10*3/mm3      Lymphocytes, Absolute 0.66 10*3/mm3      Monocytes, Absolute 0.50 10*3/mm3      Eosinophils, Absolute 0.00 10*3/mm3      Basophils, Absolute 0.00 10*3/mm3      Immature Grans, Absolute 0.07 10*3/mm3     Troponin [[id number]]  (Abnormal) Collected:  11/20/18 0815    Specimen:  Blood Updated:  11/20/18 0902     Troponin I 2.400 ng/mL     Basic Metabolic Panel [[id number]]  (Abnormal) Collected:  11/20/18 0815    Specimen:  Blood Updated:  11/20/18 0901     Glucose 159 mg/dL      BUN 20 mg/dL      Creatinine 0.65 mg/dL      Sodium 138 mmol/L      Potassium 3.3 mmol/L      Chloride 96 mmol/L      CO2 34.0 mmol/L      Calcium 8.8 mg/dL      eGFR Non African Amer 93 mL/min/1.73      BUN/Creatinine Ratio 30.8     Anion Gap 8.0 mmol/L     CK-MB [[id number]]  (Normal) Collected:  11/20/18 0815    Specimen:  Blood Updated:  11/20/18 0856     CKMB 2.03 ng/mL     CK [[id number]]  (Abnormal) Collected:  11/20/18 0815    Specimen:  Blood Updated:  11/20/18 0842     Creatine Kinase 179 U/L           Imaging Results (last 24 hours)     Procedure Component Value Units Date/Time    CT Angiogram Chest With Contrast [[id number]] Collected:  11/20/18 1358     Updated:  11/20/18 1451    Narrative:         EXAM:  Computed Tomography with CTA         REGION:  Chest       INDICATION:  SOB ;  Dyspnea, cardiac origin suspected, I21.3 ST  elevation (STEMI) myocardial infarction of unspecified site  I21.29 ST elevation (STEMI) myocardial infarction involving other  sites I10 Essential (primary) hypertension Z74.09 Other  reduced  mobility Z74.09 Other reduced mobility    - rule out pulmonary embolism       CLINICAL HISTORY:  CORRELATIVE IMAGING:  None                         TECHNIQUE:     - PE / vascular protocol     - reconstructions:  axial, coronal, sagittal, obliques     - computer-generated 3D reconstructions (MIPS) were performed.     - contrast:  intravenous ;  Isovue 370,     75 mL                                This exam was performed according to the departmental  dose-optimization program which includes automated exposure  control, adjustment of the mA and/or kV according to patient size  and/or use of iterative reconstruction technique.         COMMENTS:    - Pulmonary arterial system:      - Main pulmonary artery trunk:  negative      - Left, right main pulmonary arteries: negative      - Lobar arteries: negative       - Segmental arteries: negative      - Systemic vascularity (as visualized):        - Aorta:  grossly negative / normal caliber / no dissection        - roots of great vessels:  grossly negative / normal  caliber        - SVC / IVC:  grossly negative / normal caliber     - Misc (limited visualization):      - pulmonary parenchyma:  Bilateral upper lobe airspace  disease, small focus of consolidated airspace disease in the  right lower lobe.      - pleura:  Small bilateral pleural fluid collections.      - mediastinal / morgan:  negative      - neck, inferior:  grossly wnl      - subdiaphragmatic structures:  grossly negative (limited  evaluation)       - osseous:      - misc:       .        Impression:       CONCLUSION:          1.  No evidence of pulmonary embolism.            2.  No evidence of pathology associated with the visualized  aorta.      3. Bilateral upper lobe airspace disease, possibly of an  inflammatory etiology.                                                              Electronically signed by:  HIEN Stiles MD  11/20/2018 2:50  PM CST Workstation: [id number]          Assessment/Plan        Acute ST elevation myocardial infarction (STEMI) (CMS/HCC)    CAD (coronary artery disease)    Chronic bronchitis with acute exacerbation (CMS/HCC)    Acute on chronic respiratory failure with hypoxia (CMS/HCC)    Depression    Pneumonia of both upper lobes    Hypertension    Anxiety    Chronic alcohol abuse    Improved after the coronary angioplasty with stenting but she remains hypoxic. Continue with the diuresis, nebulized treatments, IV steroids, supplemental Oxygen.     CT lung angiogram shows bilateral upper lobe pneumonia. Start on Zoxyn.    She has been seen by the psychiatrist as well.    Alex Lomax MD  11/20/18  3:12 PM

## 2018-11-20 NOTE — PLAN OF CARE
Problem: Patient Care Overview  Goal: Plan of Care Review  Outcome: Ongoing (interventions implemented as appropriate)   11/20/18 1411   Coping/Psychosocial   Plan of Care Reviewed With patient   Plan of Care Review   Progress declining   OTHER   Outcome Summary oxygen is dropping even with oxygen on       Problem: Skin Injury Risk (Adult)  Goal: Identify Related Risk Factors and Signs and Symptoms  Outcome: Ongoing (interventions implemented as appropriate)      Problem: Cardiac: ACS (Acute Coronary Syndrome) (Adult)  Goal: Signs and Symptoms of Listed Potential Problems Will be Absent, Minimized or Managed (Cardiac: ACS)  Outcome: Ongoing (interventions implemented as appropriate)      Problem: Fall Risk (Adult)  Goal: Absence of Fall  Outcome: Ongoing (interventions implemented as appropriate)      Problem: Chronic Obstructive Pulmonary Disease (Adult)  Goal: Signs and Symptoms of Listed Potential Problems Will be Absent, Minimized or Managed (Chronic Obstructive Pulmonary Disease)  Outcome: Ongoing (interventions implemented as appropriate)

## 2018-11-20 NOTE — SIGNIFICANT NOTE
11/20/18 1453   Rehab Treatment   Reason Treatment Not Performed other (see comments)  (Nsg reported that pt's 02 dropped into the 70's with 6 L for t/f. Nsg deferred this day.)

## 2018-11-20 NOTE — PLAN OF CARE
Problem: Patient Care Overview  Goal: Plan of Care Review  Outcome: Ongoing (interventions implemented as appropriate)   11/20/18 0506   Coping/Psychosocial   Plan of Care Reviewed With patient     Goal: Individualization and Mutuality  Outcome: Ongoing (interventions implemented as appropriate)    Goal: Discharge Needs Assessment  Outcome: Ongoing (interventions implemented as appropriate)    Goal: Interprofessional Rounds/Family Conf  Outcome: Ongoing (interventions implemented as appropriate)      Problem: Skin Injury Risk (Adult)  Goal: Identify Related Risk Factors and Signs and Symptoms  Outcome: Ongoing (interventions implemented as appropriate)      Problem: Cardiac: ACS (Acute Coronary Syndrome) (Adult)  Goal: Signs and Symptoms of Listed Potential Problems Will be Absent, Minimized or Managed (Cardiac: ACS)  Outcome: Ongoing (interventions implemented as appropriate)      Problem: Fall Risk (Adult)  Goal: Absence of Fall  Outcome: Ongoing (interventions implemented as appropriate)      Problem: Chronic Obstructive Pulmonary Disease (Adult)  Goal: Signs and Symptoms of Listed Potential Problems Will be Absent, Minimized or Managed (Chronic Obstructive Pulmonary Disease)  Outcome: Ongoing (interventions implemented as appropriate)

## 2018-11-20 NOTE — PROGRESS NOTES
Cardiology Progress Note     LOS: 5 days   Patient Care Team:  Provider, No Known as PCP - Tiffanie Ivan APRN as PCP - Family Medicine (Family Medicine)    Subjective:    Chart reviewed. Patient seen and examined. Patient had symptoms of substernal chest pain earlier this morning.  Patient resting electrocardiogram did not show any ST-T wave changes.  Patient potassium was 3.3.  Patient has had a drop in the oxygen saturation to 70% on room air with ambulation.  Patient is currently on five liters nasal cannula oxygen and has a saturation of 90%.  Patient currently is not having any symptoms of chest pain.  Patient does complain of symptoms of shortness of breath.      Objective:  Temp:  [96.9 °F (36.1 °C)-98.3 °F (36.8 °C)] 96.9 °F (36.1 °C)  Heart Rate:  [68-86] 82  Resp:  [18-20] 18  BP: (129-145)/(65-78) 138/76    Intake/Output Summary (Last 24 hours) at 11/20/2018 1027  Last data filed at 11/20/2018 0800  Gross per 24 hour   Intake 720 ml   Output 1500 ml   Net -780 ml       Physical Exam:   General Appearance:    Alert, oriented, cooperative, in no acute distress.   Head:    Normocephalic, atraumatic, without obvious abnormality   Eyes:              ARI. Lids and lashes normal, conjunctivae and sclerae normal, no icterus, no pallor.   Ears:    Ears appear intact with no abnormalities noted.   Throat:   Mucous membranes pink and moist.   Neck:   Supple, trachea midline, no carotid bruit, no organomegaly or JVD.   Lungs:     Clear to auscultation and percussion, respirations. regular, even and unlabored. No wheezes, rales, or rhonchi.    Heart:    Regular rhythm and normal rate, normal S1 and S2, no      murmur, no gallop, no rub, no click.   Abdomen:     Soft, non-tender, non-distended, no guarding, no rebound tenderness. Normal bowel sounds in all four quadrants, no masses, liver and spleen nonpalpable.    Genitalia:    Deferred.   Extremities:   Moves all extremities well, no edema, no cyanosis,  no       redness, no clubbing.   Pulses:   Pulses palpable and equal bilaterally.   Skin:   Moist and warm. No bleeding, bruising or rash.   Neurologic/Psychiatric:   Alert and oriented to person, place, and time.  Motor, power and tone in upper and lower extremities are grossly intact.  No focal neurological deficits. Normal cognitive function. No psychomotor reaction or tangential thought. No depression, homicidal ideations and suicidal ideations.          Results Review:    Results from last 7 days   Lab Units  11/20/18   0815  11/19/18   0601   SODIUM mmol/L  138  138   POTASSIUM mmol/L  3.3*  3.8   CHLORIDE mmol/L  96  99   CO2 mmol/L  34.0*  30.0   BUN mg/dL  20  12   CREATININE mg/dL  0.65  0.61   CALCIUM mg/dL  8.8  8.9   BILIRUBIN mg/dL   --   0.6   ALK PHOS U/L   --   75   ALT (SGPT) U/L   --   47   AST (SGOT) U/L   --   55*   GLUCOSE mg/dL  159*  166*     Results from last 7 days   Lab Units  11/20/18   0815  11/16/18   0333  11/15/18   1624   11/15/18   1041   CK TOTAL U/L  179*   --    --    --   3,625*   TROPONIN I ng/mL  2.400*  39.900*  66.100*   < >  69.800*    < > = values in this interval not displayed.         Results from last 7 days   Lab Units  11/20/18   0815   WBC 10*3/mm3  15.38*   HEMOGLOBIN g/dL  11.6*   HEMATOCRIT %  34.5*   PLATELETS 10*3/mm3  233     Results from last 7 days   Lab Units  11/15/18   0214   INR   0.92         Results from last 7 days   Lab Units  11/16/18   0333   CHOLESTEROL mg/dL  137   TRIGLYCERIDES mg/dL  191   HDL CHOL mg/dL  33*   LDL CHOL mg/dL  89               ECHO:  Results for orders placed during the hospital encounter of 11/15/18   Adult Transthoracic Echo Complete W/ Cont if Necessary Per Protocol    Narrative · Left ventricular wall thickness is consistent with borderline concentric   hypertrophy.  · Mild mitral valve regurgitation is present  · Mild tricuspid valve regurgitation is present.          ECG 12 Lead   Preliminary Result   Test Reason : chest  pain   Blood Pressure : **/** mmHG   Vent. Rate : 085 BPM     Atrial Rate : 085 BPM      P-R Int : 106 ms          QRS Dur : 090 ms       QT Int : 404 ms       P-R-T Axes : 037 -27 072 degrees      QTc Int : 480 ms      Sinus rhythm with short HI   Otherwise normal ECG   When compared with ECG of 16-NOV-2018 20:36,   Nonspecific T wave abnormality now evident in Anterior leads      Referred By:             Confirmed By:       SCANNED EKG   Final Result      ECG 12 Lead   Final Result   Test Reason : Paroxysmal atrial fibrillation   Blood Pressure : **/** mmHG   Vent. Rate : 097 BPM     Atrial Rate : 097 BPM      P-R Int : 130 ms          QRS Dur : 086 ms       QT Int : 354 ms       P-R-T Axes : 088 -04 094 degrees      QTc Int : 449 ms      Normal sinus rhythm   Abnormal QRS-T angle, consider primary T wave abnormality   Abnormal ECG   When compared with ECG of 15-NOV-2018 02:46,   Vent. rate has increased BY  44 BPM   ST no longer depressed in Anterior leads   Nonspecific T wave abnormality now evident in Lateral leads   Confirmed by RAIN DE ANDA ([id number]) on 11/17/2018 4:09:19 PM      Referred By:             Confirmed By:RANI DE ANDA      SCANNED EKG   Final Result      SCANNED EKG   Final Result      SCANNED EKG   Final Result      ECG 12 Lead   Final Result   Test Reason : CP   Blood Pressure : **/** mmHG   Vent. Rate : 053 BPM     Atrial Rate : 053 BPM      P-R Int : 080 ms          QRS Dur : 088 ms       QT Int : 468 ms       P-R-T Axes : 068 041 066 degrees      QTc Int : 439 ms      Sinus bradycardia with short HI   Marked ST abnormality, possible septal subendocardial injury   Posterior myocardial infarction   ** ** ACUTE MI ** **   Abnormal ECG      Confirmed by NEVAEH COWAN MD ([id number]) on 11/15/2018 8:21:50 AM      Referred By:             Confirmed By:NEVAEH COWAN MD      ECG 12 Lead   ED Interpretation   Reese Greer MD     11/15/2018  2:53 AM   ECG 12 Lead      Date/Time: 11/15/2018 2:01  AM   Performed by: Reese Greer MD   Authorized by: Reese Greer MD    Interpreted by physician   Comments: Atrial fibrillation with rapid ventricular response rate of 136.     There is ST depression V1 through V4 and ST elevation in V5 and V6 which    appears to be consistent with acute postero-lateral STEMI         Final Result   Test Reason : chest pain   Blood Pressure : **/** mmHG   Vent. Rate : 136 BPM     Atrial Rate : 147 BPM      P-R Int : 000 ms          QRS Dur : 086 ms       QT Int : 280 ms       P-R-T Axes : 000 026 069 degrees      QTc Int : 421 ms      Atrial fibrillation with rapid ventricular response   ST elevation, consider lateral injury or acute infarct   Posterior myocardial injury   ** ** ACUTE MI ** **   Abnormal ECG      Confirmed by NEVAEH COWAN MD ([id number]) on 11/15/2018 8:21:25 AM      Referred By:             Confirmed By:NEVAEH COWAN MD           Medication Review:   Current Facility-Administered Medications   Medication Dose Route Frequency Provider Last Rate Last Dose   • amiodarone (PACERONE) half tablet 100 mg  100 mg Oral Q12H Thaddeus Huber MD   100 mg at 11/20/18 0840   • aspirin chewable tablet 81 mg  81 mg Oral Daily Thaddeus Huber MD   81 mg at 11/20/18 0840   • atorvastatin (LIPITOR) tablet 40 mg  40 mg Oral Nightly Thaddeus Huber MD   40 mg at 11/19/18 2150   • budesonide (PULMICORT) nebulizer solution 0.5 mg  0.5 mg Nebulization BID - RT Sanjeev Betancourt MD   0.5 mg at 11/20/18 0700   • cefTRIAXone (ROCEPHIN) 1 g/100 mL 0.9% NS (MBP)  1 g Intravenous Q24H Sanjeev Betancourt MD   1 g at 11/19/18 1415   • chlordiazePOXIDE (LIBRIUM) capsule 5 mg  5 mg Oral Q8H Sanjeev Betancourt MD   5 mg at 11/20/18 0628   • enoxaparin (LOVENOX) syringe 40 mg  40 mg Subcutaneous Nightly Danyel Jacobs DO   40 mg at 11/19/18 2151   • furosemide (LASIX) injection 40 mg  40 mg Intravenous Daily Thaddeus Huber MD   40 mg at 11/20/18 0842   • ipratropium-albuterol (DUO-NEB)  nebulizer solution 3 mL  3 mL Nebulization 4x Daily - RT Sanjeev Betancourt MD   3 mL at 11/20/18 0652   • labetalol (NORMODYNE) tablet 50 mg  50 mg Oral Q12H Thaddeus Huber MD   50 mg at 11/20/18 0840   • losartan (COZAAR) half tablet 12.5 mg  12.5 mg Oral Daily Thaddeus Huber MD   12.5 mg at 11/20/18 0840   • methylPREDNISolone sodium succinate (SOLU-Medrol) injection 60 mg  60 mg Intravenous Q8H Sanjeev Betancourt MD   60 mg at 11/20/18 0628   • montelukast (SINGULAIR) tablet 10 mg  10 mg Oral Nightly Sanjeev Betancourt MD   10 mg at 11/19/18 2150   • nitroglycerin (TRIDIL) 200-5 MCG/ML-% infusion  - ADS Override Pull            • ticagrelor (BRILINTA) tablet 90 mg  90 mg Oral BID Thaddeus Huber MD   90 mg at 11/20/18 0840       Assessment and Plan:      Acute ST elevation myocardial infarction (STEMI) (CMS/[facility])    Chronic bronchitis with acute exacerbation (CMS/HCC)    Hypertension    Anxiety    CAD (coronary artery disease)    Acute on chronic respiratory failure with hypoxia (CMS/[facility])    Depression    Chronic alcohol abuse  1.  Atherosclerotic coronary artery disease.  Patient is status post aborted posterior wall myocardial infarction with rescue PTCA and stenting of the 100% occluded circumflex artery.  Patient had a left anterior descending artery stenosis which was not intervened.  Patient left ventricular systolic function was preserved with an ejection fraction of 50-55% with mild mitral and mild tricuspid regurgitation.  Patient troponin is trending down.  Have discussed with the patient's stage PTCA of the left anterior descending artery after the patient's pulmonary status is stable.  2.  Chronic obstructive lung disease with tobacco abuse.  Patient may require home oxygen.  Patient is currently on steroids and nebulizer.  3.  Arterial hypertension.  Patient blood pressure is currently stable and patient would be continued on the present dose of the losartan and labetalol.  4.  Paroxysmal atrial  fibrillation.  Patient on initial presentation had episodes of atrial fibrillation.  Patient had not complained of having any symptoms of palpitation and is currently on amiodarone and has been maintained in normal sinus rhythm.  5.  Hyperlipidemia.  Patient is currently on Lipitor 40 mg at bedtime.    The above plan of management were discussed with the patient and the family.            Thaddeus Huber MD  11/20/18  10:27 AM      Time: Time spent on face-to-face interaction 20 minutes    Dictated utilizing Dragon dictation.

## 2018-11-21 PROCEDURE — 94799 UNLISTED PULMONARY SVC/PX: CPT

## 2018-11-21 PROCEDURE — 25010000002 FUROSEMIDE PER 20 MG: Performed by: INTERNAL MEDICINE

## 2018-11-21 PROCEDURE — 25010000002 PIPERACILLIN SOD-TAZOBACTAM PER 1 G: Performed by: INTERNAL MEDICINE

## 2018-11-21 PROCEDURE — 25010000002 ENOXAPARIN PER 10 MG: Performed by: HOSPITALIST

## 2018-11-21 PROCEDURE — 94760 N-INVAS EAR/PLS OXIMETRY 1: CPT

## 2018-11-21 PROCEDURE — 97110 THERAPEUTIC EXERCISES: CPT

## 2018-11-21 PROCEDURE — 25010000002 METHYLPREDNISOLONE PER 125 MG: Performed by: INTERNAL MEDICINE

## 2018-11-21 PROCEDURE — 97535 SELF CARE MNGMENT TRAINING: CPT

## 2018-11-21 RX ADMIN — Medication 100 MG: at 09:05

## 2018-11-21 RX ADMIN — BUDESONIDE 0.5 MG: 0.5 INHALANT RESPIRATORY (INHALATION) at 19:07

## 2018-11-21 RX ADMIN — METHYLPREDNISOLONE SODIUM SUCCINATE 60 MG: 125 INJECTION, POWDER, FOR SOLUTION INTRAMUSCULAR; INTRAVENOUS at 04:00

## 2018-11-21 RX ADMIN — TICAGRELOR 90 MG: 90 TABLET ORAL at 09:05

## 2018-11-21 RX ADMIN — IPRATROPIUM BROMIDE AND ALBUTEROL SULFATE 3 ML: 2.5; .5 SOLUTION RESPIRATORY (INHALATION) at 07:09

## 2018-11-21 RX ADMIN — PIPERACILLIN SODIUM,TAZOBACTAM SODIUM 3.38 G: 3; .375 INJECTION, POWDER, FOR SOLUTION INTRAVENOUS at 05:44

## 2018-11-21 RX ADMIN — FUROSEMIDE 40 MG: 10 INJECTION, SOLUTION INTRAMUSCULAR; INTRAVENOUS at 21:25

## 2018-11-21 RX ADMIN — ATORVASTATIN CALCIUM 40 MG: 40 TABLET, FILM COATED ORAL at 21:26

## 2018-11-21 RX ADMIN — METHYLPREDNISOLONE SODIUM SUCCINATE 60 MG: 125 INJECTION, POWDER, FOR SOLUTION INTRAMUSCULAR; INTRAVENOUS at 09:06

## 2018-11-21 RX ADMIN — METHYLPREDNISOLONE SODIUM SUCCINATE 60 MG: 125 INJECTION, POWDER, FOR SOLUTION INTRAMUSCULAR; INTRAVENOUS at 21:25

## 2018-11-21 RX ADMIN — IPRATROPIUM BROMIDE AND ALBUTEROL SULFATE 3 ML: 2.5; .5 SOLUTION RESPIRATORY (INHALATION) at 15:42

## 2018-11-21 RX ADMIN — Medication 100 MG: at 21:26

## 2018-11-21 RX ADMIN — PIPERACILLIN SODIUM,TAZOBACTAM SODIUM 3.38 G: 3; .375 INJECTION, POWDER, FOR SOLUTION INTRAVENOUS at 21:28

## 2018-11-21 RX ADMIN — FUROSEMIDE 40 MG: 10 INJECTION, SOLUTION INTRAMUSCULAR; INTRAVENOUS at 09:06

## 2018-11-21 RX ADMIN — MONTELUKAST SODIUM 10 MG: 10 TABLET, FILM COATED ORAL at 21:26

## 2018-11-21 RX ADMIN — LABETALOL HYDROCHLORIDE 50 MG: 100 TABLET, FILM COATED ORAL at 21:25

## 2018-11-21 RX ADMIN — LABETALOL HYDROCHLORIDE 50 MG: 100 TABLET, FILM COATED ORAL at 09:05

## 2018-11-21 RX ADMIN — TICAGRELOR 90 MG: 90 TABLET ORAL at 21:26

## 2018-11-21 RX ADMIN — PIPERACILLIN SODIUM,TAZOBACTAM SODIUM 3.38 G: 3; .375 INJECTION, POWDER, FOR SOLUTION INTRAVENOUS at 16:22

## 2018-11-21 RX ADMIN — Medication 12.5 MG: at 09:05

## 2018-11-21 RX ADMIN — IPRATROPIUM BROMIDE AND ALBUTEROL SULFATE 3 ML: 2.5; .5 SOLUTION RESPIRATORY (INHALATION) at 10:47

## 2018-11-21 RX ADMIN — IPRATROPIUM BROMIDE AND ALBUTEROL SULFATE 3 ML: 2.5; .5 SOLUTION RESPIRATORY (INHALATION) at 19:01

## 2018-11-21 RX ADMIN — ZOLPIDEM TARTRATE 5 MG: 5 TABLET ORAL at 21:28

## 2018-11-21 RX ADMIN — BUDESONIDE 0.5 MG: 0.5 INHALANT RESPIRATORY (INHALATION) at 07:15

## 2018-11-21 RX ADMIN — ENOXAPARIN SODIUM 40 MG: 40 INJECTION SUBCUTANEOUS at 21:25

## 2018-11-21 RX ADMIN — ASPIRIN 81 MG CHEWABLE TABLET 81 MG: 81 TABLET CHEWABLE at 09:05

## 2018-11-21 RX ADMIN — METHYLPREDNISOLONE SODIUM SUCCINATE 60 MG: 125 INJECTION, POWDER, FOR SOLUTION INTRAMUSCULAR; INTRAVENOUS at 16:22

## 2018-11-21 NOTE — PLAN OF CARE
Problem: Patient Care Overview  Goal: Plan of Care Review  Outcome: Ongoing (interventions implemented as appropriate)    Goal: Individualization and Mutuality  Outcome: Ongoing (interventions implemented as appropriate)    Goal: Discharge Needs Assessment  Outcome: Ongoing (interventions implemented as appropriate)    Goal: Interprofessional Rounds/Family Conf  Outcome: Ongoing (interventions implemented as appropriate)      Problem: Skin Injury Risk (Adult)  Goal: Identify Related Risk Factors and Signs and Symptoms  Outcome: Outcome(s) achieved Date Met: 11/21/18      Problem: Cardiac: ACS (Acute Coronary Syndrome) (Adult)  Goal: Signs and Symptoms of Listed Potential Problems Will be Absent, Minimized or Managed (Cardiac: ACS)  Outcome: Ongoing (interventions implemented as appropriate)      Problem: Fall Risk (Adult)  Goal: Absence of Fall  Outcome: Ongoing (interventions implemented as appropriate)      Problem: Chronic Obstructive Pulmonary Disease (Adult)  Goal: Signs and Symptoms of Listed Potential Problems Will be Absent, Minimized or Managed (Chronic Obstructive Pulmonary Disease)  Outcome: Ongoing (interventions implemented as appropriate)

## 2018-11-21 NOTE — SIGNIFICANT NOTE
"   11/21/18 1341   Rehab Treatment   Discipline physical therapist   Reason Treatment Not Performed patient/family declined treatment  (Pt reported feeling \"wore out\" from OT tx and lots of people coming into her room all day. Pt asked to be allowed to rest, but stated PT could check back later on this date. PT will check back today if time allows.)     "

## 2018-11-21 NOTE — PROGRESS NOTES
Progress Note  Alex Lomax MD  Hospitalist    Date of visit: 11/21/2018     LOS: 6 days   Patient Care Team:  Provider, No Known as PCP - Tiffanie Ivan APRN as PCP - Family Medicine (Family Medicine)    Chief Complaint: dyspnea    Subjective     Interval History:     Patient Complaints: chest pain / dyspnea improved. She remains hypoxic - improved to some extent, but still requiring O2 @ 5 or 6 L/min.    History taken from: patient / nursing    Medication Review:   Current Facility-Administered Medications   Medication Dose Route Frequency Provider Last Rate Last Dose   • amiodarone (PACERONE) half tablet 100 mg  100 mg Oral Q12H Thaddeus Huber MD   100 mg at 11/21/18 0905   • aspirin chewable tablet 81 mg  81 mg Oral Daily Thaddeus Huber MD   81 mg at 11/21/18 0905   • atorvastatin (LIPITOR) tablet 40 mg  40 mg Oral Nightly Thaddeus Huber MD   40 mg at 11/20/18 2201   • budesonide (PULMICORT) nebulizer solution 0.5 mg  0.5 mg Nebulization BID - RT Sanjeev Betancourt MD   0.5 mg at 11/21/18 0715   • chlordiazePOXIDE (LIBRIUM) capsule 5 mg  5 mg Oral 4x Daily PRN Alex Lomax MD       • enoxaparin (LOVENOX) syringe 40 mg  40 mg Subcutaneous Nightly Danyel Jacobs T DO   40 mg at 11/20/18 2200   • furosemide (LASIX) injection 40 mg  40 mg Intravenous Q12H Alex Lomax MD   40 mg at 11/21/18 0906   • ipratropium-albuterol (DUO-NEB) nebulizer solution 3 mL  3 mL Nebulization 4x Daily - RT Sanjeev Betancourt MD   3 mL at 11/21/18 0709   • labetalol (NORMODYNE) tablet 50 mg  50 mg Oral Q12H Thaddeus Huber MD   50 mg at 11/21/18 0905   • losartan (COZAAR) half tablet 12.5 mg  12.5 mg Oral Daily Thaddeus Huber MD   12.5 mg at 11/21/18 0905   • Magnesium Sulfate 2 gram Bolus, followed by 8 gram infusion (total Mg dose 10 grams)- Mg less than or equal to 1mg/dL  2 g Intravenous PRN Alex Lomax MD        Or   • Magnesium Sulfate 2 gram / 50mL Infusion (GIVE X 3 BAGS TO EQUAL 6GM TOTAL DOSE)  - Mg 1.1 - 1.5 mg/dl  2 g Intravenous PRN Alex oLmax MD        Or   • Magnesium Sulfate 4 gram infusion- Mg 1.6-1.9 mg/dL  4 g Intravenous PRN Alex Lomax MD       • methylPREDNISolone sodium succinate (SOLU-Medrol) injection 60 mg  60 mg Intravenous Q6H Alex Lomax MD   60 mg at 11/21/18 0906   • montelukast (SINGULAIR) tablet 10 mg  10 mg Oral Nightly Sanjeev Betancourt MD   10 mg at 11/20/18 2201   • nitroglycerin (TRIDIL) 200-5 MCG/ML-% infusion  - ADS Override Pull            • piperacillin-tazobactam (ZOSYN) 3.375 g/100 mL 0.9% NS IVPB (mbp)  3.375 g Intravenous Q8H Alex Lomax MD   3.375 g at 11/21/18 0544   • potassium chloride (KLOR-CON) packet 40 mEq  40 mEq Oral PRN Alex Lomax MD   40 mEq at 11/20/18 1621   • potassium chloride (MICRO-K) CR capsule 40 mEq  40 mEq Oral PRN Alex Lomax MD       • sodium chloride (OCEAN) nasal spray 2 spray  2 spray Each Nare PRN Thaddeus Huber MD       • ticagrelor (BRILINTA) tablet 90 mg  90 mg Oral BID Thaddeus Huber MD   90 mg at 11/21/18 0905   • zolpidem (AMBIEN) tablet 5 mg  5 mg Oral Nightly PRN Alex Lomax MD   5 mg at 11/20/18 2305       Review of Systems:   Review of Systems   Constitutional: Positive for fatigue. Negative for fever.   Respiratory: Positive for cough and shortness of breath. Negative for wheezing.    Cardiovascular: Negative for chest pain, palpitations and leg swelling.   Gastrointestinal: Negative for abdominal distention, abdominal pain and constipation.   Genitourinary: Negative for dysuria, frequency, hematuria and urgency.   Musculoskeletal: Negative for arthralgias, back pain and gait problem.   Skin: Positive for pallor.   Neurological: Positive for weakness. Negative for syncope, facial asymmetry and light-headedness.   Psychiatric/Behavioral: Positive for dysphoric mood. Negative for agitation, behavioral problems and confusion. The patient is nervous/anxious.    All other systems reviewed and are  negative.      Objective     Vital Signs  Temp:  [97.5 °F (36.4 °C)-98.7 °F (37.1 °C)] 97.5 °F (36.4 °C)  Heart Rate:  [77-90] 83  Resp:  [16-20] 16  BP: (137-148)/(72-79) 137/74    Physical Exam:  Physical Exam   Constitutional: She is oriented to person, place, and time. She appears cachectic. No distress.   HENT:   Head: Normocephalic and atraumatic.   Eyes: EOM are normal. Pupils are equal, round, and reactive to light. No scleral icterus.   Neck: Normal range of motion. Neck supple.   Cardiovascular: Normal rate and regular rhythm.   Pulmonary/Chest: Effort normal. No respiratory distress. She has wheezes.   Abdominal: Soft. Bowel sounds are normal. She exhibits no distension. There is no tenderness. There is no guarding.   Musculoskeletal: She exhibits no edema.   Neurological: She is alert and oriented to person, place, and time. No cranial nerve deficit. Coordination normal.   Skin: Skin is warm and dry. No rash noted. There is pallor.   Psychiatric: She has a normal mood and affect. Her behavior is normal.   Vitals reviewed.       Results Review:    Lab Results (last 24 hours)     Procedure Component Value Units Date/Time    Troponin [[id number]]  (Abnormal) Collected:  11/20/18 1920    Specimen:  Blood Updated:  11/20/18 2036     Troponin I 2.440 ng/mL     Blood Gas, Arterial [[id number]]  (Abnormal) Collected:  11/20/18 2010    Specimen:  Arterial Blood Updated:  11/20/18 2012     Site Right Brachial     Nael's Test N/A     pH, Arterial 7.529 pH units      Comment: 83 Value above reference range        pCO2, Arterial 40.0 mm Hg      pO2, Arterial 60.4 mm Hg      Comment: 84 Value below reference range        HCO3, Arterial 33.3 mmol/L      Comment: 83 Value above reference range        Base Excess, Arterial 9.7 mmol/L      Comment: 83 Value above reference range        O2 Saturation, Arterial 92.4 %      Comment: 84 Value below reference range        Barometric Pressure for Blood Gas 756 mmHg      Modality  Nasal Cannula     Flow Rate 5.0 lpm      Ventilator Mode NA     Collected by ys     Comment: Meter: [id number]     :  [id number]       Magnesium [[id number]]  (Normal) Collected:  11/20/18 1343    Specimen:  Blood Updated:  11/20/18 1537     Magnesium 2.2 mg/dL     Potassium [[id number]]  (Abnormal) Collected:  11/20/18 1343    Specimen:  Blood Updated:  11/20/18 1537     Potassium 3.3 mmol/L     Troponin [[id number]]  (Abnormal) Collected:  11/20/18 1343    Specimen:  Blood Updated:  11/20/18 1438     Troponin I 2.480 ng/mL           Imaging Results (last 24 hours)     Procedure Component Value Units Date/Time    CT Angiogram Chest With Contrast [[id number]] Collected:  11/20/18 1358     Updated:  11/20/18 1451    Narrative:         EXAM:  Computed Tomography with CTA         REGION:  Chest       INDICATION:  SOB ;  Dyspnea, cardiac origin suspected, I21.3 ST  elevation (STEMI) myocardial infarction of unspecified site  I21.29 ST elevation (STEMI) myocardial infarction involving other  sites I10 Essential (primary) hypertension Z74.09 Other reduced  mobility Z74.09 Other reduced mobility    - rule out pulmonary embolism       CLINICAL HISTORY:  CORRELATIVE IMAGING:  None                         TECHNIQUE:     - PE / vascular protocol     - reconstructions:  axial, coronal, sagittal, obliques     - computer-generated 3D reconstructions (MIPS) were performed.     - contrast:  intravenous ;  Isovue 370,     75 mL                                This exam was performed according to the departmental  dose-optimization program which includes automated exposure  control, adjustment of the mA and/or kV according to patient size  and/or use of iterative reconstruction technique.         COMMENTS:    - Pulmonary arterial system:      - Main pulmonary artery trunk:  negative      - Left, right main pulmonary arteries: negative      - Lobar arteries: negative       - Segmental arteries: negative      - Systemic  vascularity (as visualized):        - Aorta:  grossly negative / normal caliber / no dissection        - roots of great vessels:  grossly negative / normal  caliber        - SVC / IVC:  grossly negative / normal caliber     - Misc (limited visualization):      - pulmonary parenchyma:  Bilateral upper lobe airspace  disease, small focus of consolidated airspace disease in the  right lower lobe.      - pleura:  Small bilateral pleural fluid collections.      - mediastinal / morgan:  negative      - neck, inferior:  grossly wnl      - subdiaphragmatic structures:  grossly negative (limited  evaluation)       - osseous:      - misc:       .        Impression:       CONCLUSION:          1.  No evidence of pulmonary embolism.            2.  No evidence of pathology associated with the visualized  aorta.      3. Bilateral upper lobe airspace disease, possibly of an  inflammatory etiology.                                                              Electronically signed by:  HIEN Stiles MD  11/20/2018 2:50  PM CST Workstation: [id number]          Assessment/Plan       Acute ST elevation myocardial infarction (STEMI) (CMS/HCC)    CAD (coronary artery disease)    Chronic bronchitis with acute exacerbation (CMS/HCC)    Acute on chronic respiratory failure with hypoxia (CMS/HCC)    Depression    Pneumonia of both upper lobes    Hypertension    Anxiety    Chronic alcohol abuse    Improved after the coronary angioplasty with stenting but she remains hypoxic. Continue with the diuresis, nebulized treatments, IV steroids, supplemental Oxygen.    CT lung angiogram shows bilateral upper lobe pneumonia. Start on Zoxyn.    Alex Lomax MD  11/21/18  10:06 AM

## 2018-11-21 NOTE — THERAPY TREATMENT NOTE
Acute Care - Occupational Therapy Treatment Note  [facility]     Patient Name: Monisha Brasher  : 1955  MRN: [id number]  Today's Date: 2018  Onset of Illness/Injury or Date of Surgery: 11/15/18  Date of Referral to OT: 18  Referring Physician: BLAYNE Lomax MD    Admit Date: 11/15/2018       ICD-10-CM ICD-9-CM   1. Acute ST elevation myocardial infarction (STEMI), unspecified artery (CMS/HCC) I21.3 410.90   2. Acute myocardial infarction of posterolateral wall (CMS/HCC) I21.29 410.50   3. HTN (hypertension), benign I10 401.1   4. Impaired functional mobility, balance, gait, and endurance Z74.09 V49.89   5. Impaired mobility and activities of daily living Z74.09 799.89     Patient Active Problem List   Diagnosis   • COPD bronchitis   • Cellulitis of left leg   • Asthma   • SOB (shortness of breath)   • Chronic bronchitis with acute exacerbation (CMS/[facility])   • Nicotine abuse   • Hypertension   • Screening for osteoporosis   • Panlobular emphysema (CMS/HCC)   • Anxiety   • General medical exam   • Malaise   • Hyperglycemia   • Acute ST elevation myocardial infarction (STEMI) (CMS/HCC)   • CAD (coronary artery disease)   • Acute on chronic respiratory failure with hypoxia (CMS/HCC)   • Depression   • Chronic alcohol abuse   • Pneumonia of both upper lobes     Past Medical History:   Diagnosis Date   • Acute bronchitis    • Acute upper respiratory infection    • Adjustment disorder with mixed emotional features    • Agoraphobia with panic attacks    • Allergic rhinitis due to pollen    • Anxiety    • Asthma    • Backache    • Blood in urine    • Candidiasis of mouth    • Chronic bronchitis (CMS/HCC)    • Chronic obstructive lung disease (CMS/HCC)    • Emphysema lung (CMS/HCC)    • Essential hypertension    • Extrinsic asthma with status asthmaticus    • Fatigue    • H/O echocardiogram     EF 55-60%. LV systolic function normal. Impaired LV relaxation. [facility]   • Hypoxemia    •  Malaise and fatigue    • Non-IgE mediated allergic asthma    • Nonvenomous insect bite    • Pneumonia    • Postmenopausal state    • Urinary tract infectious disease      Past Surgical History:   Procedure Laterality Date   • INJECTION OF MEDICATION  03/20/2015    celestone(1)   • INJECTION OF MEDICATION  04/05/2016    DEPO MEDROL(16)       Therapy Treatment    Rehabilitation Treatment Summary     Row Name 11/21/18 0920 11/21/18 0900          Treatment Time/Intention    Discipline  occupational therapy assistant  -LJ  occupational therapy assistant  -     Document Type  therapy note (daily note)  -LJ  therapy note (daily note)  -LJ     Subjective Information  --  complains of;fatigue;dyspnea  -LJ     Mode of Treatment  individual therapy;occupational therapy  -LJ  individual therapy;occupational therapy  -LJ     Patient/Family Observations  --  -- no family present  -LJ     Total Minutes, Occupational Therapy Treatment  --  30  -LJ     Therapy Frequency (OT Eval)  -- 5-7 x/wk  -LJ  -- 5-7 x/wk  -LJ     Patient Effort  good  -LJ  good  -LJ     Existing Precautions/Restrictions  fall  -LJ  fall  -LJ     Patient Response to Treatment  --  -- O2 drop w/amb to bathroom;responded well overall  -LJ     Recorded by [LJ] Marta Oliver, MENARD/L 11/21/18 0959 [LJ] Marta Oliver, MENARD/L 11/21/18 0958     Row Name 11/21/18 0900             Vital Signs    Pretreatment Heart Rate (beats/min)  88  -LJ      Intratreatment Heart Rate (beats/min)  101  -LJ      Posttreatment Heart Rate (beats/min)  89  -LJ      Pre SpO2 (%)  93  -LJ      O2 Delivery Pre Treatment  supplemental O2  -LJ      Intra SpO2 (%)  -- drop to 83%, recover to 88% with rest  -LJ      O2 Delivery Intra Treatment  supplemental O2  -LJ      Post SpO2 (%)  91  -LJ      O2 Delivery Post Treatment  supplemental O2  -LJ      Pre Patient Position  Supine  -LJ      Intra Patient Position  Standing  -LJ      Post Patient Position  Supine  -LJ      Recovery Time   -- approx 2 min for O2 to recover from 83% to 88% w/toileting  -LJ      Rest Breaks   -- frequent rests d/t fatigue/dyspnea  -LJ      Recorded by [LJ] Marta Oliver, MENARD/L 11/21/18 0958      Row Name 11/21/18 0920 11/21/18 0900          Cognitive Assessment/Intervention- PT/OT    Affect/Mental Status (Cognitive)  WFL  -LJ  WFL  -LJ     Orientation Status (Cognition)  oriented x 4  -LJ  oriented x 4  -LJ     Follows Commands (Cognition)  WFL  -LJ  WFL  -LJ     Cognitive Function (Cognitive)  WFL  -LJ  WFL  -LJ     Personal Safety Interventions  --  gait belt;muscle strengthening facilitated;nonskid shoes/slippers when out of bed;supervised activity  -LJ     Recorded by [LJ] Marta Oliver MENARD/L 11/21/18 0959 [LJ] Marta Oliver MENARD/L 11/21/18 0958     Row Name 11/21/18 0920 11/21/18 0900          Bed Mobility Assessment/Treatment    Bed Mobility Assessment/Treatment  sit-supine;supine-sit  -LJ  sit-supine;supine-sit  -LJ     Supine-Sit [place] (Bed Mobility)  conditional independence  -LJ  conditional independence  -LJ     Sit-Supine [place] (Bed Mobility)  conditional independence  -LJ  conditional independence  -LJ     Recorded by [LJ] Marta Oliver MENARD/L 11/21/18 0959 [LJ] Marta Oliver MENARD/L 11/21/18 0958     Row Name 11/21/18 0920 11/21/18 0900          Functional Mobility    Functional Mobility- Ind. Level  supervision required  -LJ  supervision required  -LJ     Functional Mobility-Distance (Feet)  --  -- steps bed<>bathroom without use of AE  -LJ     Recorded by [LJ] Marta Oliver MENARD/L 11/21/18 0959 [LJ] Marta Oliver MENARD/L 11/21/18 0958     Row Name 11/21/18 0920 11/21/18 0900          Transfer Assessment/Treatment    Transfer Assessment/Treatment  sit-stand transfer;stand-sit transfer;toilet transfer  -LJ  sit-stand transfer;stand-sit transfer;toilet transfer  -LJ     Recorded by [LJ] Marta Oliver MENARD/L 11/21/18 0959 [LJ] Marta Oliver,  MENARD/L 11/21/18 0958     Row Name 11/21/18 0920 11/21/18 0900          Sit-Stand Transfer    Sit-Stand [place] (Transfers)  supervision  -LJ  supervision  -LJ     Recorded by [LJ] Marta Oliver, MENARD/L 11/21/18 0959 [LJ] Marta Oliver, MENARD/L 11/21/18 0958     Row Name 11/21/18 0920 11/21/18 0900          Stand-Sit Transfer    Stand-Sit [place] (Transfers)  supervision  -LJ  supervision  -LJ     Recorded by [LJ] Marta Oliver, MENARD/L 11/21/18 0959 [LJ] Marta Oliver, MENARD/L 11/21/18 0958     Row Name 11/21/18 0920 11/21/18 0900          Toilet Transfer    Type (Toilet Transfer)  sit-stand;stand-sit  -LJ  sit-stand;stand-sit  -LJ     [place] Level (Toilet Transfer)  supervision  -LJ  supervision  -LJ     Recorded by [LJ] Marta Oliver, MENARD/L 11/21/18 0959 [LJ] Marta Oliver, MENARD/L 11/21/18 0958     Row Name 11/21/18 0900             ADL Assessment/Intervention    05362 - OT Self Care/Mgmt Minutes  20  -LJ      BADL Assessment/Intervention  toileting;grooming  -LJ      Recorded by [LJ] Marta Oliver, MENARD/L 11/21/18 0958      Row Name 11/21/18 0920 11/21/18 0900          Grooming Assessment/Training    [place] Level (Grooming)  set up;independent  -LJ  set up;independent  -LJ     Grooming Position  edge of bed sitting  -LJ  edge of bed sitting  -LJ     Recorded by [LJ] Marta Oliver, MENARD/L 11/21/18 0959 [LJ] Marta Oliver, MENARD/L 11/21/18 0958     Row Name 11/21/18 0920 11/21/18 0900          Toileting Assessment/Training    [place] Level (Toileting)  toileting skills;supervision  -LJ  toileting skills;supervision  -LJ     Toileting Position  supported sitting;unsupported standing  -LJ  supported sitting;unsupported standing  -LJ     Recorded by [LJ] Marta Oliver MENARD/L 11/21/18 0959 [LJ] Marta Oliver, SAILAJA/L 11/21/18 0958     Row Name 11/21/18 0900             BADL Safety/Performance    Skilled BADL Treatment/Intervention  energy  conservation  -LJ      Progress in BADL Status  improvement noted  -LJ      Recorded by [LJ] Marta Oliver, MENARD/L 11/21/18 0958      Row Name 11/21/18 0900             Therapeutic Exercise    62602 - OT Therapeutic Exercise Minutes  10  -LJ      Recorded by [LJ] Marta Oliver, MENARD/L 11/21/18 0958      Row Name 11/21/18 0900             Therapeutic Exercise    Upper Extremity Range of Motion (Therapeutic Exercise)  shoulder flexion/extension, bilateral  -LJ      Weight/Resistance (Therapeutic Exercise)  1 pound  -LJ      Exercise Type (Therapeutic Exercise)  AROM (active range of motion)  -LJ      Position (Therapeutic Exercise)  -- sitting unsupported at EOB  -LJ      Sets/Reps (Therapeutic Exercise)  -- 10 x 2  -LJ      Equipment (Therapeutic Exercise)  free weight, barbell  -LJ      Recorded by [LJ] Marta Oliver MENARD/L 11/21/18 0958      Row Name 11/21/18 0900             Positioning and Restraints    Pre-Treatment Position  in bed  -LJ      Post Treatment Position  bed  -LJ      In Bed  fowlers;call light within reach;encouraged to call for assist  -LJ      Recorded by [LJ] Marta Oliver MENARD/L 11/21/18 0958      Row Name 11/21/18 0920 11/21/18 0900          Pain Scale: Numbers Pre/Post-Treatment    Pain Scale: Numbers, Pretreatment  0/10 - no pain  -LJ  0/10 - no pain  -LJ     Pain Scale: Numbers, Post-Treatment  0/10 - no pain  -LJ  0/10 - no pain  -LJ     Recorded by [LJ] Marta Oliver, MENARD/L 11/21/18 0959 [LJ] Marta Oliver MENARD/L 11/21/18 0958     Row Name 11/21/18 0920 11/21/18 0900          Outcome Summary/Treatment Plan (OT)    Daily Summary of Progress (OT)  --  progress toward functional goals is good  -     Plan for Continued Treatment (OT)  --  -- continue OT per poc  -SADIQ     Anticipated Discharge Disposition (OT)  home with assist  -LJ  home with assist  -LJ     Recorded by [LJ] Marta Oliver, MENARD/L 11/21/18 0959 [LJ] Marta Oliver, MENARD/L 11/21/18  0958       User Key  (r) = Recorded By, (t) = Taken By, (c) = Cosigned By    Initials Name Effective Dates Discipline    Marta Drake COTA/L 05/22/18 -  OT           OT Rehab Goals     Row Name 11/21/18 0920             Bathing Goal 1 (OT)    Activity/Assistive Device (Bathing Goal 1, OT)  bathing skills, all  -LJ      [place] Level/Cues Needed (Bathing Goal 1, OT)  independent  -LJ      Time Frame (Bathing Goal 1, OT)  long term goal (LTG);by discharge  -LJ      Progress/Outcomes (Bathing Goal 1, OT)  goal not met  -LJ         Toileting Goal 1 (OT)    Activity/Device (Toileting Goal 1, OT)  toileting skills, all  -LJ      [place] Level/Cues Needed (Toileting Goal 1, OT)  independent  -LJ      Time Frame (Toileting Goal 1, OT)  long term goal (LTG);by discharge  -LJ      Progress/Outcome (Toileting Goal 1, OT)  goal not met  -LJ        User Key  (r) = Recorded By, (t) = Taken By, (c) = Cosigned By    Initials Name Provider Type Discipline    Marta Drake COTA/L Occupational Therapy Assistant OT        Occupational Therapy Education     Title: PT OT SLP Therapies (Active)     Topic: Occupational Therapy (Done)     Point: ADL training (Done)     Description: Instruct learner(s) on proper safety adaptation and remediation techniques during self care or transfers.   Instruct in proper use of assistive devices.    Learning Progress Summary           Patient Acceptance, E,TB, VU by SADIQ at 11/21/2018 10:04 AM                   Point: Home exercise program (Done)     Description: Instruct learner(s) on appropriate technique for monitoring, assisting and/or progressing therapeutic exercises/activities.    Learning Progress Summary           Patient Acceptance, E,TB, VU by SADIQ at 11/21/2018 10:04 AM                   Point: Precautions (Done)     Description: Instruct learner(s) on prescribed precautions during self-care and functional transfers.    Learning Progress Summary           Patient  Acceptance, E,TB, VU by SADIQ at 11/21/2018 10:04 AM    Acceptance, E, VU by  at 11/19/2018  3:52 PM    Comment:  OT role, OT POC,                   Point: Body mechanics (Done)     Description: Instruct learner(s) on proper positioning and spine alignment during self-care, functional mobility activities and/or exercises.    Learning Progress Summary           Patient Acceptance, E,TB, VU by SADIQ at 11/21/2018 10:04 AM                               User Key     Initials Effective Dates Name Provider Type Discipline    SADIQ 05/22/18 -  Marta Oliver COTA/L Occupational Therapy Assistant OT     10/12/18 -  Melissa Santillan Occupational Therapist OT                OT Recommendation and Plan  Outcome Summary/Treatment Plan (OT)  Daily Summary of Progress (OT): progress toward functional goals is good  Plan for Continued Treatment (OT): (continue OT per poc)  Anticipated Discharge Disposition (OT): home with assist  Therapy Frequency (OT Eval): (5-7 x/wk)  Daily Summary of Progress (OT): progress toward functional goals is good  Plan of Care Review  Plan of Care Reviewed With: patient  Plan of Care Reviewed With: patient  Outcome Summary: Pt. reports feeling better this date and tolerated tx well overall; sat unsupported at EOB x 15 minutes to engage in grooming and BUE exercise tasks; required (S) to ambulate to bathroom and complete toileting task; O2 dropped to 83 % on 5 liters with toilieting, recovered to 88% with rest.  91% at end of session in Fowlers position. Continue OT per poc, with foucs on increased activity tolerance.  Outcome Measures     Row Name 11/19/18 1504 11/19/18 1500          How much help from another person do you currently need...    Turning from your back to your side while in flat bed without using bedrails?  4  -KW  --     Moving from lying on back to sitting on the side of a flat bed without bedrails?  4  -KW  --     Moving to and from a bed to a chair (including a wheelchair)?  4  -KW   --     Standing up from a chair using your arms (e.g., wheelchair, bedside chair)?  4  -KW  --     Climbing 3-5 steps with a railing?  3  -KW  --     To walk in hospital room?  3  -KW  --     AM-PAC 6 Clicks Score  22  -KW  --        How much help from another is currently needed...    Putting on and taking off regular lower body clothing?  --  3  -MH     Bathing (including washing, rinsing, and drying)  --  3  -MH     Toileting (which includes using toilet bed pan or urinal)  --  3  -MH     Putting on and taking off regular upper body clothing  --  4  -MH     Taking care of personal grooming (such as brushing teeth)  --  4  -MH     Eating meals  --  4  -MH     Score  --  21  -MH        Functional Assessment    Outcome Measure Options  AM-PAC 6 Clicks Basic Mobility (PT)  -KW  AM-[facility] 6 Clicks Daily Activity (OT)  -       User Key  (r) = Recorded By, (t) = Taken By, (c) = Cosigned By    Initials Name Provider Type    oJan Corley, DIXIE Physical Therapist     Melissa Santillan Occupational Therapist           Time Calculation:   Time Calculation- OT     Row Name 11/21/18 1004 11/21/18 0900          Time Calculation- OT    OT Start Time  0920 -LJ  --     OT Stop Time  0950 -LJ  --     OT Time Calculation (min)  30 min  -LJ  --     OT Received On  11/21/18 -LJ  --        Timed Charges    21138 - OT Therapeutic Exercise Minutes  10  -LJ  10  -LJ     14475 - OT Self Care/Mgmt Minutes  20  -LJ  20  -LJ       User Key  (r) = Recorded By, (t) = Taken By, (c) = Cosigned By    Initials Name Provider Type    Marta Drake COTA/L Occupational Therapy Assistant           Therapy Suggested Charges     Code   Minutes Charges    87226 (CPT®) Hc Ot Neuromusc Re Education Ea 15 Min      55436 (CPT®) Hc Ot Ther Proc Ea 15 Min 10 1    14311 (CPT®) Hc Ot Therapeutic Act Ea 15 Min      67798 (CPT®) Hc Ot Manual Therapy Ea 15 Min      97052 (CPT®) Hc Ot Iontophoresis Ea 15 Min      38280 (CPT®) Hc Ot Elec Stim Ea-Per  15 Min      07175 (CPT®) Hc Ot Ultrasound Ea 15 Min      78956 (CPT®) Hc Ot Self Care/Mgmt/Train Ea 15 Min 20 1    Total  30 2        Therapy Charges for Today     Code Description Service Date Service Provider Modifiers Qty    06963340556 HC OT SELF CARE/MGMT/TRAIN EA 15 MIN 11/21/2018 Marta Oliver, MENARD/L GO 1    01250789208 HC OT THER PROC EA 15 MIN 11/21/2018 Marta Oliver, MENARD/L GO 1          OT G-codes  OT Professional Judgement Used?: Yes  OT Functional Scales Options: AM-PAC 6 Clicks Daily Activity (OT)  Score: 21  Functional Limitation: Self care  Self Care Current Status (): At least 20 percent but less than 40 percent impaired, limited or restricted  Self Care Goal Status (): At least 1 percent but less than 20 percent impaired, limited or restricted    SAILAJA Leon/MATI  11/21/2018

## 2018-11-21 NOTE — PLAN OF CARE
Problem: Patient Care Overview  Goal: Plan of Care Review  Outcome: Ongoing (interventions implemented as appropriate)   11/21/18 1000   Coping/Psychosocial   Plan of Care Reviewed With patient   Plan of Care Review   Progress improving   OTHER   Outcome Summary Pt. reports feeling better this date and tolerated tx well overall; sat unsupported at EOB x 15 minutes to engage in grooming and BUE exercise tasks; required (S) to ambulate to bathroom and complete toileting task; O2 dropped to 83 % on 5 liters with toilieting, recovered to 88% with rest. 91% at end of session in Fowlers position. Continue OT per poc, with foucs on increased activity tolerance.

## 2018-11-21 NOTE — CONSULTS
Adult Nutrition  Assessment    Patient Name:  Monisha Brasher  YOB: 1955  MRN: [id number]  Admit Date:  11/15/2018    Assessment Date:  11/21/2018    Comments:  RD consult r/t LOS. Pt admitted r/t STEMI, now s/p stenting. Reports appetite is improving each day. Denies gi distress. Normal appetite/intake/stable weight pta. RD reviewed heart healthy diet guidelines and encouraged fresh fruit/vegs/meats at meals. RD will monitor.     Reason for Assessment     Row Name 11/21/18 1313          Reason for Assessment    Reason For Assessment  per organizational policy LOS     Diagnosis  cardiac disease     Identified At Risk by Screening Criteria  no indicators present         Nutrition/Diet History     Row Name 11/21/18 1313          Nutrition/Diet History    Typical Food/Fluid Intake  Pt says her appetite is improving.  Getting foods she requests. No other prefs to note.          Anthropometrics     Row Name 11/21/18 0612          Anthropometrics    Weight  71.3 kg (157 lb 3.2 oz)         Labs/Tests/Procedures/Meds     Row Name 11/21/18 1316          Labs/Procedures/Meds    Lab Results Reviewed  reviewed, pertinent     Lab Results Comments  Gl 159-166        Diagnostic Tests/Procedures    Diagnostic Test/Procedure Reviewed  reviewed, pertinent     Diagnostic Test/Procedures Comments  cardiac stenting        Medications    Pertinent Medications Reviewed  reviewed           Estimated/Assessed Needs     Row Name 11/21/18 1316          Calculation Measurements    Weight Used For Calculations  49.9 kg (110 lb)        Estimated/Assessed Needs    Additional Documentation  Fluid Requirements (Group);Protein Requirements (Group);Calorie Requirements (Group)        Calorie Requirements    Estimated Calorie Requirement (kcal/day)  1500        KCAL/KG    14 Kcal/Kg (kcal)  698.54     15 Kcal/Kg (kcal)  748.44     18 Kcal/Kg (kcal)  898.13     20 Kcal/Kg (kcal)  997.92     25 Kcal/Kg (kcal)  1247.4     30 Kcal/Kg  (kcal)  1496.88     35 Kcal/Kg (kcal)  1746.36     40 Kcal/Kg (kcal)  1995.84     45 Kcal/Kg (kcal)  2245.32     50 Kcal/Kg (kcal)  2494.8        [place]-St. Jeor Equation    RMR ([place]-St. Jeor Equation)  1017.21        Protein Requirements    Est Protein Requirement Amount (gms/kg)  0.8 gm protein     Estimated Protein Requirements (gms/day)  39.92        Fluid Requirements    Estimated Fluid Requirements (mL/day)  1500     RDA Method (mL)  1500     Oto-Segar Method (over 20 kg)  2497.92         Nutrition Prescription Ordered     Row Name 11/21/18 1317          Nutrition Prescription PO    Current PO Diet  Regular     Fluid Consistency  Thin     Common Modifiers  Cardiac         Evaluation of Received Nutrient/Fluid Intake     Row Name 11/21/18 1317 11/21/18 1316       Calculation Measurements    Weight Used For Calculations  --  49.9 kg (110 lb)       PO Evaluation    Number of Meals  6  --    % PO Intake  25% x 2, 50% x 2, 75% x 2  --        Evaluation of Prescribed Nutrient/Fluid Intake     Row Name 11/21/18 1316          Calculation Measurements    Weight Used For Calculations  49.9 kg (110 lb)             Electronically signed by:  Jennifer Porter RD  11/21/18 1:18 PM

## 2018-11-22 LAB
ALBUMIN SERPL-MCNC: 3.6 G/DL (ref 3.4–4.8)
ALBUMIN/GLOB SERPL: 1.3 G/DL (ref 1.1–1.8)
ALP SERPL-CCNC: 83 U/L (ref 38–126)
ALT SERPL W P-5'-P-CCNC: 58 U/L (ref 9–52)
ANION GAP SERPL CALCULATED.3IONS-SCNC: 7 MMOL/L (ref 5–15)
AST SERPL-CCNC: 39 U/L (ref 14–36)
BILIRUB SERPL-MCNC: 0.6 MG/DL (ref 0.2–1.3)
BUN BLD-MCNC: 24 MG/DL (ref 7–21)
BUN/CREAT SERPL: 35.3 (ref 7–25)
CALCIUM SPEC-SCNC: 8.9 MG/DL (ref 8.4–10.2)
CHLORIDE SERPL-SCNC: 91 MMOL/L (ref 95–110)
CO2 SERPL-SCNC: 37 MMOL/L (ref 22–31)
CREAT BLD-MCNC: 0.68 MG/DL (ref 0.5–1)
DEPRECATED RDW RBC AUTO: 44.1 FL (ref 36.4–46.3)
ERYTHROCYTE [DISTWIDTH] IN BLOOD BY AUTOMATED COUNT: 13.4 % (ref 11.5–14.5)
GFR SERPL CREATININE-BSD FRML MDRD: 88 ML/MIN/1.73 (ref 45–104)
GLOBULIN UR ELPH-MCNC: 2.7 GM/DL (ref 2.3–3.5)
GLUCOSE BLD-MCNC: 268 MG/DL (ref 60–100)
HCT VFR BLD AUTO: 37.2 % (ref 35–45)
HGB BLD-MCNC: 12.8 G/DL (ref 12–15.5)
HOLD SPECIMEN: NORMAL
MCH RBC QN AUTO: 30.8 PG (ref 26.5–34)
MCHC RBC AUTO-ENTMCNC: 34.4 G/DL (ref 31.4–36)
MCV RBC AUTO: 89.6 FL (ref 80–98)
PLATELET # BLD AUTO: 310 10*3/MM3 (ref 150–450)
PMV BLD AUTO: 9.1 FL (ref 8–12)
POTASSIUM BLD-SCNC: 3 MMOL/L (ref 3.5–5.1)
PROT SERPL-MCNC: 6.3 G/DL (ref 6.3–8.6)
RBC # BLD AUTO: 4.15 10*6/MM3 (ref 3.77–5.16)
SODIUM BLD-SCNC: 135 MMOL/L (ref 137–145)
WBC NRBC COR # BLD: 14.11 10*3/MM3 (ref 3.2–9.8)

## 2018-11-22 PROCEDURE — 25010000002 METHYLPREDNISOLONE PER 125 MG: Performed by: INTERNAL MEDICINE

## 2018-11-22 PROCEDURE — 94799 UNLISTED PULMONARY SVC/PX: CPT

## 2018-11-22 PROCEDURE — 97116 GAIT TRAINING THERAPY: CPT

## 2018-11-22 PROCEDURE — 80053 COMPREHEN METABOLIC PANEL: CPT | Performed by: INTERNAL MEDICINE

## 2018-11-22 PROCEDURE — 94760 N-INVAS EAR/PLS OXIMETRY 1: CPT

## 2018-11-22 PROCEDURE — 85027 COMPLETE CBC AUTOMATED: CPT | Performed by: INTERNAL MEDICINE

## 2018-11-22 PROCEDURE — 25010000002 FUROSEMIDE PER 20 MG: Performed by: INTERNAL MEDICINE

## 2018-11-22 PROCEDURE — 25010000002 ENOXAPARIN PER 10 MG: Performed by: HOSPITALIST

## 2018-11-22 PROCEDURE — 25010000002 PIPERACILLIN SOD-TAZOBACTAM PER 1 G: Performed by: INTERNAL MEDICINE

## 2018-11-22 PROCEDURE — 97530 THERAPEUTIC ACTIVITIES: CPT

## 2018-11-22 RX ADMIN — BUDESONIDE 0.5 MG: 0.5 INHALANT RESPIRATORY (INHALATION) at 19:42

## 2018-11-22 RX ADMIN — ATORVASTATIN CALCIUM 40 MG: 40 TABLET, FILM COATED ORAL at 20:23

## 2018-11-22 RX ADMIN — FUROSEMIDE 40 MG: 10 INJECTION, SOLUTION INTRAMUSCULAR; INTRAVENOUS at 20:24

## 2018-11-22 RX ADMIN — IPRATROPIUM BROMIDE AND ALBUTEROL SULFATE 3 ML: 2.5; .5 SOLUTION RESPIRATORY (INHALATION) at 11:02

## 2018-11-22 RX ADMIN — Medication 12.5 MG: at 09:04

## 2018-11-22 RX ADMIN — MONTELUKAST SODIUM 10 MG: 10 TABLET, FILM COATED ORAL at 20:23

## 2018-11-22 RX ADMIN — IPRATROPIUM BROMIDE AND ALBUTEROL SULFATE 3 ML: 2.5; .5 SOLUTION RESPIRATORY (INHALATION) at 14:50

## 2018-11-22 RX ADMIN — POTASSIUM CHLORIDE 40 MEQ: 750 CAPSULE, EXTENDED RELEASE ORAL at 16:17

## 2018-11-22 RX ADMIN — Medication 100 MG: at 09:04

## 2018-11-22 RX ADMIN — TICAGRELOR 90 MG: 90 TABLET ORAL at 09:04

## 2018-11-22 RX ADMIN — BUDESONIDE 0.5 MG: 0.5 INHALANT RESPIRATORY (INHALATION) at 07:37

## 2018-11-22 RX ADMIN — METHYLPREDNISOLONE SODIUM SUCCINATE 60 MG: 125 INJECTION, POWDER, FOR SOLUTION INTRAMUSCULAR; INTRAVENOUS at 09:23

## 2018-11-22 RX ADMIN — PIPERACILLIN SODIUM,TAZOBACTAM SODIUM 3.38 G: 3; .375 INJECTION, POWDER, FOR SOLUTION INTRAVENOUS at 15:20

## 2018-11-22 RX ADMIN — POTASSIUM CHLORIDE 40 MEQ: 750 CAPSULE, EXTENDED RELEASE ORAL at 20:24

## 2018-11-22 RX ADMIN — ENOXAPARIN SODIUM 40 MG: 40 INJECTION SUBCUTANEOUS at 20:24

## 2018-11-22 RX ADMIN — ZOLPIDEM TARTRATE 5 MG: 5 TABLET ORAL at 20:24

## 2018-11-22 RX ADMIN — PIPERACILLIN SODIUM,TAZOBACTAM SODIUM 3.38 G: 3; .375 INJECTION, POWDER, FOR SOLUTION INTRAVENOUS at 06:01

## 2018-11-22 RX ADMIN — POTASSIUM CHLORIDE 40 MEQ: 750 CAPSULE, EXTENDED RELEASE ORAL at 12:13

## 2018-11-22 RX ADMIN — LABETALOL HYDROCHLORIDE 50 MG: 100 TABLET, FILM COATED ORAL at 20:25

## 2018-11-22 RX ADMIN — METHYLPREDNISOLONE SODIUM SUCCINATE 60 MG: 125 INJECTION, POWDER, FOR SOLUTION INTRAMUSCULAR; INTRAVENOUS at 20:24

## 2018-11-22 RX ADMIN — PIPERACILLIN SODIUM,TAZOBACTAM SODIUM 3.38 G: 3; .375 INJECTION, POWDER, FOR SOLUTION INTRAVENOUS at 21:41

## 2018-11-22 RX ADMIN — ASPIRIN 81 MG CHEWABLE TABLET 81 MG: 81 TABLET CHEWABLE at 09:04

## 2018-11-22 RX ADMIN — FUROSEMIDE 40 MG: 10 INJECTION, SOLUTION INTRAMUSCULAR; INTRAVENOUS at 09:08

## 2018-11-22 RX ADMIN — IPRATROPIUM BROMIDE AND ALBUTEROL SULFATE 3 ML: 2.5; .5 SOLUTION RESPIRATORY (INHALATION) at 07:35

## 2018-11-22 RX ADMIN — IPRATROPIUM BROMIDE AND ALBUTEROL SULFATE 3 ML: 2.5; .5 SOLUTION RESPIRATORY (INHALATION) at 19:42

## 2018-11-22 RX ADMIN — Medication 100 MG: at 20:23

## 2018-11-22 RX ADMIN — METHYLPREDNISOLONE SODIUM SUCCINATE 60 MG: 125 INJECTION, POWDER, FOR SOLUTION INTRAMUSCULAR; INTRAVENOUS at 02:28

## 2018-11-22 RX ADMIN — METHYLPREDNISOLONE SODIUM SUCCINATE 60 MG: 125 INJECTION, POWDER, FOR SOLUTION INTRAMUSCULAR; INTRAVENOUS at 16:13

## 2018-11-22 RX ADMIN — LABETALOL HYDROCHLORIDE 50 MG: 100 TABLET, FILM COATED ORAL at 09:04

## 2018-11-22 RX ADMIN — TICAGRELOR 90 MG: 90 TABLET ORAL at 20:23

## 2018-11-22 NOTE — THERAPY TREATMENT NOTE
Acute Care - Physical Therapy Treatment Note  [facility]     Patient Name: Monisha Brasher  : 1955  MRN: [id number]  Today's Date: 2018  Onset of Illness/Injury or Date of Surgery: 11/15/18  Date of Referral to PT: 18  Referring Physician: BLAYNE Lomax MD    Admit Date: 11/15/2018    Visit Dx:    ICD-10-CM ICD-9-CM   1. Acute ST elevation myocardial infarction (STEMI), unspecified artery (CMS/HCC) I21.3 410.90   2. Acute myocardial infarction of posterolateral wall (CMS/HCC) I21.29 410.50   3. HTN (hypertension), benign I10 401.1   4. Impaired functional mobility, balance, gait, and endurance Z74.09 V49.89   5. Impaired mobility and activities of daily living Z74.09 799.89     Patient Active Problem List   Diagnosis   • COPD bronchitis   • Cellulitis of left leg   • Asthma   • SOB (shortness of breath)   • Chronic bronchitis with acute exacerbation (CMS/HCC)   • Nicotine abuse   • Hypertension   • Screening for osteoporosis   • Panlobular emphysema (CMS/HCC)   • Anxiety   • General medical exam   • Malaise   • Hyperglycemia   • Acute ST elevation myocardial infarction (STEMI) (CMS/HCC)   • CAD (coronary artery disease)   • Acute on chronic respiratory failure with hypoxia (CMS/HCC)   • Depression   • Chronic alcohol abuse   • Pneumonia of both upper lobes       Therapy Treatment    Rehabilitation Treatment Summary     Row Name 18 1415             Treatment Time/Intention    Discipline  physical therapy assistant  -LN      Document Type  therapy note (daily note)  -LN      Subjective Information  no complaints  -LN      Mode of Treatment  physical therapy  -LN      Therapy Frequency (PT Clinical Impression)  other (see comments) 5-7x/week  -LN      Patient Effort  good  -LN      Existing Precautions/Restrictions  fall  -LN      Recorded by [LN] Maki Osullivan PTA 18 6369      Row Name 18 1413             Vital Signs    Post Systolic BP Rehab  146  -LN      Post Treatment  Diastolic BP  80  -LN      Pretreatment Heart Rate (beats/min)  71  -LN      Intratreatment Heart Rate (beats/min)  98  -LN      Posttreatment Heart Rate (beats/min)  75  -LN      Pre SpO2 (%)  96  -LN      O2 Delivery Pre Treatment  supplemental O2  -LN      Intra SpO2 (%)  91  -LN      Post SpO2 (%)  93  -LN      Pre Patient Position  Supine  -LN      Intra Patient Position  Standing  -LN      Post Patient Position  Sitting  -LN      Recorded by [LN] Maki Osullivan, [facility] 11/22/18 1549      Row Name 11/22/18 1415             Cognitive Assessment/Intervention- PT/OT    Affect/Mental Status (Cognitive)  WFL  -LN      Orientation Status (Cognition)  oriented x 4  -LN      Follows Commands (Cognition)  WFL  -LN      Cognitive Function (Cognitive)  WFL  -LN      Recorded by [LN] Maki Osullivan, [facility] 11/22/18 1549      Row Name 11/22/18 1415             Safety Issues, Functional Mobility    Impairments Affecting Function (Mobility)  balance;endurance/activity tolerance;shortness of breath  -LN      Recorded by [LN] Maki Osullivan, [facility] 11/22/18 1549      Row Name 11/22/18 1415             Bed Mobility Assessment/Treatment    Bed Mobility Assessment/Treatment  sit-supine;supine-sit  -LN      Supine-Sit [place] (Bed Mobility)  supervision  -LN      Sit-Supine [place] (Bed Mobility)  supervision  -LN      Assistive Device (Bed Mobility)  bed rails;head of bed elevated  -LN      Recorded by [LN] Maki Osullivan, [facility] 11/22/18 1549      Row Name 11/22/18 1415             Transfer Assessment/Treatment    Transfer Assessment/Treatment  sit-stand transfer;stand-sit transfer  -LN      Recorded by [LN] Maki Osullivan, [facility] 11/22/18 1549      Row Name 11/22/18 1415             Sit-Stand Transfer    Sit-Stand [place] (Transfers)  supervision;independent  -LN      Assistive Device (Sit-Stand Transfers)  -- none  -LN      Recorded by [LN] Maki Osullivan, [facility] 11/22/18 1549      Row Name 11/22/18 1415             Stand-Sit Transfer     Stand-Sit [place] (Transfers)  supervision;independent  -LN      Assistive Device (Stand-Sit Transfers)  -- none  -LN      Recorded by [LN] Maki Osullivan, [facility] 11/22/18 1549      Row Name 11/22/18 1415             Toilet Transfer    [place] Level (Toilet Transfer)  not tested  -LN      Recorded by [LN] Maki Osullivan, [facility] 11/22/18 1549      Row Name 11/22/18 1415             Gait/Stairs Assessment/Training    Gait/Stairs Assessment/Training  gait/ambulation independence;distance ambulated;gait pattern;gait deviations  -LN      [place] Level (Gait)  contact guard;supervision  -LN      Assistive Device (Gait)  -- none  -LN      Distance in Feet (Gait)  115,133,35  -LN      Pattern (Gait)  step-through  -LN      Deviations/Abnormal Patterns (Gait)  gait speed decreased  -LN      Comment (Gait/Stairs)  slow steady pace-held to [place] rail occasionally no lob noted when walking w/o holding to rail  -LN      Recorded by [LN] Maki Osullivan, [facility] 11/22/18 1549      Row Name 11/22/18 1415             Positioning and Restraints    Post Treatment Position  bed  -LN      In Bed  sitting;notified nsg;call light within reach;encouraged to call for assist;with family/caregiver  -LN      Recorded by [LN] Maki Osullivan, [facility] 11/22/18 1549      Row Name 11/22/18 1415             Pain Scale: Numbers Pre/Post-Treatment    Pain Scale: Numbers, Pretreatment  0/10 - no pain  -LN      Pain Scale: Numbers, Post-Treatment  0/10 - no pain  -LN      Recorded by [LN] Maki Osullivan, [facility] 11/22/18 1549      Row Name 11/22/18 1415             Outcome Summary/Treatment Plan (PT)    Anticipated Discharge Disposition (PT)  home with assist  -LN      Recorded by [LN] Maki Osullivan, [facility] 11/22/18 1549        User Key  (r) = Recorded By, (t) = Taken By, (c) = Cosigned By    Initials Name Effective Dates Discipline    LN Maki Osullivan, PTA 03/07/18 -  PT               PT Rehab Goals     Row [facility] 11/22/18 1415             Bed Mobility Goal 1  (PT)    Activity/Assistive Device (Bed Mobility Goal 1, PT)  rolling to left;rolling to right;sit to supine;scooting;supine to sit  -LN      [place] Level/Cues Needed (Bed Mobility Goal 1, PT)  independent  -LN      Time Frame (Bed Mobility Goal 1, PT)  2 days  -LN      Progress/Outcomes (Bed Mobility Goal 1, PT)  goal not met  -LN         Transfer Goal 1 (PT)    Activity/Assistive Device (Transfer Goal 1, PT)  sit-to-stand/stand-to-sit;bed-to-chair/chair-to-bed  -LN      [place] Level/Cues Needed (Transfer Goal 1, PT)  conditional independence  -LN      Time Frame (Transfer Goal 1, PT)  3 days  -LN      Progress/Outcome (Transfer Goal 1, PT)  goal not met  -LN         Gait Training Goal 1 (PT)    Activity/Assistive Device (Gait Training Goal 1, PT)  gait (walking locomotion);increase endurance/gait distance  -LN      [place] Level (Gait Training Goal 1, PT)  conditional independence  -LN      Distance (Gait Goal 1, PT)  150 ft or more with SpO2 >90  -LN      Time Frame (Gait Training Goal 1, PT)  by discharge  -LN      Progress/Outcome (Gait Training Goal 1, PT)  goal not met  -LN         Stairs Goal 1 (PT)    Activity/Assistive Device (Stairs Goal 1, PT)  ascending stairs;descending stairs  -LN      [place] Level/Cues Needed (Stairs Goal 1, PT)  conditional independence  -LN      Number of Stairs (Stairs Goal 1, PT)  4  -LN      Time Frame (Stairs Goal 1, PT)  by discharge  -LN      Progress/Outcome (Stairs Goal 1, PT)  goal not met  -LN        User Key  (r) = Recorded By, (t) = Taken By, (c) = Cosigned By    Initials Name Provider Type Discipline    Maki Davis PTA Physical Therapy Assistant PT          Physical Therapy Education     Title: PT OT SLP Therapies (Active)     Topic: Physical Therapy (Active)     Point: Mobility training (Done)     Learning Progress Summary           Patient Acceptance, E,TB, VU by ZAYDA at 11/22/2018  3:50 PM    Acceptance, E, VU by SAUD at 11/19/2018  3:46 PM     Comment:  Role of PT, POC, use of gait belt                   Point: Precautions (Done)     Learning Progress Summary           Patient Acceptance, E,TB, VU by ZAYDA at 11/22/2018  3:50 PM    Acceptance, E, VU by KW at 11/19/2018  3:46 PM    Comment:  Role of PT, POC, use of gait belt                               User Key     Initials Effective Dates Name Provider Type Discipline    LN 03/07/18 -  Maki Osullivan PTA Physical Therapy Assistant PT    SAUD 07/23/18 -  Joan Dueñas PT Physical Therapist PT                PT Recommendation and Plan  Anticipated Discharge Disposition (PT): home with assist  Therapy Frequency (PT Clinical Impression): other (see comments)(5-7x/week)  Outcome Summary/Treatment Plan (PT)  Anticipated Discharge Disposition (PT): home with assist  Plan of Care Reviewed With: patient  Progress: improving  Outcome Summary: sup-sit sba,sit-stand-sit sba/ind,amb 115,133,35 w/o ad and sba occasionally holding to rail-no lob noted when holding/not holding rail-no goals met-if d/c would benefit from hh pt/ot-sats dropped to 91% with  Outcome Measures     Row Name 11/22/18 1415             How much help from another person do you currently need...    Turning from your back to your side while in flat bed without using bedrails?  3  -LN      Moving from lying on back to sitting on the side of a flat bed without bedrails?  3  -LN      Moving to and from a bed to a chair (including a wheelchair)?  3  -LN      Standing up from a chair using your arms (e.g., wheelchair, bedside chair)?  3  -LN      Climbing 3-5 steps with a railing?  3  -LN      To walk in hospital room?  3  -LN      AM-PAC 6 Clicks Score  18  -LN         Functional Assessment    Outcome Measure Options  AM-PAC 6 Clicks Basic Mobility (PT)  -LN        User Key  (r) = Recorded By, (t) = Taken By, (c) = Cosigned By    Initials Name Provider Type    Maki Davis PTA Physical Therapy Assistant         Time Calculation:   PT Charges      Row Name 11/22/18 1552             Time Calculation    Start Time  1415  -LN      Stop Time  1440  -LN      Time Calculation (min)  25 min  -LN      PT Received On  11/22/18  -LN         Time Calculation- PT    Total Timed Code Minutes- PT  25 minute(s)  -LN        User Key  (r) = Recorded By, (t) = Taken By, (c) = Cosigned By    Initials Name Provider Type    LN Maki Osullivan PTA Physical Therapy Assistant        Therapy Suggested Charges     Code   Minutes Charges    None           Therapy Charges for Today     Code Description Service Date Service Provider Modifiers Qty    02068101908 HC PT THERAPEUTIC ACT EA 15 MIN 11/22/2018 Maki Osullivan PTA GP 1    01242721535 HC GAIT TRAINING EA 15 MIN 11/22/2018 Maki Osullivan PTA GP 1          PT G-Codes  PT Professional Judgement Used?: Yes  Outcome Measure Options: AM-PAC 6 Clicks Basic Mobility (PT)  AM-PAC 6 Clicks Score: 18  Score: 21  Functional Limitation: Mobility: Walking and moving around  Mobility: Walking and Moving Around Current Status (): At least 20 percent but less than 40 percent impaired, limited or restricted  Mobility: Walking and Moving Around Goal Status (): At least 1 percent but less than 20 percent impaired, limited or restricted    Maki Osullivan PTA  11/22/2018

## 2018-11-22 NOTE — PROGRESS NOTES
Cardiology Progress Note     LOS: 6 days   Patient Care Team:  Provider, No Known as PCP - Tiffanie Ivan APRN as PCP - Family Medicine (Family Medicine)    Subjective:    Chart reviewed. Patient seen and examined. Patient denies any chest pain, shortness of breath, or palpitation.  Patient continues to have symptoms of shortness of breath.  Patient does have x-ray findings suggestive of pneumonitis with a drop in the oxygen saturation with ambulation and would require home O2.  Patient had not had any further recurrent symptoms of chest pain.      Objective:  Temp:  [97.5 °F (36.4 °C)-98.7 °F (37.1 °C)] 98.1 °F (36.7 °C)  Heart Rate:  [76-90] 83  Resp:  [16-20] 20  BP: (137-151)/(72-82) 151/82    Intake/Output Summary (Last 24 hours) at 11/21/2018 1939  Last data filed at 11/21/2018 1320  Gross per 24 hour   Intake 840 ml   Output --   Net 840 ml       Physical Exam:   General Appearance:    Alert, oriented, cooperative, in no acute distress.   Head:    Normocephalic, atraumatic, without obvious abnormality   Eyes:              ARI. Lids and lashes normal, conjunctivae and sclerae normal, no icterus, no pallor.   Ears:    Ears appear intact with no abnormalities noted.   Throat:   Mucous membranes pink and moist.   Neck:   Supple, trachea midline, no carotid bruit, no organomegaly or JVD.   Lungs:     Clear to auscultation and percussion, respirations. regular, even and unlabored. No wheezes, rales, or rhonchi.    Heart:    Regular rhythm and normal rate, normal S1 and S2, no      murmur, no gallop, no rub, no click.   Abdomen:     Soft, non-tender, non-distended, no guarding, no rebound tenderness. Normal bowel sounds in all four quadrants, no masses, liver and spleen nonpalpable.    Genitalia:    Deferred.   Extremities:   Moves all extremities well, no edema, no cyanosis, no       redness, no clubbing.   Pulses:   Pulses palpable and equal bilaterally.   Skin:   Moist and warm. No bleeding, bruising  or rash.   Neurologic/Psychiatric:   Alert and oriented to person, place, and time.  Motor, power and tone in upper and lower extremities are grossly intact.  No focal neurological deficits. Normal cognitive function. No psychomotor reaction or tangential thought. No depression, homicidal ideations and suicidal ideations.          Results Review:    Results from last 7 days   Lab Units  11/20/18   1343  11/20/18   0815  11/19/18   0601   SODIUM mmol/L   --   138  138   POTASSIUM mmol/L  3.3*  3.3*  3.8   CHLORIDE mmol/L   --   96  99   CO2 mmol/L   --   34.0*  30.0   BUN mg/dL   --   20  12   CREATININE mg/dL   --   0.65  0.61   CALCIUM mg/dL   --   8.8  8.9   BILIRUBIN mg/dL   --    --   0.6   ALK PHOS U/L   --    --   75   ALT (SGPT) U/L   --    --   47   AST (SGOT) U/L   --    --   55*   GLUCOSE mg/dL   --   159*  166*     Results from last 7 days   Lab Units  11/20/18   1920  11/20/18   1343  11/20/18   0815   11/15/18   1041   CK TOTAL U/L   --    --   179*   --   3,625*   TROPONIN I ng/mL  2.440*  2.480*  2.400*   < >  69.800*    < > = values in this interval not displayed.         Results from last 7 days   Lab Units  11/20/18   0815   WBC 10*3/mm3  15.38*   HEMOGLOBIN g/dL  11.6*   HEMATOCRIT %  34.5*   PLATELETS 10*3/mm3  233     Results from last 7 days   Lab Units  11/15/18   0214   INR   0.92     Results from last 7 days   Lab Units  11/20/18   1343   MAGNESIUM mg/dL  2.2     Results from last 7 days   Lab Units  11/16/18   0333   CHOLESTEROL mg/dL  137   TRIGLYCERIDES mg/dL  191   HDL CHOL mg/dL  33*   LDL CHOL mg/dL  89               ECHO:  Results for orders placed during the hospital encounter of 11/15/18   Adult Transthoracic Echo Complete W/ Cont if Necessary Per Protocol    Narrative · Left ventricular wall thickness is consistent with borderline concentric   hypertrophy.  · Mild mitral valve regurgitation is present  · Mild tricuspid valve regurgitation is present.          ECG 12 Lead   Final  Result   Test Reason : chest pain   Blood Pressure : **/** mmHG   Vent. Rate : 085 BPM     Atrial Rate : 085 BPM      P-R Int : 106 ms          QRS Dur : 090 ms       QT Int : 404 ms       P-R-T Axes : 037 -27 072 degrees      QTc Int : 480 ms      Sinus rhythm with short MI   Otherwise normal ECG   When compared with ECG of 16-NOV-2018 20:36,   Nonspecific T wave abnormality now evident in Anterior leads   Confirmed by MICHAEL DUMONT, B. N. ([id number]) on 11/20/2018 8:20:44 PM      Referred By:             Confirmed By:KANE RODRIGUEZ MD      SCANNED EKG   Final Result      ECG 12 Lead   Final Result   Test Reason : Paroxysmal atrial fibrillation   Blood Pressure : **/** mmHG   Vent. Rate : 097 BPM     Atrial Rate : 097 BPM      P-R Int : 130 ms          QRS Dur : 086 ms       QT Int : 354 ms       P-R-T Axes : 088 -04 094 degrees      QTc Int : 449 ms      Normal sinus rhythm   Abnormal QRS-T angle, consider primary T wave abnormality   Abnormal ECG   When compared with ECG of 15-NOV-2018 02:46,   Vent. rate has increased BY  44 BPM   ST no longer depressed in Anterior leads   Nonspecific T wave abnormality now evident in Lateral leads   Confirmed by RANI DE ANDA ([id number]) on 11/17/2018 4:09:19 PM      Referred By:             Confirmed By:RNAI DE ANDA      SCANNED EKG   Final Result      SCANNED EKG   Final Result      SCANNED EKG   Final Result      ECG 12 Lead   Final Result   Test Reason : CP   Blood Pressure : **/** mmHG   Vent. Rate : 053 BPM     Atrial Rate : 053 BPM      P-R Int : 080 ms          QRS Dur : 088 ms       QT Int : 468 ms       P-R-T Axes : 068 041 066 degrees      QTc Int : 439 ms      Sinus bradycardia with short MI   Marked ST abnormality, possible septal subendocardial injury   Posterior myocardial infarction   ** ** ACUTE MI ** **   Abnormal ECG      Confirmed by NEVAEH COWAN MD ([id number]) on 11/15/2018 8:21:50 AM      Referred By:             Confirmed By:NEVAEH COWAN MD      ECG 12  Lead   ED Interpretation   Reese Greer MD     11/15/2018  2:53 AM   ECG 12 Lead      Date/Time: 11/15/2018 2:01 AM   Performed by: Reese Greer MD   Authorized by: Reese Greer MD    Interpreted by physician   Comments: Atrial fibrillation with rapid ventricular response rate of 136.     There is ST depression V1 through V4 and ST elevation in V5 and V6 which    appears to be consistent with acute postero-lateral STEMI         Final Result   Test Reason : chest pain   Blood Pressure : **/** mmHG   Vent. Rate : 136 BPM     Atrial Rate : 147 BPM      P-R Int : 000 ms          QRS Dur : 086 ms       QT Int : 280 ms       P-R-T Axes : 000 026 069 degrees      QTc Int : 421 ms      Atrial fibrillation with rapid ventricular response   ST elevation, consider lateral injury or acute infarct   Posterior myocardial injury   ** ** ACUTE MI ** **   Abnormal ECG      Confirmed by NEVAEH COWAN MD ([id number]) on 11/15/2018 8:21:25 AM      Referred By:             Confirmed By:NEVAEH COWAN MD           Medication Review:   Current Facility-Administered Medications   Medication Dose Route Frequency Provider Last Rate Last Dose   • amiodarone (PACERONE) half tablet 100 mg  100 mg Oral Q12H Thaddeus Huber MD   100 mg at 11/21/18 0905   • aspirin chewable tablet 81 mg  81 mg Oral Daily Thaddeus Huber MD   81 mg at 11/21/18 0905   • atorvastatin (LIPITOR) tablet 40 mg  40 mg Oral Nightly Thaddeus Huber MD   40 mg at 11/20/18 2201   • budesonide (PULMICORT) nebulizer solution 0.5 mg  0.5 mg Nebulization BID - RT Sanjeev Betancourt MD   0.5 mg at 11/21/18 1907   • chlordiazePOXIDE (LIBRIUM) capsule 5 mg  5 mg Oral 4x Daily PRN Alex Lomax MD       • enoxaparin (LOVENOX) syringe 40 mg  40 mg Subcutaneous Nightly Danyel Jacobs DO   40 mg at 11/20/18 2200   • furosemide (LASIX) injection 40 mg  40 mg Intravenous Q12H Alex Lomax MD   40 mg at 11/21/18 0906   • ipratropium-albuterol (DUO-NEB) nebulizer solution 3  mL  3 mL Nebulization 4x Daily - RT Sanjeev Betancourt MD   3 mL at 11/21/18 1901   • labetalol (NORMODYNE) tablet 50 mg  50 mg Oral Q12H Thaddeus Huber MD   50 mg at 11/21/18 0905   • losartan (COZAAR) half tablet 12.5 mg  12.5 mg Oral Daily Thaddeus Huber MD   12.5 mg at 11/21/18 0905   • Magnesium Sulfate 2 gram Bolus, followed by 8 gram infusion (total Mg dose 10 grams)- Mg less than or equal to 1mg/dL  2 g Intravenous PRN Alex Lomax MD        Or   • Magnesium Sulfate 2 gram / 50mL Infusion (GIVE X 3 BAGS TO EQUAL 6GM TOTAL DOSE) - Mg 1.1 - 1.5 mg/dl  2 g Intravenous PRN Alex Lomax MD        Or   • Magnesium Sulfate 4 gram infusion- Mg 1.6-1.9 mg/dL  4 g Intravenous PRN Alex Lomax MD       • methylPREDNISolone sodium succinate (SOLU-Medrol) injection 60 mg  60 mg Intravenous Q6H Alex Lomax MD   60 mg at 11/21/18 1622   • montelukast (SINGULAIR) tablet 10 mg  10 mg Oral Nightly Sanjeev Betancourt MD   10 mg at 11/20/18 2201   • nitroglycerin (TRIDIL) 200-5 MCG/ML-% infusion  - ADS Override Pull            • piperacillin-tazobactam (ZOSYN) 3.375 g/100 mL 0.9% NS IVPB (mbp)  3.375 g Intravenous Q8H Alex Lomax MD   3.375 g at 11/21/18 1622   • potassium chloride (KLOR-CON) packet 40 mEq  40 mEq Oral PRN Alex Lomax MD   40 mEq at 11/20/18 1621   • potassium chloride (MICRO-K) CR capsule 40 mEq  40 mEq Oral PRN Alex Lomax MD       • sodium chloride (OCEAN) nasal spray 2 spray  2 spray Each Nare PRN Thaddeus Huber MD       • ticagrelor (BRILINTA) tablet 90 mg  90 mg Oral BID Thaddeus Huber MD   90 mg at 11/21/18 0905   • zolpidem (AMBIEN) tablet 5 mg  5 mg Oral Nightly PRN Alex Lomax MD   5 mg at 11/20/18 6988       Assessment and Plan:      Acute ST elevation myocardial infarction (STEMI) (CMS/[facility])    Chronic bronchitis with acute exacerbation (CMS/[facility])    Hypertension    Anxiety    CAD (coronary artery disease)    Acute on chronic respiratory failure with hypoxia  (CMS/HCC)    Depression    Chronic alcohol abuse    Pneumonia of both upper lobes  1.  Atherosclerotic coronary artery disease.  Status post aborted posterior wall myocardial infarction with rescue Pronto thrombectomy and PTCA and stenting of the circumflex artery.  Patient has left anterior descending artery and diagonal branch stenosis which was not intervened which would need percutaneous intervention at a later time.  Patient had not had any further recurrent symptoms of chest pain.  2.  Arterial hypertension.  Patient blood pressure has been labile and elevated.  Patient labetalol would be increased if the patient blood pressure remained elevated and will be continued on the present dose of the losartan.  3.  Paroxysmal atrial fibrillation.  Patient has been maintaining normal sinus rhythm and would continue with the low dose of amiodarone.  4.  Hyperlipidemia.  Patient is currently on Lipitor 40 mg at bedtime.  5.  Chronic obstructive lung disease with pneumonitis.  Patient is currently on antibiotics.  Patient would need home oxygen on discharge.    Plan of management were discussed with the patient.    Dr. Albrecht to cover for cardiology if needed.            Thaddeus Huber MD  11/21/18  7:39 PM      Time: Time spent on face-to-face interaction 20 minutes      Dictated utilizing Dragon dictation.

## 2018-11-22 NOTE — SIGNIFICANT NOTE
Pt defers OT tx stating she did not sleep last night and needs a nap.     11/22/18 1323   Rehab Treatment   Discipline occupational therapy assistant   Reason Treatment Not Performed patient/family declined treatment

## 2018-11-22 NOTE — PLAN OF CARE
Problem: Patient Care Overview  Goal: Plan of Care Review   11/22/18 1415   Coping/Psychosocial   Plan of Care Reviewed With patient   Plan of Care Review   Progress improving   OTHER   Outcome Summary sup-sit sba,sit-stand-sit sba/ind,amb 115,133,35 w/o ad and sba occasionally holding to rail-no lob noted when holding/not holding rail-no goals met-if d/c would benefit from hh pt/ot-sats dropped to 91% with     Goal: Discharge Needs Assessment   11/15/18 1437   Discharge Needs Assessment   Concerns to be Addressed adjustment to diagnosis/illness   Patient/Family Anticipates Transition to home   Patient/Family Anticipated Services at Transition none   Transportation Concerns car, none   Transportation Anticipated family or friend will provide   Anticipated Changes Related to Illness none   Equipment Needed After Discharge (Awaiting therapy recomendations)   Discharge Facility/Level of Care Needs home with home health   Current Discharge Risk chronically ill   Disability   Equipment Currently Used at Home nebulizer

## 2018-11-22 NOTE — PLAN OF CARE
Problem: Patient Care Overview  Goal: Plan of Care Review  Outcome: Ongoing (interventions implemented as appropriate)   11/22/18 0312   Coping/Psychosocial   Plan of Care Reviewed With patient   Plan of Care Review   Progress no change   OTHER   Outcome Summary Pt still SOB, does not complain of any pain at this time.       Problem: Cardiac: ACS (Acute Coronary Syndrome) (Adult)  Goal: Signs and Symptoms of Listed Potential Problems Will be Absent, Minimized or Managed (Cardiac: ACS)  Outcome: Ongoing (interventions implemented as appropriate)      Problem: Fall Risk (Adult)  Goal: Absence of Fall  Outcome: Ongoing (interventions implemented as appropriate)      Problem: Chronic Obstructive Pulmonary Disease (Adult)  Goal: Signs and Symptoms of Listed Potential Problems Will be Absent, Minimized or Managed (Chronic Obstructive Pulmonary Disease)  Outcome: Ongoing (interventions implemented as appropriate)

## 2018-11-22 NOTE — PROGRESS NOTES
Progress Note  Alex Lomax MD  Hospitalist    Date of visit: 11/22/2018     LOS: 7 days   Patient Care Team:  Provider, No Known as PCP - Tiffanie Ivan APRN as PCP - Family Medicine (Family Medicine)    Chief Complaint: dyspnea    Subjective     Interval History:     Patient Complaints: chest pain / dyspnea improved. She remains hypoxic - improved to some extent, but still requiring O2 @ 5 L/min.    History taken from: patient / nursing    Medication Review:   Current Facility-Administered Medications   Medication Dose Route Frequency Provider Last Rate Last Dose   • amiodarone (PACERONE) half tablet 100 mg  100 mg Oral Q12H Thaddeus Huber MD   100 mg at 11/22/18 0904   • aspirin chewable tablet 81 mg  81 mg Oral Daily Thaddeus Huber MD   81 mg at 11/22/18 0904   • atorvastatin (LIPITOR) tablet 40 mg  40 mg Oral Nightly Thaddeus Huber MD   40 mg at 11/21/18 2126   • budesonide (PULMICORT) nebulizer solution 0.5 mg  0.5 mg Nebulization BID - RT Sanjeev Betancourt MD   0.5 mg at 11/22/18 0737   • chlordiazePOXIDE (LIBRIUM) capsule 5 mg  5 mg Oral 4x Daily PRN Alex Lomax MD       • enoxaparin (LOVENOX) syringe 40 mg  40 mg Subcutaneous Nightly Reynaldo, Sony T, DO   40 mg at 11/21/18 2125   • furosemide (LASIX) injection 40 mg  40 mg Intravenous Q12H Alex Lomax MD   40 mg at 11/22/18 0908   • ipratropium-albuterol (DUO-NEB) nebulizer solution 3 mL  3 mL Nebulization 4x Daily - RT Sanjeev Betancourt MD   3 mL at 11/22/18 1102   • labetalol (NORMODYNE) tablet 50 mg  50 mg Oral Q12H Thaddeus Huber MD   50 mg at 11/22/18 0904   • losartan (COZAAR) half tablet 12.5 mg  12.5 mg Oral Daily Thaddeus Huber MD   12.5 mg at 11/22/18 0904   • Magnesium Sulfate 2 gram Bolus, followed by 8 gram infusion (total Mg dose 10 grams)- Mg less than or equal to 1mg/dL  2 g Intravenous PRN Alex Lomax MD        Or   • Magnesium Sulfate 2 gram / 50mL Infusion (GIVE X 3 BAGS TO EQUAL 6GM TOTAL DOSE) - Mg  1.1 - 1.5 mg/dl  2 g Intravenous PRN Alex Lomax MD        Or   • Magnesium Sulfate 4 gram infusion- Mg 1.6-1.9 mg/dL  4 g Intravenous PRN Alex Lomax MD       • methylPREDNISolone sodium succinate (SOLU-Medrol) injection 60 mg  60 mg Intravenous Q6H Alex Lomax MD   60 mg at 11/22/18 0923   • montelukast (SINGULAIR) tablet 10 mg  10 mg Oral Nightly Sanjeev Betancourt MD   10 mg at 11/21/18 2126   • nitroglycerin (TRIDIL) 200-5 MCG/ML-% infusion  - ADS Override Pull            • piperacillin-tazobactam (ZOSYN) 3.375 g/100 mL 0.9% NS IVPB (mbp)  3.375 g Intravenous Q8H Alex Lomax MD   3.375 g at 11/22/18 0601   • potassium chloride (KLOR-CON) packet 40 mEq  40 mEq Oral PRN Alex Lomax MD   40 mEq at 11/20/18 1621   • potassium chloride (MICRO-K) CR capsule 40 mEq  40 mEq Oral PRN Alex Lomax MD   40 mEq at 11/22/18 1213   • sodium chloride (OCEAN) nasal spray 2 spray  2 spray Each Nare PRN Thaddeus Huber MD       • ticagrelor (BRILINTA) tablet 90 mg  90 mg Oral BID Thaddeus Huber MD   90 mg at 11/22/18 0904   • zolpidem (AMBIEN) tablet 5 mg  5 mg Oral Nightly PRN Alex Lomax MD   5 mg at 11/21/18 2128       Review of Systems:   Review of Systems   Constitutional: Positive for fatigue. Negative for fever.   Respiratory: Positive for cough and shortness of breath. Negative for wheezing.    Cardiovascular: Negative for chest pain, palpitations and leg swelling.   Gastrointestinal: Negative for abdominal distention, abdominal pain and constipation.   Genitourinary: Negative for dysuria, frequency, hematuria and urgency.   Musculoskeletal: Negative for arthralgias, back pain and gait problem.   Skin: Positive for pallor.   Neurological: Positive for weakness. Negative for syncope, facial asymmetry and light-headedness.   Psychiatric/Behavioral: Positive for dysphoric mood. Negative for agitation, behavioral problems and confusion. The patient is nervous/anxious.    All other systems  reviewed and are negative.      Objective     Vital Signs  Temp:  [97.7 °F (36.5 °C)-98.1 °F (36.7 °C)] 97.7 °F (36.5 °C)  Heart Rate:  [75-88] 88  Resp:  [16-20] 18  BP: (136-151)/(82-89) 140/89    Physical Exam:  Physical Exam   Constitutional: She is oriented to person, place, and time. She appears cachectic. No distress.   HENT:   Head: Normocephalic and atraumatic.   Eyes: EOM are normal. Pupils are equal, round, and reactive to light. No scleral icterus.   Neck: Normal range of motion. Neck supple.   Cardiovascular: Normal rate and regular rhythm.   Pulmonary/Chest: Effort normal. No respiratory distress. She has wheezes.   Abdominal: Soft. Bowel sounds are normal. She exhibits no distension. There is no tenderness. There is no guarding.   Musculoskeletal: She exhibits no edema.   Neurological: She is alert and oriented to person, place, and time. No cranial nerve deficit. Coordination normal.   Skin: Skin is warm and dry. No rash noted. There is pallor.   Psychiatric: She has a normal mood and affect. Her behavior is normal.   Vitals reviewed.       Results Review:    Lab Results (last 24 hours)     Procedure Component Value Units Date/Time    Extra Tubes [[id number]] Collected:  11/22/18 0953    Specimen:  Blood, Venous Line Updated:  11/22/18 1100    Narrative:       The following orders were created for panel order Extra Tubes.  Procedure                               Abnormality         Status                     ---------                               -----------         ------                     [facility] - [facility][[id number]]                                   Final result                 Please view results for these tests on the individual orders.    Gold Top - [facility] [[id number]] Collected:  11/22/18 0953    Specimen:  Blood Updated:  11/22/18 1100     Extra Tube Hold for add-ons.     Comment: Auto resulted.       Comprehensive Metabolic Panel [[id number]]  (Abnormal) Collected:  11/22/18 0953    Specimen:   Blood Updated:  11/22/18 1032     Glucose 268 mg/dL      BUN 24 mg/dL      Creatinine 0.68 mg/dL      Sodium 135 mmol/L      Potassium 3.0 mmol/L      Chloride 91 mmol/L      CO2 37.0 mmol/L      Calcium 8.9 mg/dL      Total Protein 6.3 g/dL      Albumin 3.60 g/dL      ALT (SGPT) 58 U/L      AST (SGOT) 39 U/L      Alkaline Phosphatase 83 U/L      Total Bilirubin 0.6 mg/dL      eGFR Non African Amer 88 mL/min/1.73      Globulin 2.7 gm/dL      A/G Ratio 1.3 g/dL      BUN/Creatinine Ratio 35.3     Anion Gap 7.0 mmol/L     CBC (No Diff) [[id number]]  (Abnormal) Collected:  11/22/18 0953    Specimen:  Blood Updated:  11/22/18 1010     WBC 14.11 10*3/mm3      RBC 4.15 10*6/mm3      Hemoglobin 12.8 g/dL      Hematocrit 37.2 %      MCV 89.6 fL      MCH 30.8 pg      MCHC 34.4 g/dL      RDW 13.4 %      RDW-SD 44.1 fl      MPV 9.1 fL      Platelets 310 10*3/mm3           Imaging Results (last 24 hours)     ** No results found for the last 24 hours. **          Assessment/Plan       Acute ST elevation myocardial infarction (STEMI) (CMS/HCC)    CAD (coronary artery disease)    Chronic bronchitis with acute exacerbation (CMS/HCC)    Acute on chronic respiratory failure with hypoxia (CMS/HCC)    Depression    Pneumonia of both upper lobes    Hypertension    Anxiety    Chronic alcohol abuse    Improved after the coronary angioplasty with stenting but she remains hypoxic. Continue with the diuresis, nebulized treatments, IV steroids, supplemental Oxygen.    CT lung angiogram shows bilateral upper lobe pneumonia. Started on Zoxyn.  Replenish potassium    Alex Lomax MD  11/22/18  12:17 PM

## 2018-11-22 NOTE — PLAN OF CARE
Problem: Patient Care Overview  Goal: Plan of Care Review  Outcome: Ongoing (interventions implemented as appropriate)   11/22/18 5822   Coping/Psychosocial   Plan of Care Reviewed With patient   Plan of Care Review   Progress improving   OTHER   Outcome Summary pt walked in hallway and tolerated well; only dropped to 91% with oxygen       Problem: Cardiac: ACS (Acute Coronary Syndrome) (Adult)  Goal: Signs and Symptoms of Listed Potential Problems Will be Absent, Minimized or Managed (Cardiac: ACS)  Outcome: Ongoing (interventions implemented as appropriate)      Problem: Fall Risk (Adult)  Goal: Absence of Fall  Outcome: Ongoing (interventions implemented as appropriate)      Problem: Chronic Obstructive Pulmonary Disease (Adult)  Goal: Signs and Symptoms of Listed Potential Problems Will be Absent, Minimized or Managed (Chronic Obstructive Pulmonary Disease)  Outcome: Ongoing (interventions implemented as appropriate)

## 2018-11-23 VITALS
DIASTOLIC BLOOD PRESSURE: 77 MMHG | BODY MASS INDEX: 27.05 KG/M2 | SYSTOLIC BLOOD PRESSURE: 146 MMHG | TEMPERATURE: 98.1 F | OXYGEN SATURATION: 90 % | HEART RATE: 76 BPM | WEIGHT: 147 LBS | RESPIRATION RATE: 20 BRPM | HEIGHT: 62 IN

## 2018-11-23 PROBLEM — I48.0 PAROXYSMAL ATRIAL FIBRILLATION (HCC): Status: ACTIVE | Noted: 2018-11-23

## 2018-11-23 LAB
ANION GAP SERPL CALCULATED.3IONS-SCNC: 7 MMOL/L (ref 5–15)
BASOPHILS # BLD AUTO: 0.01 10*3/MM3 (ref 0–0.2)
BASOPHILS NFR BLD AUTO: 0.1 % (ref 0–2)
BUN BLD-MCNC: 25 MG/DL (ref 7–21)
BUN/CREAT SERPL: 37.3 (ref 7–25)
CALCIUM SPEC-SCNC: 8.7 MG/DL (ref 8.4–10.2)
CHLORIDE SERPL-SCNC: 96 MMOL/L (ref 95–110)
CO2 SERPL-SCNC: 32 MMOL/L (ref 22–31)
CREAT BLD-MCNC: 0.67 MG/DL (ref 0.5–1)
DEPRECATED RDW RBC AUTO: 46.7 FL (ref 36.4–46.3)
EOSINOPHIL # BLD AUTO: 0 10*3/MM3 (ref 0–0.7)
EOSINOPHIL NFR BLD AUTO: 0 % (ref 0–7)
ERYTHROCYTE [DISTWIDTH] IN BLOOD BY AUTOMATED COUNT: 13.8 % (ref 11.5–14.5)
GFR SERPL CREATININE-BSD FRML MDRD: 89 ML/MIN/1.73 (ref 45–104)
GLUCOSE BLD-MCNC: 205 MG/DL (ref 60–100)
HCT VFR BLD AUTO: 38.7 % (ref 35–45)
HGB BLD-MCNC: 12.8 G/DL (ref 12–15.5)
IMM GRANULOCYTES # BLD: 0.15 10*3/MM3 (ref 0–0.02)
IMM GRANULOCYTES NFR BLD: 1 % (ref 0–0.5)
LYMPHOCYTES # BLD AUTO: 0.59 10*3/MM3 (ref 0.6–4.2)
LYMPHOCYTES NFR BLD AUTO: 3.8 % (ref 10–50)
MCH RBC QN AUTO: 30.5 PG (ref 26.5–34)
MCHC RBC AUTO-ENTMCNC: 33.1 G/DL (ref 31.4–36)
MCV RBC AUTO: 92.4 FL (ref 80–98)
MONOCYTES # BLD AUTO: 0.2 10*3/MM3 (ref 0–0.9)
MONOCYTES NFR BLD AUTO: 1.3 % (ref 0–12)
NEUTROPHILS # BLD AUTO: 14.43 10*3/MM3 (ref 2–8.6)
NEUTROPHILS NFR BLD AUTO: 93.8 % (ref 37–80)
NRBC BLD MANUAL-RTO: 0 /100 WBC (ref 0–0)
PLATELET # BLD AUTO: 343 10*3/MM3 (ref 150–450)
PMV BLD AUTO: 8.9 FL (ref 8–12)
POTASSIUM BLD-SCNC: 3.9 MMOL/L (ref 3.5–5.1)
POTASSIUM BLD-SCNC: 4.1 MMOL/L (ref 3.5–5.1)
RBC # BLD AUTO: 4.19 10*6/MM3 (ref 3.77–5.16)
SODIUM BLD-SCNC: 135 MMOL/L (ref 137–145)
WBC NRBC COR # BLD: 15.38 10*3/MM3 (ref 3.2–9.8)

## 2018-11-23 PROCEDURE — 25010000002 FUROSEMIDE PER 20 MG: Performed by: INTERNAL MEDICINE

## 2018-11-23 PROCEDURE — 80048 BASIC METABOLIC PNL TOTAL CA: CPT | Performed by: INTERNAL MEDICINE

## 2018-11-23 PROCEDURE — 84132 ASSAY OF SERUM POTASSIUM: CPT | Performed by: INTERNAL MEDICINE

## 2018-11-23 PROCEDURE — 85025 COMPLETE CBC W/AUTO DIFF WBC: CPT | Performed by: INTERNAL MEDICINE

## 2018-11-23 PROCEDURE — 25010000002 METHYLPREDNISOLONE PER 125 MG: Performed by: INTERNAL MEDICINE

## 2018-11-23 PROCEDURE — 25010000002 PIPERACILLIN SOD-TAZOBACTAM PER 1 G: Performed by: INTERNAL MEDICINE

## 2018-11-23 PROCEDURE — 94799 UNLISTED PULMONARY SVC/PX: CPT

## 2018-11-23 RX ORDER — LOSARTAN POTASSIUM 25 MG/1
12.5 TABLET ORAL DAILY
Qty: 30 TABLET | Refills: 1 | Status: SHIPPED | OUTPATIENT
Start: 2018-11-24 | End: 2019-03-11

## 2018-11-23 RX ORDER — AMIODARONE HYDROCHLORIDE 100 MG/1
100 TABLET ORAL 2 TIMES DAILY
Qty: 60 TABLET | Refills: 1 | Status: SHIPPED | OUTPATIENT
Start: 2018-11-23

## 2018-11-23 RX ORDER — LABETALOL 100 MG/1
50 TABLET, FILM COATED ORAL 2 TIMES DAILY
Qty: 30 TABLET | Refills: 1 | Status: SHIPPED | OUTPATIENT
Start: 2018-11-23 | End: 2019-01-28 | Stop reason: ALTCHOICE

## 2018-11-23 RX ORDER — ATORVASTATIN CALCIUM 20 MG/1
40 TABLET, FILM COATED ORAL NIGHTLY
Qty: 30 TABLET | Refills: 1 | Status: SHIPPED | OUTPATIENT
Start: 2018-11-23 | End: 2020-11-25

## 2018-11-23 RX ORDER — FUROSEMIDE 20 MG/1
20 TABLET ORAL 2 TIMES DAILY
Qty: 60 TABLET | Refills: 1 | Status: SHIPPED | OUTPATIENT
Start: 2018-11-23

## 2018-11-23 RX ORDER — PREDNISONE 10 MG/1
TABLET ORAL DAILY
Qty: 48 TABLET | Refills: 0 | Status: SHIPPED | OUTPATIENT
Start: 2018-11-23 | End: 2018-12-05

## 2018-11-23 RX ORDER — ASPIRIN 81 MG/1
81 TABLET, CHEWABLE ORAL DAILY
Start: 2018-11-24

## 2018-11-23 RX ORDER — ECHINACEA PURPUREA EXTRACT 125 MG
2 TABLET ORAL AS NEEDED
Qty: 1 ML | Refills: 1 | Status: SHIPPED | OUTPATIENT
Start: 2018-11-23 | End: 2019-06-08 | Stop reason: HOSPADM

## 2018-11-23 RX ADMIN — Medication 100 MG: at 08:45

## 2018-11-23 RX ADMIN — METHYLPREDNISOLONE SODIUM SUCCINATE 60 MG: 125 INJECTION, POWDER, FOR SOLUTION INTRAMUSCULAR; INTRAVENOUS at 02:32

## 2018-11-23 RX ADMIN — IPRATROPIUM BROMIDE AND ALBUTEROL SULFATE 3 ML: 2.5; .5 SOLUTION RESPIRATORY (INHALATION) at 11:34

## 2018-11-23 RX ADMIN — FUROSEMIDE 40 MG: 10 INJECTION, SOLUTION INTRAMUSCULAR; INTRAVENOUS at 08:46

## 2018-11-23 RX ADMIN — METHYLPREDNISOLONE SODIUM SUCCINATE 60 MG: 125 INJECTION, POWDER, FOR SOLUTION INTRAMUSCULAR; INTRAVENOUS at 08:46

## 2018-11-23 RX ADMIN — IPRATROPIUM BROMIDE AND ALBUTEROL SULFATE 3 ML: 2.5; .5 SOLUTION RESPIRATORY (INHALATION) at 07:32

## 2018-11-23 RX ADMIN — TICAGRELOR 90 MG: 90 TABLET ORAL at 08:46

## 2018-11-23 RX ADMIN — Medication 12.5 MG: at 08:45

## 2018-11-23 RX ADMIN — PIPERACILLIN SODIUM,TAZOBACTAM SODIUM 3.38 G: 3; .375 INJECTION, POWDER, FOR SOLUTION INTRAVENOUS at 05:57

## 2018-11-23 RX ADMIN — ASPIRIN 81 MG CHEWABLE TABLET 81 MG: 81 TABLET CHEWABLE at 08:45

## 2018-11-23 RX ADMIN — LABETALOL HYDROCHLORIDE 50 MG: 100 TABLET, FILM COATED ORAL at 08:45

## 2018-11-23 RX ADMIN — BUDESONIDE 0.5 MG: 0.5 INHALANT RESPIRATORY (INHALATION) at 07:37

## 2018-11-23 NOTE — SIGNIFICANT NOTE
"   11/23/18 1125   Rehab Treatment   Discipline physical therapy assistant   Reason Treatment Not Performed patient/family declined treatment  (declined rx \"the only steps I'm doing is going home\")     "

## 2018-11-23 NOTE — THERAPY DISCHARGE NOTE
Acute Care - Occupational Therapy Discharge Summary  [facility]     Patient Name: Monisha Brasher  : 1955  MRN: [id number]    Today's Date: 2018  Onset of Illness/Injury or Date of Surgery: 11/15/18    Date of Referral to OT: 18  Referring Physician: BLAYNE Lomax MD      Admit Date: 11/15/2018        OT Recommendation and Plan    Visit Dx:    ICD-10-CM ICD-9-CM   1. Acute ST elevation myocardial infarction (STEMI), unspecified artery (CMS/[facility]) I21.3 410.90   2. Acute myocardial infarction of posterolateral wall (CMS/[facility]) I21.29 410.50   3. HTN (hypertension), benign I10 401.1   4. Impaired functional mobility, balance, gait, and endurance Z74.09 V49.89   5. Impaired mobility and activities of daily living Z74.09 799.89   6. Chronic bronchitis with acute exacerbation (CMS/[facility]) J20.9 466.0    J42 491.9                   Outcome Measures     Row Name 18 1415             How much help from another person do you currently need...    Turning from your back to your side while in flat bed without using bedrails?  3  -LN      Moving from lying on back to sitting on the side of a flat bed without bedrails?  3  -LN      Moving to and from a bed to a chair (including a wheelchair)?  3  -LN      Standing up from a chair using your arms (e.g., wheelchair, bedside chair)?  3  -LN      Climbing 3-5 steps with a railing?  3  -LN      To walk in hospital room?  3  -LN      AM-PAC 6 Clicks Score  18  -LN         Functional Assessment    Outcome Measure Options  AM-PAC 6 Clicks Basic Mobility (PT)  -LN        User Key  (r) = Recorded By, (t) = Taken By, (c) = Cosigned By    Initials Name Provider Type    Maki Davis PTA Physical Therapy Assistant          Therapy Suggested Charges     Code   Minutes Charges    82572 (CPT®) Hc Ot Neuromusc Re Education Ea 15 Min      85819 (CPT®) Hc Ot Ther Proc Ea 15 Min 10 1    33006 (CPT®) Hc Ot Therapeutic Act Ea 15 Min      45202 (CPT®) Hc Ot Manual Therapy  Ea 15 Min      03225 (CPT®) Hc Ot Iontophoresis Ea 15 Min      33330 (CPT®) Hc Ot Elec Stim Ea-Per 15 Min      97357 (CPT®) Hc Ot Ultrasound Ea 15 Min      99425 (CPT®) Hc Ot Self Care/Mgmt/Train Ea 15 Min 20 1    Total  30 2              OT Discharge Summary  Anticipated Discharge Disposition (OT): home with assist  Reason for Discharge: Discharge from facility, Per MD order  Outcomes Achieved: Refer to plan of care for updates on goals achieved  Discharge Destination: Home      Rula Vences OT  11/23/2018

## 2018-11-23 NOTE — DISCHARGE PLACEMENT REQUEST
"Monisha Brasher (63 y.o. Female)     Date of Birth Social Security Number Address Home Phone MRN    1955  [address] [place] [address] [phone] [id number]    Sikhism Marital Status          Holiness        Admission Date Admission Type Admitting Provider Attending Provider Department, Room/Bed    11/15/18 Emergency Thaddeus Huber MD Popescu, Tudor, MD [address], 327/1    Discharge Date Discharge Disposition Discharge Destination         [place] Care [facility] [place]              Attending Provider:  Alex Lomax MD    Allergies:  No Known Allergies    Isolation:  None   Infection:  None   Code Status:  CPR    Ht:  157.5 cm (62\")   Wt:  66.7 kg (147 lb)    Admission Cmt:  None   Principal Problem:  Acute ST elevation myocardial infarction (STEMI) (CMS/[facility]) [I21.3]                 Active Insurance as of 11/15/2018     Primary Coverage     Payor Plan Insurance Group Employer/Plan Group    [facility]     Payor Plan Address Payor Plan Phone Number Payor Plan Fax Number Effective Dates    [address] [address]   2017 - None Entered    [facility] [id number]       Subscriber Name Subscriber Birth Date Member ID       SURJIT BRASHER 1952 [id number]                 Emergency Contacts      (Rel.) Home Phone Work Phone Mobile Phone    Parish Brasher (Spouse) [phone] -- --            Emergency Contact Information     Name Relation Home Work Mobile    Parish Brasher Spouse [phone]            Insurance Information                [facility] Phone:     Subscriber: Surjit Brasher Subscriber#: [id number]    Group#: [facility] Precert#:              History & Physical      Thaddeus Huber MD at 11/15/2018  2:56 AM          Cardiology History and Physical Note        Patient Name: Monisha Brasher  Age/Sex: 63 y.o. female  : 1955  MRN: [id number]    Date of Admission : 11/15/2018    Primary care Physician: Provider, No Known    Reason for " Admission:  Chest pain and ST segment depression in lead V2 and V3 suggestive of posterolateral wall myocardial infarction  Subjective:       Chief Complaint: Chest pain.    History of Present Illness:  Monisha Brasher is a 63 y.o. female     Body mass index is 28.72 kg/m².  With a past medical history significant for chronic obstructive lung disease, tobacco abuse, allergic rhinitis, anxiety, preserved left ventricular systolic function with an ejection fraction of 55-60%, negative dobutamine stress strong family history for coronary artery disease and previous history of non-IgE mediated allergic asthmatic bronchitis.    Patient presented to the emergency room waking up from sleep with symptoms of chest pain.  Patient had associated symptoms of shortness of breath and dizziness.  Patient initial evaluation in the emergency room revealed a resting electrocardiogram with atrial fibrillation with a rapid ventricular response along with ST segment depression in lead V1 and V2 V3 along with subtle ST elevation in lead V5 and V6.  Patient had ongoing symptoms of chest pain.  Patient repeat electrocardiogram after the administration of IV fluids and Cardizem had revealed persistent ST segment depression in lead V1 and V2 V3 suggestive of posterior MI injury current patent..    Patient on further questioning denies previous history of documented atrial fibrillation.  Patient denies recent cardiac evaluation.  Patient has not had any symptoms of severe substernal chest pain prior to this episode.  Patient denies any nausea or vomiting.    Patient denies any bleeding episodes of any bleeding diastasis.    Patient 10 point review of system except for what is stated in the history of present illness is negative.      Past medical history:   1.  Chest pain.  2.  Negative dobutamine stress echocardiogram done in 2016 for any evidence of an stress-induced ischemia    3.  Arterial hypertension.  4.  Preserved left  ventricular systolic function with an ejection fraction of 55%.  5.  Chronic back pain.  6.  Allergic rhinitis.  7.  Chronic obstructive lung disease with tobacco abuse.  8.  Non-IgE mediated allergic asthmatic bronchitis.  9.  Strong family history for coronary artery disease.  10.  History of emphysema.        Past Surgical History:  1.  Bilateral ankle surgery.  2.   section.  3.  Hysterectomy.  4.  Kenalog injection.        Family History: Mother and father with coronary artery disease      Social History: Smokes up to half to one pack per day social alcohol intake patient is not working       Cardiac Risk factor:   1.  Postmenopausal.  2.  Tobacco abuse.  3.  Arterial hypertension.  4.  Family history for coronary artery disease    Allergies:  No Known Allergies    Home Medication::  Prior to Admission medications    Medication Sig Start Date End Date Taking? Authorizing Provider   ADVAIR DISKUS 500-50 MCG/DOSE DISKUS INHALE 1 PUFF BY MOUTH TWICE DAILY 18   Gigi Ac MD   albuterol (PROVENTIL) (2.5 MG/3ML) 0.083% nebulizer solution USE 1 VIAL PER NEBULIZER EVERY 4 HOURS AS NEEDED FOR WHEEZING 10/15/18   Gigi Ac MD   albuterol (VENTOLIN HFA) 108 (90 Base) MCG/ACT inhaler 2 puffs every 4 hours as needed for breathing 10/23/18   Gigi Ac MD   ALPRAZolam (XANAX) 0.25 MG tablet Take 1 tablet by mouth 3 (Three) Times a Day As Needed for Anxiety. 18   Tiffanie Elise APRN   amLODIPine (NORVASC) 10 MG tablet TAKE 1 TABLET BY MOUTH EVERY DAY 18   Tiffanie Elise APRN   azithromycin (ZITHROMAX) 250 MG tablet TAKE AS DIRECTED 18   Gigi Ac MD   azithromycin (ZITHROMAX) 250 MG tablet TAKE 2 TABLETS BY MOUTH ON DAY 1, THEN 1 TABLET DAILY FOR 4 DAYS 10/15/18   Gigi Ac MD   azithromycin (ZITHROMAX) 250 MG tablet Take 2 tablets the first day, then 1 tablet daily for 4 days. 10/23/18   Gigi Ac MD   azithromycin (ZITHROMAX) 250 MG tablet TAKE 2 TABLET BY  MOUTH THE FIRST DAY THEN1 TABLET BY MOUTH DAILY FOR 4 DAYS 11/6/18   Gigi Ac MD   budesonide-formoterol (SYMBICORT) 160-4.5 MCG/ACT inhaler 2 puffs twice a day 5/17/18   Gigi Ac MD   glucose blood test strip Use as instructed 8/17/18   Tiffanie Elise APRN   glucose monitor monitoring kit 1 each Daily. Testing blood sugar once a day    ICD10 - R73.9 8/17/18   Tiffanie Elise APRN   Lancets (FREESTYLE) lancets Testing blood sugar once a day 8/17/18   Tiffanie Elise APRN   metFORMIN (GLUCOPHAGE) 500 MG tablet Take 1 tablet by mouth Daily With Breakfast. 8/17/18   Tiffanie Elise APRN   montelukast (SINGULAIR) 10 MG tablet TAKE 1 TABLET BY MOUTH DAILY 10/29/18   Tiffanie Elise APRN   nystatin (MYCOSTATIN) 946741 UNIT/ML suspension Take 5 mL by mouth 4 (Four) Times a Day. 5/1/18   Gigi Ac MD   PARoxetine (PAXIL) 10 MG tablet Take 1 tablet by mouth Every Morning. 8/17/18   Tiffanie Elise APRN   predniSONE (DELTASONE) 10 MG tablet Take 1 tablet by mouth Daily for 30 doses. 1 by mouth daily 10/23/18 11/22/18  Gigi Ac MD   predniSONE (DELTASONE) 20 MG tablet TAKE 1 TABLET BY MOUTH DAILY 8/16/18   Gigi Ac MD   SPIRIVA HANDIHALER 18 MCG per inhalation capsule INHALE 1 PUFF BY MOUTH DAILY 6/11/18   Gigi Ac MD   VENTOLIN  (90 Base) MCG/ACT inhaler INHALE 2 PUFFS BY MOUTH EVERY 4 HOURS AS NEEDED FOR BREATHING 8/8/18   Gigi Ac MD   VENTOLIN  (90 Base) MCG/ACT inhaler INHALE 2 PUFFS BY MOUTH EVERY 4 HOURS AS NEEDED FOR BREATHING 9/4/18   Gigi Ac MD   VENTOLIN  (90 Base) MCG/ACT inhaler INHALE 2 PUFFS BY MOUTH EVERY 4 HOURS AS NEEDED FOR BREATHING 9/18/18   Gigi Ac MD   VENTOLIN  (90 Base) MCG/ACT inhaler INHALE 2 PUFFS BY MOUTH EVERY 4 HOURS AS NEEDED FOR BREATHING 10/11/18   Gigi Ac MD         Review of Systems   Constitutional: Negative for chills, fever and unexpected weight change.   HENT: Negative for  hearing loss and nosebleeds.    Eyes: Negative for visual disturbance.   Respiratory: Positive for chest tightness and shortness of breath. Negative for cough and wheezing.    Cardiovascular: Positive for chest pain and palpitations. Negative for leg swelling.   Gastrointestinal: Negative for abdominal pain, blood in stool, constipation, diarrhea, nausea and vomiting.   Endocrine: Negative for cold intolerance, heat intolerance, polydipsia, polyphagia and polyuria.   Genitourinary: Negative for hematuria.   Musculoskeletal: Negative for joint swelling, myalgias and neck pain.   Skin: Negative for color change, rash and wound.   Neurological: Positive for light-headedness. Negative for dizziness, seizures, syncope, numbness and headaches.   Hematological: Does not bruise/bleed easily.         Objective:     Objective:  Temp:  [98.2 °F (36.8 °C)] 98.2 °F (36.8 °C)  Heart Rate:  [] 54  Resp:  [14] 14  BP: ()/(50-76) 83/50      Body mass index is 28.72 kg/m².       Physical Exam   Constitutional: She is oriented to person, place, and time. She appears well-developed and well-nourished.   HENT:   Head: Normocephalic and atraumatic.   Left Ear: External ear normal.   Nose: Nose normal.   Mouth/Throat: Oropharynx is clear and moist.   Eyes: Conjunctivae and EOM are normal. Pupils are equal, round, and reactive to light. No scleral icterus.   Neck: Normal range of motion. Neck supple. No JVD present. No tracheal deviation present. No thyromegaly present.   Cardiovascular: Normal rate and regular rhythm.   Irregularly irregular S1 and S2 with a soft systolic murmur best heard at the apex.   Pulmonary/Chest: Breath sounds normal. No stridor.   Abdominal: Bowel sounds are normal.   Musculoskeletal: Normal range of motion.   Lymphadenopathy:     She has no cervical adenopathy.   Neurological: She is alert and oriented to person, place, and time. She has normal reflexes.   Skin: Skin is warm and dry.   Psychiatric:  She has a normal mood and affect. Her behavior is normal. Judgment and thought content normal.         Lab Review:           Invalid input(s): LABALBU, PROT          Results from last 7 days   Lab Units  11/15/18   0214   WBC 10*3/mm3  10.36*   HEMOGLOBIN g/dL  14.4   HEMATOCRIT %  41.9   PLATELETS 10*3/mm3  271     Results from last 7 days   Lab Units  11/15/18   0214   INR   0.92                       EKG:   ECG/EMG Results (last 24 hours)     Procedure Component Value Units Date/Time    ECG 12 Lead [[id number]] Collected:  11/15/18 0246     Updated:  11/15/18 0248    ECG 12 Lead [[id number]] Resulted:  11/15/18 0253     Updated:  11/15/18 0253          Imaging:  Imaging Results (last 24 hours)     Procedure Component Value Units Date/Time    XR Chest 1 View [[id number]] Collected:  11/15/18 0227     Updated:  11/15/18 0239    Narrative:       Exam: AP portable chest    INDICATION: Chest pain    COMPARISON: 5/17/2018    FINDINGS: AP portable chest. The bony structures are intact. The  cardiomediastinal silhouette is unremarkable. Mild plaque is  present in the aorta. Mild parenchymal scarring is present in the  right lung base. No acute infiltrate, pneumothorax or pleural  effusion.      Impression:       No acute cardiopulmonary abnormality.    Electronically signed by:  Maicol Oliver MD  11/15/2018 2:38 AM  CST Workstation: [email or web address]          I personally viewed and interpreted the patient's EKG/Telemetry data.    Assessment:   1.  Chest pain with electrocardiographic changes suggestive of posterior wall myocardial infarction.  2.  Hypotension.  3.  Chronic obstructive lung disease with tobacco abuse.          Plan:   1.  Chest pain with electrocardiographic changes suggestive of ischemia.  Patient on initial presentation was in atrial fibrillation with subsequent EKG which had revealed sinus rhythm with persistent ST segment depression in lead V1 and V2 V3 suggestive of true posterior wall myocardial  "infarction versus anterior wall sub endocardial ischemia.  Patient has been recommended an emergent coronary angiogram and rescue PTCA.  Risk-benefit treatment option were discussed with the patient and the family and an informed consent was obtained.  Patient was continued on IV fluids and taken emergently to the cardiac catheterization.  2.  Paroxysmal atrial fibrillation.  Patient has no previous history of documented atrial fibrillation.  Patient would be started on IV amiodarone infusion.  Patient would undergo a transthoracic echocardiogram.    Further recommendations to follow after the coronary angiogram.      Time: time spent in face-to-face evaluation of greater than 55  minutes and interacting and formulating examining and discussing the plan with the patient with 50% of greater time spent in face-to-face interaction.    Thaddeus Huber MD  11/15/18  2:56 AM    Dictated utilizing Dragon dictation.         Electronically signed by Thaddues Huber MD at 11/15/2018  4:45 AM       Physician Progress Notes (last 24 hours) (Notes from 2018 10:47 AM through 2018 10:47 AM)     No notes of this type exist for this encounter.        [address] [address]  Dept. Phone:  [phone]  Dept. Fax:   Date Ordered: 2018         Patient:  Monisha Brasher MRN:  [id number]   [address] [place] [address] :  1955  SSN:    Phone: [phone] Sex:  F     Weight: 66.7 kg (147 lb)         Ht Readings from Last 1 Encounters:   11/15/18 157.5 cm (62\")         Home Oxygen Therapy          (Order ID: [id number])    Diagnosis:  Chronic bronchitis with acute exacerbation (CMS/HCC) (J20.9,J42 [ICD-10-CM] 466.0,491.9 [ICD-9-CM])   Quantity:  1     Delivery Modality: Nasal Cannula  Liters Per Minute: 3.5  Duration: Continuous  Equipment:  Oxygen Concentrator &  &  Portable Gaseous Oxygen System & Portable " Oxygen Contents Gaseous &  Conserving Regulator  Length of Need (99 Months = Lifetime): 99 Months = Lifetime        Authorizing Provider's Phone: [phone]   Authorizing Provider:Alex Lomax MD  Authorizing Provider's NPI: [id number]  Order Entered By: Alex Lomax MD 11/23/2018 10:46 AM     Electronically signed by: Alex Lomax MD 11/23/2018 10:46 AM           11/22/18 1529 11/22/18 1531 11/22/18 1942   Oxygen Therapy   SpO2 (!) 82 %  (at rest; pt was blowing her nose) 92 % 93 %   Pulse Oximetry Type --  --  Intermittent   Device (Oxygen Therapy) room air nasal cannula nasal cannula;humidified   Flow (L/min) --  4 3.5

## 2018-11-23 NOTE — PLAN OF CARE
Problem: Patient Care Overview  Goal: Plan of Care Review  Outcome: Ongoing (interventions implemented as appropriate)   11/23/18 0412   Coping/Psychosocial   Plan of Care Reviewed With patient   Plan of Care Review   Progress no change       Problem: Cardiac: ACS (Acute Coronary Syndrome) (Adult)  Goal: Signs and Symptoms of Listed Potential Problems Will be Absent, Minimized or Managed (Cardiac: ACS)  Outcome: Ongoing (interventions implemented as appropriate)      Problem: Fall Risk (Adult)  Goal: Absence of Fall  Outcome: Ongoing (interventions implemented as appropriate)      Problem: Chronic Obstructive Pulmonary Disease (Adult)  Goal: Signs and Symptoms of Listed Potential Problems Will be Absent, Minimized or Managed (Chronic Obstructive Pulmonary Disease)  Outcome: Ongoing (interventions implemented as appropriate)

## 2018-11-23 NOTE — DISCHARGE SUMMARY
Discharge Summary  Alex Lomax MD  Hospitalist     Date of Discharge:  11/23/2018    Discharge Diagnosis:      Acute ST elevation myocardial infarction (STEMI) (CMS/[facility])    CAD (coronary artery disease)    Acute on chronic respiratory failure with hypoxia (CMS/[facility])    Chronic bronchitis with acute exacerbation (CMS/[facility])    Depression    Pneumonia of both upper lobes    Paroxysmal atrial fibrillation (CMS/[facility])    Hypertension    Anxiety      Presenting Problem/History of Present Illness  Chest pain    Hospital Course  Patient is a 63 y.o. female admitted for chest pain/shortness of breath. On admission she was diagnosed with an acute MI requiring immediate angioplasty and stenting of her proximal circumflex and obtuse marginal coronary artery.  Her cardiac rhythm converted from atrial fibrillation to sinus rhythm once the procedure was performed.    Her hospital course was complicated by a flareup of the underlying chronic hypoxic respiratory failure, condition which improved somewhat with the help of IV steroids, nebulized treatments, supplemental oxygen as well as IV antibiotics for the possible bilateral pneumonia.    Her vital signs are stable, she is able to ambulate with the help of PT/OT and her O2 saturation on 3.5 L/minute is around 90-92%.  She will be discharged home with home health.    Procedures Performed  Procedure(s):  Left Heart Cath    Consults:   Consults     Date and Time Order Name Status Description    11/16/2018 1902 Inpatient Psychiatrist Consult Completed     11/15/2018 0502 Inpatient Hospitalist Consult            Pertinent Test Results: Angioplasty and stenting of the proximal circumflex and obtuse marginal coronary arteries.  Good LV EF on the transthoracic echo (50 to 55%).    Condition on Discharge:  stable    Vital Signs  Temp:  [97.7 °F (36.5 °C)-98.2 °F (36.8 °C)] 98.1 °F (36.7 °C)  Heart Rate:  [72-85] 76  Resp:  [18-20] 20  BP: (132-146)/(72-78) 146/77    Physical Exam:  Physical  Exam   Constitutional: She is oriented to person, place, and time. She appears cachectic. She appears ill.   HENT:   Head: Normocephalic and atraumatic.   Eyes: EOM are normal. Pupils are equal, round, and reactive to light. No scleral icterus.   Neck: Normal range of motion. Neck supple.   Cardiovascular: Normal rate and regular rhythm.   Pulmonary/Chest: Effort normal. No respiratory distress. She has wheezes. She has no rales.   Abdominal: Soft. Bowel sounds are normal. She exhibits no distension and no mass. There is no tenderness.   Musculoskeletal: Normal range of motion. She exhibits no edema, tenderness or deformity.   Neurological: She is alert and oriented to person, place, and time. No cranial nerve deficit. Coordination normal.   Skin: Skin is warm and dry. There is pallor.   Psychiatric: She has a normal mood and affect. Her behavior is normal.   Vitals reviewed.      Discharge Disposition  [place] [facility] [place]    Discharge Medications     Discharge Medications      New Medications      Instructions Start Date   amiodarone 100 MG tablet  Commonly known as:  PACERONE   100 mg, Oral, 2 Times Daily      aspirin 81 MG chewable tablet   81 mg, Oral, Daily      atorvastatin 20 MG tablet  Commonly known as:  LIPITOR   40 mg, Oral, Nightly      furosemide 20 MG tablet  Commonly known as:  LASIX   20 mg, Oral, 2 Times Daily      labetalol 100 MG tablet  Commonly known as:  NORMODYNE   50 mg, Oral, 2 Times Daily      losartan 25 MG tablet  Commonly known as:  COZAAR   12.5 mg, Oral, Daily      PredniSONE 10 MG (48) tablet pack  Commonly known as:  DELTASONE  Replaces:  predniSONE 20 MG tablet   Oral, Daily, As instructed      sodium chloride 0.65 % nasal spray  Commonly known as:  OCEAN   2 sprays, Nasal, As Needed      ticagrelor 90 MG tablet tablet  Commonly known as:  BRILINTA   90 mg, Oral, 2 Times Daily         Changes to Medications      Instructions Start Date   albuterol (2.5 MG/3ML) 0.083% nebulizer  solution  Commonly known as:  PROVENTIL  What changed:  Another medication with the same name was removed. Continue taking this medication, and follow the directions you see here.   USE 1 VIAL PER NEBULIZER EVERY 4 HOURS AS NEEDED FOR WHEEZING      albuterol 108 (90 Base) MCG/ACT inhaler  Commonly known as:  VENTOLIN HFA  What changed:  Another medication with the same name was removed. Continue taking this medication, and follow the directions you see here.   2 puffs every 4 hours as needed for breathing         Continue These Medications      Instructions Start Date   ADVAIR DISKUS 500-50 MCG/DOSE DISKUS  Generic drug:  fluticasone-salmeterol   INHALE 1 PUFF BY MOUTH TWICE DAILY      ALPRAZolam 0.25 MG tablet  Commonly known as:  XANAX   0.25 mg, Oral, 3 Times Daily PRN      freestyle lancets   Testing blood sugar once a day      glucose blood test strip   Use as instructed      glucose monitor monitoring kit   1 each, Does not apply, Daily, Testing blood sugar once a day  ICD10 - R73.9      metFORMIN 500 MG tablet  Commonly known as:  GLUCOPHAGE   500 mg, Oral, Daily With Breakfast      montelukast 10 MG tablet  Commonly known as:  SINGULAIR   TAKE 1 TABLET BY MOUTH DAILY      nystatin [id number] UNIT/ML suspension  Commonly known as:  MYCOSTATIN   500,000 Units, Oral, 4 Times Daily      PARoxetine 10 MG tablet  Commonly known as:  PAXIL   10 mg, Oral, Every Morning      SPIRIVA HANDIHALER 18 MCG per inhalation capsule  Generic drug:  tiotropium   INHALE 1 PUFF BY MOUTH DAILY         Stop These Medications    amLODIPine 10 MG tablet  Commonly known as:  NORVASC     azithromycin 250 MG tablet  Commonly known as:  ZITHROMAX     budesonide-formoterol 160-4.5 MCG/ACT inhaler  Commonly known as:  SYMBICORT     predniSONE 10 MG tablet  Commonly known as:  DELTASONE     predniSONE 20 MG tablet  Commonly known as:  DELTASONE  Replaced by:  PredniSONE 10 MG (48) tablet pack            Discharge Diet:   Diet Instructions      Diet: Cardiac      Discharge Diet:  Cardiac          Activity at Discharge:   Activity Instructions     Activity as Tolerated            Follow-up Appointments  Future Appointments   Date Time Provider Department Center   12/5/2018 10:30 AM Isabella Elise APRN [facility] None   4/23/2019 11:00 AM Gigi Ac MD [facility] None     Additional Instructions for the Follow-ups that You Need to Schedule     Discharge Follow-up with PCP   As directed       Currently Documented PCP:    Provider, No Known    PCP Phone Number:    [phone]     Follow Up Details:  Mary Elise NP in 1 week         Discharge Follow-up with Specified Provider: Dr. Huber; 2 Weeks   As directed      To:  Dr. Huber    Follow Up:  2 Weeks         Referral to Home Health   As directed      Face to Face Visit Date:  11/23/2018    Follow-up Provider for Plan of Care?:  I treated the patient in an acute care facility and will not continue treatment after discharge.    Follow-up Provider:  ISABELLA ELISE [[id number]]    Reason/Clinical Findings:  copd    Describe mobility limitations that make leaving home difficult:  copd    Nursing/Therapeutic Services Requested:  Skilled Nursing Physical Therapy Occupational Therapy    Skilled nursing orders:  COPD management    PT orders:  Strengthening    Occupational orders:  Strengthening    Frequency:  1 Week 1                Alex Lomax MD  11/23/18  3:09 PM

## 2018-11-23 NOTE — THERAPY DISCHARGE NOTE
Acute Care - Physical Therapy Discharge Summary  [facility]       Patient Name: Monisha Brasher  : 1955  MRN: [id number]    Today's Date: 2018  Onset of Illness/Injury or Date of Surgery: 11/15/18    Date of Referral to PT: 18  Referring Physician: BLAYNE Lomax MD      Admit Date: 11/15/2018      PT Recommendation and Plan    Visit Dx:    ICD-10-CM ICD-9-CM   1. Acute ST elevation myocardial infarction (STEMI), unspecified artery (CMS/[facility]) I21.3 410.90   2. Acute myocardial infarction of posterolateral wall (CMS/[facility]) I21.29 410.50   3. HTN (hypertension), benign I10 401.1   4. Impaired functional mobility, balance, gait, and endurance Z74.09 V49.89   5. Impaired mobility and activities of daily living Z74.09 799.89   6. Chronic bronchitis with acute exacerbation (CMS/[facility]) J20.9 466.0    J42 491.9       Outcome Measures     Row Name 18 1415             How much help from another person do you currently need...    Turning from your back to your side while in flat bed without using bedrails?  3  -LN      Moving from lying on back to sitting on the side of a flat bed without bedrails?  3  -LN      Moving to and from a bed to a chair (including a wheelchair)?  3  -LN      Standing up from a chair using your arms (e.g., wheelchair, bedside chair)?  3  -LN      Climbing 3-5 steps with a railing?  3  -LN      To walk in hospital room?  3  -LN      AM-PAC 6 Clicks Score  18  -LN         Functional Assessment    Outcome Measure Options  AM-PAC 6 Clicks Basic Mobility (PT)  -LN        User Key  (r) = Recorded By, (t) = Taken By, (c) = Cosigned By    Initials Name Provider Type    Maki Davis PTA Physical Therapy Assistant            Therapy Suggested Charges     Code   Minutes Charges    None                     PT Discharge Summary  Anticipated Discharge Disposition (PT): home with assist  Reason for Discharge: Discharge from facility, Per MD order  Outcomes Achieved: Patient able to  partially acheive established goals, Refer to plan of care for updates on goals achieved  Discharge Destination: Home with home health      Laura Almodovar, PT   11/23/2018

## 2018-11-24 ENCOUNTER — READMISSION MANAGEMENT (OUTPATIENT)
Dept: CALL CENTER | Facility: HOSPITAL | Age: 63
End: 2018-11-24

## 2018-11-25 NOTE — OUTREACH NOTE
Prep Survey      Responses   Facility patient discharged from?  [facility]   Is patient eligible?  Yes   Discharge diagnosis  Acute STEMI, s/p Heart cath with angioplasty and stenting of Prox. circumflex and obtuse marginal coronary artery, CAD, Acute on chronic resp failure with hypoxia, Chronic bronchitis with acute exac. Depression, Pneumonia of both upper lobes, PAF, HTN, anxiety   Does the patient have one of the following disease processes/diagnoses(primary or secondary)?  Acute MI (STEMI,NSTEMI)   Does the patient have Home health ordered?  Yes   What is the Home health agency?   [facility]   Is there a DME ordered?  Yes   What DME was ordered?  [employer]   Comments regarding appointments  Office will call   Prep survey completed?  Yes          Bindu Spear RN

## 2018-11-26 ENCOUNTER — READMISSION MANAGEMENT (OUTPATIENT)
Dept: CALL CENTER | Facility: HOSPITAL | Age: 63
End: 2018-11-26

## 2018-11-26 NOTE — OUTREACH NOTE
AMI Week 1 Survey      Responses   Facility patient discharged from?  [facility]   Does the patient have one of the following disease processes/diagnoses(primary or secondary)?  Acute MI (STEMI,NSTEMI)   Is there a successful TCM telephone encounter documented?  No   Week 1 attempt successful?  Yes   Call start time  1253   Call end time  1303   Discharge diagnosis  Acute STEMI, s/p Heart cath with angioplasty and stenting of Prox. circumflex and obtuse marginal coronary artery, CAD, Acute on chronic resp failure with hypoxia, Chronic bronchitis with acute exac. Depression, Pneumonia of both upper lobes, PAF, HTN, anxiety   Meds reviewed with patient/caregiver?  Yes   Is the patient having any side effects they believe may be caused by any medication additions or changes?  No   Does the patient have all prescriptions related to this admission filled (includes statins,anticoagulants,HTN meds,anti-arrhythmia meds)  Yes   Is the patient taking all medications as directed (includes completed medication regime)?  Yes   Comments regarding appointments  Cardiology will call for appt- if patient does not hear from them today she will call   Does the patient have a primary care provider?   Yes   Does the patient have an appointment with their PCP,cardiologist,or clinic within 7 days of discharge?  Greater than 7 days   Comments regarding PCP  Has PCP appt on Dec 5 with Tiffanie ROMERO -[facility]   What is preventing the patient from scheduling follow up appointments within 7 days of discharge?  Earlier appointment not available   Nursing Interventions  Verified appointment date/time/provider, Educated patient on importance of making appointment, Advised patient to make appointment   Has the patient kept scheduled appointments due by today?  Yes   What is the Home health agency?   [facility]   Has home health visited the patient within 72 hours of discharge?  Yes   What DME was ordered?  [employer]   Has all DME  been delivered?  Yes   Psychosocial issues?  No   Did the patient receive a copy of their discharge instructions?  Yes   Nursing interventions  Reviewed instructions with patient   What is the patient's perception of their health status since discharge?  Improving   Nursing interventions  Nurse provided patient education   Is the patient/caregiver able to teach back signs and symptoms of when to call for help immediately:  Sudden chest discomfort, Sudden discomfort in arms, back, neck or jaw, Shortness of breath at any time, Sudden sweating or clammy skin, Nausea or vomiting, Dizziness or lightheadedness, Irregular or rapid heart rate   Nursing interventions  Nurse provided patient education   Is the pateint /caregiver able to teach back the importance of cardiac rehab?  Yes   Nursing interventions  Provided education on importance of cardiac rehab   Is the patient/caregiver able to teach back lifestyle changes to help prevent MIs  Regular exercise as approved by provider, Heart healthy diet, Maintaining a healthy weight, Reducing stress   Is the patient/caregiver able to teach back ways to prevent a second heart attack:  Take medications, Follow up with MD, Participate in Cardiac Rehab, Manage risk factors   Is the patient/caregiver able to teach back the hierarchy of who to call/visit for symptoms/problems? PCP, Specialist, Home health nurse, Urgent Care, ED, 911  Yes   Week 1 call completed?  Yes          Iker Gipson RN

## 2018-11-26 NOTE — PAYOR COMM NOTE
"Monisha Brasher (63 y.o. Female)     Date of Birth Social Security Number Address Home Phone MRN    1955  [address] [place] [address] [phone] [id number]    Gnosticism Marital Status          Lutheran        Admission Date Admission Type Admitting Provider Attending Provider Department, Room/Bed    11/15/18 Emergency Thaddeus Huber MD  [address], 327/1    Discharge Date Discharge Disposition Discharge Destination        11/23/2018 Home-Health Care Svc              Attending Provider:  (none)   Allergies:  No Known Allergies    Isolation:  None   Infection:  None   Code Status:  Prior    Ht:  157.5 cm (62\")   Wt:  66.7 kg (147 lb)    Admission Cmt:  None   Principal Problem:  Acute ST elevation myocardial infarction (STEMI) (CMS/Formerly [facility]) [I21.3]                 Active Insurance as of 11/15/2018     Primary Coverage     Payor Plan Insurance Group Employer/Plan Group    [facility]     Payor Plan Address Payor Plan Phone Number Payor Plan Fax Number Effective Dates    [address] [address]   1/1/2017 - None Entered    [facility] [phone]       Subscriber Name Subscriber Birth Date Member ID       SURJIT BRASHER 5/11/1952 [id number]                 Emergency Contacts      (Rel.) Home Phone Work Phone Mobile Phone    Parish Brasher (Spouse) [phone] -- --            ICU Vital Signs     Row Name 11/23/18 1143 11/23/18 1134 11/23/18 1123 11/23/18 0819 11/23/18 0732       Vitals    Temp  --  --  98.1 °F (36.7 °C)  --  --    Pulse  76  80  82  77  80    Heart Rate Source  --  --  --  Monitor  --    Resp  --  20  18  --  20    BP  --  --  146/77  --  --    BP Location  --  --  Left arm  --  --    BP Method  --  --  Automatic  --  --    Patient Position  --  --  Lying  --  --       Oxygen Therapy    SpO2  --  90 %  90 %  --  95 %    Pulse Oximetry Type  --  Intermittent  --  --  Intermittent    Device (Oxygen Therapy)  --  humidified;nasal cannula  " nasal cannula  --  humidified;nasal cannula    Flow (L/min)  3  3  3  --  3    Row Name 11/23/18 0551 11/22/18 2327 11/22/18 2325 11/22/18 1952 11/22/18 1942       Height and Weight    Weight  66.7 kg (147 lb)  --  --  --  --    Weight Method  Standing scale white scale  --  --  --  --       Vitals    Temp  --  98.2 °F (36.8 °C)  --  --  --    Temp src  --  Oral  --  --  --    Pulse  --  72  78  80  81    Heart Rate Source  --  Monitor  Monitor  --  Monitor    Resp  --  18  --  --  18    Resp Rate Source  --  Visual  --  --  Visual    BP  --  132/78  --  --  --    BP Location  --  Left arm  --  --  --    BP Method  --  Automatic  --  --  --    Patient Position  --  Lying  --  --  --       Oxygen Therapy    SpO2  --  96 %  --  --  93 %    Pulse Oximetry Type  --  Intermittent  --  --  Intermittent    Device (Oxygen Therapy)  --  nasal cannula;humidified  --  nasal cannula;humidified  nasal cannula;humidified    Flow (L/min)  --  --  --  3.5  3.5    Row Name 11/22/18 1646 11/22/18 1531 11/22/18 1529 11/22/18 1502 11/22/18 1450       Vitals    Temp  --  --  97.7 °F (36.5 °C)  --  --    Temp src  --  --  Temporal  --  --    Pulse  77  --  85  82  80    Heart Rate Source  Monitor  --  Monitor  --  --    Resp  --  --  18  --  --    Resp Rate Source  --  --  Visual  --  --    BP  --  --  145/72  --  --    BP Location  --  --  Left arm  --  --    BP Method  --  --  Automatic  --  --    Patient Position  --  --  Lying  --  --       Oxygen Therapy    SpO2  --  92 %  82 %  (Abnormal)  at rest; pt was blowing her nose  --  94 %    Pulse Oximetry Type  --  --  --  --  Intermittent    Device (Oxygen Therapy)  --  nasal cannula  room air  nasal cannula  nasal cannula    Flow (L/min)  --  4  --  4  5  (Significant)  changed to 4L    Row Name 11/22/18 1112 11/22/18 1102 11/22/18 0748 11/22/18 0745 11/22/18 0737       Vitals    Temp  --  --  97.7 °F (36.5 °C)  --  --    Temp src  --  --  Temporal  --  --    Pulse  88  82  84  76  75     Heart Rate Source  Monitor  Monitor  Monitor  Monitor  Monitor    Resp  --  18  18  20  20    Resp Rate Source  --  Monitor  Visual  Visual  Visual    BP  --  --  140/89  --  --    BP Location  --  --  Left arm  --  --    BP Method  --  --  Automatic  --  --    Patient Position  --  --  Lying  --  --       Oxygen Therapy    SpO2  --  95 %  94 %  --  95 %    Pulse Oximetry Type  --  Continuous  --  --  Intermittent    Device (Oxygen Therapy)  --  humidified;nasal cannula  nasal cannula  nasal cannula  nasal cannula;humidified    Flow (L/min)  --  5  5  5  5    Row Name 11/22/18 0722 11/22/18 0554 11/22/18 0214 11/21/18 2354 11/21/18 1901       Height and Weight    Weight  --  66.8 kg (147 lb 3.2 oz)  --  --  --    Weight Method  --  Standing scale white scale  --  --  --       Vitals    Temp  --  --  --  97.9 °F (36.6 °C)  --    Temp src  --  --  --  Temporal  --    Pulse  78  --  88  80  83    Heart Rate Source  Monitor  --  Monitor  Monitor  Monitor    Resp  --  --  --  18  20    Resp Rate Source  --  --  --  Visual  Visual    BP  --  --  --  136/86  --    BP Location  --  --  --  Left arm  --    BP Method  --  --  --  Automatic  --    Patient Position  --  --  --  Lying  --       Oxygen Therapy    SpO2  --  --  --  95 %  93 %    Pulse Oximetry Type  --  --  --  Intermittent  Intermittent    Device (Oxygen Therapy)  --  --  --  nasal cannula  nasal cannula    Flow (L/min)  --  --  --  5  5    Row Name 11/21/18 1707 11/21/18 1542 11/21/18 1047 11/21/18 0801 11/21/18 0741       Vitals    Temp  98.1 °F (36.7 °C)  --  --  --  97.5 °F (36.4 °C)    Temp src  --  --  --  --  Temporal    Pulse  81  82 heart rate of 82 after neb.  76 heart rate of 79 after neb.  83  80    Heart Rate Source  --  Monitor  Monitor  Monitor  Monitor    Resp  18  16  16  --  16    Resp Rate Source  --  Visual  Visual  --  Visual    BP  151/82  --  --  --  137/74    BP Location  Left arm  --  --  --  Left arm    BP Method  Automatic  --  --   --  Automatic    Patient Position  Lying  --  --  --  Lying       Oxygen Therapy    SpO2  90 %  93 %  91 %  --  90 %    Pulse Oximetry Type  --  Intermittent  Intermittent  --  --    Device (Oxygen Therapy)  nasal cannula  nasal cannula  nasal cannula  --  nasal cannula;humidified    Flow (L/min)  5  5  5  --  5    Row Name 11/21/18 0709 11/21/18 0612 11/21/18 0323 11/21/18 0025 11/20/18 2959       Height and Weight    Weight  --  71.3 kg (157 lb 3.2 oz)  --  --  --    Weight Method  --  Standing scale  --  --  --       Vitals    Temp  --  --  --  98 °F (36.7 °C)  --    Temp src  --  --  --  Temporal  --    Pulse  79 heart rate of 79 after neb.  --  80  85  -- JAQUELIN beckford PT is off monitor    Heart Rate Source  Monitor  --  Monitor  Monitor  --    Resp  16  --  --  18  --    Resp Rate Source  Visual  --  --  Visual  --    BP  --  --  --  148/73  --    BP Location  --  --  --  Left arm  --    BP Method  --  --  --  Automatic  --    Patient Position  --  --  --  Lying  --       Oxygen Therapy    SpO2  91 %  --  --  91 %  --    Pulse Oximetry Type  Intermittent  --  --  Intermittent  --    Device (Oxygen Therapy)  nasal cannula  --  --  nasal cannula  --    Flow (L/min)  5  --  --  5  --    Row Name 11/20/18 2156 11/20/18 2020 11/20/18 2009 11/20/18 1920 11/20/18 1638       Vitals    Temp  98.7 °F (37.1 °C)  --  --  --  --    Temp src  Temporal  --  --  --  --    Pulse  87  87  90  --  --    Heart Rate Source  Monitor  Monitor  Monitor  --  --    Resp  20  --  20  --  --    Resp Rate Source  Visual  --  Visual  --  --    BP  148/72  --  --  --  --    BP Location  Left arm  --  --  --  --    BP Method  Automatic  --  --  --  --    Patient Position  Lying  --  --  --  --       Oxygen Therapy    SpO2  90 %  --  89 %  (Abnormal)   --  89 %  (Abnormal)     Pulse Oximetry Type  Intermittent  --  Intermittent  --  --    Device (Oxygen Therapy)  nasal cannula;humidified  --  nasal cannula  nasal cannula  humidified;nasal  cannula    Flow (L/min)  5  --  5  5  5    Row Name 11/20/18 1544 11/20/18 1542 11/20/18 1501 11/20/18 1451 11/20/18 1126       Vitals    Temp  --  97.6 °F (36.4 °C)  --  --  --    Temp src  --  Temporal  --  --  --    Pulse  --  81  82  83  77    Heart Rate Source  --  Monitor  --  --  --    BP  --  139/79  --  --  --    BP Location  --  Left arm  --  --  --    BP Method  --  Automatic  --  --  --    Patient Position  --  Lying  --  --  --       Oxygen Therapy    SpO2  88 %  (Abnormal)   84 %  (Abnormal)   --  88 %  (Abnormal)   --    Pulse Oximetry Type  --  Intermittent  --  Intermittent  --    Device (Oxygen Therapy)  --  humidified;nasal cannula  --  humidified;nasal cannula  --    Flow (L/min)  5  4  --  4  --    Row Name 11/20/18 1117 11/20/18 0838 11/20/18 0738 11/20/18 0728 11/20/18 0705       Vitals    Temp  --  --  96.9 °F (36.1 °C)  --  --    Temp src  --  --  Oral  --  --    Pulse  81  82  84  --  72    Heart Rate Source  --  Monitor  Monitor  --  --    Resp  --  --  18  --  --    Resp Rate Source  --  --  Visual  --  --    BP  --  --  138/76  140/78  --    BP Location  --  --  Right arm  --  --    BP Method  --  --  Automatic  --  --    Patient Position  --  --  Lying  --  --       Oxygen Therapy    SpO2  91 %  --  90 %  92 %  --    Pulse Oximetry Type  Intermittent  --  --  --  --    Device (Oxygen Therapy)  humidified;nasal cannula  --  nasal cannula;humidified  humidified;nasal cannula  --    Flow (L/min)  4  --  4  4  4    Row Name 11/20/18 0652 11/20/18 0454 11/20/18 0052 11/19/18 2329 11/19/18 1955       Height and Weight    Weight  --  69.8 kg (153 lb 14.4 oz)  --  --  --    Weight Method  --  Standing scale  --  --  --       Vitals    Temp  --  --  --  97.7 °F (36.5 °C)  --    Pulse  68  --  82  84  86    Heart Rate Source  --  --  Monitor  --  Monitor    Resp  20  --  --  20  20    Resp Rate Source  --  --  --  --  Visual    BP  --  --  --  145/78  --    BP Location  --  --  --  Right arm  --     BP Method  --  --  --  Automatic  --    Patient Position  --  --  --  Lying  --       Oxygen Therapy    SpO2  87 %  (Abnormal)   --  --  90 %  90 %    Pulse Oximetry Type  Intermittent  --  --  --  Intermittent    Device (Oxygen Therapy)  humidified;nasal cannula  --  --  nasal cannula  humidified;nasal cannula    Flow (L/min)  3  --  --  3  3    Row Name 11/19/18 1920 11/19/18 1648 11/19/18 1504 11/19/18 1433 11/19/18 1428       Vitals    Temp  --  --  98 °F (36.7 °C)  --  --    Temp src  --  --  Temporal  --  --    Pulse  --  84  82  72  68    Heart Rate Source  --  Monitor  Monitor  --  --    Resp  --  --  18 --  18    Resp Rate Source  --  --  Visual  --  --    BP  --  --  132/65  --  --    BP Location  --  --  Right arm  --  --    BP Method  --  --  Automatic  --  --    Patient Position  --  --  Lying  --  --       Oxygen Therapy    SpO2  --  --  91 %  --  92 %    Pulse Oximetry Type  --  --  Intermittent  --  Intermittent    Device (Oxygen Therapy)  nasal cannula;humidified  --  nasal cannula  --  nasal cannula    Flow (L/min)  3  --  3  --  3          Lab Results (most recent)     Procedure Component Value Units Date/Time    Basic Metabolic Panel [[id number]]  (Abnormal) Collected:  11/23/18 0532    Specimen:  Blood Updated:  11/23/18 0638     Glucose 205 mg/dL      BUN 25 mg/dL      Creatinine 0.67 mg/dL      Sodium 135 mmol/L      Potassium 4.1 mmol/L      Chloride 96 mmol/L      CO2 32.0 mmol/L      Calcium 8.7 mg/dL      eGFR Non African Amer 89 mL/min/1.73      BUN/Creatinine Ratio 37.3     Anion Gap 7.0 mmol/L     CBC & Differential [[id number]] Collected:  11/23/18 0532    Specimen:  Blood Updated:  11/23/18 0625    Narrative:       The following orders were created for panel order CBC & Differential.  Procedure                               Abnormality         Status                     ---------                               -----------         ------                     CBC Auto  Differential[[id number]]        Abnormal            Final result                 Please view results for these tests on the individual orders.    CBC Auto Differential [[id number]]  (Abnormal) Collected:  11/23/18 0532    Specimen:  Blood Updated:  11/23/18 0625     WBC 15.38 10*3/mm3      RBC 4.19 10*6/mm3      Hemoglobin 12.8 g/dL      Hematocrit 38.7 %      MCV 92.4 fL      MCH 30.5 pg      MCHC 33.1 g/dL      RDW 13.8 %      RDW-SD 46.7 fl      MPV 8.9 fL      Platelets 343 10*3/mm3      Neutrophil % 93.8 %      Lymphocyte % 3.8 %      Monocyte % 1.3 %      Eosinophil % 0.0 %      Basophil % 0.1 %      Immature Grans % 1.0 %      Neutrophils, Absolute 14.43 10*3/mm3      Lymphocytes, Absolute 0.59 10*3/mm3      Monocytes, Absolute 0.20 10*3/mm3      Eosinophils, Absolute 0.00 10*3/mm3      Basophils, Absolute 0.01 10*3/mm3      Immature Grans, Absolute 0.15 10*3/mm3      nRBC 0.0 /100 WBC     Potassium [[id number]]  (Normal) Collected:  11/23/18 0042    Specimen:  Blood Updated:  11/23/18 0136     Potassium 3.9 mmol/L     Extra Tubes [[id number]] Collected:  11/22/18 0953    Specimen:  Blood, Venous Line Updated:  11/22/18 1100    Narrative:       The following orders were created for panel order Extra Tubes.  Procedure                               Abnormality         Status                     ---------                               -----------         ------                     Gold Top - SST[[id number]]                                   Final result                 Please view results for these tests on the individual orders.    Gold Top - SST [[id number]] Collected:  11/22/18 0953    Specimen:  Blood Updated:  11/22/18 1100     Extra Tube Hold for add-ons.     Comment: Auto resulted.       Comprehensive Metabolic Panel [[id number]]  (Abnormal) Collected:  11/22/18 0953    Specimen:  Blood Updated:  11/22/18 1032     Glucose 268 mg/dL      BUN 24 mg/dL      Creatinine 0.68 mg/dL      Sodium 135 mmol/L       Potassium 3.0 mmol/L      Chloride 91 mmol/L      CO2 37.0 mmol/L      Calcium 8.9 mg/dL      Total Protein 6.3 g/dL      Albumin 3.60 g/dL      ALT (SGPT) 58 U/L      AST (SGOT) 39 U/L      Alkaline Phosphatase 83 U/L      Total Bilirubin 0.6 mg/dL      eGFR Non African Amer 88 mL/min/1.73      Globulin 2.7 gm/dL      A/G Ratio 1.3 g/dL      BUN/Creatinine Ratio 35.3     Anion Gap 7.0 mmol/L     CBC (No Diff) [[id number]]  (Abnormal) Collected:  11/22/18 0953    Specimen:  Blood Updated:  11/22/18 1010     WBC 14.11 10*3/mm3      RBC 4.15 10*6/mm3      Hemoglobin 12.8 g/dL      Hematocrit 37.2 %      MCV 89.6 fL      MCH 30.8 pg      MCHC 34.4 g/dL      RDW 13.4 %      RDW-SD 44.1 fl      MPV 9.1 fL      Platelets 310 10*3/mm3     Troponin [[id number]]  (Abnormal) Collected:  11/20/18 1920    Specimen:  Blood Updated:  11/20/18 2036     Troponin I 2.440 ng/mL     Blood Gas, Arterial [[id number]]  (Abnormal) Collected:  11/20/18 2010    Specimen:  Arterial Blood Updated:  11/20/18 2012     Site Right Brachial     Nael's Test N/A     pH, Arterial 7.529 pH units      Comment: 83 Value above reference range        pCO2, Arterial 40.0 mm Hg      pO2, Arterial 60.4 mm Hg      Comment: 84 Value below reference range        HCO3, Arterial 33.3 mmol/L      Comment: 83 Value above reference range        Base Excess, Arterial 9.7 mmol/L      Comment: 83 Value above reference range        O2 Saturation, Arterial 92.4 %      Comment: 84 Value below reference range        Barometric Pressure for Blood Gas 756 mmHg      Modality Nasal Cannula     Flow Rate 5.0 lpm      Ventilator Mode NA     Collected by      Comment: Meter: [id number]     :  [id number]       Magnesium [[id number]]  (Normal) Collected:  11/20/18 1343    Specimen:  Blood Updated:  11/20/18 1537     Magnesium 2.2 mg/dL     Potassium [[id number]]  (Abnormal) Collected:  11/20/18 1343    Specimen:  Blood Updated:  11/20/18 1537     Potassium 3.3 mmol/L      Troponin [[id number]]  (Abnormal) Collected:  11/20/18 1343    Specimen:  Blood Updated:  11/20/18 1438     Troponin I 2.480 ng/mL     CBC & Differential [[id number]] Collected:  11/20/18 0815    Specimen:  Blood Updated:  11/20/18 0906    Narrative:       The following orders were created for panel order CBC & Differential.  Procedure                               Abnormality         Status                     ---------                               -----------         ------                     CBC Auto Differential[[id number]]        Abnormal            Final result                 Please view results for these tests on the individual orders.    CBC Auto Differential [[id number]]  (Abnormal) Collected:  11/20/18 0815    Specimen:  Blood Updated:  11/20/18 0906     WBC 15.38 10*3/mm3      RBC 3.80 10*6/mm3      Hemoglobin 11.6 g/dL      Hematocrit 34.5 %      MCV 90.8 fL      MCH 30.5 pg      MCHC 33.6 g/dL      RDW 13.8 %      RDW-SD 45.3 fl      MPV 9.5 fL      Platelets 233 10*3/mm3      Neutrophil % 91.9 %      Lymphocyte % 4.3 %      Monocyte % 3.3 %      Eosinophil % 0.0 %      Basophil % 0.0 %      Immature Grans % 0.5 %      Neutrophils, Absolute 14.15 10*3/mm3      Lymphocytes, Absolute 0.66 10*3/mm3      Monocytes, Absolute 0.50 10*3/mm3      Eosinophils, Absolute 0.00 10*3/mm3      Basophils, Absolute 0.00 10*3/mm3      Immature Grans, Absolute 0.07 10*3/mm3     Troponin [[id number]]  (Abnormal) Collected:  11/20/18 0815    Specimen:  Blood Updated:  11/20/18 0902     Troponin I 2.400 ng/mL     Basic Metabolic Panel [[id number]]  (Abnormal) Collected:  11/20/18 0815    Specimen:  Blood Updated:  11/20/18 0901     Glucose 159 mg/dL      BUN 20 mg/dL      Creatinine 0.65 mg/dL      Sodium 138 mmol/L      Potassium 3.3 mmol/L      Chloride 96 mmol/L      CO2 34.0 mmol/L      Calcium 8.8 mg/dL      eGFR Non African Amer 93 mL/min/1.73      BUN/Creatinine Ratio 30.8     Anion Gap 8.0 mmol/L     CK-MB [[id number]]   (Normal) Collected:  11/20/18 0815    Specimen:  Blood Updated:  11/20/18 0856     CKMB 2.03 ng/mL     CK [[id number]]  (Abnormal) Collected:  11/20/18 0815    Specimen:  Blood Updated:  11/20/18 0842     Creatine Kinase 179 U/L     Comprehensive Metabolic Panel [[id number]]  (Abnormal) Collected:  11/19/18 0601    Specimen:  Blood Updated:  11/19/18 0647     Glucose 166 mg/dL      BUN 12 mg/dL      Creatinine 0.61 mg/dL      Sodium 138 mmol/L      Potassium 3.8 mmol/L      Chloride 99 mmol/L      CO2 30.0 mmol/L      Calcium 8.9 mg/dL      Total Protein 6.2 g/dL      Albumin 3.60 g/dL      ALT (SGPT) 47 U/L      AST (SGOT) 55 U/L      Alkaline Phosphatase 75 U/L      Total Bilirubin 0.6 mg/dL      eGFR Non African Amer 100 mL/min/1.73      Globulin 2.6 gm/dL      A/G Ratio 1.4 g/dL      BUN/Creatinine Ratio 19.7     Anion Gap 9.0 mmol/L     CBC & Differential [[id number]] Collected:  11/19/18 0601    Specimen:  Blood Updated:  11/19/18 0636    Narrative:       The following orders were created for panel order CBC & Differential.  Procedure                               Abnormality         Status                     ---------                               -----------         ------                     CBC Auto Differential[[id number]]        Abnormal            Final result                 Please view results for these tests on the individual orders.    CBC Auto Differential [[id number]]  (Abnormal) Collected:  11/19/18 0601    Specimen:  Blood Updated:  11/19/18 0636     WBC 20.14 10*3/mm3      RBC 3.84 10*6/mm3      Hemoglobin 11.8 g/dL      Hematocrit 35.1 %      MCV 91.4 fL      MCH 30.7 pg      MCHC 33.6 g/dL      RDW 13.9 %      RDW-SD 45.8 fl      MPV 9.0 fL      Platelets 231 10*3/mm3      Neutrophil % 93.7 %      Lymphocyte % 2.0 %      Monocyte % 4.0 %      Eosinophil % 0.0 %      Basophil % 0.1 %      Immature Grans % 0.2 %      Neutrophils, Absolute 18.87 10*3/mm3      Lymphocytes, Absolute 0.40 10*3/mm3       Monocytes, Absolute 0.80 10*3/mm3      Eosinophils, Absolute 0.00 10*3/mm3      Basophils, Absolute 0.02 10*3/mm3      Immature Grans, Absolute 0.05 10*3/mm3      nRBC 0.0 /100 WBC     Urinalysis With Culture If Indicated - Urine, Clean Catch [[id number]]  (Normal) Collected:  11/18/18 0859    Specimen:  Urine, Clean Catch Updated:  11/18/18 0908     Color, UA Yellow     Appearance, UA Clear     pH, UA 6.0     Specific Gravity, UA 1.009     Glucose, UA Negative     Ketones, UA Negative     Bilirubin, UA Negative     Blood, UA Negative     Protein, UA Negative     Leuk Esterase, UA Negative     Nitrite, UA Negative     Urobilinogen, UA 1.0 E.U./dL    Narrative:       Urine microscopic not indicated.    Comprehensive Metabolic Panel [[id number]]  (Abnormal) Collected:  11/18/18 0621    Specimen:  Blood Updated:  11/18/18 0718     Glucose 165 mg/dL      BUN 10 mg/dL      Creatinine 0.60 mg/dL      Sodium 134 mmol/L      Potassium 4.0 mmol/L      Chloride 100 mmol/L      CO2 28.0 mmol/L      Calcium 8.3 mg/dL      Total Protein 5.8 g/dL      Albumin 3.30 g/dL      ALT (SGPT) 46 U/L      AST (SGOT) 64 U/L      Alkaline Phosphatase 70 U/L      Total Bilirubin 0.6 mg/dL      eGFR Non African Amer 102 mL/min/1.73      Globulin 2.5 gm/dL      A/G Ratio 1.3 g/dL      BUN/Creatinine Ratio 16.7     Anion Gap 6.0 mmol/L     CBC & Differential [[id number]] Collected:  11/18/18 0621    Specimen:  Blood Updated:  11/18/18 0708    Narrative:       The following orders were created for panel order CBC & Differential.  Procedure                               Abnormality         Status                     ---------                               -----------         ------                     CBC Auto Differential[[id number]]        Abnormal            Final result                 Please view results for these tests on the individual orders.    CBC Auto Differential [[id number]]  (Abnormal) Collected:  11/18/18 0621    Specimen:  Blood  Updated:  11/18/18 0708     WBC 10.48 10*3/mm3      RBC 3.73 10*6/mm3      Hemoglobin 11.4 g/dL      Hematocrit 33.7 %      MCV 90.3 fL      MCH 30.6 pg      MCHC 33.8 g/dL      RDW 13.4 %      RDW-SD 43.8 fl      MPV 9.4 fL      Platelets 160 10*3/mm3      Neutrophil % 94.0 %      Lymphocyte % 3.5 %      Monocyte % 2.3 %      Eosinophil % 0.0 %      Basophil % 0.0 %      Immature Grans % 0.2 %      Neutrophils, Absolute 9.85 10*3/mm3      Lymphocytes, Absolute 0.37 10*3/mm3      Monocytes, Absolute 0.24 10*3/mm3      Eosinophils, Absolute 0.00 10*3/mm3      Basophils, Absolute 0.00 10*3/mm3      Immature Grans, Absolute 0.02 10*3/mm3     CBC (No Diff) [[id number]]  (Abnormal) Collected:  11/17/18 0139    Specimen:  Blood Updated:  11/17/18 0147     WBC 10.57 10*3/mm3      RBC 3.90 10*6/mm3      Hemoglobin 11.9 g/dL      Hematocrit 35.9 %      MCV 92.1 fL      MCH 30.5 pg      MCHC 33.1 g/dL      RDW 13.8 %      RDW-SD 46.0 fl      MPV 9.0 fL      Platelets 161 10*3/mm3     Troponin [[id number]]  (Abnormal) Collected:  11/16/18 0333    Specimen:  Blood Updated:  11/16/18 0616     Troponin I 39.900 ng/mL     Lipid Panel [[id number]]  (Abnormal) Collected:  11/16/18 0333    Specimen:  Blood Updated:  11/16/18 0542     Total Cholesterol 137 mg/dL      Triglycerides 191 mg/dL      HDL Cholesterol 33 mg/dL      LDL Cholesterol  89 mg/dL      LDL/HDL Ratio 1.99    Extra Tubes [[id number]] Collected:  11/16/18 0333    Specimen:  Blood, Venous Line Updated:  11/16/18 0445    Narrative:       The following orders were created for panel order Extra Tubes.  Procedure                               Abnormality         Status                     ---------                               -----------         ------                     Lavender Top[[id number]]                                     Final result                 Please view results for these tests on the individual orders.    Lavender Top [[id number]] Collected:  11/16/18 0333     Specimen:  Blood Updated:  11/16/18 0445     Extra Tube hold for add-on     Comment: Auto resulted       Troponin [[id number]]  (Abnormal) Collected:  11/15/18 1624    Specimen:  Blood Updated:  11/15/18 1713     Troponin I 66.100 ng/mL     Troponin [[id number]]  (Abnormal) Collected:  11/15/18 1358    Specimen:  Blood Updated:  11/15/18 1433     Troponin I 78.500 ng/mL     CK [[id number]]  (Abnormal) Collected:  11/15/18 1041    Specimen:  Blood Updated:  11/15/18 1113     Creatine Kinase 3,625 U/L     Troponin [[id number]]  (Abnormal) Collected:  11/15/18 1041    Specimen:  Blood Updated:  11/15/18 1113     Troponin I 69.800 ng/mL     CK-MB [[id number]]  (Abnormal) Collected:  11/15/18 1041    Specimen:  Blood Updated:  11/15/18 1111     CKMB 303.00 ng/mL     Extra Tubes [[id number]] Collected:  11/15/18 0556    Specimen:  Blood, Venous Line Updated:  11/15/18 0700    Narrative:       The following orders were created for panel order Extra Tubes.  Procedure                               Abnormality         Status                     ---------                               -----------         ------                     Gold Top - SST[[id number]]                                   Final result                 Please view results for these tests on the individual orders.    Gold Top - SST [[id number]] Collected:  11/15/18 0556    Specimen:  Blood Updated:  11/15/18 0700     Extra Tube Hold for add-ons.     Comment: Auto resulted.       Basic Metabolic Panel [[id number]]  (Abnormal) Collected:  11/15/18 0556    Specimen:  Blood Updated:  11/15/18 0652     Glucose 200 mg/dL      BUN 15 mg/dL      Creatinine 0.81 mg/dL      Sodium 135 mmol/L      Potassium 4.3 mmol/L      Chloride 102 mmol/L      CO2 23.0 mmol/L      Calcium 8.2 mg/dL      eGFR Non African Amer 72 mL/min/1.73      BUN/Creatinine Ratio 18.5     Anion Gap 10.0 mmol/L     CBC (No Diff) [[id number]]  (Abnormal) Collected:  11/15/18 0555    Specimen:  Blood Updated:   11/15/18 0612     WBC 12.30 10*3/mm3      RBC 4.63 10*6/mm3      Hemoglobin 14.3 g/dL      Hematocrit 42.2 %      MCV 91.1 fL      MCH 30.9 pg      MCHC 33.9 g/dL      RDW 13.7 %      RDW-SD 44.4 fl      MPV 9.0 fL      Platelets 285 10*3/mm3     Extra Tubes [[id number]] Collected:  11/15/18 0214    Specimen:  Blood, Venous Line Updated:  11/15/18 0315    Narrative:       The following orders were created for panel order Extra Tubes.  Procedure                               Abnormality         Status                     ---------                               -----------         ------                     Gold Top - SST[[id number]]                                   Final result                 Please view results for these tests on the individual orders.    Gold Top - SST [[id number]] Collected:  11/15/18 0214    Specimen:  Blood Updated:  11/15/18 0315     Extra Tube Hold for add-ons.     Comment: Auto resulted.       BNP [[id number]]  (Normal) Collected:  11/15/18 0214    Specimen:  Blood from Arm, Right Updated:  11/15/18 0256     proBNP 34.8 pg/mL     Troponin [[id number]]  (Normal) Collected:  11/15/18 0214    Specimen:  Blood from Arm, Right Updated:  11/15/18 0256     Troponin I <0.012 ng/mL     Protime-INR [[id number]]  (Normal) Collected:  11/15/18 0214    Specimen:  Blood from Arm, Right Updated:  11/15/18 0244     Protime 12.2 Seconds      INR 0.92    Narrative:       Therapeutic range for most indications is 2.0-3.0 INR,  or 2.5-3.5 for mechanical heart valves.    CBC & Differential [[id number]] Collected:  11/15/18 0214    Specimen:  Blood Updated:  11/15/18 0229    Narrative:       The following orders were created for panel order CBC & Differential.  Procedure                               Abnormality         Status                     ---------                               -----------         ------                     CBC Auto Differential[[id number]]        Abnormal            Final result                  Please view results for these tests on the individual orders.    CBC Auto Differential [[id number]]  (Abnormal) Collected:  11/15/18 0214    Specimen:  Blood from Arm, Right Updated:  11/15/18 0229     WBC 10.36 10*3/mm3      RBC 4.61 10*6/mm3      Hemoglobin 14.4 g/dL      Hematocrit 41.9 %      MCV 90.9 fL      MCH 31.2 pg      MCHC 34.4 g/dL      RDW 13.6 %      RDW-SD 44.5 fl      MPV 8.9 fL      Platelets 271 10*3/mm3      Neutrophil % 62.5 %      Lymphocyte % 24.3 %      Monocyte % 8.0 %      Eosinophil % 4.2 %      Basophil % 0.5 %      Immature Grans % 0.5 %      Neutrophils, Absolute 6.48 10*3/mm3      Lymphocytes, Absolute 2.52 10*3/mm3      Monocytes, Absolute 0.83 10*3/mm3      Eosinophils, Absolute 0.43 10*3/mm3      Basophils, Absolute 0.05 10*3/mm3      Immature Grans, Absolute 0.05 10*3/mm3           Imaging Results (most recent)     Procedure Component Value Units Date/Time    CT Angiogram Chest With Contrast [[id number]] Collected:  11/20/18 1358     Updated:  11/20/18 1451    Narrative:         EXAM:  Computed Tomography with CTA         REGION:  Chest       INDICATION:  SOB ;  Dyspnea, cardiac origin suspected, I21.3 ST  elevation (STEMI) myocardial infarction of unspecified site  I21.29 ST elevation (STEMI) myocardial infarction involving other  sites I10 Essential (primary) hypertension Z74.09 Other reduced  mobility Z74.09 Other reduced mobility    - rule out pulmonary embolism       CLINICAL HISTORY:  CORRELATIVE IMAGING:  None                         TECHNIQUE:     - PE / vascular protocol     - reconstructions:  axial, coronal, sagittal, obliques     - computer-generated 3D reconstructions (MIPS) were performed.     - contrast:  intravenous ;  Isovue 370,     75 mL                                This exam was performed according to the departmental  dose-optimization program which includes automated exposure  control, adjustment of the mA and/or kV according to patient size  and/or use of  iterative reconstruction technique.         COMMENTS:    - Pulmonary arterial system:      - Main pulmonary artery trunk:  negative      - Left, right main pulmonary arteries: negative      - Lobar arteries: negative       - Segmental arteries: negative      - Systemic vascularity (as visualized):        - Aorta:  grossly negative / normal caliber / no dissection        - roots of great vessels:  grossly negative / normal  caliber        - SVC / IVC:  grossly negative / normal caliber     - Misc (limited visualization):      - pulmonary parenchyma:  Bilateral upper lobe airspace  disease, small focus of consolidated airspace disease in the  right lower lobe.      - pleura:  Small bilateral pleural fluid collections.      - mediastinal / morgan:  negative      - neck, inferior:  grossly wnl      - subdiaphragmatic structures:  grossly negative (limited  evaluation)       - osseous:      - misc:       .        Impression:       CONCLUSION:          1.  No evidence of pulmonary embolism.            2.  No evidence of pathology associated with the visualized  aorta.      3. Bilateral upper lobe airspace disease, possibly of an  inflammatory etiology.                                                              Electronically signed by:  HIEN Stiles MD  11/20/2018 2:50  PM CST Workstation: [id number]    XR Chest PA & Lateral [[id number]] Collected:  11/19/18 1330     Updated:  11/19/18 1352    Narrative:           PROCEDURE: Chest PA and lateral    REASON FOR EXAM: hypoxia, I21.3 ST elevation (STEMI) myocardial  infarction of unspecified site I21.29 ST elevation (STEMI)  myocardial infarction involving other sites I10 Essential  (primary) hypertension    FINDINGS: Comparison study dated November 17, 2018. Cardiac size  normal. Bilateral perihilar haziness as well as bilateral  perihilar and bibasilar interstitial opacities. Lungs are  otherwise clear. Small bilateral pleural effusions.. No acute  osseous abnormality.       Impression:       1.  Bilateral perihilar as well as bibasilar interstitial  opacities suspicious for pulmonary edema and/or pneumonia.  2.  Small bilateral pleural effusions.    Electronically signed by:  Cale Burger MD  11/19/2018 1:51 PM CST  Workstation: [id number]    XR Chest PA & Lateral [[id number]] Collected:  11/17/18 1350     Updated:  11/17/18 1409    Narrative:         TWO VIEW CHEST    HISTORY: Shortness of air    Frontal and lateral films of the chest were obtained.  COMPARISON: Portable chest 1:13 AM November 17, 2018    EKG leads.  Cardiomegaly with minimal pulmonary vascular congestion and  interstitial edema.  Small bilateral pleural effusions.  There may be underlying subsegmental atelectasis, infiltrate or  edema in the lower lobes.  No pneumothorax.  No acute osseous abnormality.  Degenerative changes are present in the thoracic spine.      Impression:       CONCLUSION:  Cardiomegaly with minimal pulmonary vascular congestion and  interstitial edema.  Small bilateral pleural effusions.  There may be underlying subsegmental atelectasis, infiltrate or  edema in the lower lobes.    [id number]    Electronically signed by:  Jg Callahan MD  11/17/2018 2:08 PM  CST Workstation: [id number]    XR Chest 1 View [[id number]] Collected:  11/17/18 0120     Updated:  11/17/18 0204    Narrative:       Exam: AP portable chest    INDICATION: shortness of breath    COMPARISON: 11/15/2018    FINDINGS: The bony structures are intact. The cardiomediastinal  silhouette is unremarkable. There is questionable increased  opacity in retrocardiac region. No pneumothorax or pleural  effusion.      Impression:       Questionable increased opacity retrocardiac region,  atelectasis and/or infiltrate are not excluded    Electronically signed by:  Maicol Oliver MD  11/17/2018 2:03 AM  CST Workstation: [id number]    XR Chest 1 View [[id number]] Collected:  11/15/18 0227     Updated:  11/15/18 0239    Narrative:       Exam: AP  portable chest    INDICATION: Chest pain    COMPARISON: 5/17/2018    FINDINGS: AP portable chest. The bony structures are intact. The  cardiomediastinal silhouette is unremarkable. Mild plaque is  present in the aorta. Mild parenchymal scarring is present in the  right lung base. No acute infiltrate, pneumothorax or pleural  effusion.      Impression:       No acute cardiopulmonary abnormality.    Electronically signed by:  Maicol Oliver MD  11/15/2018 2:38 AM  [facility] Workstation: [id number]           Physician Progress Notes (most recent note)      Alex Lomax MD at 11/22/2018  8:57 AM          Progress Note  Alex Lomax MD  Hospitalist    Date of visit: 11/22/2018     LOS: 7 days   Patient Care Team:  Provider, No Known as PCP - Tiffanie Ivan APRN as PCP - Family Medicine (Family Medicine)    Chief Complaint: dyspnea    Subjective     Interval History:     Patient Complaints: chest pain / dyspnea improved. She remains hypoxic - improved to some extent, but still requiring O2 @ 5 L/min.    History taken from: patient / nursing    Medication Review:   Current Facility-Administered Medications   Medication Dose Route Frequency Provider Last Rate Last Dose   • amiodarone (PACERONE) half tablet 100 mg  100 mg Oral Q12H Thaddeus Huber MD   100 mg at 11/22/18 0904   • aspirin chewable tablet 81 mg  81 mg Oral Daily Thaddeus Huber MD   81 mg at 11/22/18 0904   • atorvastatin (LIPITOR) tablet 40 mg  40 mg Oral Nightly Thaddeus Huber MD   40 mg at 11/21/18 2126   • budesonide (PULMICORT) nebulizer solution 0.5 mg  0.5 mg Nebulization BID - RT Sanjeev Betancourt MD   0.5 mg at 11/22/18 0737   • chlordiazePOXIDE (LIBRIUM) capsule 5 mg  5 mg Oral 4x Daily PRN Alex Lomax MD       • enoxaparin (LOVENOX) syringe 40 mg  40 mg Subcutaneous Nightly ReynaldoDanyel leblanc DO   40 mg at 11/21/18 2125   • furosemide (LASIX) injection 40 mg  40 mg Intravenous Q12H Alex Lomax MD   40 mg at 11/22/18 0908    • ipratropium-albuterol (DUO-NEB) nebulizer solution 3 mL  3 mL Nebulization 4x Daily - RT Sanjeev Betancourt MD   3 mL at 11/22/18 1102   • labetalol (NORMODYNE) tablet 50 mg  50 mg Oral Q12H Thaddeus Huber MD   50 mg at 11/22/18 0904   • losartan (COZAAR) half tablet 12.5 mg  12.5 mg Oral Daily Thaddeus Huber MD   12.5 mg at 11/22/18 0904   • Magnesium Sulfate 2 gram Bolus, followed by 8 gram infusion (total Mg dose 10 grams)- Mg less than or equal to 1mg/dL  2 g Intravenous PRN Alex Lomax MD        Or   • Magnesium Sulfate 2 gram / 50mL Infusion (GIVE X 3 BAGS TO EQUAL 6GM TOTAL DOSE) - Mg 1.1 - 1.5 mg/dl  2 g Intravenous PRN Alex Lomax MD        Or   • Magnesium Sulfate 4 gram infusion- Mg 1.6-1.9 mg/dL  4 g Intravenous PRN Alex Lomax MD       • methylPREDNISolone sodium succinate (SOLU-Medrol) injection 60 mg  60 mg Intravenous Q6H Alex Lomax MD   60 mg at 11/22/18 0923   • montelukast (SINGULAIR) tablet 10 mg  10 mg Oral Nightly Sanjeev Betancourt MD   10 mg at 11/21/18 2126   • nitroglycerin (TRIDIL) 200-5 MCG/ML-% infusion  - ADS Override Pull            • piperacillin-tazobactam (ZOSYN) 3.375 g/100 mL 0.9% NS IVPB (mbp)  3.375 g Intravenous Q8H Alex Lomax MD   3.375 g at 11/22/18 0601   • potassium chloride (KLOR-CON) packet 40 mEq  40 mEq Oral PRN Alex Lomax MD   40 mEq at 11/20/18 1621   • potassium chloride (MICRO-K) CR capsule 40 mEq  40 mEq Oral PRN Alex Lomax MD   40 mEq at 11/22/18 1213   • sodium chloride (OCEAN) nasal spray 2 spray  2 spray Each Nare PRN Thaddeus Huber MD       • ticagrelor (BRILINTA) tablet 90 mg  90 mg Oral BID Thaddeus Huber MD   90 mg at 11/22/18 0904   • zolpidem (AMBIEN) tablet 5 mg  5 mg Oral Nightly PRN Alex Lomax MD   5 mg at 11/21/18 2128       Review of Systems:   Review of Systems   Constitutional: Positive for fatigue. Negative for fever.   Respiratory: Positive for cough and shortness of breath. Negative for wheezing.     Cardiovascular: Negative for chest pain, palpitations and leg swelling.   Gastrointestinal: Negative for abdominal distention, abdominal pain and constipation.   Genitourinary: Negative for dysuria, frequency, hematuria and urgency.   Musculoskeletal: Negative for arthralgias, back pain and gait problem.   Skin: Positive for pallor.   Neurological: Positive for weakness. Negative for syncope, facial asymmetry and light-headedness.   Psychiatric/Behavioral: Positive for dysphoric mood. Negative for agitation, behavioral problems and confusion. The patient is nervous/anxious.    All other systems reviewed and are negative.      Objective     Vital Signs  Temp:  [97.7 °F (36.5 °C)-98.1 °F (36.7 °C)] 97.7 °F (36.5 °C)  Heart Rate:  [75-88] 88  Resp:  [16-20] 18  BP: (136-151)/(82-89) 140/89    Physical Exam:  Physical Exam   Constitutional: She is oriented to person, place, and time. She appears cachectic. No distress.   HENT:   Head: Normocephalic and atraumatic.   Eyes: EOM are normal. Pupils are equal, round, and reactive to light. No scleral icterus.   Neck: Normal range of motion. Neck supple.   Cardiovascular: Normal rate and regular rhythm.   Pulmonary/Chest: Effort normal. No respiratory distress. She has wheezes.   Abdominal: Soft. Bowel sounds are normal. She exhibits no distension. There is no tenderness. There is no guarding.   Musculoskeletal: She exhibits no edema.   Neurological: She is alert and oriented to person, place, and time. No cranial nerve deficit. Coordination normal.   Skin: Skin is warm and dry. No rash noted. There is pallor.   Psychiatric: She has a normal mood and affect. Her behavior is normal.   Vitals reviewed.       Results Review:    Lab Results (last 24 hours)     Procedure Component Value Units Date/Time    Extra Tubes [[id number]] Collected:  11/22/18 0953    Specimen:  Blood, Venous Line Updated:  11/22/18 1100    Narrative:       The following orders were created for panel order  Extra Tubes.  Procedure                               Abnormality         Status                     ---------                               -----------         ------                     Gold Top - SST[[id number]]                                   Final result                 Please view results for these tests on the individual orders.    Gold Top - [facility] [[id number]] Collected:  11/22/18 0953    Specimen:  Blood Updated:  11/22/18 1100     Extra Tube Hold for add-ons.     Comment: Auto resulted.       Comprehensive Metabolic Panel [[id number]]  (Abnormal) Collected:  11/22/18 0953    Specimen:  Blood Updated:  11/22/18 1032     Glucose 268 mg/dL      BUN 24 mg/dL      Creatinine 0.68 mg/dL      Sodium 135 mmol/L      Potassium 3.0 mmol/L      Chloride 91 mmol/L      CO2 37.0 mmol/L      Calcium 8.9 mg/dL      Total Protein 6.3 g/dL      Albumin 3.60 g/dL      ALT (SGPT) 58 U/L      AST (SGOT) 39 U/L      Alkaline Phosphatase 83 U/L      Total Bilirubin 0.6 mg/dL      eGFR Non African Amer 88 mL/min/1.73      Globulin 2.7 gm/dL      A/G Ratio 1.3 g/dL      BUN/Creatinine Ratio 35.3     Anion Gap 7.0 mmol/L     CBC (No Diff) [[id number]]  (Abnormal) Collected:  11/22/18 0953    Specimen:  Blood Updated:  11/22/18 1010     WBC 14.11 10*3/mm3      RBC 4.15 10*6/mm3      Hemoglobin 12.8 g/dL      Hematocrit 37.2 %      MCV 89.6 fL      MCH 30.8 pg      MCHC 34.4 g/dL      RDW 13.4 %      RDW-SD 44.1 fl      MPV 9.1 fL      Platelets 310 10*3/mm3           Imaging Results (last 24 hours)     ** No results found for the last 24 hours. **          Assessment/Plan       Acute ST elevation myocardial infarction (STEMI) (CMS/HCC)    CAD (coronary artery disease)    Chronic bronchitis with acute exacerbation (CMS/HCC)    Acute on chronic respiratory failure with hypoxia (CMS/HCC)    Depression    Pneumonia of both upper lobes    Hypertension    Anxiety    Chronic alcohol abuse    Improved after the coronary angioplasty with  stenting but she remains hypoxic. Continue with the diuresis, nebulized treatments, IV steroids, supplemental Oxygen.    CT lung angiogram shows bilateral upper lobe pneumonia. Started on Zoxyn.  Replenish potassium    Alex Lomax MD  11/22/18  12:17 PM        Electronically signed by Alex Lomax MD at 11/22/2018 12:17 PM          Consult Notes (most recent note)      Eliazar Ojeda MD at 11/17/2018  6:44 PM      Consult Orders    1. Inpatient Psychiatrist Consult [[id number]] ordered by Thaddeus Huber MD at 11/16/18 1902                Inpatient Consult to Psychiatry    11/17/2018    Referring Provider: Dr. Huber  Reason for Consultation: alcohol addiction    Source of History:  the patient    HPI:    Patient is a 63 y.o. female who seen for concerns by daughter about her excessive drinking.  She notes she is not addicted to ETOH.  She minimizes use and stated she has not drank in 6 months but then later states some drink with  watching a horse race.    She does not think she has an issue now.  She believes she did in the past when her  had an affair.    She denies any other mental health concerns currently.    Psychiatric Review Of Systems:  etoh use    History  Past psychiatric history:    Psychiatric Hospitalizations: Patient has had no prior hospitalizations.    Suicide Attempts: Patient has had no prior suicide attempts.    Prior Treatment and Medications Tried: paxil for possibly the last 3 months currently for depression and anxiety.  Xanax in the past.  No rehab history.    Social History:    Social History     Socioeconomic History   • Marital status:      Spouse name: Not on file   • Number of children: Not on file   • Years of education: Not on file   • Highest education level: Not on file   Social Needs   • Financial resource strain: Not on file   • Food insecurity - worry: Not on file   • Food insecurity - inability: Not on file   • Transportation needs - medical:  Not on file   • Transportation needs - non-medical: Not on file   Occupational History   • Not on file   Tobacco Use   • Smoking status: Former Smoker     Packs/day: 1.00     Years: 48.00     Pack years: 48.00     Types: Cigarettes     Start date:      Last attempt to quit: 2017     Years since quittin.2   • Smokeless tobacco: Current User   Substance and Sexual Activity   • Alcohol use: Yes     Alcohol/week: 1.2 - 1.8 oz     Types: 1 - 2 Glasses of wine, 1 Cans of beer per week     Comment: Socially   • Drug use: No   • Sexual activity: Not on file   Other Topics Concern   • Not on file   Social History Narrative   • Not on file         Family History:    Family History   Problem Relation Age of Onset   • Heart attack Mother    • Heart disease Mother    • Heart disease Father    • Heart disease Paternal Grandmother        Past Medical and Surgical History:    Past Medical History:   Diagnosis Date   • Acute bronchitis    • Acute upper respiratory infection    • Adjustment disorder with mixed emotional features    • Agoraphobia with panic attacks    • Allergic rhinitis due to pollen    • Anxiety    • Asthma    • Backache    • Blood in urine    • Candidiasis of mouth    • Chronic bronchitis (CMS/HCC)    • Chronic obstructive lung disease (CMS/HCC)    • Emphysema lung (CMS/HCC)    • Essential hypertension    • Extrinsic asthma with status asthmaticus    • Fatigue    • H/O echocardiogram     EF 55-60%. LV systolic function normal. Impaired LV relaxation. [facility]   • Hypoxemia    • Malaise and fatigue    • Non-IgE mediated allergic asthma    • Nonvenomous insect bite    • Pneumonia    • Postmenopausal state    • Urinary tract infectious disease      Past Surgical History:   Procedure Laterality Date   • INJECTION OF MEDICATION  2015    celestone(1)   • INJECTION OF MEDICATION  2016    DEPO MEDROL(16)     Allergies:  Patient has no known allergies.  Medications Prior to Admission    Medication Sig Dispense Refill Last Dose   • ADVAIR DISKUS 500-50 MCG/DOSE DISKUS INHALE 1 PUFF BY MOUTH TWICE DAILY 1 each 5 Taking   • albuterol (PROVENTIL) (2.5 MG/3ML) 0.083% nebulizer solution USE 1 VIAL PER NEBULIZER EVERY 4 HOURS AS NEEDED FOR WHEEZING 360 mL 0 Taking   • albuterol (VENTOLIN HFA) 108 (90 Base) MCG/ACT inhaler 2 puffs every 4 hours as needed for breathing 1 inhaler 5    • ALPRAZolam (XANAX) 0.25 MG tablet Take 1 tablet by mouth 3 (Three) Times a Day As Needed for Anxiety. 90 tablet 0 Taking   • amLODIPine (NORVASC) 10 MG tablet TAKE 1 TABLET BY MOUTH EVERY DAY 30 tablet 3 Taking   • azithromycin (ZITHROMAX) 250 MG tablet TAKE AS DIRECTED 6 tablet 0 Taking   • azithromycin (ZITHROMAX) 250 MG tablet TAKE 2 TABLETS BY MOUTH ON DAY 1, THEN 1 TABLET DAILY FOR 4 DAYS 6 tablet 0 Not Taking   • azithromycin (ZITHROMAX) 250 MG tablet Take 2 tablets the first day, then 1 tablet daily for 4 days. 6 tablet 1    • azithromycin (ZITHROMAX) 250 MG tablet TAKE 2 TABLET BY MOUTH THE FIRST DAY THEN1 TABLET BY MOUTH DAILY FOR 4 DAYS 6 tablet 0    • budesonide-formoterol (SYMBICORT) 160-4.5 MCG/ACT inhaler 2 puffs twice a day 1 inhaler 5 Taking   • glucose blood test strip Use as instructed 50 each 12 Taking   • glucose monitor monitoring kit 1 each Daily. Testing blood sugar once a day    ICD10 - R73.9 1 each 0 Taking   • Lancets (FREESTYLE) lancets Testing blood sugar once a day 100 each 12 Taking   • metFORMIN (GLUCOPHAGE) 500 MG tablet Take 1 tablet by mouth Daily With Breakfast. 30 tablet 11 Taking   • montelukast (SINGULAIR) 10 MG tablet TAKE 1 TABLET BY MOUTH DAILY 90 tablet 1    • nystatin (MYCOSTATIN) 364842 UNIT/ML suspension Take 5 mL by mouth 4 (Four) Times a Day. 280 mL 0 Taking   • PARoxetine (PAXIL) 10 MG tablet Take 1 tablet by mouth Every Morning. 30 tablet 11 Taking   • predniSONE (DELTASONE) 10 MG tablet Take 1 tablet by mouth Daily for 30 doses. 1 by mouth daily 30 tablet 3    • predniSONE  (DELTASONE) 20 MG tablet TAKE 1 TABLET BY MOUTH DAILY 30 tablet 0 Taking   • SPIRIVA HANDIHALER 18 MCG per inhalation capsule INHALE 1 PUFF BY MOUTH DAILY 30 capsule 5 Taking   • VENTOLIN  (90 Base) MCG/ACT inhaler INHALE 2 PUFFS BY MOUTH EVERY 4 HOURS AS NEEDED FOR BREATHING 18 g 0 Taking   • VENTOLIN  (90 Base) MCG/ACT inhaler INHALE 2 PUFFS BY MOUTH EVERY 4 HOURS AS NEEDED FOR BREATHING 18 g 0 Taking   • VENTOLIN  (90 Base) MCG/ACT inhaler INHALE 2 PUFFS BY MOUTH EVERY 4 HOURS AS NEEDED FOR BREATHING 18 g 0 Taking   • VENTOLIN  (90 Base) MCG/ACT inhaler INHALE 2 PUFFS BY MOUTH EVERY 4 HOURS AS NEEDED FOR BREATHING 18 g 0 Taking       Medical Review Of Systems:  Reviewed review of systems from  Dr. Betancourt's progress note from today:  Gastrointestinal: Negative for diarrhea.         All pertinent negatives and positives are as above. All other systems have been reviewed and are negative unless otherwise stated.         Objective     Vital Signs    Temp:  [98.8 °F (37.1 °C)-101.5 °F (38.6 °C)] 99.4 °F (37.4 °C)  Heart Rate:  [] 90  Resp:  [18-20] 18  BP: (123-135)/(58-81) 123/70    Physical Exam:   General Appearance: alert, appears stated age and cooperative,  Hygiene:   good  Gait & Station: in bed  Musculoskeletal: No tremors or abnormal involuntary movements    Mental Status Exam:   Cooperation:  Cooperative  Eye Contact:  Good  Psychomotor Behavior:  Appropriate  Mood: Euthymic  Affect:  normal  Speech:  Normal  Thought Process:  Coherent  Associations: Goal Directed  Thought Content:     Normal   Suicidal:  None   Homicidal:  None   Hallucinations:  None   Delusion:  None  Cognitive Functioning:   Consciousness: awake and alert   Orientation:  Person, Place, Time and Situation   Attention: normal Concentration: Normal   Language:  Intact Vocabulary: Average   Short Term Memory: Intact   Long Term Memory: Intact   Fund of Knowledge: Average  Reliability:  fair  Insight:   Fair  Judgement:  Fair  Impulse Control:  Good    Diagnostic Data:    Lab Results (last 72 hours)     Procedure Component Value Units Date/Time    CBC (No Diff) [[id number]]  (Abnormal) Collected:  11/17/18 0139    Specimen:  Blood Updated:  11/17/18 0147     WBC 10.57 10*3/mm3      RBC 3.90 10*6/mm3      Hemoglobin 11.9 g/dL      Hematocrit 35.9 %      MCV 92.1 fL      MCH 30.5 pg      MCHC 33.1 g/dL      RDW 13.8 %      RDW-SD 46.0 fl      MPV 9.0 fL      Platelets 161 10*3/mm3     Troponin [[id number]]  (Abnormal) Collected:  11/16/18 0333    Specimen:  Blood Updated:  11/16/18 0616     Troponin I 39.900 ng/mL     Lipid Panel [[id number]]  (Abnormal) Collected:  11/16/18 0333    Specimen:  Blood Updated:  11/16/18 0542     Total Cholesterol 137 mg/dL      Triglycerides 191 mg/dL      HDL Cholesterol 33 mg/dL      LDL Cholesterol  89 mg/dL      LDL/HDL Ratio 1.99    Extra Tubes [[id number]] Collected:  11/16/18 0333    Specimen:  Blood, Venous Line Updated:  11/16/18 0445    Narrative:       The following orders were created for panel order Extra Tubes.  Procedure                               Abnormality         Status                     ---------                               -----------         ------                     Lavender Top[[id number]]                                     Final result                 Please view results for these tests on the individual orders.    Lavender Top [[id number]] Collected:  11/16/18 0333    Specimen:  Blood Updated:  11/16/18 0445     Extra Tube hold for add-on     Comment: Auto resulted       Troponin [[id number]]  (Abnormal) Collected:  11/15/18 1624    Specimen:  Blood Updated:  11/15/18 1713     Troponin I 66.100 ng/mL     Troponin [[id number]]  (Abnormal) Collected:  11/15/18 1358    Specimen:  Blood Updated:  11/15/18 1433     Troponin I 78.500 ng/mL     CK [[id number]]  (Abnormal) Collected:  11/15/18 1041    Specimen:  Blood Updated:  11/15/18 1113     Creatine Kinase 3,625  U/L     Troponin [[id number]]  (Abnormal) Collected:  11/15/18 1041    Specimen:  Blood Updated:  11/15/18 1113     Troponin I 69.800 ng/mL     CK-MB [[id number]]  (Abnormal) Collected:  11/15/18 1041    Specimen:  Blood Updated:  11/15/18 1111     CKMB 303.00 ng/mL     Extra Tubes [[id number]] Collected:  11/15/18 0556    Specimen:  Blood, Venous Line Updated:  11/15/18 0700    Narrative:       The following orders were created for panel order Extra Tubes.  Procedure                               Abnormality         Status                     ---------                               -----------         ------                     Gold Top - SST[[id number]]                                   Final result                 Please view results for these tests on the individual orders.    Gold Top - SST [[id number]] Collected:  11/15/18 0556    Specimen:  Blood Updated:  11/15/18 0700     Extra Tube Hold for add-ons.     Comment: Auto resulted.       Basic Metabolic Panel [[id number]]  (Abnormal) Collected:  11/15/18 0556    Specimen:  Blood Updated:  11/15/18 0652     Glucose 200 mg/dL      BUN 15 mg/dL      Creatinine 0.81 mg/dL      Sodium 135 mmol/L      Potassium 4.3 mmol/L      Chloride 102 mmol/L      CO2 23.0 mmol/L      Calcium 8.2 mg/dL      eGFR Non African Amer 72 mL/min/1.73      BUN/Creatinine Ratio 18.5     Anion Gap 10.0 mmol/L     CBC (No Diff) [[id number]]  (Abnormal) Collected:  11/15/18 0555    Specimen:  Blood Updated:  11/15/18 0612     WBC 12.30 10*3/mm3      RBC 4.63 10*6/mm3      Hemoglobin 14.3 g/dL      Hematocrit 42.2 %      MCV 91.1 fL      MCH 30.9 pg      MCHC 33.9 g/dL      RDW 13.7 %      RDW-SD 44.4 fl      MPV 9.0 fL      Platelets 285 10*3/mm3     Extra Tubes [[id number]] Collected:  11/15/18 0214    Specimen:  Blood, Venous Line Updated:  11/15/18 0315    Narrative:       The following orders were created for panel order Extra Tubes.  Procedure                               Abnormality          Status                     ---------                               -----------         ------                     Gold Top - SST[[id number]]                                   Final result                 Please view results for these tests on the individual orders.    Gold Top - SST [[id number]] Collected:  11/15/18 0214    Specimen:  Blood Updated:  11/15/18 0315     Extra Tube Hold for add-ons.     Comment: Auto resulted.       BNP [[id number]]  (Normal) Collected:  11/15/18 0214    Specimen:  Blood from Arm, Right Updated:  11/15/18 0256     proBNP 34.8 pg/mL     Troponin [[id number]]  (Normal) Collected:  11/15/18 0214    Specimen:  Blood from Arm, Right Updated:  11/15/18 0256     Troponin I <0.012 ng/mL     Protime-INR [[id number]]  (Normal) Collected:  11/15/18 0214    Specimen:  Blood from Arm, Right Updated:  11/15/18 0244     Protime 12.2 Seconds      INR 0.92    Narrative:       Therapeutic range for most indications is 2.0-3.0 INR,  or 2.5-3.5 for mechanical heart valves.    CBC & Differential [[id number]] Collected:  11/15/18 0214    Specimen:  Blood Updated:  11/15/18 0229    Narrative:       The following orders were created for panel order CBC & Differential.  Procedure                               Abnormality         Status                     ---------                               -----------         ------                     CBC Auto Differential[[id number]]        Abnormal            Final result                 Please view results for these tests on the individual orders.    CBC Auto Differential [[id number]]  (Abnormal) Collected:  11/15/18 0214    Specimen:  Blood from Arm, Right Updated:  11/15/18 0229     WBC 10.36 10*3/mm3      RBC 4.61 10*6/mm3      Hemoglobin 14.4 g/dL      Hematocrit 41.9 %      MCV 90.9 fL      MCH 31.2 pg      MCHC 34.4 g/dL      RDW 13.6 %      RDW-SD 44.5 fl      MPV 8.9 fL      Platelets 271 10*3/mm3      Neutrophil % 62.5 %      Lymphocyte % 24.3 %      Monocyte % 8.0 %       Eosinophil % 4.2 %      Basophil % 0.5 %      Immature Grans % 0.5 %      Neutrophils, Absolute 6.48 10*3/mm3      Lymphocytes, Absolute 2.52 10*3/mm3      Monocytes, Absolute 0.83 10*3/mm3      Eosinophils, Absolute 0.43 10*3/mm3      Basophils, Absolute 0.05 10*3/mm3      Immature Grans, Absolute 0.05 10*3/mm3         Imaging Results (last 72 hours)     Procedure Component Value Units Date/Time    XR Chest PA & Lateral [[id number]] Collected:  11/17/18 1350     Updated:  11/17/18 1409    Narrative:         TWO VIEW CHEST    HISTORY: Shortness of air    Frontal and lateral films of the chest were obtained.  COMPARISON: Portable chest 1:13 AM November 17, 2018    EKG leads.  Cardiomegaly with minimal pulmonary vascular congestion and  interstitial edema.  Small bilateral pleural effusions.  There may be underlying subsegmental atelectasis, infiltrate or  edema in the lower lobes.  No pneumothorax.  No acute osseous abnormality.  Degenerative changes are present in the thoracic spine.      Impression:       CONCLUSION:  Cardiomegaly with minimal pulmonary vascular congestion and  interstitial edema.  Small bilateral pleural effusions.  There may be underlying subsegmental atelectasis, infiltrate or  edema in the lower lobes.    [id number]    Electronically signed by:  Jg Callahan MD  11/17/2018 2:08 PM  CST Workstation: [id number]    XR Chest 1 View [[id number]] Collected:  11/17/18 0120     Updated:  11/17/18 0204    Narrative:       Exam: AP portable chest    INDICATION: shortness of breath    COMPARISON: 11/15/2018    FINDINGS: The bony structures are intact. The cardiomediastinal  silhouette is unremarkable. There is questionable increased  opacity in retrocardiac region. No pneumothorax or pleural  effusion.      Impression:       Questionable increased opacity retrocardiac region,  atelectasis and/or infiltrate are not excluded    Electronically signed by:  Maicol Oliver MD  11/17/2018 2:03 AM  CST  Workstation: [id number]    XR Chest 1 View [[id number]] Collected:  11/15/18 0227     Updated:  11/15/18 0239    Narrative:       Exam: AP portable chest    INDICATION: Chest pain    COMPARISON: 5/17/2018    FINDINGS: AP portable chest. The bony structures are intact. The  cardiomediastinal silhouette is unremarkable. Mild plaque is  present in the aorta. Mild parenchymal scarring is present in the  right lung base. No acute infiltrate, pneumothorax or pleural  effusion.      Impression:       No acute cardiopulmonary abnormality.    Electronically signed by:  Maicol Oliver MD  11/15/2018 2:38 AM  CST Workstation: [id number]          Assessment/Plan       Acute ST elevation myocardial infarction (STEMI) (CMS/HCC)  Depression unspecified  ETOH use disorder    Recommendations:    Patient is not currently believing that she has an issue.  If there is evidence of current use then encouraging her to recognize the consequences of her drinking would be helpful.  Would recommend continuing the paxil.    Eliazar Ojeda MD  11/17/18  6:44 PM      Electronically signed by Eliazar Ojeda MD at 11/17/2018  6:54 PM          Discharge Summary      Alex Lomax MD at 11/23/2018  8:41 AM          Discharge Summary  Alex Lomax MD  Hospitalist     Date of Discharge:  11/23/2018    Discharge Diagnosis:      Acute ST elevation myocardial infarction (STEMI) (CMS/HCC)    CAD (coronary artery disease)    Acute on chronic respiratory failure with hypoxia (CMS/HCC)    Chronic bronchitis with acute exacerbation (CMS/HCC)    Depression    Pneumonia of both upper lobes    Paroxysmal atrial fibrillation (CMS/HCC)    Hypertension    Anxiety      Presenting Problem/History of Present Illness  Chest pain    Hospital Course  Patient is a 63 y.o. female admitted for chest pain/shortness of breath. On admission she was diagnosed with an acute MI requiring immediate angioplasty and stenting of her proximal circumflex and obtuse  marginal coronary artery.  Her cardiac rhythm converted from atrial fibrillation to sinus rhythm once the procedure was performed.    Her hospital course was complicated by a flareup of the underlying chronic hypoxic respiratory failure, condition which improved somewhat with the help of IV steroids, nebulized treatments, supplemental oxygen as well as IV antibiotics for the possible bilateral pneumonia.    Her vital signs are stable, she is able to ambulate with the help of PT/OT and her O2 saturation on 3.5 L/minute is around 90-92%.  She will be discharged home with home health.    Procedures Performed  Procedure(s):  Left Heart Cath    Consults:   Consults     Date and Time Order Name Status Description    11/16/2018 2621 Inpatient Psychiatrist Consult Completed     11/15/2018 9729 Inpatient Hospitalist Consult            Pertinent Test Results: Angioplasty and stenting of the proximal circumflex and obtuse marginal coronary arteries.  Good LV EF on the transthoracic echo (50 to 55%).    Condition on Discharge:  stable    Vital Signs  Temp:  [97.7 °F (36.5 °C)-98.2 °F (36.8 °C)] 98.1 °F (36.7 °C)  Heart Rate:  [72-85] 76  Resp:  [18-20] 20  BP: (132-146)/(72-78) 146/77    Physical Exam:  Physical Exam   Constitutional: She is oriented to person, place, and time. She appears cachectic. She appears ill.   HENT:   Head: Normocephalic and atraumatic.   Eyes: EOM are normal. Pupils are equal, round, and reactive to light. No scleral icterus.   Neck: Normal range of motion. Neck supple.   Cardiovascular: Normal rate and regular rhythm.   Pulmonary/Chest: Effort normal. No respiratory distress. She has wheezes. She has no rales.   Abdominal: Soft. Bowel sounds are normal. She exhibits no distension and no mass. There is no tenderness.   Musculoskeletal: Normal range of motion. She exhibits no edema, tenderness or deformity.   Neurological: She is alert and oriented to person, place, and time. No cranial nerve deficit.  Coordination normal.   Skin: Skin is warm and dry. There is pallor.   Psychiatric: She has a normal mood and affect. Her behavior is normal.   Vitals reviewed.      Discharge Disposition  [place] [facility] [place]    Discharge Medications     Discharge Medications      New Medications      Instructions Start Date   amiodarone 100 MG tablet  Commonly known as:  PACERONE   100 mg, Oral, 2 Times Daily      aspirin 81 MG chewable tablet   81 mg, Oral, Daily      atorvastatin 20 MG tablet  Commonly known as:  LIPITOR   40 mg, Oral, Nightly      furosemide 20 MG tablet  Commonly known as:  LASIX   20 mg, Oral, 2 Times Daily      labetalol 100 MG tablet  Commonly known as:  NORMODYNE   50 mg, Oral, 2 Times Daily      losartan 25 MG tablet  Commonly known as:  COZAAR   12.5 mg, Oral, Daily      PredniSONE 10 MG (48) tablet pack  Commonly known as:  DELTASONE  Replaces:  predniSONE 20 MG tablet   Oral, Daily, As instructed      sodium chloride 0.65 % nasal spray  Commonly known as:  OCEAN   2 sprays, Nasal, As Needed      ticagrelor 90 MG tablet tablet  Commonly known as:  BRILINTA   90 mg, Oral, 2 Times Daily         Changes to Medications      Instructions Start Date   albuterol (2.5 MG/3ML) 0.083% nebulizer solution  Commonly known as:  PROVENTIL  What changed:  Another medication with the same name was removed. Continue taking this medication, and follow the directions you see here.   USE 1 VIAL PER NEBULIZER EVERY 4 HOURS AS NEEDED FOR WHEEZING      albuterol 108 (90 Base) MCG/ACT inhaler  Commonly known as:  VENTOLIN HFA  What changed:  Another medication with the same name was removed. Continue taking this medication, and follow the directions you see here.   2 puffs every 4 hours as needed for breathing         Continue These Medications      Instructions Start Date   ADVAIR DISKUS 500-50 MCG/DOSE DISKUS  Generic drug:  fluticasone-salmeterol   INHALE 1 PUFF BY MOUTH TWICE DAILY      ALPRAZolam 0.25 MG tablet  Commonly  known as:  XANAX   0.25 mg, Oral, 3 Times Daily PRN      freestyle lancets   Testing blood sugar once a day      glucose blood test strip   Use as instructed      glucose monitor monitoring kit   1 each, Does not apply, Daily, Testing blood sugar once a day  ICD10 - R73.9      metFORMIN 500 MG tablet  Commonly known as:  GLUCOPHAGE   500 mg, Oral, Daily With Breakfast      montelukast 10 MG tablet  Commonly known as:  SINGULAIR   TAKE 1 TABLET BY MOUTH DAILY      nystatin 317572 UNIT/ML suspension  Commonly known as:  MYCOSTATIN   500,000 Units, Oral, 4 Times Daily      PARoxetine 10 MG tablet  Commonly known as:  PAXIL   10 mg, Oral, Every Morning      SPIRIVA HANDIHALER 18 MCG per inhalation capsule  Generic drug:  tiotropium   INHALE 1 PUFF BY MOUTH DAILY         Stop These Medications    amLODIPine 10 MG tablet  Commonly known as:  NORVASC     azithromycin 250 MG tablet  Commonly known as:  ZITHROMAX     budesonide-formoterol 160-4.5 MCG/ACT inhaler  Commonly known as:  SYMBICORT     predniSONE 10 MG tablet  Commonly known as:  DELTASONE     predniSONE 20 MG tablet  Commonly known as:  DELTASONE  Replaced by:  PredniSONE 10 MG (48) tablet pack            Discharge Diet:   Diet Instructions     Diet: Cardiac      Discharge Diet:  Cardiac          Activity at Discharge:   Activity Instructions     Activity as Tolerated            Follow-up Appointments  Future Appointments   Date Time Provider Department Center   12/5/2018 10:30 AM Tiffanie Elise APRN [facility] None   4/23/2019 11:00 AM Gigi Ac MD [facility] None     Additional Instructions for the Follow-ups that You Need to Schedule     Discharge Follow-up with PCP   As directed       Currently Documented PCP:    Provider, No Known    PCP Phone Number:    [phone]     Follow Up Details:  Mary Elise NP in 1 week         Discharge Follow-up with Specified Provider: Dr. Huber; 2 Weeks   As directed      To:  Dr. Huber    Follow Up:  2 Weeks          Referral to Home Health   As directed      Face to Face Visit Date:  11/23/2018    Follow-up Provider for Plan of Care?:  I treated the patient in an acute care facility and will not continue treatment after discharge.    Follow-up Provider:  ISABELLA RINCON [[id number]]    Reason/Clinical Findings:  copd    Describe mobility limitations that make leaving home difficult:  copd    Nursing/Therapeutic Services Requested:  Skilled Nursing Physical Therapy Occupational Therapy    Skilled nursing orders:  COPD management    PT orders:  Strengthening    Occupational orders:  Strengthening    Frequency:  1 Week 1                Alex Lomax MD  11/23/18  3:09 PM          Electronically signed by Alex Lomax MD at 11/23/2018  3:09 PM       Continued Stay Review  Auth # [id number]  Erica Stein RN,   [phone] phone

## 2018-12-03 ENCOUNTER — READMISSION MANAGEMENT (OUTPATIENT)
Dept: CALL CENTER | Facility: HOSPITAL | Age: 63
End: 2018-12-03

## 2018-12-03 RX ORDER — TIOTROPIUM BROMIDE 18 UG/1
CAPSULE ORAL; RESPIRATORY (INHALATION)
Qty: 30 CAPSULE | Refills: 0 | Status: SHIPPED | OUTPATIENT
Start: 2018-12-03 | End: 2019-01-12 | Stop reason: SDUPTHER

## 2018-12-03 NOTE — OUTREACH NOTE
[facility] Week 2 Survey      Responses   Facility patient discharged from?  [facility]   Does the patient have one of the following disease processes/diagnoses(primary or secondary)?  Acute MI (STEMI,NSTEMI)   Week 2 attempt successful?  No   Unsuccessful attempts  Attempt 1          Traci Marti RN

## 2018-12-05 ENCOUNTER — READMISSION MANAGEMENT (OUTPATIENT)
Dept: CALL CENTER | Facility: HOSPITAL | Age: 63
End: 2018-12-05

## 2018-12-05 ENCOUNTER — OFFICE VISIT (OUTPATIENT)
Dept: FAMILY MEDICINE CLINIC | Facility: CLINIC | Age: 63
End: 2018-12-05

## 2018-12-05 VITALS
HEIGHT: 62 IN | BODY MASS INDEX: 27.23 KG/M2 | WEIGHT: 148 LBS | DIASTOLIC BLOOD PRESSURE: 78 MMHG | SYSTOLIC BLOOD PRESSURE: 112 MMHG

## 2018-12-05 DIAGNOSIS — R21 RASH: ICD-10-CM

## 2018-12-05 DIAGNOSIS — I10 ESSENTIAL HYPERTENSION: Primary | Chronic | ICD-10-CM

## 2018-12-05 DIAGNOSIS — R06.83 SNORING: ICD-10-CM

## 2018-12-05 PROCEDURE — 99213 OFFICE O/P EST LOW 20 MIN: CPT | Performed by: NURSE PRACTITIONER

## 2018-12-05 NOTE — PROGRESS NOTES
Chief Complaint   Patient presents with   • Follow-up     3 month check up , had heart attack Nov 15   • Rash     ? from new meds     Subjective   Monisha Brasher is a 63 y.o. female.     Presents with recheck from mi-reports doing well, reports snoring and daytime fatigue       Anxiety   Presents for follow-up visit. Symptoms include chest pain, nausea and shortness of breath. Patient reports no compulsions, confusion, decreased concentration, depressed mood, dizziness, dry mouth, excessive worry, feeling of choking, hyperventilation, impotence, insomnia, irritability, malaise, muscle tension, nervous/anxious behavior, obsessions, palpitations, panic, restlessness or suicidal ideas. Symptoms occur most days. The severity of symptoms is moderate. The quality of sleep is good. Nighttime awakenings: none.     Compliance with medications is %.   Blood Sugar Problem   This is a recurrent problem. The current episode started 1 to 4 weeks ago. The problem occurs every several days. The problem has been gradually improving. Associated symptoms include chest pain, a fever, nausea, a rash, vomiting and weakness. Pertinent negatives include no abdominal pain, anorexia, arthralgias, change in bowel habit, chills, congestion, coughing, diaphoresis, fatigue, headaches, joint swelling, myalgias, neck pain, numbness, sore throat, swollen glands, urinary symptoms, vertigo or visual change. Exacerbated by: stress  Treatments tried: metformin. The treatment provided mild relief.        The following portions of the patient's history were reviewed and updated as appropriate: allergies, current medications, past social history and problem list.    Review of Systems   Constitutional: Positive for fever. Negative for activity change, appetite change, chills, diaphoresis, fatigue, irritability and unexpected weight change.   HENT: Negative.  Negative for congestion, dental problem, drooling, ear discharge, ear pain, facial  swelling, hearing loss, mouth sores, nosebleeds, postnasal drip, rhinorrhea, sinus pressure, sinus pain, sneezing, sore throat, tinnitus, trouble swallowing and voice change.    Eyes: Negative.  Negative for photophobia, pain, discharge, redness, itching and visual disturbance.   Respiratory: Positive for shortness of breath and wheezing. Negative for apnea, cough, choking, chest tightness and stridor.    Cardiovascular: Positive for chest pain. Negative for palpitations and leg swelling.   Gastrointestinal: Positive for nausea and vomiting. Negative for abdominal distention, abdominal pain, anal bleeding, anorexia, blood in stool, change in bowel habit, constipation, diarrhea and rectal pain.   Endocrine: Negative.  Negative for cold intolerance, heat intolerance, polydipsia, polyphagia and polyuria.   Genitourinary: Negative.  Negative for decreased urine volume, difficulty urinating, dyspareunia, dysuria, enuresis, flank pain, frequency, genital sores, hematuria, impotence, menstrual problem, pelvic pain, urgency, vaginal bleeding, vaginal discharge and vaginal pain.   Musculoskeletal: Negative.  Negative for arthralgias, back pain, gait problem, joint swelling, myalgias, neck pain and neck stiffness.   Skin: Positive for rash. Negative for color change, pallor and wound.   Allergic/Immunologic: Negative.  Negative for environmental allergies, food allergies and immunocompromised state.   Neurological: Positive for weakness. Negative for dizziness, vertigo, tremors, seizures, syncope, facial asymmetry, speech difficulty, light-headedness, numbness and headaches.   Hematological: Negative.  Negative for adenopathy. Does not bruise/bleed easily.   Psychiatric/Behavioral: Negative.  Negative for agitation, behavioral problems, confusion, decreased concentration, dysphoric mood, hallucinations, self-injury, sleep disturbance and suicidal ideas. The patient is not nervous/anxious, does not have insomnia and is not  "hyperactive.        Objective   /78   Ht 157.5 cm (62\")   Wt 67.1 kg (148 lb)   BMI 27.07 kg/m²   Physical Exam   Constitutional: She is oriented to person, place, and time. Vital signs are normal. She appears well-developed and well-nourished. No distress.   HENT:   Head: Normocephalic and atraumatic.   Right Ear: External ear normal.   Left Ear: External ear normal.   Nose: Nose normal.   Mouth/Throat: Oropharynx is clear and moist. No oropharyngeal exudate.   Eyes: Conjunctivae and EOM are normal. Pupils are equal, round, and reactive to light. Right eye exhibits no discharge. Left eye exhibits no discharge. No scleral icterus.   Neck: Normal range of motion. Neck supple. No JVD present. No tracheal deviation present. No thyroid mass and no thyromegaly present.   Cardiovascular: Normal rate, regular rhythm, S1 normal, S2 normal, normal heart sounds, intact distal pulses and normal pulses. Exam reveals no gallop and no friction rub.   No murmur heard.  Pulmonary/Chest: Effort normal and breath sounds normal. No stridor. No respiratory distress. She has no wheezes. She has no rales. She exhibits no tenderness.   Patient wearing 1.5-2 L O2 at home   Abdominal: Soft. Normal appearance and bowel sounds are normal. She exhibits no distension and no mass. There is no tenderness. There is no rebound and no guarding. No hernia.   Musculoskeletal: Normal range of motion. She exhibits no edema, tenderness or deformity.   Lymphadenopathy:     She has no cervical adenopathy.   Neurological: She is alert and oriented to person, place, and time. She has normal reflexes. She displays normal reflexes. No cranial nerve deficit or sensory deficit. She exhibits normal muscle tone. Coordination normal.   Skin: Skin is warm, dry and intact. Rash noted. Rash is macular and urticarial. She is not diaphoretic. No erythema. No pallor.        Macular erythematous rash on trunk- possible drug reaction   Psychiatric: She has a " normal mood and affect. Her speech is normal and behavior is normal. Judgment and thought content normal. She is not actively hallucinating. Cognition and memory are normal. She is attentive.   Nursing note and vitals reviewed.      Assessment/Plan   Problem List Items Addressed This Visit        Cardiovascular and Mediastinum    Hypertension - Primary (Chronic)       Respiratory    Snoring    Relevant Orders    Ambulatory Referral to Sleep Medicine    Polysomnography 4 or More Parameters With CPAP       Musculoskeletal and Integument    Rash         No orders of the defined types were placed in this encounter.      It's not just what you eat, but when you eat  Eat breakfast, and eat smaller meals throughout the day. A healthy breakfast can jumpstart your metabolism, while eating small, healthy meals (rather than the standard three large meals) keeps your energy up.   Avoid eating at night. Try to eat dinner earlier and fast for 14-16 hours until breakfast the next morning. Studies suggest that eating only when you’re most active and giving your digestive system a long break each day may help to regulate weight.     referral for sleep medicine asap

## 2018-12-05 NOTE — OUTREACH NOTE
AMI Week 2 Survey      Responses   Facility patient discharged from?  [facility]   Does the patient have one of the following disease processes/diagnoses(primary or secondary)?  Acute MI (STEMI,NSTEMI)   Week 2 attempt successful?  No   Unsuccessful attempts  Attempt 2          Iker Gipson RN

## 2018-12-10 ENCOUNTER — READMISSION MANAGEMENT (OUTPATIENT)
Dept: CALL CENTER | Facility: HOSPITAL | Age: 63
End: 2018-12-10

## 2018-12-10 ENCOUNTER — HOSPITAL ENCOUNTER (OUTPATIENT)
Dept: SLEEP MEDICINE | Facility: HOSPITAL | Age: 63
Discharge: HOME | End: 2018-12-10
Admitting: NURSE PRACTITIONER

## 2018-12-10 VITALS — WEIGHT: 147.93 LBS | HEIGHT: 62 IN | BODY MASS INDEX: 27.22 KG/M2

## 2018-12-10 DIAGNOSIS — R06.83 SNORING: ICD-10-CM

## 2018-12-10 PROCEDURE — 95810 POLYSOM 6/> YRS 4/> PARAM: CPT

## 2018-12-10 PROCEDURE — 95810 POLYSOM 6/> YRS 4/> PARAM: CPT | Performed by: INTERNAL MEDICINE

## 2018-12-10 NOTE — OUTREACH NOTE
AMI Week 3 Survey      Responses   Facility patient discharged from?  [facility]   Does the patient have one of the following disease processes/diagnoses(primary or secondary)?  Acute MI (STEMI,NSTEMI)   Week 3 attempt successful?  Yes   Call start time  1132   Call end time  1137   Discharge diagnosis  Acute STEMI, s/p Heart cath with angioplasty and stenting of Prox. circumflex and obtuse marginal coronary artery, CAD, Acute on chronic resp failure with hypoxia, Chronic bronchitis with acute exac. Depression, Pneumonia of both upper lobes, PAF, HTN, anxiety   Meds reviewed with patient/caregiver?  Yes   Is the patient having any side effects they believe may be caused by any medication additions or changes?  Yes   Side effects comments   dizzy at times.   Does the patient have all prescriptions related to this admission filled (includes statins,anticoagulants,HTN meds,anti-arrhythmia meds)  Yes   Prescription comments  MD aware of dizziness.   Has the patient kept scheduled appointments due by today?  Yes   Psychosocial issues?  No   What is the patient's perception of their health status since discharge?  Improving   Week 3 call completed?  Yes          Mary Morales RN

## 2018-12-11 RX ORDER — ALBUTEROL SULFATE 2.5 MG/3ML
SOLUTION RESPIRATORY (INHALATION)
Qty: 360 ML | Refills: 0 | Status: SHIPPED | OUTPATIENT
Start: 2018-12-11 | End: 2019-04-08 | Stop reason: SDUPTHER

## 2018-12-12 ENCOUNTER — APPOINTMENT (OUTPATIENT)
Dept: SLEEP MEDICINE | Facility: HOSPITAL | Age: 63
End: 2018-12-12

## 2018-12-17 ENCOUNTER — READMISSION MANAGEMENT (OUTPATIENT)
Dept: CALL CENTER | Facility: HOSPITAL | Age: 63
End: 2018-12-17

## 2018-12-17 NOTE — OUTREACH NOTE
AMI Week 4 Survey      Responses   Facility patient discharged from?  [facility]   Does the patient have one of the following disease processes/diagnoses(primary or secondary)?  Acute MI (STEMI,NSTEMI)   Week 4 attempt successful?  Yes   Call start time  1545   Call end time  1548   Discharge diagnosis  Acute STEMI, s/p Heart cath with angioplasty and stenting of Prox. circumflex and obtuse marginal coronary artery, CAD, Acute on chronic resp failure with hypoxia, Chronic bronchitis with acute exac. Depression, Pneumonia of both upper lobes, PAF, HTN, anxiety   Meds reviewed with patient/caregiver?  Yes   Is the patient having any side effects they believe may be caused by any medication additions or changes?  No   Is the patient taking all medications as directed (includes completed medication regime)?  Yes   Medication comments  Pt reports MD stopped Losartan and Labetalol, and added Carvedilol   Has the patient kept scheduled appointments due by today?  Yes   Is the patient still receiving Home Health Services?  N/A   Psychosocial issues?  No   What is the patient's perception of their health status since discharge?  Returned to baseline/stable   Nursing interventions  Nurse provided patient education   Is the patient/caregiver able to teach back signs and symptoms of when to call for help immediately:  Sudden chest discomfort, Sudden discomfort in arms, back, neck or jaw, Shortness of breath at any time, Sudden sweating or clammy skin, Nausea or vomiting, Dizziness or lightheadedness, Irregular or rapid heart rate   Nursing interventions  Nurse provided patient education   Is the pateint /caregiver able to teach back the importance of cardiac rehab?  Yes   Nursing interventions  Provided education on importance of cardiac rehab   Is the patient/caregiver able to teach back lifestyle changes to help prevent MIs  Regular exercise as approved by provider, Heart healthy diet, Maintaining a healthy weight, Reducing  stress   Is the patient/caregiver able to teach back ways to prevent a second heart attack:  Take medications, Follow up with MD, Participate in Cardiac Rehab, Manage risk factors   Is the patient/caregiver able to teach back the hierarchy of who to call/visit for symptoms/problems? PCP, Specialist, Home health nurse, Urgent Care, ED, 911  Yes   Week 4 call completed?  Yes   Would the patient like one additional call?  No   Graduated  Yes   Did the patient feel the follow up calls were helpful during their recovery period?  Yes   Was the number of calls appropriate?  Yes   Does the patient have an Advance Directive or Living Will?  No   Is the patient/caregiver familiar with Advance Care Planning?  Yes   Would the patient like more information on Advance Care Planning?  No          Benny Cook RN

## 2018-12-20 RX ORDER — AZITHROMYCIN 250 MG/1
TABLET, FILM COATED ORAL
Qty: 6 TABLET | Refills: 0 | OUTPATIENT
Start: 2018-12-20

## 2019-01-07 ENCOUNTER — TELEPHONE (OUTPATIENT)
Dept: PULMONOLOGY | Facility: CLINIC | Age: 64
End: 2019-01-07

## 2019-01-14 ENCOUNTER — OFFICE VISIT (OUTPATIENT)
Dept: SLEEP MEDICINE | Facility: HOSPITAL | Age: 64
End: 2019-01-14

## 2019-01-14 VITALS
HEIGHT: 62 IN | SYSTOLIC BLOOD PRESSURE: 137 MMHG | OXYGEN SATURATION: 94 % | DIASTOLIC BLOOD PRESSURE: 89 MMHG | WEIGHT: 149.4 LBS | HEART RATE: 92 BPM | BODY MASS INDEX: 27.49 KG/M2

## 2019-01-14 DIAGNOSIS — G47.33 OBSTRUCTIVE SLEEP APNEA, ADULT: Primary | ICD-10-CM

## 2019-01-14 PROCEDURE — 99214 OFFICE O/P EST MOD 30 MIN: CPT | Performed by: INTERNAL MEDICINE

## 2019-01-14 RX ORDER — TIOTROPIUM BROMIDE 18 UG/1
CAPSULE ORAL; RESPIRATORY (INHALATION)
Qty: 30 CAPSULE | Refills: 0 | Status: SHIPPED | OUTPATIENT
Start: 2019-01-14 | End: 2019-02-09 | Stop reason: SDUPTHER

## 2019-01-14 NOTE — PROGRESS NOTES
New Patient Sleep Medicine Consultation    Encounter Date: 1/14/2019         Patient's PCP: Tiffanie Mccauley APRN  Referring provider: No ref. provider found  Reason for consultation chief complaint: recent diagnosis of NARCISO    Monisha Brasher is a 63 y.o. female who admits to snoring, unrestful sleep, High blod pressure, gasping during sleep, decreased libido, excessive daytime sleepiness, stop breathing during sleep, frequent unexplained arousala, irritability, Up to the bathroom at night, night sweats, sleepwalking or sleeptalking and difficulty falling asleep.   She denies cataplexy, sleep paralysis, or hypnagogic hallucinations. Her bedtime is ~ 1041-3863. She  falls asleep after 10-30 minutes, and is up 3-4 times per night. She wakes up ~ 9156-8715. She endorses 6-8 hours of sleep. She drinks 2 cups of coffee, 1 teas, and 0 sodas per day. She drinks 0 alcoholic beverages per week. She is not a current smoker. She does not take sedatives or hypnotics. She has some sleepiness with driving. She naps rarely.    She had PSG on 12/10/2018  - AHI of 12.8  Massive MI on Nov 17th, 2018    [place] - 13    Past Medical History:   Diagnosis Date   • Acute bronchitis    • Acute upper respiratory infection    • Adjustment disorder with mixed emotional features    • Agoraphobia with panic attacks    • Allergic rhinitis due to pollen    • Anxiety    • Asthma    • Backache    • Blood in urine    • Candidiasis of mouth    • Chronic bronchitis (CMS/HCC)    • Chronic obstructive lung disease (CMS/HCC)    • Emphysema lung (CMS/HCC)    • Essential hypertension    • Extrinsic asthma with status asthmaticus    • Fatigue    • H/O echocardiogram     EF 55-60%. LV systolic function normal. Impaired LV relaxation. [facility]   • Hypoxemia    • Malaise and fatigue    • Non-IgE mediated allergic asthma    • Nonvenomous insect bite    • Pneumonia    • Postmenopausal state    • Urinary tract infectious disease       Social History     Socioeconomic History   • Marital status:      Spouse name: Not on file   • Number of children: Not on file   • Years of education: Not on file   • Highest education level: Not on file   Social Needs   • Financial resource strain: Not on file   • Food insecurity - worry: Not on file   • Food insecurity - inability: Not on file   • Transportation needs - medical: Not on file   • Transportation needs - non-medical: Not on file   Occupational History   • Not on file   Tobacco Use   • Smoking status: Former Smoker     Packs/day: 1.00     Years: 48.00     Pack years: 48.00     Types: Cigarettes     Start date:      Last attempt to quit: 2017     Years since quittin.4   • Smokeless tobacco: Current User   Substance and Sexual Activity   • Alcohol use: Yes     Alcohol/week: 1.2 - 1.8 oz     Types: 1 - 2 Glasses of wine, 1 Cans of beer per week     Comment: Socially   • Drug use: No   • Sexual activity: Not on file   Other Topics Concern   • Not on file   Social History Narrative   • Not on file     Family History   Problem Relation Age of Onset   • Heart attack Mother    • Heart disease Mother    • Heart disease Father    • Heart disease Paternal Grandmother      Retired from [employer]  3 kids  0 brothers and 2 sisters  Other family history + for: heart disease  Smoking history: smoked 1 ppds from age 15 until 63  FH of sleep disorders: none known    Review of Systems:  Constitutional: negative  Eyes: negative  Ears, nose, mouth, throat, and face: negative  Respiratory: negative  Cardiovascular: negative  Gastrointestinal: negative  Genitourinary:negative  Integument/breast: negative  Hematologic/lymphatic: negative  Musculoskeletal:negative  Neurological: negative  Behavioral/Psych: negative  Endocrine: negative  Allergic/Immunologic: negative Patient advised to discuss any positive ROS with PCP.      Vitals:    19 1059   BP: 137/89   Pulse: 92   SpO2: 94%     Body mass  "index is 27.32 kg/m². Patient's Body mass index is 27.32 kg/m². BMI is within normal parameters. No follow-up required.    Neck circumference: 14\"          General: Alert. Cooperative. Well developed. No acute distress.             Head:  Normocephalic. Symmetrical. Atraumatic.              Eyes: Sclera clear. No icterus. PERRLA. Normal EOM.             Ears: No deformities. Normal hearing.             Nose: No septal deviation. No drainage.          Throat: No oral lesions. No thrush. Moist mucous membranes. Trachea midline    Tongue is enlarged    Dentition is fair       Pharynx: Posterior pharyngeal pillars are wide    Mallampati score of IV (only hard palate visible)    Pharynx is normal with unrermarkable tonsils   Chest Wall:  Normal shape. Symmetric expansion with respiration. No tenderness.          Lungs:  Clear to auscultation bilaterally. No wheezes. No rhonchi. No rales. Respirations regular, even and unlabored.            Heart:  Regular rhythm and normal rate. Normal S1 and S2. No murmur.     Abdomen:  Soft, non-tender and non-distended. Normal bowel sounds. No masses.  Extremities:  Moves all extremities well. No edema.           Pulses: Pulses palpable and equal bilaterally.               Skin: Dry. Intact. No bleeding. No rash.           Neuro: Moves all 4 extremities and cranial nerves grossly intact.  Psychiatric: Normal mood and affect.      Current Outpatient Medications:   •  ADVAIR DISKUS 500-50 MCG/DOSE DISKUS, INHALE 1 PUFF BY MOUTH TWICE DAILY, Disp: 1 each, Rfl: 5  •  albuterol (PROVENTIL) (2.5 MG/3ML) 0.083% nebulizer solution, USE 1 VIAL PER NEBULIZER EVERY 4 HOURS AS NEEDED FOR WHEEZING, Disp: 360 mL, Rfl: 0  •  albuterol (VENTOLIN HFA) 108 (90 Base) MCG/ACT inhaler, 2 puffs every 4 hours as needed for breathing, Disp: 1 inhaler, Rfl: 5  •  ALPRAZolam (XANAX) 0.25 MG tablet, Take 1 tablet by mouth 3 (Three) Times a Day As Needed for Anxiety., Disp: 90 tablet, Rfl: 0  •  amiodarone " (PACERONE) 100 MG tablet, Take 1 tablet by mouth 2 (Two) Times a Day., Disp: 60 tablet, Rfl: 1  •  aspirin 81 MG chewable tablet, Chew 1 tablet Daily., Disp: , Rfl:   •  furosemide (LASIX) 20 MG tablet, Take 1 tablet by mouth 2 (Two) Times a Day., Disp: 60 tablet, Rfl: 1  •  glucose blood test strip, Use as instructed, Disp: 50 each, Rfl: 12  •  glucose monitor monitoring kit, 1 each Daily. Testing blood sugar once a day  ICD10 - R73.9, Disp: 1 each, Rfl: 0  •  Lancets (FREESTYLE) lancets, Testing blood sugar once a day, Disp: 100 each, Rfl: 12  •  metFORMIN (GLUCOPHAGE) 500 MG tablet, Take 1 tablet by mouth Daily With Breakfast., Disp: 30 tablet, Rfl: 11  •  montelukast (SINGULAIR) 10 MG tablet, TAKE 1 TABLET BY MOUTH DAILY, Disp: 90 tablet, Rfl: 1  •  nystatin (MYCOSTATIN) 075068 UNIT/ML suspension, SHAKE LIQUID AND TAKE 5 ML BY MOUTH FOUR TIMES DAILY, Disp: 280 mL, Rfl: 0  •  PARoxetine (PAXIL) 10 MG tablet, Take 1 tablet by mouth Every Morning., Disp: 30 tablet, Rfl: 11  •  sodium chloride (OCEAN) 0.65 % nasal spray, 2 sprays into the nostril(s) as directed by provider As Needed for Congestion., Disp: 1 mL, Rfl: 1  •  SPIRIVA HANDIHALER 18 MCG per inhalation capsule, INHALE 1 PUFF BY MOUTH DAILY, Disp: 30 capsule, Rfl: 0  •  ticagrelor (BRILINTA) 90 MG tablet tablet, Take 1 tablet by mouth 2 (Two) Times a Day., Disp: 60 tablet, Rfl: 1  •  atorvastatin (LIPITOR) 20 MG tablet, Take 2 tablets by mouth Every Night., Disp: 30 tablet, Rfl: 1  •  labetalol (NORMODYNE) 100 MG tablet, Take 0.5 tablets by mouth 2 (Two) Times a Day., Disp: 30 tablet, Rfl: 1  •  losartan (COZAAR) 25 MG tablet, Take 0.5 tablets by mouth Daily., Disp: 30 tablet, Rfl: 1    WBC   Date Value Ref Range Status   11/23/2018 15.38 (H) 3.20 - 9.80 10*3/mm3 Final     RBC   Date Value Ref Range Status   11/23/2018 4.19 3.77 - 5.16 10*6/mm3 Final     Hemoglobin   Date Value Ref Range Status   11/23/2018 12.8 12.0 - 15.5 g/dL Final     Hematocrit   Date  Value Ref Range Status   11/23/2018 38.7 35.0 - 45.0 % Final     MCV   Date Value Ref Range Status   11/23/2018 92.4 80.0 - 98.0 fL Final     MCH   Date Value Ref Range Status   11/23/2018 30.5 26.5 - 34.0 pg Final     MCHC   Date Value Ref Range Status   11/23/2018 33.1 31.4 - 36.0 g/dL Final     RDW   Date Value Ref Range Status   11/23/2018 13.8 11.5 - 14.5 % Final     RDW-SD   Date Value Ref Range Status   11/23/2018 46.7 (H) 36.4 - 46.3 fl Final     MPV   Date Value Ref Range Status   11/23/2018 8.9 8.0 - 12.0 fL Final     Platelets   Date Value Ref Range Status   11/23/2018 343 150 - 450 10*3/mm3 Final     Neutrophil %   Date Value Ref Range Status   11/23/2018 93.8 (H) 37.0 - 80.0 % Final     Lymphocyte %   Date Value Ref Range Status   11/23/2018 3.8 (L) 10.0 - 50.0 % Final     Monocyte %   Date Value Ref Range Status   11/23/2018 1.3 0.0 - 12.0 % Final     Eosinophil %   Date Value Ref Range Status   11/23/2018 0.0 0.0 - 7.0 % Final     Basophil %   Date Value Ref Range Status   11/23/2018 0.1 0.0 - 2.0 % Final     Immature Grans %   Date Value Ref Range Status   11/23/2018 1.0 (H) 0.0 - 0.5 % Final     Neutrophils, Absolute   Date Value Ref Range Status   11/23/2018 14.43 (H) 2.00 - 8.60 10*3/mm3 Final     Lymphocytes, Absolute   Date Value Ref Range Status   11/23/2018 0.59 (L) 0.60 - 4.20 10*3/mm3 Final     Monocytes, Absolute   Date Value Ref Range Status   11/23/2018 0.20 0.00 - 0.90 10*3/mm3 Final     Eosinophils, Absolute   Date Value Ref Range Status   11/23/2018 0.00 0.00 - 0.70 10*3/mm3 Final     Basophils, Absolute   Date Value Ref Range Status   11/23/2018 0.01 0.00 - 0.20 10*3/mm3 Final     Immature Grans, Absolute   Date Value Ref Range Status   11/23/2018 0.15 (H) 0.00 - 0.02 10*3/mm3 Final     nRBC   Date Value Ref Range Status   11/23/2018 0.0 0.0 - 0.0 /100 WBC Final       ASSESSMENT:  1. Obstructive sleep apnea   1. Start autocpap  2. RTC in 31-90 days  2. Prior MI in Nov  2018  3. Insomnia - sleep onset - mild  4. O2 dep COPD   1. On O2 since MI in NOV    Patient to follow up in 31-90 days with compliance report         This document has been electronically signed by Dami Kebede MD on January 14, 2019         CC: Pantera Elise, Tiffanie THORNE, APRN          No ref. provider found

## 2019-01-18 ENCOUNTER — LAB (OUTPATIENT)
Dept: LAB | Facility: HOSPITAL | Age: 64
End: 2019-01-18
Attending: INTERNAL MEDICINE

## 2019-01-18 ENCOUNTER — PREP FOR SURGERY (OUTPATIENT)
Dept: OTHER | Facility: HOSPITAL | Age: 64
End: 2019-01-18

## 2019-01-18 DIAGNOSIS — I25.110 ATHEROSCLEROSIS OF NATIVE CORONARY ARTERY OF NATIVE HEART WITH UNSTABLE ANGINA PECTORIS (HCC): ICD-10-CM

## 2019-01-18 DIAGNOSIS — E11.9 TYPE 2 DIABETES MELLITUS WITHOUT COMPLICATION, WITHOUT LONG-TERM CURRENT USE OF INSULIN (HCC): Primary | ICD-10-CM

## 2019-01-18 DIAGNOSIS — R07.9 CHEST PAIN, UNSPECIFIED TYPE: Primary | ICD-10-CM

## 2019-01-18 DIAGNOSIS — E11.9 TYPE 2 DIABETES MELLITUS WITHOUT COMPLICATION, WITHOUT LONG-TERM CURRENT USE OF INSULIN (HCC): ICD-10-CM

## 2019-01-18 LAB
ANION GAP SERPL CALCULATED.3IONS-SCNC: 9 MMOL/L (ref 5–15)
BASOPHILS # BLD AUTO: 0.04 10*3/MM3 (ref 0–0.2)
BASOPHILS NFR BLD AUTO: 0.4 % (ref 0–2)
BUN BLD-MCNC: 14 MG/DL (ref 7–21)
BUN/CREAT SERPL: 15.9 (ref 7–25)
CALCIUM SPEC-SCNC: 9.2 MG/DL (ref 8.4–10.2)
CHLORIDE SERPL-SCNC: 99 MMOL/L (ref 95–110)
CO2 SERPL-SCNC: 30 MMOL/L (ref 22–31)
CREAT BLD-MCNC: 0.88 MG/DL (ref 0.5–1)
DEPRECATED RDW RBC AUTO: 49 FL (ref 36.4–46.3)
EOSINOPHIL # BLD AUTO: 0.67 10*3/MM3 (ref 0–0.7)
EOSINOPHIL NFR BLD AUTO: 7.5 % (ref 0–7)
ERYTHROCYTE [DISTWIDTH] IN BLOOD BY AUTOMATED COUNT: 14.6 % (ref 11.5–14.5)
GFR SERPL CREATININE-BSD FRML MDRD: 65 ML/MIN/1.73 (ref 45–104)
GLUCOSE BLD-MCNC: 93 MG/DL (ref 60–100)
HBA1C MFR BLD: 6.2 % (ref 4–5.6)
HCT VFR BLD AUTO: 42.8 % (ref 35–45)
HGB BLD-MCNC: 14.3 G/DL (ref 12–15.5)
IMM GRANULOCYTES # BLD AUTO: 0.04 10*3/MM3 (ref 0–0.02)
IMM GRANULOCYTES NFR BLD AUTO: 0.4 % (ref 0–0.5)
LYMPHOCYTES # BLD AUTO: 2.42 10*3/MM3 (ref 0.6–4.2)
LYMPHOCYTES NFR BLD AUTO: 27 % (ref 10–50)
MCH RBC QN AUTO: 30.3 PG (ref 26.5–34)
MCHC RBC AUTO-ENTMCNC: 33.4 G/DL (ref 31.4–36)
MCV RBC AUTO: 90.7 FL (ref 80–98)
MONOCYTES # BLD AUTO: 1.11 10*3/MM3 (ref 0–0.9)
MONOCYTES NFR BLD AUTO: 12.4 % (ref 0–12)
NEUTROPHILS # BLD AUTO: 4.67 10*3/MM3 (ref 2–8.6)
NEUTROPHILS NFR BLD AUTO: 52.3 % (ref 37–80)
PLATELET # BLD AUTO: 235 10*3/MM3 (ref 150–450)
PMV BLD AUTO: 8.8 FL (ref 8–12)
POTASSIUM BLD-SCNC: 3.6 MMOL/L (ref 3.5–5.1)
RBC # BLD AUTO: 4.72 10*6/MM3 (ref 3.77–5.16)
SODIUM BLD-SCNC: 138 MMOL/L (ref 137–145)
WBC NRBC COR # BLD: 8.95 10*3/MM3 (ref 3.2–9.8)

## 2019-01-18 PROCEDURE — 85025 COMPLETE CBC W/AUTO DIFF WBC: CPT

## 2019-01-18 PROCEDURE — 80048 BASIC METABOLIC PNL TOTAL CA: CPT

## 2019-01-18 PROCEDURE — 36415 COLL VENOUS BLD VENIPUNCTURE: CPT

## 2019-01-18 PROCEDURE — 83036 HEMOGLOBIN GLYCOSYLATED A1C: CPT

## 2019-01-18 RX ORDER — SODIUM CHLORIDE 9 MG/ML
75 INJECTION, SOLUTION INTRAVENOUS CONTINUOUS
Status: CANCELLED | OUTPATIENT
Start: 2019-01-21

## 2019-01-18 RX ORDER — SODIUM CHLORIDE 0.9 % (FLUSH) 0.9 %
1-10 SYRINGE (ML) INJECTION AS NEEDED
Status: CANCELLED | OUTPATIENT
Start: 2019-01-21

## 2019-01-18 RX ORDER — SODIUM CHLORIDE 0.9 % (FLUSH) 0.9 %
3 SYRINGE (ML) INJECTION EVERY 12 HOURS SCHEDULED
Status: CANCELLED | OUTPATIENT
Start: 2019-01-21

## 2019-01-19 NOTE — H&P
Cardiology History and Physical Note        Patient Name: Monisha Brasher  Age/Sex: 63 y.o. female  : 1955  MRN: [id number]    Date of Admission : 2019    Primary care Physician: Tiffanie Mccauley APRN    Reason for Admission:  Chest pain stage PTCA of the left anterior descending artery  Subjective:       Chief Complaint: Chest pain       History of Present Illness:  Monisha Brasher is a 63 y.o. female     There is no height or weight on file to calculate BMI.  With a past medical history significant for atherosclerotic coronary artery disease status post aborted posterior wall myocardial infarction with Pronto thrombectomy and rescue PTCA and stenting of the circumflex artery done in 2018 with deployment of a 2.5 mm x 15 mm and a 2.75 mm x 8 mm Alpine Emily drug-eluting stent, PTCA and stenting of the proximal circumflex artery with deployment of a 3 mm x 12 mm Alpine Emily drug-eluting X 2, history of paroxysmal atrial fibrillation maintained in normal sinus rhythm on amiodarone, proximal left anterior descending artery 80-90% stenosis at the origin of the diagonal branch and ostial diagonal branch 80% stenosis, arterial hypertension, hypertensive heart disease, hyperlipidemia, allergic rhinitis, tobacco abuse and asthmatic bronchitis.    Patient was hospitalized in 2018 for the posterior wall myocardial infarction.  Patient during the aborted myocardial infarction had a peak troponin of 78.  Patient subsequently was discharged home in stable condition.  Prior to the discharge patient troponin was 2.6.    Patient now presents for follow-up evaluation.  Patient has had a few episodes of substernal chest pain.  Patient on further questioning denies any fever or chill patient denies any hemoptysis hematuria bright blood per rectum.  Patient denies any paroxysmal dyspnea or orthopnea.  Patient denies any symptoms of lightheaded dizziness or near syncopal  episode.    Patient 10 point review of system except for what is stated in the troponin is negative.        Past Medical History:  1.  Chest pain.  2.  Atherosclerotic coronary artery disease.  3.  Aborted posterior wall myocardial infarction in November 15, 2018.  4.  Pronto thrombectomy of the 100% occluded proximal circumflex artery.  5.  PTCA and stenting of the obtuse marginal branch with deployment of a 2.5 mm x 15 mm and a 2.75 mm x 8 mm Alpine Emily drug-eluting stent.  6.  PTCA and stenting of the proximal circumflex artery with deployment of a 3 mm x 12 mm Alpine Emily drug-eluting s2.  7.  Proximal left anterior descending artery with 80-90% stenosis with the diagonal branch #1 with ostial 80% stenosis with proximal right coronary artery with 30% stenosis.  8.  Paroxysmal atrial fibrillation maintained in sinus rhythm on amiodarone   9.  Arterial hypertension.  10.  Hypertensive heart disease.  11.  Asthmatic bronchitis.  12.  Allergic rhinitis.  13.  Chronic obstructive lung disease with tobacco abuse.  14.  Non-IgE mediated allergic asthmatic bronchitis.  15.  Chronic back pain.  16.  History of alcohol abuse.          Past Surgical History:  1.  Bilateral ankle surgery.  2.   section.  3.  Hysterectomy.  4.  Kenalog injection.    5.  Coronary angiogram and PTCA and Pronto thrombectomy done in 2018        Family History: Mother and father with coronary artery disease        Social History: Smokes up to half to one pack per day social alcohol intake patient is not working        Cardiac Risk factor:   1.  Postmenopausal.  2.  Tobacco abuse.  3.  Arterial hypertension.  4.  Family history for coronary artery disease        Allergies:  No Known Allergies    Home Medication::  Prior to Admission medications    Medication Sig Start Date End Date Taking? Authorizing Provider   ADVAIR DISKUS 500-50 MCG/DOSE DISKUS INHALE 1 PUFF BY MOUTH TWICE DAILY 18   Gigi Ac MD   albuterol  (PROVENTIL) (2.5 MG/3ML) 0.083% nebulizer solution USE 1 VIAL PER NEBULIZER EVERY 4 HOURS AS NEEDED FOR WHEEZING 12/11/18   Gigi Ac MD   albuterol (VENTOLIN HFA) 108 (90 Base) MCG/ACT inhaler 2 puffs every 4 hours as needed for breathing 10/23/18   Gigi Ac MD   ALPRAZolam (XANAX) 0.25 MG tablet Take 1 tablet by mouth 3 (Three) Times a Day As Needed for Anxiety. 8/17/18   Tiffanie Mccauley APRN   amiodarone (PACERONE) 100 MG tablet Take 1 tablet by mouth 2 (Two) Times a Day. 11/23/18   Alex Lomax MD   aspirin 81 MG chewable tablet Chew 1 tablet Daily. 11/24/18   Alex Lomax MD   atorvastatin (LIPITOR) 20 MG tablet Take 2 tablets by mouth Every Night. 11/23/18   Alex Lomax MD   furosemide (LASIX) 20 MG tablet Take 1 tablet by mouth 2 (Two) Times a Day. 11/23/18   Alex Lomax MD   glucose blood test strip Use as instructed 8/17/18   Tiffanie Mccauley APRN   glucose monitor monitoring kit 1 each Daily. Testing blood sugar once a day    ICD10 - R73.9 8/17/18   Tiffanie Mccauley APRN   labetalol (NORMODYNE) 100 MG tablet Take 0.5 tablets by mouth 2 (Two) Times a Day. 11/23/18   Alex Lomax MD   Lancets (FREESTYLE) lancets Testing blood sugar once a day 8/17/18   Tiffanie Mccauley APRN   losartan (COZAAR) 25 MG tablet Take 0.5 tablets by mouth Daily. 11/24/18   Alex Lomax MD   metFORMIN (GLUCOPHAGE) 500 MG tablet Take 1 tablet by mouth Daily With Breakfast. 8/17/18   Tiffanie Mccauley APRN   montelukast (SINGULAIR) 10 MG tablet TAKE 1 TABLET BY MOUTH DAILY 10/29/18   Tiffanie Mccauley APRN   nystatin (MYCOSTATIN) 225997 UNIT/ML suspension SHAKE LIQUID AND TAKE 5 ML BY MOUTH FOUR TIMES DAILY 12/3/18   Gigi Ac MD   PARoxetine (PAXIL) 10 MG tablet Take 1 tablet by mouth Every Morning. 8/17/18   Tiffanie Mccauley APRN   sodium chloride (OCEAN) 0.65 % nasal spray 2 sprays into the nostril(s) as directed by provider As Needed for Congestion.  11/23/18   Alex Lomax MD   SPIRIVA HANDIHALER 18 MCG per inhalation capsule INHALE 1 PUFF BY MOUTH DAILY 1/14/19   Gigi Ac MD   ticagrelor (BRILINTA) 90 MG tablet tablet Take 1 tablet by mouth 2 (Two) Times a Day. 11/23/18   Alex Lomax MD         Review of Systems   Constitutional: Negative for chills, fever and unexpected weight change.   HENT: Negative for hearing loss and nosebleeds.    Eyes: Negative for visual disturbance.   Respiratory: Positive for chest tightness and shortness of breath. Negative for cough and wheezing.    Cardiovascular: Positive for chest pain. Negative for palpitations and leg swelling.   Gastrointestinal: Negative for abdominal pain, blood in stool, constipation, diarrhea, nausea and vomiting.   Endocrine: Negative for cold intolerance, heat intolerance, polydipsia, polyphagia and polyuria.   Genitourinary: Negative for hematuria.   Musculoskeletal: Negative for joint swelling, myalgias and neck pain.   Skin: Negative for color change, rash and wound.   Neurological: Negative for dizziness, seizures, syncope, light-headedness, numbness and headaches.   Hematological: Does not bruise/bleed easily.         Objective:     Objective:         There is no height or weight on file to calculate BMI.       Physical Exam   Constitutional: She is oriented to person, place, and time. She appears well-developed and well-nourished.   HENT:   Head: Normocephalic and atraumatic.   Left Ear: External ear normal.   Nose: Nose normal.   Mouth/Throat: Oropharynx is clear and moist.   Eyes: Conjunctivae and EOM are normal. Pupils are equal, round, and reactive to light. No scleral icterus.   Neck: Normal range of motion. Neck supple. No JVD present. No tracheal deviation present. No thyromegaly present.   Cardiovascular: Normal rate and regular rhythm.   Regular S1 and S2 with a soft systolic murmur best heard at the apex   Pulmonary/Chest: Breath sounds normal. No stridor.   Abdominal:  Bowel sounds are normal.   Musculoskeletal: Normal range of motion.   Lymphadenopathy:     She has no cervical adenopathy.   Neurological: She is alert and oriented to person, place, and time. She has normal reflexes.   Skin: Skin is warm and dry.   Psychiatric: She has a normal mood and affect. Her behavior is normal. Judgment and thought content normal.         Lab Review:     Results from last 7 days   Lab Units 01/18/19  1047   SODIUM mmol/L 138   POTASSIUM mmol/L 3.6   CHLORIDE mmol/L 99   CO2 mmol/L 30.0   BUN mg/dL 14   CREATININE mg/dL 0.88   CALCIUM mg/dL 9.2   GLUCOSE mg/dL 93             Results from last 7 days   Lab Units 01/18/19  1047   WBC 10*3/mm3 8.95   HEMOGLOBIN g/dL 14.3   HEMATOCRIT % 42.8   PLATELETS 10*3/mm3 235                           EKG:   ECG/EMG Results (last 24 hours)     ** No results found for the last 24 hours. **          Imaging:  Imaging Results (last 24 hours)     ** No results found for the last 24 hours. **          I personally viewed and interpreted the patient's EKG/Telemetry data.    Assessment:   1.  Chest pain.  2.  Atherosclerotic coronary artery disease.  3.  Status post aborted posterior wall myocardial infarction in November 2018.  4.  Arterial hypertension.  5.  Hypertensive heart disease.          Plan:   1.  Chest pain.  Patient has history of documented atherosclerotic coronary artery disease with previous aborted posterior wall myocardial infarction in November 2018.  Patient underwent Pronto thrombectomy PTCA and stenting of the circumflex and obtuse marginal branch.  Patient had proximal left anterior descending artery stenosis which was not intervened.  Patient at the present time has been recommended to undergo a coronary angiogram and stage PTCA of the left anterior descending artery stenosis.  Risk-benefit treatment option for the coronary angiogram and percutaneous intervention was discussed with the patient and an informed consent was obtained.  Patient  Mallampati score was #2 patient ASA classification was #2.  Risks for minimal sedation was also discussed with the patient and an informed consent was obtained.  Plan was to proceed with the coronary angiogram and stage PTCA on 1/21/2019.  Patient renal function would be checked prior to the coronary angiogram.  2.  Arterial hypertension.  Patient blood pressure has been on the low side.  Patient has been counseled to continue  with the present antihypertensive medication.  3.  Paroxysmal atrial fibrillation.  Patient has been maintaining normal sinus rhythm and would continue with the low dose of amiodarone.  4.  Hyperlipidemia.  Patient is currently on Lipitor 40 mg at bedtime.  5.  Chronic obstructive lung disease with pneumonitis.  Patient is currently on antibiotics.  Patient would need home oxygen on discharge.  6.  Hypertensive heart disease.  Clinically at the present time patient is not in congestive heart failure.        Time: time spent in face-to-face evaluation of greater than 655  minutes and interacting and formulating examining and discussing the plan with the patient with 50% of greater time spent in face-to-face interaction.    Thaddeus Huber MD  01/19/19  3:49 PM    Dictated utilizing Dragon dictation.

## 2019-01-21 ENCOUNTER — HOSPITAL ENCOUNTER (OUTPATIENT)
Facility: HOSPITAL | Age: 64
Discharge: HOME | End: 2019-01-22
Attending: INTERNAL MEDICINE | Admitting: INTERNAL MEDICINE

## 2019-01-21 DIAGNOSIS — I25.110 ATHEROSCLEROSIS OF NATIVE CORONARY ARTERY OF NATIVE HEART WITH UNSTABLE ANGINA PECTORIS (HCC): ICD-10-CM

## 2019-01-21 DIAGNOSIS — R07.9 CHEST PAIN, UNSPECIFIED TYPE: ICD-10-CM

## 2019-01-21 LAB
INR PPP: 0.95 (ref 0.8–1.2)
PROTHROMBIN TIME: 12.5 SECONDS (ref 11.1–15.3)

## 2019-01-21 PROCEDURE — C1894 INTRO/SHEATH, NON-LASER: HCPCS | Performed by: INTERNAL MEDICINE

## 2019-01-21 PROCEDURE — 85610 PROTHROMBIN TIME: CPT | Performed by: INTERNAL MEDICINE

## 2019-01-21 PROCEDURE — C1769 GUIDE WIRE: HCPCS | Performed by: INTERNAL MEDICINE

## 2019-01-21 PROCEDURE — C1887 CATHETER, GUIDING: HCPCS | Performed by: INTERNAL MEDICINE

## 2019-01-21 PROCEDURE — C1874 STENT, COATED/COV W/DEL SYS: HCPCS | Performed by: INTERNAL MEDICINE

## 2019-01-21 PROCEDURE — 94640 AIRWAY INHALATION TREATMENT: CPT

## 2019-01-21 PROCEDURE — C9600 PERC DRUG-EL COR STENT SING: HCPCS | Performed by: INTERNAL MEDICINE

## 2019-01-21 PROCEDURE — 25010000002 BIVALIRUDIN TRIFLUOROACETATE 250 MG RECONSTITUTED SOLUTION 1 EACH VIAL: Performed by: INTERNAL MEDICINE

## 2019-01-21 PROCEDURE — C1760 CLOSURE DEV, VASC: HCPCS | Performed by: INTERNAL MEDICINE

## 2019-01-21 PROCEDURE — 0 IOPAMIDOL PER 1 ML: Performed by: INTERNAL MEDICINE

## 2019-01-21 PROCEDURE — G0378 HOSPITAL OBSERVATION PER HR: HCPCS

## 2019-01-21 PROCEDURE — 25010000002 MIDAZOLAM PER 1 MG: Performed by: INTERNAL MEDICINE

## 2019-01-21 PROCEDURE — 25010000002 FENTANYL CITRATE (PF) 100 MCG/2ML SOLUTION: Performed by: INTERNAL MEDICINE

## 2019-01-21 PROCEDURE — 25010000002 HEPARIN (PORCINE) PER 1000 UNITS: Performed by: INTERNAL MEDICINE

## 2019-01-21 DEVICE — XIENCE SIERRA™ EVEROLIMUS ELUTING CORONARY STENT SYSTEM 2.50 MM X 12 MM / RAPID-EXCHANGE
Type: IMPLANTABLE DEVICE | Status: FUNCTIONAL
Brand: XIENCE SIERRA™

## 2019-01-21 RX ORDER — LIDOCAINE HYDROCHLORIDE 20 MG/ML
INJECTION, SOLUTION INFILTRATION; PERINEURAL AS NEEDED
Status: DISCONTINUED | OUTPATIENT
Start: 2019-01-21 | End: 2019-01-21 | Stop reason: HOSPADM

## 2019-01-21 RX ORDER — FENTANYL CITRATE 50 UG/ML
INJECTION, SOLUTION INTRAMUSCULAR; INTRAVENOUS AS NEEDED
Status: DISCONTINUED | OUTPATIENT
Start: 2019-01-21 | End: 2019-01-21 | Stop reason: HOSPADM

## 2019-01-21 RX ORDER — SODIUM CHLORIDE 0.9 % (FLUSH) 0.9 %
3 SYRINGE (ML) INJECTION EVERY 12 HOURS SCHEDULED
Status: DISCONTINUED | OUTPATIENT
Start: 2019-01-21 | End: 2019-01-21 | Stop reason: HOSPADM

## 2019-01-21 RX ORDER — PAROXETINE 10 MG/1
10 TABLET, FILM COATED ORAL EVERY MORNING
Status: DISCONTINUED | OUTPATIENT
Start: 2019-01-22 | End: 2019-01-22 | Stop reason: HOSPADM

## 2019-01-21 RX ORDER — SODIUM CHLORIDE 9 MG/ML
75 INJECTION, SOLUTION INTRAVENOUS CONTINUOUS
Status: DISCONTINUED | OUTPATIENT
Start: 2019-01-21 | End: 2019-01-21

## 2019-01-21 RX ORDER — SODIUM CHLORIDE 0.9 % (FLUSH) 0.9 %
1-10 SYRINGE (ML) INJECTION AS NEEDED
Status: DISCONTINUED | OUTPATIENT
Start: 2019-01-21 | End: 2019-01-21 | Stop reason: HOSPADM

## 2019-01-21 RX ORDER — HEPARIN SODIUM 1000 [USP'U]/ML
INJECTION, SOLUTION INTRAVENOUS; SUBCUTANEOUS AS NEEDED
Status: DISCONTINUED | OUTPATIENT
Start: 2019-01-21 | End: 2019-01-21 | Stop reason: HOSPADM

## 2019-01-21 RX ORDER — BUDESONIDE AND FORMOTEROL FUMARATE DIHYDRATE 160; 4.5 UG/1; UG/1
2 AEROSOL RESPIRATORY (INHALATION)
Status: DISCONTINUED | OUTPATIENT
Start: 2019-01-21 | End: 2019-01-22 | Stop reason: HOSPADM

## 2019-01-21 RX ORDER — MONTELUKAST SODIUM 10 MG/1
10 TABLET ORAL DAILY
Status: DISCONTINUED | OUTPATIENT
Start: 2019-01-22 | End: 2019-01-22 | Stop reason: HOSPADM

## 2019-01-21 RX ORDER — MIDAZOLAM HYDROCHLORIDE 1 MG/ML
INJECTION INTRAMUSCULAR; INTRAVENOUS AS NEEDED
Status: DISCONTINUED | OUTPATIENT
Start: 2019-01-21 | End: 2019-01-21 | Stop reason: HOSPADM

## 2019-01-21 RX ORDER — ASPIRIN 81 MG/1
81 TABLET, CHEWABLE ORAL DAILY
Status: DISCONTINUED | OUTPATIENT
Start: 2019-01-22 | End: 2019-01-22 | Stop reason: HOSPADM

## 2019-01-21 RX ORDER — HEPARIN SODIUM 5000 [USP'U]/ML
INJECTION, SOLUTION INTRAVENOUS; SUBCUTANEOUS AS NEEDED
Status: DISCONTINUED | OUTPATIENT
Start: 2019-01-21 | End: 2019-01-21 | Stop reason: HOSPADM

## 2019-01-21 RX ORDER — AMIODARONE HYDROCHLORIDE 200 MG/1
100 TABLET ORAL 2 TIMES DAILY
Status: DISCONTINUED | OUTPATIENT
Start: 2019-01-21 | End: 2019-01-22 | Stop reason: HOSPADM

## 2019-01-21 RX ORDER — ALBUTEROL SULFATE 2.5 MG/3ML
2.5 SOLUTION RESPIRATORY (INHALATION) EVERY 6 HOURS PRN
Status: DISCONTINUED | OUTPATIENT
Start: 2019-01-21 | End: 2019-01-22 | Stop reason: HOSPADM

## 2019-01-21 RX ORDER — LOSARTAN POTASSIUM 25 MG/1
12.5 TABLET ORAL DAILY
Status: DISCONTINUED | OUTPATIENT
Start: 2019-01-21 | End: 2019-01-22 | Stop reason: HOSPADM

## 2019-01-21 RX ORDER — SODIUM CHLORIDE 9 MG/ML
100 INJECTION, SOLUTION INTRAVENOUS CONTINUOUS
Status: DISCONTINUED | OUTPATIENT
Start: 2019-01-21 | End: 2019-01-22 | Stop reason: HOSPADM

## 2019-01-21 RX ORDER — FUROSEMIDE 20 MG/1
20 TABLET ORAL
Status: DISCONTINUED | OUTPATIENT
Start: 2019-01-21 | End: 2019-01-22 | Stop reason: HOSPADM

## 2019-01-21 RX ORDER — ATORVASTATIN CALCIUM 40 MG/1
40 TABLET, FILM COATED ORAL NIGHTLY
Status: DISCONTINUED | OUTPATIENT
Start: 2019-01-21 | End: 2019-01-22 | Stop reason: HOSPADM

## 2019-01-21 RX ORDER — LABETALOL 100 MG/1
50 TABLET, FILM COATED ORAL 2 TIMES DAILY
Status: DISCONTINUED | OUTPATIENT
Start: 2019-01-21 | End: 2019-01-22 | Stop reason: HOSPADM

## 2019-01-21 RX ORDER — ECHINACEA PURPUREA EXTRACT 125 MG
2 TABLET ORAL AS NEEDED
Status: DISCONTINUED | OUTPATIENT
Start: 2019-01-21 | End: 2019-01-22 | Stop reason: HOSPADM

## 2019-01-21 RX ORDER — ALPRAZOLAM 0.25 MG/1
0.25 TABLET ORAL 3 TIMES DAILY PRN
Status: DISCONTINUED | OUTPATIENT
Start: 2019-01-21 | End: 2019-01-22 | Stop reason: HOSPADM

## 2019-01-21 RX ADMIN — SODIUM CHLORIDE 100 ML/HR: 9 INJECTION, SOLUTION INTRAVENOUS at 20:24

## 2019-01-21 RX ADMIN — Medication 100 MG: at 20:22

## 2019-01-21 RX ADMIN — ATORVASTATIN CALCIUM 40 MG: 40 TABLET, FILM COATED ORAL at 20:22

## 2019-01-21 RX ADMIN — ALPRAZOLAM 0.25 MG: 0.25 TABLET ORAL at 22:30

## 2019-01-21 RX ADMIN — BUDESONIDE AND FORMOTEROL FUMARATE DIHYDRATE 2 PUFF: 160; 4.5 AEROSOL RESPIRATORY (INHALATION) at 20:00

## 2019-01-21 RX ADMIN — LABETALOL HYDROCHLORIDE 50 MG: 100 TABLET, FILM COATED ORAL at 20:22

## 2019-01-21 RX ADMIN — TICAGRELOR 90 MG: 90 TABLET ORAL at 20:22

## 2019-01-21 RX ADMIN — FUROSEMIDE 20 MG: 20 TABLET ORAL at 17:29

## 2019-01-21 RX ADMIN — SODIUM CHLORIDE 75 ML/HR: 9 INJECTION, SOLUTION INTRAVENOUS at 06:32

## 2019-01-21 RX ADMIN — IPRATROPIUM BROMIDE 0.5 MG: 0.5 SOLUTION RESPIRATORY (INHALATION) at 20:00

## 2019-01-21 NOTE — CONSULTS
[facility] Medicine Consult  Inpatient Hospitalist Consult  Consult performed by: Dami Salinas MD  Consult ordered by: Thaddeus Huber MD          Date of Admission: 1/21/2019  Date of Consult: 01/21/19    Primary Care Physician: Tiffanie Mccauley APRN  Referring Physician:  Thaddeus Huber MD      Chief Complaint/Reason for Consultation:  Opinion on COPD    HPI:  Patient is a 63-year-old female who presented for a scheduled left heart catheter with PTCA.  She had a myocardial infarction in November and the culprit lesion was stented with planned staged PTCA of a stenotic lesion.  She states that she is pain-free after the procedure.  She has no chest pain, and no shortness of breath.  She states that over the last 2 weeks at home she has been a little bit more short of breath, especially with exertion.  She attributes this to her COPD.  She has had minor cough, but no increase in sputum production.  She states that she stopped smoking after her heart attack in November.  She has had no chest pain or associated symptoms.  She denies nausea and vomiting.    Past Medical History:  has a past medical history of Acute bronchitis, Acute upper respiratory infection, Adjustment disorder with mixed emotional features, Agoraphobia with panic attacks, Allergic rhinitis due to pollen, Anxiety, Asthma, Backache, Blood in urine, Candidiasis of mouth, Chronic bronchitis (CMS/HCC), Chronic obstructive lung disease (CMS/HCC), Emphysema lung (CMS/HCC), Essential hypertension, Extrinsic asthma with status asthmaticus, Fatigue, H/O echocardiogram, Hypoxemia, Malaise and fatigue, Non-IgE mediated allergic asthma, Nonvenomous insect bite, Pneumonia, PONV (postoperative nausea and vomiting), Postmenopausal state, and Urinary tract infectious disease.    Past Surgical History:  has a past surgical history that includes Injection of Medication (03/20/2015); Injection of Medication  (2016);  section; Hysterectomy; Leg Surgery (Bilateral); and Left Heart Cath (N/A, 11/15/2018).    Family History: family history includes Heart attack in her mother; Heart disease in her father, mother, and paternal grandmother.     Social History:  reports that she quit smoking about 2 months ago. Her smoking use included cigarettes and electronic cigarette. She started smoking about 50 years ago. She has a 48.00 pack-year smoking history. she has never used smokeless tobacco. She reports that she does not drink alcohol or use drugs.    Allergies: No Known Allergies    Medications: Scheduled Meds:  amiodarone 100 mg Oral BID   [START ON 2019] aspirin 81 mg Oral Daily   atorvastatin 40 mg Oral Nightly   furosemide 20 mg Oral BID   labetalol 50 mg Oral BID   losartan 12.5 mg Oral Daily   [START ON 2019] montelukast 10 mg Oral Daily   [START ON 2019] PARoxetine 10 mg Oral QAM   ticagrelor 90 mg Oral BID     Continuous Infusions:  sodium chloride 100 mL/hr Last Rate: 100 mL/hr (19 1608)     PRN Meds:.•  albuterol  •  ALPRAZolam  •  sodium chloride    Review of Systems:  Review of Systems   Constitutional: Negative for appetite change, chills, fatigue, fever and unexpected weight change.   HENT: Negative for congestion, facial swelling, hearing loss, nosebleeds, rhinorrhea, sneezing, trouble swallowing and voice change.    Eyes: Negative for photophobia and visual disturbance.   Respiratory: Positive for shortness of breath and wheezing. Negative for apnea, cough, choking, chest tightness and stridor.    Cardiovascular: Negative for chest pain, palpitations and leg swelling.   Gastrointestinal: Negative for abdominal pain, blood in stool, constipation, diarrhea, nausea and vomiting.   Endocrine: Negative for cold intolerance, heat intolerance, polydipsia, polyphagia and polyuria.   Genitourinary: Negative for dysuria, flank pain and hematuria.   Musculoskeletal: Negative for  arthralgias, back pain, myalgias and neck pain.   Skin: Negative for rash and wound.   Allergic/Immunologic: Negative for immunocompromised state.   Neurological: Negative for dizziness, seizures, syncope, speech difficulty, weakness, light-headedness, numbness and headaches.   Hematological: Does not bruise/bleed easily.   Psychiatric/Behavioral: Negative for agitation, behavioral problems, confusion, decreased concentration, hallucinations, self-injury and suicidal ideas. The patient is not nervous/anxious.       Otherwise complete ROS is negative except as mentioned above.    Physical Exam:   Temp:  [97.9 °F (36.6 °C)-98 °F (36.7 °C)] 98 °F (36.7 °C)  Heart Rate:  [66-74] 71  Resp:  [18] 18  BP: (120-158)/(68-92) 132/82     Physical Exam   Constitutional: She is oriented to person, place, and time. She appears well-developed and well-nourished.   HENT:   Head: Normocephalic and atraumatic.   Nose: Nose normal.   Mouth/Throat: Oropharynx is clear and moist.   Eyes: Conjunctivae, EOM and lids are normal. Pupils are equal, round, and reactive to light. No scleral icterus.   Neck: Normal range of motion. Neck supple. No JVD present. No tracheal tenderness and no spinous process tenderness present. No neck rigidity. No tracheal deviation, no edema and normal range of motion present.   Cardiovascular: Normal rate, regular rhythm, S1 normal, S2 normal, normal heart sounds and normal pulses. Exam reveals no gallop and no friction rub.   No murmur heard.  Pulmonary/Chest: Effort normal. No accessory muscle usage. No respiratory distress. She has decreased breath sounds. She has wheezes (very faint). She has no rales. She exhibits no tenderness.   Abdominal: Soft. Bowel sounds are normal. She exhibits no distension and no mass. There is no tenderness. There is no rebound and no guarding.   Musculoskeletal: She exhibits no edema or tenderness.   Neurological: She is alert and oriented to person, place, and time. She has  normal reflexes. She displays no atrophy and normal reflexes. No cranial nerve deficit or sensory deficit. She exhibits normal muscle tone. She displays no seizure activity. Coordination normal.   Skin: Skin is warm. No rash noted. No pallor.   Psychiatric: She has a normal mood and affect. Her behavior is normal. Judgment and thought content normal.         Results Reviewed:  I have personally reviewed current lab, radiology, and data and agree with results.  Lab Results (last 24 hours)     Procedure Component Value Units Date/Time    Protime-INR [[id number]]  (Normal) Collected:  01/21/19 0620    Specimen:  Blood Updated:  01/21/19 0649     Protime 12.5 Seconds      INR 0.95    Narrative:       Therapeutic range for most indications is 2.0-3.0 INR,  or 2.5-3.5 for mechanical heart valves.        Imaging Results (last 24 hours)     ** No results found for the last 24 hours. **          Assessment:  Active Hospital Problems    Diagnosis   • Chest pain     Added automatically from request for surgery [id number]     • Atherosclerosis of native coronary artery of native heart with unstable angina pectoris (CMS/HCC)     Added automatically from request for surgery [id number]               Recommendations:  1.  Coronary artery disease:  PTCA and stent today.  No symptoms.  2.  COPD:  Patient states that she is somewhat more short of breath over the last 2 weeks.  Worse with exertion.  Very faint wheezes.  Will order prn nebs.  Continue home meds.  3.  Paroxysmal Atrial fibrillation:  Continue amiodarone.  4.  HTN:  Continue home meds.  5.  History of tobacco abuse:  States that she quit smoking 2 months ago.    Thank you for allowing us to participate in the care of Monisha Brasher.  We will continue to follow while she is in the hospital.      I discussed the patients findings and my recommendations with: Patient        This document has been electronically signed by Dami Salinas MD on January 21, 2019 4:19 PM

## 2019-01-21 NOTE — PLAN OF CARE
Problem: Patient Care Overview  Goal: Plan of Care Review  Outcome: Ongoing (interventions implemented as appropriate)   01/21/19 6790   Coping/Psychosocial   Plan of Care Reviewed With patient   Plan of Care Review   Progress improving   OTHER   Outcome Summary heart cath today, 1 stent to LAD; Right groin CDI, no hematoma or bleeding present; ambulated in hallway     Goal: Individualization and Mutuality  Outcome: Ongoing (interventions implemented as appropriate)    Goal: Discharge Needs Assessment  Outcome: Ongoing (interventions implemented as appropriate)    Goal: Interprofessional Rounds/Family Conf  Outcome: Ongoing (interventions implemented as appropriate)      Problem: Cardiac: ACS (Acute Coronary Syndrome) (Adult)  Goal: Signs and Symptoms of Listed Potential Problems Will be Absent, Minimized or Managed (Cardiac: ACS)  Outcome: Ongoing (interventions implemented as appropriate)      Problem: Cardiac Catheterization (Diagnostic/Interventional) (Adult)  Goal: Signs and Symptoms of Listed Potential Problems Will be Absent, Minimized or Managed (Cardiac Catheterization)  Outcome: Ongoing (interventions implemented as appropriate)    Goal: Anesthesia/Sedation Recovery  Outcome: Ongoing (interventions implemented as appropriate)

## 2019-01-22 ENCOUNTER — DOCUMENTATION (OUTPATIENT)
Dept: CARDIAC REHAB | Facility: HOSPITAL | Age: 64
End: 2019-01-22

## 2019-01-22 VITALS
DIASTOLIC BLOOD PRESSURE: 58 MMHG | TEMPERATURE: 97 F | OXYGEN SATURATION: 94 % | WEIGHT: 151 LBS | HEIGHT: 63 IN | BODY MASS INDEX: 26.75 KG/M2 | HEART RATE: 60 BPM | SYSTOLIC BLOOD PRESSURE: 108 MMHG | RESPIRATION RATE: 8 BRPM

## 2019-01-22 LAB
ANION GAP SERPL CALCULATED.3IONS-SCNC: 2 MMOL/L (ref 5–15)
BUN BLD-MCNC: 13 MG/DL (ref 7–21)
BUN/CREAT SERPL: 15.5 (ref 7–25)
CALCIUM SPEC-SCNC: 8.9 MG/DL (ref 8.4–10.2)
CHLORIDE SERPL-SCNC: 107 MMOL/L (ref 95–110)
CO2 SERPL-SCNC: 29 MMOL/L (ref 22–31)
CREAT BLD-MCNC: 0.84 MG/DL (ref 0.5–1)
DEPRECATED RDW RBC AUTO: 48.6 FL (ref 36.4–46.3)
ERYTHROCYTE [DISTWIDTH] IN BLOOD BY AUTOMATED COUNT: 14.4 % (ref 11.5–14.5)
GFR SERPL CREATININE-BSD FRML MDRD: 69 ML/MIN/1.73 (ref 45–104)
GLUCOSE BLD-MCNC: 94 MG/DL (ref 60–100)
HCT VFR BLD AUTO: 36.6 % (ref 35–45)
HGB BLD-MCNC: 12.1 G/DL (ref 12–15.5)
MCH RBC QN AUTO: 30.3 PG (ref 26.5–34)
MCHC RBC AUTO-ENTMCNC: 33.1 G/DL (ref 31.4–36)
MCV RBC AUTO: 91.5 FL (ref 80–98)
PLATELET # BLD AUTO: 209 10*3/MM3 (ref 150–450)
PMV BLD AUTO: 9.2 FL (ref 8–12)
POTASSIUM BLD-SCNC: 3.4 MMOL/L (ref 3.5–5.1)
RBC # BLD AUTO: 4 10*6/MM3 (ref 3.77–5.16)
SODIUM BLD-SCNC: 138 MMOL/L (ref 137–145)
WBC NRBC COR # BLD: 6.99 10*3/MM3 (ref 3.2–9.8)

## 2019-01-22 PROCEDURE — 85027 COMPLETE CBC AUTOMATED: CPT | Performed by: INTERNAL MEDICINE

## 2019-01-22 PROCEDURE — 94799 UNLISTED PULMONARY SVC/PX: CPT

## 2019-01-22 PROCEDURE — 80048 BASIC METABOLIC PNL TOTAL CA: CPT | Performed by: INTERNAL MEDICINE

## 2019-01-22 RX ORDER — 0.9 % SODIUM CHLORIDE 0.9 %
10 VIAL (ML) INJECTION EVERY 12 HOURS SCHEDULED
Status: DISCONTINUED | OUTPATIENT
Start: 2019-01-22 | End: 2019-01-22 | Stop reason: HOSPADM

## 2019-01-22 RX ADMIN — TICAGRELOR 90 MG: 90 TABLET ORAL at 08:27

## 2019-01-22 RX ADMIN — ASPIRIN 81 MG CHEWABLE TABLET 81 MG: 81 TABLET CHEWABLE at 08:28

## 2019-01-22 RX ADMIN — SODIUM CHLORIDE, PRESERVATIVE FREE 10 ML: 5 INJECTION INTRAVENOUS at 08:28

## 2019-01-22 RX ADMIN — BUDESONIDE AND FORMOTEROL FUMARATE DIHYDRATE 2 PUFF: 160; 4.5 AEROSOL RESPIRATORY (INHALATION) at 08:22

## 2019-01-22 RX ADMIN — IPRATROPIUM BROMIDE 0.5 MG: 0.5 SOLUTION RESPIRATORY (INHALATION) at 08:16

## 2019-01-22 RX ADMIN — LABETALOL HYDROCHLORIDE 50 MG: 100 TABLET, FILM COATED ORAL at 08:27

## 2019-01-22 RX ADMIN — PAROXETINE 10 MG: 10 TABLET, FILM COATED ORAL at 06:21

## 2019-01-22 RX ADMIN — MONTELUKAST SODIUM 10 MG: 10 TABLET, FILM COATED ORAL at 08:27

## 2019-01-22 RX ADMIN — Medication 12.5 MG: at 08:27

## 2019-01-22 RX ADMIN — Medication 100 MG: at 08:28

## 2019-01-22 RX ADMIN — FUROSEMIDE 20 MG: 20 TABLET ORAL at 08:28

## 2019-01-22 NOTE — PROGRESS NOTES
[facility] Medicine Services  INPATIENT PROGRESS NOTE    Length of Stay: 0  Date of Admission: 1/21/2019  Primary Care Physician: Tiffanie Mccauley APRN    Subjective   Chief Complaint:  None  HPI:  Patient states that she feels good.  She has no chest pain.  She has no shortness of breath that is above her baseline.    Review of Systems   Constitutional: Negative for appetite change, chills, fatigue, fever and unexpected weight change.   Respiratory: Negative for cough, choking, chest tightness, shortness of breath and wheezing.    Cardiovascular: Negative for chest pain, palpitations and leg swelling.   Gastrointestinal: Negative for abdominal pain, blood in stool, constipation, diarrhea, nausea and vomiting.   Genitourinary: Negative for dysuria, flank pain and hematuria.   Neurological: Negative for dizziness, seizures, syncope, speech difficulty, weakness, light-headedness, numbness and headaches.   Hematological: Does not bruise/bleed easily.        All pertinent negatives and positives are as above. All other systems have been reviewed and are negative unless otherwise stated.     Objective    Temp:  [96.4 °F (35.8 °C)-97.4 °F (36.3 °C)] 97 °F (36.1 °C)  Heart Rate:  [60-71] 60  Resp:  [8-18] 8  BP: (108-175)/(58-91) 108/58    Physical Exam   Constitutional: She appears well-developed and well-nourished.   HENT:   Head: Normocephalic and atraumatic.   Eyes: EOM are normal. Pupils are equal, round, and reactive to light.   Neck: Normal range of motion. Neck supple.   Cardiovascular: Normal rate, regular rhythm and normal heart sounds. Exam reveals no gallop and no friction rub.   No murmur heard.  Pulmonary/Chest: Effort normal and breath sounds normal. No respiratory distress. She has no wheezes. She has no rales. She exhibits no tenderness.   Abdominal: Soft. Bowel sounds are normal. She exhibits no distension. There is no tenderness. There is no guarding.    Musculoskeletal: She exhibits no edema.   Skin: Skin is warm and dry.   Psychiatric: She has a normal mood and affect. Her behavior is normal. Thought content normal.   Vitals reviewed.          Results Review:  I have reviewed the labs, radiology results, and diagnostic studies.    Laboratory Data:   Results from last 7 days   Lab Units 01/22/19  0431 01/18/19  1047   SODIUM mmol/L 138 138   POTASSIUM mmol/L 3.4* 3.6   CHLORIDE mmol/L 107 99   CO2 mmol/L 29.0 30.0   BUN mg/dL 13 14   CREATININE mg/dL 0.84 0.88   GLUCOSE mg/dL 94 93   CALCIUM mg/dL 8.9 9.2   ANION GAP mmol/L 2.0* 9.0     Estimated Creatinine Clearance: 64.6 mL/min (by C-G formula based on SCr of 0.84 mg/dL).          Results from last 7 days   Lab Units 01/22/19  0431 01/18/19  1047   WBC 10*3/mm3 6.99 8.95   HEMOGLOBIN g/dL 12.1 14.3   HEMATOCRIT % 36.6 42.8   PLATELETS 10*3/mm3 209 235     Results from last 7 days   Lab Units 01/21/19  0620   INR  0.95       Culture Data:   No results found for: BLOODCX  No results found for: URINECX  No results found for: RESPCX  No results found for: WOUNDCX  No results found for: STOOLCX  No components found for: BODYFLD    Radiology Data:   Imaging Results (last 24 hours)     ** No results found for the last 24 hours. **          I have reviewed the patient's current medications.     Assessment/Plan     Active Hospital Problems    Diagnosis   • Chest pain     Added automatically from request for surgery [id number]     • Atherosclerosis of native coronary artery of native heart with unstable angina pectoris (CMS/[facility])     Added automatically from request for surgery [id number]         Plan:   1.  Coronary artery disease:  PTCA and stent today.  No symptoms.  2.  COPD:  Patient states that she is at her baseline.  She is somewhat short of air with exertion.   3.  Paroxysmal Atrial fibrillation:  Continue amiodarone.  4.  HTN:  Continue home meds.  5.  History of tobacco abuse:  States that she quit smoking 2 months  ago.        Discharge Planning: I expect patient to be discharged to home in today.        This document has been electronically signed by Dami Salinas MD on January 22, 2019 12:20 PM

## 2019-01-22 NOTE — PLAN OF CARE
Problem: Patient Care Overview  Goal: Plan of Care Review   01/22/19 0305   Coping/Psychosocial   Plan of Care Reviewed With patient   Plan of Care Review   Progress improving   OTHER   Outcome Summary pt VSS stable throughout shift, right groin site has no hematoma and no bleeding noted, no reports of chest pain from patient. plan for d/c home today 1/22/19

## 2019-01-22 NOTE — DISCHARGE INSTR - ACTIVITY
No tub bath or submerging in water x 1 week  May shower  Keep site covered with bandaid x 3 days  Change bandaid daily  Do not lift over 10lbs x 5 days

## 2019-01-23 ENCOUNTER — TRANSCRIBE ORDERS (OUTPATIENT)
Dept: CARDIAC REHAB | Facility: HOSPITAL | Age: 64
End: 2019-01-23

## 2019-01-23 DIAGNOSIS — Z98.61 POST PTCA: Primary | ICD-10-CM

## 2019-01-23 RX ORDER — ALPRAZOLAM 0.25 MG/1
TABLET ORAL
Qty: 90 TABLET | Refills: 0 | OUTPATIENT
Start: 2019-01-23

## 2019-01-23 NOTE — TELEPHONE ENCOUNTER
Ms Tiffanie. Monisha Brasher has requested a refill on her Xanax 0.25 mg #90 Take 1 TID PRN    Last OV 12/05/18    Next Yovana OV 01/28/19    Last Script Written 08/17/18  #90 with No Refill    Last KAYLAN  Nothing on File in Epic.    Please Advise    Thank you

## 2019-01-26 NOTE — DISCHARGE SUMMARY
Cardiology Discharge Summary          Date of Discharge:  1/25/2019    Discharge Diagnosis:   1.  Chest pain.  2.  Atherosclerotic coronary artery disease.  3.  PTCA and stenting of the proximal left anterior descending artery with deployment of a 2.5 mm x 12 mm Alpine Emily drug-eluting stent.  4.  Aborted posterior wall myocardial infarction  Pronto thrombectomy of the 100% occluded proximal circumflex artery in November 15, 2018.  5.  PTCA and stenting of the obtuse marginal branch with deployment of a 2.5 mm x 15 mm and a 2.75 mm x 8 mm Alpine Emily drug-eluting stentin November 15, 2018.  6.  PTCA and stenting of the proximal circumflex artery with deployment of a 3 mm x 12 mm Alpine Emily drug-eluting times 2  in November 15, 2018.  7.   First diagonal branch with luminal irregularity towards the ostium and midportion with 70% stenosis..  8.  Paroxysmal atrial fibrillation maintained in sinus rhythm on amiodarone   9.  Arterial hypertension.  10.  Hypertensive heart disease.  11.  Asthmatic bronchitis.  12.  Allergic rhinitis.  13.  Chronic obstructive lung disease with tobacco abuse.  14.  Non-IgE mediated allergic asthmatic bronchitis.  15.  Chronic back pain.  16.  History of alcohol abuse.  17.  Hypokalemia.      Presenting Problem/History of Present Illness   .hpi  Chest pain, unspecified type [R07.9]  Atherosclerosis of native coronary artery of native heart with unstable angina pectoris (CMS/[facility]) [I25.110]  Chest pain, unspecified type [R07.9]  Chest pain, unspecified type [R07.9]     Monisah Brasher is a 63 y.o. female     There is no height or weight on file to calculate BMI.  With a past medical history significant for atherosclerotic coronary artery disease status post aborted posterior wall myocardial infarction with Pronto thrombectomy and rescue PTCA and stenting of the circumflex artery done in November 2018 with deployment of a 2.5 mm x 15 mm and a 2.75 mm x 8 mm Alpine Emily  drug-eluting stent, PTCA and stenting of the proximal circumflex artery with deployment of a 3 mm x 12 mm Alpine Emily drug-eluting X 2, history of paroxysmal atrial fibrillation maintained in normal sinus rhythm on amiodarone, proximal left anterior descending artery 80-90% stenosis at the origin of the diagonal branch and ostial diagonal branch 80% stenosis, arterial hypertension, hypertensive heart disease, hyperlipidemia, allergic rhinitis, tobacco abuse and asthmatic bronchitis.     Patient was hospitalized in November 2018 for the posterior wall myocardial infarction.  Patient during the aborted myocardial infarction had a peak troponin of 78.  Patient subsequently was discharged home in stable condition.  Prior to the discharge patient troponin was 2.6.     Patient now presents for follow-up evaluation.  Patient has had a few episodes of substernal chest pain.  Patient on further questioning denies any fever or chill patient denies any hemoptysis hematuria bright blood per rectum.  Patient denies any paroxysmal dyspnea or orthopnea.  Patient denies any symptoms of lightheaded dizziness or near syncopal episode.    Due to the patient's ongoing symptoms of chest pain patient was recommended a coronary angiogram.  Patient was brought in through same-day surgery for elective coronary angiogram.      Hospital Course  Patient is a 63 y.o. female presented with With a past medical history significant for atherosclerotic coronary artery disease status post aborted posterior wall myocardial infarction with Pronto thrombectomy and rescue PTCA and stenting of the circumflex artery done in November 2018 with deployment of a 2.5 mm x 15 mm and a 2.75 mm x 8 mm Alpine Emily drug-eluting stent, PTCA and stenting of the proximal circumflex artery with deployment of a 3 mm x 12 mm Alpine Emily drug-eluting X 2, history of paroxysmal atrial fibrillation maintained in normal sinus rhythm on amiodarone, proximal left anterior  descending artery 80-90% stenosis at the origin of the diagonal branch and ostial diagonal branch 80% stenosis, arterial hypertension, hypertensive heart disease, hyperlipidemia, allergic rhinitis, tobacco abuse and asthmatic bronchitis.     Patient was hospitalized in November 2018 for the posterior wall myocardial infarction.  Patient during the aborted myocardial infarction had a peak troponin of 78.  Patient subsequently was discharged home in stable condition.  Prior to the discharge patient troponin was 2.6.     Patient now presents for follow-up evaluation.  Patient has had a few episodes of substernal chest pain.  Patient on further questioning denies any fever or chill patient denies any hemoptysis hematuria bright blood per rectum.  Patient denies any paroxysmal dyspnea or orthopnea.  Patient denies any symptoms of lightheaded dizziness or near syncopal episode.    Due to the patient's ongoing symptoms of chest pain patient was recommended a coronary angiogram.  Patient was brought in through same-day surgery for elective coronary angiogram.  Patient underwent coronary angiogram which revealed sustained patency in the circumflex artery site of previous angioplasty and stenting.  Patient proximal left anterior descending artery had 90% stenosis after the origin of the first diagonal branch.  Patient underwent successful PTCA and stenting of the left anterior descending artery with deployment of a 2.5 mm x 12 mm stent.  Patient first diagonal branch in the midportion had 70% stenosis which was not intervened.  Post PTCA and stenting patient was transferred to the stepdown unit.  Patient did not have any further recurrent symptoms of chest pain.  Patient had ambulation in the hallway and patient was subsequently discharged home in stable condition to follow-up in 6 weeks.  Patient right groin did not reveal off any evidence of any hematoma.  Patient had a potassium of 3.4 which would be followed up on  outpatient basis with a repeat potassium level checked in one week.    Procedures Performed  Procedure(s):  Left Heart Cath 1/21/2019 @ 8:00 AM    PTCA and stenting of the proximal left anterior descending artery with deployment of a 2.5 mm x 12 mm Alpine Emily drug-eluting stent.        Consults:   Consults     Date and Time Order Name Status Description    1/21/2019 1129 Inpatient Hospitalist Consult Completed           Pertinent Test Results:     Lab Results (last 24 hours)     Procedure Component Value Units Date/Time    Basic Metabolic Panel [[id number]]  (Abnormal) Collected:  01/22/19 0431    Specimen:  Blood Updated:  01/22/19 0508     Glucose 94 mg/dL      BUN 13 mg/dL      Creatinine 0.84 mg/dL      Sodium 138 mmol/L      Potassium 3.4 mmol/L      Chloride 107 mmol/L      CO2 29.0 mmol/L      Calcium 8.9 mg/dL      eGFR Non African Amer 69 mL/min/1.73      BUN/Creatinine Ratio 15.5     Anion Gap 2.0 mmol/L     CBC (No Diff) [[id number]]  (Abnormal) Collected:  01/22/19 0431    Specimen:  Blood Updated:  01/22/19 0501     WBC 6.99 10*3/mm3      RBC 4.00 10*6/mm3      Hemoglobin 12.1 g/dL      Hematocrit 36.6 %      MCV 91.5 fL      MCH 30.3 pg      MCHC 33.1 g/dL      RDW 14.4 %      RDW-SD 48.6 fl      MPV 9.2 fL      Platelets 209 10*3/mm3           Imaging Results (last 24 hours)     ** No results found for the last 24 hours. **          ECG/EMG Results (last 24 hours)     ** No results found for the last 24 hours. **              Vital Signs     Physical Exam:   General Appearance:    Alert, oriented, cooperative, in no acute distress   Head:    Normocephalic, atraumatic, without obvious abnormality   Eyes:           ARI  Lids and lashes normal, conjunctivae and sclerae normal, no icterus, no pallor   Ears:    Ears appear intact with no abnormalities noted   Throat:   Mucous membranes pink and moist   Neck:   Supple, trachea midline, no carotid bruit, no organomegaly or JVD   Lungs:     Clear to  auscultation and percussion, respirations regular, even and Unlabored. No wheezes, rales, rhonchi    Heart:    Regular rhythm and normal rate, normal S1 and S2, no            murmur, no gallop, no rub, no click   Abdomen:     Soft, non-tender, non-distended, no guarding, no rebound tenderness, Normal bowel sounds in all four quadrant, no masses, liver and spleen nonpalpable,    Genitalia:    Deferred   Extremities:   Moves all extremities well, no edema, no cyanosis, no              Redness, no clubbing.  Patient right groin did not reveal off any evidence of any hematoma.     Pulses:   Pulses palpable and equal bilaterally   Skin:   Moist and warm. No bleeding, bruising or rash   Neurologic/Psychiatric:   Alert and oriented to person, place, and time.  Motor, power and tone in upper and lower extremity is grossly intact.  No focal neurological deficits. Normal cognitive function. No psychomotor reaction or tangential thought. No depression, homicidal ideations and suicidal ideations           Discharge Disposition  Home or Self Care    Condition on Discharge:  Stable     Discharge Medications     Discharge Medications      Continue These Medications      Instructions Start Date   ADVAIR DISKUS 500-50 MCG/DOSE DISKUS  Generic drug:  fluticasone-salmeterol   INHALE 1 PUFF BY MOUTH TWICE DAILY      albuterol sulfate  (90 Base) MCG/ACT inhaler  Commonly known as:  VENTOLIN HFA   2 puffs every 4 hours as needed for breathing      albuterol (2.5 MG/3ML) 0.083% nebulizer solution  Commonly known as:  PROVENTIL   USE 1 VIAL PER NEBULIZER EVERY 4 HOURS AS NEEDED FOR WHEEZING      ALPRAZolam 0.25 MG tablet  Commonly known as:  XANAX   0.25 mg, Oral, 3 Times Daily PRN      amiodarone 100 MG tablet  Commonly known as:  PACERONE   100 mg, Oral, 2 Times Daily      aspirin 81 MG chewable tablet   81 mg, Oral, Daily      atorvastatin 20 MG tablet  Commonly known as:  LIPITOR   40 mg, Oral, Nightly      freestyle lancets    Testing blood sugar once a day      furosemide 20 MG tablet  Commonly known as:  LASIX   20 mg, Oral, 2 Times Daily      glucose blood test strip   Use as instructed      glucose monitor monitoring kit   1 each, Does not apply, Daily, Testing blood sugar once a day  ICD10 - R73.9      labetalol 100 MG tablet  Commonly known as:  NORMODYNE   50 mg, Oral, 2 Times Daily      losartan 25 MG tablet  Commonly known as:  COZAAR   12.5 mg, Oral, Daily      metFORMIN 500 MG tablet  Commonly known as:  GLUCOPHAGE   500 mg, Oral, Daily With Breakfast      montelukast 10 MG tablet  Commonly known as:  SINGULAIR   TAKE 1 TABLET BY MOUTH DAILY      nystatin [id number] UNIT/ML suspension  Commonly known as:  MYCOSTATIN   SHAKE LIQUID AND TAKE 5 ML BY MOUTH FOUR TIMES DAILY      PARoxetine 10 MG tablet  Commonly known as:  PAXIL   10 mg, Oral, Every Morning      sodium chloride 0.65 % nasal spray  Commonly known as:  OCEAN   2 sprays, Nasal, As Needed      SPIRIVA HANDIHALER 18 MCG per inhalation capsule  Generic drug:  tiotropium   INHALE 1 PUFF BY MOUTH DAILY      ticagrelor 90 MG tablet tablet  Commonly known as:  BRILINTA   90 mg, Oral, 2 Times Daily             Discharge Diet:   Diet Instructions     Advance Diet As Tolerated            Activity at Discharge:   Activity Instructions     No tub bath or submerging in water x 1 week  May shower  Keep site covered with bandaid x 3 days  Change bandaid daily  Do not lift over 10lbs x 5 days                 Follow-up Appointments  Future Appointments   Date Time Provider Department Center   1/28/2019 10:30 AM Tiffanie Mccauley APRN [facility] None   2/6/2019  1:00 PM Nurse, [facility] Card Rehab [facility]   4/18/2019  2:30 PM Dami Kebede MD [facility]  M[facility] None   4/23/2019 11:00 AM Gigi Ac MD [facility] None     Additional Instructions for the Follow-ups that You Need to Schedule     Discharge Follow-up with Specified Provider: Dr. Huber; 6 Weeks   As directed       To:  Dr. Huber    Follow Up:  6 Weeks               Test Results Pending at Discharge       Thaddeus Huber MD  01/25/19  9:04 PM    Time: Discharge 55 min     Dictated utilizing Dragon dictation.

## 2019-01-28 ENCOUNTER — OFFICE VISIT (OUTPATIENT)
Dept: FAMILY MEDICINE CLINIC | Facility: CLINIC | Age: 64
End: 2019-01-28

## 2019-01-28 VITALS
DIASTOLIC BLOOD PRESSURE: 90 MMHG | HEIGHT: 63 IN | WEIGHT: 148 LBS | SYSTOLIC BLOOD PRESSURE: 130 MMHG | BODY MASS INDEX: 26.22 KG/M2

## 2019-01-28 DIAGNOSIS — F41.9 ANXIETY: Primary | ICD-10-CM

## 2019-01-28 PROCEDURE — 99213 OFFICE O/P EST LOW 20 MIN: CPT | Performed by: NURSE PRACTITIONER

## 2019-01-28 RX ORDER — PAROXETINE HYDROCHLORIDE 20 MG/1
20 TABLET, FILM COATED ORAL EVERY MORNING
Qty: 30 TABLET | Refills: 11 | Status: SHIPPED | OUTPATIENT
Start: 2019-01-28 | End: 2020-02-06

## 2019-01-28 RX ORDER — ALPRAZOLAM 0.25 MG/1
0.25 TABLET ORAL 3 TIMES DAILY PRN
Qty: 90 TABLET | Refills: 0 | Status: SHIPPED | OUTPATIENT
Start: 2019-01-28 | End: 2019-03-11 | Stop reason: SDUPTHER

## 2019-01-28 NOTE — PROGRESS NOTES
Chief Complaint   Patient presents with   • Follow-up     1 week hospital check   • Med Refill     xanax     Subjective   Monisha Brasher is a 63 y.o. female.     Presents with needing refills of meds, recent MI with stent placement, starting cardiac rehab next week, very anxious       Anxiety   Presents for follow-up visit. Symptoms include depressed mood, excessive worry, insomnia, irritability, nervous/anxious behavior and panic. Patient reports no chest pain, compulsions, confusion, decreased concentration, dizziness, dry mouth, feeling of choking, hyperventilation, impotence, malaise, muscle tension, nausea, obsessions, palpitations, restlessness, shortness of breath or suicidal ideas. Symptoms occur most days. The severity of symptoms is moderate. The quality of sleep is good. Nighttime awakenings: none.     Compliance with medications is %.   Blood Sugar Problem   This is a recurrent problem. The current episode started 1 to 4 weeks ago. The problem occurs every several days. The problem has been gradually improving. Pertinent negatives include no abdominal pain, anorexia, arthralgias, change in bowel habit, chest pain, chills, congestion, coughing, diaphoresis, fatigue, fever, headaches, joint swelling, myalgias, nausea, neck pain, numbness, rash, sore throat, swollen glands, urinary symptoms, vertigo, visual change, vomiting or weakness. Exacerbated by: stress  The treatment provided mild relief.        The following portions of the patient's history were reviewed and updated as appropriate: allergies, current medications, past social history and problem list.    Review of Systems   Constitutional: Positive for irritability. Negative for activity change, appetite change, chills, diaphoresis, fatigue and fever.   HENT: Negative.  Negative for congestion, dental problem and sore throat.    Respiratory: Negative.  Negative for cough, shortness of breath, wheezing and stridor.    Cardiovascular:  "Negative.  Negative for chest pain, palpitations and leg swelling.   Gastrointestinal: Negative for abdominal distention, abdominal pain, anal bleeding, anorexia, change in bowel habit, nausea and vomiting.   Endocrine: Negative for cold intolerance, heat intolerance, polydipsia, polyphagia and polyuria.   Genitourinary: Negative for impotence.   Musculoskeletal: Negative for arthralgias, back pain, gait problem, joint swelling, myalgias, neck pain and neck stiffness.   Skin: Negative for rash.   Allergic/Immunologic: Negative.  Negative for environmental allergies, food allergies and immunocompromised state.   Neurological: Negative.  Negative for dizziness, vertigo, facial asymmetry, weakness, light-headedness, numbness and headaches.   Hematological: Negative.    Psychiatric/Behavioral: Positive for agitation, behavioral problems, dysphoric mood and sleep disturbance. Negative for confusion, decreased concentration, hallucinations, self-injury and suicidal ideas. The patient is nervous/anxious and has insomnia. The patient is not hyperactive.        Objective   /90   Ht 158.8 cm (62.5\")   Wt 67.1 kg (148 lb)   BMI 26.64 kg/m²   Physical Exam   Constitutional: She is oriented to person, place, and time. She appears well-developed and well-nourished. No distress.   HENT:   Head: Normocephalic and atraumatic.   Right Ear: External ear normal.   Left Ear: External ear normal.   Mouth/Throat: Oropharynx is clear and moist. No oropharyngeal exudate.   Eyes: EOM are normal. Pupils are equal, round, and reactive to light. Right eye exhibits no discharge. Left eye exhibits no discharge. No scleral icterus.   Neck: Normal range of motion. Neck supple.   Cardiovascular: Normal rate, regular rhythm, normal heart sounds and intact distal pulses. Exam reveals no gallop and no friction rub.   No murmur heard.  Pulmonary/Chest: Effort normal and breath sounds normal. No respiratory distress. She has no wheezes. She has " no rales. She exhibits no tenderness.   Abdominal: Soft. Bowel sounds are normal. She exhibits no distension and no mass. There is no tenderness. There is no rebound and no guarding. No hernia.   Musculoskeletal: Normal range of motion. She exhibits no edema, tenderness or deformity.   Neurological: She is alert and oriented to person, place, and time. She displays normal reflexes. No cranial nerve deficit or sensory deficit. She exhibits normal muscle tone. Coordination normal.   Skin: Skin is warm and dry. No rash noted. She is not diaphoretic. No erythema. No pallor.   Nursing note and vitals reviewed.      Assessment/Plan   Problem List Items Addressed This Visit        Other    Anxiety - Primary           New Medications Ordered This Visit   Medications   • ALPRAZolam (XANAX) 0.25 MG tablet     Sig: Take 1 tablet by mouth 3 (Three) Times a Day As Needed for Anxiety.     Dispense:  90 tablet     Refill:  0   • PARoxetine (PAXIL) 20 MG tablet     Sig: Take 1 tablet by mouth Every Morning.     Dispense:  30 tablet     Refill:  11      increase paxil to 20mg , xanax refills, diet and stress relief discussed, meds as directed, follow up as scheduled, see back in 2 months    Patient understands the risks associated with this controlled medication, including tolerance and addiction.  she also agrees to only obtain this medication from me, and not from a another provider, unless that provider is covering for me in my absence.  she also agrees to be compliant in dosing, and not self adjust the dose of medication.  A signed controlled substance agreement is on file, and she has received a controlled substance education sheet at this a previous visit.  she has also signed a consent for treatment with a controlled substance as per [facility] policy. KAYLAN was obtained.    It's not just what you eat, but when you eat  Eat breakfast, and eat smaller meals throughout the day. A healthy breakfast can jumpstart your  metabolism, while eating small, healthy meals (rather than the standard three large meals) keeps your energy up.   Avoid eating at night. Try to eat dinner earlier and fast for 14-16 hours until breakfast the next morning. Studies suggest that eating only when you’re most active and giving your digestive system a long break each day may help to regulate weight.     Stress relief discussed in length. Consider therapy, suggest yoga, exercise, meditation -patient is agrees-will call back if worsen for referral

## 2019-02-06 ENCOUNTER — HOSPITAL ENCOUNTER (OUTPATIENT)
Dept: CARDIAC REHAB | Facility: HOSPITAL | Age: 64
Setting detail: THERAPIES SERIES
Discharge: HOME | End: 2019-02-06

## 2019-02-06 VITALS
SYSTOLIC BLOOD PRESSURE: 130 MMHG | HEART RATE: 74 BPM | WEIGHT: 149 LBS | BODY MASS INDEX: 26.82 KG/M2 | DIASTOLIC BLOOD PRESSURE: 68 MMHG

## 2019-02-06 DIAGNOSIS — Z98.61 POST PTCA: Primary | ICD-10-CM

## 2019-02-06 PROCEDURE — 93798 PHYS/QHP OP CAR RHAB W/ECG: CPT

## 2019-02-06 RX ORDER — PREDNISONE 20 MG/1
20 TABLET ORAL DAILY
Qty: 30 TABLET | Refills: 0 | OUTPATIENT
Start: 2019-02-06

## 2019-02-06 RX ORDER — ALBUTEROL SULFATE 2.5 MG/3ML
SOLUTION RESPIRATORY (INHALATION)
Qty: 360 ML | Refills: 0 | OUTPATIENT
Start: 2019-02-06

## 2019-02-06 RX ORDER — PREDNISONE 10 MG/1
10 TABLET ORAL DAILY
COMMUNITY
End: 2019-03-28

## 2019-02-06 NOTE — PROGRESS NOTES
Cardiac Rehab Initial Assessment      Name: Monisha Brasher  :1955 Allergies:Patient has no known allergies.   MRN: [id number] 63 y.o. Physician: Tiffanie Mccauley APRN   Primary Diagnosis:    Diagnosis Plan   1. Post PTCA      Event Date: 19 Specialist: laura    Risk Stratification:Low Risk Note Author: Carmita Brasher RN     Cardiovascular History: Previous MI and Previous PCI     EXERCISE AT HOME  no    EF: 51-55%      Source: echo          Ambulatory Status:Independent  Ambulatory Fall Risk Assessed on Initial Visit: yes 6 Minute Walk Pre- Cardiac Rehab:  Distance:1080ft      RPE:6  Max. HR: 99       SPO2:88-95    MET: 2  MPH: 2             RPD: 1  Resting BP: 130/68 LA, 129/72 RA    Peak BP: 145/79  Recovery BP: 140/78   She will use O2 NC 2L during exercise only     NUTRITION  Lipids:yes If yes, labs as follows;  Total: No components found for: CHOLESTEROL  HDL:   HDL Cholesterol   Date Value Ref Range Status   2018 33 (L) 60 - 200 mg/dL Final    Lipids continued:  LDL:  LDL Cholesterol    Date Value Ref Range Status   2018 89 1 - 129 mg/dL Final     Triglyceride: No components found for: TRIGLYCERIDE   Weight Management:                 Weight: 149  Height: 63                                   BMI: Body mass index is 26.82 kg/m².  Waist Circumference: 36.5  inches   Alcohol Use: none Diabetes:Yes,  Monitors BS at home- yes, Frequency: 1 time daily, Random BS: 88    Last HGBA1C 6.3         SOCIAL HISTORY  Social History     Socioeconomic History   • Marital status:      Spouse name: Not on file   • Number of children: Not on file   • Years of education: Not on file   • Highest education level: Not on file   Tobacco Use   • Smoking status: Former Smoker     Packs/day: 1.00     Years: 48.00     Pack years: 48.00     Types: Cigarettes, Electronic Cigarette     Start date:      Last attempt to quit: 2018     Years since quittin.2   • Smokeless tobacco:  Never Used   • Tobacco comment: current user of e cigs    Substance and Sexual Activity   • Alcohol use: No     Alcohol/week: 1.2 - 1.8 oz     Types: 1 - 2 Glasses of wine, 1 Cans of beer per week     Frequency: Never     Comment: Socially   • Drug use: No       Educational Level (choose one that applies) high school diploma/GED Learning Barriers:Ready to Learn    Family Support:yes    Living Arrangement: lives with their spouse         Tobacco Adjunct: No             PSYCHOSOCIAL  Clinical Depression: no    Stress: no     Assess presence or absence of depression using a valid screening tool: yes                  No angina or any other pain today Diagnosed with Hypertension:yes    Heart Sounds: wnl     Lung Sounds: normal air entry, lungs clear to auscultation         Assessment: wnl  Orthopedic Problems: chronic back pain    Are you being hurt, hit, or frightened by anyone at home or in your life? no    Are you being neglected by a caregiver? N/A        Assessment: wnl    Family attends IA: no Time of arrival: 1300  Time of departure: 1400     Patient Goals: increase strength and stamina         2/6/2019  2:52 PM  Carmita Brasher RN

## 2019-02-08 ENCOUNTER — TELEPHONE (OUTPATIENT)
Dept: PULMONOLOGY | Facility: CLINIC | Age: 64
End: 2019-02-08

## 2019-02-08 ENCOUNTER — APPOINTMENT (OUTPATIENT)
Dept: CARDIAC REHAB | Facility: HOSPITAL | Age: 64
End: 2019-02-08

## 2019-02-08 ENCOUNTER — OFFICE VISIT (OUTPATIENT)
Dept: PULMONOLOGY | Facility: CLINIC | Age: 64
End: 2019-02-08

## 2019-02-08 VITALS
DIASTOLIC BLOOD PRESSURE: 69 MMHG | HEIGHT: 63 IN | HEART RATE: 78 BPM | OXYGEN SATURATION: 90 % | BODY MASS INDEX: 26.52 KG/M2 | WEIGHT: 149.7 LBS | SYSTOLIC BLOOD PRESSURE: 109 MMHG

## 2019-02-08 DIAGNOSIS — J43.1 PANLOBULAR EMPHYSEMA (HCC): Primary | ICD-10-CM

## 2019-02-08 PROCEDURE — 99213 OFFICE O/P EST LOW 20 MIN: CPT | Performed by: INTERNAL MEDICINE

## 2019-02-08 RX ORDER — CARVEDILOL 3.12 MG/1
3.21 TABLET ORAL NIGHTLY
Refills: 12 | COMMUNITY
Start: 2018-12-07 | End: 2021-09-10 | Stop reason: HOSPADM

## 2019-02-08 RX ORDER — PREDNISONE 20 MG/1
20 TABLET ORAL DAILY
Qty: 30 TABLET | Refills: 1 | Status: SHIPPED | OUTPATIENT
Start: 2019-02-08 | End: 2019-05-04 | Stop reason: SDUPTHER

## 2019-02-08 NOTE — PROGRESS NOTES
"This lady has emphysema with persistent cough wheeze shortness of breath and dyspnea on exertion.  She had a recent MI and had coronary stenting.  She remains weak and short of breath.  She denies chest pain or hemoptysis    ROS    Constitutional-no night sweats weight loss headaches  GI no abdominal pain nausea or diarrhea  Neuro no seizure or neurologic deficits  Musculoskeletal no deformity or joint pain   no dysuria or hematuria  Skin no rash or other lesions  All other systems reviewed and were negative except for the above.      Physical Exam  /69   Pulse 78   Ht 160 cm (63\")   Wt 67.9 kg (149 lb 11.2 oz)   SpO2 90%   BMI 26.52 kg/m²   Vital signs as above  Pupils equally round and reactive to light and accommodation, neck no JVD or adenopathy.  Cardiovascular regular rhythm and rate no murmur or gallop.  Abdomen soft no organomegaly tenderness.  Extremities no clubbing cyanosis or edema.  No cervical adenopathy.  No skin rash.  Neurologic good strength bilaterally without deficits  Lungs reveal diminished breath sounds with prolonged expiration and rhonchi    Impression emphysema, persistent dyspnea    Plan continue present medications, prednisone varying doses, return in 3 months        This document has been produced with the assistance of Harry dictation  This document has been electronically signed by Gigi Ac MD on February 8, 2019 11:32 AM      "

## 2019-02-08 NOTE — TELEPHONE ENCOUNTER
----- Message from Lary Atkinson sent at 2/8/2019  1:31 PM CST -----  Regarding: yashira Galeana with [facility] left a msg on v/nm @ 12:32 that they got the steroid but pt told them about an antibiotic rx ?????    Spoke to Yashira at [facility] told her I didn't see any mention of an antibiotic in Dr. Ac's note from today.  Dr. Ac had already left the office for the day.  They were calling the patient to tell her they had her steroid filled and if she needed to check on an antibiotic to call Dr. Ac on Monday.

## 2019-02-11 ENCOUNTER — HOSPITAL ENCOUNTER (OUTPATIENT)
Dept: CARDIAC REHAB | Facility: HOSPITAL | Age: 64
Setting detail: THERAPIES SERIES
Discharge: HOME | End: 2019-02-11

## 2019-02-11 VITALS
HEIGHT: 63 IN | WEIGHT: 148 LBS | SYSTOLIC BLOOD PRESSURE: 102 MMHG | BODY MASS INDEX: 26.22 KG/M2 | HEART RATE: 83 BPM | DIASTOLIC BLOOD PRESSURE: 63 MMHG

## 2019-02-11 DIAGNOSIS — Z98.61 POST PTCA: Primary | ICD-10-CM

## 2019-02-11 PROCEDURE — 93798 PHYS/QHP OP CAR RHAB W/ECG: CPT

## 2019-02-11 RX ORDER — TIOTROPIUM BROMIDE 18 UG/1
CAPSULE ORAL; RESPIRATORY (INHALATION)
Qty: 30 CAPSULE | Refills: 1 | Status: SHIPPED | OUTPATIENT
Start: 2019-02-11 | End: 2019-04-06 | Stop reason: SDUPTHER

## 2019-02-13 ENCOUNTER — HOSPITAL ENCOUNTER (OUTPATIENT)
Dept: CARDIAC REHAB | Facility: HOSPITAL | Age: 64
Setting detail: THERAPIES SERIES
Discharge: HOME | End: 2019-02-13

## 2019-02-13 VITALS — DIASTOLIC BLOOD PRESSURE: 75 MMHG | SYSTOLIC BLOOD PRESSURE: 139 MMHG | OXYGEN SATURATION: 94 % | HEART RATE: 84 BPM

## 2019-02-13 DIAGNOSIS — Z98.61 POST PTCA: Primary | ICD-10-CM

## 2019-02-13 PROCEDURE — 93798 PHYS/QHP OP CAR RHAB W/ECG: CPT

## 2019-02-14 RX ORDER — AZITHROMYCIN 250 MG/1
TABLET, FILM COATED ORAL
Qty: 6 TABLET | Refills: 0 | Status: SHIPPED | OUTPATIENT
Start: 2019-02-14 | End: 2019-03-07 | Stop reason: SDUPTHER

## 2019-02-15 ENCOUNTER — HOSPITAL ENCOUNTER (OUTPATIENT)
Dept: CARDIAC REHAB | Facility: HOSPITAL | Age: 64
Setting detail: THERAPIES SERIES
Discharge: HOME | End: 2019-02-15

## 2019-02-15 VITALS — SYSTOLIC BLOOD PRESSURE: 126 MMHG | DIASTOLIC BLOOD PRESSURE: 79 MMHG | HEART RATE: 79 BPM | OXYGEN SATURATION: 94 %

## 2019-02-15 DIAGNOSIS — Z98.61 POST PTCA: Primary | ICD-10-CM

## 2019-02-15 PROCEDURE — 93798 PHYS/QHP OP CAR RHAB W/ECG: CPT

## 2019-02-15 RX ORDER — ALBUTEROL SULFATE 90 UG/1
AEROSOL, METERED RESPIRATORY (INHALATION)
Qty: 18 G | Refills: 0 | Status: SHIPPED | OUTPATIENT
Start: 2019-02-15 | End: 2019-03-04 | Stop reason: SDUPTHER

## 2019-02-18 ENCOUNTER — HOSPITAL ENCOUNTER (OUTPATIENT)
Dept: CARDIAC REHAB | Facility: HOSPITAL | Age: 64
Setting detail: THERAPIES SERIES
Discharge: HOME | End: 2019-02-18

## 2019-02-18 VITALS
WEIGHT: 148 LBS | SYSTOLIC BLOOD PRESSURE: 165 MMHG | BODY MASS INDEX: 26.22 KG/M2 | OXYGEN SATURATION: 94 % | DIASTOLIC BLOOD PRESSURE: 81 MMHG | HEART RATE: 88 BPM

## 2019-02-18 DIAGNOSIS — Z98.61 POST PTCA: Primary | ICD-10-CM

## 2019-02-18 PROCEDURE — 93798 PHYS/QHP OP CAR RHAB W/ECG: CPT

## 2019-02-22 ENCOUNTER — HOSPITAL ENCOUNTER (OUTPATIENT)
Dept: CARDIAC REHAB | Facility: HOSPITAL | Age: 64
Setting detail: THERAPIES SERIES
Discharge: HOME | End: 2019-02-22

## 2019-02-22 VITALS — DIASTOLIC BLOOD PRESSURE: 84 MMHG | SYSTOLIC BLOOD PRESSURE: 157 MMHG | HEART RATE: 74 BPM

## 2019-02-22 DIAGNOSIS — Z98.61 POST PTCA: Primary | ICD-10-CM

## 2019-02-22 PROCEDURE — 93798 PHYS/QHP OP CAR RHAB W/ECG: CPT

## 2019-02-25 ENCOUNTER — HOSPITAL ENCOUNTER (OUTPATIENT)
Dept: CARDIAC REHAB | Facility: HOSPITAL | Age: 64
Setting detail: THERAPIES SERIES
Discharge: HOME | End: 2019-02-25

## 2019-02-25 VITALS
WEIGHT: 147.5 LBS | HEART RATE: 76 BPM | DIASTOLIC BLOOD PRESSURE: 69 MMHG | BODY MASS INDEX: 26.13 KG/M2 | SYSTOLIC BLOOD PRESSURE: 106 MMHG

## 2019-02-25 DIAGNOSIS — Z98.61 POST PTCA: Primary | ICD-10-CM

## 2019-02-25 PROCEDURE — 93798 PHYS/QHP OP CAR RHAB W/ECG: CPT

## 2019-02-27 ENCOUNTER — HOSPITAL ENCOUNTER (OUTPATIENT)
Dept: CARDIAC REHAB | Facility: HOSPITAL | Age: 64
Setting detail: THERAPIES SERIES
Discharge: HOME | End: 2019-02-27

## 2019-02-27 VITALS — DIASTOLIC BLOOD PRESSURE: 64 MMHG | SYSTOLIC BLOOD PRESSURE: 109 MMHG | HEART RATE: 86 BPM | OXYGEN SATURATION: 95 %

## 2019-02-27 DIAGNOSIS — Z98.61 POST PTCA: Primary | ICD-10-CM

## 2019-02-27 PROCEDURE — 93798 PHYS/QHP OP CAR RHAB W/ECG: CPT

## 2019-03-05 RX ORDER — ALBUTEROL SULFATE 90 UG/1
AEROSOL, METERED RESPIRATORY (INHALATION)
Qty: 18 G | Refills: 0 | Status: SHIPPED | OUTPATIENT
Start: 2019-03-05 | End: 2019-03-19 | Stop reason: SDUPTHER

## 2019-03-06 ENCOUNTER — HOSPITAL ENCOUNTER (OUTPATIENT)
Dept: CARDIAC REHAB | Facility: HOSPITAL | Age: 64
Setting detail: THERAPIES SERIES
Discharge: HOME | End: 2019-03-06

## 2019-03-06 VITALS — OXYGEN SATURATION: 96 % | HEART RATE: 87 BPM | DIASTOLIC BLOOD PRESSURE: 86 MMHG | SYSTOLIC BLOOD PRESSURE: 146 MMHG

## 2019-03-06 DIAGNOSIS — Z98.61 POST PTCA: Primary | ICD-10-CM

## 2019-03-06 PROCEDURE — 93798 PHYS/QHP OP CAR RHAB W/ECG: CPT

## 2019-03-07 RX ORDER — AZITHROMYCIN 250 MG/1
TABLET, FILM COATED ORAL
Qty: 6 TABLET | Refills: 0 | Status: SHIPPED | OUTPATIENT
Start: 2019-03-07 | End: 2019-03-11

## 2019-03-08 ENCOUNTER — APPOINTMENT (OUTPATIENT)
Dept: CARDIAC REHAB | Facility: HOSPITAL | Age: 64
End: 2019-03-08

## 2019-03-11 ENCOUNTER — OFFICE VISIT (OUTPATIENT)
Dept: FAMILY MEDICINE CLINIC | Facility: CLINIC | Age: 64
End: 2019-03-11

## 2019-03-11 ENCOUNTER — HOSPITAL ENCOUNTER (OUTPATIENT)
Dept: CARDIAC REHAB | Facility: HOSPITAL | Age: 64
Setting detail: THERAPIES SERIES
Discharge: HOME | End: 2019-03-11

## 2019-03-11 VITALS
DIASTOLIC BLOOD PRESSURE: 92 MMHG | BODY MASS INDEX: 26.36 KG/M2 | HEIGHT: 63 IN | SYSTOLIC BLOOD PRESSURE: 123 MMHG | HEART RATE: 104 BPM | WEIGHT: 148.8 LBS

## 2019-03-11 VITALS
OXYGEN SATURATION: 97 % | WEIGHT: 150 LBS | DIASTOLIC BLOOD PRESSURE: 100 MMHG | SYSTOLIC BLOOD PRESSURE: 158 MMHG | BODY MASS INDEX: 26.58 KG/M2 | HEIGHT: 63 IN

## 2019-03-11 DIAGNOSIS — Z98.61 POST PTCA: Primary | ICD-10-CM

## 2019-03-11 DIAGNOSIS — Z12.31 ENCOUNTER FOR SCREENING MAMMOGRAM FOR MALIGNANT NEOPLASM OF BREAST: ICD-10-CM

## 2019-03-11 DIAGNOSIS — F41.9 ANXIETY: ICD-10-CM

## 2019-03-11 DIAGNOSIS — I10 ESSENTIAL HYPERTENSION: Primary | Chronic | ICD-10-CM

## 2019-03-11 PROCEDURE — 93798 PHYS/QHP OP CAR RHAB W/ECG: CPT

## 2019-03-11 PROCEDURE — 99213 OFFICE O/P EST LOW 20 MIN: CPT | Performed by: NURSE PRACTITIONER

## 2019-03-11 RX ORDER — ALPRAZOLAM 0.25 MG/1
0.25 TABLET ORAL 3 TIMES DAILY PRN
Qty: 90 TABLET | Refills: 0 | Status: SHIPPED | OUTPATIENT
Start: 2019-03-11 | End: 2019-05-08 | Stop reason: SDUPTHER

## 2019-03-11 RX ORDER — LOSARTAN POTASSIUM 25 MG/1
25 TABLET ORAL DAILY
Qty: 30 TABLET | Refills: 11 | Status: SHIPPED | OUTPATIENT
Start: 2019-03-11 | End: 2020-03-26

## 2019-03-11 RX ORDER — PRASUGREL 10 MG/1
10 TABLET, FILM COATED ORAL DAILY
Qty: 30 TABLET | Refills: 11 | Status: SHIPPED | OUTPATIENT
Start: 2019-03-11 | End: 2022-06-23

## 2019-03-11 NOTE — PROGRESS NOTES
Chief Complaint   Patient presents with   • Follow-up     6 weeks check up      Subjective   Monisha Brasher is a 63 y.o. female.     Presents with needing refills of meds, recent mi-in cardiac rehab, feels better      Anxiety   Presents for follow-up visit. Symptoms include depressed mood, irritability, nervous/anxious behavior and panic. Patient reports no chest pain, compulsions, confusion, decreased concentration, dizziness, dry mouth, excessive worry, feeling of choking, hyperventilation, impotence, insomnia, malaise, muscle tension, nausea, obsessions, palpitations, restlessness, shortness of breath or suicidal ideas. Symptoms occur most days. The severity of symptoms is moderate. The quality of sleep is good. Nighttime awakenings: none.     Compliance with medications is %.   Blood Sugar Problem   This is a recurrent problem. The current episode started 1 to 4 weeks ago. The problem occurs every several days. The problem has been gradually improving. Pertinent negatives include no abdominal pain, anorexia, arthralgias, change in bowel habit, chest pain, chills, congestion, coughing, diaphoresis, fatigue, fever, headaches, joint swelling, myalgias, nausea, neck pain, numbness, rash, sore throat, swollen glands, urinary symptoms, vertigo, visual change, vomiting or weakness. Exacerbated by: stress  The treatment provided mild relief.        The following portions of the patient's history were reviewed and updated as appropriate: allergies, current medications, past social history and problem list.    Review of Systems   Constitutional: Positive for irritability. Negative for activity change, appetite change, chills, diaphoresis, fatigue and fever.   HENT: Negative.  Negative for congestion, dental problem, drooling, ear discharge, ear pain and sore throat.    Eyes: Negative for photophobia, pain, discharge, redness, itching and visual disturbance.   Respiratory: Positive for apnea. Negative for  "cough, choking, chest tightness, shortness of breath, wheezing and stridor.         Sleep apnea -has cpap    Cardiovascular: Negative.  Negative for chest pain, palpitations and leg swelling.   Gastrointestinal: Negative for abdominal distention, abdominal pain, anal bleeding, anorexia, blood in stool, change in bowel habit, constipation, diarrhea, nausea, rectal pain and vomiting.   Endocrine: Negative for cold intolerance, heat intolerance, polydipsia, polyphagia and polyuria.   Genitourinary: Negative.  Negative for impotence.   Musculoskeletal: Negative for arthralgias, back pain, gait problem, joint swelling, myalgias, neck pain and neck stiffness.   Skin: Negative.  Negative for rash.   Allergic/Immunologic: Negative.  Negative for environmental allergies, food allergies and immunocompromised state.   Neurological: Negative.  Negative for dizziness, vertigo, seizures, facial asymmetry, speech difficulty, weakness, light-headedness, numbness and headaches.   Hematological: Negative.  Negative for adenopathy. Does not bruise/bleed easily.   Psychiatric/Behavioral: Positive for agitation, behavioral problems, dysphoric mood and sleep disturbance. Negative for confusion, decreased concentration, hallucinations, self-injury and suicidal ideas. The patient is nervous/anxious. The patient does not have insomnia and is not hyperactive.        Objective   /100   Ht 158.8 cm (62.5\")   Wt 68 kg (150 lb)   SpO2 97%   BMI 27.00 kg/m²   Physical Exam   Constitutional: She is oriented to person, place, and time. She appears well-developed and well-nourished. No distress.   HENT:   Head: Normocephalic and atraumatic.   Right Ear: External ear normal.   Left Ear: External ear normal.   Nose: Nose normal.   Mouth/Throat: Oropharynx is clear and moist. No oropharyngeal exudate.   Eyes: Conjunctivae and EOM are normal. Pupils are equal, round, and reactive to light. Right eye exhibits no discharge. Left eye exhibits no " discharge. No scleral icterus.   Neck: Normal range of motion. Neck supple. No JVD present. No tracheal deviation present. No thyromegaly present.   Cardiovascular: Normal rate, regular rhythm, normal heart sounds and intact distal pulses. Exam reveals no gallop and no friction rub.   No murmur heard.  Pulmonary/Chest: Effort normal and breath sounds normal. No stridor. No respiratory distress. She has no wheezes. She has no rales. She exhibits no tenderness.   Abdominal: Soft. Bowel sounds are normal. She exhibits no distension and no mass. There is no tenderness. There is no rebound and no guarding. No hernia.   Musculoskeletal: Normal range of motion. She exhibits no edema, tenderness or deformity.   Lymphadenopathy:     She has no cervical adenopathy.   Neurological: She is alert and oriented to person, place, and time. She displays normal reflexes. No cranial nerve deficit or sensory deficit. She exhibits normal muscle tone. Coordination normal.   Skin: Skin is warm and dry. No rash noted. She is not diaphoretic. No erythema. No pallor.   Nursing note and vitals reviewed.      Assessment/Plan   Problem List Items Addressed This Visit        Cardiovascular and Mediastinum    Hypertension - Primary (Chronic)    Relevant Medications    losartan (COZAAR) 25 MG tablet       Other    Anxiety      Other Visit Diagnoses     Encounter for screening mammogram for malignant neoplasm of breast        Relevant Orders    Mammo Screening Digital Tomosynthesis Bilateral With CAD         Patient understands the risks associated with this controlled medication, including tolerance and addiction.  she also agrees to only obtain this medication from me, and not from a another provider, unless that provider is covering for me in my absence.  she also agrees to be compliant in dosing, and not self adjust the dose of medication.  A signed controlled substance agreement is on file, and she has received a controlled substance education  sheet at this a previous visit.  she has also signed a consent for treatment with a controlled substance as per [facility] policy. KAYLAN was obtained.    New Medications Ordered This Visit   Medications   • ALPRAZolam (XANAX) 0.25 MG tablet     Sig: Take 1 tablet by mouth 3 (Three) Times a Day As Needed for Anxiety.     Dispense:  90 tablet     Refill:  0   • losartan (COZAAR) 25 MG tablet     Sig: Take 1 tablet by mouth Daily.     Dispense:  30 tablet     Refill:  11   • prasugrel (EFFIENT) 10 MG tablet     Sig: Take 1 tablet by mouth Daily.     Dispense:  30 tablet     Refill:  11      needs updated eye exam -sees Dr Ac -refuses colonoscopy

## 2019-03-14 RX ORDER — BUDESONIDE AND FORMOTEROL FUMARATE DIHYDRATE 160; 4.5 UG/1; UG/1
AEROSOL RESPIRATORY (INHALATION)
Qty: 10.2 G | Refills: 0 | Status: SHIPPED | OUTPATIENT
Start: 2019-03-14 | End: 2019-04-15 | Stop reason: SDUPTHER

## 2019-03-15 ENCOUNTER — HOSPITAL ENCOUNTER (OUTPATIENT)
Dept: CARDIAC REHAB | Facility: HOSPITAL | Age: 64
Setting detail: THERAPIES SERIES
Discharge: HOME | End: 2019-03-15

## 2019-03-15 DIAGNOSIS — Z98.61 POST PTCA: Primary | ICD-10-CM

## 2019-03-15 PROCEDURE — 93798 PHYS/QHP OP CAR RHAB W/ECG: CPT

## 2019-03-18 ENCOUNTER — HOSPITAL ENCOUNTER (OUTPATIENT)
Dept: CARDIAC REHAB | Facility: HOSPITAL | Age: 64
Setting detail: THERAPIES SERIES
Discharge: HOME | End: 2019-03-18

## 2019-03-18 VITALS
BODY MASS INDEX: 26.98 KG/M2 | HEART RATE: 79 BPM | SYSTOLIC BLOOD PRESSURE: 164 MMHG | WEIGHT: 149.9 LBS | DIASTOLIC BLOOD PRESSURE: 86 MMHG

## 2019-03-18 DIAGNOSIS — Z98.61 POST PTCA: Primary | ICD-10-CM

## 2019-03-18 PROCEDURE — 93798 PHYS/QHP OP CAR RHAB W/ECG: CPT

## 2019-03-20 ENCOUNTER — HOSPITAL ENCOUNTER (OUTPATIENT)
Dept: CARDIAC REHAB | Facility: HOSPITAL | Age: 64
Setting detail: THERAPIES SERIES
Discharge: HOME | End: 2019-03-20

## 2019-03-20 VITALS — DIASTOLIC BLOOD PRESSURE: 88 MMHG | HEART RATE: 75 BPM | SYSTOLIC BLOOD PRESSURE: 152 MMHG

## 2019-03-20 DIAGNOSIS — Z98.61 POST PTCA: Primary | ICD-10-CM

## 2019-03-20 PROCEDURE — 93798 PHYS/QHP OP CAR RHAB W/ECG: CPT

## 2019-03-20 RX ORDER — ALBUTEROL SULFATE 90 UG/1
AEROSOL, METERED RESPIRATORY (INHALATION)
Qty: 18 G | Refills: 0 | Status: SHIPPED | OUTPATIENT
Start: 2019-03-20 | End: 2019-04-04 | Stop reason: SDUPTHER

## 2019-03-21 RX ORDER — ALPRAZOLAM 0.25 MG/1
TABLET ORAL
Qty: 90 TABLET | Refills: 0 | OUTPATIENT
Start: 2019-03-21

## 2019-03-21 NOTE — TELEPHONE ENCOUNTER
Tiffanie,      Just wanted to let you know, I refused refill on this refill request.   She should have a script to take to the Pharmacy.  She was just seen 03/11/19 and a script was given that day.    LISET Gambino      Ms. Monisha Brasher is requesting a refill on her Xanax 0.25 mg #90 Take 1 TID as needed.    Last OV 03/11/19    Next Yovana OV 05/13/19    Last Script Written 03/11/19  #90 with No Refill (Printed on day of Visit)    Last KAYLAN  03/11/19    Patient should have script and will need to take to the pharmacy to be refilled.

## 2019-03-25 ENCOUNTER — HOSPITAL ENCOUNTER (OUTPATIENT)
Dept: CARDIAC REHAB | Facility: HOSPITAL | Age: 64
Setting detail: THERAPIES SERIES
Discharge: HOME | End: 2019-03-25

## 2019-03-25 VITALS
WEIGHT: 149 LBS | HEIGHT: 63 IN | DIASTOLIC BLOOD PRESSURE: 81 MMHG | HEART RATE: 84 BPM | SYSTOLIC BLOOD PRESSURE: 166 MMHG | BODY MASS INDEX: 26.4 KG/M2

## 2019-03-25 DIAGNOSIS — Z98.61 POST PTCA: Primary | ICD-10-CM

## 2019-03-25 PROCEDURE — 93798 PHYS/QHP OP CAR RHAB W/ECG: CPT

## 2019-03-27 ENCOUNTER — HOSPITAL ENCOUNTER (OUTPATIENT)
Dept: CARDIAC REHAB | Facility: HOSPITAL | Age: 64
Setting detail: THERAPIES SERIES
End: 2019-03-27

## 2019-03-28 ENCOUNTER — OFFICE VISIT (OUTPATIENT)
Dept: PULMONOLOGY | Facility: CLINIC | Age: 64
End: 2019-03-28

## 2019-03-28 VITALS
OXYGEN SATURATION: 93 % | BODY MASS INDEX: 26.35 KG/M2 | WEIGHT: 148.7 LBS | HEIGHT: 63 IN | SYSTOLIC BLOOD PRESSURE: 189 MMHG | HEART RATE: 84 BPM | DIASTOLIC BLOOD PRESSURE: 102 MMHG

## 2019-03-28 DIAGNOSIS — J44.1 CHRONIC OBSTRUCTIVE PULMONARY DISEASE WITH ACUTE EXACERBATION (HCC): Primary | ICD-10-CM

## 2019-03-28 PROCEDURE — 99214 OFFICE O/P EST MOD 30 MIN: CPT | Performed by: INTERNAL MEDICINE

## 2019-03-28 PROCEDURE — 96372 THER/PROPH/DIAG INJ SC/IM: CPT | Performed by: INTERNAL MEDICINE

## 2019-03-28 RX ORDER — AZITHROMYCIN 250 MG/1
TABLET, FILM COATED ORAL
Qty: 6 TABLET | Refills: 1 | Status: SHIPPED | OUTPATIENT
Start: 2019-03-28 | End: 2019-06-08 | Stop reason: HOSPADM

## 2019-03-28 RX ORDER — METHYLPREDNISOLONE ACETATE 40 MG/ML
80 INJECTION, SUSPENSION INTRA-ARTICULAR; INTRALESIONAL; INTRAMUSCULAR; SOFT TISSUE ONCE
Status: COMPLETED | OUTPATIENT
Start: 2019-03-28 | End: 2019-03-28

## 2019-03-28 RX ADMIN — METHYLPREDNISOLONE ACETATE 80 MG: 40 INJECTION, SUSPENSION INTRA-ARTICULAR; INTRALESIONAL; INTRAMUSCULAR; SOFT TISSUE at 10:32

## 2019-03-28 NOTE — PROGRESS NOTES
"This lady has long-standing COPD.  She is a former smoker.  She uses oxygen part of the time.  She complains of cough wheeze purulent sputum and dyspnea on exertion for several days.  She has dyspnea walking more than 100 feet.  She denies chest pain or hemoptysis.    The following portions of the patient's history were reviewed and updated as appropriate: current medications, past family history, past medical history, past social history, past surgical history and problem list.      ROS    Constitutional-no night sweats weight loss headaches  GI no abdominal pain nausea or diarrhea  Neuro no seizure or neurologic deficits  Musculoskeletal no deformity or joint pain   no dysuria or hematuria  Skin no rash or other lesions  All other systems reviewed and were negative except for the above.      Physical Exam  BP (!) 189/102   Pulse 84   Ht 160 cm (63\")   Wt 67.4 kg (148 lb 11.2 oz)   SpO2 93%   BMI 26.34 kg/m²   Vital signs as above  Pupils equally round and reactive to light and accommodation, neck no JVD or adenopathy.  Cardiovascular regular rhythm and rate no murmur or gallop.  Abdomen soft no organomegaly tenderness.  Extremities no clubbing cyanosis or edema.  No cervical adenopathy.  No skin rash.  Neurologic good strength bilaterally without deficits  Dyspneic white female with wheezes and rhonchi diminished breath sounds    Impression COPD exacerbation, hypoxemia, history of tobacco use    Plan Depo-Medrol, Zithromax, continue present medications and oxygen, return in 3 months        This document has been produced with the assistance of Dragon dictation  This document has been electronically signed by Gigi Ac MD on March 28, 2019 10:18 AM      "

## 2019-03-29 ENCOUNTER — APPOINTMENT (OUTPATIENT)
Dept: CARDIAC REHAB | Facility: HOSPITAL | Age: 64
End: 2019-03-29

## 2019-04-04 RX ORDER — ALBUTEROL SULFATE 90 UG/1
AEROSOL, METERED RESPIRATORY (INHALATION)
Qty: 18 G | Refills: 0 | Status: SHIPPED | OUTPATIENT
Start: 2019-04-04 | End: 2019-04-24 | Stop reason: SDUPTHER

## 2019-04-05 ENCOUNTER — HOSPITAL ENCOUNTER (OUTPATIENT)
Dept: CARDIAC REHAB | Facility: HOSPITAL | Age: 64
Setting detail: THERAPIES SERIES
Discharge: HOME | End: 2019-04-05

## 2019-04-05 VITALS — HEART RATE: 73 BPM | DIASTOLIC BLOOD PRESSURE: 81 MMHG | OXYGEN SATURATION: 95 % | SYSTOLIC BLOOD PRESSURE: 146 MMHG

## 2019-04-05 DIAGNOSIS — Z98.61 POST PTCA: Primary | ICD-10-CM

## 2019-04-05 PROCEDURE — 93798 PHYS/QHP OP CAR RHAB W/ECG: CPT

## 2019-04-08 RX ORDER — TIOTROPIUM BROMIDE 18 UG/1
CAPSULE ORAL; RESPIRATORY (INHALATION)
Qty: 30 CAPSULE | Refills: 0 | Status: SHIPPED | OUTPATIENT
Start: 2019-04-08 | End: 2019-05-04 | Stop reason: SDUPTHER

## 2019-04-08 RX ORDER — ALBUTEROL SULFATE 2.5 MG/3ML
SOLUTION RESPIRATORY (INHALATION)
Qty: 360 ML | Refills: 0 | Status: SHIPPED | OUTPATIENT
Start: 2019-04-08 | End: 2019-04-23 | Stop reason: SDUPTHER

## 2019-04-15 RX ORDER — BUDESONIDE AND FORMOTEROL FUMARATE DIHYDRATE 160; 4.5 UG/1; UG/1
AEROSOL RESPIRATORY (INHALATION)
Qty: 10.2 G | Refills: 5 | Status: SHIPPED | OUTPATIENT
Start: 2019-04-15 | End: 2019-09-27 | Stop reason: SDUPTHER

## 2019-04-24 RX ORDER — ALBUTEROL SULFATE 2.5 MG/3ML
SOLUTION RESPIRATORY (INHALATION)
Qty: 360 ML | Refills: 0 | Status: SHIPPED | OUTPATIENT
Start: 2019-04-24 | End: 2019-06-17 | Stop reason: SDUPTHER

## 2019-04-24 RX ORDER — ALBUTEROL SULFATE 90 UG/1
AEROSOL, METERED RESPIRATORY (INHALATION)
Qty: 18 G | Refills: 0 | Status: SHIPPED | OUTPATIENT
Start: 2019-04-24 | End: 2019-05-08 | Stop reason: SDUPTHER

## 2019-04-29 RX ORDER — MONTELUKAST SODIUM 10 MG/1
TABLET ORAL
Qty: 90 TABLET | Refills: 4 | Status: SHIPPED | OUTPATIENT
Start: 2019-04-29 | End: 2020-04-28

## 2019-05-04 RX ORDER — ALPRAZOLAM 0.25 MG/1
TABLET ORAL
Qty: 90 TABLET | Refills: 0 | Status: CANCELLED | OUTPATIENT
Start: 2019-05-04

## 2019-05-06 RX ORDER — TIOTROPIUM BROMIDE 18 UG/1
CAPSULE ORAL; RESPIRATORY (INHALATION)
Qty: 30 CAPSULE | Refills: 0 | Status: SHIPPED | OUTPATIENT
Start: 2019-05-06 | End: 2019-06-08 | Stop reason: SDUPTHER

## 2019-05-06 RX ORDER — PREDNISONE 20 MG/1
20 TABLET ORAL DAILY
Qty: 30 TABLET | Refills: 0 | Status: ON HOLD | OUTPATIENT
Start: 2019-05-06 | End: 2019-06-08 | Stop reason: SDUPTHER

## 2019-05-07 NOTE — TELEPHONE ENCOUNTER
Ms Tiffanie. Monisha Brasher is requesting a refill on her Xanax 0.25 mg #90  Take 1 tablet TID as needed for anxiety    Last OV 03/11/19    Next Yovana OV 05/13/19    Last Script Printed 03/11/19  #90 with No Refill    Last KAYLAN  03/11/19    Please Advise on refill      Thank you

## 2019-05-08 ENCOUNTER — OFFICE VISIT (OUTPATIENT)
Dept: SLEEP MEDICINE | Facility: HOSPITAL | Age: 64
End: 2019-05-08

## 2019-05-08 ENCOUNTER — OFFICE VISIT (OUTPATIENT)
Dept: FAMILY MEDICINE CLINIC | Facility: CLINIC | Age: 64
End: 2019-05-08

## 2019-05-08 ENCOUNTER — APPOINTMENT (OUTPATIENT)
Dept: LAB | Facility: HOSPITAL | Age: 64
End: 2019-05-08

## 2019-05-08 VITALS
OXYGEN SATURATION: 92 % | SYSTOLIC BLOOD PRESSURE: 158 MMHG | WEIGHT: 148 LBS | HEART RATE: 83 BPM | DIASTOLIC BLOOD PRESSURE: 102 MMHG | HEIGHT: 63 IN | BODY MASS INDEX: 26.22 KG/M2

## 2019-05-08 VITALS
DIASTOLIC BLOOD PRESSURE: 62 MMHG | SYSTOLIC BLOOD PRESSURE: 112 MMHG | HEIGHT: 63 IN | BODY MASS INDEX: 26.22 KG/M2 | WEIGHT: 148 LBS

## 2019-05-08 DIAGNOSIS — F41.9 ANXIETY: Primary | ICD-10-CM

## 2019-05-08 DIAGNOSIS — G47.33 OBSTRUCTIVE SLEEP APNEA, ADULT: Primary | ICD-10-CM

## 2019-05-08 PROCEDURE — 80307 DRUG TEST PRSMV CHEM ANLYZR: CPT | Performed by: NURSE PRACTITIONER

## 2019-05-08 PROCEDURE — 99213 OFFICE O/P EST LOW 20 MIN: CPT | Performed by: NURSE PRACTITIONER

## 2019-05-08 PROCEDURE — 99214 OFFICE O/P EST MOD 30 MIN: CPT | Performed by: NURSE PRACTITIONER

## 2019-05-08 RX ORDER — NITROGLYCERIN 0.4 MG/1
0.4 TABLET SUBLINGUAL
Refills: 12 | Status: ON HOLD | COMMUNITY
Start: 2019-03-07 | End: 2019-06-08 | Stop reason: SDUPTHER

## 2019-05-08 RX ORDER — ALPRAZOLAM 0.25 MG/1
0.25 TABLET ORAL 3 TIMES DAILY PRN
Qty: 90 TABLET | Refills: 0 | Status: SHIPPED | OUTPATIENT
Start: 2019-05-08 | End: 2019-08-08 | Stop reason: SDUPTHER

## 2019-05-08 RX ORDER — ALBUTEROL SULFATE 90 UG/1
AEROSOL, METERED RESPIRATORY (INHALATION)
Qty: 1 INHALER | Refills: 5 | Status: SHIPPED | OUTPATIENT
Start: 2019-05-08 | End: 2019-10-27 | Stop reason: SDUPTHER

## 2019-05-08 NOTE — PROGRESS NOTES
Chief Complaint   Patient presents with   • Anxiety     needs refill on xanax     Subjective   Monishavicky Brasher is a 64 y.o. female.     Presents with needing refills of meds      Anxiety   Presents for follow-up visit. Symptoms include depressed mood, irritability, nervous/anxious behavior and panic. Patient reports no chest pain, compulsions, confusion, decreased concentration, dizziness, excessive worry, feeling of choking, hyperventilation, insomnia, malaise, muscle tension, nausea, obsessions, palpitations, restlessness, shortness of breath or suicidal ideas. Symptoms occur most days. The severity of symptoms is moderate. The quality of sleep is good. Nighttime awakenings: none.     Compliance with medications is %.   Blood Sugar Problem   This is a recurrent problem. The current episode started 1 to 4 weeks ago. The problem occurs every several days. The problem has been gradually improving. Pertinent negatives include no abdominal pain, anorexia, arthralgias, change in bowel habit, chest pain, chills, congestion, coughing, diaphoresis, fatigue, fever, headaches, joint swelling, myalgias, nausea, neck pain, numbness, rash, sore throat, swollen glands, urinary symptoms, visual change, vomiting or weakness. Exacerbated by: stress  The treatment provided mild relief.        The following portions of the patient's history were reviewed and updated as appropriate: allergies, current medications, past social history and problem list.    Review of Systems   Constitutional: Positive for irritability. Negative for activity change, appetite change, chills, diaphoresis, fatigue and fever.   HENT: Negative.  Negative for congestion, dental problem, drooling, ear discharge, ear pain and sore throat.    Eyes: Negative for photophobia, pain, discharge, redness, itching and visual disturbance.   Respiratory: Positive for apnea. Negative for cough, choking, chest tightness, shortness of breath, wheezing and  "stridor.         Sleep apnea -has cpap    Cardiovascular: Negative.  Negative for chest pain, palpitations and leg swelling.   Gastrointestinal: Negative for abdominal distention, abdominal pain, anal bleeding, anorexia, blood in stool, change in bowel habit, constipation, diarrhea, nausea, rectal pain and vomiting.   Endocrine: Negative for cold intolerance, heat intolerance, polydipsia, polyphagia and polyuria.   Genitourinary: Negative.    Musculoskeletal: Negative for arthralgias, back pain, gait problem, joint swelling, myalgias, neck pain and neck stiffness.   Skin: Negative.  Negative for rash.   Allergic/Immunologic: Negative.  Negative for environmental allergies, food allergies and immunocompromised state.   Neurological: Negative.  Negative for dizziness, tremors, seizures, syncope, facial asymmetry, speech difficulty, weakness, light-headedness, numbness and headaches.   Hematological: Negative.  Negative for adenopathy. Does not bruise/bleed easily.   Psychiatric/Behavioral: Positive for agitation, behavioral problems, dysphoric mood and sleep disturbance. Negative for confusion, decreased concentration, hallucinations, self-injury and suicidal ideas. The patient is nervous/anxious. The patient does not have insomnia and is not hyperactive.        Objective   /62   Ht 159.8 cm (62.9\")   Wt 67.1 kg (148 lb)   BMI 26.30 kg/m²      Physical Exam   Constitutional: She is oriented to person, place, and time. She appears well-developed and well-nourished. No distress.   HENT:   Head: Normocephalic and atraumatic.   Right Ear: External ear normal.   Left Ear: External ear normal.   Nose: Nose normal.   Mouth/Throat: Oropharynx is clear and moist. No oropharyngeal exudate.   Eyes: Conjunctivae and EOM are normal. Pupils are equal, round, and reactive to light. Right eye exhibits no discharge. Left eye exhibits no discharge. No scleral icterus.   Neck: Normal range of motion. Neck supple. No JVD " present. No tracheal deviation present. No thyromegaly present.   Cardiovascular: Normal rate, regular rhythm, normal heart sounds and intact distal pulses. Exam reveals no gallop and no friction rub.   No murmur heard.  Pulmonary/Chest: Effort normal and breath sounds normal. No stridor. No respiratory distress. She has no wheezes. She has no rales. She exhibits no tenderness.   Abdominal: Soft. Bowel sounds are normal. She exhibits no distension and no mass. There is no tenderness. There is no rebound and no guarding. No hernia.   Musculoskeletal: Normal range of motion. She exhibits no edema, tenderness or deformity.   Lymphadenopathy:     She has no cervical adenopathy.   Neurological: She is alert and oriented to person, place, and time. She displays normal reflexes. No cranial nerve deficit or sensory deficit. She exhibits normal muscle tone. Coordination normal.   Skin: Skin is warm and dry. No rash noted. She is not diaphoretic. No erythema. No pallor.   Nursing note and vitals reviewed.      Assessment/Plan   Problem List Items Addressed This Visit        Other    Anxiety - Primary    Relevant Orders    Urine Drug Screen - Urine, Clean Catch           New Medications Ordered This Visit   Medications   • ALPRAZolam (XANAX) 0.25 MG tablet     Sig: Take 1 tablet by mouth 3 (Three) Times a Day As Needed for Anxiety.     Dispense:  90 tablet     Refill:  0        It's not just what you eat, but when you eat  Eat breakfast, and eat smaller meals throughout the day. A healthy breakfast can jumpstart your metabolism, while eating small, healthy meals (rather than the standard three large meals) keeps your energy up.   Avoid eating at night. Try to eat dinner earlier and fast for 14-16 hours until breakfast the next morning. Studies suggest that eating only when you’re most active and giving your digestive system a long break each day may help to regulate weight.     Patient understands the risks associated with  this controlled medication, including tolerance and addiction.  she also agrees to only obtain this medication from me, and not from a another provider, unless that provider is covering for me in my absence.  she also agrees to be compliant in dosing, and not self adjust the dose of medication.  A signed controlled substance agreement is on file, and she has received a controlled substance education sheet at this a previous visit.  she has also signed a consent for treatment with a controlled substance as per [facility] policy. KAYLAN was obtained.      uds today, meds as directed, follow up in 3 months

## 2019-05-08 NOTE — PROGRESS NOTES
Sleep Clinic Follow Up    Date: 2019  Primary Care Physician: Tiffanie Mccauley APRN    Last office visit: 2019 (I reviewed this note)    CC: Follow up: NARCISO started on CPAP      Interim History:  Since the last visit:    1)  NARCISO -  Monisha Brasher has remained compliant with CPAP. She denies mask and machine issues, dry mouth, headaches, or pressures intolerance. States that she feels pressure is not enough at times. She denies abnormal dreams, sleep paralysis, nasal congestion, URI sx.    Sleep Testin. PSG on 12/10/2018, AHI of 12.8   2. CPAP titration on same day, recommended 10 cm H2O   3. Currently on 10 cm H2O    PAP Data:    Time frame: 2019-2019   Compliance 100 %  Average use on days used: 5 hrs 40 min  Percent of days with usage greater than or equal to 4 hours: 79.4%  PAP range : 10 cm H2O  Average 90% pressure: 10 cmH2O  Leak: 47 minutes  Average AHI 2.6 events/hr  Mask type: Nasal mask  DME: [facility]    Bed time: 2200-000  Sleep latency: 10-15 minutes  Number of times awakens during the night: 2  Wake time: 0700  Estimated total sleep time at night: 7 hours  Caffeine intake: 2 cups of coffee, 1 cup of decaf tea, and 0 oz of soda  Alcohol intake: 0 drinks per week  Nap time: none   Sleepiness with Driving: none      2) Patient denies RLS symptoms.     PMHx, FH, SH reviewed and pertinent changes are: Started on Nitroglycerin PRN for chest pain per Cardiology      REVIEW OF SYSTEMS:   Negative for chest pain, fever, cough, SOA, abdominal pain. Smoking:none      Exam:  Vitals:    19 0853   BP: (!) 158/102   Pulse: 83   SpO2: 92%           19  0853   Weight: 67.1 kg (148 lb)     Body mass index is 26.22 kg/m². Patient's Body mass index is 26.22 kg/m². BMI is within normal parameters. No follow-up required..      Gen:                No distress, conversant, pleasant, appears stated age, alert, oriented  Eyes:               Anicteric sclera, moist conjunctiva,  no lid lag                           PERRL, EOMI   Heent:             NC/AT                          Oropharynx clear                          Normal hearing  Lungs:             Decreased effort, mildly labored breathing                          Diminished bilaterally. Wheezes noted in bilateral upper lobes     CV:                  Normal S1/S2, no murmur                          No lower extremity edema  ABD:               Soft, rounded, non-distended                          Normal bowel sounds                    Psych:             Appropriate affect  Neuro:             CN 2-12 appear intact    Past Medical History:   Diagnosis Date   • Acute bronchitis    • Acute upper respiratory infection    • Adjustment disorder with mixed emotional features    • Agoraphobia with panic attacks    • Allergic rhinitis due to pollen    • Anxiety    • Asthma    • Backache    • Blood in urine    • Breast cyst    • Candidiasis of mouth    • Chronic bronchitis (CMS/HCC)    • Chronic obstructive lung disease (CMS/HCC)    • Emphysema lung (CMS/HCC)    • Essential hypertension    • Extrinsic asthma with status asthmaticus    • Fatigue    • H/O echocardiogram     EF 55-60%. LV systolic function normal. Impaired LV relaxation. [facility]   • Heart attack (CMS/HCC) 11/2015   • Hypoxemia    • Malaise and fatigue    • Non-IgE mediated allergic asthma    • Nonvenomous insect bite    • Pneumonia    • PONV (postoperative nausea and vomiting)    • Postmenopausal state    • Urinary tract infectious disease        Current Outpatient Medications:   •  albuterol (PROVENTIL) (2.5 MG/3ML) 0.083% nebulizer solution, USE 1 VIAL VIA NEBULIZER EVERY 4 HOURS AS NEEDED FOR WHEEZING, Disp: 360 mL, Rfl: 0  •  ALBUTEROL SULFATE  (90 Base) MCG/ACT inhaler, INHALE 2 PUFFS BY MOUTH EVERY 4 HOURS AS NEEDED FOR BREATHING, Disp: 18 g, Rfl: 0  •  ALPRAZolam (XANAX) 0.25 MG tablet, Take 1 tablet by mouth 3 (Three) Times a Day As Needed for Anxiety.,  Disp: 90 tablet, Rfl: 0  •  amiodarone (PACERONE) 100 MG tablet, Take 1 tablet by mouth 2 (Two) Times a Day., Disp: 60 tablet, Rfl: 1  •  aspirin 81 MG chewable tablet, Chew 1 tablet Daily., Disp: , Rfl:   •  atorvastatin (LIPITOR) 20 MG tablet, Take 2 tablets by mouth Every Night., Disp: 30 tablet, Rfl: 1  •  budesonide-formoterol (SYMBICORT) 160-4.5 MCG/ACT inhaler, INHALE 2 PUFFS BY MOUTH TWICE DAILY, Disp: 10.2 g, Rfl: 5  •  carvedilol (COREG) 3.125 MG tablet, Take 3.21 mg by mouth Every Night., Disp: , Rfl: 12  •  furosemide (LASIX) 20 MG tablet, Take 1 tablet by mouth 2 (Two) Times a Day., Disp: 60 tablet, Rfl: 1  •  glucose blood test strip, Use as instructed, Disp: 50 each, Rfl: 12  •  glucose monitor monitoring kit, 1 each Daily. Testing blood sugar once a day  ICD10 - R73.9, Disp: 1 each, Rfl: 0  •  Lancets (FREESTYLE) lancets, Testing blood sugar once a day, Disp: 100 each, Rfl: 12  •  losartan (COZAAR) 25 MG tablet, Take 1 tablet by mouth Daily., Disp: 30 tablet, Rfl: 11  •  metFORMIN (GLUCOPHAGE) 500 MG tablet, Take 1 tablet by mouth Daily With Breakfast., Disp: 30 tablet, Rfl: 11  •  montelukast (SINGULAIR) 10 MG tablet, TAKE 1 TABLET BY MOUTH DAILY, Disp: 90 tablet, Rfl: 4  •  nystatin (MYCOSTATIN) 869070 UNIT/ML suspension, SHAKE LIQUID AND TAKE 5 ML BY MOUTH FOUR TIMES DAILY, Disp: 280 mL, Rfl: 0  •  PARoxetine (PAXIL) 20 MG tablet, Take 1 tablet by mouth Every Morning., Disp: 30 tablet, Rfl: 11  •  prasugrel (EFFIENT) 10 MG tablet, Take 1 tablet by mouth Daily., Disp: 30 tablet, Rfl: 11  •  predniSONE (DELTASONE) 20 MG tablet, TAKE 1 TABLET BY MOUTH DAILY, Disp: 30 tablet, Rfl: 0  •  sodium chloride (OCEAN) 0.65 % nasal spray, 2 sprays into the nostril(s) as directed by provider As Needed for Congestion., Disp: 1 mL, Rfl: 1  •  SPIRIVA HANDIHALER 18 MCG per inhalation capsule, INHALE 1 PUFF BY MOUTH DAILY, Disp: 30 capsule, Rfl: 0  •  azithromycin (ZITHROMAX) 250 MG tablet, Take 2 tablets the first  day, then 1 tablet daily for 4 days., Disp: 6 tablet, Rfl: 1  •  nitroglycerin (NITROSTAT) 0.4 MG SL tablet, Take 0.4 mg by mouth., Disp: , Rfl: 12      Assessment and Plan:    1. Obstructive sleep apnea Established, stable (1)  1. Compliant and improved with PAP therapy  2. Continue PAP as prescribed - increase to 12 cm H2O  3. Script for PAP supplies  4. Return to clinic in 6 months  2. O2 dependent COPD   1.   Continue O2 @ 2 LPM QHS  3. Hypertension   1.   Encouraged patient to go to ER for hypertensive urgency and close F/U with PCP and Cardiology.   2.   Encouraged compliance with BP medications. Nitro PRN      Total time 15 min, more than half spent in face to face counseling and coordination of care.    RTC in 6 months        This document has been electronically signed by HEATHER Evans on May 8, 2019 9:10 AM                      CC: Tiffanie Mccauley APRN          No ref. provider found

## 2019-05-09 LAB
AMPHET+METHAMPHET UR QL: NEGATIVE
BARBITURATES UR QL SCN: NEGATIVE
BENZODIAZ UR QL SCN: POSITIVE
CANNABINOIDS SERPL QL: NEGATIVE
COCAINE UR QL: NEGATIVE
METHADONE UR QL SCN: NEGATIVE
OPIATES UR QL: NEGATIVE
OXYCODONE UR QL SCN: NEGATIVE

## 2019-06-03 ENCOUNTER — HOSPITAL ENCOUNTER (OUTPATIENT)
Facility: HOSPITAL | Age: 64
Setting detail: OBSERVATION
Discharge: HOME | End: 2019-06-08
Attending: EMERGENCY MEDICINE | Admitting: FAMILY MEDICINE

## 2019-06-03 ENCOUNTER — APPOINTMENT (OUTPATIENT)
Dept: GENERAL RADIOLOGY | Facility: HOSPITAL | Age: 64
End: 2019-06-03

## 2019-06-03 DIAGNOSIS — Z74.09 IMPAIRED FUNCTIONAL MOBILITY, BALANCE, GAIT, AND ENDURANCE: ICD-10-CM

## 2019-06-03 DIAGNOSIS — J44.1 COPD EXACERBATION (HCC): Primary | ICD-10-CM

## 2019-06-03 PROBLEM — G47.34 NOCTURNAL HYPOXIA: Status: ACTIVE | Noted: 2019-06-03

## 2019-06-03 PROBLEM — G47.33 OSA (OBSTRUCTIVE SLEEP APNEA): Status: ACTIVE | Noted: 2019-06-03

## 2019-06-03 PROBLEM — Z71.89 ADVANCED CARE PLANNING/COUNSELING DISCUSSION: Status: ACTIVE | Noted: 2019-06-03

## 2019-06-03 PROBLEM — J96.01 ACUTE RESPIRATORY FAILURE WITH HYPOXIA: Status: ACTIVE | Noted: 2018-11-19

## 2019-06-03 PROBLEM — R73.03 PREDIABETES: Status: ACTIVE | Noted: 2019-06-03

## 2019-06-03 LAB
ALBUMIN SERPL-MCNC: 3.7 G/DL (ref 3.5–5.2)
ALBUMIN/GLOB SERPL: 1.3 G/DL
ALP SERPL-CCNC: 76 U/L (ref 39–117)
ALT SERPL W P-5'-P-CCNC: 26 U/L (ref 1–33)
ANION GAP SERPL CALCULATED.3IONS-SCNC: 11 MMOL/L
AST SERPL-CCNC: 16 U/L (ref 1–32)
B PERT DNA SPEC QL NAA+PROBE: NOT DETECTED
BASOPHILS # BLD AUTO: 0.02 10*3/MM3 (ref 0–0.2)
BASOPHILS NFR BLD AUTO: 0.2 % (ref 0–1.5)
BILIRUB SERPL-MCNC: 0.3 MG/DL (ref 0.2–1.2)
BUN BLD-MCNC: 14 MG/DL (ref 8–23)
BUN/CREAT SERPL: 19.4 (ref 7–25)
C PNEUM DNA NPH QL NAA+NON-PROBE: NOT DETECTED
CALCIUM SPEC-SCNC: 9 MG/DL (ref 8.6–10.5)
CHLORIDE SERPL-SCNC: 97 MMOL/L (ref 98–107)
CO2 SERPL-SCNC: 31 MMOL/L (ref 22–29)
CREAT BLD-MCNC: 0.72 MG/DL (ref 0.57–1)
CRP SERPL-MCNC: 1.42 MG/DL (ref 0–0.5)
DEPRECATED RDW RBC AUTO: 46.8 FL (ref 37–54)
EOSINOPHIL # BLD AUTO: 0 10*3/MM3 (ref 0–0.4)
EOSINOPHIL NFR BLD AUTO: 0 % (ref 0.3–6.2)
ERYTHROCYTE [DISTWIDTH] IN BLOOD BY AUTOMATED COUNT: 14.6 % (ref 12.3–15.4)
FLUAV H1 2009 PAND RNA NPH QL NAA+PROBE: NOT DETECTED
FLUAV H1 HA GENE NPH QL NAA+PROBE: NOT DETECTED
FLUAV H3 RNA NPH QL NAA+PROBE: NOT DETECTED
FLUAV SUBTYP SPEC NAA+PROBE: NOT DETECTED
FLUBV RNA ISLT QL NAA+PROBE: NOT DETECTED
GFR SERPL CREATININE-BSD FRML MDRD: 82 ML/MIN/1.73
GLOBULIN UR ELPH-MCNC: 2.8 GM/DL
GLUCOSE BLD-MCNC: 141 MG/DL (ref 65–99)
GLUCOSE BLDC GLUCOMTR-MCNC: 177 MG/DL (ref 70–130)
HADV DNA SPEC NAA+PROBE: NOT DETECTED
HCOV 229E RNA SPEC QL NAA+PROBE: NOT DETECTED
HCOV HKU1 RNA SPEC QL NAA+PROBE: NOT DETECTED
HCOV NL63 RNA SPEC QL NAA+PROBE: NOT DETECTED
HCOV OC43 RNA SPEC QL NAA+PROBE: NOT DETECTED
HCT VFR BLD AUTO: 37.8 % (ref 34–46.6)
HGB BLD-MCNC: 12.2 G/DL (ref 12–15.9)
HMPV RNA NPH QL NAA+NON-PROBE: NOT DETECTED
HPIV1 RNA SPEC QL NAA+PROBE: NOT DETECTED
HPIV2 RNA SPEC QL NAA+PROBE: NOT DETECTED
HPIV3 RNA NPH QL NAA+PROBE: NOT DETECTED
HPIV4 P GENE NPH QL NAA+PROBE: NOT DETECTED
IMM GRANULOCYTES # BLD AUTO: 0.06 10*3/MM3 (ref 0–0.05)
IMM GRANULOCYTES NFR BLD AUTO: 0.6 % (ref 0–0.5)
L PNEUMO1 AG UR QL IA: NEGATIVE
LYMPHOCYTES # BLD AUTO: 0.91 10*3/MM3 (ref 0.7–3.1)
LYMPHOCYTES NFR BLD AUTO: 8.6 % (ref 19.6–45.3)
M PNEUMO IGG SER IA-ACNC: NOT DETECTED
MCH RBC QN AUTO: 28 PG (ref 26.6–33)
MCHC RBC AUTO-ENTMCNC: 32.3 G/DL (ref 31.5–35.7)
MCV RBC AUTO: 86.9 FL (ref 79–97)
MONOCYTES # BLD AUTO: 0.59 10*3/MM3 (ref 0.1–0.9)
MONOCYTES NFR BLD AUTO: 5.6 % (ref 5–12)
NEUTROPHILS # BLD AUTO: 8.98 10*3/MM3 (ref 1.7–7)
NEUTROPHILS NFR BLD AUTO: 85 % (ref 42.7–76)
NRBC BLD AUTO-RTO: 0 /100 WBC (ref 0–0.2)
NT-PROBNP SERPL-MCNC: 952.4 PG/ML (ref 5–900)
PLATELET # BLD AUTO: 266 10*3/MM3 (ref 140–450)
PMV BLD AUTO: 9.1 FL (ref 6–12)
POTASSIUM BLD-SCNC: 4 MMOL/L (ref 3.5–5.2)
PROT SERPL-MCNC: 6.5 G/DL (ref 6–8.5)
RBC # BLD AUTO: 4.35 10*6/MM3 (ref 3.77–5.28)
RHINOVIRUS RNA SPEC NAA+PROBE: NOT DETECTED
RSV RNA NPH QL NAA+NON-PROBE: NOT DETECTED
S PNEUM AG SPEC QL LA: NEGATIVE
SODIUM BLD-SCNC: 139 MMOL/L (ref 136–145)
TROPONIN T SERPL-MCNC: <0.01 NG/ML (ref 0–0.03)
WBC NRBC COR # BLD: 10.56 10*3/MM3 (ref 3.4–10.8)

## 2019-06-03 PROCEDURE — 94799 UNLISTED PULMONARY SVC/PX: CPT

## 2019-06-03 PROCEDURE — 71046 X-RAY EXAM CHEST 2 VIEWS: CPT

## 2019-06-03 PROCEDURE — 82962 GLUCOSE BLOOD TEST: CPT

## 2019-06-03 PROCEDURE — 93005 ELECTROCARDIOGRAM TRACING: CPT | Performed by: PHYSICIAN ASSISTANT

## 2019-06-03 PROCEDURE — 87798 DETECT AGENT NOS DNA AMP: CPT | Performed by: STUDENT IN AN ORGANIZED HEALTH CARE EDUCATION/TRAINING PROGRAM

## 2019-06-03 PROCEDURE — 94640 AIRWAY INHALATION TREATMENT: CPT

## 2019-06-03 PROCEDURE — G0378 HOSPITAL OBSERVATION PER HR: HCPCS

## 2019-06-03 PROCEDURE — 85025 COMPLETE CBC W/AUTO DIFF WBC: CPT | Performed by: PHYSICIAN ASSISTANT

## 2019-06-03 PROCEDURE — 36415 COLL VENOUS BLD VENIPUNCTURE: CPT | Performed by: PHYSICIAN ASSISTANT

## 2019-06-03 PROCEDURE — 96374 THER/PROPH/DIAG INJ IV PUSH: CPT

## 2019-06-03 PROCEDURE — 87581 M.PNEUMON DNA AMP PROBE: CPT | Performed by: STUDENT IN AN ORGANIZED HEALTH CARE EDUCATION/TRAINING PROGRAM

## 2019-06-03 PROCEDURE — 80053 COMPREHEN METABOLIC PANEL: CPT | Performed by: PHYSICIAN ASSISTANT

## 2019-06-03 PROCEDURE — 87486 CHLMYD PNEUM DNA AMP PROBE: CPT | Performed by: STUDENT IN AN ORGANIZED HEALTH CARE EDUCATION/TRAINING PROGRAM

## 2019-06-03 PROCEDURE — 87899 AGENT NOS ASSAY W/OPTIC: CPT | Performed by: STUDENT IN AN ORGANIZED HEALTH CARE EDUCATION/TRAINING PROGRAM

## 2019-06-03 PROCEDURE — 93010 ELECTROCARDIOGRAM REPORT: CPT | Performed by: INTERNAL MEDICINE

## 2019-06-03 PROCEDURE — 84443 ASSAY THYROID STIM HORMONE: CPT | Performed by: STUDENT IN AN ORGANIZED HEALTH CARE EDUCATION/TRAINING PROGRAM

## 2019-06-03 PROCEDURE — 86140 C-REACTIVE PROTEIN: CPT | Performed by: STUDENT IN AN ORGANIZED HEALTH CARE EDUCATION/TRAINING PROGRAM

## 2019-06-03 PROCEDURE — 84484 ASSAY OF TROPONIN QUANT: CPT | Performed by: PHYSICIAN ASSISTANT

## 2019-06-03 PROCEDURE — 87631 RESP VIRUS 3-5 TARGETS: CPT | Performed by: STUDENT IN AN ORGANIZED HEALTH CARE EDUCATION/TRAINING PROGRAM

## 2019-06-03 PROCEDURE — 25010000002 METHYLPREDNISOLONE PER 125 MG: Performed by: PHYSICIAN ASSISTANT

## 2019-06-03 PROCEDURE — 83880 ASSAY OF NATRIURETIC PEPTIDE: CPT | Performed by: PHYSICIAN ASSISTANT

## 2019-06-03 PROCEDURE — 99219 PR INITIAL OBSERVATION CARE/DAY 50 MINUTES: CPT | Performed by: STUDENT IN AN ORGANIZED HEALTH CARE EDUCATION/TRAINING PROGRAM

## 2019-06-03 PROCEDURE — 99284 EMERGENCY DEPT VISIT MOD MDM: CPT

## 2019-06-03 RX ORDER — ONDANSETRON 4 MG/1
4 TABLET, FILM COATED ORAL EVERY 6 HOURS PRN
Status: DISCONTINUED | OUTPATIENT
Start: 2019-06-03 | End: 2019-06-08 | Stop reason: HOSPADM

## 2019-06-03 RX ORDER — IPRATROPIUM BROMIDE AND ALBUTEROL SULFATE 2.5; .5 MG/3ML; MG/3ML
3 SOLUTION RESPIRATORY (INHALATION)
Status: DISCONTINUED | OUTPATIENT
Start: 2019-06-03 | End: 2019-06-08 | Stop reason: HOSPADM

## 2019-06-03 RX ORDER — DOXYCYCLINE 100 MG/1
100 CAPSULE ORAL EVERY 12 HOURS SCHEDULED
Status: DISCONTINUED | OUTPATIENT
Start: 2019-06-03 | End: 2019-06-08 | Stop reason: HOSPADM

## 2019-06-03 RX ORDER — METHYLPREDNISOLONE SODIUM SUCCINATE 125 MG/2ML
125 INJECTION, POWDER, LYOPHILIZED, FOR SOLUTION INTRAMUSCULAR; INTRAVENOUS ONCE
Status: COMPLETED | OUTPATIENT
Start: 2019-06-03 | End: 2019-06-03

## 2019-06-03 RX ORDER — MONTELUKAST SODIUM 10 MG/1
10 TABLET ORAL DAILY
Status: DISCONTINUED | OUTPATIENT
Start: 2019-06-03 | End: 2019-06-08 | Stop reason: HOSPADM

## 2019-06-03 RX ORDER — BISACODYL 10 MG
10 SUPPOSITORY, RECTAL RECTAL DAILY PRN
Status: DISCONTINUED | OUTPATIENT
Start: 2019-06-03 | End: 2019-06-08 | Stop reason: HOSPADM

## 2019-06-03 RX ORDER — ACETAMINOPHEN 325 MG/1
650 TABLET ORAL EVERY 4 HOURS PRN
Status: DISCONTINUED | OUTPATIENT
Start: 2019-06-03 | End: 2019-06-08 | Stop reason: HOSPADM

## 2019-06-03 RX ORDER — CARVEDILOL 3.12 MG/1
3.12 TABLET ORAL NIGHTLY
Status: DISCONTINUED | OUTPATIENT
Start: 2019-06-03 | End: 2019-06-04

## 2019-06-03 RX ORDER — ATORVASTATIN CALCIUM 40 MG/1
40 TABLET, FILM COATED ORAL NIGHTLY
Status: DISCONTINUED | OUTPATIENT
Start: 2019-06-03 | End: 2019-06-08 | Stop reason: HOSPADM

## 2019-06-03 RX ORDER — PRASUGREL 10 MG/1
10 TABLET, FILM COATED ORAL DAILY
Status: DISCONTINUED | OUTPATIENT
Start: 2019-06-04 | End: 2019-06-08 | Stop reason: HOSPADM

## 2019-06-03 RX ORDER — BUDESONIDE AND FORMOTEROL FUMARATE DIHYDRATE 160; 4.5 UG/1; UG/1
2 AEROSOL RESPIRATORY (INHALATION)
Status: DISCONTINUED | OUTPATIENT
Start: 2019-06-03 | End: 2019-06-08 | Stop reason: HOSPADM

## 2019-06-03 RX ORDER — ALPRAZOLAM 0.25 MG/1
0.25 TABLET ORAL 3 TIMES DAILY PRN
Status: DISCONTINUED | OUTPATIENT
Start: 2019-06-03 | End: 2019-06-08 | Stop reason: HOSPADM

## 2019-06-03 RX ORDER — DEXTROSE MONOHYDRATE 25 G/50ML
25 INJECTION, SOLUTION INTRAVENOUS
Status: DISCONTINUED | OUTPATIENT
Start: 2019-06-03 | End: 2019-06-08 | Stop reason: HOSPADM

## 2019-06-03 RX ORDER — NITROGLYCERIN 0.4 MG/1
0.4 TABLET SUBLINGUAL
Status: DISCONTINUED | OUTPATIENT
Start: 2019-06-03 | End: 2019-06-08 | Stop reason: HOSPADM

## 2019-06-03 RX ORDER — CALCIUM CARBONATE 200(500)MG
2 TABLET,CHEWABLE ORAL DAILY PRN
Status: DISCONTINUED | OUTPATIENT
Start: 2019-06-03 | End: 2019-06-08 | Stop reason: HOSPADM

## 2019-06-03 RX ORDER — AMIODARONE HYDROCHLORIDE 200 MG/1
100 TABLET ORAL 2 TIMES DAILY
Status: DISCONTINUED | OUTPATIENT
Start: 2019-06-03 | End: 2019-06-08 | Stop reason: HOSPADM

## 2019-06-03 RX ORDER — NICOTINE POLACRILEX 4 MG
15 LOZENGE BUCCAL
Status: DISCONTINUED | OUTPATIENT
Start: 2019-06-03 | End: 2019-06-08 | Stop reason: HOSPADM

## 2019-06-03 RX ORDER — IPRATROPIUM BROMIDE AND ALBUTEROL SULFATE 2.5; .5 MG/3ML; MG/3ML
3 SOLUTION RESPIRATORY (INHALATION)
Status: DISCONTINUED | OUTPATIENT
Start: 2019-06-03 | End: 2019-06-03

## 2019-06-03 RX ORDER — SENNA AND DOCUSATE SODIUM 50; 8.6 MG/1; MG/1
2 TABLET, FILM COATED ORAL NIGHTLY
Status: DISCONTINUED | OUTPATIENT
Start: 2019-06-03 | End: 2019-06-08 | Stop reason: HOSPADM

## 2019-06-03 RX ORDER — METHYLPREDNISOLONE SODIUM SUCCINATE 125 MG/2ML
125 INJECTION, POWDER, LYOPHILIZED, FOR SOLUTION INTRAMUSCULAR; INTRAVENOUS DAILY
Status: DISCONTINUED | OUTPATIENT
Start: 2019-06-04 | End: 2019-06-04

## 2019-06-03 RX ORDER — PAROXETINE HYDROCHLORIDE 20 MG/1
20 TABLET, FILM COATED ORAL EVERY MORNING
Status: DISCONTINUED | OUTPATIENT
Start: 2019-06-04 | End: 2019-06-08 | Stop reason: HOSPADM

## 2019-06-03 RX ORDER — FUROSEMIDE 20 MG/1
20 TABLET ORAL 2 TIMES DAILY
Status: DISCONTINUED | OUTPATIENT
Start: 2019-06-03 | End: 2019-06-08 | Stop reason: HOSPADM

## 2019-06-03 RX ORDER — ALBUTEROL SULFATE 2.5 MG/3ML
2.5 SOLUTION RESPIRATORY (INHALATION) EVERY 6 HOURS PRN
Status: DISCONTINUED | OUTPATIENT
Start: 2019-06-03 | End: 2019-06-08 | Stop reason: HOSPADM

## 2019-06-03 RX ORDER — ASPIRIN 81 MG/1
81 TABLET, CHEWABLE ORAL DAILY
Status: DISCONTINUED | OUTPATIENT
Start: 2019-06-04 | End: 2019-06-08 | Stop reason: HOSPADM

## 2019-06-03 RX ADMIN — IPRATROPIUM BROMIDE AND ALBUTEROL SULFATE 3 ML: 2.5; .5 SOLUTION RESPIRATORY (INHALATION) at 14:13

## 2019-06-03 RX ADMIN — Medication 100 MG: at 20:19

## 2019-06-03 RX ADMIN — DOXYCYCLINE 100 MG: 100 CAPSULE ORAL at 20:19

## 2019-06-03 RX ADMIN — METHYLPREDNISOLONE SODIUM SUCCINATE 125 MG: 125 INJECTION, POWDER, FOR SOLUTION INTRAMUSCULAR; INTRAVENOUS at 14:20

## 2019-06-03 RX ADMIN — ATORVASTATIN CALCIUM 40 MG: 40 TABLET, FILM COATED ORAL at 20:19

## 2019-06-03 RX ADMIN — FUROSEMIDE 20 MG: 20 TABLET ORAL at 20:19

## 2019-06-03 RX ADMIN — CARVEDILOL 3.12 MG: 3.12 TABLET, FILM COATED ORAL at 20:19

## 2019-06-03 RX ADMIN — SENNOSIDES AND DOCUSATE SODIUM 2 TABLET: 8.6; 5 TABLET ORAL at 20:19

## 2019-06-03 RX ADMIN — IPRATROPIUM BROMIDE AND ALBUTEROL SULFATE 3 ML: 2.5; .5 SOLUTION RESPIRATORY (INHALATION) at 19:28

## 2019-06-03 RX ADMIN — MONTELUKAST SODIUM 10 MG: 10 TABLET, COATED ORAL at 20:19

## 2019-06-03 NOTE — H&P
"    HISTORY AND PHYSICAL  NAME: Monisha Brasher  : 1955  MRN: [id number]    DATE OF ADMISSION: 19    DATE & TIME SEEN: 19 6:10 PM    PCP: Tiffanie Mccauley APRN    CODE STATUS:   Code Status and Medical Interventions:   Ordered at: 19 1901     Level Of Support Discussed With:    Patient     Code Status:    CPR     Medical Interventions (Level of Support Prior to Arrest):    Full     Comments:    Patient reports that  is to make her medical decisions if she is incapable.       CHIEF COMPLAINT:   Chief Complaint   Patient presents with   • Shortness of Breath       HPI:  Monisha Brasher is a 64 y.o. female with a concurrent medical history of COPD, asthma, obstructive sleep apnea, CAD, and pre-diabetes, who presents with worsening shortness of air. The patient has a baseline 2 L nighttime oxygen requirement. She reports that she has had worsening shortness of air over the last six days. She reports a productive cough of \"nasty\" sputum and a fever up to 103 F in the last week, but none over the last three days. She endorses generalized malaise, some nausea, and one episode of emesis a few days ago. She has had no particular sick contacts but reports that she was around her grandchildren who visit . The patient has had increasing uses of her albuterol inhaler and nebulizer. She has a CPAP machine but has not been using it over the past week. She reports that she takes 10 MG of prednisone daily but sometimes takes up to 40 MG. She has not taken antibiotics recently. She presently denies chest pain but reports that she had it on Thursday, which resolved after taking five sublingual nitroglycerin tablets. The patient is followed by Dr. Ac (Pulmonology) and Dr. Huber (Cardiology).    In the ER, the patient was saturating at 88% on room air. She was placed on 3L of oxygen via nasal cannula. She received a one-time dose of IV Solumedrol 125 MG as well as a duo-neb " treatment. CMP was notable for acidosis. CXR was consistent with COPD but without acute cardiopulmonary findings. The patient is being admitted for COPD exacerbation.    CONCURRENT MEDICAL HISTORY:  Past Medical History:   Diagnosis Date   • Acute bronchitis    • Acute upper respiratory infection    • Adjustment disorder with mixed emotional features    • Agoraphobia with panic attacks    • Allergic rhinitis due to pollen    • Anxiety    • Asthma    • Backache    • Blood in urine    • Breast cyst    • Candidiasis of mouth    • Chronic bronchitis (CMS/HCC)    • Chronic obstructive lung disease (CMS/HCC)    • Emphysema lung (CMS/HCC)    • Essential hypertension    • Extrinsic asthma with status asthmaticus    • Fatigue    • H/O echocardiogram     EF 55-60%. LV systolic function normal. Impaired LV relaxation. [facility]   • Heart attack (CMS/HCC) 2015   • Hypoxemia    • Malaise and fatigue    • Non-IgE mediated allergic asthma    • Nonvenomous insect bite    • Pneumonia    • PONV (postoperative nausea and vomiting)    • Postmenopausal state    • Urinary tract infectious disease        PAST SURGICAL HISTORY:  Past Surgical History:   Procedure Laterality Date   • BREAST BIOPSY     • CARDIAC CATHETERIZATION N/A 11/15/2018    Procedure: Left Heart Cath;  Surgeon: Thaddeus Huber MD;  Location: [facility] INVASIVE LOCATION;  Service: Cardiology   • CARDIAC CATHETERIZATION N/A 2019    Procedure: Left Heart Cath 2019 @ 8:00 AM;  Surgeon: Thaddeus Huber MD;  Location: [facility] INVASIVE LOCATION;  Service: Cardiology   •  SECTION     • HYSTERECTOMY     • INJECTION OF MEDICATION  2015    celestone(1)   • INJECTION OF MEDICATION  2016    DEPO MEDROL(16)   • LEG SURGERY Bilateral        FAMILY HISTORY:  Family History   Problem Relation Age of Onset   • Heart attack Mother    • Heart disease Mother    • Heart disease Father    • Heart disease Paternal Grandmother         SOCIAL  HISTORY:  Social History     Socioeconomic History   • Marital status:      Spouse name: Not on file   • Number of children: Not on file   • Years of education: Not on file   • Highest education level: Not on file   Tobacco Use   • Smoking status: Former Smoker     Packs/day: 1.00     Years: 48.00     Pack years: 48.00     Types: Cigarettes, Electronic Cigarette     Start date:      Last attempt to quit: 2018     Years since quittin.5   • Smokeless tobacco: Never Used   Substance and Sexual Activity   • Alcohol use: No     Alcohol/week: 1.2 - 1.8 oz     Types: 1 - 2 Glasses of wine, 1 Cans of beer per week     Frequency: Never     Comment: Socially   • Drug use: No       HOME MEDICATIONS:  Medications Prior to Admission   Medication Sig Dispense Refill Last Dose   • albuterol (PROVENTIL) (2.5 MG/3ML) 0.083% nebulizer solution USE 1 VIAL VIA NEBULIZER EVERY 4 HOURS AS NEEDED FOR WHEEZING 360 mL 0 6/3/2019 at Unknown time   • ALBUTEROL SULFATE  (90 Base) MCG/ACT inhaler INHALE 2 PUFFS BY MOUTH EVERY 4 HOURS AS NEEDED FOR BREATHING 1 inhaler 5 6/3/2019 at Unknown time   • ALPRAZolam (XANAX) 0.25 MG tablet Take 1 tablet by mouth 3 (Three) Times a Day As Needed for Anxiety. 90 tablet 0 2019 at Unknown time   • aspirin 81 MG chewable tablet Chew 1 tablet Daily.   6/3/2019 at Unknown time   • azithromycin (ZITHROMAX) 250 MG tablet Take 2 tablets the first day, then 1 tablet daily for 4 days. 6 tablet 1 Past Month at Unknown time   • budesonide-formoterol (SYMBICORT) 160-4.5 MCG/ACT inhaler INHALE 2 PUFFS BY MOUTH TWICE DAILY 10.2 g 5 2019 at Unknown time   • prasugrel (EFFIENT) 10 MG tablet Take 1 tablet by mouth Daily. 30 tablet 11 2019 at Unknown time   • predniSONE (DELTASONE) 20 MG tablet TAKE 1 TABLET BY MOUTH DAILY 30 tablet 0 2019 at Unknown time   • sodium chloride (OCEAN) 0.65 % nasal spray 2 sprays into the nostril(s) as directed by provider As Needed for Congestion. 1  mL 1 Past Month at Unknown time   • SPIRIVA HANDIHALER 18 MCG per inhalation capsule INHALE 1 PUFF BY MOUTH DAILY 30 capsule 0 6/2/2019 at Unknown time   • amiodarone (PACERONE) 100 MG tablet Take 1 tablet by mouth 2 (Two) Times a Day. 60 tablet 1 Taking   • atorvastatin (LIPITOR) 20 MG tablet Take 2 tablets by mouth Every Night. 30 tablet 1 Taking   • carvedilol (COREG) 3.125 MG tablet Take 3.21 mg by mouth Every Night.  12 Taking   • furosemide (LASIX) 20 MG tablet Take 1 tablet by mouth 2 (Two) Times a Day. 60 tablet 1 Taking   • glucose blood test strip Use as instructed 50 each 12 Taking   • glucose monitor monitoring kit 1 each Daily. Testing blood sugar once a day    ICD10 - R73.9 1 each 0 Taking   • Lancets (FREESTYLE) lancets Testing blood sugar once a day 100 each 12 Taking   • losartan (COZAAR) 25 MG tablet Take 1 tablet by mouth Daily. 30 tablet 11 Taking   • metFORMIN (GLUCOPHAGE) 500 MG tablet Take 1 tablet by mouth Daily With Breakfast. 30 tablet 11 Taking   • montelukast (SINGULAIR) 10 MG tablet TAKE 1 TABLET BY MOUTH DAILY 90 tablet 4 Taking   • nitroglycerin (NITROSTAT) 0.4 MG SL tablet Take 0.4 mg by mouth.  12 Taking   • nystatin (MYCOSTATIN) 542649 UNIT/ML suspension SHAKE LIQUID AND TAKE 5 ML BY MOUTH FOUR TIMES DAILY 280 mL 0 Taking   • PARoxetine (PAXIL) 20 MG tablet Take 1 tablet by mouth Every Morning. 30 tablet 11 Taking       ALLERGIES:  Patient has no known allergies.    REVIEW OF SYSTEMS  Review of Systems   Constitutional: Positive for activity change, fatigue and fever. Negative for appetite change and chills.   HENT: Negative for congestion, ear pain, rhinorrhea, sneezing and sore throat.    Eyes: Negative for discharge and visual disturbance.   Respiratory: Positive for cough and shortness of breath.    Cardiovascular: Negative for chest pain, palpitations and leg swelling.   Gastrointestinal: Positive for constipation and nausea. Negative for abdominal pain, blood in stool,  diarrhea and vomiting.   Genitourinary: Negative for difficulty urinating, dysuria and hematuria.   Musculoskeletal: Positive for myalgias. Negative for joint swelling.   Skin: Negative for rash and wound.   Neurological: Negative for dizziness, syncope, weakness, light-headedness, numbness and headaches.   Psychiatric/Behavioral: Negative for confusion, decreased concentration and sleep disturbance.     PHYSICAL EXAM:  Temp:  [98.1 °F (36.7 °C)-98.8 °F (37.1 °C)] 98.1 °F (36.7 °C)  Heart Rate:  [62-76] 74  Resp:  [18-20] 18  BP: (137-174)/() 152/100  Body mass index is 26.32 kg/m².     Physical Exam   Constitutional: She is oriented to person, place, and time. She appears well-developed and well-nourished. No distress.   HENT:   Head: Normocephalic and atraumatic.   Right Ear: External ear normal.   Left Ear: External ear normal.   Nose: Nose normal.   Mouth/Throat: Oropharynx is clear and moist. No oropharyngeal exudate.   Eyes: Conjunctivae and EOM are normal. Pupils are equal, round, and reactive to light. Right eye exhibits no discharge. Left eye exhibits no discharge. No scleral icterus.   Neck: Normal range of motion. Neck supple. No thyromegaly present.   Cardiovascular: Normal rate, regular rhythm, normal heart sounds and intact distal pulses.   No murmur heard.  Pulmonary/Chest: No stridor. She is in respiratory distress ( mild). She has wheezes ( faint expiratory wheezes at lung bases). She has no rales.   Minimal air movement at the mid and upper lung lobes. Significantly decreased breath sounds at lung bases with expiratory wheezes.   Abdominal: Soft. Bowel sounds are normal. She exhibits distension ( but soft). She exhibits no mass. There is no tenderness. There is no rebound and no guarding.   Musculoskeletal: Normal range of motion. She exhibits no edema or tenderness.   Lymphadenopathy:     She has no cervical adenopathy.   Neurological: She is alert and oriented to person, place, and time.    Skin: Skin is warm. Capillary refill takes less than 2 seconds. No rash noted. She is not diaphoretic. No erythema.   Psychiatric: She has a normal mood and affect. Her behavior is normal.   Vitals reviewed.    DIAGNOSTIC DATA:   Lab Results (last 24 hours)     Procedure Component Value Units Date/Time    C-reactive Protein [[id number]]  (Abnormal) Collected:  06/03/19 1520    Specimen:  Blood Updated:  06/03/19 1939     C-Reactive Protein 1.42 mg/dL     TSH [[id number]] Collected:  06/03/19 1520    Specimen:  Blood Updated:  06/03/19 1921    Comprehensive Metabolic Panel [[id number]]  (Abnormal) Collected:  06/03/19 1520    Specimen:  Blood Updated:  06/03/19 1544     Glucose 141 mg/dL      BUN 14 mg/dL      Creatinine 0.72 mg/dL      Sodium 139 mmol/L      Potassium 4.0 mmol/L      Chloride 97 mmol/L      CO2 31.0 mmol/L      Calcium 9.0 mg/dL      Total Protein 6.5 g/dL      Albumin 3.70 g/dL      ALT (SGPT) 26 U/L      AST (SGOT) 16 U/L      Alkaline Phosphatase 76 U/L      Total Bilirubin 0.3 mg/dL      eGFR Non African Amer 82 mL/min/1.73      Globulin 2.8 gm/dL      A/G Ratio 1.3 g/dL      BUN/Creatinine Ratio 19.4     Anion Gap 11.0 mmol/L     Narrative:       GFR Normal >60  Chronic Kidney Disease <60  Kidney Failure <15    CBC & Differential [[id number]] Collected:  06/03/19 1520    Specimen:  Blood Updated:  06/03/19 1528    Narrative:       The following orders were created for panel order CBC & Differential.  Procedure                               Abnormality         Status                     ---------                               -----------         ------                     CBC Auto Differential[[id number]]        Abnormal            Final result                 Please view results for these tests on the individual orders.    CBC Auto Differential [[id number]]  (Abnormal) Collected:  06/03/19 1520    Specimen:  Blood Updated:  06/03/19 1528     WBC 10.56 10*3/mm3      RBC 4.35 10*6/mm3      Hemoglobin  12.2 g/dL      Hematocrit 37.8 %      MCV 86.9 fL      MCH 28.0 pg      MCHC 32.3 g/dL      RDW 14.6 %      RDW-SD 46.8 fl      MPV 9.1 fL      Platelets 266 10*3/mm3      Neutrophil % 85.0 %      Lymphocyte % 8.6 %      Monocyte % 5.6 %      Eosinophil % 0.0 %      Basophil % 0.2 %      Immature Grans % 0.6 %      Neutrophils, Absolute 8.98 10*3/mm3      Lymphocytes, Absolute 0.91 10*3/mm3      Monocytes, Absolute 0.59 10*3/mm3      Eosinophils, Absolute 0.00 10*3/mm3      Basophils, Absolute 0.02 10*3/mm3      Immature Grans, Absolute 0.06 10*3/mm3      nRBC 0.0 /100 WBC     Troponin [[id number]]  (Normal) Collected:  06/03/19 1451    Specimen:  Blood Updated:  06/03/19 1511     Troponin T <0.010 ng/mL     Narrative:       Troponin T Reference Range:  <= 0.03 ng/mL-   Negative for AMI  >0.03 ng/mL-     Abnormal for myocardial necrosis.  Clinicians would have to utilize clinical acumen, EKG, Troponin and serial changes to determine if it is an Acute Myocardial Infarction or myocardial injury due to an underlying chronic condition.     BNP [[id number]]  (Abnormal) Collected:  06/03/19 1451    Specimen:  Blood Updated:  06/03/19 1510     proBNP 952.4 pg/mL     Narrative:       Among patients with dyspnea, NT-proBNP is highly sensitive for the detection of acute congestive heart failure. In addition NT-proBNP of <300 pg/ml effectively rules out acute congestive heart failure with 99% negative predictive value.           Imaging Results (last 24 hours)     Procedure Component Value Units Date/Time    XR Chest PA & Lateral [[id number]] Collected:  06/03/19 1424     Updated:  06/03/19 1453    Narrative:         [facility] [place]    Patient Name: GONZALEZ BOSS    ATTENDING: JULI HOBBS     REFERRING: BOBBY CONLEY    ORDERING: BOBBY CONLEY    -----------------------    PROCEDURE: Chest, PA and lateral    DATE OF EXAM: 6/3/2019    HISTORY: Shortness of air    2 views of the chest were obtained. Study  is compared to prior  exam of 11/19/2018.    There is hyperexpansion of both lungs with flattening of the  hemidiaphragms suggesting chronic obstructive pulmonary disease.  No acute infiltrates or pleural effusions are seen. The heart  size is within normal limits and the vasculature does not appear  congested. The costophrenic angles are clear.      Impression:       Impression: COPD. No definite acute abnormalities are seen.      Electronically signed by:  Franc Lopez MD  6/3/2019 2:51 PM CDT  Workstation: [id number]          I reviewed the patient's new clinical results.    ASSESSMENT AND PLAN: This is a 64 y.o. female with:    Active Hospital Problems    Diagnosis POA   • **COPD exacerbation (CMS/[facility]) [J44.1] Yes     - On 3L in ER with desaturation to 88% on room air. BNP slightly elevated; likely secondary to acute respiratory distress.  - Dose of solumedrol in ER. Will continue and de-escalate accordingly as patient demonstrates improvement.  - Will begin 7-day course of Doxycycline (6/3).  - Will check atypical organisms and respiratory panel.  - Daily CBC and CMP. Will monitor CRP Q48H given patient's steroids.  - Inspiratory spirometer and OPEP.  - Continue oxygen therapy and wean to baseline (2L at night).  - Pulmonary rehab consult placed.  - PT for deconditioning.     • Advanced care planning/counseling discussion [Z71.89] Not Applicable     - Patient desires to be full code but has desires with regards to duration on breathing tube. Designates her  as her medical power of . Amenable to advanced care planning.  - Will consult hospital .     • Nocturnal hypoxia [G47.34] Yes     - Baseline 2L oxygen requirement at night. Presently higher due to current COPD exacerbation.  - Will wean to baseline as clinical course improves.     • NARCISO (obstructive sleep apnea) [G47.33] Yes     - Intermittent use of CPAP at home.     • Prediabetes [R73.03] Yes     - Hgb A1c 6.2 in January.  - Will  continue home Metformin since patient is not undergoing contrast study.  - POCT glucose checks with IV steroids.  - Low-dose SSI.     • Paroxysmal atrial fibrillation (CMS/HCC) [I48.0] Yes     - First diagnosed in November. Followed by Dr. Huber outpatient.  - Maintained in sinus rhythm with Amiodarone. CHADSVASC score of 2 (for age and hypertension). On antiplatelet therapy (prasugrel). Will continue both. Will discuss with Dr. Huber about anticoagulation given her CHADSVASC score.  - Will check TSH.  - Cardiac monitoring.     • Acute respiratory failure with hypoxia (CMS/HCC) [J96.01] Yes     - Desaturated to 88% on RA without ambulation. Placed on 3 L of oxygen in ER.  - Continue oxygen therapy.  - Will treat COPD exacerbation and check atypical organisms as well as respiratory panel.  - Continue monitoring vitals per floor policy.     • CAD (coronary artery disease) [I25.10] Yes     - No active chest pain.  - Continue home prasugrel, atorvastatin, carvedilol.  - Nitro SL PRN for chest pain.     • Anxiety [F41.9] Yes     - Continue home Alprazolam and Paroxetine.         Plan for disposition:Where: home and When:  2-3days    DVT prophylaxis: SCDs, PADUA score 1.  Code status is   Code Status and Medical Interventions:   Ordered at: 06/03/19 1901     Level Of Support Discussed With:    Patient     Code Status:    CPR     Medical Interventions (Level of Support Prior to Arrest):    Full     Comments:    Patient reports that  is to make her medical decisions if she is incapable.      KAYLAN # [id number], reviewed and consistent with patient reported medications.      I discussed the patients findings and my recommendations with patient and primary care team.     Dr. Lisa Alex is the attending on record at time of admission, she is aware of the patient's status and agrees with the above history and physical.      Jyoti Gloria M.D. PGY2  [facility] [place] [address]  Office: [phone]      This document has been electronically signed by Jyoti Gloria MD on Betty 3, 2019 7:54 PM

## 2019-06-03 NOTE — ED PROVIDER NOTES
Subjective   Pt, hx of MI, COPD, and DM, reports SOA started 6 days ago and now in the ED due to worsening symptoms. At baseline, pt only uses 2 L of oxygen at night. Associated symtpoms include N/V and chest pain 6 days ago (now resolved).        History provided by:  Patient   used: No    Shortness of Breath   Severity:  Moderate  Onset quality:  Gradual  Duration:  6 days  Timing:  Constant  Progression:  Worsening  Chronicity:  Recurrent  Context: activity    Relieved by:  Oxygen  Worsened by:  Exertion and movement  Associated symptoms: chest pain and cough    Associated symptoms: no abdominal pain, no diaphoresis and no sore throat        Review of Systems   Constitutional: Negative for diaphoresis.   HENT: Negative for sore throat.    Respiratory: Positive for cough and shortness of breath.    Cardiovascular: Positive for chest pain. Negative for leg swelling.   Gastrointestinal: Negative for abdominal pain.   Musculoskeletal: Negative for arthralgias and back pain.   Hematological: Negative for adenopathy.   All other systems reviewed and are negative.      Past Medical History:   Diagnosis Date   • Acute bronchitis    • Acute upper respiratory infection    • Adjustment disorder with mixed emotional features    • Agoraphobia with panic attacks    • Allergic rhinitis due to pollen    • Anxiety    • Asthma    • Backache    • Blood in urine    • Breast cyst    • Candidiasis of mouth    • Chronic bronchitis (CMS/HCC)    • Chronic obstructive lung disease (CMS/HCC)    • Emphysema lung (CMS/HCC)    • Essential hypertension    • Extrinsic asthma with status asthmaticus    • Fatigue    • H/O echocardiogram     EF 55-60%. LV systolic function normal. Impaired LV relaxation. [facility]   • Heart attack (CMS/HCC) 11/2015   • Hypoxemia    • Malaise and fatigue    • Non-IgE mediated allergic asthma    • Nonvenomous insect bite    • Pneumonia    • PONV (postoperative nausea and vomiting)     • Postmenopausal state    • Urinary tract infectious disease        No Known Allergies    Past Surgical History:   Procedure Laterality Date   • BREAST BIOPSY     • CARDIAC CATHETERIZATION N/A 11/15/2018    Procedure: Left Heart Cath;  Surgeon: Thaddeus Huber MD;  Location: [facility] INVASIVE LOCATION;  Service: Cardiology   • CARDIAC CATHETERIZATION N/A 2019    Procedure: Left Heart Cath 2019 @ 8:00 AM;  Surgeon: Thaddeus Huber MD;  Location: [facility] INVASIVE LOCATION;  Service: Cardiology   •  SECTION     • HYSTERECTOMY     • INJECTION OF MEDICATION  2015    celestone(1)   • INJECTION OF MEDICATION  2016    DEPO MEDROL(16)   • LEG SURGERY Bilateral        Family History   Problem Relation Age of Onset   • Heart attack Mother    • Heart disease Mother    • Heart disease Father    • Heart disease Paternal Grandmother        Social History     Socioeconomic History   • Marital status:      Spouse name: Not on file   • Number of children: Not on file   • Years of education: Not on file   • Highest education level: Not on file   Tobacco Use   • Smoking status: Former Smoker     Packs/day: 1.00     Years: 48.00     Pack years: 48.00     Types: Cigarettes, Electronic Cigarette     Start date:      Last attempt to quit: 2018     Years since quittin.5   • Smokeless tobacco: Never Used   Substance and Sexual Activity   • Alcohol use: No     Alcohol/week: 1.2 - 1.8 oz     Types: 1 - 2 Glasses of wine, 1 Cans of beer per week     Frequency: Never     Comment: Socially   • Drug use: No           Objective   Physical Exam   Constitutional: She is oriented to person, place, and time. She appears well-developed and well-nourished.   HENT:   Head: Normocephalic.   Right Ear: Hearing normal.   Left Ear: Hearing normal.   Nose: Nose normal.   Eyes: Conjunctivae, EOM and lids are normal.   Neck: Trachea normal and full passive range of motion without pain.    Cardiovascular: Regular rhythm, S1 normal, S2 normal, normal heart sounds and normal pulses.   Pulmonary/Chest: Effort normal. She has wheezes.   Abdominal: Normal appearance and bowel sounds are normal.   Neurological: She is alert and oriented to person, place, and time. She is not disoriented.   Skin: Skin is warm and dry. She is not diaphoretic.   Psychiatric: She has a normal mood and affect. Her speech is normal and behavior is normal. Thought content normal.   Nursing note and vitals reviewed.      ECG 12 Lead    Date/Time: 6/3/2019 5:39 PM  Performed by: Raiza Ríos PA-C  Authorized by: Raiza Ríos PA-C   Interpreted by physician  Comparison: compared with previous ECG from 11/20/2018  Rate: normal  BPM: 66  QRS axis: normal  Conduction: conduction normal  ST Segments: ST segments normal  T Waves: T waves normal  Other: no other findings  Clinical impression: normal ECG                 ED Course  ED Course as of Jun 03 1955   Mon Jun 03, 2019   1600 ECG 12 Lead [SS]      ED Course User Index  [SS] Raiza Ríos PA-C      5:29P  When pt is not on O2, O2 sats drop to 88%.            MDM      Final diagnoses:   COPD exacerbation (CMS/[facility])            Raiza Ríos PA-C  06/1955

## 2019-06-04 LAB
ALBUMIN SERPL-MCNC: 3.5 G/DL (ref 3.5–5.2)
ALBUMIN/GLOB SERPL: 1.3 G/DL
ALP SERPL-CCNC: 79 U/L (ref 39–117)
ALT SERPL W P-5'-P-CCNC: 23 U/L (ref 1–33)
ANION GAP SERPL CALCULATED.3IONS-SCNC: 11 MMOL/L
AST SERPL-CCNC: 11 U/L (ref 1–32)
BASOPHILS # BLD AUTO: 0.02 10*3/MM3 (ref 0–0.2)
BASOPHILS NFR BLD AUTO: 0.2 % (ref 0–1.5)
BILIRUB SERPL-MCNC: 0.3 MG/DL (ref 0.2–1.2)
BUN BLD-MCNC: 16 MG/DL (ref 8–23)
BUN/CREAT SERPL: 21.6 (ref 7–25)
CALCIUM SPEC-SCNC: 9.3 MG/DL (ref 8.6–10.5)
CHLORIDE SERPL-SCNC: 97 MMOL/L (ref 98–107)
CO2 SERPL-SCNC: 32 MMOL/L (ref 22–29)
CREAT BLD-MCNC: 0.74 MG/DL (ref 0.57–1)
DEPRECATED RDW RBC AUTO: 46.1 FL (ref 37–54)
EOSINOPHIL # BLD AUTO: 0 10*3/MM3 (ref 0–0.4)
EOSINOPHIL NFR BLD AUTO: 0 % (ref 0.3–6.2)
ERYTHROCYTE [DISTWIDTH] IN BLOOD BY AUTOMATED COUNT: 14.6 % (ref 12.3–15.4)
GFR SERPL CREATININE-BSD FRML MDRD: 80 ML/MIN/1.73
GLOBULIN UR ELPH-MCNC: 2.8 GM/DL
GLUCOSE BLD-MCNC: 131 MG/DL (ref 65–99)
GLUCOSE BLDC GLUCOMTR-MCNC: 146 MG/DL (ref 70–130)
GLUCOSE BLDC GLUCOMTR-MCNC: 198 MG/DL (ref 70–130)
GLUCOSE BLDC GLUCOMTR-MCNC: 205 MG/DL (ref 70–130)
GLUCOSE BLDC GLUCOMTR-MCNC: 255 MG/DL (ref 70–130)
HCT VFR BLD AUTO: 38.5 % (ref 34–46.6)
HGB BLD-MCNC: 12.4 G/DL (ref 12–15.9)
IMM GRANULOCYTES # BLD AUTO: 0.14 10*3/MM3 (ref 0–0.05)
IMM GRANULOCYTES NFR BLD AUTO: 1.1 % (ref 0–0.5)
LYMPHOCYTES # BLD AUTO: 1.04 10*3/MM3 (ref 0.7–3.1)
LYMPHOCYTES NFR BLD AUTO: 8.2 % (ref 19.6–45.3)
MCH RBC QN AUTO: 27.9 PG (ref 26.6–33)
MCHC RBC AUTO-ENTMCNC: 32.2 G/DL (ref 31.5–35.7)
MCV RBC AUTO: 86.5 FL (ref 79–97)
MONOCYTES # BLD AUTO: 0.9 10*3/MM3 (ref 0.1–0.9)
MONOCYTES NFR BLD AUTO: 7.1 % (ref 5–12)
NEUTROPHILS # BLD AUTO: 10.61 10*3/MM3 (ref 1.7–7)
NEUTROPHILS NFR BLD AUTO: 83.4 % (ref 42.7–76)
NRBC BLD AUTO-RTO: 0 /100 WBC (ref 0–0.2)
PLATELET # BLD AUTO: 307 10*3/MM3 (ref 140–450)
PMV BLD AUTO: 9.1 FL (ref 6–12)
POTASSIUM BLD-SCNC: 4.3 MMOL/L (ref 3.5–5.2)
PROT SERPL-MCNC: 6.3 G/DL (ref 6–8.5)
RBC # BLD AUTO: 4.45 10*6/MM3 (ref 3.77–5.28)
SODIUM BLD-SCNC: 140 MMOL/L (ref 136–145)
T4 FREE SERPL-MCNC: 1.63 NG/DL (ref 0.93–1.7)
TSH SERPL DL<=0.05 MIU/L-ACNC: 0.15 MIU/ML (ref 0.27–4.2)
WBC NRBC COR # BLD: 12.71 10*3/MM3 (ref 3.4–10.8)

## 2019-06-04 PROCEDURE — 25010000002 ENOXAPARIN PER 10 MG: Performed by: STUDENT IN AN ORGANIZED HEALTH CARE EDUCATION/TRAINING PROGRAM

## 2019-06-04 PROCEDURE — G0378 HOSPITAL OBSERVATION PER HR: HCPCS

## 2019-06-04 PROCEDURE — 97530 THERAPEUTIC ACTIVITIES: CPT

## 2019-06-04 PROCEDURE — 84439 ASSAY OF FREE THYROXINE: CPT | Performed by: STUDENT IN AN ORGANIZED HEALTH CARE EDUCATION/TRAINING PROGRAM

## 2019-06-04 PROCEDURE — 85025 COMPLETE CBC W/AUTO DIFF WBC: CPT | Performed by: STUDENT IN AN ORGANIZED HEALTH CARE EDUCATION/TRAINING PROGRAM

## 2019-06-04 PROCEDURE — 96376 TX/PRO/DX INJ SAME DRUG ADON: CPT

## 2019-06-04 PROCEDURE — 94640 AIRWAY INHALATION TREATMENT: CPT

## 2019-06-04 PROCEDURE — 99225 PR SBSQ OBSERVATION CARE/DAY 25 MINUTES: CPT | Performed by: STUDENT IN AN ORGANIZED HEALTH CARE EDUCATION/TRAINING PROGRAM

## 2019-06-04 PROCEDURE — 94760 N-INVAS EAR/PLS OXIMETRY 1: CPT

## 2019-06-04 PROCEDURE — 97162 PT EVAL MOD COMPLEX 30 MIN: CPT

## 2019-06-04 PROCEDURE — 96372 THER/PROPH/DIAG INJ SC/IM: CPT

## 2019-06-04 PROCEDURE — 94799 UNLISTED PULMONARY SVC/PX: CPT

## 2019-06-04 PROCEDURE — 25010000002 METHYLPREDNISOLONE PER 125 MG: Performed by: STUDENT IN AN ORGANIZED HEALTH CARE EDUCATION/TRAINING PROGRAM

## 2019-06-04 PROCEDURE — 80053 COMPREHEN METABOLIC PANEL: CPT | Performed by: STUDENT IN AN ORGANIZED HEALTH CARE EDUCATION/TRAINING PROGRAM

## 2019-06-04 PROCEDURE — 97116 GAIT TRAINING THERAPY: CPT

## 2019-06-04 PROCEDURE — 82962 GLUCOSE BLOOD TEST: CPT

## 2019-06-04 PROCEDURE — 63710000001 INSULIN ASPART PER 5 UNITS: Performed by: STUDENT IN AN ORGANIZED HEALTH CARE EDUCATION/TRAINING PROGRAM

## 2019-06-04 RX ORDER — CARVEDILOL 3.12 MG/1
3.12 TABLET ORAL 2 TIMES DAILY WITH MEALS
Status: DISCONTINUED | OUTPATIENT
Start: 2019-06-04 | End: 2019-06-08 | Stop reason: HOSPADM

## 2019-06-04 RX ORDER — METHYLPREDNISOLONE SODIUM SUCCINATE 125 MG/2ML
60 INJECTION, POWDER, LYOPHILIZED, FOR SOLUTION INTRAMUSCULAR; INTRAVENOUS EVERY 8 HOURS
Status: DISCONTINUED | OUTPATIENT
Start: 2019-06-04 | End: 2019-06-07

## 2019-06-04 RX ORDER — LOSARTAN POTASSIUM 25 MG/1
25 TABLET ORAL
Status: DISCONTINUED | OUTPATIENT
Start: 2019-06-04 | End: 2019-06-08 | Stop reason: HOSPADM

## 2019-06-04 RX ADMIN — METHYLPREDNISOLONE SODIUM SUCCINATE 60 MG: 125 INJECTION, POWDER, FOR SOLUTION INTRAMUSCULAR; INTRAVENOUS at 20:31

## 2019-06-04 RX ADMIN — ASPIRIN 81 MG CHEWABLE TABLET 81 MG: 81 TABLET CHEWABLE at 09:04

## 2019-06-04 RX ADMIN — PAROXETINE HYDROCHLORIDE 20 MG: 20 TABLET, FILM COATED ORAL at 06:07

## 2019-06-04 RX ADMIN — BUDESONIDE AND FORMOTEROL FUMARATE DIHYDRATE 2 PUFF: 160; 4.5 AEROSOL RESPIRATORY (INHALATION) at 07:42

## 2019-06-04 RX ADMIN — ENOXAPARIN SODIUM 60 MG: 60 INJECTION SUBCUTANEOUS at 12:28

## 2019-06-04 RX ADMIN — ALPRAZOLAM 0.25 MG: 0.25 TABLET ORAL at 18:28

## 2019-06-04 RX ADMIN — LOSARTAN POTASSIUM 25 MG: 25 TABLET, FILM COATED ORAL at 12:28

## 2019-06-04 RX ADMIN — METFORMIN HYDROCHLORIDE 500 MG: 500 TABLET, FILM COATED ORAL at 09:05

## 2019-06-04 RX ADMIN — Medication 100 MG: at 20:32

## 2019-06-04 RX ADMIN — DOXYCYCLINE 100 MG: 100 CAPSULE ORAL at 20:32

## 2019-06-04 RX ADMIN — INSULIN ASPART 4 UNITS: 100 INJECTION, SOLUTION INTRAVENOUS; SUBCUTANEOUS at 20:31

## 2019-06-04 RX ADMIN — FUROSEMIDE 20 MG: 20 TABLET ORAL at 09:04

## 2019-06-04 RX ADMIN — IPRATROPIUM BROMIDE AND ALBUTEROL SULFATE 3 ML: 2.5; .5 SOLUTION RESPIRATORY (INHALATION) at 07:42

## 2019-06-04 RX ADMIN — ATORVASTATIN CALCIUM 40 MG: 40 TABLET, FILM COATED ORAL at 20:32

## 2019-06-04 RX ADMIN — PRASUGREL 10 MG: 10 TABLET, FILM COATED ORAL at 09:06

## 2019-06-04 RX ADMIN — Medication 100 MG: at 09:03

## 2019-06-04 RX ADMIN — IPRATROPIUM BROMIDE AND ALBUTEROL SULFATE 3 ML: 2.5; .5 SOLUTION RESPIRATORY (INHALATION) at 19:49

## 2019-06-04 RX ADMIN — METHYLPREDNISOLONE SODIUM SUCCINATE 60 MG: 125 INJECTION, POWDER, FOR SOLUTION INTRAMUSCULAR; INTRAVENOUS at 12:41

## 2019-06-04 RX ADMIN — IPRATROPIUM BROMIDE AND ALBUTEROL SULFATE 3 ML: 2.5; .5 SOLUTION RESPIRATORY (INHALATION) at 15:04

## 2019-06-04 RX ADMIN — DOXYCYCLINE 100 MG: 100 CAPSULE ORAL at 09:04

## 2019-06-04 RX ADMIN — IPRATROPIUM BROMIDE AND ALBUTEROL SULFATE 3 ML: 2.5; .5 SOLUTION RESPIRATORY (INHALATION) at 10:45

## 2019-06-04 RX ADMIN — SENNOSIDES AND DOCUSATE SODIUM 2 TABLET: 8.6; 5 TABLET ORAL at 20:32

## 2019-06-04 RX ADMIN — MONTELUKAST SODIUM 10 MG: 10 TABLET, COATED ORAL at 09:06

## 2019-06-04 RX ADMIN — BUDESONIDE AND FORMOTEROL FUMARATE DIHYDRATE 2 PUFF: 160; 4.5 AEROSOL RESPIRATORY (INHALATION) at 19:49

## 2019-06-04 RX ADMIN — FUROSEMIDE 20 MG: 20 TABLET ORAL at 20:31

## 2019-06-04 RX ADMIN — CARVEDILOL 3.12 MG: 3.12 TABLET, FILM COATED ORAL at 17:34

## 2019-06-04 NOTE — PROGRESS NOTES
FAMILY MEDICINE DAILY PROGRESS NOTE    NAME: Monisha Brasher  : 1955  MRN: [id number]      LOS: 0 days     PROVIDER OF SERVICE: Chintan Bloom MD    Chief Complaint: COPD exacerbation (CMS/[facility])    Subjective:     Interval History:  History taken from: patient chart    Patient doing better today than yesterday but still having occasional cough and shortness of air and requiring daily continuous supplemental oxygen. No other complaints. Denies fever, headache, chest pain, diarrhea.     Review of Systems:   Review of Systems   Constitutional: Negative for appetite change, diaphoresis, fatigue and fever.   HENT: Negative for congestion, ear pain, postnasal drip, rhinorrhea and sore throat.    Eyes: Negative for pain and visual disturbance.   Respiratory: Positive for cough, shortness of breath and wheezing. Negative for chest tightness.    Cardiovascular: Negative for chest pain, palpitations and leg swelling.   Gastrointestinal: Negative for abdominal pain, constipation, diarrhea, nausea and vomiting.   Genitourinary: Negative for dysuria, flank pain, frequency and urgency.   Musculoskeletal: Negative for arthralgias, back pain and myalgias.   Skin: Negative for pallor and rash.   Neurological: Negative for dizziness, syncope, weakness and numbness.       Objective:     Vital Signs  Temp:  [97.1 °F (36.2 °C)-98.8 °F (37.1 °C)] 97.1 °F (36.2 °C)  Heart Rate:  [62-82] 82  Resp:  [18-20] 20  BP: (120-174)/() 120/60  Body mass index is 26.39 kg/m².    Physical Exam  Physical Exam   Constitutional: She is oriented to person, place, and time. She appears well-developed and well-nourished. No distress.   HENT:   Head: Normocephalic and atraumatic.   Nose: Nose normal.   Eyes: EOM are normal. Pupils are equal, round, and reactive to light.   Neck: Normal range of motion. Neck supple. No thyromegaly present.   Cardiovascular: Normal rate, regular rhythm, normal heart sounds and intact distal pulses.    Pulmonary/Chest: She has wheezes. She has rales.   Poor air movement, normal effort. Wheezing diffusely bilaterally and crackles at the bases bilaterally   Abdominal: Soft. Bowel sounds are normal. She exhibits no distension. There is no tenderness. There is no rebound and no guarding.   Musculoskeletal: Normal range of motion. She exhibits no edema or tenderness.   Lymphadenopathy:     She has no cervical adenopathy.   Neurological: She is alert and oriented to person, place, and time.   Skin: Skin is warm and dry. Capillary refill takes less than 2 seconds. She is not diaphoretic.   Psychiatric: She has a normal mood and affect. Her behavior is normal.       Medication Review    Current Facility-Administered Medications:   •  acetaminophen (TYLENOL) tablet 650 mg, 650 mg, Oral, Q4H PRN, Jyoti Gloria MD  •  albuterol (PROVENTIL) nebulizer solution 0.083% 2.5 mg/3mL, 2.5 mg, Nebulization, Q6H PRN, Jyoti Gloria MD  •  ALPRAZolam (XANAX) tablet 0.25 mg, 0.25 mg, Oral, TID PRN, Jyoti Gloria MD  •  amiodarone (PACERONE) half tablet 100 mg, 100 mg, Oral, BID, Jyoti Gloria MD, 100 mg at 06/04/19 0903  •  aspirin chewable tablet 81 mg, 81 mg, Oral, Daily, Jyoti Gloria MD, 81 mg at 06/04/19 0904  •  atorvastatin (LIPITOR) tablet 40 mg, 40 mg, Oral, Nightly, Jyoti Gloria MD, 40 mg at 06/03/19 2019  •  bisacodyl (DULCOLAX) suppository 10 mg, 10 mg, Rectal, Daily PRN, Jyoti Gloria MD  •  budesonide-formoterol (SYMBICORT) 160-4.5 MCG/ACT inhaler 2 puff, 2 puff, Inhalation, BID - RT, Jyoti Gloria MD, 2 puff at 06/04/19 0742  •  calcium carbonate (TUMS) chewable tablet 500 mg (200 mg elemental), 2 tablet, Oral, Daily PRN, Jyoti Gloria MD  •  carvedilol (COREG) tablet 3.125 mg, 3.125 mg, Oral, Nightly, Jyoti Gloria MD, 3.125 mg at 06/03/19 2019  •  dextrose (D50W) 25 g/ 50mL Intravenous Solution 25 g,  25 g, Intravenous, Q15 Min PRN, Jyoti Gloria MD  •  dextrose (GLUTOSE) oral gel 15 g, 15 g, Oral, Q15 Min PRN, Jyoti Gloria MD  •  doxycycline (MONODOX) capsule 100 mg, 100 mg, Oral, Q12H, Jyoti Gloria MD, 100 mg at 06/04/19 0904  •  enoxaparin (LOVENOX) syringe 60 mg, 60 mg, Subcutaneous, Q24H, Chintan Bloom MD, 60 mg at 06/04/19 1228  •  furosemide (LASIX) tablet 20 mg, 20 mg, Oral, BID, Jyoti Gloria MD, 20 mg at 06/04/19 0904  •  glucagon (human recombinant) (GLUCAGEN DIAGNOSTIC) injection 1 mg, 1 mg, Subcutaneous, PRN, Jyoti Gloria MD  •  insulin aspart (novoLOG) injection 0-7 Units, 0-7 Units, Subcutaneous, 4x Daily AC & at Bedtime, Jyoti Gloria MD  •  ipratropium-albuterol (DUO-NEB) nebulizer solution 3 mL, 3 mL, Nebulization, 4x Daily - RT, Jyoti Gloria MD, 3 mL at 06/04/19 1045  •  losartan (COZAAR) tablet 25 mg, 25 mg, Oral, Q24H, Chintan Bloom MD, 25 mg at 06/04/19 1228  •  methylPREDNISolone sodium succinate (SOLU-Medrol) injection 60 mg, 60 mg, Intravenous, Q8H, Chintan Bloom MD, 60 mg at 06/04/19 1241  •  montelukast (SINGULAIR) tablet 10 mg, 10 mg, Oral, Daily, Jyoti Gloria MD, 10 mg at 06/04/19 0906  •  nitroglycerin (NITROSTAT) SL tablet 0.4 mg, 0.4 mg, Sublingual, Q5 Min PRN, Jyoti Gloria MD  •  ondansetron (ZOFRAN) tablet 4 mg, 4 mg, Oral, Q6H PRN, Jyoti Gloria MD  •  PARoxetine (PAXIL) tablet 20 mg, 20 mg, Oral, QAM, Jyoti Gloria MD, 20 mg at 06/04/19 0607  •  prasugrel (EFFIENT) tablet 10 mg, 10 mg, Oral, Daily, Jyoti Gloria MD, 10 mg at 06/04/19 0906  •  sennosides-docusate sodium (SENOKOT-S) 8.6-50 MG tablet 2 tablet, 2 tablet, Oral, Nightly, Jyoti Gloria MD, 2 tablet at 06/03/19 2019     Diagnostic Data    Lab Results (last 24 hours)     Procedure Component Value Units Date/Time    POC Glucose Once  [[id number]]  (Abnormal) Collected:  06/04/19 1042    Specimen:  Blood Updated:  06/04/19 1056     Glucose 198 mg/dL      Comment: RN NotifiedOperator: [id number] ILENE Gerard ID: [id number]       Comprehensive Metabolic Panel [[id number]]  (Abnormal) Collected:  06/04/19 0718    Specimen:  Blood Updated:  06/04/19 0819     Glucose 131 mg/dL      BUN 16 mg/dL      Creatinine 0.74 mg/dL      Sodium 140 mmol/L      Potassium 4.3 mmol/L      Chloride 97 mmol/L      CO2 32.0 mmol/L      Calcium 9.3 mg/dL      Total Protein 6.3 g/dL      Albumin 3.50 g/dL      ALT (SGPT) 23 U/L      AST (SGOT) 11 U/L      Alkaline Phosphatase 79 U/L      Total Bilirubin 0.3 mg/dL      eGFR Non African Amer 80 mL/min/1.73      Globulin 2.8 gm/dL      A/G Ratio 1.3 g/dL      BUN/Creatinine Ratio 21.6     Anion Gap 11.0 mmol/L     Narrative:       GFR Normal >60  Chronic Kidney Disease <60  Kidney Failure <15    CBC & Differential [[id number]] Collected:  06/04/19 0718    Specimen:  Blood Updated:  06/04/19 0754    Narrative:       The following orders were created for panel order CBC & Differential.  Procedure                               Abnormality         Status                     ---------                               -----------         ------                     CBC Auto Differential[[id number]]        Abnormal            Final result                 Please view results for these tests on the individual orders.    CBC Auto Differential [[id number]]  (Abnormal) Collected:  06/04/19 0718    Specimen:  Blood Updated:  06/04/19 0754     WBC 12.71 10*3/mm3      RBC 4.45 10*6/mm3      Hemoglobin 12.4 g/dL      Hematocrit 38.5 %      MCV 86.5 fL      MCH 27.9 pg      MCHC 32.2 g/dL      RDW 14.6 %      RDW-SD 46.1 fl      MPV 9.1 fL      Platelets 307 10*3/mm3      Neutrophil % 83.4 %      Lymphocyte % 8.2 %      Monocyte % 7.1 %      Eosinophil % 0.0 %      Basophil % 0.2 %      Immature Grans % 1.1 %      Neutrophils, Absolute  10.61 10*3/mm3      Lymphocytes, Absolute 1.04 10*3/mm3      Monocytes, Absolute 0.90 10*3/mm3      Eosinophils, Absolute 0.00 10*3/mm3      Basophils, Absolute 0.02 10*3/mm3      Immature Grans, Absolute 0.14 10*3/mm3      nRBC 0.0 /100 WBC     POC Glucose Once [[id number]]  (Abnormal) Collected:  06/04/19 0720    Specimen:  Blood Updated:  06/04/19 0737     Glucose 146 mg/dL      Comment: RN NotifiedOperator: [id number] ILENE CHRISTIANMeter ID: [id number]       TSH [[id number]]  (Abnormal) Collected:  06/03/19 1520    Specimen:  Blood Updated:  06/04/19 0033     TSH 0.150 mIU/mL     Respiratory Panel, PCR - Swab, Nasopharynx [[id number]]  (Normal) Collected:  06/03/19 2035    Specimen:  Swab from Nasopharynx Updated:  06/03/19 2205     ADENOVIRUS, PCR Not Detected     Coronavirus 229E Not Detected     Coronavirus HKU1 Not Detected     Coronavirus NL63 Not Detected     Coronavirus OC43 Not Detected     Human Metapneumovirus Not Detected     Human Rhinovirus/Enterovirus Not Detected     Influenza B PCR Not Detected     Parainfluenza Virus 1 Not Detected     Parainfluenza Virus 2 Not Detected     Parainfluenza Virus 3 Not Detected     Parainfluenza Virus 4 Not Detected     Bordetella pertussis pcr Not Detected     Influenza A H1 2009 PCR Not Detected     Chlamydophila pneumoniae PCR Not Detected     Mycoplasma pneumo by PCR Not Detected     Influenza A PCR Not Detected     Influenza A H3 Not Detected     Influenza A H1 Not Detected     RSV, PCR Not Detected    POC Glucose Once [[id number]]  (Abnormal) Collected:  06/03/19 1940    Specimen:  Blood Updated:  06/03/19 2202     Glucose 177 mg/dL      Comment: RN NotifiedOperator: [id number] KRISTEN WESTIEMeter ID: [id number]       Legionella Antigen, Urine - Urine, Urine, Clean Catch [[id number]]  (Normal) Collected:  06/03/19 2044    Specimen:  Urine, Clean Catch Updated:  06/03/19 2109     LEGIONELLA ANTIGEN, URINE Negative    S. Pneumo Ag Urine or CSF - Urine,  Urine, Clean Catch [[id number]]  (Normal) Collected:  06/03/19 2044    Specimen:  Urine, Clean Catch Updated:  06/03/19 2108     Strep Pneumo Ag Negative    C-reactive Protein [[id number]]  (Abnormal) Collected:  06/03/19 1520    Specimen:  Blood Updated:  06/03/19 1939     C-Reactive Protein 1.42 mg/dL     Comprehensive Metabolic Panel [[id number]]  (Abnormal) Collected:  06/03/19 1520    Specimen:  Blood Updated:  06/03/19 1544     Glucose 141 mg/dL      BUN 14 mg/dL      Creatinine 0.72 mg/dL      Sodium 139 mmol/L      Potassium 4.0 mmol/L      Chloride 97 mmol/L      CO2 31.0 mmol/L      Calcium 9.0 mg/dL      Total Protein 6.5 g/dL      Albumin 3.70 g/dL      ALT (SGPT) 26 U/L      AST (SGOT) 16 U/L      Alkaline Phosphatase 76 U/L      Total Bilirubin 0.3 mg/dL      eGFR Non African Amer 82 mL/min/1.73      Globulin 2.8 gm/dL      A/G Ratio 1.3 g/dL      BUN/Creatinine Ratio 19.4     Anion Gap 11.0 mmol/L     Narrative:       GFR Normal >60  Chronic Kidney Disease <60  Kidney Failure <15    CBC & Differential [[id number]] Collected:  06/03/19 1520    Specimen:  Blood Updated:  06/03/19 1528    Narrative:       The following orders were created for panel order CBC & Differential.  Procedure                               Abnormality         Status                     ---------                               -----------         ------                     CBC Auto Differential[[id number]]        Abnormal            Final result                 Please view results for these tests on the individual orders.    CBC Auto Differential [[id number]]  (Abnormal) Collected:  06/03/19 1520    Specimen:  Blood Updated:  06/03/19 1528     WBC 10.56 10*3/mm3      RBC 4.35 10*6/mm3      Hemoglobin 12.2 g/dL      Hematocrit 37.8 %      MCV 86.9 fL      MCH 28.0 pg      MCHC 32.3 g/dL      RDW 14.6 %      RDW-SD 46.8 fl      MPV 9.1 fL      Platelets 266 10*3/mm3      Neutrophil % 85.0 %      Lymphocyte % 8.6 %      Monocyte % 5.6 %       Eosinophil % 0.0 %      Basophil % 0.2 %      Immature Grans % 0.6 %      Neutrophils, Absolute 8.98 10*3/mm3      Lymphocytes, Absolute 0.91 10*3/mm3      Monocytes, Absolute 0.59 10*3/mm3      Eosinophils, Absolute 0.00 10*3/mm3      Basophils, Absolute 0.02 10*3/mm3      Immature Grans, Absolute 0.06 10*3/mm3      nRBC 0.0 /100 WBC     Troponin [[id number]]  (Normal) Collected:  06/03/19 1451    Specimen:  Blood Updated:  06/03/19 1511     Troponin T <0.010 ng/mL     Narrative:       Troponin T Reference Range:  <= 0.03 ng/mL-   Negative for AMI  >0.03 ng/mL-     Abnormal for myocardial necrosis.  Clinicians would have to utilize clinical acumen, EKG, Troponin and serial changes to determine if it is an Acute Myocardial Infarction or myocardial injury due to an underlying chronic condition.     BNP [[id number]]  (Abnormal) Collected:  06/03/19 1451    Specimen:  Blood Updated:  06/03/19 1510     proBNP 952.4 pg/mL     Narrative:       Among patients with dyspnea, NT-proBNP is highly sensitive for the detection of acute congestive heart failure. In addition NT-proBNP of <300 pg/ml effectively rules out acute congestive heart failure with 99% negative predictive value.            I reviewed the patient's new clinical results.    Assessment/Plan:     Active Hospital Problems    Diagnosis POA   • **COPD exacerbation (CMS/[facility]) [J44.1] Yes     - On 3L in ER with desaturation to 88% on room air. BNP slightly elevated; likely secondary to acute respiratory distress.  - Dose of 125 mg solumedrol in ER. Continue with 60 mg q8h  - Will begin 7-day Doxycycline (6/3).  - Will check atypical organisms and respiratory panel -> negative legionella, s. Pneumo, acute respiratory panel  - Daily CBC and CMP. Will monitor CRP Q48H given patient's steroids.  - Inspiratory spirometer and OPEP.  - Continue oxygen therapy and wean to baseline (2L at night).  - Pulmonary rehab consult placed.  - PT for deconditioning.     • Advanced care  planning/counseling discussion [Z71.89] Not Applicable     - Patient desires to be full code but has desires with regards to duration on breathing tube. Designates her  as her medical power of . Amenable to advanced care planning.  - Will consult hospital .     • Nocturnal hypoxia [G47.34] Yes     - Baseline 2L oxygen requirement at night. Presently higher due to current COPD exacerbation.  - Will wean to baseline as clinical course improves.     • NARCISO (obstructive sleep apnea) [G47.33] Yes     - Use home CPAP at night     • Prediabetes [R73.03] Yes     - Hgb A1c 6.2 in January.  - Hold metformin  - POCT glucose checks with IV steroids.  - Low-dose SSI.     • Paroxysmal atrial fibrillation (CMS/HCC) [I48.0] Yes     - First diagnosed in November. Followed by Dr. Huber outpatient.  - Maintained in sinus rhythm with Amiodarone. CHADSVASC score of 2 (for age and hypertension). On antiplatelet therapy (prasugrel). Will continue both. Will discuss with Dr. Huber about anticoagulation given her CHADSVASC score.  - TSH low, will check free T4 to elucidate grave's disease vs secondary hypothyroidism  - Cardiac monitoring.     • Acute respiratory failure with hypoxia (CMS/HCC) [J96.01] Yes     - Desaturated to 88% on RA without ambulation. Placed on 3 L of oxygen in ER.  - Continue oxygen therapy.  - Will treat COPD exacerbation  - Continue monitoring vitals per floor policy.     • CAD (coronary artery disease) [I25.10] Yes     - No active chest pain.  - Continue home prasugrel, atorvastatin, carvedilol.  - Nitro SL PRN for chest pain.     • Anxiety [F41.9] Yes     - Continue home Alprazolam and Paroxetine.         DVT prophylaxis: Lovenox  Code status is   Code Status and Medical Interventions:   Ordered at: 06/03/19 1901     Level Of Support Discussed With:    Patient     Code Status:    CPR     Medical Interventions (Level of Support Prior to Arrest):    Full     Comments:    Patient reports that   is to make her medical decisions if she is incapable.       Plan for disposition:Where: home and When:  1-2days      Time: 31 min       Chintan Bloom M.D. PGY1  [facility]  [address] [place] [address]  Office: [phone]    This document has been electronically signed by Chintan Bloom MD on June 4, 2019 12:49 PM

## 2019-06-04 NOTE — PLAN OF CARE
Problem: Patient Care Overview  Goal: Plan of Care Review   06/04/19 8093   Coping/Psychosocial   Plan of Care Reviewed With patient   OTHER   Outcome Summary PT evaluation completed. Pt independent with bed mobility. Pt transferred with SBA and ambulated 30ft with O2 2.5lpm and 20ft on room air. O2 sats after first walk 97% and after 2nd walk 94%. O2 sats remained in 90's with ambulation;however, patient complained of increased lightheadedness and gait more unsteady without O2. Function limited by decreaased strength, balance, and tolerance for functional mobility and activities. Pt will benefit from PT to regain lost function. Anticipate home with assistance at discharge. Patient would benefit from continued pulmonary rehab.

## 2019-06-04 NOTE — PLAN OF CARE
Problem: Patient Care Overview  Goal: Plan of Care Review  Outcome: Ongoing (interventions implemented as appropriate)   06/04/19 0037   Coping/Psychosocial   Plan of Care Reviewed With patient   Plan of Care Review   Progress no change   OTHER   Outcome Summary Patient currently resting with no complaints. VSS. Will continue to monitor.      Goal: Individualization and Mutuality  Outcome: Ongoing (interventions implemented as appropriate)    Goal: Discharge Needs Assessment  Outcome: Ongoing (interventions implemented as appropriate)    Goal: Interprofessional Rounds/Family Conf  Outcome: Ongoing (interventions implemented as appropriate)      Problem: Chronic Obstructive Pulmonary Disease (Adult)  Goal: Signs and Symptoms of Listed Potential Problems Will be Absent, Minimized or Managed (Chronic Obstructive Pulmonary Disease)  Outcome: Ongoing (interventions implemented as appropriate)

## 2019-06-04 NOTE — PLAN OF CARE
Problem: Patient Care Overview  Goal: Individualization and Mutuality  Outcome: Ongoing (interventions implemented as appropriate)    Goal: Discharge Needs Assessment  Outcome: Ongoing (interventions implemented as appropriate)      Problem: Chronic Obstructive Pulmonary Disease (Adult)  Goal: Signs and Symptoms of Listed Potential Problems Will be Absent, Minimized or Managed (Chronic Obstructive Pulmonary Disease)  Outcome: Ongoing (interventions implemented as appropriate)

## 2019-06-04 NOTE — THERAPY EVALUATION
Acute Care - Physical Therapy Initial Evaluation  [facility]     Patient Name: Monisha Brasher  : 1955  MRN: [id number]  Today's Date: 2019   Onset of Illness/Injury or Date of Surgery: 19  Date of Referral to PT: 19  Referring Physician: Dr. Chaidez      Admit Date: 6/3/2019    Visit Dx:     ICD-10-CM ICD-9-CM   1. COPD exacerbation (CMS/HCC) J44.1 491.21   2. Impaired functional mobility, balance, gait, and endurance Z74.09 V49.89     Patient Active Problem List   Diagnosis   • COPD bronchitis   • Cellulitis of left leg   • Asthma   • SOB (shortness of breath)   • Chronic bronchitis with acute exacerbation (CMS/HCC)   • Nicotine abuse   • Hypertension   • Screening for osteoporosis   • Panlobular emphysema (CMS/HCC)   • Anxiety   • General medical exam   • Malaise   • Hyperglycemia   • Acute ST elevation myocardial infarction (STEMI) (CMS/HCC)   • CAD (coronary artery disease)   • Acute respiratory failure with hypoxia (CMS/HCC)   • Depression   • Chronic alcohol abuse   • Pneumonia of both upper lobes   • Paroxysmal atrial fibrillation (CMS/HCC)   • Snoring   • Rash   • Chest pain   • Atherosclerosis of native coronary artery of native heart with unstable angina pectoris (CMS/HCC)   • Encounter for screening mammogram for malignant neoplasm of breast   • COPD exacerbation (CMS/HCC)   • Advanced care planning/counseling discussion   • Nocturnal hypoxia   • NARCISO (obstructive sleep apnea)   • Prediabetes     Past Medical History:   Diagnosis Date   • Acute bronchitis    • Acute upper respiratory infection    • Adjustment disorder with mixed emotional features    • Agoraphobia with panic attacks    • Allergic rhinitis due to pollen    • Anxiety    • Asthma    • Backache    • Blood in urine    • Breast cyst    • Candidiasis of mouth    • Chronic bronchitis (CMS/HCC)    • Chronic obstructive lung disease (CMS/HCC)    • Emphysema lung (CMS/HCC)    • Essential hypertension    •  Extrinsic asthma with status asthmaticus    • Fatigue    • H/O echocardiogram     EF 55-60%. LV systolic function normal. Impaired LV relaxation. [facility]   • Heart attack (CMS/HCC) 2015   • Hypoxemia    • Malaise and fatigue    • Non-IgE mediated allergic asthma    • Nonvenomous insect bite    • Pneumonia    • PONV (postoperative nausea and vomiting)    • Postmenopausal state    • Urinary tract infectious disease      Past Surgical History:   Procedure Laterality Date   • BREAST BIOPSY     • CARDIAC CATHETERIZATION N/A 11/15/2018    Procedure: Left Heart Cath;  Surgeon: Thaddeus Huber MD;  Location: [facility] INVASIVE LOCATION;  Service: Cardiology   • CARDIAC CATHETERIZATION N/A 2019    Procedure: Left Heart Cath 2019 @ 8:00 AM;  Surgeon: Thaddeus Huber MD;  Location: [facility] INVASIVE LOCATION;  Service: Cardiology   •  SECTION     • HYSTERECTOMY     • INJECTION OF MEDICATION  2015    celestone(1)   • INJECTION OF MEDICATION  2016    DEPO MEDROL(16)   • LEG SURGERY Bilateral         PT ASSESSMENT (last 12 hours)      Physical Therapy Evaluation     Row Name 19 1056          PT Evaluation Time/Intention    Subjective Information  no complaints  -     Document Type  evaluation  -JORGE     Mode of Treatment  individual therapy;physical therapy  -JORGE     Patient Effort  good  -     Row Name 19 1056          General Information    Patient Profile Reviewed?  yes  -JORGE     Onset of Illness/Injury or Date of Surgery  19  -     Referring Physician  Dr. Chaidez  -     Patient Observations  alert;cooperative;agree to therapy  -JORGE     Patient/Family Observations  no family present  -JORGE     General Observations of Patient  Pt sitting in bed with HOB elevated  -JORGE     Prior Level of Function  independent:;all household mobility;community mobility;gait;transfer;ADL's;home management;cooking;driving;cleaning going to need help with cleaning  -JORGE      Equipment Currently Used at Home  grab bar;oxygen  -     Pertinent History of Current Functional Problem  Pt admitted to [facility] with COPD exacerbation  -     Existing Precautions/Restrictions  oxygen therapy device and L/min  -     Equipment Issued to Patient  gait belt  -     Risks Reviewed  spouse/S.O.:;LOB;nausea/vomiting;dizziness;increased discomfort;change in vital signs  -     Benefits Reviewed  patient:;improve function;increase independence;increase strength;increase balance;decrease pain;decrease risk of DVT;improve skin integrity;increase knowledge  -     Row Name 06/04/19 1056          Relationship/Environment    Lives With  spouse  -     Row Name 06/04/19 1056          Resource/Environmental Concerns    Current Living Arrangements  home/apartment/condo  -[facility] Name 06/04/19 1056          Home Main Entrance    Number of Stairs, Main Entrance  three  -     Stair Railings, Main Entrance  none  -     Row Name 06/04/19 1056          Cognitive Assessment/Interventions    Additional Documentation  Cognitive Assessment/Intervention (Group)  -     Row Name 06/04/19 1056          Cognitive Assessment/Intervention- PT/OT    Affect/Mental Status (Cognitive)  WFL  -     Orientation Status (Cognition)  oriented x 4  -     Follows Commands (Cognition)  follows multi-step commands;over 90% accuracy  -     Personal Safety Interventions  fall prevention program maintained;gait belt;nonskid shoes/slippers when out of bed;supervised activity  -     Row Name 06/04/19 1056          Safety Issues, Functional Mobility    Safety Issues Affecting Function (Mobility)  insight into deficits/self awareness;safety precaution awareness;safety precautions follow-through/compliance  -     Row Name 06/04/19 1056          Bed Mobility Assessment/Treatment    Bed Mobility Assessment/Treatment  supine-sit;sit-supine  -     Supine-Sit [place] (Bed Mobility)  independent  -     Sit-Supine [place]  (Bed Mobility)  independent  -[facility] Name 06/04/19 1056          Transfer Assessment/Treatment    Transfer Assessment/Treatment  sit-stand transfer;stand-sit transfer  -     Sit-Stand [place] (Transfers)  stand by assist  -     Stand-Sit [place] (Transfers)  stand by assist  -JORGE     Row Name 06/04/19 1056          Gait/Stairs Assessment/Training    Gait/Stairs Assessment/Training  gait/ambulation independence  -     [place] Level (Gait)  contact guard  -     Distance in Feet (Gait)  30,20  -     Comment (Gait/Stairs)  first walk with O2 2.5lpm.O2 sats 97%. Second walk O2 sats 94% on room air. Pt reported increased lightheadedness on room air with mobility so PT deferred ambulation in hallway .  -[facility] Name 06/04/19 1056          General ROM    GENERAL ROM COMMENTS  AROM WFL all extremities  -JORGE     Row Name 06/04/19 1056          MMT (Manual Muscle Testing)    General MMT Comments  BUE: 4+/5 grosslyBLE: 4/5 grossly  -JORGE     Row Name 06/04/19 1056          Sensory Assessment/Intervention    Sensory General Assessment  no sensation deficits identified to light touch  -JORGE     Row Name 06/04/19 1056          Vision Assessment/Intervention    Visual Impairment/Limitations  corrective lenses for reading  -[facility] Name 06/04/19 1056          Pain Assessment    Additional Documentation  Pain Scale: Numbers Pre/Post-Treatment (Group)  -JORGE     Row Name 06/04/19 1056          Pain Scale: Numbers Pre/Post-Treatment    Pain Scale: Numbers, Pretreatment  0/10 - no pain  -     Pain Scale: Numbers, Post-Treatment  0/10 - no pain  -JORGE     Row Name 06/04/19 1056          Plan of Care Review    Plan of Care Reviewed With  patient  -JORGE     Row Name 06/04/19 1056          Physical Therapy Clinical Impression    Date of Referral to PT  06/03/19  -     PT Diagnosis (PT Clinical Impression)  impaired gait and mobility  -     Prognosis (PT Clinical Impression)  good  -     Patient/Family Goals  Statement (PT Clinical Impression)  return to PLOF  -     Criteria for Skilled Interventions Met (PT Clinical Impression)  yes;treatment indicated  -     Pathology/Pathophysiology Noted (Describe Specifically for Each System)  musculoskeletal;pulmonary  -     Impairments Found (describe specific impairments)  aerobic capacity/endurance;ergonomics and body mechanics;gait, locomotion, and balance;ventilation and respiration/gas exchange  -     Functional Limitations in Following Categories (Describe Specific Limitations)  home management;community/leisure  -     Disability: Inability to Perform Actions/Activities of Required Roles (describe specific disability)  community/leisure  -     Rehab Potential (PT Clinical Summary)  good, to achieve stated therapy goals  -     Predicted Duration of Therapy (PT)  until discharge or goals met  -     Care Plan Review (PT)  evaluation/treatment results reviewed;care plan/treatment goals reviewed;risks/benefits reviewed;current/potential barriers reviewed;patient/other agree to care plan  -     Row Name 06/04/19 1056          Vital Signs    Pre Systolic BP Rehab  124  -JORGE     Pre Treatment Diastolic BP  70  -JORGE     Post Treatment Diastolic BP  120  -JORGE     Pretreatment Heart Rate (beats/min)  60  -JORGE     Intratreatment Heart Rate (beats/min)  61  -JORGE     Posttreatment Heart Rate (beats/min)  88  -JORGE     Pretreatment Resp Rate (breaths/min)  82  -JORGE     Pre SpO2 (%)  95  -JORGE     O2 Delivery Pre Treatment  supplemental O2  -JORGE     Intra SpO2 (%)  94  -JORGE     O2 Delivery Intra Treatment  room air  -JORGE     Post SpO2 (%)  97  -JORGE     O2 Delivery Post Treatment  supplemental O2  -JORGE     Pre Patient Position  Supine  -JORGE     Intra Patient Position  Sitting  -JORGE     Post Patient Position  Supine  -JORGE     Row Name 06/04/19 1056          Physical Therapy Goals    Transfer Goal Selection (PT)  transfer, PT goal 1  -JORGE     Gait Training Goal Selection (PT)  gait training, PT  goal 1  -JORGE     Additional Documentation  Stairs Goal Selection (PT) (Row)  -     Row Name 06/04/19 1056          Transfer Goal 1 (PT)    Activity/Assistive Device (Transfer Goal 1, PT)  sit-to-stand/stand-to-sit;bed-to-chair/chair-to-bed;toilet  -     [place] Level/Cues Needed (Transfer Goal 1, PT)  independent  -JORGE     Time Frame (Transfer Goal 1, PT)  by discharge  -JORGE     Progress/Outcome (Transfer Goal 1, PT)  goal not met  -JORGE     Row Name 06/04/19 1056          Gait Training Goal 1 (PT)    Activity/Assistive Device (Gait Training Goal 1, PT)  gait (walking locomotion)  -     [place] Level (Gait Training Goal 1, PT)  independent O2 sats will not drop below 92%  -JORGE     Distance (Gait Goal 1, PT)  [id number]  -JORGE     Time Frame (Gait Training Goal 1, PT)  by discharge  -JORGE     Progress/Outcome (Gait Training Goal 1, PT)  goal not met  -     Row Name 06/04/19 1056          Positioning and Restraints    Pre-Treatment Position  in bed  -JORGE     Post Treatment Position  bed  -JORGE     In Bed  call light within reach;encouraged to call for assist;exit alarm on;side rails up x2  -JORGE     Row Name 06/04/19 1056          Living Environment    Home Accessibility  tub/shower is not walk in;stairs to enter home  -       User Key  (r) = Recorded By, (t) = Taken By, (c) = Cosigned By    Initials Name Provider Type    Cherie López PT Physical Therapist        Physical Therapy Education     Title: PT OT SLP Therapies (Done)     Topic: Physical Therapy (Done)     Point: Mobility training (Done)     Learning Progress Summary           Patient Acceptance, ELUCY VU by JORGE at 6/4/2019 12:43 PM                   Point: Body mechanics (Done)     Learning Progress Summary           Patient Acceptance, ELUCYVU by JORGE at 6/4/2019 12:43 PM                   Point: Precautions (Done)     Learning Progress Summary           Patient Acceptance, ELUCY VU by JORGE at 6/4/2019 12:43 PM                               User Key      Initials Effective Dates Name Provider Type Discipline     04/03/18 -  Cherie Carranza, PT Physical Therapist PT              PT Recommendation and Plan  Anticipated Discharge Disposition (PT): home with assist(pulmonary rehab)  Planned Therapy Interventions (PT Eval): balance training, gait training, home exercise program, patient/family education, strengthening, stair training, transfer training  Therapy Frequency (PT Clinical Impression): other (see comments)(6xwk)  Outcome Summary/Treatment Plan (PT)  Anticipated Discharge Disposition (PT): home with assist(pulmonary rehab)  Plan of Care Reviewed With: patient  Outcome Summary: PT evaluation completed. Pt independent with bed mobility. Pt transferred with SBA and ambulated 30ft with O2 2.5lpm and 20ft on room air. O2 sats after first walk 97% and after 2nd walk 94%. O2 sats remained in 90's with ambulation;however, patient complained of increased lightheadedness and gait more unsteady without O2. Function limited by decreaased strength, balance, and tolerance for functional mobility and activities. Pt will benefit from PT to regain lost function. Anticipate home with assistance at discharge. Patient would benefit from continued pulmonary rehab.  Outcome Measures     Row Name 06/04/19 1056             How much help from another person do you currently need...    Turning from your back to your side while in flat bed without using bedrails?  4  -JORGE      Moving from lying on back to sitting on the side of a flat bed without bedrails?  4  -JORGE      Moving to and from a bed to a chair (including a wheelchair)?  3  -JORGE      Standing up from a chair using your arms (e.g., wheelchair, bedside chair)?  3  -JORGE      Climbing 3-5 steps with a railing?  3  -JORGE      To walk in hospital room?  3  -JORGE      AM-PAC 6 Clicks Score  20  -JORGE         Functional Assessment    Outcome Measure Options  AM-PAC 6 Clicks Basic Mobility (PT)  -JORGE        User Key  (r) = Recorded By, (t) =  Taken By, (c) = Cosigned By    Initials Name Provider Type    Cherie López, PT Physical Therapist         Time Calculation:   PT Charges     Row Name 06/04/19 1248             Time Calculation    Start Time  1056  -JORGE      Stop Time  1147  -JORGE      Time Calculation (min)  51 min  -JORGE      PT Received On  06/04/19  -JORGE      PT Goal Re-Cert Due Date  06/17/19  -JORGE         Time Calculation- PT    Total Timed Code Minutes- PT  30 minute(s)  -        User Key  (r) = Recorded By, (t) = Taken By, (c) = Cosigned By    Initials Name Provider Type    Cherie López, PT Physical Therapist        Therapy Charges for Today     Code Description Service Date Service Provider Modifiers Qty    88585756705 HC PT EVAL MOD COMPLEXITY 1 6/4/2019 Cherie Carranza, PT GP 1    25861841878 HC GAIT TRAINING EA 15 MIN 6/4/2019 Cherie Carranza, PT GP 1    53347111645 HC PT THERAPEUTIC ACT EA 15 MIN 6/4/2019 Cherie Carranza, PT GP 1          PT G-Codes  Outcome Measure Options: AM-PAC 6 Clicks Basic Mobility (PT)  AM-PAC 6 Clicks Score: 20      Cherie Carranza PT  6/4/2019

## 2019-06-05 LAB
ALBUMIN SERPL-MCNC: 3.5 G/DL (ref 3.5–5.2)
ALBUMIN/GLOB SERPL: 1.3 G/DL
ALP SERPL-CCNC: 74 U/L (ref 39–117)
ALT SERPL W P-5'-P-CCNC: 23 U/L (ref 1–33)
ANION GAP SERPL CALCULATED.3IONS-SCNC: 8 MMOL/L
AST SERPL-CCNC: 10 U/L (ref 1–32)
BASOPHILS # BLD AUTO: 0.02 10*3/MM3 (ref 0–0.2)
BASOPHILS NFR BLD AUTO: 0.2 % (ref 0–1.5)
BILIRUB SERPL-MCNC: 0.2 MG/DL (ref 0.2–1.2)
BUN BLD-MCNC: 22 MG/DL (ref 8–23)
BUN/CREAT SERPL: 27.5 (ref 7–25)
CALCIUM SPEC-SCNC: 9.5 MG/DL (ref 8.6–10.5)
CHLORIDE SERPL-SCNC: 98 MMOL/L (ref 98–107)
CO2 SERPL-SCNC: 34 MMOL/L (ref 22–29)
CREAT BLD-MCNC: 0.8 MG/DL (ref 0.57–1)
CRP SERPL-MCNC: 0.42 MG/DL (ref 0–0.5)
DEPRECATED RDW RBC AUTO: 46.6 FL (ref 37–54)
EOSINOPHIL # BLD AUTO: 0 10*3/MM3 (ref 0–0.4)
EOSINOPHIL NFR BLD AUTO: 0 % (ref 0.3–6.2)
ERYTHROCYTE [DISTWIDTH] IN BLOOD BY AUTOMATED COUNT: 14.5 % (ref 12.3–15.4)
GFR SERPL CREATININE-BSD FRML MDRD: 73 ML/MIN/1.73
GLOBULIN UR ELPH-MCNC: 2.8 GM/DL
GLUCOSE BLD-MCNC: 161 MG/DL (ref 65–99)
GLUCOSE BLDC GLUCOMTR-MCNC: 151 MG/DL (ref 70–130)
GLUCOSE BLDC GLUCOMTR-MCNC: 191 MG/DL (ref 70–130)
GLUCOSE BLDC GLUCOMTR-MCNC: 227 MG/DL (ref 70–130)
GLUCOSE BLDC GLUCOMTR-MCNC: 258 MG/DL (ref 70–130)
HCT VFR BLD AUTO: 39.6 % (ref 34–46.6)
HGB BLD-MCNC: 12.7 G/DL (ref 12–15.9)
IMM GRANULOCYTES # BLD AUTO: 0.16 10*3/MM3 (ref 0–0.05)
IMM GRANULOCYTES NFR BLD AUTO: 1.3 % (ref 0–0.5)
LYMPHOCYTES # BLD AUTO: 0.85 10*3/MM3 (ref 0.7–3.1)
LYMPHOCYTES NFR BLD AUTO: 7 % (ref 19.6–45.3)
MCH RBC QN AUTO: 28.1 PG (ref 26.6–33)
MCHC RBC AUTO-ENTMCNC: 32.1 G/DL (ref 31.5–35.7)
MCV RBC AUTO: 87.6 FL (ref 79–97)
MONOCYTES # BLD AUTO: 0.37 10*3/MM3 (ref 0.1–0.9)
MONOCYTES NFR BLD AUTO: 3.1 % (ref 5–12)
NEUTROPHILS # BLD AUTO: 10.71 10*3/MM3 (ref 1.7–7)
NEUTROPHILS NFR BLD AUTO: 88.4 % (ref 42.7–76)
NRBC BLD AUTO-RTO: 0 /100 WBC (ref 0–0.2)
PLATELET # BLD AUTO: 291 10*3/MM3 (ref 140–450)
PMV BLD AUTO: 9.1 FL (ref 6–12)
POTASSIUM BLD-SCNC: 4.4 MMOL/L (ref 3.5–5.2)
PROT SERPL-MCNC: 6.3 G/DL (ref 6–8.5)
RBC # BLD AUTO: 4.52 10*6/MM3 (ref 3.77–5.28)
SODIUM BLD-SCNC: 140 MMOL/L (ref 136–145)
WBC NRBC COR # BLD: 12.11 10*3/MM3 (ref 3.4–10.8)

## 2019-06-05 PROCEDURE — G0378 HOSPITAL OBSERVATION PER HR: HCPCS

## 2019-06-05 PROCEDURE — 99225 PR SBSQ OBSERVATION CARE/DAY 25 MINUTES: CPT | Performed by: STUDENT IN AN ORGANIZED HEALTH CARE EDUCATION/TRAINING PROGRAM

## 2019-06-05 PROCEDURE — 97116 GAIT TRAINING THERAPY: CPT

## 2019-06-05 PROCEDURE — 94799 UNLISTED PULMONARY SVC/PX: CPT

## 2019-06-05 PROCEDURE — 63710000001 INSULIN ASPART PER 5 UNITS: Performed by: STUDENT IN AN ORGANIZED HEALTH CARE EDUCATION/TRAINING PROGRAM

## 2019-06-05 PROCEDURE — 96376 TX/PRO/DX INJ SAME DRUG ADON: CPT

## 2019-06-05 PROCEDURE — 85025 COMPLETE CBC W/AUTO DIFF WBC: CPT | Performed by: STUDENT IN AN ORGANIZED HEALTH CARE EDUCATION/TRAINING PROGRAM

## 2019-06-05 PROCEDURE — 94760 N-INVAS EAR/PLS OXIMETRY 1: CPT

## 2019-06-05 PROCEDURE — 86140 C-REACTIVE PROTEIN: CPT | Performed by: STUDENT IN AN ORGANIZED HEALTH CARE EDUCATION/TRAINING PROGRAM

## 2019-06-05 PROCEDURE — 96372 THER/PROPH/DIAG INJ SC/IM: CPT

## 2019-06-05 PROCEDURE — 82962 GLUCOSE BLOOD TEST: CPT

## 2019-06-05 PROCEDURE — 80053 COMPREHEN METABOLIC PANEL: CPT | Performed by: STUDENT IN AN ORGANIZED HEALTH CARE EDUCATION/TRAINING PROGRAM

## 2019-06-05 PROCEDURE — 25010000002 METHYLPREDNISOLONE PER 125 MG: Performed by: STUDENT IN AN ORGANIZED HEALTH CARE EDUCATION/TRAINING PROGRAM

## 2019-06-05 PROCEDURE — 25010000002 ENOXAPARIN PER 10 MG: Performed by: STUDENT IN AN ORGANIZED HEALTH CARE EDUCATION/TRAINING PROGRAM

## 2019-06-05 RX ADMIN — BUDESONIDE AND FORMOTEROL FUMARATE DIHYDRATE 2 PUFF: 160; 4.5 AEROSOL RESPIRATORY (INHALATION) at 20:11

## 2019-06-05 RX ADMIN — LOSARTAN POTASSIUM 25 MG: 25 TABLET, FILM COATED ORAL at 07:59

## 2019-06-05 RX ADMIN — BUDESONIDE AND FORMOTEROL FUMARATE DIHYDRATE 2 PUFF: 160; 4.5 AEROSOL RESPIRATORY (INHALATION) at 07:42

## 2019-06-05 RX ADMIN — Medication 100 MG: at 07:56

## 2019-06-05 RX ADMIN — POLYETHYLENE GLYCOL 3350 17 G: 17 POWDER, FOR SOLUTION ORAL at 11:07

## 2019-06-05 RX ADMIN — IPRATROPIUM BROMIDE AND ALBUTEROL SULFATE 3 ML: 2.5; .5 SOLUTION RESPIRATORY (INHALATION) at 11:11

## 2019-06-05 RX ADMIN — PAROXETINE HYDROCHLORIDE 20 MG: 20 TABLET, FILM COATED ORAL at 05:46

## 2019-06-05 RX ADMIN — ASPIRIN 81 MG CHEWABLE TABLET 81 MG: 81 TABLET CHEWABLE at 07:58

## 2019-06-05 RX ADMIN — DOXYCYCLINE 100 MG: 100 CAPSULE ORAL at 20:24

## 2019-06-05 RX ADMIN — METHYLPREDNISOLONE SODIUM SUCCINATE 60 MG: 125 INJECTION, POWDER, FOR SOLUTION INTRAMUSCULAR; INTRAVENOUS at 20:24

## 2019-06-05 RX ADMIN — FUROSEMIDE 20 MG: 20 TABLET ORAL at 07:58

## 2019-06-05 RX ADMIN — PRASUGREL 10 MG: 10 TABLET, FILM COATED ORAL at 07:59

## 2019-06-05 RX ADMIN — ATORVASTATIN CALCIUM 40 MG: 40 TABLET, FILM COATED ORAL at 20:24

## 2019-06-05 RX ADMIN — Medication 100 MG: at 20:24

## 2019-06-05 RX ADMIN — ALPRAZOLAM 0.25 MG: 0.25 TABLET ORAL at 17:26

## 2019-06-05 RX ADMIN — IPRATROPIUM BROMIDE AND ALBUTEROL SULFATE 3 ML: 2.5; .5 SOLUTION RESPIRATORY (INHALATION) at 20:11

## 2019-06-05 RX ADMIN — METHYLPREDNISOLONE SODIUM SUCCINATE 60 MG: 125 INJECTION, POWDER, FOR SOLUTION INTRAMUSCULAR; INTRAVENOUS at 14:41

## 2019-06-05 RX ADMIN — DOXYCYCLINE 100 MG: 100 CAPSULE ORAL at 07:56

## 2019-06-05 RX ADMIN — FUROSEMIDE 20 MG: 20 TABLET ORAL at 20:24

## 2019-06-05 RX ADMIN — IPRATROPIUM BROMIDE AND ALBUTEROL SULFATE 3 ML: 2.5; .5 SOLUTION RESPIRATORY (INHALATION) at 15:39

## 2019-06-05 RX ADMIN — SENNOSIDES AND DOCUSATE SODIUM 2 TABLET: 8.6; 5 TABLET ORAL at 20:24

## 2019-06-05 RX ADMIN — METHYLPREDNISOLONE SODIUM SUCCINATE 60 MG: 125 INJECTION, POWDER, FOR SOLUTION INTRAMUSCULAR; INTRAVENOUS at 05:46

## 2019-06-05 RX ADMIN — MONTELUKAST SODIUM 10 MG: 10 TABLET, COATED ORAL at 07:58

## 2019-06-05 RX ADMIN — CARVEDILOL 3.12 MG: 3.12 TABLET, FILM COATED ORAL at 07:56

## 2019-06-05 RX ADMIN — IPRATROPIUM BROMIDE AND ALBUTEROL SULFATE 3 ML: 2.5; .5 SOLUTION RESPIRATORY (INHALATION) at 07:27

## 2019-06-05 RX ADMIN — INSULIN ASPART 4 UNITS: 100 INJECTION, SOLUTION INTRAVENOUS; SUBCUTANEOUS at 20:26

## 2019-06-05 RX ADMIN — INSULIN ASPART 2 UNITS: 100 INJECTION, SOLUTION INTRAVENOUS; SUBCUTANEOUS at 17:26

## 2019-06-05 RX ADMIN — ENOXAPARIN SODIUM 40 MG: 40 INJECTION SUBCUTANEOUS at 11:08

## 2019-06-05 NOTE — PLAN OF CARE
Problem: Patient Care Overview  Goal: Plan of Care Review  Outcome: Ongoing (interventions implemented as appropriate)   06/05/19 1015   Coping/Psychosocial   Plan of Care Reviewed With patient   Plan of Care Review   Progress improving   OTHER   Outcome Summary Pt. met gt goal this a.m. Pt. able to amb. w/o AD independent no LOB on RA with O2 SATS did not drop below 92% throughout performance, Pt. independent with bed mobility & sit-stand-sit. Asess pt. in room for transfers & cont. gt. on RA to assess pt.'s tolerance and O2 SATS with performance     Goal: Discharge Needs Assessment  Outcome: Ongoing (interventions implemented as appropriate)   06/03/19 1906 06/03/19 1910   Disability   Equipment Currently Used at Home oxygen;nebulizer;bipap/cpap --    Discharge Needs Assessment   Patient/Family Anticipates Transition to --  home with family   Patient/Family Anticipated Services at Transition --  none   Transportation Anticipated --  car, drives self

## 2019-06-05 NOTE — PROGRESS NOTES
FAMILY MEDICINE DAILY PROGRESS NOTE    NAME: Monisha Brasher  : 1955  MRN: [id number]      LOS: 0 days     PROVIDER OF SERVICE: Chintan Bloom MD    Chief Complaint: COPD exacerbation (CMS/formerly [facility])    Subjective:     Interval History:  History taken from: patient chart    Patient feels better, decreased wheezing and cough. Still requiring supplemental oxygen and says she is short of air with just moving around the room. May do a 6 minute walk test today. No other complaints.       Review of Systems:   Review of Systems   Constitutional: Negative for appetite change, diaphoresis, fatigue and fever.   HENT: Negative for congestion, ear pain, postnasal drip, rhinorrhea and sore throat.    Eyes: Negative for pain and visual disturbance.   Respiratory: Positive for cough, shortness of breath and wheezing. Negative for chest tightness.    Cardiovascular: Negative for chest pain, palpitations and leg swelling.   Gastrointestinal: Negative for abdominal pain, constipation, diarrhea, nausea and vomiting.   Genitourinary: Negative for dysuria, flank pain, frequency and urgency.   Musculoskeletal: Negative for arthralgias, back pain and myalgias.   Skin: Negative for pallor and rash.   Neurological: Negative for dizziness, syncope, weakness and numbness.       Objective:     Vital Signs  Temp:  [96.8 °F (36 °C)-99.2 °F (37.3 °C)] 97.3 °F (36.3 °C)  Heart Rate:  [60-96] 65  Resp:  [18-20] 20  BP: (120-158)/(60-84) 140/72  Body mass index is 26.75 kg/m².    Physical Exam  Physical Exam   Constitutional: She is oriented to person, place, and time. She appears well-developed and well-nourished. No distress.   HENT:   Head: Normocephalic and atraumatic.   Nose: Nose normal.   Eyes: EOM are normal. Pupils are equal, round, and reactive to light.   Neck: Normal range of motion. Neck supple. No thyromegaly present.   Cardiovascular: Normal rate, regular rhythm, normal heart sounds and intact distal pulses.    Pulmonary/Chest: She has wheezes. She has rales.   Poor air movement, normal effort. Wheezing diffusely bilaterally and crackles at the bases bilaterally. Improved from yesterday.    Abdominal: Soft. Bowel sounds are normal. She exhibits no distension. There is no tenderness. There is no rebound and no guarding.   Musculoskeletal: Normal range of motion. She exhibits no edema or tenderness.   Lymphadenopathy:     She has no cervical adenopathy.   Neurological: She is alert and oriented to person, place, and time.   Skin: Skin is warm and dry. Capillary refill takes less than 2 seconds. She is not diaphoretic.   Psychiatric: She has a normal mood and affect. Her behavior is normal.       Medication Review    Current Facility-Administered Medications:   •  acetaminophen (TYLENOL) tablet 650 mg, 650 mg, Oral, Q4H PRN, Jyoti Gloria MD  •  albuterol (PROVENTIL) nebulizer solution 0.083% 2.5 mg/3mL, 2.5 mg, Nebulization, Q6H PRN, Jyoti Gloira MD  •  ALPRAZolam (XANAX) tablet 0.25 mg, 0.25 mg, Oral, TID PRN, Jyoti Gloria MD, 0.25 mg at 06/04/19 1828  •  amiodarone (PACERONE) half tablet 100 mg, 100 mg, Oral, BID, Jyoti Gloria MD, 100 mg at 06/05/19 0756  •  aspirin chewable tablet 81 mg, 81 mg, Oral, Daily, Jyoti Gloria MD, 81 mg at 06/05/19 0758  •  atorvastatin (LIPITOR) tablet 40 mg, 40 mg, Oral, Nightly, Jyoti Gloria MD, 40 mg at 06/04/19 2032  •  bisacodyl (DULCOLAX) suppository 10 mg, 10 mg, Rectal, Daily PRN, Jyoti Gloria MD  •  budesonide-formoterol (SYMBICORT) 160-4.5 MCG/ACT inhaler 2 puff, 2 puff, Inhalation, BID - RT, Jyoti Gloria MD, 2 puff at 06/05/19 0742  •  calcium carbonate (TUMS) chewable tablet 500 mg (200 mg elemental), 2 tablet, Oral, Daily PRN, Jyoti Gloria MD  •  carvedilol (COREG) tablet 3.125 mg, 3.125 mg, Oral, BID With Meals, Chintan Bloom MD, 3.125 mg at 06/05/19  0756  •  dextrose (D50W) 25 g/ 50mL Intravenous Solution 25 g, 25 g, Intravenous, Q15 Min PRN, Jyoti Gloria MD  •  dextrose (GLUTOSE) oral gel 15 g, 15 g, Oral, Q15 Min PRN, Jyoti Gloria MD  •  doxycycline (MONODOX) capsule 100 mg, 100 mg, Oral, Q12H, Jyoti Gloria MD, 100 mg at 06/05/19 0756  •  enoxaparin (LOVENOX) syringe 60 mg, 60 mg, Subcutaneous, Q24H, Chintan Bloom MD, 60 mg at 06/04/19 1228  •  furosemide (LASIX) tablet 20 mg, 20 mg, Oral, BID, Jyoti Gloria MD, 20 mg at 06/05/19 0758  •  glucagon (human recombinant) (GLUCAGEN DIAGNOSTIC) injection 1 mg, 1 mg, Subcutaneous, PRN, Jyoti Gloria MD  •  insulin aspart (novoLOG) injection 0-7 Units, 0-7 Units, Subcutaneous, 4x Daily AC & at Bedtime, Jyoti Gloria MD, 4 Units at 06/04/19 2031  •  ipratropium-albuterol (DUO-NEB) nebulizer solution 3 mL, 3 mL, Nebulization, 4x Daily - RT, Jyoti Gloria MD, 3 mL at 06/05/19 0727  •  losartan (COZAAR) tablet 25 mg, 25 mg, Oral, Q24H, Chintan Bloom MD, 25 mg at 06/05/19 0759  •  methylPREDNISolone sodium succinate (SOLU-Medrol) injection 60 mg, 60 mg, Intravenous, Q8H, Chintan Bloom MD, 60 mg at 06/05/19 0546  •  montelukast (SINGULAIR) tablet 10 mg, 10 mg, Oral, Daily, Jyoti Gloria MD, 10 mg at 06/05/19 0758  •  nitroglycerin (NITROSTAT) SL tablet 0.4 mg, 0.4 mg, Sublingual, Q5 Min PRN, Jyoti Gloria MD  •  ondansetron (ZOFRAN) tablet 4 mg, 4 mg, Oral, Q6H PRN, Jyoti Gloria MD  •  PARoxetine (PAXIL) tablet 20 mg, 20 mg, Oral, QAM, Jyoti Gloria MD, 20 mg at 06/05/19 0546  •  polyethylene glycol 3350 powder (packet), 17 g, Oral, Daily, Chintan Bloom MD  •  prasugrel (EFFIENT) tablet 10 mg, 10 mg, Oral, Daily, Jyoti Gloria MD, 10 mg at 06/05/19 0753  •  sennosides-docusate sodium (SENOKOT-S) 8.6-50 MG tablet 2 tablet, 2 tablet, Oral, Nightly,  Jyoti Gloria MD, 2 tablet at 06/04/19 2032     Diagnostic Data    Lab Results (last 24 hours)     Procedure Component Value Units Date/Time    POC Glucose Once [[id number]]  (Abnormal) Collected:  06/05/19 0720    Specimen:  Blood Updated:  06/05/19 0738     Glucose 151 mg/dL      Comment: RN NotifiedOperator: [id number] ILENE Gerard ID: [id number]       Comprehensive Metabolic Panel [[id number]]  (Abnormal) Collected:  06/05/19 0553    Specimen:  Blood Updated:  06/05/19 0647     Glucose 161 mg/dL      BUN 22 mg/dL      Creatinine 0.80 mg/dL      Sodium 140 mmol/L      Potassium 4.4 mmol/L      Chloride 98 mmol/L      CO2 34.0 mmol/L      Calcium 9.5 mg/dL      Total Protein 6.3 g/dL      Albumin 3.50 g/dL      ALT (SGPT) 23 U/L      AST (SGOT) 10 U/L      Alkaline Phosphatase 74 U/L      Total Bilirubin 0.2 mg/dL      eGFR Non African Amer 73 mL/min/1.73      Globulin 2.8 gm/dL      A/G Ratio 1.3 g/dL      BUN/Creatinine Ratio 27.5     Anion Gap 8.0 mmol/L     Narrative:       GFR Normal >60  Chronic Kidney Disease <60  Kidney Failure <15    CBC & Differential [[id number]] Collected:  06/05/19 0553    Specimen:  Blood Updated:  06/05/19 0619    Narrative:       The following orders were created for panel order CBC & Differential.  Procedure                               Abnormality         Status                     ---------                               -----------         ------                     CBC Auto Differential[[id number]]        Abnormal            Final result                 Please view results for these tests on the individual orders.    CBC Auto Differential [[id number]]  (Abnormal) Collected:  06/05/19 0553    Specimen:  Blood Updated:  06/05/19 0619     WBC 12.11 10*3/mm3      RBC 4.52 10*6/mm3      Hemoglobin 12.7 g/dL      Hematocrit 39.6 %      MCV 87.6 fL      MCH 28.1 pg      MCHC 32.1 g/dL      RDW 14.5 %      RDW-SD 46.6 fl      MPV 9.1 fL      Platelets 291 10*3/mm3       Neutrophil % 88.4 %      Lymphocyte % 7.0 %      Monocyte % 3.1 %      Eosinophil % 0.0 %      Basophil % 0.2 %      Immature Grans % 1.3 %      Neutrophils, Absolute 10.71 10*3/mm3      Lymphocytes, Absolute 0.85 10*3/mm3      Monocytes, Absolute 0.37 10*3/mm3      Eosinophils, Absolute 0.00 10*3/mm3      Basophils, Absolute 0.02 10*3/mm3      Immature Grans, Absolute 0.16 10*3/mm3      nRBC 0.0 /100 WBC     POC Glucose Once [[id number]]  (Abnormal) Collected:  06/04/19 1918    Specimen:  Blood Updated:  06/04/19 1953     Glucose 255 mg/dL      Comment: RN NotifiedOperator: [id number] EFREM AMBERMeter ID: [id number]       POC Glucose Once [[id number]]  (Abnormal) Collected:  06/04/19 1639    Specimen:  Blood Updated:  06/04/19 1652     Glucose 205 mg/dL      Comment: RN NotifiedOperator: [id number] ILENE KERNIANMeter ID: [id number]       T4, Free [[id number]]  (Normal) Collected:  06/04/19 0718    Specimen:  Blood Updated:  06/04/19 1319     Free T4 1.63 ng/dL     POC Glucose Once [[id number]]  (Abnormal) Collected:  06/04/19 1042    Specimen:  Blood Updated:  06/04/19 1056     Glucose 198 mg/dL      Comment: RN NotifiedOperator: [id number] ILENE CHRISTIANMeter ID: [id number]               I reviewed the patient's new clinical results.    Assessment/Plan:     Active Hospital Problems    Diagnosis POA   • **COPD exacerbation (CMS/[facility]) [J44.1] Yes     - On 3L in ER with desaturation to 88% on room air. BNP slightly elevated; likely secondary to acute respiratory distress.  - Dose of 125 mg solumedrol in ER. Continue with 60 mg q8h. This will be weaned.   - Will begin 7-day Doxycycline (6/3).  - Atypical organisms and respiratory panel negative.  - Daily CBC and CMP. Will monitor CRP Q48H given patient's steroids.  - Inspiratory spirometer and OPEP.  - Continue oxygen therapy and wean to baseline (2L at night). She desatted on room air to 87% to room air this morning.   - Pulmonary rehab consult placed.  - PT  for deconditioning.     • Advanced care planning/counseling discussion [Z71.89] Not Applicable     - Patient desires to be full code but has desires with regards to duration on breathing tube. Designates her  as her medical power of . Amenable to advanced care planning.  - Will consult hospital .     • Nocturnal hypoxia [G47.34] Yes     - Baseline 2L oxygen requirement at night. Presently higher due to current COPD exacerbation.  - Will wean to baseline as clinical course improves.     • NARCISO (obstructive sleep apnea) [G47.33] Yes     - Use home CPAP at night     • Prediabetes [R73.03] Yes     - Hgb A1c 6.2 in January.  - Hold metformin  - POCT glucose checks with IV steroids.  - Low-dose SSI.     • Paroxysmal atrial fibrillation (CMS/HCC) [I48.0] Yes     - First diagnosed in November. Followed by Dr. Huber outpatient.  - Maintained in sinus rhythm with Amiodarone. CHADSVASC score of 2 (for age and hypertension). On antiplatelet therapy (prasugrel). Will continue both. Will discuss with Dr. Huber about anticoagulation given her CHADSVASC score.  - TSH low, will check free T4 to elucidate grave's disease vs secondary hypothyroidism  - Cardiac monitoring.     • Acute respiratory failure with hypoxia (CMS/HCC) [J96.01] Yes     - Desaturated to 88% on RA without ambulation. Placed on 3 L of oxygen in ER.  - Continue oxygen therapy.  - Will treat COPD exacerbation  - Continue monitoring vitals per floor policy.     • CAD (coronary artery disease) [I25.10] Yes     - No active chest pain.  - Continue home prasugrel, atorvastatin, carvedilol.  - Nitro SL PRN for chest pain.     • Anxiety [F41.9] Yes     - Continue home Alprazolam and Paroxetine.         DVT prophylaxis: Lovenox  Code status is   Code Status and Medical Interventions:   Ordered at: 06/03/19 1901     Level Of Support Discussed With:    Patient     Code Status:    CPR     Medical Interventions (Level of Support Prior to Arrest):     Full     Comments:    Patient reports that  is to make her medical decisions if she is incapable.       Plan for disposition:Where: home and When:  1-2days      Time: 31 min       Chintan Bloom M.D. PGY1  [facility]  [address] [place], [place] [address]  Office: [phone]    This document has been electronically signed by Chintan Bloom MD on June 5, 2019 8:51 AM

## 2019-06-05 NOTE — THERAPY TREATMENT NOTE
Acute Care - Physical Therapy Treatment Note  [facility]     Patient Name: Monisha Brasher  : 1955  MRN: [id number]  Today's Date: 2019  Onset of Illness/Injury or Date of Surgery: 19  Date of Referral to PT: 19  Referring Physician: Dr. Chaidez    Admit Date: 6/3/2019    Visit Dx:    ICD-10-CM ICD-9-CM   1. COPD exacerbation (CMS/HCC) J44.1 491.21   2. Impaired functional mobility, balance, gait, and endurance Z74.09 V49.89     Patient Active Problem List   Diagnosis   • COPD bronchitis   • Cellulitis of left leg   • Asthma   • SOB (shortness of breath)   • Chronic bronchitis with acute exacerbation (CMS/HCC)   • Nicotine abuse   • Hypertension   • Screening for osteoporosis   • Panlobular emphysema (CMS/HCC)   • Anxiety   • General medical exam   • Malaise   • Hyperglycemia   • Acute ST elevation myocardial infarction (STEMI) (CMS/HCC)   • CAD (coronary artery disease)   • Acute respiratory failure with hypoxia (CMS/HCC)   • Depression   • Chronic alcohol abuse   • Pneumonia of both upper lobes   • Paroxysmal atrial fibrillation (CMS/HCC)   • Snoring   • Rash   • Chest pain   • Atherosclerosis of native coronary artery of native heart with unstable angina pectoris (CMS/HCC)   • Encounter for screening mammogram for malignant neoplasm of breast   • COPD exacerbation (CMS/HCC)   • Advanced care planning/counseling discussion   • Nocturnal hypoxia   • NARCISO (obstructive sleep apnea)   • Prediabetes       Therapy Treatment    Rehabilitation Treatment Summary     Row Name 19 1015             Treatment Time/Intention    Discipline  physical therapy assistant  -YAZAN      Document Type  therapy note (daily note)  -YAZAN      Subjective Information  no complaints  -YAZAN      Mode of Treatment  individual therapy;physical therapy  -YAZAN      Patient/Family Observations  sitting up in bed no family present   -YAZAN      Therapy Frequency (PT Clinical Impression)  other (see comments) 6xwk  -YAZAN       Patient Effort  good  -JA      Existing Precautions/Restrictions  oxygen therapy device and L/min  -JA      Recorded by [JA] German Sanford, PTA 06/05/19 1126      Row Name 06/05/19 1015             Vital Signs    Pretreatment Heart Rate (beats/min)  67  -JA      Intratreatment Heart Rate (beats/min)  82  -JA      Posttreatment Heart Rate (beats/min)  69  -JA      Pre SpO2 (%)  97  -JA      O2 Delivery Pre Treatment  supplemental O2  -JA      Intra SpO2 (%)  93  -JA      O2 Delivery Intra Treatment  room air  -JA      Post SpO2 (%)  96  -JA      O2 Delivery Post Treatment  room air  -JA      Pre Patient Position  Supine  -JA      Intra Patient Position  Standing  -JA      Post Patient Position  Supine  -JA      Recorded by [JA] German Sanford, [facility] 06/05/19 1126      Row Name 06/05/19 1015             Cognitive Assessment/Intervention- PT/OT    Affect/Mental Status (Cognitive)  WFL  -JA      Orientation Status (Cognition)  oriented x 4  -JA      Follows Commands (Cognition)  follows multi-step commands;over 90% accuracy  -JA      Recorded by [YAZAN] German Sanford, PTA 06/05/19 1126      Row Name 06/05/19 1015             Bed Mobility Assessment/Treatment    Bed Mobility Assessment/Treatment  supine-sit;sit-supine  -JA      Supine-Sit [place] (Bed Mobility)  independent  -JA      Sit-Supine [place] (Bed Mobility)  independent  -JA      Recorded by [YAZAN] German Sanford, [facility] 06/05/19 1126      Row Name 06/05/19 1015             Transfer Assessment/Treatment    Transfer Assessment/Treatment  sit-stand transfer;stand-sit transfer  -JA      Recorded by [YAZAN] German Sanford, [facility] 06/05/19 1126      Row Name 06/05/19 1015             Sit-Stand Transfer    Sit-Stand [place] (Transfers)  independent  -JA      Assistive Device (Sit-Stand Transfers)  other (see comments) no AD   -JA      Recorded by [YAZAN] German Sanford, [facility] 06/05/19 1126      Row Name 06/05/19 1015             Stand-Sit  Transfer    Stand-Sit [place] (Transfers)  independent  -JA      Assistive Device (Stand-Sit Transfers)  other (see comments) no AD  -JA      Recorded by [JA] German Sanford, [facility] 06/05/19 1126      Row Name 06/05/19 1015             Gait/Stairs Assessment/Training    Gait/Stairs Assessment/Training  gait/ambulation independence  -JA      [place] Level (Gait)  independent  -JA      Assistive Device (Gait)  other (see comments) no AD  -JA      Distance in Feet (Gait)  242', 270'  -JA      Comment (Gait/Stairs)  No c/o's with gt. this a.m. on RA  -JA      Recorded by [JA] German Sanford, [facility] 06/05/19 1126      Row Name 06/05/19 1015             Positioning and Restraints    Pre-Treatment Position  in bed  -JA      Post Treatment Position  bed  -JA      In Bed  supine;call light within reach;encouraged to call for assist  -JA      Recorded by [JA] German Sanford, [facility] 06/05/19 1126      Row Name 06/05/19 1015             Pain Scale: Numbers Pre/Post-Treatment    Pain Scale: Numbers, Pretreatment  0/10 - no pain  -JA      Pain Scale: Numbers, Post-Treatment  0/10 - no pain  -JA      Recorded by [JA] German Sanford, [facility] 06/05/19 1126      Row Name 06/05/19 1015             Sensory Assessment/Intervention    Sensory General Assessment  --  -JA      Recorded by [JA] German Sanford, PTA 06/05/19 1126      Row Name 06/05/19 1015             Vision Assessment/Intervention    Visual Impairment/Limitations  corrective lenses for reading  -JA      Recorded by [YAZAN] German Sanford, [facility] 06/05/19 1126      Row Name 06/05/19 1015             Respiratory WDL    Cough Frequency  --  -JA      Cough Type  --  -JA      Recorded by [JA] German Sanford, [facility] 06/05/19 1126      Row Name 06/05/19 1015             Outcome Summary/Treatment Plan (PT)    Daily Summary of Progress (PT)  progress toward functional goals as expected  -JA      Plan for Continued Treatment (PT)  Assess transfers in room  bed-toilet-bed-chair no AD  -JA      Anticipated Discharge Disposition (PT)  home pulmonary rehab  -JA      Recorded by [YAZAN] German Sanford, PTA 06/05/19 1126        User Key  (r) = Recorded By, (t) = Taken By, (c) = Cosigned By    Initials Name Effective Dates Discipline    German Durán, PTA 03/07/18 -  PT               Rehab Goal Summary     Row Name 06/05/19 1015             Physical Therapy Goals    Transfer Goal Selection (PT)  transfer, PT goal 1  -JA      Gait Training Goal Selection (PT)  gait training, PT goal 1  -JA         Transfer Goal 1 (PT)    Activity/Assistive Device (Transfer Goal 1, PT)  sit-to-stand/stand-to-sit;bed-to-chair/chair-to-bed;toilet  -JA      [place] Level/Cues Needed (Transfer Goal 1, PT)  independent  -JA      Time Frame (Transfer Goal 1, PT)  by discharge  -JA      Progress/Outcome (Transfer Goal 1, PT)  goal partially met  -JA         Gait Training Goal 1 (PT)    Activity/Assistive Device (Gait Training Goal 1, PT)  gait (walking locomotion)  -JA      [place] Level (Gait Training Goal 1, PT)  independent O2 sats will not drop below 92%  -JA      Distance (Gait Goal 1, PT)  721mcm7  -JA      Time Frame (Gait Training Goal 1, PT)  by discharge  -JA      Progress/Outcome (Gait Training Goal 1, PT)  goal met  (Significant)   -JA        User Key  (r) = Recorded By, (t) = Taken By, (c) = Cosigned By    Initials Name Provider Type Discipline    German Durán, \[facility]\"" Physical Therapy Assistant PT          Physical Therapy Education     Title: PT OT SLP Therapies (Done)     Topic: Physical Therapy (Done)     Point: Mobility training (Done)     Learning Progress Summary           Patient Acceptance, LUCY FLORES VU by JORGE at 6/4/2019 12:43 PM                   Point: Body mechanics (Done)     Learning Progress Summary           Patient Acceptance, ELUCY VU by JORGE at 6/4/2019 12:43 PM                   Point: Precautions (Done)     Learning Progress Summary            Patient Acceptance, LUCY FLORES,TATUM by  at 6/4/2019 12:43 PM                               User Key     Initials Effective Dates Name Provider Type Discipline    JORGE 04/03/18 -  Cherie Carranza, PT Physical Therapist PT                PT Recommendation and Plan  Anticipated Discharge Disposition (PT): home(pulmonary rehab)  Therapy Frequency (PT Clinical Impression): other (see comments)(6xwk)  Outcome Summary/Treatment Plan (PT)  Daily Summary of Progress (PT): progress toward functional goals as expected  Plan for Continued Treatment (PT): Assess transfers in room bed-toilet-bed-chair no AD  Anticipated Discharge Disposition (PT): home(pulmonary rehab)  Plan of Care Reviewed With: patient  Progress: improving  Outcome Summary: Pt. met gt goal this a.m. Pt. able to amb. w/o AD independent no LOB on RA with O2 SATS did not drop below 92% throughout performance, Pt. independent with bed mobility & sit-stand-sit. Asess pt. in room for transfers & cont. gt. on RA to assess pt.'s tolerance and O2 SATS with performance  Outcome Measures     Row Name 06/05/19 1015 06/04/19 1056          How much help from another person do you currently need...    Turning from your back to your side while in flat bed without using bedrails?  4  -  4  -JORGE     Moving from lying on back to sitting on the side of a flat bed without bedrails?  4  -  4  -JORGE     Moving to and from a bed to a chair (including a wheelchair)?  4  -  3  -JORGE     Standing up from a chair using your arms (e.g., wheelchair, bedside chair)?  4  -  3  -JORGE     Climbing 3-5 steps with a railing?  4  -  3  -JORGE     To walk in hospital room?  4  -  3  -JORGE     AM-PAC 6 Clicks Score  24  -  20  -        Functional Assessment    Outcome Measure Options  AM-PAC 6 Clicks Basic Mobility (PT)  -  AM-PAC 6 Clicks Basic Mobility (PT)  -       User Key  (r) = Recorded By, (t) = Taken By, (c) = Cosigned By    Initials Name Provider Type    Cherie López PT Physical  Therapist    German Durán PTA Physical Therapy Assistant         Time Calculation:   PT Charges     Row Name 06/05/19 1129             Time Calculation    Start Time  1015  -YAZAN      Stop Time  1030  -YAZAN      Time Calculation (min)  15 min  -YAZAN         Time Calculation- PT    Total Timed Code Minutes- PT  15 minute(s)  -YAZAN         Timed Charges    02152 - Gait Training Minutes   15  -JA        User Key  (r) = Recorded By, (t) = Taken By, (c) = Cosigned By    Initials Name Provider Type    German Durán PTA Physical Therapy Assistant        Therapy Charges for Today     Code Description Service Date Service Provider Modifiers Qty    [id number] HC GAIT TRAINING EA 15 MIN 6/5/2019 German Sanford, TRICIA GP 1          PT G-Codes  Outcome Measure Options: AM-PAC 6 Clicks Basic Mobility (PT)  AM-PAC 6 Clicks Score: 24    German Sanford PTA  6/5/2019

## 2019-06-05 NOTE — PLAN OF CARE
Problem: Patient Care Overview  Goal: Plan of Care Review  Outcome: Ongoing (interventions implemented as appropriate)    Goal: Individualization and Mutuality  Outcome: Ongoing (interventions implemented as appropriate)    Goal: Discharge Needs Assessment  Outcome: Ongoing (interventions implemented as appropriate)    Goal: Interprofessional Rounds/Family Conf  Outcome: Ongoing (interventions implemented as appropriate)      Problem: Chronic Obstructive Pulmonary Disease (Adult)  Goal: Signs and Symptoms of Listed Potential Problems Will be Absent, Minimized or Managed (Chronic Obstructive Pulmonary Disease)  Outcome: Ongoing (interventions implemented as appropriate)      Problem: Fall Risk (Adult)  Goal: Identify Related Risk Factors and Signs and Symptoms  Outcome: Outcome(s) achieved Date Met: 06/05/19    Goal: Absence of Fall  Outcome: Outcome(s) achieved Date Met: 06/05/19    Goal: Identify Related Risk Factors and Signs and Symptoms  Outcome: Outcome(s) achieved Date Met: 06/05/19    Goal: Absence of Fall  Outcome: Outcome(s) achieved Date Met: 06/05/19

## 2019-06-05 NOTE — PLAN OF CARE
Problem: Patient Care Overview  Goal: Plan of Care Review  Outcome: Ongoing (interventions implemented as appropriate)   06/04/19 2503   Coping/Psychosocial   Plan of Care Reviewed With patient   Plan of Care Review   Progress no change     Goal: Individualization and Mutuality  Outcome: Ongoing (interventions implemented as appropriate)    Goal: Discharge Needs Assessment  Outcome: Ongoing (interventions implemented as appropriate)    Goal: Interprofessional Rounds/Family Conf  Outcome: Ongoing (interventions implemented as appropriate)      Problem: Chronic Obstructive Pulmonary Disease (Adult)  Goal: Signs and Symptoms of Listed Potential Problems Will be Absent, Minimized or Managed (Chronic Obstructive Pulmonary Disease)  Outcome: Ongoing (interventions implemented as appropriate)      Problem: Fall Risk (Adult)  Goal: Identify Related Risk Factors and Signs and Symptoms  Outcome: Ongoing (interventions implemented as appropriate)    Goal: Absence of Fall  Outcome: Ongoing (interventions implemented as appropriate)    Goal: Identify Related Risk Factors and Signs and Symptoms  Outcome: Ongoing (interventions implemented as appropriate)    Goal: Absence of Fall  Outcome: Ongoing (interventions implemented as appropriate)

## 2019-06-06 LAB
ALBUMIN SERPL-MCNC: 3.6 G/DL (ref 3.5–5.2)
ALBUMIN/GLOB SERPL: 1.4 G/DL
ALP SERPL-CCNC: 71 U/L (ref 39–117)
ALT SERPL W P-5'-P-CCNC: 22 U/L (ref 1–33)
ANION GAP SERPL CALCULATED.3IONS-SCNC: 7 MMOL/L
AST SERPL-CCNC: 9 U/L (ref 1–32)
BASOPHILS # BLD AUTO: 0.03 10*3/MM3 (ref 0–0.2)
BASOPHILS NFR BLD AUTO: 0.2 % (ref 0–1.5)
BILIRUB SERPL-MCNC: 0.2 MG/DL (ref 0.2–1.2)
BUN BLD-MCNC: 23 MG/DL (ref 8–23)
BUN/CREAT SERPL: 29.9 (ref 7–25)
CALCIUM SPEC-SCNC: 9.4 MG/DL (ref 8.6–10.5)
CHLORIDE SERPL-SCNC: 99 MMOL/L (ref 98–107)
CO2 SERPL-SCNC: 35 MMOL/L (ref 22–29)
CREAT BLD-MCNC: 0.77 MG/DL (ref 0.57–1)
DEPRECATED RDW RBC AUTO: 46.1 FL (ref 37–54)
EOSINOPHIL # BLD AUTO: 0 10*3/MM3 (ref 0–0.4)
EOSINOPHIL NFR BLD AUTO: 0 % (ref 0.3–6.2)
ERYTHROCYTE [DISTWIDTH] IN BLOOD BY AUTOMATED COUNT: 14.4 % (ref 12.3–15.4)
GFR SERPL CREATININE-BSD FRML MDRD: 76 ML/MIN/1.73
GLOBULIN UR ELPH-MCNC: 2.6 GM/DL
GLUCOSE BLD-MCNC: 183 MG/DL (ref 65–99)
GLUCOSE BLDC GLUCOMTR-MCNC: 161 MG/DL (ref 70–130)
GLUCOSE BLDC GLUCOMTR-MCNC: 238 MG/DL (ref 70–130)
GLUCOSE BLDC GLUCOMTR-MCNC: 246 MG/DL (ref 70–130)
GLUCOSE BLDC GLUCOMTR-MCNC: 337 MG/DL (ref 70–130)
HCT VFR BLD AUTO: 39.2 % (ref 34–46.6)
HGB BLD-MCNC: 12.6 G/DL (ref 12–15.9)
IMM GRANULOCYTES # BLD AUTO: 0.29 10*3/MM3 (ref 0–0.05)
IMM GRANULOCYTES NFR BLD AUTO: 2 % (ref 0–0.5)
LYMPHOCYTES # BLD AUTO: 0.95 10*3/MM3 (ref 0.7–3.1)
LYMPHOCYTES NFR BLD AUTO: 6.5 % (ref 19.6–45.3)
MCH RBC QN AUTO: 27.9 PG (ref 26.6–33)
MCHC RBC AUTO-ENTMCNC: 32.1 G/DL (ref 31.5–35.7)
MCV RBC AUTO: 86.7 FL (ref 79–97)
MONOCYTES # BLD AUTO: 0.68 10*3/MM3 (ref 0.1–0.9)
MONOCYTES NFR BLD AUTO: 4.6 % (ref 5–12)
NEUTROPHILS # BLD AUTO: 12.68 10*3/MM3 (ref 1.7–7)
NEUTROPHILS NFR BLD AUTO: 86.7 % (ref 42.7–76)
NRBC BLD AUTO-RTO: 0 /100 WBC (ref 0–0.2)
PLATELET # BLD AUTO: 304 10*3/MM3 (ref 140–450)
PMV BLD AUTO: 9.2 FL (ref 6–12)
POTASSIUM BLD-SCNC: 5.3 MMOL/L (ref 3.5–5.2)
PROT SERPL-MCNC: 6.2 G/DL (ref 6–8.5)
RBC # BLD AUTO: 4.52 10*6/MM3 (ref 3.77–5.28)
SODIUM BLD-SCNC: 141 MMOL/L (ref 136–145)
WBC NRBC COR # BLD: 14.63 10*3/MM3 (ref 3.4–10.8)

## 2019-06-06 PROCEDURE — 85025 COMPLETE CBC W/AUTO DIFF WBC: CPT | Performed by: STUDENT IN AN ORGANIZED HEALTH CARE EDUCATION/TRAINING PROGRAM

## 2019-06-06 PROCEDURE — 25010000002 METHYLPREDNISOLONE PER 125 MG: Performed by: STUDENT IN AN ORGANIZED HEALTH CARE EDUCATION/TRAINING PROGRAM

## 2019-06-06 PROCEDURE — 94799 UNLISTED PULMONARY SVC/PX: CPT

## 2019-06-06 PROCEDURE — 99225 PR SBSQ OBSERVATION CARE/DAY 25 MINUTES: CPT | Performed by: STUDENT IN AN ORGANIZED HEALTH CARE EDUCATION/TRAINING PROGRAM

## 2019-06-06 PROCEDURE — G0378 HOSPITAL OBSERVATION PER HR: HCPCS

## 2019-06-06 PROCEDURE — 96372 THER/PROPH/DIAG INJ SC/IM: CPT

## 2019-06-06 PROCEDURE — 25010000002 ENOXAPARIN PER 10 MG: Performed by: STUDENT IN AN ORGANIZED HEALTH CARE EDUCATION/TRAINING PROGRAM

## 2019-06-06 PROCEDURE — 82962 GLUCOSE BLOOD TEST: CPT

## 2019-06-06 PROCEDURE — 96376 TX/PRO/DX INJ SAME DRUG ADON: CPT

## 2019-06-06 PROCEDURE — 94760 N-INVAS EAR/PLS OXIMETRY 1: CPT

## 2019-06-06 PROCEDURE — 63710000001 INSULIN ASPART PER 5 UNITS: Performed by: STUDENT IN AN ORGANIZED HEALTH CARE EDUCATION/TRAINING PROGRAM

## 2019-06-06 PROCEDURE — 97530 THERAPEUTIC ACTIVITIES: CPT

## 2019-06-06 PROCEDURE — 80053 COMPREHEN METABOLIC PANEL: CPT | Performed by: STUDENT IN AN ORGANIZED HEALTH CARE EDUCATION/TRAINING PROGRAM

## 2019-06-06 RX ADMIN — METHYLPREDNISOLONE SODIUM SUCCINATE 60 MG: 125 INJECTION, POWDER, FOR SOLUTION INTRAMUSCULAR; INTRAVENOUS at 21:13

## 2019-06-06 RX ADMIN — IPRATROPIUM BROMIDE AND ALBUTEROL SULFATE 3 ML: 2.5; .5 SOLUTION RESPIRATORY (INHALATION) at 20:52

## 2019-06-06 RX ADMIN — Medication 100 MG: at 21:12

## 2019-06-06 RX ADMIN — FUROSEMIDE 20 MG: 20 TABLET ORAL at 21:12

## 2019-06-06 RX ADMIN — ALPRAZOLAM 0.25 MG: 0.25 TABLET ORAL at 08:36

## 2019-06-06 RX ADMIN — IPRATROPIUM BROMIDE AND ALBUTEROL SULFATE 3 ML: 2.5; .5 SOLUTION RESPIRATORY (INHALATION) at 15:33

## 2019-06-06 RX ADMIN — PRASUGREL 10 MG: 10 TABLET, FILM COATED ORAL at 08:31

## 2019-06-06 RX ADMIN — IPRATROPIUM BROMIDE AND ALBUTEROL SULFATE 3 ML: 2.5; .5 SOLUTION RESPIRATORY (INHALATION) at 07:25

## 2019-06-06 RX ADMIN — ALPRAZOLAM 0.25 MG: 0.25 TABLET ORAL at 21:15

## 2019-06-06 RX ADMIN — ASPIRIN 81 MG CHEWABLE TABLET 81 MG: 81 TABLET CHEWABLE at 08:36

## 2019-06-06 RX ADMIN — METHYLPREDNISOLONE SODIUM SUCCINATE 60 MG: 125 INJECTION, POWDER, FOR SOLUTION INTRAMUSCULAR; INTRAVENOUS at 13:00

## 2019-06-06 RX ADMIN — INSULIN ASPART 5 UNITS: 100 INJECTION, SOLUTION INTRAVENOUS; SUBCUTANEOUS at 21:12

## 2019-06-06 RX ADMIN — Medication 100 MG: at 08:29

## 2019-06-06 RX ADMIN — BUDESONIDE AND FORMOTEROL FUMARATE DIHYDRATE 2 PUFF: 160; 4.5 AEROSOL RESPIRATORY (INHALATION) at 07:25

## 2019-06-06 RX ADMIN — DOXYCYCLINE 100 MG: 100 CAPSULE ORAL at 08:30

## 2019-06-06 RX ADMIN — DOXYCYCLINE 100 MG: 100 CAPSULE ORAL at 21:12

## 2019-06-06 RX ADMIN — CARVEDILOL 3.12 MG: 3.12 TABLET, FILM COATED ORAL at 08:29

## 2019-06-06 RX ADMIN — CARVEDILOL 3.12 MG: 3.12 TABLET, FILM COATED ORAL at 17:24

## 2019-06-06 RX ADMIN — ENOXAPARIN SODIUM 40 MG: 40 INJECTION SUBCUTANEOUS at 12:02

## 2019-06-06 RX ADMIN — INSULIN ASPART 3 UNITS: 100 INJECTION, SOLUTION INTRAVENOUS; SUBCUTANEOUS at 11:42

## 2019-06-06 RX ADMIN — BUDESONIDE AND FORMOTEROL FUMARATE DIHYDRATE 2 PUFF: 160; 4.5 AEROSOL RESPIRATORY (INHALATION) at 20:52

## 2019-06-06 RX ADMIN — LOSARTAN POTASSIUM 25 MG: 25 TABLET, FILM COATED ORAL at 08:31

## 2019-06-06 RX ADMIN — FUROSEMIDE 20 MG: 20 TABLET ORAL at 08:30

## 2019-06-06 RX ADMIN — SENNOSIDES AND DOCUSATE SODIUM 2 TABLET: 8.6; 5 TABLET ORAL at 21:12

## 2019-06-06 RX ADMIN — IPRATROPIUM BROMIDE AND ALBUTEROL SULFATE 3 ML: 2.5; .5 SOLUTION RESPIRATORY (INHALATION) at 10:45

## 2019-06-06 RX ADMIN — PAROXETINE HYDROCHLORIDE 20 MG: 20 TABLET, FILM COATED ORAL at 05:58

## 2019-06-06 RX ADMIN — ATORVASTATIN CALCIUM 40 MG: 40 TABLET, FILM COATED ORAL at 21:12

## 2019-06-06 RX ADMIN — MONTELUKAST SODIUM 10 MG: 10 TABLET, COATED ORAL at 08:31

## 2019-06-06 RX ADMIN — POLYETHYLENE GLYCOL 3350 17 G: 17 POWDER, FOR SOLUTION ORAL at 08:37

## 2019-06-06 RX ADMIN — INSULIN ASPART 3 UNITS: 100 INJECTION, SOLUTION INTRAVENOUS; SUBCUTANEOUS at 17:24

## 2019-06-06 RX ADMIN — METHYLPREDNISOLONE SODIUM SUCCINATE 60 MG: 125 INJECTION, POWDER, FOR SOLUTION INTRAMUSCULAR; INTRAVENOUS at 05:59

## 2019-06-06 NOTE — THERAPY TREATMENT NOTE
Acute Care - Physical Therapy Treatment Note  [facility]     Patient Name: Monisha Brasher  : 1955  MRN: [id number]  Today's Date: 2019  Onset of Illness/Injury or Date of Surgery: 19  Date of Referral to PT: 19  Referring Physician: Dr. Chaidez    Admit Date: 6/3/2019    Visit Dx:    ICD-10-CM ICD-9-CM   1. COPD exacerbation (CMS/HCC) J44.1 491.21   2. Impaired functional mobility, balance, gait, and endurance Z74.09 V49.89     Patient Active Problem List   Diagnosis   • COPD bronchitis   • Cellulitis of left leg   • Asthma   • SOB (shortness of breath)   • Chronic bronchitis with acute exacerbation (CMS/HCC)   • Nicotine abuse   • Hypertension   • Screening for osteoporosis   • Panlobular emphysema (CMS/HCC)   • Anxiety   • General medical exam   • Malaise   • Hyperglycemia   • Acute ST elevation myocardial infarction (STEMI) (CMS/HCC)   • CAD (coronary artery disease)   • Acute respiratory failure with hypoxia (CMS/HCC)   • Depression   • Chronic alcohol abuse   • Pneumonia of both upper lobes   • Paroxysmal atrial fibrillation (CMS/HCC)   • Snoring   • Rash   • Chest pain   • Atherosclerosis of native coronary artery of native heart with unstable angina pectoris (CMS/HCC)   • Encounter for screening mammogram for malignant neoplasm of breast   • COPD exacerbation (CMS/HCC)   • Advanced care planning/counseling discussion   • Nocturnal hypoxia   • NARCISO (obstructive sleep apnea)   • Prediabetes       Therapy Treatment    Rehabilitation Treatment Summary     Row Name 19 1405             Treatment Time/Intention    Discipline  physical therapy assistant  -JA      Document Type  therapy note (daily note)  -JA      Subjective Information  no complaints  -JA      Mode of Treatment  individual therapy;physical therapy  -JA      Therapy Frequency (PT Clinical Impression)  other (see comments) 6xwk  -JA      Patient Effort  good  -JA      Existing Precautions/Restrictions  oxygen  therapy device and L/min  -JA      Recorded by [YAZAN] German Sanford, [facility] [place] 06/06/19 1528      Row Name 06/06/19 1405             Vital Signs    Pretreatment Heart Rate (beats/min)  82  -JA      Intratreatment Heart Rate (beats/min)  86  -JA      Posttreatment Heart Rate (beats/min)  81  -JA      Pre SpO2 (%)  93  -JA      O2 Delivery Pre Treatment  room air per pt request   -JA      Intra SpO2 (%)  88  (Significant)  -90% after 1st gt on RA, Pt. O2 93% 2nd gt. attempt 2L n.c.  -JA      O2 Delivery Intra Treatment  room air  -JA      Post SpO2 (%)  94  -JA      O2 Delivery Post Treatment  supplemental O2  -JA      Recorded by [YAZAN] German Sanford, [facility] [place] 06/06/19 1528      Row Name 06/06/19 1405             Cognitive Assessment/Intervention- PT/OT    Affect/Mental Status (Cognitive)  WFL  -JA      Orientation Status (Cognition)  oriented x 4  -JA      Follows Commands (Cognition)  follows multi-step commands;over 90% accuracy  -JA      Recorded by [YAZAN] German Sanford, [facility] [place] 06/06/19 1528      Row Name 06/06/19 1405             Bed Mobility Assessment/Treatment    Bed Mobility Assessment/Treatment  supine-sit;sit-supine  -JA      Supine-Sit [place] (Bed Mobility)  independent  -JA      Sit-Supine [place] (Bed Mobility)  independent  -JA      Recorded by [YAZAN] German Sanford, [facility] [place] 06/06/19 1528      Row Name 06/06/19 1405             Transfer Assessment/Treatment    Transfer Assessment/Treatment  sit-stand transfer;stand-sit transfer;bed-chair transfer;chair-bed transfer;toilet transfer  -JA      Recorded by [YAZAN] German Sanford, [facility] [place] 06/06/19 1528      Row Name 06/06/19 1405             Bed-Chair Transfer    Bed-Chair [place] (Transfers)  independent  -JA      Assistive Device (Bed-Chair Transfers)  other (see comments) no AD  -JA      Recorded by [YAZAN] German Sanford, [facility] [place] 06/06/19 1528      Row Name 06/06/19 1405             Chair-Bed Transfer    Chair-Bed [place] (Transfers)   independent  -JA      Assistive Device (Chair-Bed Transfers)  other (see comments) no AD  -JA      Recorded by [JA] German Sanford, PTA 06/06/19 1528      Row Name 06/06/19 1405             Sit-Stand Transfer    Sit-Stand [place] (Transfers)  independent  -JA      Assistive Device (Sit-Stand Transfers)  other (see comments) no AD   -JA      Recorded by [YAZAN] German Sanford, PTA 06/06/19 1528      Row Name 06/06/19 1405             Stand-Sit Transfer    Stand-Sit [place] (Transfers)  independent  -JA      Assistive Device (Stand-Sit Transfers)  other (see comments) no AD  -JA      Recorded by [YAZAN] German Sanford, PTA 06/06/19 1528      Row Name 06/06/19 1405             Toilet Transfer    Type (Toilet Transfer)  sit-stand;stand-sit  -JA      [place] Level (Toilet Transfer)  independent  -JA      Assistive Device (Toilet Transfer)  other (see comments) no AD  -JA      Recorded by [YAZAN] German Sanford, PTA 06/06/19 1528      Row Name 06/06/19 1405             Gait/Stairs Assessment/Training    Gait/Stairs Assessment/Training  gait/ambulation independence  -JA      [place] Level (Gait)  independent  -JA      Assistive Device (Gait)  other (see comments) no AD  -JA      Distance in Feet (Gait)  150', 150'x2  -JA      Comment (Gait/Stairs)  Pt. with SOA with gt. w/o AD O2 SATS dropped to 88% during 1st gt & elevated to 90% with standing rest & PLB, O2 donned once returned to room O2 dropped to 93% after 2nd gt. attempt with 2L n.c.   -JA      Recorded by [YAZAN] German Sanford, PTA 06/06/19 1528      Row Name 06/06/19 1405             Positioning and Restraints    Pre-Treatment Position  in bed  -JA      Post Treatment Position  bed  -JA      Recorded by [YAZAN] German Sanford, PTA 06/06/19 1528      Row Name 06/06/19 1405             Pain Scale: Numbers Pre/Post-Treatment    Pain Scale: Numbers, Pretreatment  0/10 - no pain  -JA      Pain Scale: Numbers, Post-Treatment  0/10 - no  pain  -JA      Recorded by [JA] German Sanford, PTA 06/06/19 1528      Row Name 06/06/19 1405             Vision Assessment/Intervention    Visual Impairment/Limitations  corrective lenses for reading  -JA      Recorded by [JA] German Sanford, PTA 06/06/19 1528      Row Name 06/06/19 1405             Outcome Summary/Treatment Plan (PT)    Daily Summary of Progress (PT)  progress toward functional goals as expected  -JA      Plan for Continued Treatment (PT)  Discuss with evaluating PT regarding goal attainment   -JA      Anticipated Discharge Disposition (PT)  anticipate therapy at next level of care  -JA      Recorded by [JA] German Sanford, PTA 06/06/19 1528        User Key  (r) = Recorded By, (t) = Taken By, (c) = Cosigned By    Initials Name Effective Dates Discipline    German Durán, PTA 03/07/18 -  PT               Rehab Goal Summary     Row Name 06/06/19 1405             Physical Therapy Goals    Transfer Goal Selection (PT)  transfer, PT goal 1  -JA      Gait Training Goal Selection (PT)  gait training, PT goal 1  -         Transfer Goal 1 (PT)    Activity/Assistive Device (Transfer Goal 1, PT)  sit-to-stand/stand-to-sit;bed-to-chair/chair-to-bed;toilet  -JA      [place] Level/Cues Needed (Transfer Goal 1, PT)  independent  -JA      Time Frame (Transfer Goal 1, PT)  by discharge  -JA      Progress/Outcome (Transfer Goal 1, PT)  goal met  (Significant)   -JA         Gait Training Goal 1 (PT)    Activity/Assistive Device (Gait Training Goal 1, PT)  gait (walking locomotion)  -JA      [place] Level (Gait Training Goal 1, PT)  independent O2 sats will not drop below 92%  -JA      Distance (Gait Goal 1, PT)  [id number]  -JA      Time Frame (Gait Training Goal 1, PT)  by discharge  -JA      Progress/Outcome (Gait Training Goal 1, PT)  goal met  (Significant)   -JA        User Key  (r) = Recorded By, (t) = Taken By, (c) = Cosigned By    Initials Name Provider Type Discipline    YAZAN  German Sanford, PTA Physical Therapy Assistant PT          Physical Therapy Education     Title: PT OT SLP Therapies (Done)     Topic: Physical Therapy (Done)     Point: Mobility training (Done)     Learning Progress Summary           Patient Acceptance, E, DU,VU by  at 6/4/2019 12:43 PM                   Point: Body mechanics (Done)     Learning Progress Summary           Patient Acceptance, E, LUCY,VU by  at 6/4/2019 12:43 PM                   Point: Precautions (Done)     Learning Progress Summary           Patient Acceptance, E, DU,VU by  at 6/4/2019 12:43 PM                               User Key     Initials Effective Dates Name Provider Type Discipline     04/03/18 -  Cherie Carranza, PT Physical Therapist PT                PT Recommendation and Plan  Anticipated Discharge Disposition (PT): anticipate therapy at next level of care  Therapy Frequency (PT Clinical Impression): other (see comments)(6xwk)  Outcome Summary/Treatment Plan (PT)  Daily Summary of Progress (PT): progress toward functional goals as expected  Plan for Continued Treatment (PT): Discuss with evaluating PT regarding goal attainment   Anticipated Discharge Disposition (PT): anticipate therapy at next level of care  Plan of Care Reviewed With: patient  Progress: improving  Outcome Summary: Pt. has met all goals for PT at this time. Pt. independent with gt. & transfers at this time. Discuss with evaluating PT regarding pt. goal attainment at this time  Outcome Measures     Row Name 06/06/19 1405 06/05/19 1015 06/04/19 1056       How much help from another person do you currently need...    Turning from your back to your side while in flat bed without using bedrails?  4  -JA  4  -JA  4  -JORGE    Moving from lying on back to sitting on the side of a flat bed without bedrails?  4  -JA  4  -JA  4  -JORGE    Moving to and from a bed to a chair (including a wheelchair)?  4  -JA  4  -JA  3  -JORGE    Standing up from a chair using your arms (e.g.,  wheelchair, bedside chair)?  4  -JA  4  -JA  3  -JORGE    Climbing 3-5 steps with a railing?  4  -JA  4  -JA  3  -JORGE    To walk in hospital room?  4  -JA  4  -JA  3  -JORGE    AM-PAC 6 Clicks Score  24  -JA  24  -JA  20  -JORGE       Functional Assessment    Outcome Measure Options  AM-PAC 6 Clicks Basic Mobility (PT)  -JA  AM-PAC 6 Clicks Basic Mobility (PT)  -JA  AM-PAC 6 Clicks Basic Mobility (PT)  -JORGE      User Key  (r) = Recorded By, (t) = Taken By, (c) = Cosigned By    Initials Name Provider Type    Cherie López, PT Physical Therapist    German Durán PTA Physical Therapy Assistant         Time Calculation:   PT Charges     Row Name 06/06/19 1530             Time Calculation    Start Time  1405  -YAZAN      Stop Time  1430  -YAZAN      Time Calculation (min)  25 min  -YAZAN         Time Calculation- PT    Total Timed Code Minutes- PT  25 minute(s)  -YAZAN         Timed Charges    88841 - PT Therapeutic Activity Minutes  25  -        User Key  (r) = Recorded By, (t) = Taken By, (c) = Cosigned By    Initials Name Provider Type    German Durán PTA Physical Therapy Assistant        Therapy Charges for Today     Code Description Service Date Service Provider Modifiers Qty    78529828741 HC GAIT TRAINING EA 15 MIN 6/5/2019 German Sanford PTA GP 1    01724299829 HC PT THERAPEUTIC ACT EA 15 MIN 6/6/2019 German Sanford PTA GP 2          PT G-Codes  Outcome Measure Options: AM-PAC 6 Clicks Basic Mobility (PT)  AM-PAC 6 Clicks Score: 24    German Sanford PTA  6/6/2019

## 2019-06-06 NOTE — PROGRESS NOTES
FAMILY MEDICINE DAILY PROGRESS NOTE    NAME: Monisha Brasher  : 1955  MRN: [id number]      LOS: 0 days     PROVIDER OF SERVICE: Chintan Bloom MD    Chief Complaint: COPD exacerbation (CMS/[facility])    Subjective:     Interval History:  History taken from: patient chart    Patient continues to improve. Supplemental O2 has been weaned to 1.5 L via NC. She was able to walk yesterday down the [place] with minimal SOA. No other complaints. VSS. Afebrile.     Review of Systems:   Review of Systems   Constitutional: Negative for appetite change, diaphoresis, fatigue and fever.   HENT: Negative for congestion, ear pain, postnasal drip, rhinorrhea and sore throat.    Eyes: Negative for pain and visual disturbance.   Respiratory: Positive for shortness of breath and wheezing. Negative for cough and chest tightness.    Cardiovascular: Negative for chest pain, palpitations and leg swelling.   Gastrointestinal: Negative for abdominal pain, constipation, diarrhea, nausea and vomiting.   Genitourinary: Negative for dysuria, flank pain, frequency and urgency.   Musculoskeletal: Negative for arthralgias, back pain and myalgias.   Skin: Negative for pallor and rash.   Neurological: Negative for dizziness, syncope, weakness and numbness.       Objective:     Vital Signs  Temp:  [96.9 °F (36.1 °C)-98.8 °F (37.1 °C)] 98 °F (36.7 °C)  Heart Rate:  [60-70] 67  Resp:  [16-18] 18  BP: (108-165)/(60-80) 156/80  Body mass index is 26.71 kg/m².    Physical Exam  Physical Exam   Constitutional: She is oriented to person, place, and time. She appears well-developed and well-nourished. No distress.   HENT:   Head: Normocephalic and atraumatic.   Nose: Nose normal.   Eyes: EOM are normal. Pupils are equal, round, and reactive to light.   Neck: Normal range of motion. Neck supple. No thyromegaly present.   Cardiovascular: Normal rate, regular rhythm, normal heart sounds and intact distal pulses.   Pulmonary/Chest: She has wheezes.  She has rales.   Poor air movement, normal effort. Wheezing diffusely bilaterally and crackles at the bases bilaterally. Improved from yesterday.    Abdominal: Soft. Bowel sounds are normal. She exhibits no distension. There is no tenderness. There is no rebound and no guarding.   Musculoskeletal: Normal range of motion. She exhibits no edema or tenderness.   Lymphadenopathy:     She has no cervical adenopathy.   Neurological: She is alert and oriented to person, place, and time.   Skin: Skin is warm and dry. Capillary refill takes less than 2 seconds. She is not diaphoretic.   Psychiatric: She has a normal mood and affect. Her behavior is normal.       Medication Review    Current Facility-Administered Medications:   •  acetaminophen (TYLENOL) tablet 650 mg, 650 mg, Oral, Q4H PRN, Jyoti Gloria MD  •  albuterol (PROVENTIL) nebulizer solution 0.083% 2.5 mg/3mL, 2.5 mg, Nebulization, Q6H PRN, Jyoti Gloria MD  •  ALPRAZolam (XANAX) tablet 0.25 mg, 0.25 mg, Oral, TID PRN, Jyoti Gloria MD, 0.25 mg at 06/05/19 1726  •  amiodarone (PACERONE) half tablet 100 mg, 100 mg, Oral, BID, Jyoti Gloria MD, 100 mg at 06/05/19 2024  •  aspirin chewable tablet 81 mg, 81 mg, Oral, Daily, Jyoti Gloria MD, 81 mg at 06/05/19 0758  •  atorvastatin (LIPITOR) tablet 40 mg, 40 mg, Oral, Nightly, Jyoti Gloria MD, 40 mg at 06/05/19 2024  •  bisacodyl (DULCOLAX) suppository 10 mg, 10 mg, Rectal, Daily PRN, Jyoti Gloria MD  •  budesonide-formoterol (SYMBICORT) 160-4.5 MCG/ACT inhaler 2 puff, 2 puff, Inhalation, BID - RT, Jyoti Gloria MD, 2 puff at 06/06/19 0725  •  calcium carbonate (TUMS) chewable tablet 500 mg (200 mg elemental), 2 tablet, Oral, Daily PRN, Jyoti Gloria MD  •  carvedilol (COREG) tablet 3.125 mg, 3.125 mg, Oral, BID With Meals, Chintan Bloom MD, 3.125 mg at 06/05/19 8184  •  dextrose (D50W) 25 g/ 50mL  Intravenous Solution 25 g, 25 g, Intravenous, Q15 Min PRN, Jyoti Gloria MD  •  dextrose (GLUTOSE) oral gel 15 g, 15 g, Oral, Q15 Min PRN, Jyoti Gloria MD  •  doxycycline (MONODOX) capsule 100 mg, 100 mg, Oral, Q12H, Jyoti Gloria MD, 100 mg at 06/05/19 2024  •  enoxaparin (LOVENOX) syringe 40 mg, 40 mg, Subcutaneous, Q24H, Chintan Bloom MD, 40 mg at 06/05/19 1108  •  furosemide (LASIX) tablet 20 mg, 20 mg, Oral, BID, Jyoti Gloria MD, 20 mg at 06/05/19 2024  •  glucagon (human recombinant) (GLUCAGEN DIAGNOSTIC) injection 1 mg, 1 mg, Subcutaneous, PRN, Jyoti Gloria MD  •  insulin aspart (novoLOG) injection 0-7 Units, 0-7 Units, Subcutaneous, 4x Daily AC & at Bedtime, Jyoti Gloria MD, 4 Units at 06/05/19 2026  •  ipratropium-albuterol (DUO-NEB) nebulizer solution 3 mL, 3 mL, Nebulization, 4x Daily - RT, Jyoti Gloria MD, 3 mL at 06/06/19 0725  •  losartan (COZAAR) tablet 25 mg, 25 mg, Oral, Q24H, Chintan Bloom MD, 25 mg at 06/05/19 0759  •  methylPREDNISolone sodium succinate (SOLU-Medrol) injection 60 mg, 60 mg, Intravenous, Q8H, Chintan Bloom MD, 60 mg at 06/06/19 0559  •  montelukast (SINGULAIR) tablet 10 mg, 10 mg, Oral, Daily, Jyoti Gloria MD, 10 mg at 06/05/19 0758  •  nitroglycerin (NITROSTAT) SL tablet 0.4 mg, 0.4 mg, Sublingual, Q5 Min PRN, Jyoti Gloria MD  •  ondansetron (ZOFRAN) tablet 4 mg, 4 mg, Oral, Q6H PRN, Jyoti Gloria MD  •  PARoxetine (PAXIL) tablet 20 mg, 20 mg, Oral, QAM, Jyoti Gloria MD, 20 mg at 06/06/19 0558  •  polyethylene glycol 3350 powder (packet), 17 g, Oral, Daily, Chintan Bloom MD, 17 g at 06/05/19 1107  •  prasugrel (EFFIENT) tablet 10 mg, 10 mg, Oral, Daily, Jyoti Gloria MD, 10 mg at 06/05/19 0759  •  sennosides-docusate sodium (SENOKOT-S) 8.6-50 MG tablet 2 tablet, 2 tablet, Oral, Nightly, Juani  Jyoti Kemp MD, 2 tablet at 06/05/19 2024     Diagnostic Data    Lab Results (last 24 hours)     Procedure Component Value Units Date/Time    POC Glucose Once [[id number]]  (Abnormal) Collected:  06/06/19 0715    Specimen:  Blood Updated:  06/06/19 0743     Glucose 161 mg/dL      Comment: RN NotifiedOperator: [id number] ALEXA Rendon ID: [id number]       Comprehensive Metabolic Panel [[id number]]  (Abnormal) Collected:  06/06/19 0539    Specimen:  Blood Updated:  06/06/19 0626     Glucose 183 mg/dL      BUN 23 mg/dL      Creatinine 0.77 mg/dL      Sodium 141 mmol/L      Potassium 5.3 mmol/L      Chloride 99 mmol/L      CO2 35.0 mmol/L      Calcium 9.4 mg/dL      Total Protein 6.2 g/dL      Albumin 3.60 g/dL      ALT (SGPT) 22 U/L      AST (SGOT) 9 U/L      Alkaline Phosphatase 71 U/L      Total Bilirubin 0.2 mg/dL      eGFR Non African Amer 76 mL/min/1.73      Globulin 2.6 gm/dL      A/G Ratio 1.4 g/dL      BUN/Creatinine Ratio 29.9     Anion Gap 7.0 mmol/L     Narrative:       GFR Normal >60  Chronic Kidney Disease <60  Kidney Failure <15    CBC & Differential [[id number]] Collected:  06/06/19 0539    Specimen:  Blood Updated:  06/06/19 0607    Narrative:       The following orders were created for panel order CBC & Differential.  Procedure                               Abnormality         Status                     ---------                               -----------         ------                     CBC Auto Differential[[id number]]        Abnormal            Final result                 Please view results for these tests on the individual orders.    CBC Auto Differential [[id number]]  (Abnormal) Collected:  06/06/19 0539    Specimen:  Blood Updated:  06/06/19 0607     WBC 14.63 10*3/mm3      RBC 4.52 10*6/mm3      Hemoglobin 12.6 g/dL      Hematocrit 39.2 %      MCV 86.7 fL      MCH 27.9 pg      MCHC 32.1 g/dL      RDW 14.4 %      RDW-SD 46.1 fl      MPV 9.2 fL      Platelets 304 10*3/mm3      Neutrophil  % 86.7 %      Lymphocyte % 6.5 %      Monocyte % 4.6 %      Eosinophil % 0.0 %      Basophil % 0.2 %      Immature Grans % 2.0 %      Neutrophils, Absolute 12.68 10*3/mm3      Lymphocytes, Absolute 0.95 10*3/mm3      Monocytes, Absolute 0.68 10*3/mm3      Eosinophils, Absolute 0.00 10*3/mm3      Basophils, Absolute 0.03 10*3/mm3      Immature Grans, Absolute 0.29 10*3/mm3      nRBC 0.0 /100 WBC     POC Glucose Once [[id number]]  (Abnormal) Collected:  06/05/19 1912    Specimen:  Blood Updated:  06/05/19 1955     Glucose 258 mg/dL      Comment: RN NotifiedOperator: [id number] EFREM AMBERMeter ID: [id number]       POC Glucose Once [[id number]]  (Abnormal) Collected:  06/05/19 1641    Specimen:  Blood Updated:  06/05/19 1659     Glucose 191 mg/dL      Comment: RN NotifiedOperator: [id number] ILENE KERNIANMeter ID: [id number]       C-reactive Protein [[id number]]  (Normal) Collected:  06/05/19 0553    Specimen:  Blood Updated:  06/05/19 1436     C-Reactive Protein 0.42 mg/dL     POC Glucose Once [[id number]]  (Abnormal) Collected:  06/05/19 1054    Specimen:  Blood Updated:  06/05/19 1112     Glucose 227 mg/dL      Comment: RN NotifiedOperator: [id number] ILENE CHRISTIANMeter ID: [id number]               I reviewed the patient's new clinical results.    Assessment/Plan:     Active Hospital Problems    Diagnosis POA   • **COPD exacerbation (CMS/[facility]) [J44.1] Yes     - On 3L in ER with desaturation to 88% on room air. BNP slightly elevated; likely secondary to acute respiratory distress.  - Dose of 125 mg solumedrol in ER. Continue with 60 mg q8h. This will be weaned.   - Will begin 7-day Doxycycline (6/3).  - Atypical organisms and respiratory panel negative.  - Daily CBC and CMP. Will monitor CRP Q48H given patient's steroids.  - Inspiratory spirometer and OPEP.  - Continue oxygen therapy and wean to baseline (2L at night). She desatted on room air to 87% to room air this morning.   - Pulmonary rehab consult  placed.  - PT for deconditioning.     • Advanced care planning/counseling discussion [Z71.89] Not Applicable     - Patient desires to be full code but has desires with regards to duration on breathing tube. Designates her  as her medical power of . Amenable to advanced care planning.  - Will consult hospital .     • Nocturnal hypoxia [G47.34] Yes     - Baseline 2L oxygen requirement at night. Presently higher due to current COPD exacerbation.  - Will wean to baseline as clinical course improves.     • NARCISO (obstructive sleep apnea) [G47.33] Yes     - Use home CPAP at night     • Prediabetes [R73.03] Yes     - Hgb A1c 6.2 in January.  - Hold metformin  - POCT glucose checks with IV steroids.  - Low-dose SSI.     • Paroxysmal atrial fibrillation (CMS/HCC) [I48.0] Yes     - First diagnosed in November. Followed by Dr. Huber outpatient.  - Maintained in sinus rhythm with Amiodarone. CHADSVASC score of 2 (for age and hypertension). On antiplatelet therapy (prasugrel). Will continue both.   - Cardiac monitoring.     • Acute respiratory failure with hypoxia (CMS/HCC) [J96.01] Yes     - Desaturated to 88% on RA without ambulation. Placed on 3 L of oxygen in ER. Currently on 1.5 L via NC  - Continue oxygen therapy.  - Will treat COPD exacerbation  - Continue monitoring vitals per floor policy.     • CAD (coronary artery disease) [I25.10] Yes     - No active chest pain.  - Continue home prasugrel, atorvastatin, carvedilol.  - Nitro SL PRN for chest pain.     • Anxiety [F41.9] Yes     - Continue home Alprazolam and Paroxetine.         DVT prophylaxis: Lovenox  Code status is   Code Status and Medical Interventions:   Ordered at: 06/03/19 1901     Level Of Support Discussed With:    Patient     Code Status:    CPR     Medical Interventions (Level of Support Prior to Arrest):    Full     Comments:    Patient reports that  is to make her medical decisions if she is incapable.       Plan for  disposition:Where: home and When:  1-2days      Time: 31 min       Chintan Bloom M.D. PGY1  [facility] Medicine Residency  [address] [place] [address]  Office: [phone]    This document has been electronically signed by Chintan Bloom MD on June 6, 2019 8:15 AM

## 2019-06-06 NOTE — PLAN OF CARE
Problem: Patient Care Overview  Goal: Individualization and Mutuality  Outcome: Ongoing (interventions implemented as appropriate)    Goal: Interprofessional Rounds/Family Conf  Outcome: Ongoing (interventions implemented as appropriate)      Problem: Chronic Obstructive Pulmonary Disease (Adult)  Goal: Signs and Symptoms of Listed Potential Problems Will be Absent, Minimized or Managed (Chronic Obstructive Pulmonary Disease)  Outcome: Ongoing (interventions implemented as appropriate)

## 2019-06-06 NOTE — ACP (ADVANCE CARE PLANNING)
Discussed advance care planning with patient and daughter.  Patient completed a living will directive.  Copy sent to HIM to scan into patient's record.

## 2019-06-06 NOTE — PLAN OF CARE
Problem: Patient Care Overview  Goal: Plan of Care Review  Outcome: Ongoing (interventions implemented as appropriate)   06/06/19 1405   Coping/Psychosocial   Plan of Care Reviewed With patient   Plan of Care Review   Progress improving   OTHER   Outcome Summary Pt. has met all goals for PT at this time. Pt. independent with gt. & transfers at this time. Discuss with evaluating PT regarding pt. goal attainment at this time     Goal: Discharge Needs Assessment  Outcome: Ongoing (interventions implemented as appropriate)   06/03/19 1906 06/05/19 1506   Discharge Needs Assessment   Patient/Family Anticipates Transition to --  home with family   Patient/Family Anticipated Services at Transition --  none   Transportation Anticipated --  car, drives self   Disability   Equipment Currently Used at Home oxygen;nebulizer;bipap/cpap --

## 2019-06-07 LAB
ALBUMIN SERPL-MCNC: 3.6 G/DL (ref 3.5–5.2)
ALBUMIN/GLOB SERPL: 1.4 G/DL
ALP SERPL-CCNC: 70 U/L (ref 39–117)
ALT SERPL W P-5'-P-CCNC: 24 U/L (ref 1–33)
ANION GAP SERPL CALCULATED.3IONS-SCNC: 9 MMOL/L
AST SERPL-CCNC: 10 U/L (ref 1–32)
BASOPHILS # BLD AUTO: 0.04 10*3/MM3 (ref 0–0.2)
BASOPHILS NFR BLD AUTO: 0.3 % (ref 0–1.5)
BILIRUB SERPL-MCNC: 0.3 MG/DL (ref 0.2–1.2)
BUN BLD-MCNC: 24 MG/DL (ref 8–23)
BUN/CREAT SERPL: 30.4 (ref 7–25)
CALCIUM SPEC-SCNC: 9.3 MG/DL (ref 8.6–10.5)
CHLORIDE SERPL-SCNC: 97 MMOL/L (ref 98–107)
CO2 SERPL-SCNC: 35 MMOL/L (ref 22–29)
CREAT BLD-MCNC: 0.79 MG/DL (ref 0.57–1)
CRP SERPL-MCNC: 0.08 MG/DL (ref 0–0.5)
DEPRECATED RDW RBC AUTO: 46.9 FL (ref 37–54)
EOSINOPHIL # BLD AUTO: 0 10*3/MM3 (ref 0–0.4)
EOSINOPHIL NFR BLD AUTO: 0 % (ref 0.3–6.2)
ERYTHROCYTE [DISTWIDTH] IN BLOOD BY AUTOMATED COUNT: 14.6 % (ref 12.3–15.4)
GFR SERPL CREATININE-BSD FRML MDRD: 74 ML/MIN/1.73
GLOBULIN UR ELPH-MCNC: 2.6 GM/DL
GLUCOSE BLD-MCNC: 190 MG/DL (ref 65–99)
GLUCOSE BLDC GLUCOMTR-MCNC: 144 MG/DL (ref 70–130)
GLUCOSE BLDC GLUCOMTR-MCNC: 157 MG/DL (ref 70–130)
GLUCOSE BLDC GLUCOMTR-MCNC: 217 MG/DL (ref 70–130)
GLUCOSE BLDC GLUCOMTR-MCNC: 226 MG/DL (ref 70–130)
GLUCOSE BLDC GLUCOMTR-MCNC: 366 MG/DL (ref 70–130)
HCT VFR BLD AUTO: 40.6 % (ref 34–46.6)
HGB BLD-MCNC: 12.9 G/DL (ref 12–15.9)
IMM GRANULOCYTES # BLD AUTO: 0.5 10*3/MM3 (ref 0–0.05)
IMM GRANULOCYTES NFR BLD AUTO: 3.6 % (ref 0–0.5)
LYMPHOCYTES # BLD AUTO: 0.74 10*3/MM3 (ref 0.7–3.1)
LYMPHOCYTES NFR BLD AUTO: 5.3 % (ref 19.6–45.3)
MCH RBC QN AUTO: 27.8 PG (ref 26.6–33)
MCHC RBC AUTO-ENTMCNC: 31.8 G/DL (ref 31.5–35.7)
MCV RBC AUTO: 87.5 FL (ref 79–97)
MONOCYTES # BLD AUTO: 0.58 10*3/MM3 (ref 0.1–0.9)
MONOCYTES NFR BLD AUTO: 4.2 % (ref 5–12)
NEUTROPHILS # BLD AUTO: 12 10*3/MM3 (ref 1.7–7)
NEUTROPHILS NFR BLD AUTO: 86.6 % (ref 42.7–76)
NRBC BLD AUTO-RTO: 0 /100 WBC (ref 0–0.2)
PLATELET # BLD AUTO: 308 10*3/MM3 (ref 140–450)
PMV BLD AUTO: 8.8 FL (ref 6–12)
POTASSIUM BLD-SCNC: 5.2 MMOL/L (ref 3.5–5.2)
PROT SERPL-MCNC: 6.2 G/DL (ref 6–8.5)
RBC # BLD AUTO: 4.64 10*6/MM3 (ref 3.77–5.28)
SODIUM BLD-SCNC: 141 MMOL/L (ref 136–145)
WBC NRBC COR # BLD: 13.86 10*3/MM3 (ref 3.4–10.8)

## 2019-06-07 PROCEDURE — 96376 TX/PRO/DX INJ SAME DRUG ADON: CPT

## 2019-06-07 PROCEDURE — 25010000002 METHYLPREDNISOLONE PER 125 MG: Performed by: STUDENT IN AN ORGANIZED HEALTH CARE EDUCATION/TRAINING PROGRAM

## 2019-06-07 PROCEDURE — 80053 COMPREHEN METABOLIC PANEL: CPT | Performed by: STUDENT IN AN ORGANIZED HEALTH CARE EDUCATION/TRAINING PROGRAM

## 2019-06-07 PROCEDURE — G0378 HOSPITAL OBSERVATION PER HR: HCPCS

## 2019-06-07 PROCEDURE — 94799 UNLISTED PULMONARY SVC/PX: CPT

## 2019-06-07 PROCEDURE — 99225 PR SBSQ OBSERVATION CARE/DAY 25 MINUTES: CPT | Performed by: STUDENT IN AN ORGANIZED HEALTH CARE EDUCATION/TRAINING PROGRAM

## 2019-06-07 PROCEDURE — 86140 C-REACTIVE PROTEIN: CPT | Performed by: STUDENT IN AN ORGANIZED HEALTH CARE EDUCATION/TRAINING PROGRAM

## 2019-06-07 PROCEDURE — 82962 GLUCOSE BLOOD TEST: CPT

## 2019-06-07 PROCEDURE — 97116 GAIT TRAINING THERAPY: CPT

## 2019-06-07 PROCEDURE — 85025 COMPLETE CBC W/AUTO DIFF WBC: CPT | Performed by: STUDENT IN AN ORGANIZED HEALTH CARE EDUCATION/TRAINING PROGRAM

## 2019-06-07 PROCEDURE — 25010000002 ENOXAPARIN PER 10 MG: Performed by: STUDENT IN AN ORGANIZED HEALTH CARE EDUCATION/TRAINING PROGRAM

## 2019-06-07 PROCEDURE — 94760 N-INVAS EAR/PLS OXIMETRY 1: CPT

## 2019-06-07 PROCEDURE — 96372 THER/PROPH/DIAG INJ SC/IM: CPT

## 2019-06-07 RX ORDER — PREDNISONE 20 MG/1
60 TABLET ORAL
Status: DISCONTINUED | OUTPATIENT
Start: 2019-06-08 | End: 2019-06-08 | Stop reason: HOSPADM

## 2019-06-07 RX ADMIN — PAROXETINE HYDROCHLORIDE 20 MG: 20 TABLET, FILM COATED ORAL at 06:00

## 2019-06-07 RX ADMIN — MONTELUKAST SODIUM 10 MG: 10 TABLET, COATED ORAL at 10:44

## 2019-06-07 RX ADMIN — FUROSEMIDE 20 MG: 20 TABLET ORAL at 20:57

## 2019-06-07 RX ADMIN — BUDESONIDE AND FORMOTEROL FUMARATE DIHYDRATE 2 PUFF: 160; 4.5 AEROSOL RESPIRATORY (INHALATION) at 19:05

## 2019-06-07 RX ADMIN — SENNOSIDES AND DOCUSATE SODIUM 2 TABLET: 8.6; 5 TABLET ORAL at 20:57

## 2019-06-07 RX ADMIN — POLYETHYLENE GLYCOL 3350 17 G: 17 POWDER, FOR SOLUTION ORAL at 20:57

## 2019-06-07 RX ADMIN — LOSARTAN POTASSIUM 25 MG: 25 TABLET, FILM COATED ORAL at 10:41

## 2019-06-07 RX ADMIN — ALPRAZOLAM 0.25 MG: 0.25 TABLET ORAL at 15:35

## 2019-06-07 RX ADMIN — IPRATROPIUM BROMIDE AND ALBUTEROL SULFATE 3 ML: 2.5; .5 SOLUTION RESPIRATORY (INHALATION) at 15:17

## 2019-06-07 RX ADMIN — IPRATROPIUM BROMIDE AND ALBUTEROL SULFATE 3 ML: 2.5; .5 SOLUTION RESPIRATORY (INHALATION) at 10:57

## 2019-06-07 RX ADMIN — METHYLPREDNISOLONE SODIUM SUCCINATE 60 MG: 125 INJECTION, POWDER, FOR SOLUTION INTRAMUSCULAR; INTRAVENOUS at 05:58

## 2019-06-07 RX ADMIN — DOXYCYCLINE 100 MG: 100 CAPSULE ORAL at 20:58

## 2019-06-07 RX ADMIN — ENOXAPARIN SODIUM 40 MG: 40 INJECTION SUBCUTANEOUS at 15:35

## 2019-06-07 RX ADMIN — Medication 100 MG: at 10:45

## 2019-06-07 RX ADMIN — IPRATROPIUM BROMIDE AND ALBUTEROL SULFATE 3 ML: 2.5; .5 SOLUTION RESPIRATORY (INHALATION) at 19:05

## 2019-06-07 RX ADMIN — POLYETHYLENE GLYCOL 3350 17 G: 17 POWDER, FOR SOLUTION ORAL at 10:43

## 2019-06-07 RX ADMIN — ALPRAZOLAM 0.25 MG: 0.25 TABLET ORAL at 20:57

## 2019-06-07 RX ADMIN — ASPIRIN 81 MG CHEWABLE TABLET 81 MG: 81 TABLET CHEWABLE at 10:42

## 2019-06-07 RX ADMIN — FUROSEMIDE 20 MG: 20 TABLET ORAL at 10:41

## 2019-06-07 RX ADMIN — PRASUGREL 10 MG: 10 TABLET, FILM COATED ORAL at 10:40

## 2019-06-07 RX ADMIN — BUDESONIDE AND FORMOTEROL FUMARATE DIHYDRATE 2 PUFF: 160; 4.5 AEROSOL RESPIRATORY (INHALATION) at 07:45

## 2019-06-07 RX ADMIN — ATORVASTATIN CALCIUM 40 MG: 40 TABLET, FILM COATED ORAL at 20:57

## 2019-06-07 RX ADMIN — IPRATROPIUM BROMIDE AND ALBUTEROL SULFATE 3 ML: 2.5; .5 SOLUTION RESPIRATORY (INHALATION) at 07:44

## 2019-06-07 RX ADMIN — CARVEDILOL 3.12 MG: 3.12 TABLET, FILM COATED ORAL at 10:41

## 2019-06-07 RX ADMIN — DOXYCYCLINE 100 MG: 100 CAPSULE ORAL at 10:39

## 2019-06-07 RX ADMIN — Medication 100 MG: at 20:57

## 2019-06-07 RX ADMIN — CARVEDILOL 3.12 MG: 3.12 TABLET, FILM COATED ORAL at 17:55

## 2019-06-07 NOTE — PLAN OF CARE
Problem: Patient Care Overview  Goal: Plan of Care Review  Outcome: Ongoing (interventions implemented as appropriate)   06/06/19 1405 06/06/19 1932 06/06/19 1938   Coping/Psychosocial   Plan of Care Reviewed With --  patient --    Plan of Care Review   Progress improving --  --    OTHER   Outcome Summary --  --  pt resting well with family, no complaints       Problem: Chronic Obstructive Pulmonary Disease (Adult)  Goal: Signs and Symptoms of Listed Potential Problems Will be Absent, Minimized or Managed (Chronic Obstructive Pulmonary Disease)  Outcome: Ongoing (interventions implemented as appropriate)      Problem: Fall Risk (Adult)  Goal: Absence of Fall  Outcome: Ongoing (interventions implemented as appropriate)

## 2019-06-07 NOTE — PROGRESS NOTES
Discharge Planning Assessment  [facility]     Patient Name: Monisha Brasher  MRN: [id number]  Today's Date: 6/7/2019    Admit Date: 6/3/2019    Discharge Needs Assessment     Row Name 06/07/19 1558       Living Environment    Lives With  spouse    Current Living Arrangements  home/apartment/condo    Primary Care Provided by  self    Provides Primary Care For  no one    Family Caregiver if Needed  spouse    Quality of Family Relationships  helpful;involved;supportive    Able to Return to Prior Arrangements  yes       Resource/Environmental Concerns    Resource/Environmental Concerns  none    Transportation Concerns  car, none       Transition Planning    Patient/Family Anticipates Transition to  home with family    Patient/Family Anticipated Services at Transition  none    Transportation Anticipated  car, drives self;family or friend will provide       Discharge Needs Assessment    Readmission Within the Last 30 Days  no previous admission in last 30 days    Concerns to be Addressed  no discharge needs identified;denies needs/concerns at this time    Equipment Currently Used at Home  bipap/cpap;nebulizer;oxygen CPAP (Uses [employer]) Has home 02 @ 2L    Anticipated Changes Related to Illness  none    Equipment Needed After Discharge  none    Current Discharge Risk  chronically ill        Discharge Plan     Row Name 06/07/19 6417       Plan    Plan  Home no Services    Patient/Family in Agreement with Plan  yes    Plan Comments  LACE completed. Patient has home 02 @ 2L. States she only wears at night. Patient just worked with PT and did not desat below 90%. Patient states she has 3 portable tanks and concentrator at home. Confirmed PCP. Patient does not want HH or HH transition visit at this time. Patient does not have any other needs or concerns at this time. As of this date, SW does not have qualifying desats for continuous order for home 02.....COMPA Jacome    Row Name 06/07/19 2835       Plan    Plan  Comments  Per Dr. Pop, plan to d/c home in the AM. Will need continuous 02 order and sats. States patient wear 2L 02 at night only prior to hospitalization. Informed him that CM will need sats and continous order to send to DME. MD to change to PO Prednisone today. VSS, patient remains on 1.5L 02 today....SREE Jacome        Destination      No service coordination in this encounter.      Durable Medical Equipment      No service coordination in this encounter.      Dialysis/Infusion      No service coordination in this encounter.      Home Medical Care      No service coordination in this encounter.      Therapy      No service coordination in this encounter.      Community Resources      No service coordination in this encounter.        Expected Discharge Date and Time     Expected Discharge Date Expected Discharge Time    Jun 8, 2019         Demographic Summary     Row Name 06/07/19 1558       General Information    Admission Type  observation    Arrived From  home    Referral Source  high risk screening;physician    Reason for Consult  discharge planning    Preferred Language  English       Contact Information    Permission Granted to Share Info With          Functional Status     Row Name 06/07/19 1558       Functional Status    Usual Activity Tolerance  good    Current Activity Tolerance  good       Functional Status, IADL    Medications  independent    Meal Preparation  independent    Housekeeping  independent    Laundry  independent    Shopping  independent       Mental Status    General Appearance WDL  WDL       Mental Status Summary    Recent Changes in Mental Status/Cognitive Functioning  no changes        Psychosocial    No documentation.       Abuse/Neglect    No documentation.       Legal    No documentation.       Substance Abuse    No documentation.       Patient Forms    No documentation.           Ruby Stein

## 2019-06-07 NOTE — PLAN OF CARE
Problem: Patient Care Overview  Goal: Plan of Care Review   06/06/19 1405 06/07/19 1656   Coping/Psychosocial   Plan of Care Reviewed With --  patient   Plan of Care Review   Progress improving --    OTHER   Outcome Summary --  t/f's ind,amb 300 with 02 sats 94%,amb 264 w/o 02 with sats dropping to 90%     Goal: Discharge Needs Assessment   06/07/19 1558   Discharge Needs Assessment   Readmission Within the Last 30 Days no previous admission in last 30 days   Concerns to be Addressed no discharge needs identified;denies needs/concerns at this time   Patient/Family Anticipates Transition to home with family   Patient/Family Anticipated Services at Transition none   Transportation Concerns car, none   Transportation Anticipated car, drives self;family or friend will provide   Anticipated Changes Related to Illness none   Equipment Needed After Discharge none   Current Discharge Risk chronically ill   Disability   Equipment Currently Used at Home bipap/cpap;nebulizer;oxygen  (CPAP (Uses [employer]) Has home 02 @ 2L)

## 2019-06-07 NOTE — THERAPY TREATMENT NOTE
Acute Care - Physical Therapy Treatment Note  [facility]     Patient Name: Monisha Brasher  : 1955  MRN: [id number]  Today's Date: 2019  Onset of Illness/Injury or Date of Surgery: 19  Date of Referral to PT: 19  Referring Physician: Dr. Chaidez    Admit Date: 6/3/2019    Visit Dx:    ICD-10-CM ICD-9-CM   1. COPD exacerbation (CMS/HCC) J44.1 491.21   2. Impaired functional mobility, balance, gait, and endurance Z74.09 V49.89     Patient Active Problem List   Diagnosis   • COPD bronchitis   • Cellulitis of left leg   • Asthma   • SOB (shortness of breath)   • Chronic bronchitis with acute exacerbation (CMS/HCC)   • Nicotine abuse   • Hypertension   • Screening for osteoporosis   • Panlobular emphysema (CMS/HCC)   • Anxiety   • General medical exam   • Malaise   • Hyperglycemia   • Acute ST elevation myocardial infarction (STEMI) (CMS/HCC)   • CAD (coronary artery disease)   • Acute respiratory failure with hypoxia (CMS/HCC)   • Depression   • Chronic alcohol abuse   • Pneumonia of both upper lobes   • Paroxysmal atrial fibrillation (CMS/HCC)   • Snoring   • Rash   • Chest pain   • Atherosclerosis of native coronary artery of native heart with unstable angina pectoris (CMS/HCC)   • Encounter for screening mammogram for malignant neoplasm of breast   • COPD exacerbation (CMS/HCC)   • Advanced care planning/counseling discussion   • Nocturnal hypoxia   • NARCISO (obstructive sleep apnea)   • Prediabetes       Therapy Treatment    Rehabilitation Treatment Summary     Row Name 19 1455             Treatment Time/Intention    Discipline  physical therapy assistant  -LN      Document Type  therapy note (daily note)  -LN      Subjective Information  no complaints  -LN      Mode of Treatment  physical therapy  -LN      Comment  PT  states to cont therapy due to pt's sats dropped with gt  -LN      Existing Precautions/Restrictions  oxygen therapy device and L/min  -LN      Recorded by [LN]  Maki Osullivan, PTA 06/07/19 1655      Row Name 06/07/19 1455             Vital Signs    Pre Systolic BP Rehab  144  -LN      Pre Treatment Diastolic BP  73  -LN      Post Systolic BP Rehab  178  -LN      Post Treatment Diastolic BP  84  -LN      Pretreatment Heart Rate (beats/min)  66  -LN      Intratreatment Heart Rate (beats/min)  74  -LN      Posttreatment Heart Rate (beats/min)  70  -LN      Pre SpO2 (%)  95  -LN      O2 Delivery Pre Treatment  supplemental O2  -LN      Intra SpO2 (%)  94 on 02 with gt;w/o 02 sats 90%  -LN      Post SpO2 (%)  96  -LN      O2 Delivery Post Treatment  room air  -LN      Pre Patient Position  Supine  -LN      Intra Patient Position  Standing  -LN      Post Patient Position  Supine  -LN      Recorded by [LN] Maki Osullivan, [facility] 06/07/19 1655      Row Name 06/07/19 1455             Cognitive Assessment/Intervention- PT/OT    Orientation Status (Cognition)  oriented x 4  -LN      Recorded by [LN] Maki Osullivan, [facility] 06/07/19 1655      Row Name 06/07/19 1455             Bed Mobility Assessment/Treatment    Supine-Sit [place] (Bed Mobility)  independent  -LN      Sit-Supine [place] (Bed Mobility)  independent  -LN      Recorded by [LN] Maki Osullivan, PTA 06/07/19 1655      Row Name 06/07/19 1455             Sit-Stand Transfer    Sit-Stand [place] (Transfers)  independent  -LN      Recorded by [LN] Maki Osullivan, PTA 06/07/19 1655      Row Name 06/07/19 1455             Stand-Sit Transfer    Stand-Sit [place] (Transfers)  independent  -LN      Recorded by [LN] Maki Osullivan, [facility] 06/07/19 1655      Row Name 06/07/19 1455             Gait/Stairs Assessment/Training    [place] Level (Gait)  independent  -LN      Assistive Device (Gait)  -- none  -LN      Distance in Feet (Gait)  300,264  -LN2      Comment (Gait/Stairs)  gt-sats with 02 94%,w/o 02 90%  -LN      Recorded by [LN] Maki Osullivan, PTA 06/07/19 1655  [LN2] Maki Osullivan, [facility] 06/07/19 1658      Row Name  06/07/19 1455             Positioning and Restraints    Post Treatment Position  bed  -LN      In Bed  sitting EOB;call light within reach;encouraged to call for assist;with family/caregiver  -LN      Recorded by [LN] Maki Osullivan PTA 06/07/19 1655      Row Name 06/07/19 1455             Pain Scale: Numbers Pre/Post-Treatment    Pain Scale: Numbers, Pretreatment  0/10 - no pain  -LN      Pain Scale: Numbers, Post-Treatment  0/10 - no pain  -LN      Recorded by [LN] Maki Osullivan, [facility] 06/07/19 1655      Row Name 06/07/19 1455             Plan of Care Review    Plan of Care Reviewed With  patient  -LN      Recorded by [LN] Maki Osullivan PTA 06/07/19 1655      Row Name 06/07/19 1455             Outcome Summary/Treatment Plan (PT)    Plan for Continued Treatment (PT)  cont-monitor sats with gt  -LN      Recorded by [LN] Maki Osullivan, [facility] 06/07/19 1655        User Key  (r) = Recorded By, (t) = Taken By, (c) = Cosigned By    Initials Name Effective Dates Discipline    LN Maki Osullivan, PTA 03/07/18 -  PT               Rehab Goal Summary     Row Name 06/07/19 1455             Physical Therapy Goals    Transfer Goal Selection (PT)  transfer, PT goal 1  -LN      Gait Training Goal Selection (PT)  gait training, PT goal 1  -LN         Transfer Goal 1 (PT)    Activity/Assistive Device (Transfer Goal 1, PT)  sit-to-stand/stand-to-sit;bed-to-chair/chair-to-bed;toilet  -LN      [place] Level/Cues Needed (Transfer Goal 1, PT)  independent  -LN      Time Frame (Transfer Goal 1, PT)  by discharge  -LN      Progress/Outcome (Transfer Goal 1, PT)  goal met  -LN         Gait Training Goal 1 (PT)    Activity/Assistive Device (Gait Training Goal 1, PT)  gait (walking locomotion)  -LN      [place] Level (Gait Training Goal 1, PT)  independent O2 sats will not drop below 92%  -LN      Distance (Gait Goal 1, PT)  [id number]  -LN      Time Frame (Gait Training Goal 1, PT)  by discharge  -LN      Progress/Outcome (Gait Training  Goal 1, PT)  goal met  -        User Key  (r) = Recorded By, (t) = Taken By, (c) = Cosigned By    Initials Name Provider Type Discipline    Maki Davis PTA Physical Therapy Assistant PT          Physical Therapy Education     Title: PT OT SLP Therapies (Done)     Topic: Physical Therapy (Done)     Point: Mobility training (Done)     Learning Progress Summary           Patient Acceptance, E,TB, VU by ZAYDA at 6/7/2019  4:56 PM    Acceptance, E, DU,VU by JORGE at 6/4/2019 12:43 PM                   Point: Body mechanics (Done)     Learning Progress Summary           Patient Acceptance, E,TB, VU by ZAYDA at 6/7/2019  4:56 PM    Acceptance, E, DU,VU by JORGE at 6/4/2019 12:43 PM                   Point: Precautions (Done)     Learning Progress Summary           Patient Acceptance, E,TB, VU by ZAYDA at 6/7/2019  4:56 PM    Acceptance, E, DU,VU by JORGE at 6/4/2019 12:43 PM                               User Key     Initials Effective Dates Name Provider Type Discipline    JORGE 04/03/18 -  Cherie Carranza, PT Physical Therapist PT    ZAYDA 03/07/18 -  Maki Osullivan PTA Physical Therapy Assistant PT                PT Recommendation and Plan     Outcome Summary/Treatment Plan (PT)  Plan for Continued Treatment (PT): cont-monitor sats with gt  Plan of Care Reviewed With: patient  Outcome Summary: t/f's ind,amb 300 with 02 sats 94%,amb 264 w/o 02 with sats dropping to 90%  Outcome Measures     Row Name 06/07/19 1455 06/06/19 1405 06/05/19 1015       How much help from another person do you currently need...    Turning from your back to your side while in flat bed without using bedrails?  4  -LN  4  -JA  4  -JA    Moving from lying on back to sitting on the side of a flat bed without bedrails?  4  -LN  4  -JA  4  -JA    Moving to and from a bed to a chair (including a wheelchair)?  4  -LN  4  -JA  4  -JA    Standing up from a chair using your arms (e.g., wheelchair, bedside chair)?  4  -LN  4  -JA  4  -JA    Climbing 3-5 steps with a  railing?  4  -LN  4  -JA  4  -JA    To walk in hospital room?  4  -LN  4  -JA  4  -JA    AM-PAC 6 Clicks Score  24  -LN  24  -JA  24  -JA       Functional Assessment    Outcome Measure Options  AM-PAC 6 Clicks Basic Mobility (PT)  -LN  AM-PAC 6 Clicks Basic Mobility (PT)  -JA  AM-PAC 6 Clicks Basic Mobility (PT)  -JA      User Key  (r) = Recorded By, (t) = Taken By, (c) = Cosigned By    Initials Name Provider Type    German Durán PTA Physical Therapy Assistant    Maki Davis PTA Physical Therapy Assistant         Time Calculation:   PT Charges     Row Name 06/07/19 1658             Time Calculation    Start Time  1455  -LN      Stop Time  1512  -LN      Time Calculation (min)  17 min  -LN      PT Received On  06/07/19  -LN         Time Calculation- PT    Total Timed Code Minutes- PT  17 minute(s)  -LN        User Key  (r) = Recorded By, (t) = Taken By, (c) = Cosigned By    Initials Name Provider Type    Maki Davis PTA Physical Therapy Assistant        Therapy Charges for Today     Code Description Service Date Service Provider Modifiers Qty    [id number] HC GAIT TRAINING EA 15 MIN 6/7/2019 Maki Osullivan PTA GP 1          PT G-Codes  Outcome Measure Options: AM-PAC 6 Clicks Basic Mobility (PT)  AM-PAC 6 Clicks Score: 24    Maki Osullivan PTA  6/7/2019

## 2019-06-07 NOTE — PROGRESS NOTES
FAMILY MEDICINE DAILY PROGRESS NOTE    NAME: Monisha Brasher  : 1955  MRN: [id number]      LOS: 0 days     PROVIDER OF SERVICE: Chintan Bloom MD    Chief Complaint: COPD exacerbation (CMS/[facility])    Subjective:     Interval History:  History taken from: patient chart    Patient well with no acute events overnight. She is currently on 1L O2 via NC resting comfortably. Vital signs stable. Afebrile.       Review of Systems:   Review of Systems   Constitutional: Negative for appetite change, diaphoresis, fatigue and fever.   HENT: Negative for congestion, ear pain, postnasal drip, rhinorrhea and sore throat.    Eyes: Negative for pain and visual disturbance.   Respiratory: Positive for shortness of breath and wheezing. Negative for cough and chest tightness.    Cardiovascular: Negative for chest pain, palpitations and leg swelling.   Gastrointestinal: Negative for abdominal pain, constipation, diarrhea, nausea and vomiting.   Genitourinary: Negative for dysuria, flank pain, frequency and urgency.   Musculoskeletal: Negative for arthralgias, back pain and myalgias.   Skin: Negative for pallor and rash.   Neurological: Negative for dizziness, syncope, weakness and numbness.       Objective:     Vital Signs  Temp:  [97.6 °F (36.4 °C)-98.1 °F (36.7 °C)] 97.6 °F (36.4 °C)  Heart Rate:  [60-76] 60  Resp:  [18-20] 18  BP: (140-170)/(80-84) 170/80  Body mass index is 27.76 kg/m².    Physical Exam  Physical Exam   Constitutional: She is oriented to person, place, and time. She appears well-developed and well-nourished. No distress.   HENT:   Head: Normocephalic and atraumatic.   Nose: Nose normal.   Eyes: EOM are normal. Pupils are equal, round, and reactive to light.   Neck: Normal range of motion. Neck supple. No thyromegaly present.   Cardiovascular: Normal rate, regular rhythm, normal heart sounds and intact distal pulses.   Pulmonary/Chest: She has wheezes. She has no rales.   Poor air movement, normal  effort. End-expiratory wheezing diffusely   Abdominal: Soft. Bowel sounds are normal. She exhibits no distension. There is no tenderness. There is no rebound and no guarding.   Musculoskeletal: Normal range of motion. She exhibits no edema or tenderness.   Lymphadenopathy:     She has no cervical adenopathy.   Neurological: She is alert and oriented to person, place, and time.   Skin: Skin is warm and dry. Capillary refill takes less than 2 seconds. She is not diaphoretic.   Psychiatric: She has a normal mood and affect. Her behavior is normal.       Medication Review    Current Facility-Administered Medications:   •  acetaminophen (TYLENOL) tablet 650 mg, 650 mg, Oral, Q4H PRN, Jyoti Gloria MD  •  albuterol (PROVENTIL) nebulizer solution 0.083% 2.5 mg/3mL, 2.5 mg, Nebulization, Q6H PRN, Jyoti Gloria MD  •  ALPRAZolam (XANAX) tablet 0.25 mg, 0.25 mg, Oral, TID PRN, Jyoti Gloria MD, 0.25 mg at 06/06/19 2115  •  amiodarone (PACERONE) half tablet 100 mg, 100 mg, Oral, BID, Jyoti Gloria MD, 100 mg at 06/06/19 2112  •  aspirin chewable tablet 81 mg, 81 mg, Oral, Daily, Jyoti Gloria MD, 81 mg at 06/06/19 0836  •  atorvastatin (LIPITOR) tablet 40 mg, 40 mg, Oral, Nightly, Jyoti Gloria MD, 40 mg at 06/06/19 2112  •  bisacodyl (DULCOLAX) suppository 10 mg, 10 mg, Rectal, Daily PRN, Jyoti Gloria MD  •  budesonide-formoterol (SYMBICORT) 160-4.5 MCG/ACT inhaler 2 puff, 2 puff, Inhalation, BID - RT, Jyoti Gloria MD, 2 puff at 06/06/19 2052  •  calcium carbonate (TUMS) chewable tablet 500 mg (200 mg elemental), 2 tablet, Oral, Daily PRN, Jyoti Gloria MD  •  carvedilol (COREG) tablet 3.125 mg, 3.125 mg, Oral, BID With Meals, Chintan Bloom MD, 3.125 mg at 06/06/19 1724  •  dextrose (D50W) 25 g/ 50mL Intravenous Solution 25 g, 25 g, Intravenous, Q15 Min PRN, Jyoti Gloria MD  •  dextrose (GLUTOSE)  oral gel 15 g, 15 g, Oral, Q15 Min PRN, Jyoti Gloria MD  •  doxycycline (MONODOX) capsule 100 mg, 100 mg, Oral, Q12H, Jyoti Gloria MD, 100 mg at 06/06/19 2112  •  enoxaparin (LOVENOX) syringe 40 mg, 40 mg, Subcutaneous, Q24H, Chintan Bloom MD, 40 mg at 06/06/19 1202  •  furosemide (LASIX) tablet 20 mg, 20 mg, Oral, BID, Jyoti Gloria MD, 20 mg at 06/06/19 2112  •  glucagon (human recombinant) (GLUCAGEN DIAGNOSTIC) injection 1 mg, 1 mg, Subcutaneous, PRN, Jyoti Gloria MD  •  insulin aspart (novoLOG) injection 0-7 Units, 0-7 Units, Subcutaneous, 4x Daily AC & at Bedtime, Jyoti Gloria MD, 5 Units at 06/06/19 2112  •  ipratropium-albuterol (DUO-NEB) nebulizer solution 3 mL, 3 mL, Nebulization, 4x Daily - RT, Jyoti Gloria MD, 3 mL at 06/06/19 2052  •  losartan (COZAAR) tablet 25 mg, 25 mg, Oral, Q24H, Chintan Bloom MD, 25 mg at 06/06/19 0831  •  methylPREDNISolone sodium succinate (SOLU-Medrol) injection 60 mg, 60 mg, Intravenous, Q8H, Chintan Bloom MD, 60 mg at 06/07/19 0558  •  montelukast (SINGULAIR) tablet 10 mg, 10 mg, Oral, Daily, Jyoti Gloria MD, 10 mg at 06/06/19 0831  •  nitroglycerin (NITROSTAT) SL tablet 0.4 mg, 0.4 mg, Sublingual, Q5 Min PRN, Jyoti Gloria MD  •  ondansetron (ZOFRAN) tablet 4 mg, 4 mg, Oral, Q6H PRN, Jyoti Gloria MD  •  PARoxetine (PAXIL) tablet 20 mg, 20 mg, Oral, QAM, Jyoti Gloria MD, 20 mg at 06/07/19 0600  •  polyethylene glycol 3350 powder (packet), 17 g, Oral, TID, Chintan Bloom MD  •  prasugrel (EFFIENT) tablet 10 mg, 10 mg, Oral, Daily, Jyoti Gloria MD, 10 mg at 06/06/19 0831  •  sennosides-docusate sodium (SENOKOT-S) 8.6-50 MG tablet 2 tablet, 2 tablet, Oral, Nightly, Jyoti Gloria MD, 2 tablet at 06/06/19 8599     Diagnostic Data    Lab Results (last 24 hours)     Procedure Component Value Units  Date/Time    Comprehensive Metabolic Panel [[id number]]  (Abnormal) Collected:  06/07/19 0559    Specimen:  Blood Updated:  06/07/19 0638     Glucose 190 mg/dL      BUN 24 mg/dL      Creatinine 0.79 mg/dL      Sodium 141 mmol/L      Potassium 5.2 mmol/L      Chloride 97 mmol/L      CO2 35.0 mmol/L      Calcium 9.3 mg/dL      Total Protein 6.2 g/dL      Albumin 3.60 g/dL      ALT (SGPT) 24 U/L      AST (SGOT) 10 U/L      Alkaline Phosphatase 70 U/L      Total Bilirubin 0.3 mg/dL      eGFR Non African Amer 74 mL/min/1.73      Globulin 2.6 gm/dL      A/G Ratio 1.4 g/dL      BUN/Creatinine Ratio 30.4     Anion Gap 9.0 mmol/L     Narrative:       GFR Normal >60  Chronic Kidney Disease <60  Kidney Failure <15    CBC & Differential [[id number]] Collected:  06/07/19 0559    Specimen:  Blood Updated:  06/07/19 0613    Narrative:       The following orders were created for panel order CBC & Differential.  Procedure                               Abnormality         Status                     ---------                               -----------         ------                     CBC Auto Differential[[id number]]        Abnormal            Final result                 Please view results for these tests on the individual orders.    CBC Auto Differential [[id number]]  (Abnormal) Collected:  06/07/19 0559    Specimen:  Blood Updated:  06/07/19 0613     WBC 13.86 10*3/mm3      RBC 4.64 10*6/mm3      Hemoglobin 12.9 g/dL      Hematocrit 40.6 %      MCV 87.5 fL      MCH 27.8 pg      MCHC 31.8 g/dL      RDW 14.6 %      RDW-SD 46.9 fl      MPV 8.8 fL      Platelets 308 10*3/mm3      Neutrophil % 86.6 %      Lymphocyte % 5.3 %      Monocyte % 4.2 %      Eosinophil % 0.0 %      Basophil % 0.3 %      Immature Grans % 3.6 %      Neutrophils, Absolute 12.00 10*3/mm3      Lymphocytes, Absolute 0.74 10*3/mm3      Monocytes, Absolute 0.58 10*3/mm3      Eosinophils, Absolute 0.00 10*3/mm3      Basophils, Absolute 0.04 10*3/mm3      Immature Grans,  Absolute 0.50 10*3/mm3      nRBC 0.0 /100 WBC     POC Glucose Once [[id number]]  (Abnormal) Collected:  06/06/19 1929    Specimen:  Blood Updated:  06/06/19 1943     Glucose 337 mg/dL      Comment: RN NotifiedOperator: [id number] SHARON Sampson ID: [id number]       POC Glucose Once [[id number]]  (Abnormal) Collected:  06/06/19 1641    Specimen:  Blood Updated:  06/06/19 1702     Glucose 238 mg/dL      Comment: RN NotifiedOperator: [id number] ALEXA SOSANMeter ID: [id number]       POC Glucose Once [[id number]]  (Abnormal) Collected:  06/06/19 1021    Specimen:  Blood Updated:  06/06/19 1148     Glucose 246 mg/dL      Comment: RN NotifiedOperator: [id number] ALEXA KINGNATHONMeter ID: [id number]       POC Glucose Once [[id number]]  (Abnormal) Collected:  06/06/19 0715    Specimen:  Blood Updated:  06/06/19 0743     Glucose 161 mg/dL      Comment: RN NotifiedOperator: [id number] ALEXA PERSAUDHONMeter ID: [id number]               I reviewed the patient's new clinical results.    Assessment/Plan:     Active Hospital Problems    Diagnosis POA   • **COPD exacerbation (CMS/[facility]) [J44.1] Yes     - On 3L in ER with desaturation to 88% on room air. BNP slightly elevated; likely secondary to acute respiratory distress.  - Dose of 125 mg solumedrol in ER. Continue with 60 mg q8h. This will be weaned.   - Will begin 7-day Doxycycline (6/3).  - Atypical organisms and respiratory panel negative.  - Daily CBC and CMP. Will monitor CRP Q48H given patient's steroids.  - Inspiratory spirometer and OPEP.  - Continue oxygen therapy and wean to baseline (2L at night).  - Pulmonary rehab consult placed.  - PT for deconditioning.     • Advanced care planning/counseling discussion [Z71.89] Not Applicable     - Patient desires to be full code but has desires with regards to duration on breathing tube. Designates her  as her medical power of . Amenable to advanced care planning.  -  has seen patient; has filled  and filed a living will     • Nocturnal hypoxia [G47.34] Yes     - Baseline 2L oxygen requirement at night. Presently higher due to current COPD exacerbation.  - Will wean to baseline as clinical course improves.     • NARCISO (obstructive sleep apnea) [G47.33] Yes     - Use home CPAP at night     • Prediabetes [R73.03] Yes     - Hgb A1c 6.2 in January.  - Hold metformin  - POCT glucose checks with IV steroids.  - Low-dose SSI.     • Paroxysmal atrial fibrillation (CMS/HCC) [I48.0] Yes     - First diagnosed in November. Followed by Dr. Huber outpatient.  - Maintained in sinus rhythm with Amiodarone. CHADSVASC score of 2 (for age and hypertension). On antiplatelet therapy (prasugrel). Will continue both.   - Cardiac monitoring.     • Acute respiratory failure with hypoxia (CMS/HCC) [J96.01] Yes     - Desaturated to 88% on RA at rest at admission. Placed on 3 L of oxygen in ER. Currently on 1.5 L via NC. She has desatted to 87% while on room air while at rest.   - Continue oxygen therapy.  - Will treat COPD exacerbation  - Continue monitoring vitals per floor policy.     • CAD (coronary artery disease) [I25.10] Yes     - No active chest pain.  - Continue home prasugrel, atorvastatin, carvedilol.  - Nitro SL PRN for chest pain.     • Anxiety [F41.9] Yes     - Continue home Alprazolam and Paroxetine.         DVT prophylaxis: Lovenox  Code status is   Code Status and Medical Interventions:   Ordered at: 06/03/19 1901     Level Of Support Discussed With:    Patient     Code Status:    CPR     Medical Interventions (Level of Support Prior to Arrest):    Full     Comments:    Patient reports that  is to make her medical decisions if she is incapable.       Plan for disposition:Where: home and When:  1-2days      Time: 31 min       Chintan Bloom M.D. PGY1  [facility]  [address] [place], [place] [address]  Office: [phone]    This document has been electronically  signed by Chintan Bloom MD on June 7, 2019 7:32 AM

## 2019-06-08 VITALS
TEMPERATURE: 98.2 F | DIASTOLIC BLOOD PRESSURE: 72 MMHG | SYSTOLIC BLOOD PRESSURE: 133 MMHG | HEART RATE: 66 BPM | BODY MASS INDEX: 27.32 KG/M2 | WEIGHT: 154.2 LBS | HEIGHT: 63 IN | RESPIRATION RATE: 18 BRPM | OXYGEN SATURATION: 92 %

## 2019-06-08 PROBLEM — J44.1 COPD EXACERBATION: Status: RESOLVED | Noted: 2019-06-03 | Resolved: 2019-06-08

## 2019-06-08 LAB
ALBUMIN SERPL-MCNC: 3.4 G/DL (ref 3.5–5.2)
ALBUMIN/GLOB SERPL: 1.5 G/DL
ALP SERPL-CCNC: 62 U/L (ref 39–117)
ALT SERPL W P-5'-P-CCNC: 25 U/L (ref 1–33)
ANION GAP SERPL CALCULATED.3IONS-SCNC: 7 MMOL/L
AST SERPL-CCNC: 12 U/L (ref 1–32)
BASOPHILS # BLD AUTO: 0.06 10*3/MM3 (ref 0–0.2)
BASOPHILS NFR BLD AUTO: 0.5 % (ref 0–1.5)
BILIRUB SERPL-MCNC: 0.3 MG/DL (ref 0.2–1.2)
BUN BLD-MCNC: 27 MG/DL (ref 8–23)
BUN/CREAT SERPL: 31 (ref 7–25)
CALCIUM SPEC-SCNC: 8.8 MG/DL (ref 8.6–10.5)
CHLORIDE SERPL-SCNC: 97 MMOL/L (ref 98–107)
CO2 SERPL-SCNC: 34 MMOL/L (ref 22–29)
CREAT BLD-MCNC: 0.87 MG/DL (ref 0.57–1)
DEPRECATED RDW RBC AUTO: 45.2 FL (ref 37–54)
EOSINOPHIL # BLD AUTO: 0.06 10*3/MM3 (ref 0–0.4)
EOSINOPHIL NFR BLD AUTO: 0.5 % (ref 0.3–6.2)
ERYTHROCYTE [DISTWIDTH] IN BLOOD BY AUTOMATED COUNT: 14.6 % (ref 12.3–15.4)
GFR SERPL CREATININE-BSD FRML MDRD: 66 ML/MIN/1.73
GLOBULIN UR ELPH-MCNC: 2.3 GM/DL
GLUCOSE BLD-MCNC: 123 MG/DL (ref 65–99)
GLUCOSE BLDC GLUCOMTR-MCNC: 104 MG/DL (ref 70–130)
GLUCOSE BLDC GLUCOMTR-MCNC: 206 MG/DL (ref 70–130)
HCT VFR BLD AUTO: 39.8 % (ref 34–46.6)
HGB BLD-MCNC: 13 G/DL (ref 12–15.9)
IMM GRANULOCYTES # BLD AUTO: 0.63 10*3/MM3 (ref 0–0.05)
IMM GRANULOCYTES NFR BLD AUTO: 4.7 % (ref 0–0.5)
LYMPHOCYTES # BLD AUTO: 1.78 10*3/MM3 (ref 0.7–3.1)
LYMPHOCYTES NFR BLD AUTO: 13.4 % (ref 19.6–45.3)
MCH RBC QN AUTO: 27.8 PG (ref 26.6–33)
MCHC RBC AUTO-ENTMCNC: 32.7 G/DL (ref 31.5–35.7)
MCV RBC AUTO: 85.2 FL (ref 79–97)
MONOCYTES # BLD AUTO: 1.48 10*3/MM3 (ref 0.1–0.9)
MONOCYTES NFR BLD AUTO: 11.2 % (ref 5–12)
NEUTROPHILS # BLD AUTO: 9.26 10*3/MM3 (ref 1.7–7)
NEUTROPHILS NFR BLD AUTO: 69.7 % (ref 42.7–76)
NRBC BLD AUTO-RTO: 0.2 /100 WBC (ref 0–0.2)
PLATELET # BLD AUTO: 297 10*3/MM3 (ref 140–450)
PMV BLD AUTO: 8.7 FL (ref 6–12)
POTASSIUM BLD-SCNC: 4.3 MMOL/L (ref 3.5–5.2)
PROT SERPL-MCNC: 5.7 G/DL (ref 6–8.5)
RBC # BLD AUTO: 4.67 10*6/MM3 (ref 3.77–5.28)
SODIUM BLD-SCNC: 138 MMOL/L (ref 136–145)
WBC NRBC COR # BLD: 13.27 10*3/MM3 (ref 3.4–10.8)

## 2019-06-08 PROCEDURE — 97535 SELF CARE MNGMENT TRAINING: CPT

## 2019-06-08 PROCEDURE — 82962 GLUCOSE BLOOD TEST: CPT

## 2019-06-08 PROCEDURE — 80053 COMPREHEN METABOLIC PANEL: CPT | Performed by: STUDENT IN AN ORGANIZED HEALTH CARE EDUCATION/TRAINING PROGRAM

## 2019-06-08 PROCEDURE — 25010000002 ENOXAPARIN PER 10 MG: Performed by: STUDENT IN AN ORGANIZED HEALTH CARE EDUCATION/TRAINING PROGRAM

## 2019-06-08 PROCEDURE — 99217 PR OBSERVATION CARE DISCHARGE MANAGEMENT: CPT | Performed by: STUDENT IN AN ORGANIZED HEALTH CARE EDUCATION/TRAINING PROGRAM

## 2019-06-08 PROCEDURE — 96372 THER/PROPH/DIAG INJ SC/IM: CPT

## 2019-06-08 PROCEDURE — 94799 UNLISTED PULMONARY SVC/PX: CPT

## 2019-06-08 PROCEDURE — G0378 HOSPITAL OBSERVATION PER HR: HCPCS

## 2019-06-08 PROCEDURE — 63710000001 PREDNISONE PER 1 MG: Performed by: STUDENT IN AN ORGANIZED HEALTH CARE EDUCATION/TRAINING PROGRAM

## 2019-06-08 PROCEDURE — 85025 COMPLETE CBC W/AUTO DIFF WBC: CPT | Performed by: STUDENT IN AN ORGANIZED HEALTH CARE EDUCATION/TRAINING PROGRAM

## 2019-06-08 PROCEDURE — 94760 N-INVAS EAR/PLS OXIMETRY 1: CPT

## 2019-06-08 RX ORDER — IPRATROPIUM BROMIDE AND ALBUTEROL SULFATE 2.5; .5 MG/3ML; MG/3ML
3 SOLUTION RESPIRATORY (INHALATION) 4 TIMES DAILY PRN
Qty: 360 ML | Refills: 0 | Status: SHIPPED | OUTPATIENT
Start: 2019-06-08 | End: 2019-10-28 | Stop reason: SDUPTHER

## 2019-06-08 RX ORDER — NITROGLYCERIN 0.4 MG/1
TABLET SUBLINGUAL
Qty: 15 TABLET | Refills: 0 | Status: SHIPPED | OUTPATIENT
Start: 2019-06-08

## 2019-06-08 RX ORDER — DOXYCYCLINE 100 MG/1
100 CAPSULE ORAL EVERY 12 HOURS SCHEDULED
Qty: 10 CAPSULE | Refills: 0 | Status: SHIPPED | OUTPATIENT
Start: 2019-06-08 | End: 2019-06-13

## 2019-06-08 RX ORDER — PREDNISONE 20 MG/1
TABLET ORAL
Qty: 30 TABLET | Refills: 0 | Status: SHIPPED | OUTPATIENT
Start: 2019-06-08 | End: 2019-07-10

## 2019-06-08 RX ADMIN — ENOXAPARIN SODIUM 40 MG: 40 INJECTION SUBCUTANEOUS at 11:09

## 2019-06-08 RX ADMIN — PAROXETINE HYDROCHLORIDE 20 MG: 20 TABLET, FILM COATED ORAL at 06:04

## 2019-06-08 RX ADMIN — CARVEDILOL 3.12 MG: 3.12 TABLET, FILM COATED ORAL at 08:34

## 2019-06-08 RX ADMIN — PRASUGREL 10 MG: 10 TABLET, FILM COATED ORAL at 08:33

## 2019-06-08 RX ADMIN — IPRATROPIUM BROMIDE AND ALBUTEROL SULFATE 3 ML: 2.5; .5 SOLUTION RESPIRATORY (INHALATION) at 10:53

## 2019-06-08 RX ADMIN — MONTELUKAST SODIUM 10 MG: 10 TABLET, COATED ORAL at 08:34

## 2019-06-08 RX ADMIN — PREDNISONE 60 MG: 20 TABLET ORAL at 08:33

## 2019-06-08 RX ADMIN — ASPIRIN 81 MG CHEWABLE TABLET 81 MG: 81 TABLET CHEWABLE at 08:34

## 2019-06-08 RX ADMIN — LOSARTAN POTASSIUM 25 MG: 25 TABLET, FILM COATED ORAL at 08:33

## 2019-06-08 RX ADMIN — IPRATROPIUM BROMIDE AND ALBUTEROL SULFATE 3 ML: 2.5; .5 SOLUTION RESPIRATORY (INHALATION) at 07:29

## 2019-06-08 RX ADMIN — FUROSEMIDE 20 MG: 20 TABLET ORAL at 08:34

## 2019-06-08 RX ADMIN — Medication 100 MG: at 08:34

## 2019-06-08 RX ADMIN — POLYETHYLENE GLYCOL 3350 17 G: 17 POWDER, FOR SOLUTION ORAL at 08:34

## 2019-06-08 RX ADMIN — BUDESONIDE AND FORMOTEROL FUMARATE DIHYDRATE 2 PUFF: 160; 4.5 AEROSOL RESPIRATORY (INHALATION) at 07:29

## 2019-06-08 RX ADMIN — DOXYCYCLINE 100 MG: 100 CAPSULE ORAL at 08:34

## 2019-06-08 NOTE — THERAPY TREATMENT NOTE
Acute Care - Physical Therapy Treatment Note  [facility]     Patient Name: Monisha Brasher  : 1955  MRN: [id number]  Today's Date: 2019  Onset of Illness/Injury or Date of Surgery: 19  Date of Referral to PT: 19  Referring Physician: Dr. Chaidez    Admit Date: 6/3/2019    Visit Dx:    ICD-10-CM ICD-9-CM   1. COPD exacerbation (CMS/HCC) J44.1 491.21   2. Impaired functional mobility, balance, gait, and endurance Z74.09 V49.89     Patient Active Problem List   Diagnosis   • COPD bronchitis   • Cellulitis of left leg   • Asthma   • SOB (shortness of breath)   • Chronic bronchitis with acute exacerbation (CMS/HCC)   • Nicotine abuse   • Hypertension   • Screening for osteoporosis   • Panlobular emphysema (CMS/HCC)   • Anxiety   • General medical exam   • Malaise   • Hyperglycemia   • Acute ST elevation myocardial infarction (STEMI) (CMS/HCC)   • CAD (coronary artery disease)   • Acute respiratory failure with hypoxia (CMS/HCC)   • Depression   • Chronic alcohol abuse   • Pneumonia of both upper lobes   • Paroxysmal atrial fibrillation (CMS/HCC)   • Snoring   • Rash   • Chest pain   • Atherosclerosis of native coronary artery of native heart with unstable angina pectoris (CMS/HCC)   • Encounter for screening mammogram for malignant neoplasm of breast   • COPD exacerbation (CMS/HCC)   • Advanced care planning/counseling discussion   • Nocturnal hypoxia   • NARCISO (obstructive sleep apnea)   • Prediabetes       Therapy Treatment    Rehabilitation Treatment Summary     Row Name 19 0854             Treatment Time/Intention    Discipline  physical therapy assistant  -EM      Document Type  therapy note (daily note)  -EM      Subjective Information  no complaints  -EM      Comment  Pt declined OOB, gt training, and stair training stating she feels good about all of that. Pt was agreeable to home education.   -EM      Recorded by [EM] Jin Callahan, PTA 19 1414      Row Name  06/08/19 0854             Cognitive Assessment/Intervention- PT/OT    Affect/Mental Status (Cognitive)  WFL  -EM      Orientation Status (Cognition)  oriented x 4  -EM      Follows Commands (Cognition)  follows multi-step commands;over 90% accuracy  -EM      Recorded by [EM] Jin Callahan, [facility] 06/08/19 1137      Row Name 06/08/19 0854             Positioning and Restraints    Pre-Treatment Position  in bed  -EM      Post Treatment Position  bed  -EM      Recorded by [EM] Jin Callahan, [facility] 06/08/19 1137      Row Name 06/08/19 0854             Pain Scale: Numbers Pre/Post-Treatment    Pain Scale: Numbers, Pretreatment  0/10 - no pain  -EM      Pain Scale: Numbers, Post-Treatment  0/10 - no pain  -EM      Recorded by [EM] Jin Callahan, [facility] 06/08/19 1137      Row Name 06/08/19 0854             Outcome Summary/Treatment Plan (PT)    Plan for Continued Treatment (PT)  Continue. Monitor sats with gt. Stair training.  -EM      Anticipated Discharge Disposition (PT)  anticipate therapy at next level of care  -EM      Recorded by [EM] Jin Callahan, [facility] 06/08/19 1137        User Key  (r) = Recorded By, (t) = Taken By, (c) = Cosigned By    Initials Name Effective Dates Discipline    EM Jin Callahan, [facility] 08/11/15 -  PT               Rehab Goal Summary     Row Name 06/08/19 1100             Physical Therapy Goals    Transfer Goal Selection (PT)  transfer, PT goal 1  -EM      Gait Training Goal Selection (PT)  gait training, PT goal 1  -EM         Transfer Goal 1 (PT)    Activity/Assistive Device (Transfer Goal 1, PT)  sit-to-stand/stand-to-sit;bed-to-chair/chair-to-bed;toilet  -EM      [place] Level/Cues Needed (Transfer Goal 1, PT)  independent  -EM      Time Frame (Transfer Goal 1, PT)  by discharge  -EM      Progress/Outcome (Transfer Goal 1, PT)  goal met  (Significant)   -EM         Gait Training Goal 1 (PT)    Activity/Assistive Device (Gait Training Goal 1, PT)  gait (walking locomotion)  -EM       [place] Level (Gait Training Goal 1, PT)  independent O2 sats will not drop below 92%  -EM      Distance (Gait Goal 1, PT)  247sqt5  -EM      Time Frame (Gait Training Goal 1, PT)  by discharge  -EM      Progress/Outcome (Gait Training Goal 1, PT)  goal met  (Significant)   -EM        User Key  (r) = Recorded By, (t) = Taken By, (c) = Cosigned By    Initials Name Provider Type Discipline    EM Jin Callahan, PTA Physical Therapy Assistant PT          Physical Therapy Education     Title: PT OT SLP Therapies (Done)     Topic: Physical Therapy (Done)     Point: Mobility training (Done)     Learning Progress Summary           Patient Acceptance, E, VU,DU by EM at 6/8/2019 11:38 AM    Comment:  Pt edu verbally on home education to include: using handrails, putting pets up until she gets where she is going initially for fall prevention, removal of throw rugs, night lights, clear halls and walkways of clutter and cords.    Acceptance, E, DU,VU by EM at 6/8/2019 11:37 AM    Acceptance, E,TB, VU by ZAYDA at 6/7/2019  4:56 PM    Acceptance, E, DU,VU by JORGE at 6/4/2019 12:43 PM                   Point: Body mechanics (Done)     Learning Progress Summary           Patient Acceptance, E, VU,DU by EM at 6/8/2019 11:38 AM    Comment:  Pt edu verbally on home education to include: using handrails, putting pets up until she gets where she is going initially for fall prevention, removal of throw rugs, night lights, clear halls and walkways of clutter and cords.    Acceptance, E, DU,VU by EM at 6/8/2019 11:37 AM    Acceptance, E,TB, VU by ZAYDA at 6/7/2019  4:56 PM    Acceptance, E, DU,VU by JORGE at 6/4/2019 12:43 PM                   Point: Precautions (Done)     Learning Progress Summary           Patient Acceptance, E, VU,DU by EM at 6/8/2019 11:38 AM    Comment:  Pt edu verbally on home education to include: using handrails, putting pets up until she gets where she is going initially for fall prevention, removal of throw rugs, night  lights, clear halls and walkways of clutter and cords.    Acceptance, E, DU,VU by EM at 6/8/2019 11:37 AM    Acceptance, E,TB, VU by LN at 6/7/2019  4:56 PM    Acceptance, E, DU,VU by JORGE at 6/4/2019 12:43 PM                               User Key     Initials Effective Dates Name Provider Type Discipline    EM 08/11/15 -  Jin Callahan, PTA Physical Therapy Assistant PT    JORGE 04/03/18 -  Cherie Carranza, PT Physical Therapist PT    LN 03/07/18 -  Maki Osullivan, TRICIA Physical Therapy Assistant PT                PT Recommendation and Plan  Anticipated Discharge Disposition (PT): anticipate therapy at next level of care  Outcome Summary/Treatment Plan (PT)  Plan for Continued Treatment (PT): Continue. Monitor sats with gt. Stair training.  Anticipated Discharge Disposition (PT): anticipate therapy at next level of care  Outcome Measures     Row Name 06/07/19 1455 06/06/19 1405          How much help from another person do you currently need...    Turning from your back to your side while in flat bed without using bedrails?  4  -LN  4  -JA     Moving from lying on back to sitting on the side of a flat bed without bedrails?  4  -LN  4  -JA     Moving to and from a bed to a chair (including a wheelchair)?  4  -LN  4  -JA     Standing up from a chair using your arms (e.g., wheelchair, bedside chair)?  4  -LN  4  -JA     Climbing 3-5 steps with a railing?  4  -LN  4  -JA     To walk in hospital room?  4  -LN  4  -JA     AM-PAC 6 Clicks Score  24  -LN  24  -JA        Functional Assessment    Outcome Measure Options  AM-PAC 6 Clicks Basic Mobility (PT)  -LN  AM-PAC 6 Clicks Basic Mobility (PT)  -JA       User Key  (r) = Recorded By, (t) = Taken By, (c) = Cosigned By    Initials Name Provider Type    German Durán, PTA Physical Therapy Assistant    LN Maki Osullivan, PTA Physical Therapy Assistant         Time Calculation:   PT Charges     Row Name 06/08/19 1140             Time Calculation    Start Time  0854  -       Stop Time  0904  -EM      Time Calculation (min)  10 min  -EM         Time Calculation- PT    Total Timed Code Minutes- PT  10 minute(s)  -EM        User Key  (r) = Recorded By, (t) = Taken By, (c) = Cosigned By    Initials Name Provider Type    EM Jin Callahan, PTA Physical Therapy Assistant        Therapy Charges for Today     Code Description Service Date Service Provider Modifiers Qty    [id number] HC PT SELF CARE/MGMT/TRAIN EA 15 MIN 6/8/2019 Jin Callahan PTA GP 1          PT G-Codes  Outcome Measure Options: AM-PAC 6 Clicks Basic Mobility (PT)  AM-PAC 6 Clicks Score: 24    Jin Callahan PTA  6/8/2019

## 2019-06-08 NOTE — DISCHARGE SUMMARY
DISCHARGE SUMMARY    PATIENT NAME: Monisha Brasher       PHYSICIAN: Chintan Bloom MD  : 1955  MRN: [id number]    ADMITTED: 6/3/2019     DISCHARGED: 2019    ADMISSION DIAGNOSES:  Active Hospital Problems    Diagnosis  POA   • Advanced care planning/counseling discussion [Z71.89]  Not Applicable   • Nocturnal hypoxia [G47.34]  Yes   • NARCISO (obstructive sleep apnea) [G47.33]  Yes   • Prediabetes [R73.03]  Yes   • Paroxysmal atrial fibrillation (CMS/HCC) [I48.0]  Yes   • Acute respiratory failure with hypoxia (CMS/HCC) [J96.01]  Yes   • CAD (coronary artery disease) [I25.10]  Yes   • Anxiety [F41.9]  Yes      Resolved Hospital Problems    Diagnosis Date Resolved POA   • **COPD exacerbation (CMS/HCC) [J44.1] 2019 Yes     DISCHARGE DIAGNOSES:   Active Hospital Problems    Diagnosis  POA   • Advanced care planning/counseling discussion [Z71.89]  Not Applicable   • Nocturnal hypoxia [G47.34]  Yes   • NARCISO (obstructive sleep apnea) [G47.33]  Yes   • Prediabetes [R73.03]  Yes   • Paroxysmal atrial fibrillation (CMS/HCC) [I48.0]  Yes   • Acute respiratory failure with hypoxia (CMS/HCC) [J96.01]  Yes   • CAD (coronary artery disease) [I25.10]  Yes   • Anxiety [F41.9]  Yes      Resolved Hospital Problems    Diagnosis Date Resolved POA   • **COPD exacerbation (CMS/HCC) [J44.1] 2019 Yes       SERVICE: Family Medicine.  Attending: Dr. Constantino  Resident: Chintan Bloom MD    CONSULTS:   Consult Orders (all) (From admission, onward)    Start     Ordered    19  Inpatient Pulmonary Rehab Consult  Once     Provider:  (Not yet assigned)    19  Inpatient Advance Care Planning Consult  Once     Provider:  (Not yet assigned)    19        PHYSICAL EXAM:  Physical Exam   Constitutional: She is oriented to person, place, and time. She appears well-developed and well-nourished. No distress.   HENT:   Head: Normocephalic and atraumatic.   Mouth/Throat:  "Oropharynx is clear and moist.   Eyes: EOM are normal. Pupils are equal, round, and reactive to light.   Neck: Normal range of motion. Neck supple. No thyromegaly present.   Cardiovascular: Normal rate, regular rhythm, normal heart sounds and intact distal pulses.   Pulmonary/Chest: She has wheezes.   Normal effort, moderate movement of air, end-expiratory wheezing diffusely   Abdominal: Soft. Bowel sounds are normal. She exhibits no distension. There is no tenderness.   Musculoskeletal: She exhibits no edema or tenderness.   Lymphadenopathy:     She has no cervical adenopathy.   Neurological: She is alert and oriented to person, place, and time.   Psychiatric: She has a normal mood and affect. Her behavior is normal.       PROCEDURES:   none    HISTORY OF PRESENT ILLNESS:   Monisha Brasher is a 64 y.o. female with a concurrent medical history of COPD, asthma, obstructive sleep apnea, CAD, and pre-diabetes, who presents with worsening shortness of air. The patient has a baseline 2 L nighttime oxygen requirement. She reports that she has had worsening shortness of air over the last six days. She reports a productive cough of \"nasty\" sputum and a fever up to 103 F in the last week, but none over the last three days. She endorses generalized malaise, some nausea, and one episode of emesis a few days ago. She has had no particular sick contacts but reports that she was around her grandchildren who visit . The patient has had increasing uses of her albuterol inhaler and nebulizer. She has a CPAP machine but has not been using it over the past week. She reports that she takes 10 MG of prednisone daily but sometimes takes up to 40 MG. She has not taken antibiotics recently. She presently denies chest pain but reports that she had it on Thursday, which resolved after taking five sublingual nitroglycerin tablets. The patient is followed by Dr. Ac (Pulmonology) and Dr. Huber (Cardiology).     In the ER, the " patient was saturating at 88% on room air. She was placed on 3L of oxygen via nasal cannula. She received a one-time dose of IV Solumedrol 125 MG as well as a duo-neb treatment. CMP was notable for acidosis. CXR was consistent with COPD but without acute cardiopulmonary findings. The patient is being admitted for COPD exacerbation.      DIAGNOSTIC DATA:   Lab Results (last 72 hours)     Procedure Component Value Units Date/Time    POC Glucose Once [[id number]]  (Abnormal) Collected:  06/08/19 1040    Specimen:  Blood Updated:  06/08/19 1153     Glucose 206 mg/dL      Comment: RN NotifiedOperator: [id number] CEDRICK CHARLESMeter ID: [id number]       POC Glucose Once [[id number]]  (Normal) Collected:  06/08/19 0724    Specimen:  Blood Updated:  06/08/19 0749     Glucose 104 mg/dL      Comment: : [id number] CEDRICK CHARLESMeter ID: [id number]       CBC & Differential [[id number]] Collected:  06/08/19 0510    Specimen:  Blood Updated:  06/08/19 0550    Narrative:       The following orders were created for panel order CBC & Differential.  Procedure                               Abnormality         Status                     ---------                               -----------         ------                     Scan Slide[[id number]]                                                                  CBC Auto Differential[[id number]]        Abnormal            Final result                 Please view results for these tests on the individual orders.    Comprehensive Metabolic Panel [[id number]]  (Abnormal) Collected:  06/08/19 0510    Specimen:  Blood Updated:  06/08/19 0549     Glucose 123 mg/dL      BUN 27 mg/dL      Creatinine 0.87 mg/dL      Sodium 138 mmol/L      Potassium 4.3 mmol/L      Chloride 97 mmol/L      CO2 34.0 mmol/L      Calcium 8.8 mg/dL      Total Protein 5.7 g/dL      Albumin 3.40 g/dL      ALT (SGPT) 25 U/L      AST (SGOT) 12 U/L      Alkaline Phosphatase 62 U/L      Total Bilirubin 0.3 mg/dL       eGFR Non African Amer 66 mL/min/1.73      Globulin 2.3 gm/dL      A/G Ratio 1.5 g/dL      BUN/Creatinine Ratio 31.0     Anion Gap 7.0 mmol/L     Narrative:       GFR Normal >60  Chronic Kidney Disease <60  Kidney Failure <15    CBC Auto Differential [[id number]]  (Abnormal) Collected:  06/08/19 0510    Specimen:  Blood Updated:  06/08/19 0549     WBC 13.27 10*3/mm3      RBC 4.67 10*6/mm3      Hemoglobin 13.0 g/dL      Hematocrit 39.8 %      MCV 85.2 fL      MCH 27.8 pg      MCHC 32.7 g/dL      RDW 14.6 %      RDW-SD 45.2 fl      MPV 8.7 fL      Platelets 297 10*3/mm3      Neutrophil % 69.7 %      Lymphocyte % 13.4 %      Monocyte % 11.2 %      Eosinophil % 0.5 %      Basophil % 0.5 %      Immature Grans % 4.7 %      Neutrophils, Absolute 9.26 10*3/mm3      Lymphocytes, Absolute 1.78 10*3/mm3      Monocytes, Absolute 1.48 10*3/mm3      Eosinophils, Absolute 0.06 10*3/mm3      Basophils, Absolute 0.06 10*3/mm3      Immature Grans, Absolute 0.63 10*3/mm3      nRBC 0.2 /100 WBC     POC Glucose Once [[id number]]  (Abnormal) Collected:  06/07/19 1934    Specimen:  Blood Updated:  06/07/19 1954     Glucose 226 mg/dL      Comment: RN NotifiedOperator: [id number] [facility] ID: [id number]       C-reactive Protein [[id number]]  (Normal) Collected:  06/07/19 1849    Specimen:  Blood Updated:  06/07/19 1915     C-Reactive Protein 0.08 mg/dL     POC Glucose Once [[id number]]  (Abnormal) Collected:  06/07/19 1641    Specimen:  Blood Updated:  06/07/19 1659     Glucose 157 mg/dL      Comment: RN NotifiedOperator: [id number] JED Deras ID: [id number]       POC Glucose Once [[id number]]  (Abnormal) Collected:  06/07/19 1430    Specimen:  Blood Updated:  06/07/19 1450     Glucose 217 mg/dL      Comment: Result Not ConfirmedOperator: [id number] MATTHEW [id number] ID: [id number]       POC Glucose Once [[id number]]  (Abnormal) Collected:  06/07/19 1100    Specimen:  Blood Updated:  06/07/19 1114     Glucose 366  mg/dL      Comment: RN NotifiedOperator: [id number] [facility] ID: [id number]       POC Glucose Once [[id number]]  (Abnormal) Collected:  06/07/19 0813    Specimen:  Blood Updated:  06/07/19 0826     Glucose 144 mg/dL      Comment: RN NotifiedOperator: [id number] JED [facility] ID: [id number]       Comprehensive Metabolic Panel [[id number]]  (Abnormal) Collected:  06/07/19 0559    Specimen:  Blood Updated:  06/07/19 0638     Glucose 190 mg/dL      BUN 24 mg/dL      Creatinine 0.79 mg/dL      Sodium 141 mmol/L      Potassium 5.2 mmol/L      Chloride 97 mmol/L      CO2 35.0 mmol/L      Calcium 9.3 mg/dL      Total Protein 6.2 g/dL      Albumin 3.60 g/dL      ALT (SGPT) 24 U/L      AST (SGOT) 10 U/L      Alkaline Phosphatase 70 U/L      Total Bilirubin 0.3 mg/dL      eGFR Non African Amer 74 mL/min/1.73      Globulin 2.6 gm/dL      A/G Ratio 1.4 g/dL      BUN/Creatinine Ratio 30.4     Anion Gap 9.0 mmol/L     Narrative:       GFR Normal >60  Chronic Kidney Disease <60  Kidney Failure <15    CBC & Differential [[id number]] Collected:  06/07/19 0559    Specimen:  Blood Updated:  06/07/19 0613    Narrative:       The following orders were created for panel order CBC & Differential.  Procedure                               Abnormality         Status                     ---------                               -----------         ------                     CBC Auto Differential[[id number]]        Abnormal            Final result                 Please view results for these tests on the individual orders.    CBC Auto Differential [[id number]]  (Abnormal) Collected:  06/07/19 0559    Specimen:  Blood Updated:  06/07/19 0613     WBC 13.86 10*3/mm3      RBC 4.64 10*6/mm3      Hemoglobin 12.9 g/dL      Hematocrit 40.6 %      MCV 87.5 fL      MCH 27.8 pg      MCHC 31.8 g/dL      RDW 14.6 %      RDW-SD 46.9 fl      MPV 8.8 fL      Platelets 308 10*3/mm3      Neutrophil % 86.6 %      Lymphocyte % 5.3 %       Monocyte % 4.2 %      Eosinophil % 0.0 %      Basophil % 0.3 %      Immature Grans % 3.6 %      Neutrophils, Absolute 12.00 10*3/mm3      Lymphocytes, Absolute 0.74 10*3/mm3      Monocytes, Absolute 0.58 10*3/mm3      Eosinophils, Absolute 0.00 10*3/mm3      Basophils, Absolute 0.04 10*3/mm3      Immature Grans, Absolute 0.50 10*3/mm3      nRBC 0.0 /100 WBC     POC Glucose Once [[id number]]  (Abnormal) Collected:  06/06/19 1929    Specimen:  Blood Updated:  06/06/19 1943     Glucose 337 mg/dL      Comment: RN NotifiedOperator: [id number] SHARON Sampson ID: [id number]       POC Glucose Once [[id number]]  (Abnormal) Collected:  06/06/19 1641    Specimen:  Blood Updated:  06/06/19 1702     Glucose 238 mg/dL      Comment: RN NotifiedOperator: [id number] ALEXA Rendon ID: [id number]       POC Glucose Once [[id number]]  (Abnormal) Collected:  06/06/19 1021    Specimen:  Blood Updated:  06/06/19 1148     Glucose 246 mg/dL      Comment: RN NotifiedOperator: [id number] ALEXA PERSAUDCresencio ID: [id number]       POC Glucose Once [[id number]]  (Abnormal) Collected:  06/06/19 0715    Specimen:  Blood Updated:  06/06/19 0743     Glucose 161 mg/dL      Comment: RN NotifiedOperator: [id number] ALEXA Rendon ID: [id number]       Comprehensive Metabolic Panel [[id number]]  (Abnormal) Collected:  06/06/19 0539    Specimen:  Blood Updated:  06/06/19 0626     Glucose 183 mg/dL      BUN 23 mg/dL      Creatinine 0.77 mg/dL      Sodium 141 mmol/L      Potassium 5.3 mmol/L      Chloride 99 mmol/L      CO2 35.0 mmol/L      Calcium 9.4 mg/dL      Total Protein 6.2 g/dL      Albumin 3.60 g/dL      ALT (SGPT) 22 U/L      AST (SGOT) 9 U/L      Alkaline Phosphatase 71 U/L      Total Bilirubin 0.2 mg/dL      eGFR Non African Amer 76 mL/min/1.73      Globulin 2.6 gm/dL      A/G Ratio 1.4 g/dL      BUN/Creatinine Ratio 29.9     Anion Gap 7.0 mmol/L     Narrative:       GFR Normal >60  Chronic Kidney Disease <60  Kidney Failure  <15    CBC & Differential [[id number]] Collected:  06/06/19 0539    Specimen:  Blood Updated:  06/06/19 0607    Narrative:       The following orders were created for panel order CBC & Differential.  Procedure                               Abnormality         Status                     ---------                               -----------         ------                     CBC Auto Differential[[id number]]        Abnormal            Final result                 Please view results for these tests on the individual orders.    CBC Auto Differential [[id number]]  (Abnormal) Collected:  06/06/19 0539    Specimen:  Blood Updated:  06/06/19 0607     WBC 14.63 10*3/mm3      RBC 4.52 10*6/mm3      Hemoglobin 12.6 g/dL      Hematocrit 39.2 %      MCV 86.7 fL      MCH 27.9 pg      MCHC 32.1 g/dL      RDW 14.4 %      RDW-SD 46.1 fl      MPV 9.2 fL      Platelets 304 10*3/mm3      Neutrophil % 86.7 %      Lymphocyte % 6.5 %      Monocyte % 4.6 %      Eosinophil % 0.0 %      Basophil % 0.2 %      Immature Grans % 2.0 %      Neutrophils, Absolute 12.68 10*3/mm3      Lymphocytes, Absolute 0.95 10*3/mm3      Monocytes, Absolute 0.68 10*3/mm3      Eosinophils, Absolute 0.00 10*3/mm3      Basophils, Absolute 0.03 10*3/mm3      Immature Grans, Absolute 0.29 10*3/mm3      nRBC 0.0 /100 WBC     POC Glucose Once [[id number]]  (Abnormal) Collected:  06/05/19 1912    Specimen:  Blood Updated:  06/05/19 1955     Glucose 258 mg/dL      Comment: RN NotifiedOperator: [id number] EFREM ONEILLMeter ID: [id number]       POC Glucose Once [[id number]]  (Abnormal) Collected:  06/05/19 1641    Specimen:  Blood Updated:  06/05/19 1659     Glucose 191 mg/dL      Comment: RN NotifiedOperator: [id number] ILENE FELIXMeter ID: [id number]       C-reactive Protein [[id number]]  (Normal) Collected:  06/05/19 0553    Specimen:  Blood Updated:  06/05/19 1436     C-Reactive Protein 0.42 mg/dL         Imaging Results (last 72 hours)     ** No results found for the  last 72 hours. **             HOSPITAL COURSE:  Patient was admitted for further management of COPD. She was started on doxycycline, continued on supplemental oxygen 3L via NC, and started on solumedrol 80 mg three times daily. She did well throughout her stay and steroids were weaned down to 60mg three times daily before transitioning to oral prednisone 60mg to be weaned over the next 2 weeks. Doxycycline will continue to complete a 7 day course. He supplemental oxygen requirements returned to baseline on day of discharge and she was maintaining her O2 sats while walking in the halls. She was afebrile with stable vitals at time of discharge. She is advised to follow up with PCP within 1 week and pulmonology with Dr. Ac in 2 weeks.     DISCHARGE CONDITION:   stable    DISPOSITION:  Home or Self Care    DISCHARGE MEDICATIONS     Discharge Medications      New Medications      Instructions Start Date   doxycycline 100 MG capsule  Commonly known as:  MONODOX   100 mg, Oral, Every 12 Hours Scheduled      ipratropium-albuterol 0.5-2.5 mg/3 ml nebulizer  Commonly known as:  DUO-NEB   3 mL, Nebulization, 4 Times Daily PRN         Changes to Medications      Instructions Start Date   nitroglycerin 0.4 MG SL tablet  Commonly known as:  NITROSTAT  What changed:    · how much to take  · how to take this  · additional instructions   Take 1 tablet under tongue every 5 minutes for a maximum of three doses per episode of chest pain      predniSONE 20 MG tablet  Commonly known as:  DELTASONE  What changed:    · how much to take  · how to take this  · when to take this  · additional instructions  Notes to patient:  Take as directed   Take 6tabs for one day, then 4tabs daily for 3 days, then, 2tabs daily for 3 days, then 1tab daily for 3 days, then half tab daily for 3 day         Continue These Medications      Instructions Start Date   albuterol (2.5 MG/3ML) 0.083% nebulizer solution  Commonly known as:  PROVENTIL   USE 1 VIAL  VIA NEBULIZER EVERY 4 HOURS AS NEEDED FOR WHEEZING      albuterol sulfate  (90 Base) MCG/ACT inhaler  Commonly known as:  PROVENTIL HFA;VENTOLIN HFA;PROAIR HFA   INHALE 2 PUFFS BY MOUTH EVERY 4 HOURS AS NEEDED FOR BREATHING      ALPRAZolam 0.25 MG tablet  Commonly known as:  XANAX   0.25 mg, Oral, 3 Times Daily PRN      amiodarone 100 MG tablet  Commonly known as:  PACERONE   100 mg, Oral, 2 Times Daily      aspirin 81 MG chewable tablet   81 mg, Oral, Daily      atorvastatin 20 MG tablet  Commonly known as:  LIPITOR   40 mg, Oral, Nightly      budesonide-formoterol 160-4.5 MCG/ACT inhaler  Commonly known as:  SYMBICORT   INHALE 2 PUFFS BY MOUTH TWICE DAILY      carvedilol 3.125 MG tablet  Commonly known as:  COREG   3.21 mg, Oral, Nightly      freestyle lancets   Testing blood sugar once a day      furosemide 20 MG tablet  Commonly known as:  LASIX   20 mg, Oral, 2 Times Daily      glucose blood test strip  Notes to patient:  As Instructed   Use as instructed      glucose monitor monitoring kit   1 each, Does not apply, Daily, Testing blood sugar once a day  ICD10 - R73.9      losartan 25 MG tablet  Commonly known as:  COZAAR   25 mg, Oral, Daily      metFORMIN 500 MG tablet  Commonly known as:  GLUCOPHAGE   500 mg, Oral, Daily With Breakfast      montelukast 10 MG tablet  Commonly known as:  SINGULAIR   TAKE 1 TABLET BY MOUTH DAILY      nystatin 226009 UNIT/ML suspension  Commonly known as:  MYCOSTATIN   SHAKE LIQUID AND TAKE 5 ML BY MOUTH FOUR TIMES DAILY      PARoxetine 20 MG tablet  Commonly known as:  PAXIL   20 mg, Oral, Every Morning      prasugrel 10 MG tablet  Commonly known as:  EFFIENT   10 mg, Oral, Daily      SPIRIVA HANDIHALER 18 MCG per inhalation capsule  Generic drug:  tiotropium   INHALE 1 PUFF BY MOUTH DAILY         Stop These Medications    azithromycin 250 MG tablet  Commonly known as:  ZITHROMAX     sodium chloride 0.65 % nasal spray            INSTRUCTIONS:  Activity:   Activity  Instructions     Activity as Tolerated          Diet:   Diet Instructions     Diet: Consistent Carbohydrate, Cardiac      Discharge Diet:   Consistent Carbohydrate  Cardiac           Special instructions: Patient instructed to call MD or return to ED with worsening shortness of breath, chest pain, fever greater than 100.4 degrees F or any other medical concerns..    FOLLOW UP:   Additional Instructions for the Follow-ups that You Need to Schedule     Discharge Follow-up with PCP   As directed       Currently Documented PCP:    Tiffanie Mccauley APRN    PCP Phone Number:    [phone]     Follow Up Details:  See PCP within 1 week         Discharge Follow-up with Specified Provider: Dr Ac in 2 weeks   As directed      To:  Dr Ac in 2 weeks           Follow-up Information     Tiffanie Mccauley APRN Follow up.    Specialty:  Family Medicine  Why:  If office does not call by Monday afternoon June 10th with appointment time, please call to make appointment in one week.   Contact information:  [address]   [place] [id number] [phone]             Tiffanie Mccauley APRN Follow up.    Specialty:  Family Medicine  Why:  See PCP within 1 week  Contact information:  [address] [facility]   [facility] [address] [phone]             Gigi Ac MD Follow up.    Specialties:  Pulmonary Disease, Allergy  Why:  If office does not call by Monday afternoon June 10th with appointment time, please call to make appointment in two weeks.  Contact information:  [address] [place] [address] [address]  [address]  [place] [id number] [phone]                   PENDING TEST RESULTS AT DISCHARGE      Time: Discharge 31 min    Dr. Smith is the attending at time of discharge, He is aware of the patient's status and agrees with the above discharge summary.      Chintan Bloom M.D. PGY1  [facility]  [address] [place] [address]  Office: [phone]    This document has  been electronically signed by Chintan Bloom MD on June 8, 2019 3:10 PM

## 2019-06-08 NOTE — PLAN OF CARE
Problem: Patient Care Overview  Goal: Plan of Care Review  Outcome: Ongoing (interventions implemented as appropriate)   06/08/19 0112   Coping/Psychosocial   Plan of Care Reviewed With patient   Plan of Care Review   Progress no change       Problem: Chronic Obstructive Pulmonary Disease (Adult)  Goal: Signs and Symptoms of Listed Potential Problems Will be Absent, Minimized or Managed (Chronic Obstructive Pulmonary Disease)  Outcome: Ongoing (interventions implemented as appropriate)      Problem: Fall Risk (Adult)  Goal: Absence of Fall  Outcome: Ongoing (interventions implemented as appropriate)

## 2019-06-09 ENCOUNTER — READMISSION MANAGEMENT (OUTPATIENT)
Dept: CALL CENTER | Facility: HOSPITAL | Age: 64
End: 2019-06-09

## 2019-06-09 NOTE — OUTREACH NOTE
Prep Survey      Responses   Facility patient discharged from?  [facility]   Is patient eligible?  Yes   Discharge diagnosis  ACS (acute coronary syndrome   Does the patient have one of the following disease processes/diagnoses(primary or secondary)?  Other   Does the patient have Home health ordered?  No   What is the Home health agency?   declined    Is there a DME ordered?  No   Prep survey completed?  Yes          Arabella Zamorano RN

## 2019-06-10 ENCOUNTER — READMISSION MANAGEMENT (OUTPATIENT)
Dept: CALL CENTER | Facility: HOSPITAL | Age: 64
End: 2019-06-10

## 2019-06-10 RX ORDER — TIOTROPIUM BROMIDE 18 UG/1
CAPSULE ORAL; RESPIRATORY (INHALATION)
Qty: 30 CAPSULE | Refills: 6 | Status: SHIPPED | OUTPATIENT
Start: 2019-06-10 | End: 2020-02-06 | Stop reason: SDUPTHER

## 2019-06-10 NOTE — OUTREACH NOTE
Medical Week 1 Survey      Responses   Facility patient discharged from?  [facility]   Does the patient have one of the following disease processes/diagnoses(primary or secondary)?  Other   Is there a successful TCM telephone encounter documented?  No   Week 1 attempt successful?  Yes   Call start time  1440   Call end time  1441   Discharge diagnosis  ACS (acute coronary syndrome   Is patient permission given to speak with other caregiver?  Yes   List who call center can speak with  Teo Mast reviewed with patient/caregiver?  Yes   Is the patient having any side effects they believe may be caused by any medication additions or changes?  No   Does the patient have all medications ordered at discharge?  Yes   Is the patient taking all medications as directed (includes completed medication regime)?  Yes   Does the patient have a primary care provider?   Yes   Does the patient have an appointment with their PCP within 7 days of discharge?  Yes   Has the patient kept scheduled appointments due by today?  N/A   Psychosocial issues?  No   Did the patient receive a copy of their discharge instructions?  Yes   Nursing interventions  Reviewed instructions with patient   What is the patient's perception of their health status since discharge?  Improving   Is the patient/caregiver able to teach back signs and symptoms related to disease process for when to call PCP?  Yes   Is the patient/caregiver able to teach back signs and symptoms related to disease process for when to call 911?  Yes   Is the patient/caregiver able to teach back the hierarchy of who to call/visit for symptoms/problems? PCP, Specialist, Home health nurse, Urgent Care, ED, 911  Yes   Week 1 call completed?  Yes          Jenn Arzola RN

## 2019-06-12 RX ORDER — TIOTROPIUM BROMIDE 18 UG/1
CAPSULE ORAL; RESPIRATORY (INHALATION)
Qty: 30 CAPSULE | Refills: 0 | Status: SHIPPED | OUTPATIENT
Start: 2019-06-12 | End: 2019-06-24 | Stop reason: SDUPTHER

## 2019-06-17 ENCOUNTER — READMISSION MANAGEMENT (OUTPATIENT)
Dept: CALL CENTER | Facility: HOSPITAL | Age: 64
End: 2019-06-17

## 2019-06-17 ENCOUNTER — OFFICE VISIT (OUTPATIENT)
Dept: FAMILY MEDICINE CLINIC | Facility: CLINIC | Age: 64
End: 2019-06-17

## 2019-06-17 VITALS
OXYGEN SATURATION: 96 % | SYSTOLIC BLOOD PRESSURE: 104 MMHG | DIASTOLIC BLOOD PRESSURE: 60 MMHG | BODY MASS INDEX: 25.87 KG/M2 | WEIGHT: 146 LBS | HEIGHT: 63 IN

## 2019-06-17 DIAGNOSIS — K59.00 CONSTIPATION, UNSPECIFIED CONSTIPATION TYPE: ICD-10-CM

## 2019-06-17 DIAGNOSIS — R73.9 HYPERGLYCEMIA: ICD-10-CM

## 2019-06-17 DIAGNOSIS — J20.9 CHRONIC BRONCHITIS WITH ACUTE EXACERBATION (HCC): Primary | ICD-10-CM

## 2019-06-17 DIAGNOSIS — J42 CHRONIC BRONCHITIS WITH ACUTE EXACERBATION (HCC): Primary | ICD-10-CM

## 2019-06-17 PROCEDURE — 99213 OFFICE O/P EST LOW 20 MIN: CPT | Performed by: NURSE PRACTITIONER

## 2019-06-17 RX ORDER — METHYLPREDNISOLONE 4 MG/1
TABLET ORAL
Qty: 21 EACH | Refills: 0 | Status: SHIPPED | OUTPATIENT
Start: 2019-06-17 | End: 2019-06-24

## 2019-06-17 NOTE — PROGRESS NOTES
Chief Complaint   Patient presents with   • Viral Infection     1 week hospital check      Subjective   Monisha Brasher is a 64 y.o. female.     COPD   This is a new problem. The current episode started 1 to 4 weeks ago. The problem has been gradually improving. Associated symptoms include coughing, fatigue and nausea. Pertinent negatives include no abdominal pain, anorexia, arthralgias, change in bowel habit, chest pain, chills, congestion, diaphoresis, fever, headaches, joint swelling, myalgias, neck pain, numbness, rash, sore throat, swollen glands, urinary symptoms, vertigo, visual change, vomiting or weakness. Exacerbated by: hx of smoking  Treatments tried: iv steroids and antibiotics  The treatment provided significant relief.   Constipation   This is a recurrent problem. The current episode started 1 to 4 weeks ago. The problem has been waxing and waning since onset. The stool is described as firm. The patient is on a high fiber diet. She exercises regularly. There has been adequate water intake. Associated symptoms include nausea. Pertinent negatives include no abdominal pain, anorexia, diarrhea, fecal incontinence, fever, melena or vomiting. She has tried diet changes, fiber and stool softeners for the symptoms. The treatment provided mild relief. There is no history of abdominal surgery, endocrine disease, inflammatory bowel disease, irritable bowel syndrome, metabolic disease, neurologic disease, neuromuscular disease, psychiatric history or radiation treatment.        The following portions of the patient's history were reviewed and updated as appropriate: allergies, current medications, past social history and problem list.    Review of Systems   Constitutional: Positive for activity change, appetite change and fatigue. Negative for chills, diaphoresis, fever and unexpected weight change.   HENT: Negative for congestion and sore throat.    Eyes: Negative.  Negative for photophobia, redness and  "visual disturbance.   Respiratory: Positive for cough and wheezing. Negative for apnea, choking, chest tightness, shortness of breath and stridor.         Patient wanting to wean off steroids-on prednisone 20mg now and wanting to wean off due to hyperglycemia    Cardiovascular: Negative for chest pain, palpitations and leg swelling.   Gastrointestinal: Positive for constipation and nausea. Negative for abdominal pain, anorexia, blood in stool, change in bowel habit, diarrhea, melena and vomiting.   Endocrine: Negative.    Musculoskeletal: Negative.  Negative for arthralgias, joint swelling, myalgias and neck pain.   Skin: Negative.  Negative for rash.   Allergic/Immunologic: Negative.    Neurological: Negative.  Negative for vertigo, syncope, weakness, numbness and headaches.   Hematological: Negative.    Psychiatric/Behavioral: Negative.  Negative for agitation and behavioral problems.       Objective   /60   Ht 160 cm (63\")   Wt 66.2 kg (146 lb)   SpO2 96%   BMI 25.86 kg/m²   Physical Exam   Constitutional: She is oriented to person, place, and time. She appears well-developed and well-nourished. No distress.   HENT:   Head: Normocephalic and atraumatic.   Right Ear: External ear normal.   Left Ear: External ear normal.   Nose: Nose normal.   Mouth/Throat: Oropharynx is clear and moist. No oropharyngeal exudate.   Eyes: Conjunctivae and EOM are normal. Pupils are equal, round, and reactive to light. Right eye exhibits no discharge. Left eye exhibits no discharge. No scleral icterus.   Neck: Normal range of motion. Neck supple. No JVD present. No tracheal deviation present. No thyromegaly present.   Cardiovascular: Normal rate, regular rhythm, normal heart sounds and intact distal pulses. Exam reveals no gallop and no friction rub.   No murmur heard.  Pulmonary/Chest: Effort normal and breath sounds normal. No stridor. No respiratory distress. She has no wheezes. She has no rales. She exhibits no " tenderness.   No wheezing present    Abdominal: Soft. Bowel sounds are normal. She exhibits no distension and no mass. There is tenderness. There is no rebound and no guarding. No hernia.   Hypoactive bowel sounds    Musculoskeletal: Normal range of motion. She exhibits no edema, tenderness or deformity.   Lymphadenopathy:     She has no cervical adenopathy.   Neurological: She is alert and oriented to person, place, and time. She displays normal reflexes. No cranial nerve deficit or sensory deficit. She exhibits normal muscle tone. Coordination normal.   Skin: Skin is warm and dry. No rash noted. She is not diaphoretic. No erythema. No pallor.   Nursing note and vitals reviewed.      Assessment/Plan   Problem List Items Addressed This Visit        Respiratory    Chronic bronchitis with acute exacerbation (CMS/HCC) - Primary (Chronic)    Relevant Orders    CBC & Differential    Comprehensive Metabolic Panel    Hemoglobin A1c    Iron    TSH    Magnesium    Vitamin D 25 Hydroxy    Vitamin B12       Digestive    Constipation       Other    Hyperglycemia    Relevant Orders    CBC & Differential    Comprehensive Metabolic Panel    Hemoglobin A1c    Iron    TSH    Magnesium    Vitamin D 25 Hydroxy    Vitamin B12           New Medications Ordered This Visit   Medications   • methylPREDNISolone (MEDROL, MJ,) 4 MG tablet     Sig: Take as directed on package instructions.     Dispense:  21 each     Refill:  0   • Plecanatide 3 MG tablet     Sig: Take 1 tablet by mouth Daily.     Dispense:  30 tablet     Refill:  1      add trulance for constipation-medrol dose pack to help wean off the prednisone 20mg daily -labs next week-follow up if worsen patient agrees with plan of action

## 2019-06-17 NOTE — OUTREACH NOTE
Medical Week 2 Survey      Responses   Facility patient discharged from?  [facility]   Does the patient have one of the following disease processes/diagnoses(primary or secondary)?  Other   Week 2 attempt successful?  No   Unsuccessful attempts  Attempt 1          Tasneem Yun RN

## 2019-06-18 RX ORDER — ALBUTEROL SULFATE 2.5 MG/3ML
SOLUTION RESPIRATORY (INHALATION)
Qty: 360 ML | Refills: 5 | Status: SHIPPED | OUTPATIENT
Start: 2019-06-18 | End: 2019-10-28 | Stop reason: SDUPTHER

## 2019-06-19 RX ORDER — ALBUTEROL SULFATE 90 UG/1
AEROSOL, METERED RESPIRATORY (INHALATION)
Qty: 18 G | Refills: 5 | Status: SHIPPED | OUTPATIENT
Start: 2019-06-19 | End: 2019-06-24 | Stop reason: SDUPTHER

## 2019-06-20 ENCOUNTER — READMISSION MANAGEMENT (OUTPATIENT)
Dept: CALL CENTER | Facility: HOSPITAL | Age: 64
End: 2019-06-20

## 2019-06-20 NOTE — OUTREACH NOTE
Medical Week 2 Survey      Responses   Facility patient discharged from?  [facility]   Does the patient have one of the following disease processes/diagnoses(primary or secondary)?  Other   Week 2 attempt successful?  Yes   Call start time  1653   Discharge diagnosis  ACS (acute coronary syndrome   Call end time  1656   Is patient permission given to speak with other caregiver?  Yes   List who call center can speak with  Teo Mast reviewed with patient/caregiver?  Yes   Is the patient having any side effects they believe may be caused by any medication additions or changes?  No   Does the patient have all medications ordered at discharge?  Yes   Is the patient taking all medications as directed (includes completed medication regime)?  Yes   Does the patient have a primary care provider?   Yes   Does the patient have an appointment with their PCP within 7 days of discharge?  Yes   Has the patient kept scheduled appointments due by today?  Yes   Has home health visited the patient within 72 hours of discharge?  N/A   Did the patient receive a copy of their discharge instructions?  Yes   Nursing interventions  Reviewed instructions with patient   What is the patient's perception of their health status since discharge?  Improving   Is the patient/caregiver able to teach back signs and symptoms related to disease process for when to call PCP?  Yes   Is the patient/caregiver able to teach back signs and symptoms related to disease process for when to call 911?  Yes   Is the patient/caregiver able to teach back the hierarchy of who to call/visit for symptoms/problems? PCP, Specialist, Home health nurse, Urgent Care, ED, 911  Yes   Week 2 Call Completed?  Yes   Graduated  Yes   Did the patient feel the follow up calls were helpful during their recovery period?  Yes   Was the number of calls appropriate?  Yes          Iker Gipson RN

## 2019-06-24 ENCOUNTER — APPOINTMENT (OUTPATIENT)
Dept: LAB | Facility: HOSPITAL | Age: 64
End: 2019-06-24

## 2019-06-24 ENCOUNTER — OFFICE VISIT (OUTPATIENT)
Dept: PULMONOLOGY | Facility: CLINIC | Age: 64
End: 2019-06-24

## 2019-06-24 VITALS
BODY MASS INDEX: 26.22 KG/M2 | WEIGHT: 148 LBS | DIASTOLIC BLOOD PRESSURE: 78 MMHG | OXYGEN SATURATION: 92 % | HEART RATE: 97 BPM | SYSTOLIC BLOOD PRESSURE: 121 MMHG | HEIGHT: 63 IN

## 2019-06-24 DIAGNOSIS — J42 CHRONIC BRONCHITIS WITH ACUTE EXACERBATION (HCC): Primary | Chronic | ICD-10-CM

## 2019-06-24 DIAGNOSIS — F41.9 ANXIETY: ICD-10-CM

## 2019-06-24 DIAGNOSIS — J20.9 CHRONIC BRONCHITIS WITH ACUTE EXACERBATION (HCC): Primary | Chronic | ICD-10-CM

## 2019-06-24 LAB
25(OH)D3 SERPL-MCNC: 34.4 NG/ML (ref 30–100)
ALBUMIN SERPL-MCNC: 3.9 G/DL (ref 3.5–5.2)
ALBUMIN/GLOB SERPL: 1.4 G/DL
ALP SERPL-CCNC: 70 U/L (ref 39–117)
ALT SERPL W P-5'-P-CCNC: 25 U/L (ref 1–33)
ANION GAP SERPL CALCULATED.3IONS-SCNC: 14.5 MMOL/L
AST SERPL-CCNC: 16 U/L (ref 1–32)
BASOPHILS # BLD AUTO: 0.05 10*3/MM3 (ref 0–0.2)
BASOPHILS NFR BLD AUTO: 0.5 % (ref 0–1.5)
BILIRUB SERPL-MCNC: 0.6 MG/DL (ref 0.2–1.2)
BUN BLD-MCNC: 12 MG/DL (ref 8–23)
BUN/CREAT SERPL: 15.8 (ref 7–25)
CALCIUM SPEC-SCNC: 9.3 MG/DL (ref 8.6–10.5)
CHLORIDE SERPL-SCNC: 96 MMOL/L (ref 98–107)
CO2 SERPL-SCNC: 27.5 MMOL/L (ref 22–29)
CREAT BLD-MCNC: 0.76 MG/DL (ref 0.57–1)
DEPRECATED RDW RBC AUTO: 52.1 FL (ref 37–54)
EOSINOPHIL # BLD AUTO: 0.41 10*3/MM3 (ref 0–0.4)
EOSINOPHIL NFR BLD AUTO: 4.2 % (ref 0.3–6.2)
ERYTHROCYTE [DISTWIDTH] IN BLOOD BY AUTOMATED COUNT: 16.3 % (ref 12.3–15.4)
GFR SERPL CREATININE-BSD FRML MDRD: 77 ML/MIN/1.73
GLOBULIN UR ELPH-MCNC: 2.7 GM/DL
GLUCOSE BLD-MCNC: 81 MG/DL (ref 65–99)
HBA1C MFR BLD: 6.83 % (ref 4.8–5.6)
HCT VFR BLD AUTO: 43.2 % (ref 34–46.6)
HGB BLD-MCNC: 14.2 G/DL (ref 12–15.9)
IMM GRANULOCYTES # BLD AUTO: 0.07 10*3/MM3 (ref 0–0.05)
IMM GRANULOCYTES NFR BLD AUTO: 0.7 % (ref 0–0.5)
IRON 24H UR-MRATE: 69 MCG/DL (ref 37–145)
LYMPHOCYTES # BLD AUTO: 1.95 10*3/MM3 (ref 0.7–3.1)
LYMPHOCYTES NFR BLD AUTO: 19.9 % (ref 19.6–45.3)
MAGNESIUM SERPL-MCNC: 2.2 MG/DL (ref 1.6–2.4)
MCH RBC QN AUTO: 28.9 PG (ref 26.6–33)
MCHC RBC AUTO-ENTMCNC: 32.9 G/DL (ref 31.5–35.7)
MCV RBC AUTO: 88 FL (ref 79–97)
MONOCYTES # BLD AUTO: 0.82 10*3/MM3 (ref 0.1–0.9)
MONOCYTES NFR BLD AUTO: 8.4 % (ref 5–12)
NEUTROPHILS # BLD AUTO: 6.49 10*3/MM3 (ref 1.7–7)
NEUTROPHILS NFR BLD AUTO: 66.3 % (ref 42.7–76)
NRBC BLD AUTO-RTO: 0 /100 WBC (ref 0–0.2)
PLATELET # BLD AUTO: 218 10*3/MM3 (ref 140–450)
PMV BLD AUTO: 9.7 FL (ref 6–12)
POTASSIUM BLD-SCNC: 4 MMOL/L (ref 3.5–5.2)
PROT SERPL-MCNC: 6.6 G/DL (ref 6–8.5)
RBC # BLD AUTO: 4.91 10*6/MM3 (ref 3.77–5.28)
SODIUM BLD-SCNC: 138 MMOL/L (ref 136–145)
TSH SERPL DL<=0.05 MIU/L-ACNC: 3.02 MIU/ML (ref 0.27–4.2)
VIT B12 BLD-MCNC: 422 PG/ML (ref 211–946)
WBC NRBC COR # BLD: 9.79 10*3/MM3 (ref 3.4–10.8)

## 2019-06-24 PROCEDURE — 36415 COLL VENOUS BLD VENIPUNCTURE: CPT | Performed by: NURSE PRACTITIONER

## 2019-06-24 PROCEDURE — 82306 VITAMIN D 25 HYDROXY: CPT | Performed by: NURSE PRACTITIONER

## 2019-06-24 PROCEDURE — 84443 ASSAY THYROID STIM HORMONE: CPT | Performed by: NURSE PRACTITIONER

## 2019-06-24 PROCEDURE — 80053 COMPREHEN METABOLIC PANEL: CPT | Performed by: NURSE PRACTITIONER

## 2019-06-24 PROCEDURE — 83036 HEMOGLOBIN GLYCOSYLATED A1C: CPT | Performed by: NURSE PRACTITIONER

## 2019-06-24 PROCEDURE — 83540 ASSAY OF IRON: CPT | Performed by: NURSE PRACTITIONER

## 2019-06-24 PROCEDURE — 85025 COMPLETE CBC W/AUTO DIFF WBC: CPT | Performed by: NURSE PRACTITIONER

## 2019-06-24 PROCEDURE — 99213 OFFICE O/P EST LOW 20 MIN: CPT | Performed by: INTERNAL MEDICINE

## 2019-06-24 PROCEDURE — 83735 ASSAY OF MAGNESIUM: CPT | Performed by: NURSE PRACTITIONER

## 2019-06-24 PROCEDURE — 82607 VITAMIN B-12: CPT | Performed by: NURSE PRACTITIONER

## 2019-06-24 NOTE — PROGRESS NOTES
"This lady has advanced COPD and anxiety.  She was hospitalized recently for pneumonia and respiratory failure and is improving.  She is taking a considerable amount of steroid recently and complains of weight gain and fluid gain    ROS    Constitutional-no night sweats weight loss headaches  GI no abdominal pain nausea or diarrhea  Neuro no seizure or neurologic deficits  Musculoskeletal no deformity or joint pain   no dysuria or hematuria  Skin no rash or other lesions  All other systems reviewed and were negative except for the above.      Physical Exam  /78   Pulse 97   Ht 160 cm (63\")   Wt 67.1 kg (148 lb)   SpO2 92%   BMI 26.22 kg/m²   Vital signs as above  Pupils equally round and reactive to light and accommodation, neck no JVD or adenopathy.  Cardiovascular regular rhythm and rate no murmur or gallop.  Abdomen soft no organomegaly tenderness.  Extremities no clubbing cyanosis or edema.  No cervical adenopathy.  No skin rash.  Neurologic good strength bilaterally without deficits  She is alert afebrile mildly dyspneic, cushingoid.    Impression recent COPD exacerbation with steroid excess, anxiety    Plan refill Xanax 1 time, continue present medications, wean steroid, return in 2 weeks        This document has been produced with the assistance of Dragon dictation  This document has been electronically signed by Gigi Ac MD on June 24, 2019 11:04 AM      "

## 2019-06-26 ENCOUNTER — TELEPHONE (OUTPATIENT)
Dept: FAMILY MEDICINE CLINIC | Facility: CLINIC | Age: 64
End: 2019-06-26

## 2019-06-26 NOTE — PROGRESS NOTES
Per HEATHER Burns, Ms. Brasher has been called with recent lab results & recommendations.  Continue current medications and follow-up as planned or sooner if any problems.

## 2019-06-26 NOTE — TELEPHONE ENCOUNTER
-Per HEATHER Burns, Ms. Brasher has been called with recent lab results & recommendations.  Continue current medications and follow-up as planned or sooner if any problems.      ---- Message from HEATHER Sams sent at 6/26/2019  8:43 AM CDT -----  Can you let her know normal except a1c is a little high but otherwise good

## 2019-07-10 ENCOUNTER — OFFICE VISIT (OUTPATIENT)
Dept: PULMONOLOGY | Facility: CLINIC | Age: 64
End: 2019-07-10

## 2019-07-10 VITALS
HEIGHT: 65 IN | DIASTOLIC BLOOD PRESSURE: 97 MMHG | BODY MASS INDEX: 24.66 KG/M2 | SYSTOLIC BLOOD PRESSURE: 172 MMHG | WEIGHT: 148 LBS | OXYGEN SATURATION: 84 % | HEART RATE: 112 BPM | TEMPERATURE: 98 F

## 2019-07-10 DIAGNOSIS — J43.1 PANLOBULAR EMPHYSEMA (HCC): Primary | ICD-10-CM

## 2019-07-10 PROCEDURE — 96372 THER/PROPH/DIAG INJ SC/IM: CPT | Performed by: INTERNAL MEDICINE

## 2019-07-10 PROCEDURE — 99213 OFFICE O/P EST LOW 20 MIN: CPT | Performed by: INTERNAL MEDICINE

## 2019-07-10 RX ORDER — IPRATROPIUM BROMIDE AND ALBUTEROL SULFATE 2.5; .5 MG/3ML; MG/3ML
3 SOLUTION RESPIRATORY (INHALATION) EVERY 4 HOURS PRN
Qty: 270 ML | Refills: 5 | Status: SHIPPED | OUTPATIENT
Start: 2019-07-10 | End: 2019-08-09 | Stop reason: SDUPTHER

## 2019-07-10 RX ORDER — METHYLPREDNISOLONE ACETATE 40 MG/ML
80 INJECTION, SUSPENSION INTRA-ARTICULAR; INTRALESIONAL; INTRAMUSCULAR; SOFT TISSUE ONCE
Status: COMPLETED | OUTPATIENT
Start: 2019-07-10 | End: 2019-07-10

## 2019-07-10 RX ORDER — METHYLPREDNISOLONE 8 MG/1
16 TABLET ORAL DAILY
Qty: 30 TABLET | Refills: 0 | Status: SHIPPED | OUTPATIENT
Start: 2019-07-10 | End: 2019-08-09 | Stop reason: SDUPTHER

## 2019-07-10 RX ADMIN — METHYLPREDNISOLONE ACETATE 80 MG: 40 INJECTION, SUSPENSION INTRA-ARTICULAR; INTRALESIONAL; INTRAMUSCULAR; SOFT TISSUE at 11:26

## 2019-07-10 NOTE — PROGRESS NOTES
"This lady has emphysema chronic bronchitis and asthma.  She has been weaning off steroids and now notices increased anxiety shortness of breath cough and wheeze and she is not using discolored sputum    ROS    Constitutional-no night sweats weight loss headaches  GI no abdominal pain nausea or diarrhea  Neuro no seizure or neurologic deficits  Musculoskeletal no deformity or joint pain   no dysuria or hematuria  Skin no rash or other lesions  All other systems reviewed and were negative except for the above.      Physical Exam  /97   Pulse 112   Temp 98 °F (36.7 °C)   Ht 165.1 cm (65\")   Wt 67.1 kg (148 lb)   SpO2 (!) 84%   BMI 24.63 kg/m²   Vital signs as above  Pupils equally round and reactive to light and accommodation, neck no JVD or adenopathy.  Cardiovascular regular rhythm and rate no murmur or gallop.  Abdomen soft no organomegaly tenderness.  Extremities no clubbing cyanosis or edema.  No cervical adenopathy.  No skin rash.  Neurologic good strength bilaterally without deficits  Lungs reveal diminished breath sounds without wheeze patient is anxious and tremulous    Impression COPD, steroid withdrawal    Medrol  16 mg mg a day to be tapered, return in a few weeks, continue present medications        This document has been produced with the assistance of Dragon dictation  This document has been electronically signed by Gigi Ac MD on July 10, 2019 11:23 AM      "

## 2019-08-08 ENCOUNTER — OFFICE VISIT (OUTPATIENT)
Dept: FAMILY MEDICINE CLINIC | Facility: CLINIC | Age: 64
End: 2019-08-08

## 2019-08-08 VITALS
DIASTOLIC BLOOD PRESSURE: 82 MMHG | BODY MASS INDEX: 24.66 KG/M2 | HEIGHT: 65 IN | SYSTOLIC BLOOD PRESSURE: 120 MMHG | WEIGHT: 148 LBS

## 2019-08-08 DIAGNOSIS — S51.012A SKIN TEAR OF LEFT ELBOW WITHOUT COMPLICATION, INITIAL ENCOUNTER: ICD-10-CM
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
## 2019-08-08 DIAGNOSIS — F41.9 ANXIETY: ICD-10-CM

## 2019-08-08 DIAGNOSIS — I10 ESSENTIAL HYPERTENSION: Primary | Chronic | ICD-10-CM

## 2019-08-08 PROCEDURE — 99214 OFFICE O/P EST MOD 30 MIN: CPT | Performed by: NURSE PRACTITIONER

## 2019-08-08 RX ORDER — ALPRAZOLAM 0.25 MG/1
0.25 TABLET ORAL 3 TIMES DAILY PRN
Qty: 90 TABLET | Refills: 0 | Status: SHIPPED | OUTPATIENT
Start: 2019-08-08 | End: 2019-11-06 | Stop reason: SDUPTHER

## 2019-08-08 NOTE — PROGRESS NOTES
Chief Complaint   Patient presents with   • Chronic bronchitis     3 month check up    • Hypertension   • Anxiety   • Med Refill     bactroban     Subjective   Monisha Brasher is a 64 y.o. female.     Hypertension   This is a chronic problem. The current episode started more than 1 year ago. The problem has been resolved since onset. The problem is controlled. Associated symptoms include anxiety and malaise/fatigue. Pertinent negatives include no blurred vision, chest pain, headaches, neck pain, orthopnea, palpitations, peripheral edema, PND or shortness of breath. Current antihypertension treatment includes lifestyle changes. The current treatment provides significant improvement. There is no history of chronic renal disease, coarctation of the aorta, hyperaldosteronism, hypercortisolism, hyperparathyroidism, a hypertension causing med, pheochromocytoma, renovascular disease, sleep apnea or a thyroid problem.   Anxiety   Presents for follow-up visit. Symptoms include decreased concentration, excessive worry, irritability and nausea. Patient reports no chest pain, hyperventilation, impotence, nervous/anxious behavior, palpitations or shortness of breath. Symptoms occur most days. The quality of sleep is good. Nighttime awakenings: none.     There is no history of asthma. Compliance with medications is %.   Rash   This is a chronic problem. The current episode started yesterday. The problem has been gradually worsening since onset. Location: left arm  The rash is characterized by redness. Associated with: dog scratch  Associated symptoms include coughing and fatigue. Pertinent negatives include no congestion, diarrhea, eye pain, fever, joint pain, nail changes, shortness of breath, sore throat or vomiting. Past treatments include antibiotic cream. The treatment provided mild relief. There is no history of allergies, asthma, eczema or varicella.        The following portions of the patient's history were  "reviewed and updated as appropriate: allergies, current medications, past social history and problem list.    Review of Systems   Constitutional: Positive for activity change, appetite change, fatigue, irritability and malaise/fatigue. Negative for chills, diaphoresis, fever and unexpected weight change.   HENT: Negative for congestion and sore throat.    Eyes: Negative.  Negative for blurred vision, photophobia, pain, redness and visual disturbance.   Respiratory: Positive for cough and wheezing. Negative for apnea, choking, chest tightness, shortness of breath and stridor.         Patient wanting to wean off steroids-on prednisone 20mg now and wanting to wean off due to hyperglycemia    Cardiovascular: Negative for chest pain, palpitations, orthopnea, leg swelling and PND.   Gastrointestinal: Positive for constipation and nausea. Negative for abdominal pain, blood in stool, diarrhea, rectal pain and vomiting.   Endocrine: Positive for cold intolerance. Negative for heat intolerance, polydipsia, polyphagia and polyuria.   Genitourinary: Negative for impotence.   Musculoskeletal: Negative.  Negative for arthralgias, back pain, gait problem, joint pain, joint swelling, myalgias, neck pain and neck stiffness.   Skin: Negative.  Negative for color change, nail changes, pallor and rash.        Skin tear left arm    Allergic/Immunologic: Negative.  Negative for environmental allergies, food allergies and immunocompromised state.   Neurological: Negative.  Negative for syncope, weakness, numbness and headaches.   Hematological: Negative.  Negative for adenopathy. Does not bruise/bleed easily.   Psychiatric/Behavioral: Positive for decreased concentration. Negative for agitation, behavioral problems, dysphoric mood and hallucinations. The patient is not nervous/anxious and is not hyperactive.        Objective   /82   Ht 165.1 cm (65\")   Wt 67.1 kg (148 lb)   BMI 24.63 kg/m²   Physical Exam   Constitutional: She is " oriented to person, place, and time. She appears well-developed and well-nourished. No distress.   HENT:   Head: Normocephalic and atraumatic.   Right Ear: External ear normal.   Left Ear: External ear normal.   Nose: Nose normal.   Mouth/Throat: Oropharynx is clear and moist. No oropharyngeal exudate.   Eyes: Conjunctivae and EOM are normal. Pupils are equal, round, and reactive to light. Right eye exhibits no discharge. Left eye exhibits no discharge. No scleral icterus.   Neck: Normal range of motion. Neck supple. No JVD present. No tracheal deviation present. No thyromegaly present.   Cardiovascular: Normal rate, regular rhythm, normal heart sounds and intact distal pulses. Exam reveals no gallop and no friction rub.   No murmur heard.  Pulmonary/Chest: Effort normal and breath sounds normal. No stridor. No respiratory distress. She has no wheezes. She has no rales. She exhibits no tenderness.   No wheezing present    Abdominal: Soft. Bowel sounds are normal. She exhibits no distension and no mass. There is tenderness. There is no rebound and no guarding. No hernia.   Hypoactive bowel sounds    Musculoskeletal: Normal range of motion. She exhibits no edema, tenderness or deformity.   Lymphadenopathy:     She has no cervical adenopathy.   Neurological: She is alert and oriented to person, place, and time. She displays normal reflexes. No cranial nerve deficit or sensory deficit. She exhibits normal muscle tone. Coordination normal.   Skin: Skin is warm and dry. No rash noted. She is not diaphoretic. No erythema. No pallor.   Skin tear left arm    Nursing note and vitals reviewed.      Assessment/Plan   Problem List Items Addressed This Visit        Cardiovascular and Mediastinum    Hypertension - Primary (Chronic)       Musculoskeletal and Integument    Skin tear of left elbow without complication    Relevant Medications    mupirocin (BACTROBAN) 2 % ointment    Dog scratch       Other    Anxiety           New  Medications Ordered This Visit   Medications   • mupirocin (BACTROBAN) 2 % ointment     Sig: Apply  topically to the appropriate area as directed 3 (Three) Times a Day.     Dispense:  30 g     Refill:  0   • ALPRAZolam (XANAX) 0.25 MG tablet     Sig: Take 1 tablet by mouth 3 (Three) Times a Day As Needed for Anxiety.     Dispense:  90 tablet     Refill:  0       It's not just what you eat, but when you eat  Eat breakfast, and eat smaller meals throughout the day. A healthy breakfast can jumpstart your metabolism, while eating small, healthy meals (rather than the standard three large meals) keeps your energy up.   Avoid eating at night. Try to eat dinner earlier and fast for 14-16 hours until breakfast the next morning. Studies suggest that eating only when you’re most active and giving your digestive system a long break each day may help to regulate weight.     Patient understands the risks associated with this controlled medication, including tolerance and addiction.  she also agrees to only obtain this medication from me, and not from a another provider, unless that provider is covering for me in my absence.  she also agrees to be compliant in dosing, and not self adjust the dose of medication.  A signed controlled substance agreement is on file, and she has received a controlled substance education sheet at this a previous visit.  she has also signed a consent for treatment with a controlled substance as per [facility] policy. KAYLAN was obtained.

## 2019-08-09 ENCOUNTER — OFFICE VISIT (OUTPATIENT)
Dept: PULMONOLOGY | Facility: CLINIC | Age: 64
End: 2019-08-09

## 2019-08-09 VITALS
SYSTOLIC BLOOD PRESSURE: 137 MMHG | HEART RATE: 83 BPM | OXYGEN SATURATION: 93 % | BODY MASS INDEX: 26.52 KG/M2 | HEIGHT: 63 IN | DIASTOLIC BLOOD PRESSURE: 75 MMHG | WEIGHT: 149.7 LBS

## 2019-08-09 DIAGNOSIS — J45.40 MODERATE PERSISTENT ASTHMA WITHOUT COMPLICATION: ICD-10-CM

## 2019-08-09 DIAGNOSIS — J43.1 PANLOBULAR EMPHYSEMA (HCC): Primary | ICD-10-CM

## 2019-08-09 PROCEDURE — 99213 OFFICE O/P EST LOW 20 MIN: CPT | Performed by: INTERNAL MEDICINE

## 2019-08-09 RX ORDER — TIOTROPIUM BROMIDE 18 UG/1
CAPSULE ORAL; RESPIRATORY (INHALATION)
Qty: 30 CAPSULE | Refills: 5 | Status: SHIPPED | OUTPATIENT
Start: 2019-08-09 | End: 2019-10-02 | Stop reason: SDUPTHER

## 2019-08-09 RX ORDER — METHYLPREDNISOLONE 8 MG/1
16 TABLET ORAL DAILY
Qty: 30 TABLET | Refills: 0 | Status: SHIPPED | OUTPATIENT
Start: 2019-08-09 | End: 2019-08-12 | Stop reason: SDUPTHER

## 2019-08-09 NOTE — PROGRESS NOTES
"This lady has COPD with components of emphysema asthma and chronic bronchitis.  She takes multiple medications for breathing and has been on substantial doses of steroids and is now being weaned off steroids.  Her breathing is doing well at this time.  She continues to have chronic dyspnea on exertion    ROS    Constitutional-no night sweats weight loss headaches  GI no abdominal pain nausea or diarrhea  Neuro no seizure or neurologic deficits  Musculoskeletal no deformity or joint pain   no dysuria or hematuria  Skin no rash or other lesions  All other systems reviewed and were negative except for the above.      Physical Exam  /75   Pulse 83   Ht 160 cm (63\")   Wt 67.9 kg (149 lb 11.2 oz)   SpO2 93%   BMI 26.52 kg/m²   Vital signs as above  Pupils equally round and reactive to light and accommodation, neck no JVD or adenopathy.  Cardiovascular regular rhythm and rate no murmur or gallop.  Abdomen soft no organomegaly tenderness.  Extremities no clubbing cyanosis or edema.  No cervical adenopathy.  No skin rash.  Neurologic good strength bilaterally without deficits  Alert afebrile lungs diminished breath sounds prolonged expiration without wheeze    Impression COPD with emphysema asthma and chronic bronchitis    Plan continue steroid wean, return in 1 month        This document has been produced with the assistance of Dragon dictation  This document has been electronically signed by Gigi Ac MD on August 9, 2019 11:25 AM      "

## 2019-08-12 RX ORDER — METHYLPREDNISOLONE 8 MG/1
16 TABLET ORAL DAILY
Qty: 60 TABLET | Refills: 0 | Status: SHIPPED | OUTPATIENT
Start: 2019-08-12 | End: 2019-11-21

## 2019-08-12 RX ORDER — METHYLPREDNISOLONE 8 MG/1
16 TABLET ORAL DAILY
Qty: 30 TABLET | Refills: 0 | Status: SHIPPED | OUTPATIENT
Start: 2019-08-12 | End: 2019-08-12 | Stop reason: SDUPTHER

## 2019-08-21 ENCOUNTER — TELEPHONE (OUTPATIENT)
Dept: PULMONOLOGY | Facility: CLINIC | Age: 64
End: 2019-08-21

## 2019-08-21 RX ORDER — AZITHROMYCIN 250 MG/1
TABLET, FILM COATED ORAL
Qty: 6 TABLET | Refills: 0 | Status: SHIPPED | OUTPATIENT
Start: 2019-08-21 | End: 2019-10-02

## 2019-08-21 NOTE — TELEPHONE ENCOUNTER
Script for z pac sent to [facility] per Dr. Ac        ----- Message from Lary Atkinson sent at 8/21/2019  2:04 PM CDT -----  Contact: [phone]  She called this morning and hated to call back, but she has a family emergency and may have to leave town in the morning. She was asking about a refill(?) of antibiotic  [facility]

## 2019-08-21 NOTE — TELEPHONE ENCOUNTER
azythromicin (zpac) 250mg take 2 pills today and 1 pill daily for 4 days no refills to [facility]  Per Dr. Del Castillo        ----- Message from Brittny Joe sent at 8/21/2019 10:29 AM CDT -----  Regarding: call   Contact: [phone]  Patient needs dr del castillo to send an antibiotic to [facility]     please call [phone]    Thank you

## 2019-09-27 RX ORDER — BUDESONIDE AND FORMOTEROL FUMARATE DIHYDRATE 160; 4.5 UG/1; UG/1
AEROSOL RESPIRATORY (INHALATION)
Qty: 10.2 G | Refills: 5 | Status: SHIPPED | OUTPATIENT
Start: 2019-09-27 | End: 2020-07-27 | Stop reason: SDUPTHER

## 2019-10-02 ENCOUNTER — OFFICE VISIT (OUTPATIENT)
Dept: FAMILY MEDICINE CLINIC | Facility: CLINIC | Age: 64
End: 2019-10-02

## 2019-10-02 VITALS
SYSTOLIC BLOOD PRESSURE: 118 MMHG | BODY MASS INDEX: 26.93 KG/M2 | DIASTOLIC BLOOD PRESSURE: 80 MMHG | OXYGEN SATURATION: 96 % | WEIGHT: 152 LBS | HEIGHT: 63 IN

## 2019-10-02 DIAGNOSIS — I10 ESSENTIAL HYPERTENSION: Chronic | ICD-10-CM

## 2019-10-02 DIAGNOSIS — R05.9 COUGH: ICD-10-CM

## 2019-10-02 DIAGNOSIS — R22.31 AXILLARY LUMP, RIGHT: Primary | ICD-10-CM

## 2019-10-02 PROCEDURE — 99214 OFFICE O/P EST MOD 30 MIN: CPT | Performed by: NURSE PRACTITIONER

## 2019-10-02 NOTE — PROGRESS NOTES
Chief Complaint   Patient presents with   • Arm Pain     under arm pain right side x 2 weeks   • Cough   • Hypertension     follow up and talk about the prednisone     Subjective   Monisha Brasher is a 64 y.o. female.     Arm Pain    The incident occurred more than 1 week ago. There was no injury mechanism. The quality of the pain is described as cramping. The pain is at a severity of 2/10. The pain is mild. The pain has been fluctuating since the incident. Pertinent negatives include no chest pain, muscle weakness, numbness or tingling. The symptoms are aggravated by movement. She has tried NSAIDs for the symptoms. The treatment provided mild relief.   Cough   This is a recurrent problem. The current episode started 1 to 4 weeks ago. The problem has been gradually worsening. Associated symptoms include wheezing. Pertinent negatives include no chest pain, chills, ear pain, eye redness, fever, headaches, myalgias, rash, sore throat, shortness of breath or sweats. She has tried oral steroids for the symptoms. The treatment provided mild relief. Her past medical history is significant for asthma, bronchitis, COPD and pneumonia. There is no history of emphysema or environmental allergies.   Hypertension   This is a recurrent problem. The current episode started 1 to 4 weeks ago. The problem has been gradually worsening since onset. The problem is controlled. Associated symptoms include malaise/fatigue. Pertinent negatives include no anxiety, blurred vision, chest pain, headaches, neck pain, orthopnea, palpitations, peripheral edema, PND, shortness of breath or sweats. Risk factors for coronary artery disease include sedentary lifestyle. Past treatments include ACE inhibitors. Current antihypertension treatment includes beta blockers, lifestyle changes and calcium channel blockers. The current treatment provides significant improvement. There is no history of angina, kidney disease, CAD/MI, CVA, heart failure,  left ventricular hypertrophy or retinopathy.        The following portions of the patient's history were reviewed and updated as appropriate: allergies, current medications, past social history and problem list.    Review of Systems   Constitutional: Positive for activity change, appetite change, fatigue and malaise/fatigue. Negative for chills, diaphoresis, fever and unexpected weight change.   HENT: Negative for congestion, dental problem, drooling, ear discharge, ear pain and sore throat.    Eyes: Negative.  Negative for blurred vision, photophobia, pain, redness, itching and visual disturbance.   Respiratory: Positive for cough and wheezing. Negative for apnea, choking, chest tightness, shortness of breath and stridor.         Right sided chest wall pain with deep breath and right axillary swelling    Cardiovascular: Negative for chest pain, palpitations, orthopnea, leg swelling and PND.   Gastrointestinal: Positive for constipation and nausea. Negative for abdominal distention, abdominal pain, anal bleeding, blood in stool, diarrhea, rectal pain and vomiting.   Endocrine: Positive for cold intolerance. Negative for heat intolerance, polydipsia, polyphagia and polyuria.   Genitourinary: Negative.    Musculoskeletal: Negative.  Negative for arthralgias, back pain, gait problem, joint swelling, myalgias, neck pain and neck stiffness.   Skin: Negative.  Negative for color change, pallor and rash.        Skin tear left arm    Allergic/Immunologic: Negative.  Negative for environmental allergies, food allergies and immunocompromised state.   Neurological: Negative.  Negative for tingling, syncope, weakness, numbness and headaches.   Hematological: Negative.  Negative for adenopathy. Does not bruise/bleed easily.   Psychiatric/Behavioral: Positive for decreased concentration. Negative for agitation, behavioral problems, confusion, dysphoric mood, hallucinations, self-injury and sleep disturbance. The patient is not  "nervous/anxious and is not hyperactive.        Objective   /80   Ht 160 cm (63\")   Wt 68.9 kg (152 lb)   SpO2 96%   BMI 26.93 kg/m²   Physical Exam   Constitutional: She is oriented to person, place, and time. She appears well-developed and well-nourished. No distress.   HENT:   Head: Normocephalic and atraumatic.   Right Ear: External ear normal.   Left Ear: External ear normal.   Nose: Nose normal.   Mouth/Throat: Oropharynx is clear and moist. No oropharyngeal exudate.   Swelling right axillary area tender to touch-sore to touch -mammogram in march    Eyes: Conjunctivae and EOM are normal. Pupils are equal, round, and reactive to light. Right eye exhibits no discharge. Left eye exhibits no discharge. No scleral icterus.   Neck: Normal range of motion. Neck supple. No JVD present. No tracheal deviation present. No thyromegaly present.   Cardiovascular: Normal rate, regular rhythm, normal heart sounds and intact distal pulses. Exam reveals no gallop and no friction rub.   No murmur heard.  Pulmonary/Chest: Effort normal. No stridor. No respiratory distress. She has wheezes. She has no rales. She exhibits no tenderness.   No wheezing present    Abdominal: Soft. Bowel sounds are normal. She exhibits no distension and no mass. There is tenderness. There is no rebound and no guarding. No hernia.   Hypoactive bowel sounds    Musculoskeletal: Normal range of motion. She exhibits no edema, tenderness or deformity.   Lymphadenopathy:     She has no cervical adenopathy.   Neurological: She is alert and oriented to person, place, and time. She displays normal reflexes. No cranial nerve deficit or sensory deficit. She exhibits normal muscle tone. Coordination normal.   Skin: Skin is warm and dry. No rash noted. She is not diaphoretic. No erythema. No pallor.   Skin tear left arm    Nursing note and vitals reviewed.    Status:  Final result   Visible to patient:  No (Not Released) Dx:  Cough   Details     Reading " Physician Reading Date Result Priority   Inna Callahan MD 10/2/2019       Narrative     TWO VIEW CHEST    HISTORY: Cough    Frontal and lateral films of the chest were obtained.    COMPARISON: Betty 3, 2019    FINDINGS:    Chronic obstructive pulmonary disease.  Linear atelectasis or scar right lung base.  The heart is not enlarged.  The pulmonary vasculature is not increased.  No pleural effusion.  No pneumothorax.  No acute osseous abnormality.  Degenerative changes are present in the thoracic spine.  Old left rib fractures.      Impression     CONCLUSION:  Chronic obstructive pulmonary disease.  Linear atelectasis or scar right lung base.    [id number]             Assessment/Plan   Problem List Items Addressed This Visit        Cardiovascular and Mediastinum    Hypertension (Chronic)       Respiratory    Cough    Relevant Orders    XR Chest PA & Lateral (Completed)       Other    Axillary lump, right - Primary    Relevant Orders    US Axilla Right         No orders of the defined types were placed in this encounter.     us of right axillary as directed, chest xray today, monitor blood pressure, meds as directed  Wean off of steroids due to side effects.     Decrease caffeine as directed-us of axillary area

## 2019-10-07 ENCOUNTER — HOSPITAL ENCOUNTER (OUTPATIENT)
Dept: ULTRASOUND IMAGING | Facility: HOSPITAL | Age: 64
Discharge: HOME | End: 2019-10-07
Admitting: NURSE PRACTITIONER

## 2019-10-07 PROCEDURE — 76882 US LMTD JT/FCL EVL NVASC XTR: CPT

## 2019-10-08 ENCOUNTER — TELEPHONE (OUTPATIENT)
Dept: PULMONOLOGY | Facility: CLINIC | Age: 64
End: 2019-10-08

## 2019-10-08 ENCOUNTER — TELEPHONE (OUTPATIENT)
Dept: FAMILY MEDICINE CLINIC | Facility: CLINIC | Age: 64
End: 2019-10-08

## 2019-10-08 RX ORDER — AZITHROMYCIN 250 MG/1
TABLET, FILM COATED ORAL
Qty: 6 TABLET | Refills: 0 | Status: SHIPPED | OUTPATIENT
Start: 2019-10-08 | End: 2019-11-21

## 2019-10-08 NOTE — TELEPHONE ENCOUNTER
Sent script for z pac 250 mg  No refills to [facility] per Dr. Kat          ---- Message from Celia Connors sent at 10/8/2019  3:12 PM CDT -----  Regarding: REFILL  Contact: [phone]  GONZALEZ KABA 59 CALLED AND WANT DR KAT TO CALL IN AN ANTIBIOTIC FOR HER,SHE WANT A CALL BACK @ [phone]. THXS

## 2019-10-08 NOTE — PROGRESS NOTES
Per HEATHER Burns, Ms. Brasher has been called with recent Right Axillary US results & recommendations.  Continue current medications and follow-up as planned or sooner if any problems.

## 2019-10-08 NOTE — TELEPHONE ENCOUNTER
-Per HEATHER Burns, Ms. Brasher has been called with recent Right Axillary US results & recommendations.  Continue current medications and follow-up as planned or sooner if any problems.        ---- Message from HEATHER Sams sent at 10/8/2019  2:22 PM CDT -----  Regarding: FW:  Can you let her know normal please no mass  ----- Message -----  From: Armando Rad Results [place] In  Sent: 10/8/2019   1:39 PM  To: HEATHER Sams

## 2019-10-23 RX ORDER — METHYLPREDNISOLONE 8 MG/1
8 TABLET ORAL DAILY
Qty: 21 TABLET | Refills: 0 | Status: SHIPPED | OUTPATIENT
Start: 2019-10-23 | End: 2019-12-12 | Stop reason: SDUPTHER

## 2019-10-28 RX ORDER — ALBUTEROL SULFATE 90 UG/1
AEROSOL, METERED RESPIRATORY (INHALATION)
Qty: 6.7 G | Refills: 5 | Status: SHIPPED | OUTPATIENT
Start: 2019-10-28 | End: 2019-12-09 | Stop reason: CLARIF

## 2019-11-05 NOTE — TELEPHONE ENCOUNTER
Ms Tiffanie. Monisha Brasher is requesting a refill on her Xanax 0.25 mg #90  Take 1 TID    Last OV    10/02/19    Next Yovana OV    11/13/2019  (Manuel from 11/08/19)    Last Script Written  08/08/19  #90  With No refills      Last [place]     06/03/19    Please advise on refill    Thank you

## 2019-11-06 DIAGNOSIS — R73.03 PRE-DIABETES: Primary | ICD-10-CM

## 2019-11-06 RX ORDER — PAROXETINE 10 MG/1
10 TABLET, FILM COATED ORAL EVERY MORNING
Qty: 30 TABLET | Refills: 0 | Status: SHIPPED | OUTPATIENT
Start: 2019-11-06 | End: 2019-12-04 | Stop reason: SDUPTHER

## 2019-11-06 RX ORDER — ALPRAZOLAM 0.25 MG/1
0.25 TABLET ORAL 3 TIMES DAILY PRN
Qty: 90 TABLET | Refills: 0 | Status: SHIPPED | OUTPATIENT
Start: 2019-11-06 | End: 2019-11-21 | Stop reason: SDUPTHER

## 2019-11-06 RX ORDER — LANCETS 28 GAUGE
EACH MISCELLANEOUS
Qty: 100 EACH | Refills: 5 | Status: SHIPPED | OUTPATIENT
Start: 2019-11-06

## 2019-11-06 RX ORDER — ALPRAZOLAM 0.25 MG/1
TABLET ORAL
Qty: 10 TABLET | Refills: 0 | Status: CANCELLED | OUTPATIENT
Start: 2019-11-06

## 2019-11-06 RX ORDER — AMLODIPINE BESYLATE 10 MG/1
TABLET ORAL
Qty: 30 TABLET | Refills: 0 | Status: SHIPPED | OUTPATIENT
Start: 2019-11-06 | End: 2019-12-04 | Stop reason: SDUPTHER

## 2019-11-06 NOTE — TELEPHONE ENCOUNTER
Per HEATHER Mathews, Script for Ms. Monisha Brasher has been called to [facility] for her Xanax 0.25 mg #10 tablets.  With No refills    Ms. Brasher has an appointment scheduled for 11/13/19 and will consider additional refill at that time.    Changed to Phone in and sent back to HEATHER Burns for her signature

## 2019-11-18 RX ORDER — IPRATROPIUM BROMIDE AND ALBUTEROL SULFATE 2.5; .5 MG/3ML; MG/3ML
SOLUTION RESPIRATORY (INHALATION)
Qty: 270 ML | Refills: 2 | Status: SHIPPED | OUTPATIENT
Start: 2019-11-18 | End: 2020-01-16

## 2019-11-21 ENCOUNTER — OFFICE VISIT (OUTPATIENT)
Dept: FAMILY MEDICINE CLINIC | Facility: CLINIC | Age: 64
End: 2019-11-21

## 2019-11-21 VITALS
DIASTOLIC BLOOD PRESSURE: 70 MMHG | SYSTOLIC BLOOD PRESSURE: 128 MMHG | BODY MASS INDEX: 26.4 KG/M2 | WEIGHT: 149 LBS | HEIGHT: 63 IN

## 2019-11-21 DIAGNOSIS — Z12.11 ENCOUNTER FOR SCREENING COLONOSCOPY: ICD-10-CM

## 2019-11-21 DIAGNOSIS — Z78.0 POST-MENOPAUSAL: ICD-10-CM

## 2019-11-21 DIAGNOSIS — Z01.00 DIABETIC EYE EXAM (HCC): ICD-10-CM

## 2019-11-21 DIAGNOSIS — F41.9 ANXIETY: ICD-10-CM

## 2019-11-21 DIAGNOSIS — E11.9 DIABETIC EYE EXAM (HCC): ICD-10-CM

## 2019-11-21 DIAGNOSIS — I10 ESSENTIAL HYPERTENSION: Primary | Chronic | ICD-10-CM

## 2019-11-21 DIAGNOSIS — Z23 NEED FOR VACCINATION: ICD-10-CM

## 2019-11-21 PROCEDURE — 90674 CCIIV4 VAC NO PRSV 0.5 ML IM: CPT | Performed by: NURSE PRACTITIONER

## 2019-11-21 PROCEDURE — 99214 OFFICE O/P EST MOD 30 MIN: CPT | Performed by: NURSE PRACTITIONER

## 2019-11-21 PROCEDURE — 90471 IMMUNIZATION ADMIN: CPT | Performed by: NURSE PRACTITIONER

## 2019-11-21 RX ORDER — BUPROPION HYDROCHLORIDE 75 MG/1
75 TABLET ORAL DAILY
Qty: 30 TABLET | Refills: 11 | Status: SHIPPED | OUTPATIENT
Start: 2019-11-21 | End: 2022-06-23

## 2019-11-21 RX ORDER — ALPRAZOLAM 0.25 MG/1
0.25 TABLET ORAL 3 TIMES DAILY PRN
Qty: 90 TABLET | Refills: 0 | Status: SHIPPED | OUTPATIENT
Start: 2019-11-21 | End: 2019-12-30 | Stop reason: SDUPTHER

## 2019-11-21 NOTE — PROGRESS NOTES
Chief Complaint   Patient presents with   • Hypertension     3 month check up    • Verrucous Vulgaris   • Flu Vaccine     Subjective   Monisha Brasher is a 64 y.o. female.     Hypertension   This is a chronic problem. The current episode started more than 1 year ago. The problem has been resolved since onset. The problem is controlled. Associated symptoms include anxiety and malaise/fatigue. Pertinent negatives include no blurred vision, chest pain, headaches, neck pain, orthopnea, palpitations, peripheral edema, PND or shortness of breath. Past treatments include nothing. Current antihypertension treatment includes lifestyle changes. The current treatment provides significant improvement. There are no compliance problems.  There is no history of angina, kidney disease, CAD/MI, CVA, heart failure, left ventricular hypertrophy, PVD or retinopathy. There is no history of chronic renal disease, coarctation of the aorta, hyperaldosteronism, hypercortisolism, hyperparathyroidism, a hypertension causing med, pheochromocytoma, renovascular disease, sleep apnea or a thyroid problem.   Anxiety   Presents for follow-up visit. Symptoms include decreased concentration, excessive worry, irritability and nausea. Patient reports no chest pain, hyperventilation, impotence, nervous/anxious behavior, palpitations, shortness of breath or suicidal ideas. Symptoms occur most days. The quality of sleep is good. Nighttime awakenings: none.     There is no history of asthma. Compliance with medications is %.   Rash   This is a chronic problem. The current episode started yesterday. The problem has been gradually worsening since onset. Location: left arm  The rash is characterized by redness. Associated with: dog scratch  Associated symptoms include coughing and fatigue. Pertinent negatives include no congestion, diarrhea, eye pain, fever, joint pain, nail changes, shortness of breath, sore throat or vomiting. Past treatments  include antibiotic cream. The treatment provided mild relief. There is no history of allergies, asthma, eczema or varicella.        The following portions of the patient's history were reviewed and updated as appropriate: allergies, current medications, past social history and problem list.    Review of Systems   Constitutional: Positive for activity change, appetite change, fatigue, irritability and malaise/fatigue. Negative for chills, diaphoresis, fever and unexpected weight change.   HENT: Negative for congestion and sore throat.    Eyes: Negative.  Negative for blurred vision, photophobia, pain, redness and visual disturbance.   Respiratory: Positive for cough and wheezing. Negative for apnea, choking, chest tightness, shortness of breath and stridor.         Rash both arms healing    Cardiovascular: Negative for chest pain, palpitations, orthopnea, leg swelling and PND.   Gastrointestinal: Positive for constipation and nausea. Negative for abdominal distention, abdominal pain, anal bleeding, blood in stool, diarrhea, rectal pain and vomiting.   Endocrine: Positive for cold intolerance. Negative for heat intolerance, polydipsia, polyphagia and polyuria.   Genitourinary: Negative for impotence.   Musculoskeletal: Negative.  Negative for arthralgias, back pain, gait problem, joint pain, joint swelling, myalgias, neck pain and neck stiffness.   Skin: Negative.  Negative for color change, nail changes, pallor and rash.        Skin tear left arm    Allergic/Immunologic: Negative.  Negative for environmental allergies, food allergies and immunocompromised state.   Neurological: Negative.  Negative for syncope, weakness, numbness and headaches.   Hematological: Negative.  Negative for adenopathy. Does not bruise/bleed easily.   Psychiatric/Behavioral: Positive for decreased concentration and sleep disturbance. Negative for agitation, behavioral problems, dysphoric mood, hallucinations, self-injury and suicidal ideas.  "The patient is not nervous/anxious and is not hyperactive.          recently had a heart attack        Objective   /70   Ht 160 cm (63\")   Wt 67.6 kg (149 lb)   BMI 26.39 kg/m²   Physical Exam   Constitutional: She is oriented to person, place, and time. She appears well-developed and well-nourished. No distress.   HENT:   Head: Normocephalic and atraumatic.   Right Ear: External ear normal.   Left Ear: External ear normal.   Nose: Nose normal.   Mouth/Throat: Oropharynx is clear and moist. No oropharyngeal exudate.   Eyes: Conjunctivae and EOM are normal. Pupils are equal, round, and reactive to light. Right eye exhibits no discharge. Left eye exhibits no discharge. No scleral icterus.   Neck: Normal range of motion. Neck supple. No JVD present. No tracheal deviation present. No thyromegaly present.   Cardiovascular: Normal rate, regular rhythm, normal heart sounds and intact distal pulses. Exam reveals no gallop and no friction rub.   No murmur heard.  Pulmonary/Chest: Effort normal. No stridor. No respiratory distress. She has wheezes. She has no rales. She exhibits no tenderness.   Abdominal: Soft. Bowel sounds are normal. She exhibits no distension and no mass. There is no tenderness. There is no rebound and no guarding. No hernia.   Musculoskeletal: Normal range of motion. She exhibits no edema, tenderness or deformity.    Monisha had a diabetic foot exam performed today.   During the foot exam she had a monofilament test performed.    Neurological Sensory Findings - Unaltered hot/cold right ankle/foot discrimination and unaltered hot/cold left ankle/foot discrimination. Unaltered sharp/dull right ankle/foot discrimination and unaltered sharp/dull left ankle/foot discrimination. No right ankle/foot altered proprioception and no left ankle/foot altered proprioception  Vascular Status -  Her right foot exhibits normal foot vasculature  and no edema. Her left foot exhibits normal foot vasculature  " and no edema.  Skin Integrity  -  Her right foot skin is intact.  She has no right foot onychomycosis, no right foot ulcer and non-callous right foot.Her left foot skin is intact. She has no left foot onychomycosis, no left foot ulcer and non-callous left foot..   Foot Structure and Biomechanics -  Her right foot has no hallux valgus, no pes cavus and no claw toes present. Her left foot has no hallux valgus, no pes cavus and no claw toes.  Lymphadenopathy:     She has no cervical adenopathy.   Neurological: She is alert and oriented to person, place, and time. She displays normal reflexes. No cranial nerve deficit or sensory deficit. She exhibits normal muscle tone. Coordination normal.   Skin: Skin is warm and dry. Rash noted. She is not diaphoretic. No erythema. No pallor.   Healing rash both arms    Nursing note and vitals reviewed.      Assessment/Plan   Problem List Items Addressed This Visit        Cardiovascular and Mediastinum    Hypertension - Primary (Chronic)       Genitourinary    Post-menopausal    Relevant Orders    DEXA Bone Density Axial       Other    Anxiety    Encounter for screening colonoscopy    Relevant Orders    Cologuard - Stool, Per Rectum    Need for vaccination    Relevant Medications    Influenza Vac Subunit Quad (FLUCELVAX) injection 0.5 mL (Completed)           New Medications Ordered This Visit   Medications   • ALPRAZolam (XANAX) 0.25 MG tablet     Sig: Take 1 tablet by mouth 3 (Three) Times a Day As Needed for Anxiety.     Dispense:  90 tablet     Refill:  0   • buPROPion (WELLBUTRIN) 75 MG tablet     Sig: Take 1 tablet by mouth Daily.     Dispense:  30 tablet     Refill:  11   • Influenza Vac Subunit Quad (FLUCELVAX) injection 0.5 mL       It's not just what you eat, but when you eat  Eat breakfast, and eat smaller meals throughout the day. A healthy breakfast can jumpstart your metabolism, while eating small, healthy meals (rather than the standard three large meals) keeps your  energy up.   Avoid eating at night. Try to eat dinner earlier and fast for 14-16 hours until breakfast the next morning. Studies suggest that eating only when you’re most active and giving your digestive system a long break each day may help to regulate weight.     Patient understands the risks associated with this controlled medication, including tolerance and addiction.  she also agrees to only obtain this medication from me, and not from a another provider, unless that provider is covering for me in my absence.  she also agrees to be compliant in dosing, and not self adjust the dose of medication.  A signed controlled substance agreement is on file, and she has received a controlled substance education sheet at this a previous visit.  she has also signed a consent for treatment with a controlled substance as per [facility] policy. KAYLAN was obtained.      Refer for diabetic eye exam   Add wellbutrin daily        Add wellbutrin as directed, smoking cessation ordered   Referral for diabetic eye exam, meds reviewed diet discussed

## 2019-12-02 ENCOUNTER — HOSPITAL ENCOUNTER (EMERGENCY)
Facility: HOSPITAL | Age: 64
Discharge: HOME | End: 2019-12-02
Attending: EMERGENCY MEDICINE | Admitting: EMERGENCY MEDICINE

## 2019-12-02 ENCOUNTER — APPOINTMENT (OUTPATIENT)
Dept: GENERAL RADIOLOGY | Facility: HOSPITAL | Age: 64
End: 2019-12-02

## 2019-12-02 VITALS
WEIGHT: 152.2 LBS | OXYGEN SATURATION: 92 % | SYSTOLIC BLOOD PRESSURE: 108 MMHG | HEART RATE: 91 BPM | RESPIRATION RATE: 18 BRPM | HEIGHT: 63 IN | TEMPERATURE: 98.4 F | BODY MASS INDEX: 26.97 KG/M2 | DIASTOLIC BLOOD PRESSURE: 62 MMHG

## 2019-12-02 DIAGNOSIS — S92.355A CLOSED NONDISPLACED FRACTURE OF FIFTH METATARSAL BONE OF LEFT FOOT, INITIAL ENCOUNTER: Primary | ICD-10-CM

## 2019-12-02 PROCEDURE — 99283 EMERGENCY DEPT VISIT LOW MDM: CPT

## 2019-12-02 PROCEDURE — 73630 X-RAY EXAM OF FOOT: CPT

## 2019-12-02 RX ORDER — HYDROCODONE BITARTRATE AND ACETAMINOPHEN 5; 325 MG/1; MG/1
1 TABLET ORAL ONCE
Status: COMPLETED | OUTPATIENT
Start: 2019-12-02 | End: 2019-12-02

## 2019-12-02 RX ORDER — HYDROCODONE BITARTRATE AND ACETAMINOPHEN 5; 325 MG/1; MG/1
1 TABLET ORAL EVERY 6 HOURS PRN
Qty: 12 TABLET | Refills: 0 | Status: SHIPPED | OUTPATIENT
Start: 2019-12-02 | End: 2019-12-30

## 2019-12-02 RX ADMIN — HYDROCODONE BITARTRATE AND ACETAMINOPHEN 1 TABLET: 5; 325 TABLET ORAL at 22:12

## 2019-12-03 NOTE — ED PROVIDER NOTES
Subjective   64-year-old white female presents to the emergency department with chief complaint of foot pain and foot swelling.  Patient complains of left foot pain and swelling for a few days.  She does not recall injuring her foot.  She denies fever sweats or chills.  She relates she is feeling well otherwise.            Review of Systems   Constitutional: Negative for chills, diaphoresis and fever.   Respiratory: Negative for shortness of breath.    Cardiovascular: Negative for chest pain.   Gastrointestinal: Negative for abdominal pain, nausea and vomiting.   Genitourinary: Negative for dysuria and hematuria.   Musculoskeletal: Positive for arthralgias and back pain. Negative for neck pain.   Neurological: Negative for syncope, weakness, numbness and headaches.   All other systems reviewed and are negative.      Past Medical History:   Diagnosis Date   • Acute bronchitis    • Acute upper respiratory infection    • Adjustment disorder with mixed emotional features    • Agoraphobia with panic attacks    • Allergic rhinitis due to pollen    • Anxiety    • Asthma    • Backache    • Blood in urine    • Breast cyst    • Candidiasis of mouth    • Chronic bronchitis (CMS/HCC)    • Chronic obstructive lung disease (CMS/HCC)    • Emphysema lung (CMS/HCC)    • Essential hypertension    • Extrinsic asthma with status asthmaticus    • Fatigue    • H/O echocardiogram     EF 55-60%. LV systolic function normal. Impaired LV relaxation. [facility]   • Heart attack (CMS/HCC) 11/2015   • Hypoxemia    • Malaise and fatigue    • Non-IgE mediated allergic asthma    • Nonvenomous insect bite    • Pneumonia    • PONV (postoperative nausea and vomiting)    • Postmenopausal state    • Urinary tract infectious disease        No Known Allergies    Past Surgical History:   Procedure Laterality Date   • BREAST BIOPSY     • CARDIAC CATHETERIZATION N/A 11/15/2018    Procedure: Left Heart Cath;  Surgeon: Thaddeus Huber MD;   Location: [facility] INVASIVE LOCATION;  Service: Cardiology   • CARDIAC CATHETERIZATION N/A 2019    Procedure: Left Heart Cath 2019 @ 8:00 AM;  Surgeon: Thaddeus Huber MD;  Location: [facility] INVASIVE LOCATION;  Service: Cardiology   •  SECTION     • HYSTERECTOMY     • INJECTION OF MEDICATION  2015    celestone(1)   • INJECTION OF MEDICATION  2016    DEPO MEDROL(16)   • LEG SURGERY Bilateral        Family History   Problem Relation Age of Onset   • Heart attack Mother    • Heart disease Mother    • Heart disease Father    • Heart disease Paternal Grandmother        Social History     Socioeconomic History   • Marital status:      Spouse name: Not on file   • Number of children: Not on file   • Years of education: Not on file   • Highest education level: Not on file   Tobacco Use   • Smoking status: Former Smoker     Packs/day: 1.00     Years: 48.00     Pack years: 48.00     Types: Cigarettes, Electronic Cigarette     Start date:      Last attempt to quit: 2018     Years since quittin.0   • Smokeless tobacco: Never Used   Substance and Sexual Activity   • Alcohol use: No     Alcohol/week: 1.2 - 1.8 oz     Types: 1 - 2 Glasses of wine, 1 Cans of beer per week     Frequency: Never     Comment: Socially   • Drug use: No           Objective   Physical Exam   Constitutional: She is oriented to person, place, and time. She appears well-developed and well-nourished. No distress.   HENT:   Head: Normocephalic and atraumatic.   Right Ear: External ear normal.   Left Ear: External ear normal.   Nose: Nose normal.   Mouth/Throat: Oropharynx is clear and moist.   Eyes: Conjunctivae and EOM are normal. Pupils are equal, round, and reactive to light.   Neck: Normal range of motion. Neck supple.   Cardiovascular: Normal rate, regular rhythm, normal heart sounds and intact distal pulses.   Pulmonary/Chest: Effort normal and breath sounds normal.   Abdominal: Soft. Bowel  sounds are normal. She exhibits no distension and no mass. There is no tenderness. There is no guarding.   Musculoskeletal:   No tenderness of the cervical, thoracic, or lumbar spine.  The left foot is edematous and erythematous without increased warmth.  There is tenderness on the dorsal lateral aspect of the left foot.  Neurovascular intact distally.   Neurological: She is alert and oriented to person, place, and time. No cranial nerve deficit or sensory deficit. She exhibits normal muscle tone.   Skin: Skin is warm and dry. She is not diaphoretic.   Psychiatric: She has a normal mood and affect. Her behavior is normal.   Nursing note and vitals reviewed.      Procedures           ED Course  ED Course as of Dec 02 2225   Mon Dec 02, 2019   2220 Patient is alert and resting comfortably. I reviewed the results of the emergency department evaluation with the patient.  I recommended podiatry follow-up.  I advised the patient to return to the emergency department if their symptoms change or worsen.   [DR]   2222 Reviewed [facility] report #[id number]  [DR]      ED Course User Index  [DR] Reese Greer MD      Labs Reviewed - No data to display  Xr Foot 3+ View Left    Result Date: 12/2/2019  Narrative: Left foot three view on 12/2/2019 CLINICAL INDICATION: Injury, pain, lateral foot redness COMPARISON: None FINDINGS: There is an acute, transverse, essentially nondisplaced fracture of the mid to distal diaphysis of the fifth metatarsal. There is periosteal reaction around an old healed fracture of the second metatarsal diaphysis. Degenerative changes are noted in the first MTP joint. No other fracture is noted. Visualized joints are well aligned.     Impression: Acute transverse essentially nondisplaced fracture of the fifth metatarsal diaphysis as above. Electronically signed by:  Rico Saab  12/2/2019 9:51 PM [facility] Workstation: [id number]              [facility]    Final diagnoses:   Closed nondisplaced fracture of fifth  metatarsal bone of left foot, initial encounter              Reese Greer MD  12/02/19 [id number]

## 2019-12-04 RX ORDER — AMLODIPINE BESYLATE 10 MG/1
TABLET ORAL
Qty: 30 TABLET | Refills: 11 | Status: SHIPPED | OUTPATIENT
Start: 2019-12-04 | End: 2020-11-25

## 2019-12-04 RX ORDER — PAROXETINE 10 MG/1
10 TABLET, FILM COATED ORAL EVERY MORNING
Qty: 30 TABLET | Refills: 11 | Status: SHIPPED | OUTPATIENT
Start: 2019-12-04 | End: 2019-12-30

## 2019-12-09 RX ORDER — ALBUTEROL SULFATE 90 UG/1
AEROSOL, METERED RESPIRATORY (INHALATION)
Qty: 18 G | Refills: 2 | Status: SHIPPED | OUTPATIENT
Start: 2019-12-09 | End: 2020-04-06

## 2019-12-12 RX ORDER — METHYLPREDNISOLONE 8 MG/1
TABLET ORAL
Qty: 21 TABLET | Refills: 0 | Status: SHIPPED | OUTPATIENT
Start: 2019-12-12 | End: 2019-12-30

## 2019-12-16 ENCOUNTER — TELEPHONE (OUTPATIENT)
Dept: FAMILY MEDICINE CLINIC | Facility: CLINIC | Age: 64
End: 2019-12-16

## 2019-12-16 DIAGNOSIS — M81.0 OSTEOPOROSIS WITHOUT CURRENT PATHOLOGICAL FRACTURE, UNSPECIFIED OSTEOPOROSIS TYPE: Primary | ICD-10-CM

## 2019-12-17 ENCOUNTER — OFFICE VISIT (OUTPATIENT)
Dept: PODIATRY | Facility: CLINIC | Age: 64
End: 2019-12-17

## 2019-12-17 VITALS — HEART RATE: 93 BPM | OXYGEN SATURATION: 94 % | BODY MASS INDEX: 26.93 KG/M2 | HEIGHT: 63 IN | WEIGHT: 152 LBS

## 2019-12-17 DIAGNOSIS — M79.672 LEFT FOOT PAIN: Primary | ICD-10-CM

## 2019-12-17 DIAGNOSIS — S92.355A CLOSED NONDISPLACED FRACTURE OF FIFTH METATARSAL BONE OF LEFT FOOT, INITIAL ENCOUNTER: ICD-10-CM

## 2019-12-17 PROCEDURE — 28470 CLTX METATARSAL FX WO MNP EA: CPT | Performed by: PODIATRIST

## 2019-12-17 PROCEDURE — 99203 OFFICE O/P NEW LOW 30 MIN: CPT | Performed by: PODIATRIST

## 2019-12-17 NOTE — PROGRESS NOTES
Monisha Brasher  1955  64 y.o. female       Patient came to clinic with left foot pain states her pain is 3/10    12/17/2019    Chief Complaint   Patient presents with   • Left Foot - Pain       History of Present Illness    Monisha Brasher is a 64 y.o.female who presents to clinic today with chief complaint of left foot pain.  Patient states that approximately 3 weeks ago she noticed increased swelling and pain to her left foot.  There are no associated injuries.  On December 2 she was evaluated in the emergency department with radiographs.  A nondisplaced fracture was noted to the fifth metatarsal.  Patient was placed into a boot and given a follow-up here.  She presents to clinic today for the follow-up.  She is ambulating in her cam boot.  She rates her pain as a 3 out of 10.  She states that it is improving.  She has no other pedal complaints today.      Past Medical History:   Diagnosis Date   • Acute bronchitis    • Acute upper respiratory infection    • Adjustment disorder with mixed emotional features    • Agoraphobia with panic attacks    • Allergic rhinitis due to pollen    • Anxiety    • Asthma    • Asthma    • Backache    • Blood in urine    • Breast cyst    • Candidiasis of mouth    • Chronic bronchitis (CMS/HCC)    • Chronic obstructive lung disease (CMS/HCC)    • Diabetes (CMS/HCC)    • Emphysema (subcutaneous) (surgical) resulting from a procedure    • Emphysema lung (CMS/HCC)    • Essential hypertension    • Extrinsic asthma with status asthmaticus    • Fatigue    • H/O echocardiogram     EF 55-60%. LV systolic function normal. Impaired LV relaxation. [facility]   • Heart attack (CMS/HCC) 11/2015   • Heart problem    • High blood pressure    • Hypoxemia    • Malaise and fatigue    • Non-IgE mediated allergic asthma    • Nonvenomous insect bite    • Pneumonia    • PONV (postoperative nausea and vomiting)    • Postmenopausal state    • Urinary tract infectious disease           Past Surgical History:   Procedure Laterality Date   • BREAST BIOPSY     • CARDIAC CATHETERIZATION N/A 11/15/2018    Procedure: Left Heart Cath;  Surgeon: Thaddeus Huber MD;  Location: [facility] INVASIVE LOCATION;  Service: Cardiology   • CARDIAC CATHETERIZATION N/A 2019    Procedure: Left Heart Cath 2019 @ 8:00 AM;  Surgeon: Thaddeus Huber MD;  Location: [facility] INVASIVE LOCATION;  Service: Cardiology   •  SECTION     • HYSTERECTOMY     • INJECTION OF MEDICATION  2015    celestone(1)   • INJECTION OF MEDICATION  2016    DEPO MEDROL(16)   • LEG SURGERY Bilateral          Family History   Problem Relation Age of Onset   • Heart attack Mother    • Heart disease Mother    • Heart disease Father    • Heart disease Paternal Grandmother        No Known Allergies    Social History     Socioeconomic History   • Marital status:      Spouse name: Not on file   • Number of children: Not on file   • Years of education: Not on file   • Highest education level: Not on file   Tobacco Use   • Smoking status: Former Smoker     Packs/day: 1.00     Years: 48.00     Pack years: 48.00     Types: Cigarettes, Electronic Cigarette     Start date:      Last attempt to quit: 2018     Years since quittin.0   • Smokeless tobacco: Never Used   Substance and Sexual Activity   • Alcohol use: No     Alcohol/week: 2.0 - 3.0 standard drinks     Types: 1 - 2 Glasses of wine, 1 Cans of beer per week     Frequency: Never     Comment: Socially   • Drug use: No         Current Outpatient Medications   Medication Sig Dispense Refill   • albuterol sulfate HFA (VENTOLIN HFA) 108 (90 Base) MCG/ACT inhaler INHALE 2 PUFFS BY MOUTH EVERY 4 HOURS AS NEEDED FOR BREATHING 18 g 2   • ALPRAZolam (XANAX) 0.25 MG tablet Take 1 tablet by mouth 3 (Three) Times a Day As Needed for Anxiety. 90 tablet 0   • amiodarone (PACERONE) 100 MG tablet Take 1 tablet by mouth 2 (Two) Times a Day. 60 tablet 1   •  amLODIPine (NORVASC) 10 MG tablet TAKE 1 TABLET BY MOUTH EVERY DAY 30 tablet 11   • aspirin 81 MG chewable tablet Chew 1 tablet Daily.     • atorvastatin (LIPITOR) 20 MG tablet Take 2 tablets by mouth Every Night. 30 tablet 1   • budesonide-formoterol (SYMBICORT) 160-4.5 MCG/ACT inhaler INHALE 2 PUFFS BY MOUTH TWICE DAILY 10.2 g 5   • buPROPion (WELLBUTRIN) 75 MG tablet Take 1 tablet by mouth Daily. 30 tablet 11   • carvedilol (COREG) 3.125 MG tablet Take 3.21 mg by mouth Every Night.  12   • furosemide (LASIX) 20 MG tablet Take 1 tablet by mouth 2 (Two) Times a Day. 60 tablet 1   • glucose blood (FREESTYLE LITE) test strip USE AS DIRECTED TO TEST BLOOD SUGAR ONCE DAILY 50 each 11   • glucose monitor monitoring kit 1 each Daily. Testing blood sugar once a day    ICD10 - R73.9 1 each 0   • HYDROcodone-acetaminophen (NORCO) 5-325 MG per tablet Take 1 tablet by mouth Every 6 (Six) Hours As Needed for Severe Pain . 12 tablet 0   • ipratropium-albuterol (DUO-NEB) 0.5-2.5 mg/3 ml nebulizer USE 3 ML VIA NEBULIZER EVERY 4 HOURS AS NEEDED FOR WHEEZING 270 mL 2   • Lancets (FREESTYLE) lancets USE AS DIRECTED TO TEST BLOOD SUGAR ONE DAILY 100 each 5   • losartan (COZAAR) 25 MG tablet Take 1 tablet by mouth Daily. 30 tablet 11   • metFORMIN (GLUCOPHAGE) 500 MG tablet TAKE 1 TABLET BY MOUTH DAILY WITH BREAKFAST 30 tablet 11   • methylPREDNISolone (MEDROL) 8 MG tablet TAKE 1 TABLET BY MOUTH TWICE DAILY FOR 7 DAYS, THEN 1 TABLET DAILY FOR 7 DAYS 21 tablet 0   • montelukast (SINGULAIR) 10 MG tablet TAKE 1 TABLET BY MOUTH DAILY 90 tablet 4   • mupirocin (BACTROBAN) 2 % ointment Apply  topically to the appropriate area as directed 3 (Three) Times a Day. 30 g 0   • nitroglycerin (NITROSTAT) 0.4 MG SL tablet Take 1 tablet under tongue every 5 minutes for a maximum of three doses per episode of chest pain 15 tablet 0   • nystatin (MYCOSTATIN) 382243 UNIT/ML suspension SHAKE LIQUID AND TAKE 5 ML BY MOUTH FOUR TIMES DAILY 280 mL 0   •  "PARoxetine (PAXIL) 10 MG tablet TAKE 1 TABLET BY MOUTH EVERY MORNING 30 tablet 11   • PARoxetine (PAXIL) 20 MG tablet Take 1 tablet by mouth Every Morning. 30 tablet 11   • Plecanatide 3 MG tablet Take 1 tablet by mouth Daily. 30 tablet 1   • prasugrel (EFFIENT) 10 MG tablet Take 1 tablet by mouth Daily. 30 tablet 11   • SPIRIVA HANDIHALER 18 MCG per inhalation capsule INHALE 1 PUFF BY MOUTH DAILY 30 capsule 6     No current facility-administered medications for this visit.        Review of Systems   Constitutional: Negative.    Eyes: Negative.    Respiratory: Positive for cough, shortness of breath and wheezing.    Gastrointestinal: Negative.    Endocrine: Negative.    Genitourinary:        Night time urination    Musculoskeletal: Positive for arthralgias, back pain and joint swelling.        Ankle pain    Skin: Negative.    Allergic/Immunologic: Negative.    Neurological: Positive for dizziness and headaches.   Hematological: Negative.    Psychiatric/Behavioral:        Depression          OBJECTIVE    Pulse 93   Ht 158.8 cm (62.5\")   Wt 68.9 kg (152 lb)   SpO2 94%   BMI 27.36 kg/m²       Physical Exam   Constitutional: She is oriented to person, place, and time. She appears well-developed and well-nourished. No distress.   HENT:   Head: Normocephalic and atraumatic.   Nose: Nose normal.   Eyes: Pupils are equal, round, and reactive to light. Conjunctivae and EOM are normal.   Pulmonary/Chest: Effort normal. No respiratory distress. She has no wheezes.   Musculoskeletal: Normal range of motion. She exhibits tenderness. She exhibits no edema or deformity.   Neurological: She is alert and oriented to person, place, and time.   Skin: Skin is warm and dry. Capillary refill takes less than 2 seconds. No erythema.   Psychiatric: She has a normal mood and affect. Her behavior is normal.   Vitals reviewed.      Gait: normal     Assistive Device: cam boot    Left Lower Extremity    Cardiovascular:    DP/PT pulses " palpable    CFT brisk  to all digits  No erythema noted, mild edema left lateral foot  Musculoskeletal:  Muscle strength is 5/5 for all muscle groups tested   Pain on palpation to lateral foot  Dermatological:   Skin is warm, dry and intact    Webspaces 1-4  are clean, dry and intact.   Neurological:   Sensation intact to light touch        Procedures        ASSESSMENT AND PLAN    Monisha was seen today for pain.    Diagnoses and all orders for this visit:    Left foot pain  -     XR Foot 3+ View Left    Closed nondisplaced fracture of fifth metatarsal bone of left foot, initial encounter      - Comprehensive foot and ankle exam performed.   -Repeat radiographs taken and reviewed.  Nondisplaced fracture noted to the midshaft of the fifth metatarsal.  No acute significant periosteal reaction.  -Continue cam boot  - All questions were answered to the patients satisfaction.  - RTC 3 weeks, repeat radiographs          This document has been electronically signed by Mumtaz Dobson DPM on December 18, 2019 7:38 AM     12/18/2019  7:38 AM

## 2019-12-17 NOTE — TELEPHONE ENCOUNTER
Per HEATHER Burns, Ms. Brasher has been called with recent DEXA Bone Density Study results & recommendations.  Continue current medications and follow-up as planned or sooner if any problems.    Referral to [facility]      ----- Message from HEATHER Sams sent at 12/16/2019  2:15 PM CST -----  Can you let her know she has osteoporosis-she needs a referral to  [facility] [place]

## 2019-12-30 ENCOUNTER — OFFICE VISIT (OUTPATIENT)
Dept: ORTHOPEDIC SURGERY | Facility: CLINIC | Age: 64
End: 2019-12-30

## 2019-12-30 ENCOUNTER — TELEPHONE (OUTPATIENT)
Dept: FAMILY MEDICINE CLINIC | Facility: CLINIC | Age: 64
End: 2019-12-30

## 2019-12-30 ENCOUNTER — OFFICE VISIT (OUTPATIENT)
Dept: PODIATRY | Facility: CLINIC | Age: 64
End: 2019-12-30

## 2019-12-30 VITALS — HEIGHT: 63 IN | WEIGHT: 147 LBS | OXYGEN SATURATION: 90 % | BODY MASS INDEX: 26.05 KG/M2 | HEART RATE: 81 BPM

## 2019-12-30 VITALS — HEIGHT: 63 IN | WEIGHT: 147.3 LBS | BODY MASS INDEX: 26.1 KG/M2

## 2019-12-30 DIAGNOSIS — S92.355D CLOSED NONDISPLACED FRACTURE OF FIFTH METATARSAL BONE OF LEFT FOOT WITH ROUTINE HEALING, SUBSEQUENT ENCOUNTER: Primary | ICD-10-CM

## 2019-12-30 DIAGNOSIS — Z78.0 POSTMENOPAUSAL: ICD-10-CM

## 2019-12-30 DIAGNOSIS — M80.00XA OSTEOPOROSIS WITH CURRENT PATHOLOGICAL FRACTURE, UNSPECIFIED OSTEOPOROSIS TYPE, INITIAL ENCOUNTER: Primary | ICD-10-CM

## 2019-12-30 DIAGNOSIS — M84.375D STRESS FRACTURE OF LEFT FOOT WITH ROUTINE HEALING, SUBSEQUENT ENCOUNTER: ICD-10-CM

## 2019-12-30 DIAGNOSIS — F41.9 ANXIETY: Primary | ICD-10-CM

## 2019-12-30 DIAGNOSIS — Z79.52 LONG TERM (CURRENT) USE OF SYSTEMIC STEROIDS: ICD-10-CM

## 2019-12-30 DIAGNOSIS — Z79.51 LONG TERM (CURRENT) USE OF INHALED STEROIDS: ICD-10-CM

## 2019-12-30 PROBLEM — M84.375A STRESS FRACTURE OF LEFT FOOT: Status: ACTIVE | Noted: 2019-12-30

## 2019-12-30 PROBLEM — F43.29 ADJUSTMENT DISORDER WITH MIXED EMOTIONAL FEATURES: Status: ACTIVE | Noted: 2019-12-30

## 2019-12-30 PROCEDURE — 99024 POSTOP FOLLOW-UP VISIT: CPT | Performed by: PODIATRIST

## 2019-12-30 PROCEDURE — 99214 OFFICE O/P EST MOD 30 MIN: CPT | Performed by: NURSE PRACTITIONER

## 2019-12-30 RX ORDER — ALPRAZOLAM 0.25 MG/1
0.25 TABLET ORAL 3 TIMES DAILY PRN
Qty: 90 TABLET | Refills: 0 | Status: SHIPPED | OUTPATIENT
Start: 2019-12-30 | End: 2020-05-14 | Stop reason: SDUPTHER

## 2019-12-30 NOTE — PROGRESS NOTES
Monisha Brasher  1955  64 y.o. female     Patient presents today for a recheck of her left foot fracture; obtained new xrays today.    12/30/2019     Chief Complaint   Patient presents with   • Left Foot - fracture care, Follow-up       History of Present Illness    Monisha Brasher is a 64 y.o.female who presents to clinic today for follow-up of her left fifth metatarsal fracture.  States that she discontinued using the cam boot approximately 3 days ago.  She has been ambulating in athletic shoe gear.  States that she has minimal pain to the left foot.  Denies any new complaints.      Past Medical History:   Diagnosis Date   • Acute bronchitis    • Acute upper respiratory infection    • Adjustment disorder with mixed emotional features    • Agoraphobia with panic attacks    • Allergic rhinitis due to pollen    • Anxiety    • Asthma    • Asthma    • Backache    • Blood in urine    • Breast cyst    • Candidiasis of mouth    • Chronic bronchitis (CMS/HCC)    • Chronic obstructive lung disease (CMS/HCC)    • COPD (chronic obstructive pulmonary disease) (CMS/HCC)    • Diabetes (CMS/HCC)    • Diabetes (CMS/HCC)    • Emphysema (subcutaneous) (surgical) resulting from a procedure    • Emphysema lung (CMS/HCC)    • Essential hypertension    • Extrinsic asthma with status asthmaticus    • Fatigue    • H/O echocardiogram     EF 55-60%. LV systolic function normal. Impaired LV relaxation. [facility]   • Heart attack (CMS/HCC) 11/2015   • Heart problem    • High blood pressure    • Hypoxemia    • Malaise and fatigue    • Non-IgE mediated allergic asthma    • Nonvenomous insect bite    • Pneumonia    • PONV (postoperative nausea and vomiting)    • Postmenopausal state    • Rheumatoid arthritis (CMS/HCC)    • Urinary tract infectious disease          Past Surgical History:   Procedure Laterality Date   • BREAST BIOPSY     • CARDIAC CATHETERIZATION N/A 11/15/2018    Procedure: Left Heart Cath;  Surgeon:  Thaddeus Huber MD;  Location: [facility] INVASIVE LOCATION;  Service: Cardiology   • CARDIAC CATHETERIZATION N/A 2019    Procedure: Left Heart Cath 2019 @ 8:00 AM;  Surgeon: Thaddeus Huber MD;  Location: [facility] INVASIVE LOCATION;  Service: Cardiology   •  SECTION     • HYSTERECTOMY     • INJECTION OF MEDICATION  2015    celestone(1)   • INJECTION OF MEDICATION  2016    DEPO MEDROL(16)   • LEG SURGERY Bilateral          Family History   Problem Relation Age of Onset   • Heart attack Mother    • Heart disease Mother    • Heart disease Father    • Heart disease Paternal Grandmother    • Osteoporosis Other        No Known Allergies    Social History     Socioeconomic History   • Marital status:      Spouse name: Not on file   • Number of children: Not on file   • Years of education: Not on file   • Highest education level: Not on file   Tobacco Use   • Smoking status: Former Smoker     Packs/day: 1.00     Years: 48.00     Pack years: 48.00     Types: Cigarettes, Electronic Cigarette     Start date:      Last attempt to quit: 2018     Years since quittin.1   • Smokeless tobacco: Never Used   Substance and Sexual Activity   • Alcohol use: Not Currently     Frequency: Never   • Drug use: No   • Sexual activity: Defer         Current Outpatient Medications   Medication Sig Dispense Refill   • albuterol sulfate HFA (VENTOLIN HFA) 108 (90 Base) MCG/ACT inhaler INHALE 2 PUFFS BY MOUTH EVERY 4 HOURS AS NEEDED FOR BREATHING 18 g 2   • ALPRAZolam (XANAX) 0.25 MG tablet Take 1 tablet by mouth 3 (Three) Times a Day As Needed for Anxiety. 90 tablet 0   • amiodarone (PACERONE) 100 MG tablet Take 1 tablet by mouth 2 (Two) Times a Day. 60 tablet 1   • amLODIPine (NORVASC) 10 MG tablet TAKE 1 TABLET BY MOUTH EVERY DAY 30 tablet 11   • aspirin 81 MG chewable tablet Chew 1 tablet Daily.     • atorvastatin (LIPITOR) 20 MG tablet Take 2 tablets by mouth Every Night. 30 tablet 1    • budesonide-formoterol (SYMBICORT) 160-4.5 MCG/ACT inhaler INHALE 2 PUFFS BY MOUTH TWICE DAILY 10.2 g 5   • buPROPion (WELLBUTRIN) 75 MG tablet Take 1 tablet by mouth Daily. 30 tablet 11   • carvedilol (COREG) 3.125 MG tablet Take 3.21 mg by mouth Every Night.  12   • furosemide (LASIX) 20 MG tablet Take 1 tablet by mouth 2 (Two) Times a Day. 60 tablet 1   • glucose blood (FREESTYLE LITE) test strip USE AS DIRECTED TO TEST BLOOD SUGAR ONCE DAILY 50 each 11   • glucose monitor monitoring kit 1 each Daily. Testing blood sugar once a day    ICD10 - R73.9 1 each 0   • ipratropium-albuterol (DUO-NEB) 0.5-2.5 mg/3 ml nebulizer USE 3 ML VIA NEBULIZER EVERY 4 HOURS AS NEEDED FOR WHEEZING 270 mL 2   • Lancets (FREESTYLE) lancets USE AS DIRECTED TO TEST BLOOD SUGAR ONE DAILY 100 each 5   • losartan (COZAAR) 25 MG tablet Take 1 tablet by mouth Daily. 30 tablet 11   • metFORMIN (GLUCOPHAGE) 500 MG tablet TAKE 1 TABLET BY MOUTH DAILY WITH BREAKFAST 30 tablet 11   • montelukast (SINGULAIR) 10 MG tablet TAKE 1 TABLET BY MOUTH DAILY 90 tablet 4   • mupirocin (BACTROBAN) 2 % ointment Apply  topically to the appropriate area as directed 3 (Three) Times a Day. 30 g 0   • nitroglycerin (NITROSTAT) 0.4 MG SL tablet Take 1 tablet under tongue every 5 minutes for a maximum of three doses per episode of chest pain 15 tablet 0   • nystatin (MYCOSTATIN) 591114 UNIT/ML suspension SHAKE LIQUID AND TAKE 5 ML BY MOUTH FOUR TIMES DAILY 280 mL 0   • PARoxetine (PAXIL) 20 MG tablet Take 1 tablet by mouth Every Morning. 30 tablet 11   • prasugrel (EFFIENT) 10 MG tablet Take 1 tablet by mouth Daily. 30 tablet 11   • SPIRIVA HANDIHALER 18 MCG per inhalation capsule INHALE 1 PUFF BY MOUTH DAILY 30 capsule 6     No current facility-administered medications for this visit.        Review of Systems   Constitutional: Negative.    Eyes: Negative.    Gastrointestinal: Negative.    Genitourinary:        Night time urination    Musculoskeletal: Positive for  "arthralgias, back pain and joint swelling.        Ankle pain    Skin: Negative.          OBJECTIVE    Pulse 81   Ht 158.8 cm (62.5\")   Wt 66.7 kg (147 lb)   SpO2 90%   BMI 26.46 kg/m²       Physical Exam   Constitutional: She is oriented to person, place, and time. She appears well-developed and well-nourished. No distress.   HENT:   Head: Normocephalic and atraumatic.   Pulmonary/Chest: Effort normal. No respiratory distress.   Musculoskeletal: Normal range of motion. She exhibits tenderness. She exhibits no edema or deformity.   Neurological: She is alert and oriented to person, place, and time.   Skin: Skin is warm and dry. Capillary refill takes less than 2 seconds. No erythema.   Psychiatric: She has a normal mood and affect. Her behavior is normal.   Vitals reviewed.      Gait: normal     Assistive Device: none     Left Lower Extremity    Cardiovascular:    DP/PT pulses palpable    CFT brisk  to all digits  No erythema or edema  Musculoskeletal:  Muscle strength is 5/5 for all muscle groups tested   Improving pain on palpation to lateral foot  Dermatological:   Skin is warm, dry and intact    Webspaces 1-4  are clean, dry and intact.   Neurological:   Sensation intact to light touch        Procedures        ASSESSMENT AND PLAN    Monisha was seen today for fracture care and follow-up.    Diagnoses and all orders for this visit:    Closed nondisplaced fracture of fifth metatarsal bone of left foot with routine healing, subsequent encounter  -     XR Foot 3+ View Left      -Radiographs taken and reviewed.  Healing left metatarsal fracture  -Activity as tolerated in regular shoe gear  - All questions were answered to the patients satisfaction.  - RTC as needed          This document has been electronically signed by [place] Valentin on December 30, 2019 2:41 PM     12/30/2019  2:41 PM    "

## 2019-12-30 NOTE — PROGRESS NOTES
Monisha Brasher is a 64 y.o. female returns for     Chief Complaint   Patient presents with   • Osteoporosis     [facility]       HISTORY OF PRESENT ILLNESS: 64-year-old  female patient [facility] for bone health evaluation and treatment of osteoporosis.    Last Bone Density Study: 12/16/2019    Referred by: HEATHER Mathews    History of Osteoporosis or Osteopenia: No. Recently diagnosed by her first bone density study.   Prior Treatments for Osteoporosis: None    Decrease in Height: No    Fracture History: Current stress fracture in left foot (5th metatarsal), Previous right foot fracture (5th metatarsal), Bilateral lower leg fractures in her 30's due to a horse kicking her    High Risk Medications:   Current: Symbicort, Prednisone, Lasix   Previous: Long term and frequent use of oral and inhaled steroids due to COPD/emphysema/asthma    Comorbid Medical Conditions that Increase Risk for Osteoporosis: Rheumatoid arthritis, COPD, diabetes mellitus, asthma/emphysema requiring long-term use of oral and inhaled steroids    Postmenopausal status: Postmenopausal   Age of Menopause/Hysterectomy: Approximately age 52. Patient went through menopause first and later had a hysterectomy. No early menopause.   Hormone Replacement Therapy: Yes, but only for about one month and then quit.     Family History of Osteoporosis: Yes    History of Smoking: Yes, former smoker. Previously smoked 1 PPD x 48 years. Quit smoking November 2018.     Taking Calcium supplements: No  Taking Vitamin D supplements: No. Last vitamin D level = 34.4 as of 6/24/2019       CONCURRENT MEDICAL HISTORY:    The following portions of the patient's history were reviewed and updated as appropriate: allergies, current medications, past family history, past medical history, past social history, past surgical history and problem list.     ROS  No fevers or chills.  No chest pain or shortness of air.  No GI or   "disturbances.    PHYSICAL EXAMINATION:       Ht 158.8 cm (62.5\")   Wt 66.8 kg (147 lb 4.8 oz)   BMI 26.51 kg/m²     Physical Exam   Constitutional: She is oriented to person, place, and time. Vital signs are normal. She appears well-developed and well-nourished. She is active and cooperative. She does not appear ill. No distress.   HENT:   Head: Normocephalic.   Pulmonary/Chest: Effort normal. No respiratory distress.   Abdominal: Soft. She exhibits no distension.   Musculoskeletal: She exhibits no edema, tenderness or deformity.   Neurological: She is alert and oriented to person, place, and time. GCS eye subscore is 4. GCS verbal subscore is 5. GCS motor subscore is 6.   Skin: Skin is warm, dry and intact. Capillary refill takes less than 2 seconds. No erythema.   Psychiatric: She has a normal mood and affect. Her speech is normal and behavior is normal. Judgment and thought content normal. Cognition and memory are normal.   Vitals reviewed.      GAIT:     [x]  Normal  []  Antalgic    Assistive device: [x]  None  []  Walker     []  Crutches  []  Cane     []  Wheelchair  []  Stretcher    Back Exam     Tenderness   The patient is experiencing no tenderness.     Muscle Strength   The patient has normal back strength.    Tests   Straight leg raise right: negative  Straight leg raise left: negative    Reflexes   Patellar: normal    Other   Sensation: normal  Gait: normal   Erythema: no back redness    Comments:  No pain or tenderness of the spine.  No deformity or kyphosis noted.  No focal neurological deficits noted.            Dexa Bone Density Axial    Result Date: 12/16/2019  Narrative: PROCEDURE: DEXA SCAN CLINICAL INDICATION: z78.0, Z78.0 Asymptomatic menopausal state COMPARISON: None PROCEDURE/TECHNIQUE: Multiple images of the bilateral hip and lumbar spine were obtained using dual absorption technique. FINDINGS: The exam is performed using the [employer] QDR-4500 SL unit.  Images are of satisfactory quality. " Total Lumbar spine:   0.765  gm/cm2     T-Score -2.6 Total Left Hip femoral neck:            0.635  gm/cm2     T-Score -1.9 Total Right Hip femoral neck:            0.653  gm/cm2    T-Score -1.8     Impression: CONCLUSION: The patient has osteoporosis. WORLD [employer]IZATION CRITERIA FOR OSTEOPOROSIS DIAGNOSTIC CATEGORY    T-SCORE Normal......................................................0 to - 1.0 Osteopenia..............................................-1.0 to -2.5 Osteoporosis...........................................Greater than -2.5 (no history of fragility fractures)* Established Osteoporosis........................Greater than -2.5 (with history of fragility fractures)* *FRAGILITY FRACTURES: VERTEBRAE, HUMERUS, WRIST, HIP (OVER 40 YEARS OF AGE) Electronically signed by:  Cale Burger MD  12/16/2019 1:51 PM CST Workstation: [id number]      ASSESSMENT:    Diagnoses and all orders for this visit:    Osteoporosis with current pathological fracture, unspecified osteoporosis type, initial encounter    Stress fracture of left foot with routine healing, subsequent encounter    Postmenopausal    Long term (current) use of inhaled steroids    Long term (current) use of systemic steroids    PLAN    Bone density study results from 12/16/2019 reviewed and discussed with patient.  We discussed her diagnosis of osteoporosis and her increased risk for fracture.  Patient currently has a stress fracture in her left foot (fifth metatarsal).  We discussed her individualized risk factors for osteoporosis, most notably her long-term current use of oral steroids and inhaled steroids for control of COPD/asthma/emphysema, age, race, gender, postmenopausal status and comorbid medical conditions including rheumatoid arthritis, diabetes and COPD.  I have recommended that she start treatment for her osteoporosis in an effort to prevent further worsening osteoporosis, reduce further bone loss and reduce her risk for fracture.  We  discussed various pharmaceutical treatment options for osteoporosis today including oral biphosphonate, Prolia and Forteo.  I have recommended that she start Prolia for treatment of her osteoporosis.  Patient is in agreement with this and wants to proceed.  Patient understands she will be notified via telephone once it has been approved by her insurance and she will be scheduled to receive her initial injection in office. We discussed the importance of proper nutrition and adequate intake of calcium and vitamin D for optimal bone health and prevention of osteoporosis.  She states that she drinks a lot of milk.  Patient is not currently taking a calcium or vitamin D supplement.  I have recommended that she start a calcium plus vitamin D supplement twice daily, such as Caltrate or Citracal, which are available over-the-counter. We discussed the importance of regular exercise, especially weightbearing exercise such as walking, for optimal bone health and prevention of osteoporosis. An educational packet regarding osteoporosis is provided to the patient today.  Written literature regarding Prolia is also provided to the patient today.  Patient is a former smoker with a long history of tobacco abuse.  She quit smoking in November 2018.  We discussed the importance of maintaining her non-smoking status, which will benefit her bone health, prevent osteoporosis from worsening and provide her with overall good health benefits, especially in regards to her lungs.  Recommend a repeat bone density study in one year and follow-up with [facility].    Return in about 1 year (around 12/30/2020) for Recheck.        This document has been electronically signed by HEATHER Arteaga on December 30, 2019 10:03 AM      HEATHER Arteaga

## 2020-01-08 RX ORDER — METHYLPREDNISOLONE 8 MG/1
TABLET ORAL
Qty: 21 TABLET | Refills: 0 | Status: SHIPPED | OUTPATIENT
Start: 2020-01-08 | End: 2020-02-06 | Stop reason: DRUGHIGH

## 2020-01-16 RX ORDER — IPRATROPIUM BROMIDE AND ALBUTEROL SULFATE 2.5; .5 MG/3ML; MG/3ML
SOLUTION RESPIRATORY (INHALATION)
Qty: 270 ML | Refills: 2 | Status: SHIPPED | OUTPATIENT
Start: 2020-01-16 | End: 2020-03-04

## 2020-01-24 NOTE — PROGRESS NOTES
Received call from [facility] lamar has been approved 1/24/2020-1/23/2021, auth# [id number] referral put in.

## 2020-02-06 RX ORDER — METHYLPREDNISOLONE 8 MG/1
TABLET ORAL
Qty: 15 TABLET | Refills: 0 | Status: SHIPPED | OUTPATIENT
Start: 2020-02-06 | End: 2020-08-17

## 2020-02-06 RX ORDER — TIOTROPIUM BROMIDE 18 UG/1
CAPSULE ORAL; RESPIRATORY (INHALATION)
Qty: 30 CAPSULE | Refills: 0 | OUTPATIENT
Start: 2020-02-06

## 2020-02-06 RX ORDER — PAROXETINE HYDROCHLORIDE 20 MG/1
20 TABLET, FILM COATED ORAL EVERY MORNING
Qty: 30 TABLET | Refills: 11 | Status: SHIPPED | OUTPATIENT
Start: 2020-02-06 | End: 2021-01-25

## 2020-02-07 ENCOUNTER — TELEPHONE (OUTPATIENT)
Dept: FAMILY MEDICINE CLINIC | Facility: CLINIC | Age: 65
End: 2020-02-07

## 2020-02-28 ENCOUNTER — OFFICE VISIT (OUTPATIENT)
Dept: PULMONOLOGY | Facility: CLINIC | Age: 65
End: 2020-02-28

## 2020-02-28 VITALS
OXYGEN SATURATION: 98 % | WEIGHT: 152 LBS | HEART RATE: 80 BPM | HEIGHT: 63 IN | SYSTOLIC BLOOD PRESSURE: 130 MMHG | DIASTOLIC BLOOD PRESSURE: 90 MMHG | BODY MASS INDEX: 26.93 KG/M2

## 2020-02-28 DIAGNOSIS — J44.1 CHRONIC OBSTRUCTIVE PULMONARY DISEASE WITH ACUTE EXACERBATION (HCC): Primary | ICD-10-CM

## 2020-02-28 DIAGNOSIS — J45.41 MODERATE PERSISTENT ASTHMA WITH ACUTE EXACERBATION: ICD-10-CM

## 2020-02-28 PROCEDURE — 96372 THER/PROPH/DIAG INJ SC/IM: CPT | Performed by: INTERNAL MEDICINE

## 2020-02-28 PROCEDURE — 99214 OFFICE O/P EST MOD 30 MIN: CPT | Performed by: INTERNAL MEDICINE

## 2020-02-28 RX ORDER — BETAMETHASONE SODIUM PHOSPHATE AND BETAMETHASONE ACETATE 3; 3 MG/ML; MG/ML
6 INJECTION, SUSPENSION INTRA-ARTICULAR; INTRALESIONAL; INTRAMUSCULAR; SOFT TISSUE ONCE
Status: COMPLETED | OUTPATIENT
Start: 2020-02-28 | End: 2020-02-28

## 2020-02-28 RX ORDER — AMOXICILLIN AND CLAVULANATE POTASSIUM 875; 125 MG/1; MG/1
TABLET, FILM COATED ORAL
Qty: 20 TABLET | Refills: 0 | Status: SHIPPED | OUTPATIENT
Start: 2020-02-28 | End: 2020-04-29

## 2020-02-28 RX ORDER — PREDNISONE 10 MG/1
TABLET ORAL
Qty: 21 TABLET | Refills: 0 | Status: SHIPPED | OUTPATIENT
Start: 2020-02-28 | End: 2020-08-17 | Stop reason: ALTCHOICE

## 2020-02-28 RX ADMIN — BETAMETHASONE SODIUM PHOSPHATE AND BETAMETHASONE ACETATE 6 MG: 3; 3 INJECTION, SUSPENSION INTRA-ARTICULAR; INTRALESIONAL; INTRAMUSCULAR; SOFT TISSUE at 11:43

## 2020-02-28 NOTE — PROGRESS NOTES
"This lady has COPD with components of emphysema asthma and chronic bronchitis.  She complains of a several day history of cough purulent sputum worsening shortness of breath and dyspnea on minimal exertion.  She is taking all of her inhalers    The following portions of the patient's history were reviewed and updated as appropriate: current medications, past family history, past medical history, past social history, past surgical history and problem list.      ROS    Constitutional-no night sweats weight loss headaches  GI no abdominal pain nausea or diarrhea  Neuro no seizure or neurologic deficits  Musculoskeletal no deformity or joint pain   no dysuria or hematuria  Skin no rash or other lesions  All other systems reviewed and were negative except for the above.      Physical Exam  /90 (BP Location: Left arm, Patient Position: Sitting, Cuff Size: Adult)   Pulse 80   Ht 160 cm (63\")   Wt 68.9 kg (152 lb)   SpO2 98%   BMI 26.93 kg/m²   Vital signs as above  Pupils equally round and reactive to light and accommodation, neck no JVD or adenopathy.  Cardiovascular regular rhythm and rate no murmur or gallop.  Abdomen soft no organomegaly tenderness.  Extremities no clubbing cyanosis or edema.  No cervical adenopathy.  No skin rash.  Neurologic good strength bilaterally without deficits  Dyspneic white female with moderate wheezes and rhonchi, patient is in respiratory distress    Impression COPD exacerbation, status asthma, acute bronchitis    Plan Celestone IM, prednisone, Augmentin, return in 2 weeks        This document has been produced with the assistance of Harry dictation  This document has been electronically signed by Gigi Ac MD on February 28, 2020 11:35 AM      "

## 2020-03-04 RX ORDER — IPRATROPIUM BROMIDE AND ALBUTEROL SULFATE 2.5; .5 MG/3ML; MG/3ML
SOLUTION RESPIRATORY (INHALATION)
Qty: 270 ML | Refills: 2 | Status: SHIPPED | OUTPATIENT
Start: 2020-03-04 | End: 2020-04-27

## 2020-03-12 RX ORDER — TIOTROPIUM BROMIDE 18 UG/1
CAPSULE ORAL; RESPIRATORY (INHALATION)
Qty: 30 CAPSULE | Refills: 0 | Status: SHIPPED | OUTPATIENT
Start: 2020-03-12 | End: 2020-04-10 | Stop reason: SDUPTHER

## 2020-03-13 ENCOUNTER — OFFICE VISIT (OUTPATIENT)
Dept: PULMONOLOGY | Facility: CLINIC | Age: 65
End: 2020-03-13

## 2020-03-13 VITALS
OXYGEN SATURATION: 95 % | DIASTOLIC BLOOD PRESSURE: 78 MMHG | HEIGHT: 63 IN | WEIGHT: 146.6 LBS | BODY MASS INDEX: 25.98 KG/M2 | HEART RATE: 87 BPM | SYSTOLIC BLOOD PRESSURE: 142 MMHG

## 2020-03-13 DIAGNOSIS — J30.1 ALLERGIC RHINITIS DUE TO POLLEN, UNSPECIFIED SEASONALITY: Primary | ICD-10-CM

## 2020-03-13 PROCEDURE — 96372 THER/PROPH/DIAG INJ SC/IM: CPT | Performed by: INTERNAL MEDICINE

## 2020-03-13 PROCEDURE — 99213 OFFICE O/P EST LOW 20 MIN: CPT | Performed by: INTERNAL MEDICINE

## 2020-03-13 RX ORDER — ONDANSETRON 4 MG/1
4 TABLET, FILM COATED ORAL EVERY 8 HOURS PRN
Qty: 20 TABLET | Refills: 0 | Status: SHIPPED | OUTPATIENT
Start: 2020-03-13 | End: 2020-03-20 | Stop reason: SDUPTHER

## 2020-03-13 RX ORDER — METHYLPREDNISOLONE ACETATE 40 MG/ML
80 INJECTION, SUSPENSION INTRA-ARTICULAR; INTRALESIONAL; INTRAMUSCULAR; SOFT TISSUE ONCE
Status: COMPLETED | OUTPATIENT
Start: 2020-03-13 | End: 2020-03-13

## 2020-03-13 RX ORDER — AZITHROMYCIN 250 MG/1
TABLET, FILM COATED ORAL
COMMUNITY
Start: 2020-02-19 | End: 2020-04-29

## 2020-03-13 RX ORDER — AZITHROMYCIN 250 MG/1
TABLET, FILM COATED ORAL
Qty: 6 TABLET | Refills: 1 | Status: SHIPPED | OUTPATIENT
Start: 2020-03-13 | End: 2020-04-29

## 2020-03-13 RX ADMIN — METHYLPREDNISOLONE ACETATE 80 MG: 40 INJECTION, SUSPENSION INTRA-ARTICULAR; INTRALESIONAL; INTRAMUSCULAR; SOFT TISSUE at 11:26

## 2020-03-13 NOTE — PROGRESS NOTES
"This lady has asthma and allergic rhinitis.  She complains of nasal congestion and a sore throat for several days    ROS    Constitutional-no night sweats weight loss headaches  GI no abdominal pain nausea or diarrhea  Neuro no seizure or neurologic deficits  Musculoskeletal no deformity or joint pain   no dysuria or hematuria  Skin no rash or other lesions  All other systems reviewed and were negative except for the above.      Physical Exam  /78 (BP Location: Left arm, Patient Position: Sitting, Cuff Size: Adult)   Pulse 87   Ht 160 cm (63\")   Wt 66.5 kg (146 lb 9.6 oz)   SpO2 95%   BMI 25.97 kg/m²   Vital signs as above  Pupils equally round and reactive to light and accommodation, neck no JVD or adenopathy.  Cardiovascular regular rhythm and rate no murmur or gallop.  Abdomen soft no organomegaly tenderness.  Extremities no clubbing cyanosis or edema.  No cervical adenopathy.  No skin rash.  Neurologic good strength bilaterally without deficits  Lungs are clear nose is congested, throat is injected    Impression rhinitis and pharyngitis plan Depo-Medrol, Zithromax, ondansetron, continue present medications, return as needed        This document has been produced with the assistance of Dragon dictation  This document has been electronically signed by Gigi Ac MD on March 13, 2020 11:20      "

## 2020-03-17 ENCOUNTER — TELEPHONE (OUTPATIENT)
Dept: FAMILY MEDICINE CLINIC | Facility: CLINIC | Age: 65
End: 2020-03-17

## 2020-03-18 NOTE — TELEPHONE ENCOUNTER
Received a request from [facility] for ondansetron and prednisone. I called pt and she said she doesn't need these refilled at this time.

## 2020-03-20 RX ORDER — ONDANSETRON 4 MG/1
4 TABLET, FILM COATED ORAL EVERY 8 HOURS PRN
Qty: 20 TABLET | Refills: 0 | Status: SHIPPED | OUTPATIENT
Start: 2020-03-20 | End: 2020-05-11 | Stop reason: SDUPTHER

## 2020-03-26 RX ORDER — LOSARTAN POTASSIUM 25 MG/1
25 TABLET ORAL DAILY
Qty: 30 TABLET | Refills: 11 | Status: SHIPPED | OUTPATIENT
Start: 2020-03-26 | End: 2021-03-12

## 2020-04-04 DIAGNOSIS — J45.41 MODERATE PERSISTENT ASTHMA WITH ACUTE EXACERBATION: Primary | ICD-10-CM

## 2020-04-06 RX ORDER — ALBUTEROL SULFATE 90 UG/1
AEROSOL, METERED RESPIRATORY (INHALATION)
Qty: 18 G | Refills: 2 | Status: SHIPPED | OUTPATIENT
Start: 2020-04-06 | End: 2020-06-15

## 2020-04-07 RX ORDER — METHYLPREDNISOLONE 8 MG/1
TABLET ORAL
Qty: 15 TABLET | Refills: 0 | OUTPATIENT
Start: 2020-04-07

## 2020-04-08 ENCOUNTER — OFFICE VISIT (OUTPATIENT)
Dept: PULMONOLOGY | Facility: CLINIC | Age: 65
End: 2020-04-08

## 2020-04-08 VITALS
WEIGHT: 150 LBS | BODY MASS INDEX: 26.58 KG/M2 | OXYGEN SATURATION: 91 % | HEART RATE: 83 BPM | DIASTOLIC BLOOD PRESSURE: 100 MMHG | SYSTOLIC BLOOD PRESSURE: 158 MMHG | HEIGHT: 63 IN

## 2020-04-08 DIAGNOSIS — J30.2 SEASONAL ALLERGIC RHINITIS, UNSPECIFIED TRIGGER: ICD-10-CM

## 2020-04-08 DIAGNOSIS — J44.1 CHRONIC OBSTRUCTIVE PULMONARY DISEASE WITH ACUTE EXACERBATION (HCC): Primary | ICD-10-CM

## 2020-04-08 PROCEDURE — 99213 OFFICE O/P EST LOW 20 MIN: CPT | Performed by: INTERNAL MEDICINE

## 2020-04-08 RX ORDER — ATORVASTATIN CALCIUM 40 MG/1
TABLET, FILM COATED ORAL DAILY
COMMUNITY
Start: 2020-03-30

## 2020-04-08 RX ORDER — METHYLPREDNISOLONE 8 MG/1
TABLET ORAL
Qty: 21 TABLET | Refills: 0 | Status: SHIPPED | OUTPATIENT
Start: 2020-04-08 | End: 2020-08-17

## 2020-04-08 RX ORDER — AMOXICILLIN AND CLAVULANATE POTASSIUM 875; 125 MG/1; MG/1
TABLET, FILM COATED ORAL
Qty: 20 TABLET | Refills: 0 | Status: SHIPPED | OUTPATIENT
Start: 2020-04-08 | End: 2020-04-29

## 2020-04-08 NOTE — PROGRESS NOTES
"This lady has COPD asthma chronic bronchitis and rhinitis.  She complains of cough shortness of breath nasal congestion.  She denies chills fever or grossly purulent sputum.  She takes bronchodilators    ROS    Constitutional-no night sweats weight loss headaches  GI no abdominal pain nausea or diarrhea  Neuro no seizure or neurologic deficits  Musculoskeletal no deformity or joint pain   no dysuria or hematuria  Skin no rash or other lesions  All other systems reviewed and were negative except for the above.      Physical Exam  /100   Pulse 83   Ht 160 cm (63\")   Wt 68 kg (150 lb)   SpO2 91%   BMI 26.57 kg/m²   Vital signs as above  Pupils equally round and reactive to light and accommodation, neck no JVD or adenopathy.  Cardiovascular regular rhythm and rate no murmur or gallop.  Abdomen soft no organomegaly tenderness.  Extremities no clubbing cyanosis or edema.  No cervical adenopathy.  No skin rash.  Neurologic good strength bilaterally without deficits  Dyspneic white female nose is congested lungs scattered rhonchi    Impression COPD exacerbation    Plan Augmentin Medrol as needed, continue present medications, return as needed  "

## 2020-04-27 RX ORDER — IPRATROPIUM BROMIDE AND ALBUTEROL SULFATE 2.5; .5 MG/3ML; MG/3ML
SOLUTION RESPIRATORY (INHALATION)
Qty: 270 ML | Refills: 2 | Status: SHIPPED | OUTPATIENT
Start: 2020-04-27 | End: 2020-06-08

## 2020-04-28 RX ORDER — MONTELUKAST SODIUM 10 MG/1
TABLET ORAL
Qty: 90 TABLET | Refills: 4 | Status: SHIPPED | OUTPATIENT
Start: 2020-04-28 | End: 2020-11-25

## 2020-04-29 ENCOUNTER — HOSPITAL ENCOUNTER (EMERGENCY)
Facility: HOSPITAL | Age: 65
Discharge: HOME | End: 2020-04-29
Attending: EMERGENCY MEDICINE | Admitting: EMERGENCY MEDICINE

## 2020-04-29 ENCOUNTER — APPOINTMENT (OUTPATIENT)
Dept: GENERAL RADIOLOGY | Facility: HOSPITAL | Age: 65
End: 2020-04-29

## 2020-04-29 VITALS
BODY MASS INDEX: 27.97 KG/M2 | RESPIRATION RATE: 18 BRPM | HEIGHT: 62 IN | HEART RATE: 74 BPM | WEIGHT: 152 LBS | TEMPERATURE: 98.5 F | DIASTOLIC BLOOD PRESSURE: 66 MMHG | OXYGEN SATURATION: 96 % | SYSTOLIC BLOOD PRESSURE: 139 MMHG

## 2020-04-29 DIAGNOSIS — R07.9 CHEST PAIN, UNSPECIFIED TYPE: ICD-10-CM

## 2020-04-29 DIAGNOSIS — J18.9 PNEUMONIA OF RIGHT LOWER LOBE DUE TO INFECTIOUS ORGANISM: Primary | ICD-10-CM

## 2020-04-29 LAB
ALBUMIN SERPL-MCNC: 4 G/DL (ref 3.5–5.2)
ALBUMIN/GLOB SERPL: 1.5 G/DL
ALP SERPL-CCNC: 78 U/L (ref 39–117)
ALT SERPL W P-5'-P-CCNC: 19 U/L (ref 1–33)
ANION GAP SERPL CALCULATED.3IONS-SCNC: 17 MMOL/L (ref 5–15)
AST SERPL-CCNC: 18 U/L (ref 1–32)
BASOPHILS # BLD AUTO: 0.08 10*3/MM3 (ref 0–0.2)
BASOPHILS NFR BLD AUTO: 0.8 % (ref 0–1.5)
BILIRUB SERPL-MCNC: 0.3 MG/DL (ref 0.2–1.2)
BUN BLD-MCNC: 17 MG/DL (ref 8–23)
BUN/CREAT SERPL: 27.9 (ref 7–25)
CALCIUM SPEC-SCNC: 9.4 MG/DL (ref 8.6–10.5)
CHLORIDE SERPL-SCNC: 96 MMOL/L (ref 98–107)
CO2 SERPL-SCNC: 29 MMOL/L (ref 22–29)
CREAT BLD-MCNC: 0.61 MG/DL (ref 0.57–1)
DEPRECATED RDW RBC AUTO: 52.6 FL (ref 37–54)
EOSINOPHIL # BLD AUTO: 0.35 10*3/MM3 (ref 0–0.4)
EOSINOPHIL NFR BLD AUTO: 3.3 % (ref 0.3–6.2)
ERYTHROCYTE [DISTWIDTH] IN BLOOD BY AUTOMATED COUNT: 16.9 % (ref 12.3–15.4)
GFR SERPL CREATININE-BSD FRML MDRD: 99 ML/MIN/1.73
GLOBULIN UR ELPH-MCNC: 2.7 GM/DL
GLUCOSE BLD-MCNC: 168 MG/DL (ref 65–99)
HCT VFR BLD AUTO: 41.6 % (ref 34–46.6)
HGB BLD-MCNC: 13.3 G/DL (ref 12–15.9)
HOLD SPECIMEN: NORMAL
IMM GRANULOCYTES # BLD AUTO: 0.08 10*3/MM3 (ref 0–0.05)
IMM GRANULOCYTES NFR BLD AUTO: 0.8 % (ref 0–0.5)
LYMPHOCYTES # BLD AUTO: 1.16 10*3/MM3 (ref 0.7–3.1)
LYMPHOCYTES NFR BLD AUTO: 11.1 % (ref 19.6–45.3)
MAGNESIUM SERPL-MCNC: 2 MG/DL (ref 1.6–2.4)
MCH RBC QN AUTO: 27.7 PG (ref 26.6–33)
MCHC RBC AUTO-ENTMCNC: 32 G/DL (ref 31.5–35.7)
MCV RBC AUTO: 86.5 FL (ref 79–97)
MONOCYTES # BLD AUTO: 1.01 10*3/MM3 (ref 0.1–0.9)
MONOCYTES NFR BLD AUTO: 9.6 % (ref 5–12)
NEUTROPHILS # BLD AUTO: 7.8 10*3/MM3 (ref 1.7–7)
NEUTROPHILS NFR BLD AUTO: 74.4 % (ref 42.7–76)
NRBC BLD AUTO-RTO: 0 /100 WBC (ref 0–0.2)
NT-PROBNP SERPL-MCNC: 231.1 PG/ML (ref 5–900)
PLATELET # BLD AUTO: 268 10*3/MM3 (ref 140–450)
PMV BLD AUTO: 8.9 FL (ref 6–12)
POTASSIUM BLD-SCNC: 3.3 MMOL/L (ref 3.5–5.2)
PROT SERPL-MCNC: 6.7 G/DL (ref 6–8.5)
RBC # BLD AUTO: 4.81 10*6/MM3 (ref 3.77–5.28)
SODIUM BLD-SCNC: 142 MMOL/L (ref 136–145)
TROPONIN T SERPL-MCNC: <0.01 NG/ML (ref 0–0.03)
TROPONIN T SERPL-MCNC: <0.01 NG/ML (ref 0–0.03)
WBC NRBC COR # BLD: 10.48 10*3/MM3 (ref 3.4–10.8)
WHOLE BLOOD HOLD SPECIMEN: NORMAL

## 2020-04-29 PROCEDURE — 99284 EMERGENCY DEPT VISIT MOD MDM: CPT

## 2020-04-29 PROCEDURE — 85025 COMPLETE CBC W/AUTO DIFF WBC: CPT | Performed by: EMERGENCY MEDICINE

## 2020-04-29 PROCEDURE — 93010 ELECTROCARDIOGRAM REPORT: CPT | Performed by: INTERNAL MEDICINE

## 2020-04-29 PROCEDURE — 71045 X-RAY EXAM CHEST 1 VIEW: CPT

## 2020-04-29 PROCEDURE — 80053 COMPREHEN METABOLIC PANEL: CPT | Performed by: EMERGENCY MEDICINE

## 2020-04-29 PROCEDURE — 83735 ASSAY OF MAGNESIUM: CPT | Performed by: EMERGENCY MEDICINE

## 2020-04-29 PROCEDURE — 83880 ASSAY OF NATRIURETIC PEPTIDE: CPT | Performed by: EMERGENCY MEDICINE

## 2020-04-29 PROCEDURE — 93005 ELECTROCARDIOGRAM TRACING: CPT | Performed by: EMERGENCY MEDICINE

## 2020-04-29 PROCEDURE — 84484 ASSAY OF TROPONIN QUANT: CPT | Performed by: EMERGENCY MEDICINE

## 2020-04-29 RX ORDER — DOXYCYCLINE 100 MG/1
100 CAPSULE ORAL 2 TIMES DAILY
Qty: 14 CAPSULE | Refills: 0 | Status: SHIPPED | OUTPATIENT
Start: 2020-04-29 | End: 2020-05-06

## 2020-04-29 RX ORDER — SODIUM CHLORIDE 0.9 % (FLUSH) 0.9 %
10 SYRINGE (ML) INJECTION AS NEEDED
Status: DISCONTINUED | OUTPATIENT
Start: 2020-04-29 | End: 2020-04-29 | Stop reason: HOSPADM

## 2020-04-29 RX ORDER — DOXYCYCLINE 100 MG/1
100 CAPSULE ORAL ONCE
Status: COMPLETED | OUTPATIENT
Start: 2020-04-29 | End: 2020-04-29

## 2020-04-29 RX ADMIN — Medication 10 ML: at 15:26

## 2020-04-29 RX ADMIN — DOXYCYCLINE 100 MG: 100 CAPSULE ORAL at 17:21

## 2020-05-07 RX ORDER — TIOTROPIUM BROMIDE 18 UG/1
CAPSULE ORAL; RESPIRATORY (INHALATION)
Qty: 30 CAPSULE | Refills: 0 | Status: SHIPPED | OUTPATIENT
Start: 2020-05-07 | End: 2020-06-04 | Stop reason: SDUPTHER

## 2020-05-08 DIAGNOSIS — F41.9 ANXIETY: ICD-10-CM

## 2020-05-11 DIAGNOSIS — F41.9 ANXIETY: ICD-10-CM

## 2020-05-11 RX ORDER — ALPRAZOLAM 0.25 MG/1
0.25 TABLET ORAL 3 TIMES DAILY PRN
Qty: 90 TABLET | Refills: 0 | Status: CANCELLED | OUTPATIENT
Start: 2020-05-11

## 2020-05-11 RX ORDER — ALPRAZOLAM 0.25 MG/1
TABLET ORAL
Qty: 90 TABLET | OUTPATIENT
Start: 2020-05-11

## 2020-05-11 NOTE — TELEPHONE ENCOUNTER
Called pt and left a v/m asking to see if she requested these refills or if [facility] automatically requested the refills.

## 2020-05-11 NOTE — TELEPHONE ENCOUNTER
Tiffanie    Ms. Monisha Brasher is requesting a refill on her Xanax 0.25 mg Take 1 TID as needed for anxiety.    Last OV    11/21/2019 with HEATHER Burns  She     Next [facility] OV    Visit date not found    Last Script Written  12/30/2019  By Dr. Vuong for #90 with No Refill       Last [facility]     11/21/2019  All previous scripts have been written by HEATHER Burns      Please advise on refill    Thank you        Per HEATHER Burns prescribing protocol,  Ms. Brasher will need either an Office Visit or a Telemed Visit for refills.  Message has been left for the patient to call me back.  Refill has been denied.

## 2020-05-13 RX ORDER — ONDANSETRON 4 MG/1
4 TABLET, FILM COATED ORAL EVERY 8 HOURS PRN
Qty: 20 TABLET | Refills: 2 | Status: SHIPPED | OUTPATIENT
Start: 2020-05-13 | End: 2020-06-01

## 2020-05-13 RX ORDER — METHYLPREDNISOLONE 8 MG/1
TABLET ORAL
Qty: 21 TABLET | Refills: 0 | OUTPATIENT
Start: 2020-05-13

## 2020-05-13 NOTE — TELEPHONE ENCOUNTER
Called pt back. She wanted the refills but Dr Ac said he would not refill the Medrol without seeing her first because if she was to develop Covid-19, medrol would make it worse. He said she will need to see him again if she needs this. Refilled the zofran. Called pt and left a v/m informing her of this.

## 2020-05-14 ENCOUNTER — OFFICE VISIT (OUTPATIENT)
Dept: FAMILY MEDICINE CLINIC | Facility: CLINIC | Age: 65
End: 2020-05-14

## 2020-05-14 DIAGNOSIS — F41.9 ANXIETY: ICD-10-CM

## 2020-05-14 DIAGNOSIS — I10 ESSENTIAL HYPERTENSION: Primary | Chronic | ICD-10-CM

## 2020-05-14 PROCEDURE — 99442 PR PHYS/QHP TELEPHONE EVALUATION 11-20 MIN: CPT | Performed by: NURSE PRACTITIONER

## 2020-05-14 RX ORDER — ALPRAZOLAM 0.25 MG/1
0.25 TABLET ORAL 3 TIMES DAILY PRN
Qty: 90 TABLET | Refills: 0 | Status: SHIPPED | OUTPATIENT
Start: 2020-05-14 | End: 2020-07-06 | Stop reason: SDUPTHER

## 2020-05-14 NOTE — PROGRESS NOTES
No chief complaint on file.    Andreina Brasher is a 65 y.o. female.     Presents with telephone visit for medication refill -doing well otherwise -needs refills of medication     Hypertension   This is a chronic problem. The current episode started more than 1 year ago. The problem has been resolved since onset. The problem is controlled. Associated symptoms include anxiety and malaise/fatigue. Pertinent negatives include no blurred vision, chest pain, headaches, neck pain, orthopnea, palpitations, peripheral edema or PND. Past treatments include nothing. Current antihypertension treatment includes lifestyle changes. The current treatment provides significant improvement. There are no compliance problems.  There is no history of angina, kidney disease, CAD/MI, CVA, heart failure, left ventricular hypertrophy, PVD or retinopathy. There is no history of chronic renal disease, coarctation of the aorta, hyperaldosteronism, hypercortisolism, hyperparathyroidism, a hypertension causing med, pheochromocytoma, renovascular disease, sleep apnea or a thyroid problem.   Anxiety   Presents for follow-up visit. Symptoms include decreased concentration, excessive worry, irritability and nausea. Patient reports no chest pain, confusion, hyperventilation, impotence, nervous/anxious behavior, palpitations or suicidal ideas. Symptoms occur most days. The quality of sleep is good. Nighttime awakenings: none.     Compliance with medications is %.        The following portions of the patient's history were reviewed and updated as appropriate: allergies, current medications, past social history and problem list.    Review of Systems   Constitutional: Positive for activity change, appetite change, irritability and malaise/fatigue. Negative for chills, diaphoresis and unexpected weight change.   HENT: Negative for dental problem, drooling, ear discharge, sinus pressure, sinus pain, sneezing and tinnitus.    Eyes:  Negative.  Negative for blurred vision, photophobia, pain, discharge, redness, itching and visual disturbance.   Respiratory: Positive for wheezing. Negative for apnea, choking, chest tightness and stridor.         Rash both arms healing    Cardiovascular: Negative for chest pain, palpitations, orthopnea, leg swelling and PND.   Gastrointestinal: Positive for constipation and nausea. Negative for abdominal distention, abdominal pain, anal bleeding, blood in stool and rectal pain.   Endocrine: Positive for cold intolerance. Negative for heat intolerance, polydipsia, polyphagia and polyuria.   Genitourinary: Negative for impotence.   Musculoskeletal: Negative.  Negative for arthralgias, back pain, gait problem, joint swelling, myalgias, neck pain and neck stiffness.   Skin: Negative.  Negative for color change and pallor.   Allergic/Immunologic: Negative.  Negative for environmental allergies, food allergies and immunocompromised state.   Neurological: Negative.  Negative for syncope, facial asymmetry, weakness, numbness and headaches.   Hematological: Negative.  Negative for adenopathy. Does not bruise/bleed easily.   Psychiatric/Behavioral: Positive for decreased concentration and sleep disturbance. Negative for agitation, behavioral problems, confusion, dysphoric mood, hallucinations, self-injury and suicidal ideas. The patient is not nervous/anxious and is not hyperactive.          recently had a heart attack        Objective   There were no vitals taken for this visit.  Physical Exam   Vitals reviewed.      Assessment/Plan   Problem List Items Addressed This Visit        Cardiovascular and Mediastinum    Hypertension - Primary (Chronic)       Other    Anxiety    Relevant Medications    ALPRAZolam (Xanax) 0.25 MG tablet           New Medications Ordered This Visit   Medications   • ALPRAZolam (Xanax) 0.25 MG tablet     Sig: Take 1 tablet by mouth 3 (Three) Times a Day As Needed for Anxiety.     Dispense:   90 tablet     Refill:  0       This was an audio and video enabled telemedicine encounter.    This visit has been rescheduled as a phone visit to comply with patient safety concerns in accordance with CDC recommendations. Total time of discussion was 14 minutes.      Patient understands the risks associated with this controlled medication, including tolerance and addiction.  she also agrees to only obtain this medication from me, and not from a another provider, unless that provider is covering for me in my absence.  she also agrees to be compliant in dosing, and not self adjust the dose of medication.  A signed controlled substance agreement is on file, and she has received a controlled substance education sheet at this a previous visit.  she has also signed a consent for treatment with a controlled substance as per [facility] policy. KAYLAN was obtained.      meds as directed, monitor blood pressure as directed

## 2020-05-20 ENCOUNTER — OFFICE VISIT (OUTPATIENT)
Dept: PULMONOLOGY | Facility: CLINIC | Age: 65
End: 2020-05-20

## 2020-05-20 VITALS
DIASTOLIC BLOOD PRESSURE: 84 MMHG | BODY MASS INDEX: 28.67 KG/M2 | OXYGEN SATURATION: 92 % | HEIGHT: 62 IN | SYSTOLIC BLOOD PRESSURE: 140 MMHG | HEART RATE: 75 BPM | WEIGHT: 155.8 LBS

## 2020-05-20 DIAGNOSIS — J42 CHRONIC BRONCHITIS, UNSPECIFIED CHRONIC BRONCHITIS TYPE (HCC): Primary | ICD-10-CM

## 2020-05-20 DIAGNOSIS — J30.1 SEASONAL ALLERGIC RHINITIS DUE TO POLLEN: ICD-10-CM

## 2020-05-20 PROCEDURE — 99213 OFFICE O/P EST LOW 20 MIN: CPT | Performed by: INTERNAL MEDICINE

## 2020-05-20 RX ORDER — METHYLPREDNISOLONE 8 MG/1
TABLET ORAL
Qty: 30 TABLET | Refills: 1 | Status: SHIPPED | OUTPATIENT
Start: 2020-05-20 | End: 2020-08-17

## 2020-05-20 NOTE — PROGRESS NOTES
"This lady has COPD and rhinitis.  She continues to have dyspnea on exertion.  She is not producing discolored sputum    ROS    Constitutional-no night sweats weight loss headaches  GI no abdominal pain nausea or diarrhea  Neuro no seizure or neurologic deficits  Musculoskeletal no deformity or joint pain   no dysuria or hematuria  Skin no rash or other lesions  All other systems reviewed and were negative except for the above.      Physical Exam  /84   Pulse 75   Ht 157.5 cm (62\")   Wt 70.7 kg (155 lb 12.8 oz)   SpO2 92%   BMI 28.50 kg/m²   Vital signs as above  Pupils equally round and reactive to light and accommodation, neck no JVD or adenopathy.  Cardiovascular regular rhythm and rate no murmur or gallop.  Abdomen soft no organomegaly tenderness.  Extremities no clubbing cyanosis or edema.  No cervical adenopathy.  No skin rash.  Neurologic good strength bilaterally without deficits  Dyspneic white female diminished breath sounds without wheeze    Impression COPD and rhinitis    Plan continue Medrol 4 mg a day, return as needed        This document has been produced with the assistance of Dragon dictation  This document has been electronically signed by Gigi Ac MD on May 20, 2020 09:45      "

## 2020-06-01 RX ORDER — ONDANSETRON 4 MG/1
TABLET, FILM COATED ORAL
Qty: 20 TABLET | Refills: 2 | OUTPATIENT
Start: 2020-06-01

## 2020-06-01 RX ORDER — ONDANSETRON 4 MG/1
TABLET, FILM COATED ORAL
Qty: 20 TABLET | Refills: 0 | Status: SHIPPED | OUTPATIENT
Start: 2020-06-01 | End: 2020-07-27 | Stop reason: SDUPTHER

## 2020-06-03 ENCOUNTER — TELEPHONE (OUTPATIENT)
Dept: ORTHOPEDIC SURGERY | Facility: CLINIC | Age: 65
End: 2020-06-03

## 2020-06-03 NOTE — TELEPHONE ENCOUNTER
"Called and spoke with patient regarding missed prolia appt, patient states that she is not going to get the injection now \"they scare her\".   "

## 2020-06-08 RX ORDER — IPRATROPIUM BROMIDE AND ALBUTEROL SULFATE 2.5; .5 MG/3ML; MG/3ML
SOLUTION RESPIRATORY (INHALATION)
Qty: 270 ML | Refills: 2 | Status: SHIPPED | OUTPATIENT
Start: 2020-06-08 | End: 2020-09-18 | Stop reason: SDUPTHER

## 2020-06-09 ENCOUNTER — OFFICE VISIT (OUTPATIENT)
Dept: PULMONOLOGY | Facility: CLINIC | Age: 65
End: 2020-06-09

## 2020-06-09 VITALS
DIASTOLIC BLOOD PRESSURE: 75 MMHG | SYSTOLIC BLOOD PRESSURE: 135 MMHG | BODY MASS INDEX: 27.94 KG/M2 | OXYGEN SATURATION: 93 % | WEIGHT: 151.8 LBS | HEART RATE: 91 BPM | HEIGHT: 62 IN

## 2020-06-09 DIAGNOSIS — J44.1 CHRONIC OBSTRUCTIVE PULMONARY DISEASE WITH ACUTE EXACERBATION (HCC): Primary | ICD-10-CM

## 2020-06-09 PROCEDURE — 96372 THER/PROPH/DIAG INJ SC/IM: CPT | Performed by: INTERNAL MEDICINE

## 2020-06-09 PROCEDURE — 99214 OFFICE O/P EST MOD 30 MIN: CPT | Performed by: INTERNAL MEDICINE

## 2020-06-09 RX ORDER — METHYLPREDNISOLONE ACETATE 40 MG/ML
80 INJECTION, SUSPENSION INTRA-ARTICULAR; INTRALESIONAL; INTRAMUSCULAR; SOFT TISSUE ONCE
Status: COMPLETED | OUTPATIENT
Start: 2020-06-09 | End: 2020-06-09

## 2020-06-09 RX ORDER — METHYLPREDNISOLONE 8 MG/1
TABLET ORAL
Qty: 21 TABLET | Refills: 0 | Status: SHIPPED | OUTPATIENT
Start: 2020-06-09 | End: 2020-08-17

## 2020-06-09 RX ADMIN — METHYLPREDNISOLONE ACETATE 80 MG: 40 INJECTION, SUSPENSION INTRA-ARTICULAR; INTRALESIONAL; INTRAMUSCULAR; SOFT TISSUE at 11:23

## 2020-06-09 NOTE — PROGRESS NOTES
"This lady has advanced COPD with hypoxemia.  Her breathing is getting worse to the point she has O2 desaturation with minimal exercise and decreased exercise tolerance.  She is coughing and wheezing.  She is not producing purulent sputum and denies chest pain or hemoptysis    The following portions of the patient's history were reviewed and updated as appropriate: current medications, past family history, past medical history, past social history, past surgical history and problem list.      ROS    Constitutional-no night sweats weight loss headaches  GI no abdominal pain nausea or diarrhea  Neuro no seizure or neurologic deficits  Musculoskeletal no deformity or joint pain   no dysuria or hematuria  Skin no rash or other lesions  All other systems reviewed and were negative except for the above.      Physical Exam  /75   Pulse 91   Ht 157.5 cm (62\")   Wt 68.9 kg (151 lb 12.8 oz)   SpO2 93%   BMI 27.76 kg/m²   Vital signs as above  Pupils equally round and reactive to light and accommodation, neck no JVD or adenopathy.  Cardiovascular regular rhythm and rate no murmur or gallop.  Abdomen soft no organomegaly tenderness.  Extremities no clubbing cyanosis or edema.  No cervical adenopathy.  No skin rash.  Neurologic good strength bilaterally without deficits  Alert dyspneic white female lungs reveal diminished breath sounds and quiet lungs with poor air exchange, arms reveal ecchymoses bilaterally    Impression advanced COPD with exacerbation, hypoxemia    Plan Depo-Medrol, p.o. Medrol starting at 60 mg to be tapered, continue all present medications, return in 1 week        This document has been produced with the assistance of Dragon dictation  This document has been electronically signed by Gigi Ac MD on June 9, 2020 11:15      "

## 2020-06-15 DIAGNOSIS — J45.41 MODERATE PERSISTENT ASTHMA WITH ACUTE EXACERBATION: ICD-10-CM

## 2020-06-15 RX ORDER — ALBUTEROL SULFATE 90 UG/1
AEROSOL, METERED RESPIRATORY (INHALATION)
Qty: 18 G | Refills: 2 | Status: SHIPPED | OUTPATIENT
Start: 2020-06-15 | End: 2020-09-18 | Stop reason: SDUPTHER

## 2020-07-02 DIAGNOSIS — J45.41 MODERATE PERSISTENT ASTHMA WITH ACUTE EXACERBATION: ICD-10-CM

## 2020-07-02 RX ORDER — ALBUTEROL SULFATE 90 UG/1
AEROSOL, METERED RESPIRATORY (INHALATION)
Qty: 18 G | Refills: 2 | OUTPATIENT
Start: 2020-07-02

## 2020-07-03 DIAGNOSIS — F41.9 ANXIETY: ICD-10-CM

## 2020-07-06 ENCOUNTER — TELEMEDICINE (OUTPATIENT)
Dept: FAMILY MEDICINE CLINIC | Facility: CLINIC | Age: 65
End: 2020-07-06

## 2020-07-06 DIAGNOSIS — Z00.00 GENERAL MEDICAL EXAM: ICD-10-CM

## 2020-07-06 DIAGNOSIS — F41.9 ANXIETY: ICD-10-CM

## 2020-07-06 DIAGNOSIS — R73.9 HYPERGLYCEMIA: Primary | ICD-10-CM

## 2020-07-06 PROCEDURE — 99212 OFFICE O/P EST SF 10 MIN: CPT | Performed by: NURSE PRACTITIONER

## 2020-07-06 RX ORDER — ALPRAZOLAM 0.25 MG/1
0.25 TABLET ORAL 3 TIMES DAILY PRN
Qty: 90 TABLET | Refills: 0 | OUTPATIENT
Start: 2020-07-06

## 2020-07-06 RX ORDER — ALPRAZOLAM 0.25 MG/1
0.25 TABLET ORAL 3 TIMES DAILY PRN
Qty: 90 TABLET | Refills: 0 | Status: CANCELLED | OUTPATIENT
Start: 2020-07-06

## 2020-07-06 RX ORDER — ALPRAZOLAM 0.25 MG/1
0.25 TABLET ORAL 3 TIMES DAILY PRN
Qty: 90 TABLET | Refills: 0 | Status: SHIPPED | OUTPATIENT
Start: 2020-07-06 | End: 2020-07-27 | Stop reason: SDUPTHER

## 2020-07-06 NOTE — TELEPHONE ENCOUNTER
I have left message for patient to call me back to confirm if she still wants the script sent to [facility].

## 2020-07-06 NOTE — TELEPHONE ENCOUNTER
Tiffanie    Ms. Monisha Brasher is requesting a refill on her Xanax 0.25 mg #90      Last OV    05/14/2020 Phone Visit    Next [facility] OV    No Future Appointment Scheduled    Last Script Written  05/14/2020 #90, No Refil       Last [facility]     05/14/2020    Please advise on refill    Thank you

## 2020-07-06 NOTE — PROGRESS NOTES
No chief complaint on file.    Andreina Brasher is a 65 y.o. female.     Presents with telephone visit for medication refill -doing well otherwise -needs refills of medication and labs      Hypertension   This is a chronic problem. The current episode started more than 1 year ago. The problem has been resolved since onset. The problem is controlled. Associated symptoms include anxiety and malaise/fatigue. Pertinent negatives include no blurred vision, chest pain, headaches, neck pain, orthopnea, palpitations, peripheral edema, PND or shortness of breath. Past treatments include nothing. Current antihypertension treatment includes lifestyle changes. The current treatment provides significant improvement. There are no compliance problems.  There is no history of angina, kidney disease, CAD/MI, CVA, heart failure, left ventricular hypertrophy, PVD or retinopathy. There is no history of chronic renal disease, coarctation of the aorta, hyperaldosteronism, hypercortisolism, hyperparathyroidism, a hypertension causing med, pheochromocytoma, renovascular disease, sleep apnea or a thyroid problem.   Anxiety   Presents for follow-up visit. Symptoms include decreased concentration, excessive worry, irritability, nausea and nervous/anxious behavior. Patient reports no chest pain, confusion, hyperventilation, impotence, palpitations, shortness of breath or suicidal ideas. Symptoms occur most days. The quality of sleep is good. Nighttime awakenings: none.     Compliance with medications is %.   Fatigue   This is a recurrent problem. The current episode started more than 1 month ago. The problem occurs intermittently. The problem has been waxing and waning. Associated symptoms include coughing, fatigue and nausea. Pertinent negatives include no abdominal pain, arthralgias, chest pain, chills, congestion, diaphoresis, fever, headaches, joint swelling, myalgias, neck pain, numbness, rash, swollen glands or  weakness. The symptoms are aggravated by stress and exertion. She has tried rest for the symptoms. The treatment provided mild relief.        The following portions of the patient's history were reviewed and updated as appropriate: allergies, current medications, past social history and problem list.    Review of Systems   Constitutional: Positive for activity change, appetite change, fatigue, irritability and malaise/fatigue. Negative for chills, diaphoresis, fever and unexpected weight change.   HENT: Negative for congestion, dental problem, drooling, ear discharge, ear pain, facial swelling, sinus pressure, sinus pain, sneezing and tinnitus.    Eyes: Negative.  Negative for blurred vision, photophobia, pain, discharge, redness, itching and visual disturbance.   Respiratory: Positive for cough and wheezing. Negative for apnea, choking, chest tightness, shortness of breath and stridor.         Rash both arms healing    Cardiovascular: Negative for chest pain, palpitations, orthopnea, leg swelling and PND.   Gastrointestinal: Positive for constipation and nausea. Negative for abdominal distention, abdominal pain, anal bleeding, blood in stool and rectal pain.   Endocrine: Positive for cold intolerance. Negative for heat intolerance, polydipsia, polyphagia and polyuria.   Genitourinary: Negative for difficulty urinating and impotence.   Musculoskeletal: Negative.  Negative for arthralgias, back pain, gait problem, joint swelling, myalgias, neck pain and neck stiffness.   Skin: Negative.  Negative for color change, pallor and rash.   Allergic/Immunologic: Negative.  Negative for environmental allergies, food allergies and immunocompromised state.   Neurological: Positive for light-headedness. Negative for seizures, syncope, facial asymmetry, speech difficulty, weakness, numbness and headaches.   Hematological: Negative.  Negative for adenopathy. Does not bruise/bleed easily.   Psychiatric/Behavioral: Positive for  agitation, decreased concentration and sleep disturbance. Negative for behavioral problems, confusion, dysphoric mood, hallucinations, self-injury and suicidal ideas. The patient is nervous/anxious. The patient is not hyperactive.          recently had a heart attack        Objective   There were no vitals taken for this visit.  Physical Exam   Constitutional: She appears well-developed and well-nourished.   Limited exam due to telemedicine    Nursing note and vitals reviewed.      Assessment/Plan   Problem List Items Addressed This Visit        Other    Anxiety    Relevant Medications    ALPRAZolam (Xanax) 0.25 MG tablet    Other Relevant Orders    CBC & Differential    Comprehensive Metabolic Panel    Hemoglobin A1c    Iron    Lipid Panel    Vitamin D 25 Hydroxy    Vitamin B12    TSH    Magnesium    Urine Drug Screen - Urine, Clean Catch    General medical exam    Relevant Orders    CBC & Differential    Comprehensive Metabolic Panel    Hemoglobin A1c    Iron    Lipid Panel    Vitamin D 25 Hydroxy    Vitamin B12    TSH    Magnesium    Urine Drug Screen - Urine, Clean Catch    Hyperglycemia - Primary    Relevant Orders    CBC & Differential    Comprehensive Metabolic Panel    Hemoglobin A1c    Iron    Lipid Panel    Vitamin D 25 Hydroxy    Vitamin B12    TSH    Magnesium    Urine Drug Screen - Urine, Clean Catch           New Medications Ordered This Visit   Medications   • ALPRAZolam (Xanax) 0.25 MG tablet     Sig: Take 1 tablet by mouth 3 (Three) Times a Day As Needed for Anxiety.     Dispense:  90 tablet     Refill:  0       Patient understands the risks associated with this controlled medication, including tolerance and addiction.  she also agrees to only obtain this medication from me, and not from a another provider, unless that provider is covering for me in my absence.  she also agrees to be compliant in dosing, and not self adjust the dose of medication.  A signed controlled substance agreement is  on file, and she has received a controlled substance education sheet at this a previous visit.  she has also signed a consent for treatment with a controlled substance as per [facility] policy. KAYLAN was obtained.    This visit has been rescheduled as a phone visit to comply with patient safety concerns in accordance with CDC recommendations. Total time of discussion was 12 minutes.    You have chosen to receive care through a telephone visit. Do you consent to use a telephone visit for your medical care today? Yes    Fasting labs as directed, diet discussed, meds reviewed      Labs as directed-before next visit, meds as directed, uds with next visit

## 2020-07-06 NOTE — TELEPHONE ENCOUNTER
We need a telephone or video visit -can give her some if he needs it until we can do the appointment

## 2020-07-07 ENCOUNTER — TELEPHONE (OUTPATIENT)
Dept: FAMILY MEDICINE CLINIC | Facility: CLINIC | Age: 65
End: 2020-07-07

## 2020-07-07 RX ORDER — CEPHALEXIN 500 MG/1
500 CAPSULE ORAL 3 TIMES DAILY
Qty: 30 CAPSULE | Refills: 0 | Status: SHIPPED | OUTPATIENT
Start: 2020-07-07 | End: 2020-07-27

## 2020-07-07 NOTE — TELEPHONE ENCOUNTER
Pantera Elise, Tiffanie THORNE, Maki Pizano LPN   Caller: Unspecified (Today, 11:43 AM)             See if she can take Keflex 500mg tid and Silvadene cream bid      Script sent to [facility] in [place], patient called

## 2020-07-07 NOTE — TELEPHONE ENCOUNTER
Tiffanie    Ms. Brasher states she burned her arm taking something out of the oven yesterday and states it looks bad today. She is requesting an antibiotic to be called in at the [facility] in [place].  She is aware that you are out of office today

## 2020-07-08 ENCOUNTER — HOSPITAL ENCOUNTER (EMERGENCY)
Facility: HOSPITAL | Age: 65
Discharge: HOME | End: 2020-07-09
Attending: EMERGENCY MEDICINE | Admitting: EMERGENCY MEDICINE

## 2020-07-08 ENCOUNTER — APPOINTMENT (OUTPATIENT)
Dept: GENERAL RADIOLOGY | Facility: HOSPITAL | Age: 65
End: 2020-07-08

## 2020-07-08 VITALS
HEART RATE: 96 BPM | OXYGEN SATURATION: 94 % | RESPIRATION RATE: 18 BRPM | WEIGHT: 150 LBS | TEMPERATURE: 98.6 F | HEIGHT: 62 IN | BODY MASS INDEX: 27.6 KG/M2 | DIASTOLIC BLOOD PRESSURE: 77 MMHG | SYSTOLIC BLOOD PRESSURE: 122 MMHG

## 2020-07-08 DIAGNOSIS — M54.31 SCIATICA OF RIGHT SIDE: Primary | ICD-10-CM

## 2020-07-08 DIAGNOSIS — S76.011A HIP STRAIN, RIGHT, INITIAL ENCOUNTER: ICD-10-CM

## 2020-07-08 PROCEDURE — 99282 EMERGENCY DEPT VISIT SF MDM: CPT

## 2020-07-08 PROCEDURE — 73502 X-RAY EXAM HIP UNI 2-3 VIEWS: CPT

## 2020-07-08 RX ORDER — KETOROLAC TROMETHAMINE 30 MG/ML
30 INJECTION, SOLUTION INTRAMUSCULAR; INTRAVENOUS ONCE
Status: COMPLETED | OUTPATIENT
Start: 2020-07-08 | End: 2020-07-09

## 2020-07-09 PROCEDURE — 96372 THER/PROPH/DIAG INJ SC/IM: CPT

## 2020-07-09 PROCEDURE — 25010000002 KETOROLAC TROMETHAMINE PER 15 MG: Performed by: EMERGENCY MEDICINE

## 2020-07-09 PROCEDURE — 25010000002 HYDROMORPHONE 1 MG/ML SOLUTION: Performed by: EMERGENCY MEDICINE

## 2020-07-09 RX ORDER — HYDROCODONE BITARTRATE AND ACETAMINOPHEN 5; 325 MG/1; MG/1
1 TABLET ORAL EVERY 6 HOURS PRN
Qty: 12 TABLET | Refills: 0 | Status: SHIPPED | OUTPATIENT
Start: 2020-07-09 | End: 2020-11-25

## 2020-07-09 RX ORDER — CYCLOBENZAPRINE HCL 5 MG
5 TABLET ORAL 2 TIMES DAILY PRN
Qty: 21 TABLET | Refills: 0 | Status: SHIPPED | OUTPATIENT
Start: 2020-07-09 | End: 2020-10-02 | Stop reason: DRUGHIGH

## 2020-07-09 RX ADMIN — HYDROMORPHONE HYDROCHLORIDE 0.5 MG: 1 INJECTION, SOLUTION INTRAMUSCULAR; INTRAVENOUS; SUBCUTANEOUS at 00:15

## 2020-07-09 RX ADMIN — KETOROLAC TROMETHAMINE 30 MG: 30 INJECTION, SOLUTION INTRAMUSCULAR at 00:14

## 2020-07-09 NOTE — DISCHARGE INSTRUCTIONS
Restart steroid course.  Use walker for ambulation.  Followup with your doctor in one week for further evaluation should symptoms persist.

## 2020-07-09 NOTE — ED PROVIDER NOTES
Subjective   Patient presents emergency department complaint of hip pain.  This is the right posterior hip with some radiation down the leg.  Patient notes that she tweaked this approximately 48 hours ago while she was in her camper.  Patient noted twisting-like motion with pain in the piriformis area with some radiation down the right thigh.  This has continued causing difficulty with ambulation.  There is been no loss of bowel or bladder control.  No urinary retention, no saddle anesthesia, no bilateral symptoms, they have been mostly isolated to the right.  Patient has been ambulatory with this with assistance from her .  Patient has been taking Tylenol for this.          Review of Systems   Constitutional: Negative.  Negative for appetite change, chills and fever.   HENT: Negative.  Negative for congestion.    Eyes: Negative.  Negative for photophobia and visual disturbance.   Respiratory: Negative.  Negative for cough, chest tightness and shortness of breath.    Cardiovascular: Negative.  Negative for chest pain and palpitations.   Gastrointestinal: Negative.  Negative for abdominal pain, constipation, diarrhea, nausea and vomiting.   Endocrine: Negative.    Genitourinary: Negative.  Negative for decreased urine volume, dysuria, flank pain and hematuria.   Musculoskeletal: Positive for back pain. Negative for arthralgias, myalgias, neck pain and neck stiffness.   Skin: Negative.  Negative for pallor.   Neurological: Negative.  Negative for dizziness, syncope, weakness, light-headedness, numbness and headaches.   Psychiatric/Behavioral: Negative.  Negative for confusion and suicidal ideas. The patient is not nervous/anxious.    All other systems reviewed and are negative.      Past Medical History:   Diagnosis Date   • Acute bronchitis    • Acute upper respiratory infection    • Adjustment disorder with mixed emotional features    • Agoraphobia with panic attacks    • Allergic rhinitis due to pollen    •  Anxiety    • Asthma    • Asthma    • Backache    • Blood in urine    • Breast cyst    • Candidiasis of mouth    • Chronic bronchitis (CMS/[facility])    • Chronic obstructive lung disease (CMS/[facility])    • COPD (chronic obstructive pulmonary disease) (CMS/[facility])    • Diabetes (CMS/[facility])    • Diabetes (CMS/[facility])    • Emphysema (subcutaneous) (surgical) resulting from a procedure    • Emphysema lung (CMS/[facility])    • Essential hypertension    • Extrinsic asthma with status asthmaticus    • Fatigue    • H/O echocardiogram     EF 55-60%. LV systolic function normal. Impaired LV relaxation. [facility]   • Heart attack (CMS/[facility]) 2015   • Heart problem    • High blood pressure    • Hypoxemia    • Malaise and fatigue    • Non-IgE mediated allergic asthma    • Nonvenomous insect bite    • Pneumonia    • PONV (postoperative nausea and vomiting)    • Postmenopausal state    • Rheumatoid arthritis (CMS/[facility])    • Urinary tract infectious disease        No Known Allergies    Past Surgical History:   Procedure Laterality Date   • BREAST BIOPSY     • CARDIAC CATHETERIZATION N/A 11/15/2018    Procedure: Left Heart Cath;  Surgeon: Thaddeus Huber MD;  Location: [facility] INVASIVE LOCATION;  Service: Cardiology   • CARDIAC CATHETERIZATION N/A 2019    Procedure: Left Heart Cath 2019 @ 8:00 AM;  Surgeon: Thaddeus Huber MD;  Location: [facility] CATH INVASIVE LOCATION;  Service: Cardiology   •  SECTION     • HYSTERECTOMY     • INJECTION OF MEDICATION  2015    celestone(1)   • INJECTION OF MEDICATION  2016    DEPO MEDROL(16)   • LEG SURGERY Bilateral        Family History   Problem Relation Age of Onset   • Heart attack Mother    • Heart disease Mother    • Heart disease Father    • Heart disease Paternal Grandmother    • Osteoporosis Other        Social History     Socioeconomic History   • Marital status:      Spouse name: Not on file   • Number of children: Not on file   • Years of education: Not on  file   • Highest education level: Not on file   Tobacco Use   • Smoking status: Former Smoker     Packs/day: 1.00     Years: 48.00     Pack years: 48.00     Types: Cigarettes, Electronic Cigarette     Start date:      Last attempt to quit: 2018     Years since quittin.6   • Smokeless tobacco: Never Used   Substance and Sexual Activity   • Alcohol use: Yes     Frequency: Never     Comment: rarely   • Drug use: No   • Sexual activity: Defer           Objective   Physical Exam   Constitutional: She is oriented to person, place, and time. She appears well-developed and well-nourished. No distress.   HENT:   Head: Normocephalic and atraumatic.   Nose: Nose normal.   Mouth/Throat: Oropharynx is clear and moist.   Eyes: Conjunctivae and EOM are normal. No scleral icterus.   Neck: Normal range of motion. Neck supple. No JVD present.   Cardiovascular: Normal rate, regular rhythm, normal heart sounds and intact distal pulses. Exam reveals no gallop and no friction rub.   No murmur heard.  Pulmonary/Chest: Effort normal. No respiratory distress. She has no wheezes. She has no rales. She exhibits no tenderness.   Abdominal: Soft. She exhibits no distension and no mass. There is no tenderness. There is no rebound and no guarding.   Musculoskeletal: Normal range of motion. She exhibits no edema, tenderness or deformity.        Arms:  Patient is able to lift the right lower extremity against gravity.  Patient has range of motion the hip, lower suspicion for fracture in the area.  Patient does have pain in the piriformis area with possible sciatica in this region.  Normal reflexes, 2+ distal pulses.  Symmetric   Lymphadenopathy:     She has no cervical adenopathy.   Neurological: She is alert and oriented to person, place, and time. No cranial nerve deficit. She exhibits normal muscle tone.   Skin: Skin is warm and dry. No rash noted. She is not diaphoretic. No erythema. No pallor.   Psychiatric: She has a normal mood  and affect. Her behavior is normal. Judgment and thought content normal.   Nursing note and vitals reviewed.      Procedures           ED Course                                 Labs Reviewed - No data to display    XR Hip With or Without Pelvis 2 - 3 View Right   Final Result      No acute findings in the pelvis or right hip.      Electronically signed by:  Nathalie Ramesh MD  7/9/2020 12:30 AM CDT   Workstation: [id number]        No acute fractures noted.  Signs and symptoms consistent with sciatica.  Plan walking with a walker.  The patient states that her father has one that she can borrow and does not wish a prescription.        MDM    Final diagnoses:   Sciatica of right side   Hip strain, right, initial encounter            Hermann Medina MD  07/09/20 0047

## 2020-07-21 RX ORDER — IPRATROPIUM BROMIDE AND ALBUTEROL SULFATE 2.5; .5 MG/3ML; MG/3ML
SOLUTION RESPIRATORY (INHALATION)
Qty: 270 ML | Refills: 2 | OUTPATIENT
Start: 2020-07-21

## 2020-07-27 ENCOUNTER — OFFICE VISIT (OUTPATIENT)
Dept: FAMILY MEDICINE CLINIC | Facility: CLINIC | Age: 65
End: 2020-07-27

## 2020-07-27 VITALS
SYSTOLIC BLOOD PRESSURE: 120 MMHG | HEIGHT: 62 IN | HEART RATE: 112 BPM | WEIGHT: 143 LBS | OXYGEN SATURATION: 96 % | BODY MASS INDEX: 26.31 KG/M2 | TEMPERATURE: 97.6 F | DIASTOLIC BLOOD PRESSURE: 86 MMHG

## 2020-07-27 DIAGNOSIS — I10 ESSENTIAL HYPERTENSION: ICD-10-CM

## 2020-07-27 DIAGNOSIS — F41.9 ANXIETY: ICD-10-CM

## 2020-07-27 DIAGNOSIS — M54.41 ACUTE BILATERAL LOW BACK PAIN WITH RIGHT-SIDED SCIATICA: Primary | ICD-10-CM

## 2020-07-27 DIAGNOSIS — J43.1 PANLOBULAR EMPHYSEMA (HCC): ICD-10-CM

## 2020-07-27 PROCEDURE — 99214 OFFICE O/P EST MOD 30 MIN: CPT | Performed by: NURSE PRACTITIONER

## 2020-07-27 RX ORDER — ALPRAZOLAM 0.25 MG/1
0.25 TABLET ORAL 3 TIMES DAILY PRN
Qty: 90 TABLET | Refills: 0 | Status: SHIPPED | OUTPATIENT
Start: 2020-07-27 | End: 2020-09-09 | Stop reason: SDUPTHER

## 2020-07-27 RX ORDER — METHYLPREDNISOLONE 4 MG/1
TABLET ORAL
Qty: 21 TABLET | Refills: 0 | Status: SHIPPED | OUTPATIENT
Start: 2020-07-27 | End: 2020-08-17

## 2020-07-27 RX ORDER — BUDESONIDE AND FORMOTEROL FUMARATE DIHYDRATE 160; 4.5 UG/1; UG/1
AEROSOL RESPIRATORY (INHALATION)
Qty: 10.2 G | Refills: 5 | Status: SHIPPED | OUTPATIENT
Start: 2020-07-27 | End: 2021-01-05 | Stop reason: SDUPTHER

## 2020-07-27 RX ORDER — ONDANSETRON 4 MG/1
4 TABLET, FILM COATED ORAL EVERY 8 HOURS PRN
Qty: 20 TABLET | Refills: 2 | Status: SHIPPED | OUTPATIENT
Start: 2020-07-27 | End: 2020-09-10

## 2020-07-27 NOTE — PROGRESS NOTES
Chief Complaint   Patient presents with   • Sciatica   • Referral - Lung Txp   • Med Refill     Subjective   Monisha Brasher is a 65 y.o. female.     Presents with needing refills of medication reports low back pain with radiation of pain to right hip-seen in er doing well -following with chiropractor 2-3 days a week     Hypertension   This is a chronic problem. The current episode started more than 1 year ago. The problem has been resolved since onset. The problem is controlled. Associated symptoms include anxiety and malaise/fatigue. Pertinent negatives include no blurred vision, chest pain, headaches, neck pain, orthopnea, palpitations, peripheral edema, PND or shortness of breath. Past treatments include nothing. Current antihypertension treatment includes lifestyle changes. The current treatment provides significant improvement. There are no compliance problems.  There is no history of angina, kidney disease, CAD/MI, CVA, heart failure, left ventricular hypertrophy, PVD or retinopathy. There is no history of chronic renal disease, coarctation of the aorta, hyperaldosteronism, hypercortisolism, hyperparathyroidism, a hypertension causing med, pheochromocytoma, renovascular disease, sleep apnea or a thyroid problem.   Anxiety   Presents for follow-up visit. Symptoms include decreased concentration, excessive worry, irritability, nausea and nervous/anxious behavior. Patient reports no chest pain, confusion, hyperventilation, impotence, palpitations, shortness of breath or suicidal ideas. Symptoms occur most days. The quality of sleep is good. Nighttime awakenings: none.     Compliance with medications is %.   Fatigue   This is a recurrent problem. The current episode started more than 1 month ago. The problem occurs intermittently. The problem has been waxing and waning. Associated symptoms include arthralgias, coughing, fatigue, joint swelling, myalgias and nausea. Pertinent negatives include no  abdominal pain, chest pain, chills, congestion, diaphoresis, fever, headaches, neck pain, numbness, rash, swollen glands, vomiting or weakness. The symptoms are aggravated by stress and exertion. She has tried rest for the symptoms. The treatment provided mild relief.   Back Pain   This is a recurrent problem. The current episode started more than 1 month ago. The problem occurs intermittently. The problem has been gradually worsening since onset. The pain is present in the lumbar spine. The quality of the pain is described as burning. The pain radiates to the left foot. The pain is at a severity of 3/10. The pain is mild. The pain is the same all the time. Stiffness is present all day. Associated symptoms include leg pain, paresis and paresthesias. Pertinent negatives include no abdominal pain, bladder incontinence, bowel incontinence, chest pain, dysuria, fever, headaches, numbness, pelvic pain, perianal numbness, tingling or weakness. Risk factors include sedentary lifestyle and obesity. She has tried analgesics, bed rest, home exercises, heat, muscle relaxant, ice, chiropractic manipulation, NSAIDs and walking (chiropractor ) for the symptoms. The treatment provided mild relief.        The following portions of the patient's history were reviewed and updated as appropriate: allergies, current medications, past social history and problem list.    Review of Systems   Constitutional: Positive for activity change, appetite change, fatigue, irritability and malaise/fatigue. Negative for chills, diaphoresis, fever and unexpected weight change.   HENT: Negative for congestion, dental problem, drooling, ear discharge, ear pain, facial swelling, hearing loss, mouth sores, nosebleeds, sinus pressure, sinus pain, sneezing, tinnitus and trouble swallowing.    Eyes: Negative.  Negative for blurred vision, photophobia, pain, discharge, redness, itching and visual disturbance.   Respiratory: Positive for cough and wheezing.  "Negative for apnea, choking, chest tightness, shortness of breath and stridor.         Rash both arms healing    Cardiovascular: Negative for chest pain, palpitations, orthopnea, leg swelling and PND.   Gastrointestinal: Positive for constipation and nausea. Negative for abdominal distention, abdominal pain, anal bleeding, blood in stool, bowel incontinence, diarrhea, rectal pain and vomiting.   Endocrine: Positive for cold intolerance. Negative for heat intolerance, polydipsia, polyphagia and polyuria.   Genitourinary: Negative for bladder incontinence, difficulty urinating, dyspareunia, dysuria, impotence and pelvic pain.   Musculoskeletal: Positive for arthralgias, back pain, gait problem, joint swelling and myalgias. Negative for neck pain and neck stiffness.        Lumbar pain with radiation of pain to right hip    Skin: Negative.  Negative for color change, pallor and rash.   Allergic/Immunologic: Negative.  Negative for environmental allergies, food allergies and immunocompromised state.   Neurological: Positive for paresthesias. Negative for tingling, tremors, syncope, facial asymmetry, weakness, numbness and headaches.   Hematological: Negative.  Negative for adenopathy. Does not bruise/bleed easily.   Psychiatric/Behavioral: Positive for decreased concentration and sleep disturbance. Negative for agitation, behavioral problems, confusion, dysphoric mood, hallucinations, self-injury and suicidal ideas. The patient is nervous/anxious. The patient is not hyperactive.          recently had a heart attack        Objective   /86   Pulse 112   Temp 97.6 °F (36.4 °C) (Tympanic)   Ht 157.5 cm (62\")   Wt 64.9 kg (143 lb)   SpO2 96%   BMI 26.16 kg/m²   Physical Exam   Constitutional: She appears well-developed and well-nourished.   HENT:   Head: Normocephalic.   Eyes: Pupils are equal, round, and reactive to light.   Neck: Normal range of motion.   Cardiovascular: Normal rate.   Pulmonary/Chest: " Effort normal. No stridor. She has no wheezes. She has no rales. She exhibits no tenderness.   Abdominal: She exhibits no distension and no mass. There is no tenderness. There is no rebound and no guarding. No hernia.   Musculoskeletal: She exhibits tenderness.        Right shoulder: She exhibits bony tenderness, pain and spasm.        Arms:  Neurological: She displays normal reflexes. No cranial nerve deficit or sensory deficit. She exhibits normal muscle tone. Coordination normal.   Vitals reviewed.      Assessment/Plan   Problem List Items Addressed This Visit        Cardiovascular and Mediastinum    Essential hypertension (Chronic)       Respiratory    Panlobular emphysema (CMS/HCC)    Relevant Medications    budesonide-formoterol (Symbicort) 160-4.5 MCG/ACT inhaler    methylPREDNISolone (MEDROL, MJ,) 4 MG tablet    Other Relevant Orders    Ambulatory Referral to Pulmonology    Urine Drug Screen - Urine, Clean Catch    CBC & Differential    Comprehensive Metabolic Panel    Hemoglobin A1c    Iron    Lipid Panel    Vitamin D 25 Hydroxy    Vitamin B12    TSH    Magnesium       Nervous and Auditory    Acute bilateral low back pain with right-sided sciatica - Primary    Relevant Orders    Urine Drug Screen - Urine, Clean Catch    CBC & Differential    Comprehensive Metabolic Panel    Hemoglobin A1c    Iron    Lipid Panel    Vitamin D 25 Hydroxy    Vitamin B12    TSH    Magnesium       Other    Anxiety    Relevant Medications    ALPRAZolam (Xanax) 0.25 MG tablet    Other Relevant Orders    Urine Drug Screen - Urine, Clean Catch    CBC & Differential    Comprehensive Metabolic Panel    Hemoglobin A1c    Iron    Lipid Panel    Vitamin D 25 Hydroxy    Vitamin B12    TSH    Magnesium           New Medications Ordered This Visit   Medications   • budesonide-formoterol (Symbicort) 160-4.5 MCG/ACT inhaler     Sig: INHALE 2 PUFFS BY MOUTH TWICE DAILY     Dispense:  10.2 g     Refill:  5   • ondansetron (ZOFRAN) 4 MG tablet      Sig: Take 1 tablet by mouth Every 8 (Eight) Hours As Needed for Nausea or Vomiting.     Dispense:  20 tablet     Refill:  2   • methylPREDNISolone (MEDROL, MJ,) 4 MG tablet     Sig: Take as directed on package instructions.     Dispense:  21 tablet     Refill:  0   • ALPRAZolam (Xanax) 0.25 MG tablet     Sig: Take 1 tablet by mouth 3 (Three) Times a Day As Needed for Anxiety.     Dispense:  90 tablet     Refill:  0      patient previous of Dr Ac and requesting referral to Dr Callahan, referral made as directed     Patient understands the risks associated with this controlled medication, including tolerance and addiction.  she also agrees to only obtain this medication from me, and not from a another provider, unless that provider is covering for me in my absence.  she also agrees to be compliant in dosing, and not self adjust the dose of medication.  A signed controlled substance agreement is on file, and she has received a controlled substance education sheet at this a previous visit.  she has also signed a consent for treatment with a controlled substance as per [facility] policy. KAYLAN was obtained.    It's not just what you eat, but when you eat  Eat breakfast, and eat smaller meals throughout the day. A healthy breakfast can jumpstart your metabolism, while eating small, healthy meals (rather than the standard three large meals) keeps your energy up.   Avoid eating at night. Try to eat dinner earlier and fast for 14-16 hours until breakfast the next morning. Studies suggest that eating only when you’re most active and giving your digestive system a long break each day may help to regulate weight.       meds as directed, diet discussed, follow up if worsen     If back pain persist will need to follow up with a pt referral as directed -patient agrees with plan of action

## 2020-08-17 ENCOUNTER — TELEPHONE (OUTPATIENT)
Dept: FAMILY MEDICINE CLINIC | Facility: CLINIC | Age: 65
End: 2020-08-17

## 2020-08-17 RX ORDER — METHYLPREDNISOLONE 4 MG/1
TABLET ORAL
Qty: 21 TABLET | Refills: 0 | Status: SHIPPED | OUTPATIENT
Start: 2020-08-17 | End: 2020-09-18

## 2020-08-17 NOTE — TELEPHONE ENCOUNTER
Patient is needing refills on ALPRAZolam (Xanax) 0.25 MG tablet     Sent to   Pharmacy:  [id number] - [place], [place] - [address] AT [facility] [address] [facility] [address] - [phone]  - [phone] FX Phone:  [phone] Fax:  [phone]    Address:  [address] [place] [address]        Please and would also like for someone to give her a call please

## 2020-08-17 NOTE — TELEPHONE ENCOUNTER
Tiffanie,    The script was on hold at the pharmacy,for her Xanax 0.25 mg they will refill the script.  It had been placed on hold when it was sent on 2020.    She is asking for Prednisone 4 mg?  Said she takes this daily?  I see where a Medrol Dose Pack was sent on 2020 for #21 tabs.     This looks to be something Dr XOCHITL Ac would send her monthly.   Looks like he would send Medrol 8 mg with the instructions to take 2 tablets daily for 1 week then 1 tablet daily for an additional week.     She also has an  script from Dr XOCHITL Ac for Deltasone 10 mg #21 tabs from 2020 with No Refills. I have DC that script from her med list.     Please Advise on the Prednisone 3 mg.

## 2020-08-17 NOTE — TELEPHONE ENCOUNTER
Tiffanie    Ms. Monisha Brasher is requesting a refill on her Xanax 0.25 mg #90    Last OV    07/27/2020      Next Yovana OV    9/9/2020    Last Script Written  07/27/2020  #90, No Refill       Last Chandler     07/08/2020     Please advise on refill    Thank you

## 2020-09-09 ENCOUNTER — OFFICE VISIT (OUTPATIENT)
Dept: FAMILY MEDICINE CLINIC | Facility: CLINIC | Age: 65
End: 2020-09-09

## 2020-09-09 ENCOUNTER — LAB (OUTPATIENT)
Dept: LAB | Facility: HOSPITAL | Age: 65
End: 2020-09-09

## 2020-09-09 VITALS
DIASTOLIC BLOOD PRESSURE: 80 MMHG | WEIGHT: 148 LBS | TEMPERATURE: 98.5 F | SYSTOLIC BLOOD PRESSURE: 120 MMHG | BODY MASS INDEX: 27.23 KG/M2 | HEIGHT: 62 IN

## 2020-09-09 DIAGNOSIS — F41.9 ANXIETY: Primary | ICD-10-CM

## 2020-09-09 DIAGNOSIS — M54.41 RIGHT-SIDED LOW BACK PAIN WITH RIGHT-SIDED SCIATICA, UNSPECIFIED CHRONICITY: ICD-10-CM

## 2020-09-09 DIAGNOSIS — S81.802A WOUND OF LEFT LOWER EXTREMITY, INITIAL ENCOUNTER: ICD-10-CM

## 2020-09-09 LAB
25(OH)D3 SERPL-MCNC: 31.6 NG/ML (ref 30–100)
ALBUMIN SERPL-MCNC: 4 G/DL (ref 3.5–5.2)
ALBUMIN/GLOB SERPL: 1.3 G/DL
ALP SERPL-CCNC: 79 U/L (ref 39–117)
ALT SERPL W P-5'-P-CCNC: 19 U/L (ref 1–33)
ANION GAP SERPL CALCULATED.3IONS-SCNC: 12.3 MMOL/L (ref 5–15)
AST SERPL-CCNC: 14 U/L (ref 1–32)
BASOPHILS # BLD AUTO: 0.13 10*3/MM3 (ref 0–0.2)
BASOPHILS NFR BLD AUTO: 1.2 % (ref 0–1.5)
BILIRUB SERPL-MCNC: 0.4 MG/DL (ref 0–1.2)
BUN SERPL-MCNC: 9 MG/DL (ref 8–23)
BUN/CREAT SERPL: 12.7 (ref 7–25)
CALCIUM SPEC-SCNC: 9.8 MG/DL (ref 8.6–10.5)
CHLORIDE SERPL-SCNC: 100 MMOL/L (ref 98–107)
CHOLEST SERPL-MCNC: 152 MG/DL (ref 0–200)
CO2 SERPL-SCNC: 26.7 MMOL/L (ref 22–29)
CREAT SERPL-MCNC: 0.71 MG/DL (ref 0.57–1)
DEPRECATED RDW RBC AUTO: 50.3 FL (ref 37–54)
EOSINOPHIL # BLD AUTO: 0.17 10*3/MM3 (ref 0–0.4)
EOSINOPHIL NFR BLD AUTO: 1.6 % (ref 0.3–6.2)
ERYTHROCYTE [DISTWIDTH] IN BLOOD BY AUTOMATED COUNT: 14.6 % (ref 12.3–15.4)
GFR SERPL CREATININE-BSD FRML MDRD: 83 ML/MIN/1.73
GLOBULIN UR ELPH-MCNC: 3.1 GM/DL
GLUCOSE SERPL-MCNC: 105 MG/DL (ref 65–99)
HBA1C MFR BLD: 5.7 % (ref 4.8–5.6)
HCT VFR BLD AUTO: 43.4 % (ref 34–46.6)
HDLC SERPL-MCNC: 61 MG/DL (ref 40–60)
HGB BLD-MCNC: 14.4 G/DL (ref 12–15.9)
IMM GRANULOCYTES # BLD AUTO: 0.06 10*3/MM3 (ref 0–0.05)
IMM GRANULOCYTES NFR BLD AUTO: 0.6 % (ref 0–0.5)
IRON 24H UR-MRATE: 77 MCG/DL (ref 37–145)
LDLC SERPL CALC-MCNC: 44 MG/DL (ref 0–100)
LDLC/HDLC SERPL: 0.71 {RATIO}
LYMPHOCYTES # BLD AUTO: 1.06 10*3/MM3 (ref 0.7–3.1)
LYMPHOCYTES NFR BLD AUTO: 10.2 % (ref 19.6–45.3)
MAGNESIUM SERPL-MCNC: 2.1 MG/DL (ref 1.6–2.4)
MCH RBC QN AUTO: 31.2 PG (ref 26.6–33)
MCHC RBC AUTO-ENTMCNC: 33.2 G/DL (ref 31.5–35.7)
MCV RBC AUTO: 94.1 FL (ref 79–97)
MONOCYTES # BLD AUTO: 0.65 10*3/MM3 (ref 0.1–0.9)
MONOCYTES NFR BLD AUTO: 6.2 % (ref 5–12)
NEUTROPHILS NFR BLD AUTO: 8.37 10*3/MM3 (ref 1.7–7)
NEUTROPHILS NFR BLD AUTO: 80.2 % (ref 42.7–76)
NRBC BLD AUTO-RTO: 0 /100 WBC (ref 0–0.2)
PLATELET # BLD AUTO: 316 10*3/MM3 (ref 140–450)
PMV BLD AUTO: 9.6 FL (ref 6–12)
POTASSIUM SERPL-SCNC: 4.4 MMOL/L (ref 3.5–5.2)
PROT SERPL-MCNC: 7.1 G/DL (ref 6–8.5)
RBC # BLD AUTO: 4.61 10*6/MM3 (ref 3.77–5.28)
SODIUM SERPL-SCNC: 139 MMOL/L (ref 136–145)
TRIGL SERPL-MCNC: 237 MG/DL (ref 0–150)
TSH SERPL DL<=0.05 MIU/L-ACNC: 0.75 UIU/ML (ref 0.27–4.2)
VIT B12 BLD-MCNC: 546 PG/ML (ref 211–946)
VLDLC SERPL-MCNC: 47.4 MG/DL (ref 5–40)
WBC # BLD AUTO: 10.44 10*3/MM3 (ref 3.4–10.8)

## 2020-09-09 PROCEDURE — 80061 LIPID PANEL: CPT | Performed by: NURSE PRACTITIONER

## 2020-09-09 PROCEDURE — 83735 ASSAY OF MAGNESIUM: CPT | Performed by: NURSE PRACTITIONER

## 2020-09-09 PROCEDURE — 83036 HEMOGLOBIN GLYCOSYLATED A1C: CPT | Performed by: NURSE PRACTITIONER

## 2020-09-09 PROCEDURE — 82607 VITAMIN B-12: CPT | Performed by: NURSE PRACTITIONER

## 2020-09-09 PROCEDURE — 80053 COMPREHEN METABOLIC PANEL: CPT | Performed by: NURSE PRACTITIONER

## 2020-09-09 PROCEDURE — 36415 COLL VENOUS BLD VENIPUNCTURE: CPT | Performed by: NURSE PRACTITIONER

## 2020-09-09 PROCEDURE — 84443 ASSAY THYROID STIM HORMONE: CPT | Performed by: NURSE PRACTITIONER

## 2020-09-09 PROCEDURE — 82306 VITAMIN D 25 HYDROXY: CPT | Performed by: NURSE PRACTITIONER

## 2020-09-09 PROCEDURE — 85025 COMPLETE CBC W/AUTO DIFF WBC: CPT | Performed by: NURSE PRACTITIONER

## 2020-09-09 PROCEDURE — 83540 ASSAY OF IRON: CPT | Performed by: NURSE PRACTITIONER

## 2020-09-09 PROCEDURE — 99214 OFFICE O/P EST MOD 30 MIN: CPT | Performed by: NURSE PRACTITIONER

## 2020-09-09 RX ORDER — METHYLPREDNISOLONE 8 MG/1
8 TABLET ORAL DAILY
Qty: 30 TABLET | Refills: 11 | Status: SHIPPED | OUTPATIENT
Start: 2020-09-09 | End: 2021-04-30 | Stop reason: SDUPTHER

## 2020-09-09 RX ORDER — ALPRAZOLAM 0.25 MG/1
0.25 TABLET ORAL 3 TIMES DAILY PRN
Qty: 90 TABLET | Refills: 0 | Status: SHIPPED | OUTPATIENT
Start: 2020-09-09 | End: 2020-11-09 | Stop reason: SDUPTHER

## 2020-09-09 NOTE — PROGRESS NOTES
Chief Complaint   Patient presents with   • Sciatica     flared up    • Skin Ulcer     left lower leg   • Leg Swelling     left leg     Subjective   Monisha Brasher is a 65 y.o. female.     Presents with recheck of anxiety, wound not healing , recheck of hypertension     Hypertension   This is a chronic problem. The current episode started more than 1 year ago. The problem has been resolved since onset. The problem is controlled. Associated symptoms include anxiety and malaise/fatigue. Pertinent negatives include no blurred vision, chest pain, headaches, neck pain, orthopnea, palpitations, peripheral edema, PND or shortness of breath. Past treatments include nothing. Current antihypertension treatment includes lifestyle changes. The current treatment provides significant improvement. There are no compliance problems.  There is no history of angina, kidney disease, CAD/MI, CVA, heart failure, left ventricular hypertrophy, PVD or retinopathy. There is no history of chronic renal disease, coarctation of the aorta, hyperaldosteronism, hypercortisolism, hyperparathyroidism, a hypertension causing med, pheochromocytoma, renovascular disease, sleep apnea or a thyroid problem.   Anxiety   Presents for follow-up visit. Symptoms include decreased concentration, excessive worry, irritability, nausea and nervous/anxious behavior. Patient reports no chest pain, confusion, hyperventilation, impotence, palpitations, shortness of breath or suicidal ideas. Symptoms occur most days. The quality of sleep is good. Nighttime awakenings: none.     There is no history of asthma. Compliance with medications is %.   Fatigue   This is a recurrent problem. The current episode started more than 1 month ago. The problem occurs intermittently. The problem has been waxing and waning. Associated symptoms include arthralgias, coughing, fatigue, joint swelling, myalgias, nausea and a rash. Pertinent negatives include no abdominal pain,  anorexia, chest pain, chills, congestion, diaphoresis, fever, headaches, neck pain, numbness, sore throat, swollen glands, vomiting or weakness. The symptoms are aggravated by stress and exertion. She has tried rest for the symptoms. The treatment provided mild relief.   Back Pain   This is a recurrent problem. The current episode started more than 1 month ago. The problem occurs intermittently. The problem has been gradually worsening since onset. The pain is present in the lumbar spine. The quality of the pain is described as burning. The pain radiates to the left foot. The pain is at a severity of 3/10. The pain is mild. The pain is the same all the time. Stiffness is present all day. Associated symptoms include leg pain, paresis and paresthesias. Pertinent negatives include no abdominal pain, bladder incontinence, bowel incontinence, chest pain, dysuria, fever, headaches, numbness, pelvic pain, perianal numbness, tingling or weakness. Risk factors include sedentary lifestyle and obesity. She has tried analgesics, bed rest, home exercises, heat, muscle relaxant, ice, chiropractic manipulation, NSAIDs and walking (chiropractor ) for the symptoms. The treatment provided mild relief.   Rash   This is a new problem. The current episode started more than 1 month ago. The problem has been gradually worsening since onset. Location: left lower leg not healing  She was exposed to nothing. Associated symptoms include coughing and fatigue. Pertinent negatives include no anorexia, congestion, diarrhea, eye pain, facial edema, fever, joint pain, nail changes, rhinorrhea, shortness of breath, sore throat or vomiting. The treatment provided mild relief. There is no history of allergies, asthma, eczema or varicella.        The following portions of the patient's history were reviewed and updated as appropriate: allergies, current medications, past social history and problem list.    Review of Systems   Constitutional: Positive  for activity change, appetite change, fatigue, irritability and malaise/fatigue. Negative for chills, diaphoresis, fever and unexpected weight change.   HENT: Negative for congestion, dental problem, drooling, ear discharge, ear pain, facial swelling, hearing loss, mouth sores, nosebleeds, postnasal drip, rhinorrhea, sinus pressure, sinus pain, sneezing, sore throat, tinnitus, trouble swallowing and voice change.    Eyes: Negative.  Negative for blurred vision, photophobia, pain, discharge, redness, itching and visual disturbance.   Respiratory: Positive for cough and wheezing. Negative for apnea, choking, chest tightness, shortness of breath and stridor.         Rash both arms healing    Cardiovascular: Negative for chest pain, palpitations, orthopnea, leg swelling and PND.   Gastrointestinal: Positive for constipation and nausea. Negative for abdominal distention, abdominal pain, anal bleeding, anorexia, blood in stool, bowel incontinence, diarrhea, rectal pain and vomiting.   Endocrine: Positive for cold intolerance. Negative for heat intolerance, polydipsia, polyphagia and polyuria.   Genitourinary: Negative for bladder incontinence, difficulty urinating, dyspareunia, dysuria, impotence and pelvic pain.   Musculoskeletal: Positive for arthralgias, back pain, gait problem, joint swelling and myalgias. Negative for joint pain, neck pain and neck stiffness.        Lumbar pain with radiation of pain to right hip    Skin: Positive for rash and wound. Negative for color change, nail changes and pallor.        Left lower leg not healing    Allergic/Immunologic: Negative.  Negative for environmental allergies, food allergies and immunocompromised state.   Neurological: Positive for paresthesias. Negative for tingling, tremors, syncope, facial asymmetry, weakness, numbness and headaches.   Hematological: Negative.  Negative for adenopathy. Does not bruise/bleed easily.   Psychiatric/Behavioral: Positive for decreased  "concentration and sleep disturbance. Negative for agitation, behavioral problems, confusion, dysphoric mood, hallucinations, self-injury and suicidal ideas. The patient is nervous/anxious. The patient is not hyperactive.          recently had a heart attack -she is very stressed        Objective   /80   Temp 98.5 °F (36.9 °C) (Tympanic)   Ht 157.5 cm (62\")   Wt 67.1 kg (148 lb)   BMI 27.07 kg/m²   Physical Exam   Constitutional: She appears well-developed and well-nourished.   HENT:   Head: Normocephalic.   Eyes: Pupils are equal, round, and reactive to light.   Neck: Normal range of motion.   Cardiovascular: Normal rate.   Pulmonary/Chest: Effort normal. No stridor. She has no wheezes. She has no rales. She exhibits no tenderness.   Abdominal: She exhibits no distension and no mass. There is no tenderness. There is no rebound and no guarding. No hernia.   Musculoskeletal: She exhibits tenderness. She exhibits no edema or deformity.        Right shoulder: She exhibits bony tenderness, pain and spasm.        Lumbar back: She exhibits bony tenderness, pain and spasm.        Back:         Arms:  Neurological: She displays normal reflexes. No cranial nerve deficit or sensory deficit. She exhibits normal muscle tone. Coordination normal.   Skin: Rash noted. No erythema. No pallor.   2 cm x 2cm left lower leg irregular shape and wound not healing -slight distal edema -chronic for her.    Vitals reviewed.      Assessment/Plan   Problem List Items Addressed This Visit        Nervous and Auditory    Right-sided low back pain with right-sided sciatica    Relevant Orders    CBC & Differential    Comprehensive Metabolic Panel    Iron    Lipid Panel    Vitamin D 25 Hydroxy    Vitamin B12    TSH    Magnesium    Hemoglobin A1c       Other    Anxiety - Primary    Relevant Medications    ALPRAZolam (Xanax) 0.25 MG tablet    Other Relevant Orders    CBC & Differential    Comprehensive Metabolic Panel    Iron    Lipid " Panel    Vitamin D 25 Hydroxy    Vitamin B12    TSH    Magnesium    Hemoglobin A1c    Wound of left leg    Relevant Orders    Ambulatory Referral to Wound Clinic    CBC & Differential    Comprehensive Metabolic Panel    Iron    Lipid Panel    Vitamin D 25 Hydroxy    Vitamin B12    TSH    Magnesium    Hemoglobin A1c           New Medications Ordered This Visit   Medications   • methylPREDNISolone (Medrol) 8 MG tablet     Sig: Take 1 tablet by mouth Daily.     Dispense:  30 tablet     Refill:  11   • ALPRAZolam (Xanax) 0.25 MG tablet     Sig: Take 1 tablet by mouth 3 (Three) Times a Day As Needed for Anxiety.     Dispense:  90 tablet     Refill:  0   • diclofenac (VOLTAREN) 50 MG EC tablet     Sig: Bid prn     Dispense:  60 tablet     Refill:  5       It's not just what you eat, but when you eat  Eat breakfast, and eat smaller meals throughout the day. A healthy breakfast can jumpstart your metabolism, while eating small, healthy meals (rather than the standard three large meals) keeps your energy up.   Avoid eating at night. Try to eat dinner earlier and fast for 14-16 hours until breakfast the next morning. Studies suggest that eating only when you’re most active and giving your digestive system a long break each day may help to regulate weight.     Labs as directed, meds reviewed, diet discussed, add voltaren for low back pain, meds as directed, refer to wound care for left leg wound

## 2020-09-10 RX ORDER — ONDANSETRON 4 MG/1
TABLET, FILM COATED ORAL
Qty: 20 TABLET | Refills: 2 | Status: SHIPPED | OUTPATIENT
Start: 2020-09-10 | End: 2020-12-11

## 2020-09-14 DIAGNOSIS — J45.41 MODERATE PERSISTENT ASTHMA WITH ACUTE EXACERBATION: ICD-10-CM

## 2020-09-14 RX ORDER — ALBUTEROL SULFATE 90 UG/1
AEROSOL, METERED RESPIRATORY (INHALATION)
Qty: 18 G | Refills: 2 | OUTPATIENT
Start: 2020-09-14

## 2020-09-15 ENCOUNTER — TRANSCRIBE ORDERS (OUTPATIENT)
Dept: WOUND CARE | Facility: HOSPITAL | Age: 65
End: 2020-09-15

## 2020-09-15 ENCOUNTER — OFFICE VISIT (OUTPATIENT)
Dept: WOUND CARE | Facility: HOSPITAL | Age: 65
End: 2020-09-15

## 2020-09-15 DIAGNOSIS — R60.0 LOCAL EDEMA: Primary | ICD-10-CM

## 2020-09-15 PROCEDURE — G0463 HOSPITAL OUTPT CLINIC VISIT: HCPCS

## 2020-09-17 DIAGNOSIS — J45.41 MODERATE PERSISTENT ASTHMA WITH ACUTE EXACERBATION: Primary | ICD-10-CM

## 2020-09-17 PROBLEM — Z87.891 PERSONAL HISTORY OF TOBACCO USE, PRESENTING HAZARDS TO HEALTH: Status: ACTIVE | Noted: 2020-09-17

## 2020-09-17 PROBLEM — J96.11 CHRONIC HYPOXEMIC RESPIRATORY FAILURE: Status: ACTIVE | Noted: 2020-09-17

## 2020-09-17 PROBLEM — J44.9 STAGE 3 SEVERE COPD BY GOLD CLASSIFICATION (HCC): Status: ACTIVE | Noted: 2020-09-17

## 2020-09-17 NOTE — PROGRESS NOTES
"    Pulmonary Office Follow-up    Subjective     Monisha Brasher is seen today at the office for   Chief Complaint   Patient presents with   • COPD     Former [facility] patient       History of Present Illness  Monisha Brasher is a 65 y.o. female with a PMH significant for COPD, past tobacco use, NARCISO, CAD, proximal A. fib, and hyperlipidemia who presents for evaluation of COPD and dyspnea.    9/18/2020: Pt was previously seeing Dr. Ac with COPD and asthma. She has been treated with duonebs prn (2-6 times a day), Symbicort 1puff qAM and 2 puffs qAM, Spiriva daily, and methylprednisolone 4mg a day. Pt also takes singulair daily. She states she has tried stopping the prednisone but she has withdrawal. Pt admits to a chronic cough which is now productive of greenish sputum. She denies fevers or chills, but she has wheeze at night. Pt has rare chest discomfort. She has oxygen at night but she does not wear it during the day. Pt reports her sats can drop into the 70s but she will only use her O2 prn. She is smoking again, 0.5ppd. Pt worked as a  and a business owner. Her maternal grandfather, sister, and mother had emphysema (smokers).     Review and summarization of last visit with Dr. Ac on 6/9/2020: Patient complained of progressive dyspnea with significant desaturations on minimal exertion.  Also complained of nonproductive cough and wheeze.  Physical exam significant for \"dyspneic white female lungs reveal diminished breath sounds and quiet lungs with poor air exchange, arms reveal ecchymoses bilaterally\".  Diagnosed with COPD exacerbation and treated with Depo-Medrol followed by a Medrol taper and continue present medications with return in 1 week.    Tobacco use history:  Type: cigarettes  Amount: 0.5 ppd  Duration: 35 years  Cessation: N/A   Willing to quit: Yes      Review of Systems: History obtained from chart review and the patient.  Review of Systems   Constitutional: Positive for " fatigue. Negative for fever and unexpected weight change.   HENT: Positive for congestion.    Respiratory: Positive for cough, chest tightness, shortness of breath and wheezing.    Cardiovascular: Negative for chest pain and leg swelling.   Gastrointestinal: Negative for abdominal pain.   Musculoskeletal: Positive for back pain.     As described in the HPI. Otherwise, remainder of ROS (14 systems) were negative.    Patient Active Problem List   Diagnosis   • COPD bronchitis   • Cellulitis of left leg   • Asthma   • SOB (shortness of breath)   • Chronic bronchitis with acute exacerbation (CMS/HCC)   • Nicotine abuse   • Essential hypertension   • Screening for osteoporosis   • Panlobular emphysema (CMS/HCC)   • Anxiety   • General medical exam   • Malaise   • Hyperglycemia   • Acute ST elevation myocardial infarction (STEMI) (CMS/HCC)   • CAD (coronary artery disease)   • Acute respiratory failure with hypoxia (CMS/HCC)   • Depression   • Chronic alcohol abuse   • Pneumonia of both upper lobes   • Paroxysmal atrial fibrillation (CMS/HCC)   • Snoring   • Rash   • Chest pain   • Atherosclerosis of native coronary artery of native heart with unstable angina pectoris (CMS/[facility])   • Encounter for screening mammogram for malignant neoplasm of breast   • Encounter for screening colonoscopy   • Nocturnal hypoxia   • NARCISO (obstructive sleep apnea)   • Prediabetes   • Constipation   • Skin tear of left elbow without complication   • Dog scratch   • Axillary lump, right   • Cough   • Postmenopausal   • Need for vaccination   • Osteoporosis with current pathological fracture   • Stress fracture of left foot   • Long term (current) use of inhaled steroids   • Long term (current) use of systemic steroids   • Adjustment disorder with mixed emotional features   • Acute bilateral low back pain with right-sided sciatica   • Right-sided low back pain with right-sided sciatica   • Wound of left leg   • Personal history of tobacco use,  presenting hazards to health   • Chronic hypoxemic respiratory failure (CMS/[facility])   • Stage 4 very severe COPD by GOLD classification (CMS/[facility])         Current Outpatient Medications:   •  albuterol sulfate  (90 Base) MCG/ACT inhaler, INHALE 2 PUFFS EVERY 4 HOURS AS NEEDED, Disp: 18 g, Rfl: 5  •  amiodarone (PACERONE) 100 MG tablet, Take 1 tablet by mouth 2 (Two) Times a Day., Disp: 60 tablet, Rfl: 1  •  amLODIPine (NORVASC) 10 MG tablet, TAKE 1 TABLET BY MOUTH EVERY DAY, Disp: 30 tablet, Rfl: 11  •  aspirin 81 MG chewable tablet, Chew 1 tablet Daily., Disp: , Rfl:   •  atorvastatin (LIPITOR) 40 MG tablet, Take  by mouth Daily., Disp: , Rfl:   •  budesonide-formoterol (Symbicort) 160-4.5 MCG/ACT inhaler, INHALE 2 PUFFS BY MOUTH TWICE DAILY, Disp: 10.2 g, Rfl: 5  •  carvedilol (COREG) 3.125 MG tablet, Take 3.21 mg by mouth Every Night., Disp: , Rfl: 12  •  furosemide (LASIX) 20 MG tablet, Take 1 tablet by mouth 2 (Two) Times a Day., Disp: 60 tablet, Rfl: 1  •  glucose blood (FREESTYLE LITE) test strip, USE AS DIRECTED TO TEST BLOOD SUGAR ONCE DAILY, Disp: 50 each, Rfl: 11  •  glucose monitor monitoring kit, 1 each Daily. Testing blood sugar once a day  ICD10 - R73.9, Disp: 1 each, Rfl: 0  •  ipratropium-albuterol (DUO-NEB) 0.5-2.5 mg/3 ml nebulizer, Take 3 mL by nebulization 4 (Four) Times a Day., Disp: 360 mL, Rfl: 5  •  Lancets (FREESTYLE) lancets, USE AS DIRECTED TO TEST BLOOD SUGAR ONE DAILY, Disp: 100 each, Rfl: 5  •  losartan (COZAAR) 25 MG tablet, TAKE 1 TABLET BY MOUTH DAILY, Disp: 30 tablet, Rfl: 11  •  metFORMIN (GLUCOPHAGE) 500 MG tablet, TAKE 1 TABLET BY MOUTH DAILY WITH BREAKFAST, Disp: 30 tablet, Rfl: 11  •  montelukast (SINGULAIR) 10 MG tablet, TAKE 1 TABLET BY MOUTH DAILY, Disp: 90 tablet, Rfl: 4  •  mupirocin (BACTROBAN) 2 % ointment, APPLY TO THE APPROPRIATE AREA AS DIRECTED THREE TIMES DAILY, Disp: 22 g, Rfl: 0  •  nitroglycerin (NITROSTAT) 0.4 MG SL tablet, Take 1 tablet under tongue every 5  minutes for a maximum of three doses per episode of chest pain, Disp: 15 tablet, Rfl: 0  •  nystatin (MYCOSTATIN) 769506 UNIT/ML suspension, SHAKE LIQUID AND TAKE 5 ML BY MOUTH FOUR TIMES DAILY, Disp: 280 mL, Rfl: 0  •  ondansetron (ZOFRAN) 4 MG tablet, TAKE 1 TABLET BY MOUTH EVERY 8 HOURS AS NEEDED FOR NAUSEA OR VOMITING, Disp: 20 tablet, Rfl: 2  •  PARoxetine (PAXIL) 20 MG tablet, TAKE 1 TABLET BY MOUTH EVERY MORNING, Disp: 30 tablet, Rfl: 11  •  silver sulfadiazine (Silvadene) 1 % cream, Apply  topically to the appropriate area as directed 2 (Two) Times a Day., Disp: 50 g, Rfl: 0  •  ALPRAZolam (Xanax) 0.25 MG tablet, Take 1 tablet by mouth 3 (Three) Times a Day As Needed for Anxiety., Disp: 90 tablet, Rfl: 0  •  atorvastatin (LIPITOR) 20 MG tablet, Take 2 tablets by mouth Every Night., Disp: 30 tablet, Rfl: 1  •  buPROPion (WELLBUTRIN) 75 MG tablet, Take 1 tablet by mouth Daily., Disp: 30 tablet, Rfl: 11  •  cyclobenzaprine (FLEXERIL) 5 MG tablet, Take 1 tablet by mouth 2 (Two) Times a Day As Needed for Muscle Spasms., Disp: 21 tablet, Rfl: 0  •  diclofenac (VOLTAREN) 50 MG EC tablet, Bid prn, Disp: 60 tablet, Rfl: 5  •  HYDROcodone-acetaminophen (NORCO) 5-325 MG per tablet, Take 1 tablet by mouth Every 6 (Six) Hours As Needed for Severe Pain ., Disp: 12 tablet, Rfl: 0  •  methylPREDNISolone (Medrol) 8 MG tablet, Take 1 tablet by mouth Daily., Disp: 30 tablet, Rfl: 11  •  prasugrel (EFFIENT) 10 MG tablet, Take 1 tablet by mouth Daily., Disp: 30 tablet, Rfl: 11    No Known Allergies    Past Medical History:   Diagnosis Date   • Acute bronchitis    • Acute upper respiratory infection    • Adjustment disorder with mixed emotional features    • Agoraphobia with panic attacks    • Allergic rhinitis due to pollen    • Anxiety    • Asthma    • Asthma    • Backache    • Blood in urine    • Breast cyst    • Candidiasis of mouth    • Chronic bronchitis (CMS/HCC)    • Chronic obstructive lung disease (CMS/HCC)    • COPD  (chronic obstructive pulmonary disease) (CMS/[facility])    • Diabetes (CMS/[facility])    • Diabetes (CMS/[facility])    • Emphysema (subcutaneous) (surgical) resulting from a procedure    • Emphysema lung (CMS/[facility])    • Essential hypertension    • Extrinsic asthma with status asthmaticus    • Fatigue    • H/O echocardiogram     EF 55-60%. LV systolic function normal. Impaired LV relaxation. [facility]   • Heart attack (CMS/[facility]) 2015   • Heart problem    • High blood pressure    • Hypoxemia    • Malaise and fatigue    • Non-IgE mediated allergic asthma    • Nonvenomous insect bite    • Pneumonia    • PONV (postoperative nausea and vomiting)    • Postmenopausal state    • Rheumatoid arthritis (CMS/[facility])    • Urinary tract infectious disease      Past Surgical History:   Procedure Laterality Date   • BREAST BIOPSY     • CARDIAC CATHETERIZATION N/A 11/15/2018    Procedure: Left Heart Cath;  Surgeon: Thaddeus Huber MD;  Location: [facility] CATH INVASIVE LOCATION;  Service: Cardiology   • CARDIAC CATHETERIZATION N/A 2019    Procedure: Left Heart Cath 2019 @ 8:00 AM;  Surgeon: Thaddeus Huber MD;  Location: [facility] CATH INVASIVE LOCATION;  Service: Cardiology   •  SECTION     • HYSTERECTOMY     • INJECTION OF MEDICATION  2015    celestone(1)   • INJECTION OF MEDICATION  2016    DEPO MEDROL(16)   • LEG SURGERY Bilateral      Social History     Socioeconomic History   • Marital status:      Spouse name: Not on file   • Number of children: Not on file   • Years of education: Not on file   • Highest education level: Not on file   Tobacco Use   • Smoking status: Former Smoker     Packs/day: 1.00     Years: 48.00     Pack years: 48.00     Types: Cigarettes, Electronic Cigarette     Start date:      Quit date: 2018     Years since quittin.8   • Smokeless tobacco: Never Used   Substance and Sexual Activity   • Alcohol use: Yes     Frequency: Never     Comment: rarely   • Drug use: No   •  Sexual activity: Defer     Family History   Problem Relation Age of Onset   • Heart attack Mother    • Heart disease Mother    • Heart disease Father    • Heart disease Paternal Grandmother    • Osteoporosis Other           Objective     Blood pressure 124/74, pulse 91, weight 67.6 kg (149 lb), SpO2 (!) 83 %.  Physical Exam  Vitals signs and nursing note reviewed.   Constitutional:       Appearance: Normal appearance. She is well-developed and overweight.   HENT:      Head: Normocephalic and atraumatic.      Comments: Moon face ease     Nose: Nose normal.      Mouth/Throat:      Pharynx: Uvula midline.      Comments: Mallampati 1  Eyes:      General: Lids are normal.      Conjunctiva/sclera: Conjunctivae normal.      Pupils: Pupils are equal, round, and reactive to light.   Neck:      Musculoskeletal: Normal range of motion.      Thyroid: No thyroid mass.      Trachea: Trachea normal. No tracheal tenderness.   Cardiovascular:      Rate and Rhythm: Normal rate and regular rhythm.      Chest Wall: PMI is not displaced.      Heart sounds: Normal heart sounds. No murmur. No gallop.    Pulmonary:      Effort: Pulmonary effort is normal. No respiratory distress.      Breath sounds: Rhonchi (scattered) present. No decreased breath sounds or wheezing.   Chest:      Chest wall: No tenderness.   Abdominal:      General: Bowel sounds are normal.      Palpations: Abdomen is soft. There is no hepatomegaly.      Tenderness: There is no abdominal tenderness.   Musculoskeletal:      Comments: Walks with a Rollator walker, no extremity edema, left lower leg wrapped   Lymphadenopathy:      Head:      Right side of head: No submandibular adenopathy.      Left side of head: No submandibular adenopathy.      Cervical: No cervical adenopathy.      Upper Body:      Right upper body: No supraclavicular adenopathy.      Left upper body: No supraclavicular adenopathy.   Skin:     General: Skin is warm and dry.      Findings: No rash.       Nails: There is no clubbing.        Comments: Thinning skin with scattered ecchymoses   Neurological:      Mental Status: She is alert and oriented to person, place, and time.   Psychiatric:         Speech: Speech normal.         Behavior: Behavior normal.         Judgment: Judgment normal.         PFTs: 9/18/20 (independently reviewed and interpreted by me)  Ratio 42  FVC 1.42(2.12)/ 49%  FEV1 0.6 (0.84)/ 26%  TLC 3.84/ 80%  DLCO 8.39/ 38%  Very severe obstruction.  Normal lung volumes.  Evidence of air trapping.  Severely reduced diffusing capacity.  Significant decrease in FVC and FEV1 compared to 11/6/2017.    Radiology (independently reviewed and interpreted by me): CXR 9/18/2020 showed NACPD, stable chronic changes       Assessment/Plan     Monisha was seen today for copd.    Diagnoses and all orders for this visit:    SOB (shortness of breath)  -     Pulmonary Function Test  -     FluLaval Quad >6 Months (0348-0239)    Stage 4 very severe COPD by GOLD classification (CMS/[facility])  -     Cancel: FluLaval Quad >6 Months (8267-2370)  -     albuterol sulfate  (90 Base) MCG/ACT inhaler; INHALE 2 PUFFS EVERY 4 HOURS AS NEEDED  -     ipratropium-albuterol (DUO-NEB) 0.5-2.5 mg/3 ml nebulizer; Take 3 mL by nebulization 4 (Four) Times a Day.  -     FluLaval Quad >6 Months ([id number])    Chronic hypoxemic respiratory failure (CMS/[facility])    Personal history of tobacco use, presenting hazards to health    Physical deconditioning    Long term (current) use of systemic steroids         Discussion/ Recommendations:   PFTs showed very severe obstruction.  Chest x-ray was unremarkable.  She has significant disease but I do think her maintenance therapy can be optimized.  I am concerned about the chronicity of her steroid use especially as the patient is experiencing multiple different side effects from her chronic steroid use.  Her room air sat today is low so I have recommended she start using her oxygen during the day and  provided with instructions on how to use.  Otherwise, I encouraged her to exercise as much as tolerated.    -Counseled to use Symbicort 2 puffs twice daily for maximal benefit.  Rinse mouth after use.  -Stop Spiriva  -Continue duo nebs at a minimum of 4 times daily  -Use albuterol only as needed for dyspnea or wheeze  -Encouraged patient to use 3 L supplemental oxygen around-the-clock.  She already has a portable concentrator and a pulse oximeter at home.  -Recommended that she try alternating methylprednisolone 4 mg daily with 2 mg daily and we can attempt weaning from there.  -Encouraged exercise as tolerated.  -Up-to-date with pneumococcal vaccine.  Annual flu vaccine.    Patient's Body mass index is 27.25 kg/m². BMI is above normal parameters. Recommendations include: exercise counseling.           Return for Recheck COPD.      Thank you for allowing me to participate in the care of Monisha Brasher. Please do not hesitate to contact me with any questions.         This document has been electronically signed by Jaylin Callahan MD on September 18, 2020 14:24 CDT      Dictated using Dragon

## 2020-09-18 ENCOUNTER — HOSPITAL ENCOUNTER (OUTPATIENT)
Dept: GENERAL RADIOLOGY | Facility: HOSPITAL | Age: 65
Discharge: HOME | End: 2020-09-18
Admitting: INTERNAL MEDICINE

## 2020-09-18 ENCOUNTER — PROCEDURE VISIT (OUTPATIENT)
Dept: PULMONOLOGY | Facility: CLINIC | Age: 65
End: 2020-09-18

## 2020-09-18 ENCOUNTER — OFFICE VISIT (OUTPATIENT)
Dept: PULMONOLOGY | Facility: CLINIC | Age: 65
End: 2020-09-18

## 2020-09-18 VITALS
HEART RATE: 91 BPM | DIASTOLIC BLOOD PRESSURE: 74 MMHG | BODY MASS INDEX: 27.25 KG/M2 | SYSTOLIC BLOOD PRESSURE: 124 MMHG | OXYGEN SATURATION: 83 % | WEIGHT: 149 LBS

## 2020-09-18 DIAGNOSIS — R06.02 SOB (SHORTNESS OF BREATH): Primary | ICD-10-CM

## 2020-09-18 DIAGNOSIS — J96.11 CHRONIC HYPOXEMIC RESPIRATORY FAILURE (HCC): ICD-10-CM

## 2020-09-18 DIAGNOSIS — Z87.891 PERSONAL HISTORY OF TOBACCO USE, PRESENTING HAZARDS TO HEALTH: ICD-10-CM

## 2020-09-18 DIAGNOSIS — R06.02 SHORTNESS OF BREATH: Primary | ICD-10-CM

## 2020-09-18 DIAGNOSIS — Z79.52 LONG TERM (CURRENT) USE OF SYSTEMIC STEROIDS: ICD-10-CM

## 2020-09-18 DIAGNOSIS — R53.81 PHYSICAL DECONDITIONING: ICD-10-CM

## 2020-09-18 DIAGNOSIS — J45.41 MODERATE PERSISTENT ASTHMA WITH ACUTE EXACERBATION: ICD-10-CM

## 2020-09-18 DIAGNOSIS — J44.9 STAGE 4 VERY SEVERE COPD BY GOLD CLASSIFICATION (HCC): ICD-10-CM

## 2020-09-18 PROCEDURE — 94729 DIFFUSING CAPACITY: CPT | Performed by: INTERNAL MEDICINE

## 2020-09-18 PROCEDURE — 90471 IMMUNIZATION ADMIN: CPT | Performed by: INTERNAL MEDICINE

## 2020-09-18 PROCEDURE — 99214 OFFICE O/P EST MOD 30 MIN: CPT | Performed by: INTERNAL MEDICINE

## 2020-09-18 PROCEDURE — 94010 BREATHING CAPACITY TEST: CPT | Performed by: INTERNAL MEDICINE

## 2020-09-18 PROCEDURE — 94727 GAS DIL/WSHOT DETER LNG VOL: CPT | Performed by: INTERNAL MEDICINE

## 2020-09-18 PROCEDURE — 90686 IIV4 VACC NO PRSV 0.5 ML IM: CPT | Performed by: INTERNAL MEDICINE

## 2020-09-18 PROCEDURE — 71046 X-RAY EXAM CHEST 2 VIEWS: CPT

## 2020-09-18 RX ORDER — ALBUTEROL SULFATE 90 UG/1
AEROSOL, METERED RESPIRATORY (INHALATION)
Qty: 18 G | Refills: 5 | Status: SHIPPED | OUTPATIENT
Start: 2020-09-18 | End: 2021-01-29 | Stop reason: SDUPTHER

## 2020-09-18 RX ORDER — IPRATROPIUM BROMIDE AND ALBUTEROL SULFATE 2.5; .5 MG/3ML; MG/3ML
3 SOLUTION RESPIRATORY (INHALATION)
Qty: 360 ML | Refills: 5 | Status: SHIPPED | OUTPATIENT
Start: 2020-09-18 | End: 2021-01-15

## 2020-09-18 NOTE — PROCEDURES
Pulmonary Function Test  Performed by: Jaylin Callahan MD  Authorized by: Jaylin Callahan MD      Pre Drug    FVC: 49%   FEV1: 26%   FEV1/FVC: 42%   T%   RV: 153%   DLCO: 38%    Interpretation   Spirometry   Spirometry shows very severe obstruction.   Review of FVL curve   Effort is normal.   Lung Volume Measurements  Measurements show: normal results and elevated residual volume consistent with gas trapping.   Diffusion Capacity  The patient's diffusion capacity is severely reduced.    Overall comments: Significant reduction in FVC and FEV1 compared to 2018.

## 2020-09-22 ENCOUNTER — APPOINTMENT (OUTPATIENT)
Dept: WOUND CARE | Facility: HOSPITAL | Age: 65
End: 2020-09-22

## 2020-10-02 ENCOUNTER — TELEPHONE (OUTPATIENT)
Dept: FAMILY MEDICINE CLINIC | Facility: CLINIC | Age: 65
End: 2020-10-02

## 2020-10-02 RX ORDER — CYCLOBENZAPRINE HCL 10 MG
10 TABLET ORAL 3 TIMES DAILY PRN
Qty: 90 TABLET | Refills: 1 | Status: CANCELLED | OUTPATIENT
Start: 2020-10-02

## 2020-10-02 RX ORDER — CYCLOBENZAPRINE HCL 10 MG
10 TABLET ORAL 3 TIMES DAILY PRN
Qty: 90 TABLET | Refills: 5 | Status: SHIPPED | OUTPATIENT
Start: 2020-10-02 | End: 2021-06-18

## 2020-10-07 RX ORDER — CEPHALEXIN 500 MG/1
CAPSULE ORAL
Qty: 30 CAPSULE | Refills: 0 | OUTPATIENT
Start: 2020-10-07

## 2020-10-13 ENCOUNTER — OFFICE VISIT (OUTPATIENT)
Dept: WOUND CARE | Facility: HOSPITAL | Age: 65
End: 2020-10-13

## 2020-10-23 NOTE — TELEPHONE ENCOUNTER
Caller: Monisha Brasher    Relationship: Self    Best call back number:[phone] (H)    Medication needed:   Requested Prescriptions     Pending Prescriptions Disp Refills   • nystatin (MYCOSTATIN) [id number] UNIT/ML suspension 280 mL 0       When do you need the refill by: 110-23-20    What details did the patient provide when requesting the medication:    Does the patient have less than a 3 day supply:  [x] Yes  [] No    What is the patient's preferred pharmacy: [id number] [address] [phone] [facility] [phone]

## 2020-10-26 ENCOUNTER — OFFICE VISIT (OUTPATIENT)
Dept: WOUND CARE | Facility: HOSPITAL | Age: 65
End: 2020-10-26

## 2020-11-02 ENCOUNTER — OFFICE VISIT (OUTPATIENT)
Dept: WOUND CARE | Facility: HOSPITAL | Age: 65
End: 2020-11-02

## 2020-11-09 ENCOUNTER — TELEPHONE (OUTPATIENT)
Dept: FAMILY MEDICINE CLINIC | Facility: CLINIC | Age: 65
End: 2020-11-09

## 2020-11-09 ENCOUNTER — APPOINTMENT (OUTPATIENT)
Dept: WOUND CARE | Facility: HOSPITAL | Age: 65
End: 2020-11-09

## 2020-11-09 DIAGNOSIS — F41.9 ANXIETY: ICD-10-CM

## 2020-11-09 RX ORDER — ALPRAZOLAM 0.25 MG/1
0.25 TABLET ORAL 3 TIMES DAILY PRN
Qty: 10 TABLET | Refills: 0 | Status: SHIPPED | OUTPATIENT
Start: 2020-11-09 | End: 2021-01-16 | Stop reason: SDUPTHER

## 2020-11-09 NOTE — TELEPHONE ENCOUNTER
PATIENT CALLED AND STATING SHE IS GOING THROUGH WITHDRAW SYMPTOMS FROM NOT HAVING XANAX, SHE PASSED OUT ON Saturday, SHAKING, CHILLS, COUGHING,LOST APPETITE.    SHE WOULD LIKE TO KNOW WHAT TO DO, PLEASE ADVISE.    BEST CALL BACK [phone]

## 2020-11-09 NOTE — TELEPHONE ENCOUNTER
Tiffanie,     Ms.Sameera has been called, she states her appointment on Wednesday is a telephone visit and she is planning on keeping it.

## 2020-11-11 ENCOUNTER — OFFICE VISIT (OUTPATIENT)
Dept: FAMILY MEDICINE CLINIC | Facility: CLINIC | Age: 65
End: 2020-11-11

## 2020-11-11 ENCOUNTER — OFFICE VISIT (OUTPATIENT)
Dept: WOUND CARE | Facility: HOSPITAL | Age: 65
End: 2020-11-11

## 2020-11-11 VITALS — BODY MASS INDEX: 27.42 KG/M2 | HEIGHT: 62 IN | WEIGHT: 149 LBS

## 2020-11-11 DIAGNOSIS — R53.83 MALAISE AND FATIGUE: Primary | ICD-10-CM

## 2020-11-11 DIAGNOSIS — F41.9 ANXIETY: ICD-10-CM

## 2020-11-11 DIAGNOSIS — R53.81 MALAISE AND FATIGUE: Primary | ICD-10-CM

## 2020-11-11 PROCEDURE — 99441 PR PHYS/QHP TELEPHONE EVALUATION 5-10 MIN: CPT | Performed by: NURSE PRACTITIONER

## 2020-11-11 PROCEDURE — G0463 HOSPITAL OUTPT CLINIC VISIT: HCPCS

## 2020-11-11 RX ORDER — HYDROXYZINE HYDROCHLORIDE 10 MG/1
10 TABLET, FILM COATED ORAL EVERY 8 HOURS PRN
Qty: 90 TABLET | Refills: 5 | Status: SHIPPED | OUTPATIENT
Start: 2020-11-11 | End: 2022-06-23

## 2020-11-11 NOTE — PROGRESS NOTES
Chief Complaint   Patient presents with   • Anxiety     had been out for 4 days and had spell where she passed out     Subjective   Monisha Brasher is a 65 y.o. female.     Presents with recheck of anxiety-telephone visit for refills  Or change in meds    Anxiety  Presents for follow-up visit. Symptoms include decreased concentration, excessive worry, irritability, nausea and nervous/anxious behavior. Patient reports no chest pain, confusion, dizziness, hyperventilation, impotence, palpitations, shortness of breath or suicidal ideas. Symptoms occur most days. The quality of sleep is good. Nighttime awakenings: none.     Compliance with medications is %.   Fatigue  This is a recurrent problem. The current episode started more than 1 month ago. The problem occurs intermittently. The problem has been waxing and waning. Associated symptoms include arthralgias, fatigue, joint swelling, myalgias and nausea. Pertinent negatives include no chest pain, chills, congestion, coughing, diaphoresis, headaches, rash, swollen glands or weakness. The symptoms are aggravated by stress and exertion. She has tried rest for the symptoms. The treatment provided mild relief.        The following portions of the patient's history were reviewed and updated as appropriate: allergies, current medications, past social history and problem list.    Review of Systems   Constitutional: Positive for fatigue and irritability. Negative for activity change, appetite change, chills, diaphoresis and unexpected weight change.   HENT: Negative for congestion, dental problem, sinus pressure and sinus pain.    Eyes: Negative.  Negative for photophobia, pain, discharge, redness and visual disturbance.   Respiratory: Negative for apnea, cough, choking, chest tightness, shortness of breath, wheezing and stridor.    Cardiovascular: Negative.  Negative for chest pain, palpitations and leg swelling.   Gastrointestinal: Positive for nausea. Negative  "for abdominal distention, anal bleeding, blood in stool and constipation.   Endocrine: Negative.  Negative for cold intolerance, heat intolerance, polyphagia and polyuria.   Genitourinary: Negative.  Negative for impotence.   Musculoskeletal: Positive for arthralgias, joint swelling and myalgias. Negative for gait problem and neck stiffness.   Skin: Negative for color change, pallor, rash and wound.   Allergic/Immunologic: Negative.  Negative for environmental allergies, food allergies and immunocompromised state.   Neurological: Negative for dizziness, tremors, syncope, facial asymmetry, speech difficulty, weakness and headaches.   Hematological: Negative for adenopathy. Does not bruise/bleed easily.   Psychiatric/Behavioral: Positive for agitation and decreased concentration. Negative for confusion, dysphoric mood, hallucinations, self-injury, sleep disturbance and suicidal ideas. The patient is nervous/anxious.        Objective   Ht 157.5 cm (62\")   Wt 67.6 kg (149 lb)   BMI 27.25 kg/m²   Physical Exam  Vitals signs and nursing note reviewed.   Constitutional:       Comments: Limited exam due to telephone visit    HENT:      Head: Normocephalic.   Cardiovascular:      Rate and Rhythm: Normal rate and regular rhythm.   Neurological:      Mental Status: She is alert.         Assessment/Plan   Problems Addressed this Visit        Other    Anxiety      Other Visit Diagnoses     Malaise and fatigue    -  Primary      Diagnoses       Codes Comments    Malaise and fatigue    -  Primary ICD-10-CM: R53.81, R53.83  ICD-9-CM: 780.79     Anxiety     ICD-10-CM: F41.9  ICD-9-CM: 300.00            New Medications Ordered This Visit   Medications   • hydrOXYzine (ATARAX) 10 MG tablet     Sig: Take 1 tablet by mouth Every 8 (Eight) Hours As Needed for Anxiety.     Dispense:  90 tablet     Refill:  5     Change from xanax to atarax 10mg tid prn as directed, patient agrees with plan of action    use xanax very sparingly-start " atarax tid prn   Use xanax very sparingly as directed-consider therapy if anxiety worsens    You have chosen to receive care through a telephone visit. Do you consent to use a telephone visit for your medical care today? Yes    This visit has been rescheduled as a phone visit to comply with patient safety concerns in accordance with CDC recommendations. Total time of discussion was 10 minutes.

## 2020-11-25 ENCOUNTER — OFFICE VISIT (OUTPATIENT)
Dept: FAMILY MEDICINE CLINIC | Facility: CLINIC | Age: 65
End: 2020-11-25

## 2020-11-25 VITALS
TEMPERATURE: 98.9 F | HEIGHT: 62 IN | WEIGHT: 151 LBS | DIASTOLIC BLOOD PRESSURE: 80 MMHG | OXYGEN SATURATION: 96 % | SYSTOLIC BLOOD PRESSURE: 120 MMHG | BODY MASS INDEX: 27.79 KG/M2

## 2020-11-25 DIAGNOSIS — M51.16 LUMBAR DISC DISEASE WITH RADICULOPATHY: Primary | ICD-10-CM

## 2020-11-25 DIAGNOSIS — Z01.818 PRE-OPERATIVE CLEARANCE: ICD-10-CM

## 2020-11-25 PROCEDURE — 99213 OFFICE O/P EST LOW 20 MIN: CPT | Performed by: NURSE PRACTITIONER

## 2020-11-25 NOTE — PROGRESS NOTES
Chief Complaint   Patient presents with   • Pre-op Exam     surg scheduled 12/3/2020 with Dr Emmanuel Bearden Elvira Brasher is a 65 y.o. female.     Needs surgical clearance -scheduled for lumbar laminectomy -needs surgical clearance     Back Pain  This is a recurrent problem. The current episode started more than 1 month ago. The problem occurs intermittently. The problem has been gradually worsening since onset. The pain is present in the lumbar spine. The pain radiates to the left foot. The pain is at a severity of 2/10. The pain is mild. The pain is the same all the time. Stiffness is present all day. Associated symptoms include leg pain, numbness, paresis and paresthesias. Pertinent negatives include no abdominal pain, bladder incontinence, bowel incontinence, chest pain, dysuria, fever, headaches, pelvic pain, perianal numbness, tingling, weakness or weight loss. She has tried analgesics, ice, NSAIDs, muscle relaxant, bed rest, home exercises, heat and chiropractic manipulation for the symptoms. The treatment provided mild relief.   Fatigue  This is a recurrent problem. The current episode started more than 1 month ago. The problem occurs intermittently. The problem has been gradually worsening. Associated symptoms include arthralgias, fatigue, joint swelling, myalgias and numbness. Pertinent negatives include no abdominal pain, anorexia, change in bowel habit, chest pain, chills, congestion, coughing, diaphoresis, fever, headaches, nausea, neck pain, rash, sore throat, swollen glands, urinary symptoms, vertigo, visual change, vomiting or weakness. She has tried acetaminophen and NSAIDs for the symptoms. The treatment provided mild relief.        The following portions of the patient's history were reviewed and updated as appropriate: allergies, current medications, past social history and problem list.    Review of Systems   Constitutional: Positive for activity change, appetite change and  fatigue. Negative for chills, diaphoresis, fever, unexpected weight change and weight loss.   HENT: Negative for congestion, dental problem, drooling, ear discharge, ear pain, facial swelling, hearing loss, mouth sores, nosebleeds, postnasal drip, rhinorrhea, sinus pressure, sinus pain, sneezing, sore throat, tinnitus, trouble swallowing and voice change.    Eyes: Negative.  Negative for photophobia, pain, discharge, redness, itching and visual disturbance.   Respiratory: Positive for wheezing. Negative for apnea, cough, choking, chest tightness, shortness of breath and stridor.         Rash both arms healing    Cardiovascular: Negative for chest pain, palpitations and leg swelling.   Gastrointestinal: Positive for constipation. Negative for abdominal distention, abdominal pain, anal bleeding, anorexia, blood in stool, bowel incontinence, change in bowel habit, diarrhea, nausea, rectal pain and vomiting.   Endocrine: Positive for cold intolerance. Negative for heat intolerance, polydipsia, polyphagia and polyuria.   Genitourinary: Negative for bladder incontinence, difficulty urinating, dyspareunia, dysuria, enuresis, flank pain, frequency and pelvic pain.   Musculoskeletal: Positive for arthralgias, back pain, gait problem, joint swelling and myalgias. Negative for neck pain and neck stiffness.        Lumbar pain with radiation of pain to right hip    Skin: Positive for wound. Negative for color change, pallor and rash.        Left lower leg not healing    Allergic/Immunologic: Negative.  Negative for environmental allergies, food allergies and immunocompromised state.   Neurological: Positive for numbness and paresthesias. Negative for dizziness, vertigo, tingling, tremors, seizures, syncope, facial asymmetry, speech difficulty, weakness, light-headedness and headaches.   Hematological: Negative.  Negative for adenopathy. Does not bruise/bleed easily.   Psychiatric/Behavioral: Positive for sleep disturbance.  "Negative for agitation, behavioral problems, confusion, decreased concentration, dysphoric mood, hallucinations, self-injury and suicidal ideas. The patient is not nervous/anxious and is not hyperactive.          recently had a heart attack -she is very stressed        Objective   /80   Temp 98.9 °F (37.2 °C) (Tympanic)   Ht 157.5 cm (62\")   Wt 68.5 kg (151 lb)   SpO2 96%   BMI 27.62 kg/m²   Physical Exam  Vitals signs and nursing note reviewed.   Constitutional:       General: She is not in acute distress.     Appearance: Normal appearance. She is normal weight. She is not ill-appearing, toxic-appearing or diaphoretic.   HENT:      Head: Normocephalic and atraumatic.      Right Ear: Tympanic membrane normal. There is no impacted cerumen.      Left Ear: There is no impacted cerumen.      Nose: Nose normal. No congestion or rhinorrhea.      Mouth/Throat:      Mouth: Mucous membranes are moist.      Pharynx: No oropharyngeal exudate or posterior oropharyngeal erythema.   Eyes:      General: No scleral icterus.        Right eye: No discharge.         Left eye: No discharge.      Pupils: Pupils are equal, round, and reactive to light.   Neck:      Musculoskeletal: Normal range of motion. No neck rigidity or muscular tenderness.      Vascular: No carotid bruit.   Cardiovascular:      Rate and Rhythm: Normal rate and regular rhythm.      Pulses: Normal pulses.      Heart sounds: Normal heart sounds. No murmur. No friction rub. No gallop.    Pulmonary:      Effort: Pulmonary effort is normal. No respiratory distress.      Breath sounds: Normal breath sounds. No stridor. No wheezing, rhonchi or rales.   Chest:      Chest wall: No tenderness.   Abdominal:      General: Abdomen is flat. Bowel sounds are normal. There is no distension.      Palpations: Abdomen is soft. There is no mass.      Tenderness: There is no abdominal tenderness. There is no right CVA tenderness, left CVA tenderness, guarding or " rebound.      Hernia: No hernia is present.   Musculoskeletal: Normal range of motion.         General: Tenderness present. No swelling, deformity or signs of injury.      Right lower leg: No edema.      Left lower leg: No edema.   Lymphadenopathy:      Cervical: No cervical adenopathy.   Skin:     General: Skin is warm and dry.      Coloration: Skin is not jaundiced or pale.      Findings: No bruising, erythema, lesion or rash.   Neurological:      General: No focal deficit present.      Mental Status: She is alert and oriented to person, place, and time.      Cranial Nerves: No cranial nerve deficit.      Sensory: No sensory deficit.      Motor: No weakness.      Coordination: Coordination normal.      Gait: Gait normal.      Deep Tendon Reflexes: Reflexes normal.   Psychiatric:         Mood and Affect: Mood normal.         Assessment/Plan   Problems Addressed this Visit     None      Visit Diagnoses     Lumbar disc disease with radiculopathy    -  Primary    Pre-operative clearance          Diagnoses       Codes Comments    Lumbar disc disease with radiculopathy    -  Primary ICD-10-CM: M51.16  ICD-9-CM: 722.10, 724.4     Pre-operative clearance     ICD-10-CM: Z01.818  ICD-9-CM: V72.84          No orders of the defined types were placed in this encounter.        Ventricular Rate : 111  BPM  Atrial Rate : 111  BPM  P-R Interval : 158  ms  QRS Duration : 82  ms  Q-T Interval : 348  ms  QTC Calculation(Bazett) : 473  ms  Calculated P Axis : 77  degrees  Calculated R Axis : 29  degrees  T Axis : 80  degrees  Diagnosis : Sinus tachycardia with premature atrial complexes with aberrant  conduction  Right atrial enlargement  Poor R progression  Electronically Signed by Dmias MONAHAN [facility] ([id number]) on  11/18/2020 1:16:57 PM   Other Result Information   Reuben, Rad Results In - 11/18/2020  1:17 PM CST  Ventricular Rate : 111  BPM  Atrial Rate : 111  BPM  P-R Interval : 158  ms  QRS Duration : 82  ms  Q-T Interval :  348  ms  QTC Calculation(Bazett) : 473  ms  Calculated P Axis : 77  degrees  Calculated R Axis : 29  degrees  T Axis : 80  degrees  Diagnosis : Sinus tachycardia with premature atrial complexes with aberrant  conduction  Right atrial enlargement  Poor R progression  Electronically Signed by Dimas MONAHAN [facility] ([id number]) on  11/18/2020 1:16:57 PM   Status Results Details          TECHNIQUE:  PA and lateral chest    HISTORY:  Preop study.    COMPARISON:  None.    FINDINGS:  Pulmonary hyperexpansion and bibasilar interstitial prominence suggesting COPD.  Please correlate clinically in this regard.    No pleural effusion or pneumothorax was seen.    The heart is normal in size.  Mediastinal contour is within normal limits.    Multilevel discogenic degenerative change in the thoracic spine.   Other Result Information   Reuben, Rad Results In - 11/18/2020 11:53 AM CST    TECHNIQUE:  PA and lateral chest    HISTORY:  Preop study.    COMPARISON:  None.    FINDINGS:  Pulmonary hyperexpansion and bibasilar interstitial prominence suggesting COPD.  Please correlate clinically in this regard.    No pleural effusion or pneumothorax was seen.    The heart is normal in size.  Mediastinal contour is within normal limits.    Multilevel discogenic degenerative change in the thoracic spine.   Status     Patient cleared for surgery-optimal health at this time -ekg, chest xray and labs reviewed

## 2020-11-30 ENCOUNTER — TELEPHONE (OUTPATIENT)
Dept: FAMILY MEDICINE CLINIC | Facility: CLINIC | Age: 65
End: 2020-11-30

## 2020-12-11 RX ORDER — ONDANSETRON 4 MG/1
TABLET, FILM COATED ORAL
Qty: 20 TABLET | Refills: 2 | Status: SHIPPED | OUTPATIENT
Start: 2020-12-11 | End: 2022-06-23

## 2020-12-28 RX ORDER — AMLODIPINE BESYLATE 10 MG/1
TABLET ORAL
Qty: 30 TABLET | Refills: 11 | OUTPATIENT
Start: 2020-12-28

## 2021-01-05 RX ORDER — BUDESONIDE AND FORMOTEROL FUMARATE DIHYDRATE 160; 4.5 UG/1; UG/1
AEROSOL RESPIRATORY (INHALATION)
Qty: 10.2 G | Refills: 5 | Status: SHIPPED | OUTPATIENT
Start: 2021-01-05

## 2021-01-15 DIAGNOSIS — J44.9 STAGE 4 VERY SEVERE COPD BY GOLD CLASSIFICATION (HCC): ICD-10-CM

## 2021-01-15 RX ORDER — IPRATROPIUM BROMIDE AND ALBUTEROL SULFATE 2.5; .5 MG/3ML; MG/3ML
3 SOLUTION RESPIRATORY (INHALATION)
Qty: 360 ML | Refills: 5 | Status: SHIPPED | OUTPATIENT
Start: 2021-01-15 | End: 2021-01-16 | Stop reason: SDUPTHER

## 2021-01-15 NOTE — TELEPHONE ENCOUNTER
PATIENT CALLED IN STATING THAT SHE IS IN [place] AND SHE RAN OUT OF SOME OF HER MEDICATION AND WAS WONDERING IF IT WILL BE SENT DOWN TO [place].     ipratropium-albuterol (DUO-NEB) 0.5-2.5 mg/3 ml nebulizer    PLEASE CALLBACK AND ADVISE.     PATIENT CALLBACK # [phone]

## 2021-01-16 DIAGNOSIS — J44.9 STAGE 4 VERY SEVERE COPD BY GOLD CLASSIFICATION (HCC): ICD-10-CM

## 2021-01-16 DIAGNOSIS — F41.9 ANXIETY: ICD-10-CM

## 2021-01-18 RX ORDER — IPRATROPIUM BROMIDE AND ALBUTEROL SULFATE 2.5; .5 MG/3ML; MG/3ML
3 SOLUTION RESPIRATORY (INHALATION)
Qty: 360 ML | Refills: 5 | Status: SHIPPED | OUTPATIENT
Start: 2021-01-18 | End: 2021-01-25 | Stop reason: SDUPTHER

## 2021-01-18 RX ORDER — ALPRAZOLAM 0.25 MG/1
0.25 TABLET ORAL 3 TIMES DAILY PRN
Qty: 10 TABLET | Refills: 0 | Status: SHIPPED | OUTPATIENT
Start: 2021-01-18 | End: 2021-01-29 | Stop reason: SDUPTHER

## 2021-01-18 NOTE — TELEPHONE ENCOUNTER
Request Xanax  Refill      Last OV   11/25/2020    Next Yovana OV    1/29/2021    Last Script Written  11/09/2020  #10, NR      Last [place]    Review PDMP    Please advise on refill    Thank you

## 2021-01-25 DIAGNOSIS — J44.9 STAGE 4 VERY SEVERE COPD BY GOLD CLASSIFICATION (HCC): ICD-10-CM

## 2021-01-25 RX ORDER — PAROXETINE HYDROCHLORIDE 20 MG/1
20 TABLET, FILM COATED ORAL EVERY MORNING
Qty: 30 TABLET | Refills: 11 | Status: SHIPPED | OUTPATIENT
Start: 2021-01-25 | End: 2022-01-31 | Stop reason: SDUPTHER

## 2021-01-25 RX ORDER — IPRATROPIUM BROMIDE AND ALBUTEROL SULFATE 2.5; .5 MG/3ML; MG/3ML
3 SOLUTION RESPIRATORY (INHALATION)
Qty: 360 ML | Refills: 5 | Status: SHIPPED | OUTPATIENT
Start: 2021-01-25 | End: 2021-06-23 | Stop reason: SDUPTHER

## 2021-01-29 ENCOUNTER — OFFICE VISIT (OUTPATIENT)
Dept: FAMILY MEDICINE CLINIC | Facility: CLINIC | Age: 66
End: 2021-01-29

## 2021-01-29 DIAGNOSIS — R53.81 MALAISE AND FATIGUE: ICD-10-CM

## 2021-01-29 DIAGNOSIS — F41.9 ANXIETY: Primary | ICD-10-CM

## 2021-01-29 DIAGNOSIS — J44.9 STAGE 4 VERY SEVERE COPD BY GOLD CLASSIFICATION (HCC): ICD-10-CM

## 2021-01-29 DIAGNOSIS — R53.83 MALAISE AND FATIGUE: ICD-10-CM

## 2021-01-29 PROCEDURE — 99441 PR PHYS/QHP TELEPHONE EVALUATION 5-10 MIN: CPT | Performed by: NURSE PRACTITIONER

## 2021-01-29 RX ORDER — ALBUTEROL SULFATE 90 UG/1
AEROSOL, METERED RESPIRATORY (INHALATION)
Qty: 18 G | Refills: 11 | Status: SHIPPED | OUTPATIENT
Start: 2021-01-29 | End: 2021-11-15 | Stop reason: SDUPTHER

## 2021-01-29 RX ORDER — ALPRAZOLAM 0.25 MG/1
0.25 TABLET ORAL 3 TIMES DAILY PRN
Qty: 90 TABLET | Refills: 0 | Status: SHIPPED | OUTPATIENT
Start: 2021-01-29 | End: 2021-03-17 | Stop reason: SDUPTHER

## 2021-01-29 NOTE — PROGRESS NOTES
No chief complaint on file.    Subjective   Monisha Brasher is a 65 y.o. female.     Presents with needing refill of meds, reports anxiety and fatigue related to covid pandemic and family concerns-scared to leave the house and when she does she has a panic attack.     Fatigue  This is a recurrent problem. The current episode started more than 1 month ago. The problem occurs intermittently. The problem has been gradually worsening. Associated symptoms include arthralgias, fatigue, joint swelling and myalgias. Pertinent negatives include no anorexia, change in bowel habit, chest pain, chills, congestion, coughing, diaphoresis, fever, headaches, nausea, neck pain, rash, sore throat, swollen glands, urinary symptoms, vertigo, visual change or vomiting. She has tried acetaminophen and NSAIDs for the symptoms. The treatment provided mild relief.   Anxiety  Presents for follow-up visit. Symptoms include excessive worry, hyperventilation, irritability and nervous/anxious behavior. Patient reports no chest pain, compulsions, confusion, decreased concentration, dizziness, feeling of choking, impotence, malaise, nausea, obsessions, palpitations, panic, restlessness, shortness of breath or suicidal ideas. The quality of sleep is good. Nighttime awakenings: none.     There is no history of asthma. Compliance with medications is %.   Cough  This is a chronic problem. The current episode started more than 1 year ago. The problem has been waxing and waning. The problem occurs hourly. The cough is non-productive. Associated symptoms include myalgias and wheezing. Pertinent negatives include no chest pain, chills, ear congestion, ear pain, eye redness, fever, headaches, heartburn, hemoptysis, postnasal drip, rash, rhinorrhea, sore throat, shortness of breath, sweats or weight loss. She has tried steroid inhaler, prescription cough suppressant and oral steroids (ventolin ) for the symptoms. The treatment provided  significant relief. Her past medical history is significant for COPD. There is no history of asthma, bronchiectasis, emphysema, environmental allergies or pneumonia.        The following portions of the patient's history were reviewed and updated as appropriate: allergies, current medications, past social history and problem list.    Review of Systems   Constitutional: Positive for activity change, appetite change, fatigue and irritability. Negative for chills, diaphoresis, fever, unexpected weight change and weight loss.   HENT: Negative for congestion, dental problem, drooling, ear discharge, ear pain, facial swelling, hearing loss, mouth sores, nosebleeds, postnasal drip, rhinorrhea, sinus pressure, sinus pain, sneezing, sore throat, tinnitus, trouble swallowing and voice change.    Eyes: Negative.  Negative for photophobia, pain, discharge, redness, itching and visual disturbance.   Respiratory: Positive for wheezing. Negative for apnea, cough, hemoptysis, choking, chest tightness, shortness of breath and stridor.    Cardiovascular: Negative for chest pain, palpitations and leg swelling.   Gastrointestinal: Positive for constipation. Negative for abdominal distention, anal bleeding, anorexia, blood in stool, change in bowel habit, diarrhea, heartburn, nausea, rectal pain and vomiting.   Endocrine: Positive for cold intolerance. Negative for heat intolerance, polydipsia, polyphagia and polyuria.   Genitourinary: Negative for difficulty urinating, dyspareunia, enuresis, flank pain, frequency and impotence.   Musculoskeletal: Positive for arthralgias, gait problem, joint swelling and myalgias. Negative for neck pain and neck stiffness.        Lumbar pain with radiation of pain to right hip    Skin: Positive for wound. Negative for color change, pallor and rash.   Allergic/Immunologic: Negative.  Negative for environmental allergies, food allergies and immunocompromised state.   Neurological: Negative for dizziness,  vertigo, tremors, seizures, syncope, facial asymmetry, speech difficulty, light-headedness and headaches.   Hematological: Negative.  Negative for adenopathy. Does not bruise/bleed easily.   Psychiatric/Behavioral: Positive for sleep disturbance. Negative for agitation, behavioral problems, confusion, decreased concentration, dysphoric mood, hallucinations, self-injury and suicidal ideas. The patient is nervous/anxious. The patient is not hyperactive.          recently had a heart attack -she is very stressed        Objective   There were no vitals taken for this visit.  Physical Exam  Vitals signs and nursing note reviewed.   Constitutional:       General: She is not in acute distress.     Appearance: Normal appearance. She is not ill-appearing, toxic-appearing or diaphoretic.      Comments: Limited exam due to telephone visit    Neurological:      Mental Status: She is alert.         Assessment/Plan   Problems Addressed this Visit        Mental Health    Anxiety - Primary    Relevant Medications    ALPRAZolam (Xanax) 0.25 MG tablet    Other Relevant Orders    Urine Drug Screen - Urine, Clean Catch       Pulmonary and Pneumonias    Stage 4 very severe COPD by GOLD classification (CMS/[facility])    Relevant Medications    albuterol sulfate  (90 Base) MCG/ACT inhaler      Other Visit Diagnoses     Malaise and fatigue          Diagnoses       Codes Comments    Anxiety    -  Primary ICD-10-CM: F41.9  ICD-9-CM: 300.00     Stage 4 very severe COPD by GOLD classification (CMS/[facility])     ICD-10-CM: J44.9  ICD-9-CM: 496     Malaise and fatigue     ICD-10-CM: R53.81, R53.83  ICD-9-CM: 780.79            New Medications Ordered This Visit   Medications   • ALPRAZolam (Xanax) 0.25 MG tablet     Sig: Take 1 tablet by mouth 3 (Three) Times a Day As Needed for Anxiety.     Dispense:  90 tablet     Refill:  0   • albuterol sulfate  (90 Base) MCG/ACT inhaler     Sig: INHALE 2 PUFFS EVERY 4 HOURS AS NEEDED     Dispense:   18 g     Refill:  11       You have chosen to receive care through a telephone visit. Do you consent to use a telephone visit for your medical care today? Yes    This visit has been rescheduled as a phone visit to comply with patient safety concerns in accordance with CDC recommendations. Total time of discussion was 9 minutes.    Xanax and albuterol refilled, uds ordered, follow up if worsen. Patient agrees

## 2021-02-10 ENCOUNTER — IMMUNIZATION (OUTPATIENT)
Dept: VACCINE CLINIC | Facility: HOSPITAL | Age: 66
End: 2021-02-10

## 2021-02-10 PROCEDURE — 0001A: CPT | Performed by: THORACIC SURGERY (CARDIOTHORACIC VASCULAR SURGERY)

## 2021-02-10 PROCEDURE — 91300 HC SARSCOV02 VAC 30MCG/0.3ML IM: CPT | Performed by: THORACIC SURGERY (CARDIOTHORACIC VASCULAR SURGERY)

## 2021-03-03 ENCOUNTER — IMMUNIZATION (OUTPATIENT)
Dept: VACCINE CLINIC | Facility: HOSPITAL | Age: 66
End: 2021-03-03

## 2021-03-03 PROCEDURE — 91300 HC SARSCOV02 VAC 30MCG/0.3ML IM: CPT | Performed by: THORACIC SURGERY (CARDIOTHORACIC VASCULAR SURGERY)

## 2021-03-03 PROCEDURE — 0002A: CPT | Performed by: THORACIC SURGERY (CARDIOTHORACIC VASCULAR SURGERY)

## 2021-03-11 ENCOUNTER — LAB (OUTPATIENT)
Dept: LAB | Facility: HOSPITAL | Age: 66
End: 2021-03-11

## 2021-03-11 ENCOUNTER — TELEPHONE (OUTPATIENT)
Dept: FAMILY MEDICINE CLINIC | Facility: CLINIC | Age: 66
End: 2021-03-11

## 2021-03-11 LAB
AMPHET+METHAMPHET UR QL: NEGATIVE
AMPHETAMINES UR QL: NEGATIVE
BARBITURATES UR QL SCN: NEGATIVE
BENZODIAZ UR QL SCN: NEGATIVE
BUPRENORPHINE SERPL-MCNC: NEGATIVE NG/ML
CANNABINOIDS SERPL QL: NEGATIVE
COCAINE UR QL: NEGATIVE
METHADONE UR QL SCN: NEGATIVE
OPIATES UR QL: NEGATIVE
OXYCODONE UR QL SCN: NEGATIVE
PCP UR QL SCN: NEGATIVE
PROPOXYPH UR QL: NEGATIVE
TRICYCLICS UR QL SCN: NEGATIVE

## 2021-03-11 PROCEDURE — 80306 DRUG TEST PRSMV INSTRMNT: CPT | Performed by: NURSE PRACTITIONER

## 2021-03-12 RX ORDER — LOSARTAN POTASSIUM 25 MG/1
25 TABLET ORAL DAILY
Qty: 30 TABLET | Refills: 11 | Status: SHIPPED | OUTPATIENT
Start: 2021-03-12 | End: 2022-06-23

## 2021-03-17 ENCOUNTER — CLINICAL SUPPORT (OUTPATIENT)
Dept: FAMILY MEDICINE CLINIC | Facility: CLINIC | Age: 66
End: 2021-03-17

## 2021-03-17 DIAGNOSIS — F41.9 ANXIETY: ICD-10-CM

## 2021-03-17 DIAGNOSIS — M25.50 ARTHRALGIA, UNSPECIFIED JOINT: Primary | ICD-10-CM

## 2021-03-17 PROCEDURE — 96372 THER/PROPH/DIAG INJ SC/IM: CPT | Performed by: NURSE PRACTITIONER

## 2021-03-17 RX ORDER — TRIAMCINOLONE ACETONIDE 40 MG/ML
80 INJECTION, SUSPENSION INTRA-ARTICULAR; INTRAMUSCULAR ONCE
Status: COMPLETED | OUTPATIENT
Start: 2021-03-17 | End: 2021-03-17

## 2021-03-17 RX ORDER — ALPRAZOLAM 0.25 MG/1
0.25 TABLET ORAL 3 TIMES DAILY PRN
Qty: 90 TABLET | Refills: 0 | Status: SHIPPED | OUTPATIENT
Start: 2021-03-17 | End: 2021-04-30 | Stop reason: SDUPTHER

## 2021-03-17 RX ADMIN — TRIAMCINOLONE ACETONIDE 80 MG: 40 INJECTION, SUSPENSION INTRA-ARTICULAR; INTRAMUSCULAR at 13:47

## 2021-04-30 DIAGNOSIS — F41.9 ANXIETY: ICD-10-CM

## 2021-04-30 RX ORDER — ALPRAZOLAM 0.25 MG/1
0.25 TABLET ORAL 3 TIMES DAILY PRN
Qty: 90 TABLET | Refills: 0 | Status: SHIPPED | OUTPATIENT
Start: 2021-04-30 | End: 2021-06-23 | Stop reason: SDUPTHER

## 2021-04-30 RX ORDER — METHYLPREDNISOLONE 8 MG/1
8 TABLET ORAL DAILY
Qty: 30 TABLET | Refills: 11 | Status: SHIPPED | OUTPATIENT
Start: 2021-04-30 | End: 2021-09-10 | Stop reason: HOSPADM

## 2021-04-30 RX ORDER — ALPRAZOLAM 0.25 MG/1
0.25 TABLET ORAL 3 TIMES DAILY PRN
Qty: 90 TABLET | Refills: 0 | Status: CANCELLED | OUTPATIENT
Start: 2021-04-30

## 2021-04-30 NOTE — TELEPHONE ENCOUNTER
I have called [facility] and VOIDED the script that was sent there earlier.     She has requested the script be sent to [facility] @ [address] [place] [address].  Phone # [phone]    I have called the script in at the [facility] in [facility]

## 2021-06-18 RX ORDER — CYCLOBENZAPRINE HCL 10 MG
TABLET ORAL
Qty: 90 TABLET | Refills: 5 | Status: SHIPPED | OUTPATIENT
Start: 2021-06-18 | End: 2022-07-27 | Stop reason: SDUPTHER

## 2021-06-23 ENCOUNTER — OFFICE VISIT (OUTPATIENT)
Dept: FAMILY MEDICINE CLINIC | Facility: CLINIC | Age: 66
End: 2021-06-23

## 2021-06-23 VITALS
SYSTOLIC BLOOD PRESSURE: 120 MMHG | HEIGHT: 62 IN | BODY MASS INDEX: 26.68 KG/M2 | OXYGEN SATURATION: 97 % | DIASTOLIC BLOOD PRESSURE: 90 MMHG | TEMPERATURE: 97.4 F | HEART RATE: 121 BPM | WEIGHT: 145 LBS

## 2021-06-23 DIAGNOSIS — J42 CHRONIC BRONCHITIS WITH ACUTE EXACERBATION (HCC): ICD-10-CM

## 2021-06-23 DIAGNOSIS — J44.9 STAGE 4 VERY SEVERE COPD BY GOLD CLASSIFICATION (HCC): ICD-10-CM

## 2021-06-23 DIAGNOSIS — J20.9 CHRONIC BRONCHITIS WITH ACUTE EXACERBATION (HCC): ICD-10-CM

## 2021-06-23 DIAGNOSIS — R05.9 COUGH: ICD-10-CM

## 2021-06-23 DIAGNOSIS — R53.81 MALAISE AND FATIGUE: ICD-10-CM

## 2021-06-23 DIAGNOSIS — F41.9 ANXIETY: Primary | ICD-10-CM

## 2021-06-23 DIAGNOSIS — R53.83 MALAISE AND FATIGUE: ICD-10-CM

## 2021-06-23 PROCEDURE — 99214 OFFICE O/P EST MOD 30 MIN: CPT | Performed by: NURSE PRACTITIONER

## 2021-06-23 PROCEDURE — 96372 THER/PROPH/DIAG INJ SC/IM: CPT | Performed by: NURSE PRACTITIONER

## 2021-06-23 RX ORDER — ALPRAZOLAM 0.25 MG/1
0.25 TABLET ORAL 3 TIMES DAILY PRN
Qty: 90 TABLET | Refills: 0 | Status: SHIPPED | OUTPATIENT
Start: 2021-06-23 | End: 2021-10-07 | Stop reason: SDUPTHER

## 2021-06-23 RX ORDER — IPRATROPIUM BROMIDE AND ALBUTEROL SULFATE 2.5; .5 MG/3ML; MG/3ML
3 SOLUTION RESPIRATORY (INHALATION)
Qty: 360 ML | Refills: 5 | Status: ON HOLD | OUTPATIENT
Start: 2021-06-23 | End: 2021-09-10 | Stop reason: SDUPTHER

## 2021-06-23 RX ORDER — ALPRAZOLAM 0.25 MG/1
0.25 TABLET ORAL 3 TIMES DAILY PRN
Qty: 90 TABLET | Refills: 0 | Status: SHIPPED | OUTPATIENT
Start: 2021-06-23 | End: 2021-06-23 | Stop reason: SDUPTHER

## 2021-06-23 RX ORDER — TRIAMCINOLONE ACETONIDE 40 MG/ML
80 INJECTION, SUSPENSION INTRA-ARTICULAR; INTRAMUSCULAR ONCE
Status: COMPLETED | OUTPATIENT
Start: 2021-06-23 | End: 2021-06-23

## 2021-06-23 RX ADMIN — TRIAMCINOLONE ACETONIDE 80 MG: 40 INJECTION, SUSPENSION INTRA-ARTICULAR; INTRAMUSCULAR at 14:28

## 2021-06-23 NOTE — PROGRESS NOTES
Chief Complaint   Patient presents with   • Shortness of Breath   • Med Refill   • knot in palm of right hand     Subjective   Monisha Brasher is a 66 y.o. female.     Presents with needing refill of meds, reports anxiety and fatigue related to covid pandemic and family concerns-scared to leave the house and when she does she has a panic attack.     Shortness of Breath  This is a recurrent problem. The current episode started more than 1 year ago. The problem has been gradually worsening. Associated symptoms include wheezing. Pertinent negatives include no abdominal pain, chest pain, ear pain, fever, headaches, hemoptysis, leg swelling, neck pain, rash, rhinorrhea, sore throat, swollen glands or vomiting. The treatment provided mild relief. Her past medical history is significant for allergies and COPD. There is no history of asthma, a heart failure, PE or pneumonia.   Fatigue  This is a recurrent problem. The current episode started more than 1 month ago. The problem occurs intermittently. The problem has been gradually worsening. Associated symptoms include arthralgias, coughing, fatigue, joint swelling and myalgias. Pertinent negatives include no abdominal pain, anorexia, change in bowel habit, chest pain, chills, congestion, diaphoresis, fever, headaches, nausea, neck pain, numbness, rash, sore throat, swollen glands, urinary symptoms, vertigo, visual change, vomiting or weakness. She has tried acetaminophen and NSAIDs for the symptoms. The treatment provided mild relief.   Anxiety  Presents for follow-up visit. Symptoms include excessive worry, hyperventilation, irritability, nervous/anxious behavior and shortness of breath. Patient reports no chest pain, compulsions, confusion, decreased concentration, dizziness, feeling of choking, impotence, malaise, nausea, obsessions, palpitations, panic, restlessness or suicidal ideas. The quality of sleep is good. Nighttime awakenings: none.     There is no  history of asthma. Compliance with medications is %.   Cough  This is a chronic problem. The current episode started more than 1 year ago. The problem has been waxing and waning. The problem occurs hourly. The cough is non-productive. Associated symptoms include myalgias, shortness of breath and wheezing. Pertinent negatives include no chest pain, chills, ear congestion, ear pain, eye redness, fever, headaches, heartburn, hemoptysis, postnasal drip, rash, rhinorrhea, sore throat, sweats or weight loss. She has tried steroid inhaler, prescription cough suppressant and oral steroids (ventolin ) for the symptoms. The treatment provided significant relief. Her past medical history is significant for COPD. There is no history of asthma, bronchiectasis, emphysema, environmental allergies or pneumonia.        The following portions of the patient's history were reviewed and updated as appropriate: allergies, current medications, past social history and problem list.    Review of Systems   Constitutional: Positive for activity change, appetite change, fatigue and irritability. Negative for chills, diaphoresis, fever, unexpected weight change and weight loss.   HENT: Negative for congestion, dental problem, drooling, ear discharge, ear pain, facial swelling, hearing loss, mouth sores, nosebleeds, postnasal drip, rhinorrhea, sinus pressure, sinus pain, sneezing, sore throat, tinnitus, trouble swallowing and voice change.    Eyes: Negative.  Negative for photophobia, pain, discharge, redness, itching and visual disturbance.   Respiratory: Positive for cough, shortness of breath and wheezing. Negative for apnea, hemoptysis, choking, chest tightness and stridor.    Cardiovascular: Negative for chest pain, palpitations and leg swelling.   Gastrointestinal: Positive for constipation. Negative for abdominal distention, abdominal pain, anal bleeding, anorexia, blood in stool, change in bowel habit, diarrhea, heartburn, nausea,  "rectal pain and vomiting.   Endocrine: Positive for cold intolerance. Negative for heat intolerance, polydipsia, polyphagia and polyuria.   Genitourinary: Negative for difficulty urinating, dyspareunia, enuresis, flank pain, frequency and impotence.   Musculoskeletal: Positive for arthralgias, gait problem, joint swelling and myalgias. Negative for neck pain and neck stiffness.        Lumbar pain with radiation of pain to right hip    Skin: Positive for wound. Negative for color change, pallor and rash.   Allergic/Immunologic: Negative.  Negative for environmental allergies, food allergies and immunocompromised state.   Neurological: Negative for dizziness, vertigo, tremors, seizures, syncope, facial asymmetry, speech difficulty, weakness, light-headedness, numbness and headaches.   Hematological: Negative.  Negative for adenopathy. Does not bruise/bleed easily.   Psychiatric/Behavioral: Positive for sleep disturbance. Negative for agitation, behavioral problems, confusion, decreased concentration, dysphoric mood, hallucinations, self-injury and suicidal ideas. The patient is nervous/anxious. The patient is not hyperactive.          recently had a heart attack -she is very stressed        Objective   /90   Pulse (!) 121   Temp 97.4 °F (36.3 °C) (Infrared)   Ht 157.5 cm (62\")   Wt 65.8 kg (145 lb)   SpO2 97%   BMI 26.52 kg/m²   Physical Exam  Vitals and nursing note reviewed.   Constitutional:       General: She is not in acute distress.     Appearance: Normal appearance. She is not ill-appearing, toxic-appearing or diaphoretic.   HENT:      Right Ear: Tympanic membrane normal.      Mouth/Throat:      Mouth: Mucous membranes are moist.   Eyes:      Pupils: Pupils are equal, round, and reactive to light.   Cardiovascular:      Rate and Rhythm: Normal rate.      Heart sounds: No murmur heard.   No friction rub. No gallop.    Pulmonary:      Breath sounds: Wheezing and rhonchi present.   Abdominal:    "   General: Abdomen is flat. There is no distension.      Palpations: Abdomen is soft. There is no mass.      Tenderness: There is no abdominal tenderness.      Hernia: No hernia is present.   Musculoskeletal:      Cervical back: Normal range of motion.   Neurological:      General: No focal deficit present.      Mental Status: She is alert and oriented to person, place, and time.   Psychiatric:         Mood and Affect: Mood normal.         Behavior: Behavior normal.         Assessment/Plan   Problems Addressed this Visit        Mental Health    Anxiety - Primary    Relevant Orders    CBC & Differential    Comprehensive Metabolic Panel    Hemoglobin A1c    Iron    Lipid Panel    Vitamin D 25 Hydroxy    Vitamin B12    TSH       Pulmonary and Pneumonias    Chronic bronchitis with acute exacerbation (CMS/HCC) (Chronic)    Relevant Medications    ipratropium-albuterol (DUO-NEB) 0.5-2.5 mg/3 ml nebulizer    ALPRAZolam (Xanax) 0.25 MG tablet    Other Relevant Orders    CBC & Differential    Comprehensive Metabolic Panel    Hemoglobin A1c    Iron    Lipid Panel    Vitamin D 25 Hydroxy    Vitamin B12    TSH    Ambulatory Referral to Pulmonology (Completed)    Cough    Stage 4 very severe COPD by GOLD classification (CMS/[facility])    Relevant Medications    ipratropium-albuterol (DUO-NEB) 0.5-2.5 mg/3 ml nebulizer      Other Visit Diagnoses     Malaise and fatigue          Diagnoses       Codes Comments    Anxiety    -  Primary ICD-10-CM: F41.9  ICD-9-CM: 300.00     Chronic bronchitis with acute exacerbation (CMS/HCC)     ICD-10-CM: J20.9, J42  ICD-9-CM: 466.0, 491.9     Stage 4 very severe COPD by GOLD classification (CMS/[facility])     ICD-10-CM: J44.9  ICD-9-CM: 496     Cough     ICD-10-CM: R05  ICD-9-CM: 786.2     Malaise and fatigue     ICD-10-CM: R53.81, R53.83  ICD-9-CM: 780.79            New Medications Ordered This Visit   Medications   • ipratropium-albuterol (DUO-NEB) 0.5-2.5 mg/3 ml nebulizer     Sig: Take 3 mL by  nebulization 4 (Four) Times a Day.     Dispense:  360 mL     Refill:  5   • mupirocin (BACTROBAN) 2 % ointment     Sig: Apply  topically to the appropriate area as directed 2 (Two) Times a Day.     Dispense:  30 g     Refill:  5   • nystatin (MYCOSTATIN) 016666 UNIT/ML suspension     Sig: Take 5 mL by mouth 4 (Four) Times a Day. As needed for mouth sores.     Dispense:  280 mL     Refill:  0   • ALPRAZolam (Xanax) 0.25 MG tablet     Sig: Take 1 tablet by mouth 3 (Three) Times a Day As Needed for Anxiety.     Dispense:  90 tablet     Refill:  0   • triamcinolone acetonide (KENALOG-40) injection 80 mg       Patient understands the risks associated with this controlled medication, including tolerance and addiction.  she also agrees to only obtain this medication from me, and not from a another provider, unless that provider is covering for me in my absence.  she also agrees to be compliant in dosing, and not self adjust the dose of medication.  A signed controlled substance agreement is on file, and she has received a controlled substance education sheet at this a previous visit.  she has also signed a consent for treatment with a controlled substance as per [facility] policy. KAYLAN was obtained.    meds as directed, refill meds -see back if worsen

## 2021-07-14 RX ORDER — MONTELUKAST SODIUM 10 MG/1
TABLET ORAL
Qty: 90 TABLET | Refills: 4 | Status: SHIPPED | OUTPATIENT
Start: 2021-07-14 | End: 2022-08-29

## 2021-08-25 ENCOUNTER — CLINICAL SUPPORT (OUTPATIENT)
Dept: FAMILY MEDICINE CLINIC | Facility: CLINIC | Age: 66
End: 2021-08-25

## 2021-08-25 DIAGNOSIS — J44.1 COPD WITH EXACERBATION (HCC): Primary | ICD-10-CM

## 2021-08-25 PROCEDURE — 96372 THER/PROPH/DIAG INJ SC/IM: CPT | Performed by: NURSE PRACTITIONER

## 2021-08-25 RX ORDER — TRIAMCINOLONE ACETONIDE 40 MG/ML
80 INJECTION, SUSPENSION INTRA-ARTICULAR; INTRAMUSCULAR ONCE
Status: COMPLETED | OUTPATIENT
Start: 2021-08-25 | End: 2021-08-25

## 2021-08-25 RX ADMIN — TRIAMCINOLONE ACETONIDE 80 MG: 40 INJECTION, SUSPENSION INTRA-ARTICULAR; INTRAMUSCULAR at 14:06

## 2021-09-07 ENCOUNTER — APPOINTMENT (OUTPATIENT)
Dept: CT IMAGING | Facility: HOSPITAL | Age: 66
End: 2021-09-07

## 2021-09-07 ENCOUNTER — HOSPITAL ENCOUNTER (INPATIENT)
Facility: HOSPITAL | Age: 66
LOS: 3 days | Discharge: HOME HEALTH | End: 2021-09-10
Attending: EMERGENCY MEDICINE | Admitting: INTERNAL MEDICINE

## 2021-09-07 ENCOUNTER — APPOINTMENT (OUTPATIENT)
Dept: GENERAL RADIOLOGY | Facility: HOSPITAL | Age: 66
End: 2021-09-07

## 2021-09-07 DIAGNOSIS — R00.0 SINUS TACHYCARDIA: ICD-10-CM

## 2021-09-07 DIAGNOSIS — Z74.09 IMPAIRED MOBILITY AND ACTIVITIES OF DAILY LIVING: ICD-10-CM

## 2021-09-07 DIAGNOSIS — Z78.9 IMPAIRED MOBILITY AND ACTIVITIES OF DAILY LIVING: ICD-10-CM

## 2021-09-07 DIAGNOSIS — J18.9 PNEUMONIA OF BOTH LUNGS DUE TO INFECTIOUS ORGANISM, UNSPECIFIED PART OF LUNG: Primary | ICD-10-CM

## 2021-09-07 DIAGNOSIS — Z74.09 IMPAIRED FUNCTIONAL MOBILITY, BALANCE, GAIT, AND ENDURANCE: ICD-10-CM

## 2021-09-07 DIAGNOSIS — J44.9 STAGE 4 VERY SEVERE COPD BY GOLD CLASSIFICATION (HCC): ICD-10-CM

## 2021-09-07 DIAGNOSIS — J96.01 ACUTE RESPIRATORY FAILURE WITH HYPOXIA (HCC): ICD-10-CM

## 2021-09-07 LAB
ALBUMIN SERPL-MCNC: 3.7 G/DL (ref 3.5–5.2)
ALBUMIN/GLOB SERPL: 1.1 G/DL
ALP SERPL-CCNC: 93 U/L (ref 39–117)
ALT SERPL W P-5'-P-CCNC: 22 U/L (ref 1–33)
ANION GAP SERPL CALCULATED.3IONS-SCNC: 12 MMOL/L (ref 5–15)
ANION GAP SERPL CALCULATED.3IONS-SCNC: 13 MMOL/L (ref 5–15)
ANISOCYTOSIS BLD QL: ABNORMAL
ARTERIAL PATENCY WRIST A: POSITIVE
AST SERPL-CCNC: 19 U/L (ref 1–32)
ATMOSPHERIC PRESS: 747 MMHG
BASE EXCESS BLDA CALC-SCNC: 4.1 MMOL/L (ref 0–2)
BASOPHILS # BLD AUTO: 0.01 10*3/MM3 (ref 0–0.2)
BASOPHILS # BLD MANUAL: 0.1 10*3/MM3 (ref 0–0.2)
BASOPHILS NFR BLD AUTO: 0.1 % (ref 0–1.5)
BASOPHILS NFR BLD AUTO: 1 % (ref 0–1.5)
BDY SITE: ABNORMAL
BILIRUB SERPL-MCNC: 0.8 MG/DL (ref 0–1.2)
BUN SERPL-MCNC: 10 MG/DL (ref 8–23)
BUN SERPL-MCNC: 10 MG/DL (ref 8–23)
BUN/CREAT SERPL: 13.9 (ref 7–25)
BUN/CREAT SERPL: 17.2 (ref 7–25)
CALCIUM SPEC-SCNC: 8.4 MG/DL (ref 8.6–10.5)
CALCIUM SPEC-SCNC: 9.4 MG/DL (ref 8.6–10.5)
CHLORIDE SERPL-SCNC: 96 MMOL/L (ref 98–107)
CHLORIDE SERPL-SCNC: 98 MMOL/L (ref 98–107)
CO2 SERPL-SCNC: 23 MMOL/L (ref 22–29)
CO2 SERPL-SCNC: 28 MMOL/L (ref 22–29)
CREAT SERPL-MCNC: 0.58 MG/DL (ref 0.57–1)
CREAT SERPL-MCNC: 0.72 MG/DL (ref 0.57–1)
D-DIMER, QUANTITATIVE (MAD,POW, STR): 2694 NG/ML (FEU) (ref 0–470)
D-LACTATE SERPL-SCNC: 1.6 MMOL/L (ref 0.5–2)
DEPRECATED RDW RBC AUTO: 50.8 FL (ref 37–54)
DEPRECATED RDW RBC AUTO: 50.8 FL (ref 37–54)
EOSINOPHIL # BLD AUTO: 0 10*3/MM3 (ref 0–0.4)
EOSINOPHIL NFR BLD AUTO: 0 % (ref 0.3–6.2)
ERYTHROCYTE [DISTWIDTH] IN BLOOD BY AUTOMATED COUNT: 14.1 % (ref 12.3–15.4)
ERYTHROCYTE [DISTWIDTH] IN BLOOD BY AUTOMATED COUNT: 14.2 % (ref 12.3–15.4)
FLUAV RNA RESP QL NAA+PROBE: NOT DETECTED
FLUBV RNA RESP QL NAA+PROBE: NOT DETECTED
GAS FLOW AIRWAY: 6 LPM
GFR SERPL CREATININE-BSD FRML MDRD: 105 ML/MIN/1.73
GFR SERPL CREATININE-BSD FRML MDRD: 82 ML/MIN/1.73
GLOBULIN UR ELPH-MCNC: 3.4 GM/DL
GLUCOSE BLDC GLUCOMTR-MCNC: 145 MG/DL (ref 70–130)
GLUCOSE BLDC GLUCOMTR-MCNC: 153 MG/DL (ref 70–130)
GLUCOSE BLDC GLUCOMTR-MCNC: 284 MG/DL (ref 70–130)
GLUCOSE BLDC GLUCOMTR-MCNC: 297 MG/DL (ref 70–130)
GLUCOSE SERPL-MCNC: 148 MG/DL (ref 65–99)
GLUCOSE SERPL-MCNC: 317 MG/DL (ref 65–99)
HCO3 BLDA-SCNC: 28.3 MMOL/L (ref 20–26)
HCT VFR BLD AUTO: 38.1 % (ref 34–46.6)
HCT VFR BLD AUTO: 45 % (ref 34–46.6)
HGB BLD-MCNC: 12.3 G/DL (ref 12–15.9)
HGB BLD-MCNC: 14.4 G/DL (ref 12–15.9)
HOLD SPECIMEN: NORMAL
HOLD SPECIMEN: NORMAL
IMM GRANULOCYTES # BLD AUTO: 0.09 10*3/MM3 (ref 0–0.05)
IMM GRANULOCYTES NFR BLD AUTO: 0.7 % (ref 0–0.5)
LYMPHOCYTES # BLD AUTO: 0.24 10*3/MM3 (ref 0.7–3.1)
LYMPHOCYTES # BLD MANUAL: 0.38 10*3/MM3 (ref 0.7–3.1)
LYMPHOCYTES # BLD MANUAL: 0.72 10*3/MM3 (ref 0.7–3.1)
LYMPHOCYTES NFR BLD AUTO: 1.9 % (ref 19.6–45.3)
LYMPHOCYTES NFR BLD MANUAL: 1 % (ref 5–12)
LYMPHOCYTES NFR BLD MANUAL: 3 % (ref 19.6–45.3)
LYMPHOCYTES NFR BLD MANUAL: 5 % (ref 5–12)
LYMPHOCYTES NFR BLD MANUAL: 7 % (ref 19.6–45.3)
Lab: ABNORMAL
MCH RBC QN AUTO: 31.1 PG (ref 26.6–33)
MCH RBC QN AUTO: 31.3 PG (ref 26.6–33)
MCHC RBC AUTO-ENTMCNC: 32 G/DL (ref 31.5–35.7)
MCHC RBC AUTO-ENTMCNC: 32.3 G/DL (ref 31.5–35.7)
MCV RBC AUTO: 96.9 FL (ref 79–97)
MCV RBC AUTO: 97.2 FL (ref 79–97)
METAMYELOCYTES NFR BLD MANUAL: 1 % (ref 0–0)
MODALITY: ABNORMAL
MONOCYTES # BLD AUTO: 0.13 10*3/MM3 (ref 0.1–0.9)
MONOCYTES # BLD AUTO: 0.36 10*3/MM3 (ref 0.1–0.9)
MONOCYTES # BLD AUTO: 0.51 10*3/MM3 (ref 0.1–0.9)
MONOCYTES NFR BLD AUTO: 2.9 % (ref 5–12)
MYELOCYTES NFR BLD MANUAL: 1 % (ref 0–0)
NEUTROPHILS # BLD AUTO: 11.83 10*3/MM3 (ref 1.7–7)
NEUTROPHILS # BLD AUTO: 8.93 10*3/MM3 (ref 1.7–7)
NEUTROPHILS NFR BLD AUTO: 11.89 10*3/MM3 (ref 1.7–7)
NEUTROPHILS NFR BLD AUTO: 94.4 % (ref 42.7–76)
NEUTROPHILS NFR BLD MANUAL: 73 % (ref 42.7–76)
NEUTROPHILS NFR BLD MANUAL: 73 % (ref 42.7–76)
NEUTS BAND NFR BLD MANUAL: 14 % (ref 0–5)
NEUTS BAND NFR BLD MANUAL: 21 % (ref 0–5)
NRBC BLD AUTO-RTO: 0 /100 WBC (ref 0–0.2)
NT-PROBNP SERPL-MCNC: 1254 PG/ML (ref 0–900)
PCO2 BLDA: 40.1 MM HG (ref 35–45)
PH BLDA: 7.46 PH UNITS (ref 7.35–7.45)
PLAT MORPH BLD: NORMAL
PLATELET # BLD AUTO: 189 10*3/MM3 (ref 140–450)
PLATELET # BLD AUTO: 226 10*3/MM3 (ref 140–450)
PMV BLD AUTO: 9.3 FL (ref 6–12)
PMV BLD AUTO: 9.3 FL (ref 6–12)
PO2 BLDA: 78.5 MM HG (ref 83–108)
POTASSIUM SERPL-SCNC: 4 MMOL/L (ref 3.5–5.2)
POTASSIUM SERPL-SCNC: 4 MMOL/L (ref 3.5–5.2)
PROT SERPL-MCNC: 7.1 G/DL (ref 6–8.5)
QT INTERVAL: 272 MS
QTC INTERVAL: 437 MS
RBC # BLD AUTO: 3.93 10*6/MM3 (ref 3.77–5.28)
RBC # BLD AUTO: 4.63 10*6/MM3 (ref 3.77–5.28)
RBC MORPH BLD: NORMAL
SAO2 % BLDCOA: 96.8 % (ref 94–99)
SARS-COV-2 RNA RESP QL NAA+PROBE: NOT DETECTED
SMALL PLATELETS BLD QL SMEAR: ADEQUATE
SODIUM SERPL-SCNC: 134 MMOL/L (ref 136–145)
SODIUM SERPL-SCNC: 136 MMOL/L (ref 136–145)
TROPONIN T SERPL-MCNC: 0.01 NG/ML (ref 0–0.03)
VENTILATOR MODE: ABNORMAL
WBC # BLD AUTO: 10.27 10*3/MM3 (ref 3.4–10.8)
WBC # BLD AUTO: 12.59 10*3/MM3 (ref 3.4–10.8)
WBC MORPH BLD: NORMAL
WBC MORPH BLD: NORMAL
WHOLE BLOOD HOLD SPECIMEN: NORMAL
WHOLE BLOOD HOLD SPECIMEN: NORMAL

## 2021-09-07 PROCEDURE — 94799 UNLISTED PULMONARY SVC/PX: CPT

## 2021-09-07 PROCEDURE — 25010000002 FUROSEMIDE PER 20 MG: Performed by: INTERNAL MEDICINE

## 2021-09-07 PROCEDURE — 94760 N-INVAS EAR/PLS OXIMETRY 1: CPT

## 2021-09-07 PROCEDURE — 87040 BLOOD CULTURE FOR BACTERIA: CPT | Performed by: INTERNAL MEDICINE

## 2021-09-07 PROCEDURE — 36600 WITHDRAWAL OF ARTERIAL BLOOD: CPT

## 2021-09-07 PROCEDURE — 87040 BLOOD CULTURE FOR BACTERIA: CPT | Performed by: EMERGENCY MEDICINE

## 2021-09-07 PROCEDURE — 82962 GLUCOSE BLOOD TEST: CPT

## 2021-09-07 PROCEDURE — 87636 SARSCOV2 & INF A&B AMP PRB: CPT | Performed by: EMERGENCY MEDICINE

## 2021-09-07 PROCEDURE — 83605 ASSAY OF LACTIC ACID: CPT | Performed by: EMERGENCY MEDICINE

## 2021-09-07 PROCEDURE — 83880 ASSAY OF NATRIURETIC PEPTIDE: CPT | Performed by: EMERGENCY MEDICINE

## 2021-09-07 PROCEDURE — 0 IOPAMIDOL PER 1 ML: Performed by: EMERGENCY MEDICINE

## 2021-09-07 PROCEDURE — 87150 DNA/RNA AMPLIFIED PROBE: CPT | Performed by: EMERGENCY MEDICINE

## 2021-09-07 PROCEDURE — 36415 COLL VENOUS BLD VENIPUNCTURE: CPT | Performed by: INTERNAL MEDICINE

## 2021-09-07 PROCEDURE — 84484 ASSAY OF TROPONIN QUANT: CPT | Performed by: EMERGENCY MEDICINE

## 2021-09-07 PROCEDURE — 25010000002 ENOXAPARIN PER 10 MG: Performed by: INTERNAL MEDICINE

## 2021-09-07 PROCEDURE — 25010000002 AZITHROMYCIN 500 MG/250 ML: Performed by: EMERGENCY MEDICINE

## 2021-09-07 PROCEDURE — 82803 BLOOD GASES ANY COMBINATION: CPT

## 2021-09-07 PROCEDURE — 63710000001 INSULIN ASPART PER 5 UNITS: Performed by: INTERNAL MEDICINE

## 2021-09-07 PROCEDURE — 25010000002 CEFTRIAXONE PER 250 MG: Performed by: EMERGENCY MEDICINE

## 2021-09-07 PROCEDURE — 25010000002 METHYLPREDNISOLONE PER 125 MG: Performed by: EMERGENCY MEDICINE

## 2021-09-07 PROCEDURE — 85007 BL SMEAR W/DIFF WBC COUNT: CPT | Performed by: INTERNAL MEDICINE

## 2021-09-07 PROCEDURE — 99285 EMERGENCY DEPT VISIT HI MDM: CPT

## 2021-09-07 PROCEDURE — 80053 COMPREHEN METABOLIC PANEL: CPT | Performed by: EMERGENCY MEDICINE

## 2021-09-07 PROCEDURE — 85025 COMPLETE CBC W/AUTO DIFF WBC: CPT | Performed by: EMERGENCY MEDICINE

## 2021-09-07 PROCEDURE — 94660 CPAP INITIATION&MGMT: CPT

## 2021-09-07 PROCEDURE — 93010 ELECTROCARDIOGRAM REPORT: CPT | Performed by: INTERNAL MEDICINE

## 2021-09-07 PROCEDURE — 71045 X-RAY EXAM CHEST 1 VIEW: CPT

## 2021-09-07 PROCEDURE — 85379 FIBRIN DEGRADATION QUANT: CPT | Performed by: EMERGENCY MEDICINE

## 2021-09-07 PROCEDURE — 25010000002 ONDANSETRON PER 1 MG: Performed by: INTERNAL MEDICINE

## 2021-09-07 PROCEDURE — 93005 ELECTROCARDIOGRAM TRACING: CPT | Performed by: EMERGENCY MEDICINE

## 2021-09-07 PROCEDURE — 85025 COMPLETE CBC W/AUTO DIFF WBC: CPT | Performed by: INTERNAL MEDICINE

## 2021-09-07 PROCEDURE — 85007 BL SMEAR W/DIFF WBC COUNT: CPT | Performed by: EMERGENCY MEDICINE

## 2021-09-07 PROCEDURE — 71275 CT ANGIOGRAPHY CHEST: CPT

## 2021-09-07 RX ORDER — ASPIRIN 81 MG/1
81 TABLET, CHEWABLE ORAL DAILY
Status: DISCONTINUED | OUTPATIENT
Start: 2021-09-07 | End: 2021-09-10 | Stop reason: HOSPADM

## 2021-09-07 RX ORDER — ONDANSETRON 4 MG/1
4 TABLET, FILM COATED ORAL EVERY 6 HOURS PRN
Status: DISCONTINUED | OUTPATIENT
Start: 2021-09-07 | End: 2021-09-10 | Stop reason: HOSPADM

## 2021-09-07 RX ORDER — ALBUTEROL SULFATE 90 UG/1
2 AEROSOL, METERED RESPIRATORY (INHALATION)
Status: DISCONTINUED | OUTPATIENT
Start: 2021-09-07 | End: 2021-09-07

## 2021-09-07 RX ORDER — NITROGLYCERIN 0.4 MG/1
0.4 TABLET SUBLINGUAL
Status: DISCONTINUED | OUTPATIENT
Start: 2021-09-07 | End: 2021-09-10 | Stop reason: HOSPADM

## 2021-09-07 RX ORDER — DEXTROSE MONOHYDRATE 25 G/50ML
25 INJECTION, SOLUTION INTRAVENOUS
Status: DISCONTINUED | OUTPATIENT
Start: 2021-09-07 | End: 2021-09-10 | Stop reason: HOSPADM

## 2021-09-07 RX ORDER — PRASUGREL 10 MG/1
10 TABLET, FILM COATED ORAL DAILY
Status: DISCONTINUED | OUTPATIENT
Start: 2021-09-07 | End: 2021-09-10 | Stop reason: HOSPADM

## 2021-09-07 RX ORDER — ACETAMINOPHEN 650 MG/1
650 SUPPOSITORY RECTAL EVERY 4 HOURS PRN
Status: DISCONTINUED | OUTPATIENT
Start: 2021-09-07 | End: 2021-09-10 | Stop reason: HOSPADM

## 2021-09-07 RX ORDER — ACETAMINOPHEN 160 MG/5ML
650 SOLUTION ORAL EVERY 4 HOURS PRN
Status: DISCONTINUED | OUTPATIENT
Start: 2021-09-07 | End: 2021-09-10 | Stop reason: HOSPADM

## 2021-09-07 RX ORDER — NICOTINE POLACRILEX 4 MG
15 LOZENGE BUCCAL
Status: DISCONTINUED | OUTPATIENT
Start: 2021-09-07 | End: 2021-09-10 | Stop reason: HOSPADM

## 2021-09-07 RX ORDER — METHYLPREDNISOLONE SODIUM SUCCINATE 125 MG/2ML
60 INJECTION, POWDER, LYOPHILIZED, FOR SOLUTION INTRAMUSCULAR; INTRAVENOUS EVERY 8 HOURS
Status: DISCONTINUED | OUTPATIENT
Start: 2021-09-07 | End: 2021-09-10 | Stop reason: HOSPADM

## 2021-09-07 RX ORDER — ALBUTEROL SULFATE 2.5 MG/3ML
2.5 SOLUTION RESPIRATORY (INHALATION)
Status: DISCONTINUED | OUTPATIENT
Start: 2021-09-07 | End: 2021-09-07

## 2021-09-07 RX ORDER — ATORVASTATIN CALCIUM 40 MG/1
40 TABLET, FILM COATED ORAL DAILY
Status: DISCONTINUED | OUTPATIENT
Start: 2021-09-07 | End: 2021-09-10 | Stop reason: HOSPADM

## 2021-09-07 RX ORDER — HYDROXYZINE HYDROCHLORIDE 10 MG/1
10 TABLET, FILM COATED ORAL EVERY 8 HOURS PRN
Status: DISCONTINUED | OUTPATIENT
Start: 2021-09-07 | End: 2021-09-07 | Stop reason: CLARIF

## 2021-09-07 RX ORDER — LOSARTAN POTASSIUM 25 MG/1
25 TABLET ORAL DAILY
Status: DISCONTINUED | OUTPATIENT
Start: 2021-09-07 | End: 2021-09-10 | Stop reason: HOSPADM

## 2021-09-07 RX ORDER — ALPRAZOLAM 0.25 MG/1
0.25 TABLET ORAL 3 TIMES DAILY PRN
Status: DISCONTINUED | OUTPATIENT
Start: 2021-09-07 | End: 2021-09-10 | Stop reason: HOSPADM

## 2021-09-07 RX ORDER — ALBUTEROL SULFATE 2.5 MG/3ML
2.5 SOLUTION RESPIRATORY (INHALATION) EVERY 4 HOURS PRN
Status: DISCONTINUED | OUTPATIENT
Start: 2021-09-07 | End: 2021-09-10 | Stop reason: HOSPADM

## 2021-09-07 RX ORDER — METHYLPREDNISOLONE SODIUM SUCCINATE 125 MG/2ML
125 INJECTION, POWDER, LYOPHILIZED, FOR SOLUTION INTRAMUSCULAR; INTRAVENOUS ONCE
Status: COMPLETED | OUTPATIENT
Start: 2021-09-07 | End: 2021-09-07

## 2021-09-07 RX ORDER — SODIUM CHLORIDE 0.9 % (FLUSH) 0.9 %
10 SYRINGE (ML) INJECTION AS NEEDED
Status: DISCONTINUED | OUTPATIENT
Start: 2021-09-07 | End: 2021-09-10 | Stop reason: HOSPADM

## 2021-09-07 RX ORDER — SODIUM CHLORIDE 0.9 % (FLUSH) 0.9 %
10 SYRINGE (ML) INJECTION EVERY 12 HOURS SCHEDULED
Status: DISCONTINUED | OUTPATIENT
Start: 2021-09-07 | End: 2021-09-10 | Stop reason: HOSPADM

## 2021-09-07 RX ORDER — ACETAMINOPHEN 325 MG/1
650 TABLET ORAL EVERY 4 HOURS PRN
Status: DISCONTINUED | OUTPATIENT
Start: 2021-09-07 | End: 2021-09-10 | Stop reason: HOSPADM

## 2021-09-07 RX ORDER — AMIODARONE HYDROCHLORIDE 200 MG/1
100 TABLET ORAL 2 TIMES DAILY
Status: DISCONTINUED | OUTPATIENT
Start: 2021-09-07 | End: 2021-09-10 | Stop reason: HOSPADM

## 2021-09-07 RX ORDER — BUDESONIDE AND FORMOTEROL FUMARATE DIHYDRATE 160; 4.5 UG/1; UG/1
2 AEROSOL RESPIRATORY (INHALATION) 2 TIMES DAILY
Status: DISCONTINUED | OUTPATIENT
Start: 2021-09-07 | End: 2021-09-10 | Stop reason: HOSPADM

## 2021-09-07 RX ORDER — ALUMINA, MAGNESIA, AND SIMETHICONE 2400; 2400; 240 MG/30ML; MG/30ML; MG/30ML
15 SUSPENSION ORAL EVERY 6 HOURS PRN
Status: DISCONTINUED | OUTPATIENT
Start: 2021-09-07 | End: 2021-09-10 | Stop reason: HOSPADM

## 2021-09-07 RX ORDER — MONTELUKAST SODIUM 10 MG/1
10 TABLET ORAL DAILY
Status: DISCONTINUED | OUTPATIENT
Start: 2021-09-07 | End: 2021-09-10 | Stop reason: HOSPADM

## 2021-09-07 RX ORDER — HYDROXYZINE HYDROCHLORIDE 25 MG/1
12.5 TABLET, FILM COATED ORAL EVERY 8 HOURS PRN
Status: DISCONTINUED | OUTPATIENT
Start: 2021-09-07 | End: 2021-09-10 | Stop reason: HOSPADM

## 2021-09-07 RX ORDER — ONDANSETRON 2 MG/ML
4 INJECTION INTRAMUSCULAR; INTRAVENOUS EVERY 6 HOURS PRN
Status: DISCONTINUED | OUTPATIENT
Start: 2021-09-07 | End: 2021-09-10 | Stop reason: HOSPADM

## 2021-09-07 RX ORDER — FUROSEMIDE 10 MG/ML
20 INJECTION INTRAMUSCULAR; INTRAVENOUS DAILY
Status: DISCONTINUED | OUTPATIENT
Start: 2021-09-07 | End: 2021-09-10 | Stop reason: HOSPADM

## 2021-09-07 RX ORDER — PAROXETINE HYDROCHLORIDE 20 MG/1
20 TABLET, FILM COATED ORAL EVERY MORNING
Status: DISCONTINUED | OUTPATIENT
Start: 2021-09-08 | End: 2021-09-10 | Stop reason: HOSPADM

## 2021-09-07 RX ORDER — CYCLOBENZAPRINE HCL 10 MG
10 TABLET ORAL 3 TIMES DAILY PRN
Status: DISCONTINUED | OUTPATIENT
Start: 2021-09-07 | End: 2021-09-10 | Stop reason: HOSPADM

## 2021-09-07 RX ORDER — IPRATROPIUM BROMIDE AND ALBUTEROL SULFATE 2.5; .5 MG/3ML; MG/3ML
3 SOLUTION RESPIRATORY (INHALATION)
Status: DISCONTINUED | OUTPATIENT
Start: 2021-09-07 | End: 2021-09-10 | Stop reason: HOSPADM

## 2021-09-07 RX ADMIN — ATORVASTATIN CALCIUM 40 MG: 40 TABLET, FILM COATED ORAL at 16:03

## 2021-09-07 RX ADMIN — INSULIN ASPART 4 UNITS: 100 INJECTION, SOLUTION INTRAVENOUS; SUBCUTANEOUS at 17:52

## 2021-09-07 RX ADMIN — CEFTRIAXONE SODIUM 1 G: 1 INJECTION, POWDER, FOR SOLUTION INTRAMUSCULAR; INTRAVENOUS at 09:26

## 2021-09-07 RX ADMIN — ALBUTEROL SULFATE 2.5 MG: 2.5 SOLUTION RESPIRATORY (INHALATION) at 13:52

## 2021-09-07 RX ADMIN — PRASUGREL 10 MG: 10 TABLET, FILM COATED ORAL at 16:03

## 2021-09-07 RX ADMIN — MONTELUKAST 10 MG: 10 TABLET, FILM COATED ORAL at 16:03

## 2021-09-07 RX ADMIN — AZITHROMYCIN 500 MG: 500 INJECTION, POWDER, LYOPHILIZED, FOR SOLUTION INTRAVENOUS at 12:19

## 2021-09-07 RX ADMIN — LOSARTAN POTASSIUM 25 MG: 25 TABLET, FILM COATED ORAL at 16:03

## 2021-09-07 RX ADMIN — ONDANSETRON HYDROCHLORIDE 4 MG: 2 INJECTION, SOLUTION INTRAMUSCULAR; INTRAVENOUS at 13:40

## 2021-09-07 RX ADMIN — FUROSEMIDE 20 MG: 20 INJECTION, SOLUTION INTRAMUSCULAR; INTRAVENOUS at 16:07

## 2021-09-07 RX ADMIN — BUDESONIDE AND FORMOTEROL FUMARATE DIHYDRATE 2 PUFF: 160; 4.5 AEROSOL RESPIRATORY (INHALATION) at 19:45

## 2021-09-07 RX ADMIN — ALPRAZOLAM 0.25 MG: 0.25 TABLET ORAL at 20:53

## 2021-09-07 RX ADMIN — ENOXAPARIN SODIUM 40 MG: 40 INJECTION SUBCUTANEOUS at 13:31

## 2021-09-07 RX ADMIN — SODIUM CHLORIDE, PRESERVATIVE FREE 10 ML: 5 INJECTION INTRAVENOUS at 20:53

## 2021-09-07 RX ADMIN — Medication 100 MG: at 20:53

## 2021-09-07 RX ADMIN — SODIUM CHLORIDE 500 ML: 9 INJECTION, SOLUTION INTRAVENOUS at 09:29

## 2021-09-07 RX ADMIN — IPRATROPIUM BROMIDE AND ALBUTEROL SULFATE 3 ML: 2.5; .5 SOLUTION RESPIRATORY (INHALATION) at 19:45

## 2021-09-07 RX ADMIN — ASPIRIN 81 MG: 81 TABLET, CHEWABLE ORAL at 16:05

## 2021-09-07 RX ADMIN — METHYLPREDNISOLONE SODIUM SUCCINATE 125 MG: 125 INJECTION, POWDER, FOR SOLUTION INTRAMUSCULAR; INTRAVENOUS at 09:22

## 2021-09-07 RX ADMIN — IOPAMIDOL 70 ML: 755 INJECTION, SOLUTION INTRAVENOUS at 14:45

## 2021-09-07 RX ADMIN — SODIUM CHLORIDE, PRESERVATIVE FREE 10 ML: 5 INJECTION INTRAVENOUS at 12:19

## 2021-09-07 NOTE — PAYOR COMM NOTE
"[facility]   [facility]  phone [phone]  fax [phone]    Notification of IP stay 9/7/21    Monisha Brasher (66 y.o. Female)     Date of Birth Social Security Number Address Home Phone MRN    1955  [address] [place], [place] [id number] [phone] [id number]    Jewish Marital Status          Christian        Admission Date Admission Type Admitting Provider Attending Provider Department, Room/Bed    9/7/21 Emergency Echendu, MD Adam Velázquez Peter E, MD [facility] EMERGENCY DEPARTMENT, 09/09    Discharge Date Discharge Disposition Discharge Destination                       Attending Provider: Hermann Medina MD    Allergies: No Known Allergies    Isolation: None   Infection: COVID (rule out) (09/07/21)   Code Status: Prior    Ht: 157.5 cm (62\")   Wt: 66.2 kg (146 lb)    Admission Cmt: None   Principal Problem: None                Active Insurance as of 9/7/2021     Primary Coverage     Payor Plan Insurance Group Employer/Plan Group    MEDICARE MEDICARE A & B      Payor Plan Address Payor Plan Phone Number Payor Plan Fax Number Effective Dates    [address] [phone]  6/1/2021 - None Entered    [facility] [id number]       Subscriber Name Subscriber Birth Date Member ID       MONISHA BRASHER 1955 [id number]           Secondary Coverage     Payor Plan Insurance Group Employer/Plan Group    [facility]     Payor Plan Address Payor Plan Phone Number Payor Plan Fax Number Effective Dates    [address] [address]   1/1/2017 - None Entered    [facility] [phone]       Subscriber Name Subscriber Birth Date Member ID       SURJIT BRASHER 5[birth year]1954 [id number]                 Emergency Contacts      (Rel.) Home Phone Work Phone Mobile Phone    Parish Brasher (Spouse) [phone] -- [phone]               Emergency Department Notes      Hermann Medina MD at 09/07/21 0851          Subjective   Patient " presents in acute respiratory failure with shortness of breath.  Patient arrives with an oxygen saturation of 77% with increased work of breathing.  Patient has known COPD.  Patient is on 4 L O2 at home.  Patient notes fever to 102 at home and worsening respiratory function.  Patient noted similar episode several weeks prior.  Patient had a Covid test yesterday which was negative.  Patient arrives hypotensive tachycardic with increased work of breathing.  Patient speaking only in short phrases cannot complete full sentences secondary to respiratory distress.  Does appear to be mentally oriented at this time.          Review of Systems   Constitutional: Positive for activity change, fatigue and fever. Negative for appetite change and chills.   HENT: Negative.  Negative for congestion.    Eyes: Negative.  Negative for photophobia and visual disturbance.   Respiratory: Positive for cough and shortness of breath. Negative for chest tightness.    Cardiovascular: Negative.  Negative for chest pain and palpitations.   Gastrointestinal: Negative.  Negative for abdominal pain, constipation, diarrhea, nausea and vomiting.   Endocrine: Negative.    Genitourinary: Negative.  Negative for decreased urine volume, dysuria, flank pain and hematuria.   Musculoskeletal: Negative.  Negative for arthralgias, back pain, myalgias, neck pain and neck stiffness.   Skin: Negative.  Negative for pallor.   Neurological: Positive for weakness. Negative for dizziness, syncope, light-headedness, numbness and headaches.   Psychiatric/Behavioral: Negative.  Negative for confusion and suicidal ideas. The patient is not nervous/anxious.    All other systems reviewed and are negative.      Past Medical History:   Diagnosis Date   • Acute bronchitis    • Acute upper respiratory infection    • Adjustment disorder with mixed emotional features    • Agoraphobia with panic attacks    • Allergic rhinitis due to pollen    • Anxiety    • Asthma    • Asthma     • Backache    • Blood in urine    • Breast cyst    • Candidiasis of mouth    • Chronic bronchitis (CMS/[facility])    • Chronic obstructive lung disease (CMS/[facility])    • COPD (chronic obstructive pulmonary disease) (CMS/[facility])    • Diabetes (CMS/[facility])    • Diabetes (CMS/[facility])    • Emphysema (subcutaneous) (surgical) resulting from a procedure    • Emphysema lung (CMS/[facility])    • Essential hypertension    • Extrinsic asthma with status asthmaticus    • Fatigue    • H/O echocardiogram     EF 55-60%. LV systolic function normal. Impaired LV relaxation. [facility]   • Heart attack (CMS/[facility]) 2015   • Heart problem    • High blood pressure    • Hypoxemia    • Malaise and fatigue    • Non-IgE mediated allergic asthma    • Nonvenomous insect bite    • Pneumonia    • PONV (postoperative nausea and vomiting)    • Postmenopausal state    • Rheumatoid arthritis (CMS/[facility])    • Urinary tract infectious disease        No Known Allergies    Past Surgical History:   Procedure Laterality Date   • BREAST BIOPSY     • CARDIAC CATHETERIZATION N/A 11/15/2018    Procedure: Left Heart Cath;  Surgeon: Thaddeus Huber MD;  Location: [facility] INVASIVE LOCATION;  Service: Cardiology   • CARDIAC CATHETERIZATION N/A 2019    Procedure: Left Heart Cath 2019 @ 8:00 AM;  Surgeon: Thaddeus Huber MD;  Location: [facility] CATH INVASIVE LOCATION;  Service: Cardiology   •  SECTION     • HYSTERECTOMY     • INJECTION OF MEDICATION  2015    celestone(1)   • INJECTION OF MEDICATION  2016    DEPO MEDROL(16)   • LEG SURGERY Bilateral        Family History   Problem Relation Age of Onset   • Heart attack Mother    • Heart disease Mother    • Heart disease Father    • Heart disease Paternal Grandmother    • Osteoporosis Other        Social History     Socioeconomic History   • Marital status:      Spouse name: Not on file   • Number of children: Not on file   • Years of education: Not on file   • Highest education level:  Not on file   Tobacco Use   • Smoking status: Former Smoker     Packs/day: 1.00     Years: 48.00     Pack years: 48.00     Types: Cigarettes, Electronic Cigarette     Start date:      Quit date: 2018     Years since quittin.8   • Smokeless tobacco: Never Used   Substance and Sexual Activity   • Alcohol use: Yes     Comment: rarely   • Drug use: No   • Sexual activity: Defer           Objective   Physical Exam  Vitals and nursing note reviewed.   Constitutional:       General: She is in acute distress.      Appearance: She is well-developed. She is ill-appearing. She is not diaphoretic.   HENT:      Head: Normocephalic and atraumatic.      Nose: Nose normal.   Eyes:      General: No scleral icterus.     Conjunctiva/sclera: Conjunctivae normal.      Pupils: Pupils are equal, round, and reactive to light.   Neck:      Vascular: No JVD.   Cardiovascular:      Rate and Rhythm: Regular rhythm. Tachycardia present.      Heart sounds: Normal heart sounds. No murmur heard.   No friction rub. No gallop.    Pulmonary:      Effort: Tachypnea, accessory muscle usage and respiratory distress present.      Breath sounds: Decreased breath sounds and wheezing present. No rales.   Chest:      Chest wall: No tenderness.   Abdominal:      General: There is no distension.      Palpations: Abdomen is soft. There is no mass.      Tenderness: There is no abdominal tenderness. There is no guarding or rebound.   Musculoskeletal:         General: No tenderness or deformity. Normal range of motion.      Cervical back: Normal range of motion and neck supple.      Right lower leg: No edema.   Lymphadenopathy:      Cervical: No cervical adenopathy.   Skin:     General: Skin is warm and dry.      Capillary Refill: Capillary refill takes less than 2 seconds.      Coloration: Skin is not pale.      Findings: Ecchymosis present. No erythema or rash.      Comments: Patient with extensive ecchymosis on all her extremities.   Neurological:       General: No focal deficit present.      Mental Status: She is alert and oriented to person, place, and time.      Cranial Nerves: No cranial nerve deficit.      Motor: No abnormal muscle tone.   Psychiatric:         Mood and Affect: Mood is anxious.         Behavior: Behavior normal.         Thought Content: Thought content normal.         Judgment: Judgment normal.         Procedures          ED Course  ED Course as of Sep 07 1015   Tue Sep 07, 2021   0854 EKG notes a sinus tachycardia with PVCs.  Rate 155.    [PC]   1015 None Covid, acute respiratory failure with bilateral pneumonia noted on x-ray.  Patient received antibiotics in the ED as well as steroid.  Patient will be admitted for further respiratory support.  Appears to be saturating well with less work of breathing on the CPAP.    [PC]      ED Course User Index  [PC] Hermann Medina MD                                 Labs Reviewed   COMPREHENSIVE METABOLIC PANEL - Abnormal; Notable for the following components:       Result Value    Glucose 148 (*)     Chloride 96 (*)     All other components within normal limits    Narrative:     GFR Normal >60  Chronic Kidney Disease <60  Kidney Failure <15     BNP (IN-HOUSE) - Abnormal; Notable for the following components:    proBNP 1,254.0 (*)     All other components within normal limits    Narrative:     Among patients with dyspnea, NT-proBNP is highly sensitive for the detection of acute congestive heart failure. In addition NT-proBNP of <300 pg/ml effectively rules out acute congestive heart failure with 99% negative predictive value.    Results may be falsely decreased if patient taking Biotin.     CBC WITH AUTO DIFFERENTIAL - Abnormal; Notable for the following components:    MCV 97.2 (*)     All other components within normal limits   BLOOD GAS, ARTERIAL - Abnormal; Notable for the following components:    pH, Arterial 7.457 (*)     pO2, Arterial 78.5 (*)     HCO3, Arterial 28.3 (*)     Base Excess,  Arterial 4.1 (*)     All other components within normal limits   POCT GLUCOSE FINGERSTICK - Abnormal; Notable for the following components:    Glucose 153 (*)     All other components within normal limits   COVID-19 AND FLU A/B, NP SWAB IN TRANSPORT MEDIA 8-12 HR TAT - Normal    Narrative:     Fact sheet for providers: https://www.fda.gov/media/370107/download    Fact sheet for patients: https://www.fda.gov/media/569845/download    Test performed by PCR.   TROPONIN (IN-HOUSE) - Normal    Narrative:     Troponin T Reference Range:  <= 0.03 ng/mL-   Negative for AMI  >0.03 ng/mL-     Abnormal for myocardial necrosis.  Clinicians would have to utilize clinical acumen, EKG, Troponin and serial changes to determine if it is an Acute Myocardial Infarction or myocardial injury due to an underlying chronic condition.       Results may be falsely decreased if patient taking Biotin.     LACTIC ACID, PLASMA - Normal   BLOOD CULTURE   BLOOD CULTURE   BLOOD GAS, ARTERIAL   RAINBOW DRAW    Narrative:     The following orders were created for panel order [place] Draw.  Procedure                               Abnormality         Status                     ---------                               -----------         ------                     Green Top (Gel)[[id number]]                                  In process                 Lavender Top[[id number]]                                     In process                 Gold Top - SST[[id number]]                                   In process                 Light Blue Top[[id number]]                                   In process                   Please view results for these tests on the individual orders.   MANUAL DIFFERENTIAL   D-DIMER, QUANTITATIVE   CBC AND DIFFERENTIAL    Narrative:     The following orders were created for panel order CBC & Differential.  Procedure                               Abnormality         Status                     ---------                                -----------         ------                     CBC Auto Differential[[id number]]        Abnormal            Final result               Scan Slide[[id number]]                                                                    Please view results for these tests on the individual orders.   GREEN TOP   LAVENDER TOP   GOLD TOP - SST   LIGHT BLUE TOP       XR Chest 1 View   Final Result   Interstitial and airspace disease bilaterally likely   reflects at least in part infection, though edema could also be   contributing to this appearance.      Electronically signed by:  Kamaljit Waite MD  9/7/2021 9:14 AM CDT   Workstation: [id number]                  [facility]    Final diagnoses:   Pneumonia of both lungs due to infectious organism, unspecified part of lung   Acute respiratory failure with hypoxia (CMS/HCC)   Sinus tachycardia       ED Disposition  ED Disposition     ED Disposition Condition Comment    Decision to Admit  Level of Care: Telemetry [5]   Diagnosis: Pneumonia of both lungs due to infectious organism, unspecified part of lung [[id number]]   Admitting Physician: RANJANA MALLOY [[id number]]   Attending Physician: RANJANA MALLOY [[id number]]   Isolate for COVID?: No [0]   Certification: I Certify That Inpatient Hospital Services Are Medically Necessary For Greater Than 2 Midnights            No follow-up provider specified.       Medication List      No changes were made to your prescriptions during this visit.          Hermann Medina MD  09/07/21 1015      Electronically signed by Hermann Medina MD at 09/07/21 1015     Fernando Montana at 09/07/21 0949        This tech wore a N95, mask, and shield during time of initial arrival and help getting pt out of the car.      Fernando Montana  09/07/21 0950      Electronically signed by Fernando Montana at 09/07/21 0983

## 2021-09-07 NOTE — ED PROVIDER NOTES
Subjective   Patient presents in acute respiratory failure with shortness of breath.  Patient arrives with an oxygen saturation of 77% with increased work of breathing.  Patient has known COPD.  Patient is on 4 L O2 at home.  Patient notes fever to 102 at home and worsening respiratory function.  Patient noted similar episode several weeks prior.  Patient had a Covid test yesterday which was negative.  Patient arrives hypotensive tachycardic with increased work of breathing.  Patient speaking only in short phrases cannot complete full sentences secondary to respiratory distress.  Does appear to be mentally oriented at this time.          Review of Systems   Constitutional: Positive for activity change, fatigue and fever. Negative for appetite change and chills.   HENT: Negative.  Negative for congestion.    Eyes: Negative.  Negative for photophobia and visual disturbance.   Respiratory: Positive for cough and shortness of breath. Negative for chest tightness.    Cardiovascular: Negative.  Negative for chest pain and palpitations.   Gastrointestinal: Negative.  Negative for abdominal pain, constipation, diarrhea, nausea and vomiting.   Endocrine: Negative.    Genitourinary: Negative.  Negative for decreased urine volume, dysuria, flank pain and hematuria.   Musculoskeletal: Negative.  Negative for arthralgias, back pain, myalgias, neck pain and neck stiffness.   Skin: Negative.  Negative for pallor.   Neurological: Positive for weakness. Negative for dizziness, syncope, light-headedness, numbness and headaches.   Psychiatric/Behavioral: Negative.  Negative for confusion and suicidal ideas. The patient is not nervous/anxious.    All other systems reviewed and are negative.      Past Medical History:   Diagnosis Date   • Acute bronchitis    • Acute upper respiratory infection    • Adjustment disorder with mixed emotional features    • Agoraphobia with panic attacks    • Allergic rhinitis due to pollen    • Anxiety    •  Asthma    • Asthma    • Backache    • Blood in urine    • Breast cyst    • Candidiasis of mouth    • Chronic bronchitis (CMS/[facility])    • Chronic obstructive lung disease (CMS/[facility])    • COPD (chronic obstructive pulmonary disease) (CMS/[facility])    • Diabetes (CMS/[facility])    • Diabetes (CMS/[facility])    • Emphysema (subcutaneous) (surgical) resulting from a procedure    • Emphysema lung (CMS/[facility])    • Essential hypertension    • Extrinsic asthma with status asthmaticus    • Fatigue    • H/O echocardiogram     EF 55-60%. LV systolic function normal. Impaired LV relaxation. [facility]   • Heart attack (CMS/[facility]) 2015   • Heart problem    • High blood pressure    • Hypoxemia    • Malaise and fatigue    • Non-IgE mediated allergic asthma    • Nonvenomous insect bite    • Pneumonia    • PONV (postoperative nausea and vomiting)    • Postmenopausal state    • Rheumatoid arthritis (CMS/[facility])    • Urinary tract infectious disease        No Known Allergies    Past Surgical History:   Procedure Laterality Date   • BREAST BIOPSY     • CARDIAC CATHETERIZATION N/A 11/15/2018    Procedure: Left Heart Cath;  Surgeon: Thaddeus Huber MD;  Location: [facility] INVASIVE LOCATION;  Service: Cardiology   • CARDIAC CATHETERIZATION N/A 2019    Procedure: Left Heart Cath 2019 @ 8:00 AM;  Surgeon: Thaddeus Huber MD;  Location: [facility] INVASIVE LOCATION;  Service: Cardiology   •  SECTION     • HYSTERECTOMY     • INJECTION OF MEDICATION  2015    celestone(1)   • INJECTION OF MEDICATION  2016    DEPO MEDROL(16)   • LEG SURGERY Bilateral        Family History   Problem Relation Age of Onset   • Heart attack Mother    • Heart disease Mother    • Heart disease Father    • Heart disease Paternal Grandmother    • Osteoporosis Other        Social History     Socioeconomic History   • Marital status:      Spouse name: Not on file   • Number of children: Not on file   • Years of education: Not on file   •  Highest education level: Not on file   Tobacco Use   • Smoking status: Former Smoker     Packs/day: 1.00     Years: 48.00     Pack years: 48.00     Types: Cigarettes, Electronic Cigarette     Start date:      Quit date: 2018     Years since quittin.8   • Smokeless tobacco: Never Used   Substance and Sexual Activity   • Alcohol use: Yes     Comment: rarely   • Drug use: No   • Sexual activity: Defer           Objective   Physical Exam  Vitals and nursing note reviewed.   Constitutional:       General: She is in acute distress.      Appearance: She is well-developed. She is ill-appearing. She is not diaphoretic.   HENT:      Head: Normocephalic and atraumatic.      Nose: Nose normal.   Eyes:      General: No scleral icterus.     Conjunctiva/sclera: Conjunctivae normal.      Pupils: Pupils are equal, round, and reactive to light.   Neck:      Vascular: No JVD.   Cardiovascular:      Rate and Rhythm: Regular rhythm. Tachycardia present.      Heart sounds: Normal heart sounds. No murmur heard.   No friction rub. No gallop.    Pulmonary:      Effort: Tachypnea, accessory muscle usage and respiratory distress present.      Breath sounds: Decreased breath sounds and wheezing present. No rales.   Chest:      Chest wall: No tenderness.   Abdominal:      General: There is no distension.      Palpations: Abdomen is soft. There is no mass.      Tenderness: There is no abdominal tenderness. There is no guarding or rebound.   Musculoskeletal:         General: No tenderness or deformity. Normal range of motion.      Cervical back: Normal range of motion and neck supple.      Right lower leg: No edema.   Lymphadenopathy:      Cervical: No cervical adenopathy.   Skin:     General: Skin is warm and dry.      Capillary Refill: Capillary refill takes less than 2 seconds.      Coloration: Skin is not pale.      Findings: Ecchymosis present. No erythema or rash.      Comments: Patient with extensive ecchymosis on all her  extremities.   Neurological:      General: No focal deficit present.      Mental Status: She is alert and oriented to person, place, and time.      Cranial Nerves: No cranial nerve deficit.      Motor: No abnormal muscle tone.   Psychiatric:         Mood and Affect: Mood is anxious.         Behavior: Behavior normal.         Thought Content: Thought content normal.         Judgment: Judgment normal.         Procedures           ED Course  ED Course as of Sep 07 1015   Tue Sep 07, 2021   0854 EKG notes a sinus tachycardia with PVCs.  Rate 155.    [PC]   1015 None Covid, acute respiratory failure with bilateral pneumonia noted on x-ray.  Patient received antibiotics in the ED as well as steroid.  Patient will be admitted for further respiratory support.  Appears to be saturating well with less work of breathing on the CPAP.    [PC]      ED Course User Index  [PC] Hermann Medina MD                                 Labs Reviewed   COMPREHENSIVE METABOLIC PANEL - Abnormal; Notable for the following components:       Result Value    Glucose 148 (*)     Chloride 96 (*)     All other components within normal limits    Narrative:     GFR Normal >60  Chronic Kidney Disease <60  Kidney Failure <15     BNP (IN-HOUSE) - Abnormal; Notable for the following components:    proBNP 1,254.0 (*)     All other components within normal limits    Narrative:     Among patients with dyspnea, NT-proBNP is highly sensitive for the detection of acute congestive heart failure. In addition NT-proBNP of <300 pg/ml effectively rules out acute congestive heart failure with 99% negative predictive value.    Results may be falsely decreased if patient taking Biotin.     CBC WITH AUTO DIFFERENTIAL - Abnormal; Notable for the following components:    MCV 97.2 (*)     All other components within normal limits   BLOOD GAS, ARTERIAL - Abnormal; Notable for the following components:    pH, Arterial 7.457 (*)     pO2, Arterial 78.5 (*)     HCO3, Arterial  28.3 (*)     Base Excess, Arterial 4.1 (*)     All other components within normal limits   POCT GLUCOSE FINGERSTICK - Abnormal; Notable for the following components:    Glucose 153 (*)     All other components within normal limits   COVID-19 AND FLU A/B, NP SWAB IN TRANSPORT MEDIA 8-12 HR TAT - Normal    Narrative:     Fact sheet for providers: https://www.fda.gov/media/711397/download    Fact sheet for patients: https://www.fda.gov/media/365517/download    Test performed by PCR.   TROPONIN (IN-HOUSE) - Normal    Narrative:     Troponin T Reference Range:  <= 0.03 ng/mL-   Negative for AMI  >0.03 ng/mL-     Abnormal for myocardial necrosis.  Clinicians would have to utilize clinical acumen, EKG, Troponin and serial changes to determine if it is an Acute Myocardial Infarction or myocardial injury due to an underlying chronic condition.       Results may be falsely decreased if patient taking Biotin.     LACTIC ACID, PLASMA - Normal   BLOOD CULTURE   BLOOD CULTURE   BLOOD GAS, ARTERIAL   RAINBOW DRAW    Narrative:     The following orders were created for panel order [place] Draw.  Procedure                               Abnormality         Status                     ---------                               -----------         ------                     Green Top (Gel)[[id number]]                                  In process                 Lavender Top[[id number]]                                     In process                 Gold Top - SST[[id number]]                                   In process                 Light Blue Top[[id number]]                                   In process                   Please view results for these tests on the individual orders.   MANUAL DIFFERENTIAL   D-DIMER, QUANTITATIVE   CBC AND DIFFERENTIAL    Narrative:     The following orders were created for panel order CBC & Differential.  Procedure                               Abnormality         Status                     ---------                                -----------         ------                     CBC Auto Differential[[id number]]        Abnormal            Final result               Scan Slide[[id number]]                                                                    Please view results for these tests on the individual orders.   GREEN TOP   LAVENDER TOP   GOLD TOP - SST   LIGHT BLUE TOP       XR Chest 1 View   Final Result   Interstitial and airspace disease bilaterally likely   reflects at least in part infection, though edema could also be   contributing to this appearance.      Electronically signed by:  Kamaljit Waite MD  9/7/2021 9:14 AM CDT   Workstation: [id number]                  [facility]    Final diagnoses:   Pneumonia of both lungs due to infectious organism, unspecified part of lung   Acute respiratory failure with hypoxia (CMS/HCC)   Sinus tachycardia       ED Disposition  ED Disposition     ED Disposition Condition Comment    Decision to Admit  Level of Care: Telemetry [5]   Diagnosis: Pneumonia of both lungs due to infectious organism, unspecified part of lung [[id number]]   Admitting Physician: RANJANA MALLOY [[id number]]   Attending Physician: RANJANA MALLOY [[id number]]   Isolate for COVID?: No [0]   Certification: I Certify That Inpatient Hospital Services Are Medically Necessary For Greater Than 2 Midnights            No follow-up provider specified.       Medication List      No changes were made to your prescriptions during this visit.          Hermann Medina MD  09/07/21 [id number]

## 2021-09-07 NOTE — H&P
[facility] Medicine Services  INPATIENT HISTORY AND PHYSICAL       Patient Care Team:  Tiffanie Mccauley APRN as PCP - General (Family Medicine)  Tiffanie Mccauley APRN as PCP - Family Medicine (Family Medicine)    Date of Admission: 9/7/2021    Primary Care Physician: Tiffanie Mccauley APRN    Chief complaint   Chief Complaint   Patient presents with   • Shortness of Breath   • Fever   • Fatigue       Subjective     Patient is a 66 y.o. female with history of O2 dependent COPD/chronic respiratory failure (chronically on 4 L of supplemental oxygen at baseline), coronary artery disease status post PCI, nicotine dependence, hypertension, depressive disorder, diabetes mellitus presents with 3 to 4-day history of progressively worsening shortness of air associated with cough productive of mucoid to yellow sputum.  She denies fever, chills, nausea, vomiting, chest pain, orthopnea or PND.    She had a chest x-ray in the emergency room which showed interstitial and airspace disease bilaterally.  Blood work showed lactic acid 1.6, proBNP 1254, D-dimer 2694 and WBC 10.27 with bandemia.  ABG showed pH 7.45, PCO2 40, PO2 78.5 and saturation of 96.8 on 6 L.      Review of Systems   Constitutional: Positive for activity change, appetite change and fatigue. Negative for chills, diaphoresis and fever.   HENT: Negative for trouble swallowing and voice change.    Eyes: Negative for photophobia and visual disturbance.   Respiratory: Positive for cough, shortness of breath and wheezing. Negative for choking, chest tightness and stridor.    Cardiovascular: Negative for chest pain, palpitations and leg swelling.   Gastrointestinal: Negative for abdominal distention, abdominal pain, blood in stool, constipation, diarrhea, nausea and vomiting.   Endocrine: Negative for cold intolerance, heat intolerance, polydipsia, polyphagia and polyuria.   Genitourinary: Negative for decreased  urine volume, difficulty urinating, dysuria, enuresis, flank pain, frequency, hematuria and urgency.   Musculoskeletal: Negative for arthralgias, gait problem, myalgias, neck pain and neck stiffness.   Skin: Positive for color change. Negative for pallor, rash and wound.   Neurological: Negative for dizziness, tremors, seizures, syncope, facial asymmetry, speech difficulty, weakness, light-headedness, numbness and headaches.   Hematological: Does not bruise/bleed easily.   Psychiatric/Behavioral: Negative for agitation, behavioral problems and confusion.         History  Past Medical History:   Diagnosis Date   • Acute bronchitis    • Acute upper respiratory infection    • Adjustment disorder with mixed emotional features    • Agoraphobia with panic attacks    • Allergic rhinitis due to pollen    • Anxiety    • Asthma    • Asthma    • Backache    • Blood in urine    • Breast cyst    • Candidiasis of mouth    • Chronic bronchitis (CMS/[facility])    • Chronic obstructive lung disease (CMS/[facility])    • COPD (chronic obstructive pulmonary disease) (CMS/[facility])    • Diabetes (CMS/[facility])    • Diabetes (CMS/[facility])    • Emphysema (subcutaneous) (surgical) resulting from a procedure    • Emphysema lung (CMS/[facility])    • Essential hypertension    • Extrinsic asthma with status asthmaticus    • Fatigue    • H/O echocardiogram     EF 55-60%. LV systolic function normal. Impaired LV relaxation. [facility]   • Heart attack (CMS/[facility]) 11/2015   • Heart problem    • High blood pressure    • Hypoxemia    • Malaise and fatigue    • Non-IgE mediated allergic asthma    • Nonvenomous insect bite    • Pneumonia    • PONV (postoperative nausea and vomiting)    • Postmenopausal state    • Rheumatoid arthritis (CMS/[facility])    • Urinary tract infectious disease      Past Surgical History:   Procedure Laterality Date   • BREAST BIOPSY     • CARDIAC CATHETERIZATION N/A 11/15/2018    Procedure: Left Heart Cath;  Surgeon: Thaddeus Huber MD;  Location:   [facility] CATH INVASIVE LOCATION;  Service: Cardiology   • CARDIAC CATHETERIZATION N/A 2019    Procedure: Left Heart Cath 2019 @ 8:00 AM;  Surgeon: Thaddeus Huber MD;  Location: [facility] INVASIVE LOCATION;  Service: Cardiology   •  SECTION     • HYSTERECTOMY     • INJECTION OF MEDICATION  2015    celestone(1)   • INJECTION OF MEDICATION  2016    DEPO MEDROL(16)   • LEG SURGERY Bilateral      Family History   Problem Relation Age of Onset   • Heart attack Mother    • Heart disease Mother    • Heart disease Father    • Heart disease Paternal Grandmother    • Osteoporosis Other      Social History     Tobacco Use   • Smoking status: Former Smoker     Packs/day: 1.00     Years: 48.00     Pack years: 48.00     Types: Cigarettes, Electronic Cigarette     Start date:      Quit date: 2018     Years since quittin.8   • Smokeless tobacco: Never Used   Substance Use Topics   • Alcohol use: Yes     Comment: rarely   • Drug use: No     (Not in a hospital admission)    Allergies:  Patient has no known allergies.  Prior to Admission medications    Medication Sig Start Date End Date Taking? Authorizing Provider   albuterol sulfate  (90 Base) MCG/ACT inhaler INHALE 2 PUFFS EVERY 4 HOURS AS NEEDED 21   Tiffanie Mccauley APRN   ALPRAZolam (Xanax) 0.25 MG tablet Take 1 tablet by mouth 3 (Three) Times a Day As Needed for Anxiety. 21   Tiffanie Mccauley APRN   amiodarone (PACERONE) 100 MG tablet Take 1 tablet by mouth 2 (Two) Times a Day. 18   Alex Lomax MD   aspirin 81 MG chewable tablet Chew 1 tablet Daily. 18   Alex Lomax MD   atorvastatin (LIPITOR) 40 MG tablet Take  by mouth Daily. 3/30/20   Provider, MD Liya   budesonide-formoterol (SYMBICORT) 160-4.5 MCG/ACT inhaler INHALE 2 PUFFS BY MOUTH TWICE DAILY 21   Tiffanie Mccauley APRN   buPROPion (WELLBUTRIN) 75 MG tablet Take 1 tablet by mouth Daily. 19   Pantera Elise  HEATHER Smith   carvedilol (COREG) 3.125 MG tablet Take 3.21 mg by mouth Every Night. 12/7/18   Provider, MD Liya   cyclobenzaprine (FLEXERIL) 10 MG tablet TAKE 1 TABLET BY MOUTH THREE TIMES DAILY AS NEEDED FOR MUSCLE SPASMS 6/18/21   Tiffanie Mccauley APRN   diclofenac (VOLTAREN) 50 MG EC tablet Bid prn 9/9/20   Tiffanie Mccauley APRN   furosemide (LASIX) 20 MG tablet Take 1 tablet by mouth 2 (Two) Times a Day. 11/23/18   Alex Lomax MD   glucose blood (FREESTYLE LITE) test strip USE AS DIRECTED TO TEST BLOOD SUGAR ONCE DAILY 11/6/19   Tiffanie Mccauley APRN   glucose monitor monitoring kit 1 each Daily. Testing blood sugar once a day    ICD10 - R73.9 8/17/18   Tiffanie Mccauley APRN   hydrOXYzine (ATARAX) 10 MG tablet Take 1 tablet by mouth Every 8 (Eight) Hours As Needed for Anxiety. 11/11/20   Tiffanie Mccauley APRN   ipratropium-albuterol (DUO-NEB) 0.5-2.5 mg/3 ml nebulizer Take 3 mL by nebulization 4 (Four) Times a Day. 6/23/21   Tiffanie Mccauley APRN   Lancets (FREESTYLE) lancets USE AS DIRECTED TO TEST BLOOD SUGAR ONE DAILY 11/6/19   Tiffanie Mccauley APRN   losartan (COZAAR) 25 MG tablet TAKE 1 TABLET BY MOUTH DAILY 3/12/21   Tiffanie Mccauley APRN   metFORMIN (GLUCOPHAGE) 500 MG tablet TAKE 1 TABLET BY MOUTH DAILY WITH BREAKFAST 2/26/21   Tiffanie Mccauley APRN   methylPREDNISolone (MEDROL) 8 MG tablet TAKE 1 TABLET BY MOUTH DAILY 4/30/21   Tiffanie Mccauley APRN   montelukast (SINGULAIR) 10 MG tablet TAKE 1 TABLET BY MOUTH DAILY 7/14/21   Tiffanie Mccauley APRN   mupirocin (BACTROBAN) 2 % ointment Apply  topically to the appropriate area as directed 2 (Two) Times a Day. 6/23/21   Tiffanie Mccauley APRN   nitroglycerin (NITROSTAT) 0.4 MG SL tablet Take 1 tablet under tongue every 5 minutes for a maximum of three doses per episode of chest pain 6/8/19   Evan Pop MD   nystatin (MYCOSTATIN) 009528 UNIT/ML suspension Take 5  mL by mouth 4 (Four) Times a Day. As needed for mouth sores. 6/23/21   Tiffanie Mccauley APRN   ondansetron (ZOFRAN) 4 MG tablet TAKE 1 TABLET BY MOUTH EVERY 8 HOURS AS NEEDED FOR NAUSEA OR VOMITING 12/11/20   Tiffanie Mccauley APRN   PARoxetine (PAXIL) 20 MG tablet TAKE 1 TABLET BY MOUTH EVERY MORNING 1/25/21   Tiffanie Mccauley APRN   prasugrel (EFFIENT) 10 MG tablet Take 1 tablet by mouth Daily. 3/11/19   Tiffanie Mccauley APRN   budesonide-formoterol (Symbicort) 160-4.5 MCG/ACT inhaler INHALE 2 PUFFS BY MOUTH TWICE DAILY 7/27/20   Tiffanie Mccauley APRN   methylPREDNISolone (Medrol) 8 MG tablet Take 1 tablet by mouth Daily. 9/9/20   Tiffanie Mccauley APRN       Objective        Vital Signs  Heart Rate:  [120-159] 120  Resp:  [20] 20  BP: (152-191)/() 157/104      Physical Exam  Vitals and nursing note reviewed.   Constitutional:       General: She is not in acute distress.     Appearance: She is well-developed. She is ill-appearing. She is not diaphoretic.   HENT:      Head: Normocephalic and atraumatic.      Right Ear: External ear normal.      Left Ear: External ear normal.      Nose: Nose normal.   Eyes:      Conjunctiva/sclera: Conjunctivae normal.      Pupils: Pupils are equal, round, and reactive to light.   Neck:      Thyroid: No thyromegaly.      Vascular: No JVD.   Cardiovascular:      Rate and Rhythm: Regular rhythm. Tachycardia present.      Heart sounds: Normal heart sounds. No murmur heard.   No friction rub. No gallop.    Pulmonary:      Effort: Pulmonary effort is normal. No respiratory distress.      Breath sounds: No stridor. Decreased breath sounds, wheezing and rhonchi present. No rales.   Chest:      Chest wall: No tenderness.   Abdominal:      General: Bowel sounds are normal. There is no distension.      Palpations: Abdomen is soft. There is no mass.      Tenderness: There is no abdominal tenderness. There is no right CVA tenderness, left CVA tenderness,  guarding or rebound.      Hernia: No hernia is present.   Musculoskeletal:         General: No tenderness or deformity. Normal range of motion.      Cervical back: Normal range of motion and neck supple.      Right lower leg: No edema.      Left lower leg: No edema.   Skin:     General: Skin is warm and dry.      Coloration: Skin is not jaundiced or pale.      Findings: No bruising, erythema, lesion or rash.      Comments: She has widespread bruising and ecchymosis secondary to chronic steroid use.   Neurological:      General: No focal deficit present.      Mental Status: She is alert and oriented to person, place, and time.      Cranial Nerves: No cranial nerve deficit.      Sensory: No sensory deficit.      Motor: No weakness.      Coordination: Coordination normal.      Gait: Gait normal.      Deep Tendon Reflexes: Reflexes are normal and symmetric. Reflexes normal.   Psychiatric:         Mood and Affect: Mood normal.         Behavior: Behavior normal. Behavior is cooperative.         Thought Content: Thought content normal.         Judgment: Judgment normal.           Results Review:     Results from last 7 days   Lab Units 09/07/21  0917   SODIUM mmol/L 136   POTASSIUM mmol/L 4.0   CHLORIDE mmol/L 96*   CO2 mmol/L 28.0   BUN mg/dL 10   CREATININE mg/dL 0.58   GLUCOSE mg/dL 148*   CALCIUM mg/dL 9.4   BILIRUBIN mg/dL 0.8   ALK PHOS U/L 93   ALT (SGPT) U/L 22   AST (SGOT) U/L 19             Results from last 7 days   Lab Units 09/07/21  0917   WBC 10*3/mm3 10.27   HEMOGLOBIN g/dL 14.4   HEMATOCRIT % 45.0   PLATELETS 10*3/mm3 226           Imaging Results (Last 7 Days)     Procedure Component Value Units Date/Time    XR Chest 1 View [[id number]] Collected: 09/07/21 0856     Updated: 09/07/21 0915    Narrative:      EXAMINATION:  XR CHEST 1 VW    CLINICAL HISTORY:  66 years Female,CHF/COPD Protocol    COMPARISON:  Chest x-ray dated 9/18/2020    FINDINGS:  Interstitial prominence in the mid and lower lungs  with  bilateral airspace disease, most focal in the periphery of  the right greater than left midlung. No pleural effusion or  pneumothorax. Normal heart size.      Impression:      Interstitial and airspace disease bilaterally likely  reflects at least in part infection, though edema could also be  contributing to this appearance.    Electronically signed by:  Kamaljit Waite MD  9/7/2021 9:14 AM CDT  Workstation: [id number]          Assessment / Plan       Hospital Problem List:    Pneumonia of both lungs due to infectious organism    Acute on chronic respiratory failure with hypoxia secondary to COPD exacerbation/pneumonia: Continue noninvasive respiratory therapy, begin IV antibiotics, steroids, bronchodilators and check Legionella, strep screens and respiratory culture.  COVID-19 PCR is negative.  Elevated D-dimer is likely due to acute illness.  CT angiogram of the chest to rule out pulmonary embolism is pending.    Diabetes mellitus: Continue antiglycemic and begin Accu-Cheks and sliding scale insulin.    Coronary artery disease status post PCI: Continue home medications.    Hypertension: Stable.  Continue Cozaar and Coreg.    Depressive/disorder: Continue home medications.    Begin GI and DVT prophylaxis.    Additional orders and treatment plan as hospital course dictates.    CODE STATUS is full code.      I confirmed that the patient's Advance Care Plan is present, code status is documented, or surrogate decision maker is listed in the patient's medical record.     I have utilized all available immediate resources to obtain, update, or review the patient's current medications    I discussed the patient's findings and my recommendations with patient.     Bobo Medina MD  09/07/21  12:06 CDT      Dictated Utilizing Dragon Dictation

## 2021-09-07 NOTE — ED NOTES
This tech wore a N95, mask, and shield during time of initial arrival and help getting pt out of the car.      Fernando Montana  09/07/21 0948

## 2021-09-07 NOTE — PLAN OF CARE
Goal Outcome Evaluation:  Plan of Care Reviewed With: patient        Progress: no change  Outcome Summary: Pt arrived to floor via wc form ED.  Pt is alert and oriented.  Ambulatory in room.  No c/o pain or discomfort.  Will continue to monitor.

## 2021-09-08 LAB
ANION GAP SERPL CALCULATED.3IONS-SCNC: 8 MMOL/L (ref 5–15)
BACTERIA BLD CULT: ABNORMAL
BASOPHILS # BLD AUTO: 0.04 10*3/MM3 (ref 0–0.2)
BASOPHILS NFR BLD AUTO: 0.3 % (ref 0–1.5)
BUN SERPL-MCNC: 12 MG/DL (ref 8–23)
BUN/CREAT SERPL: 24.5 (ref 7–25)
CALCIUM SPEC-SCNC: 9 MG/DL (ref 8.6–10.5)
CHLORIDE SERPL-SCNC: 97 MMOL/L (ref 98–107)
CO2 SERPL-SCNC: 31 MMOL/L (ref 22–29)
CREAT SERPL-MCNC: 0.49 MG/DL (ref 0.57–1)
DEPRECATED RDW RBC AUTO: 50 FL (ref 37–54)
EOSINOPHIL # BLD AUTO: 0 10*3/MM3 (ref 0–0.4)
EOSINOPHIL NFR BLD AUTO: 0 % (ref 0.3–6.2)
ERYTHROCYTE [DISTWIDTH] IN BLOOD BY AUTOMATED COUNT: 14.1 % (ref 12.3–15.4)
GFR SERPL CREATININE-BSD FRML MDRD: 127 ML/MIN/1.73
GLUCOSE BLDC GLUCOMTR-MCNC: 195 MG/DL (ref 70–130)
GLUCOSE BLDC GLUCOMTR-MCNC: 196 MG/DL (ref 70–130)
GLUCOSE BLDC GLUCOMTR-MCNC: 245 MG/DL (ref 70–130)
GLUCOSE BLDC GLUCOMTR-MCNC: 289 MG/DL (ref 70–130)
GLUCOSE SERPL-MCNC: 173 MG/DL (ref 65–99)
HCT VFR BLD AUTO: 36.6 % (ref 34–46.6)
HGB BLD-MCNC: 12 G/DL (ref 12–15.9)
IMM GRANULOCYTES # BLD AUTO: 0.06 10*3/MM3 (ref 0–0.05)
IMM GRANULOCYTES NFR BLD AUTO: 0.5 % (ref 0–0.5)
L PNEUMO1 AG UR QL IA: NEGATIVE
LYMPHOCYTES # BLD AUTO: 0.52 10*3/MM3 (ref 0.7–3.1)
LYMPHOCYTES NFR BLD AUTO: 4.5 % (ref 19.6–45.3)
MCH RBC QN AUTO: 31.9 PG (ref 26.6–33)
MCHC RBC AUTO-ENTMCNC: 32.8 G/DL (ref 31.5–35.7)
MCV RBC AUTO: 97.3 FL (ref 79–97)
MONOCYTES # BLD AUTO: 0.45 10*3/MM3 (ref 0.1–0.9)
MONOCYTES NFR BLD AUTO: 3.9 % (ref 5–12)
NEUTROPHILS NFR BLD AUTO: 10.4 10*3/MM3 (ref 1.7–7)
NEUTROPHILS NFR BLD AUTO: 90.8 % (ref 42.7–76)
NRBC BLD AUTO-RTO: 0 /100 WBC (ref 0–0.2)
PLATELET # BLD AUTO: 180 10*3/MM3 (ref 140–450)
PMV BLD AUTO: 9.3 FL (ref 6–12)
POTASSIUM SERPL-SCNC: 4.3 MMOL/L (ref 3.5–5.2)
RBC # BLD AUTO: 3.76 10*6/MM3 (ref 3.77–5.28)
S PNEUM AG SPEC QL LA: NEGATIVE
SODIUM SERPL-SCNC: 136 MMOL/L (ref 136–145)
WBC # BLD AUTO: 11.47 10*3/MM3 (ref 3.4–10.8)

## 2021-09-08 PROCEDURE — 63710000001 INSULIN DETEMIR PER 5 UNITS: Performed by: INTERNAL MEDICINE

## 2021-09-08 PROCEDURE — 25010000002 ENOXAPARIN PER 10 MG: Performed by: INTERNAL MEDICINE

## 2021-09-08 PROCEDURE — 25010000002 METHYLPREDNISOLONE PER 125 MG: Performed by: INTERNAL MEDICINE

## 2021-09-08 PROCEDURE — 25010000002 AZITHROMYCIN PER 500 MG: Performed by: INTERNAL MEDICINE

## 2021-09-08 PROCEDURE — 97166 OT EVAL MOD COMPLEX 45 MIN: CPT

## 2021-09-08 PROCEDURE — 85025 COMPLETE CBC W/AUTO DIFF WBC: CPT | Performed by: INTERNAL MEDICINE

## 2021-09-08 PROCEDURE — 82962 GLUCOSE BLOOD TEST: CPT

## 2021-09-08 PROCEDURE — 63710000001 INSULIN ASPART PER 5 UNITS: Performed by: INTERNAL MEDICINE

## 2021-09-08 PROCEDURE — 25010000002 CEFTRIAXONE PER 250 MG: Performed by: INTERNAL MEDICINE

## 2021-09-08 PROCEDURE — 80048 BASIC METABOLIC PNL TOTAL CA: CPT | Performed by: INTERNAL MEDICINE

## 2021-09-08 PROCEDURE — 87899 AGENT NOS ASSAY W/OPTIC: CPT | Performed by: INTERNAL MEDICINE

## 2021-09-08 PROCEDURE — 94760 N-INVAS EAR/PLS OXIMETRY 1: CPT

## 2021-09-08 PROCEDURE — 94799 UNLISTED PULMONARY SVC/PX: CPT

## 2021-09-08 PROCEDURE — 25010000002 FUROSEMIDE PER 20 MG: Performed by: INTERNAL MEDICINE

## 2021-09-08 PROCEDURE — 25010000002 ONDANSETRON PER 1 MG: Performed by: INTERNAL MEDICINE

## 2021-09-08 PROCEDURE — 97162 PT EVAL MOD COMPLEX 30 MIN: CPT

## 2021-09-08 RX ORDER — CARVEDILOL 6.25 MG/1
6.25 TABLET ORAL NIGHTLY
Status: DISCONTINUED | OUTPATIENT
Start: 2021-09-08 | End: 2021-09-10 | Stop reason: HOSPADM

## 2021-09-08 RX ORDER — CARVEDILOL 3.12 MG/1
3.21 TABLET ORAL NIGHTLY
Status: DISCONTINUED | OUTPATIENT
Start: 2021-09-08 | End: 2021-09-08

## 2021-09-08 RX ORDER — ENALAPRILAT 2.5 MG/2ML
1.25 INJECTION INTRAVENOUS EVERY 6 HOURS PRN
Status: DISCONTINUED | OUTPATIENT
Start: 2021-09-08 | End: 2021-09-10 | Stop reason: HOSPADM

## 2021-09-08 RX ADMIN — METHYLPREDNISOLONE SODIUM SUCCINATE 60 MG: 125 INJECTION, POWDER, FOR SOLUTION INTRAMUSCULAR; INTRAVENOUS at 19:56

## 2021-09-08 RX ADMIN — IPRATROPIUM BROMIDE AND ALBUTEROL SULFATE 3 ML: 2.5; .5 SOLUTION RESPIRATORY (INHALATION) at 14:48

## 2021-09-08 RX ADMIN — PRASUGREL 10 MG: 10 TABLET, FILM COATED ORAL at 08:56

## 2021-09-08 RX ADMIN — ATORVASTATIN CALCIUM 40 MG: 40 TABLET, FILM COATED ORAL at 08:56

## 2021-09-08 RX ADMIN — LOSARTAN POTASSIUM 25 MG: 25 TABLET, FILM COATED ORAL at 06:49

## 2021-09-08 RX ADMIN — SODIUM CHLORIDE, PRESERVATIVE FREE 10 ML: 5 INJECTION INTRAVENOUS at 20:38

## 2021-09-08 RX ADMIN — ONDANSETRON HYDROCHLORIDE 4 MG: 2 INJECTION, SOLUTION INTRAMUSCULAR; INTRAVENOUS at 19:54

## 2021-09-08 RX ADMIN — AZITHROMYCIN 500 MG: 500 INJECTION, POWDER, LYOPHILIZED, FOR SOLUTION INTRAVENOUS at 12:16

## 2021-09-08 RX ADMIN — IPRATROPIUM BROMIDE AND ALBUTEROL SULFATE 3 ML: 2.5; .5 SOLUTION RESPIRATORY (INHALATION) at 11:14

## 2021-09-08 RX ADMIN — BUDESONIDE AND FORMOTEROL FUMARATE DIHYDRATE 2 PUFF: 160; 4.5 AEROSOL RESPIRATORY (INHALATION) at 07:25

## 2021-09-08 RX ADMIN — CARVEDILOL 6.25 MG: 6.25 TABLET, FILM COATED ORAL at 20:38

## 2021-09-08 RX ADMIN — INSULIN ASPART 2 UNITS: 100 INJECTION, SOLUTION INTRAVENOUS; SUBCUTANEOUS at 08:57

## 2021-09-08 RX ADMIN — ENALAPRILAT 1.25 MG: 1.25 INJECTION INTRAVENOUS at 20:38

## 2021-09-08 RX ADMIN — IPRATROPIUM BROMIDE AND ALBUTEROL SULFATE 3 ML: 2.5; .5 SOLUTION RESPIRATORY (INHALATION) at 07:25

## 2021-09-08 RX ADMIN — IPRATROPIUM BROMIDE AND ALBUTEROL SULFATE 3 ML: 2.5; .5 SOLUTION RESPIRATORY (INHALATION) at 20:07

## 2021-09-08 RX ADMIN — Medication 100 MG: at 20:38

## 2021-09-08 RX ADMIN — METHYLPREDNISOLONE SODIUM SUCCINATE 60 MG: 125 INJECTION, POWDER, FOR SOLUTION INTRAMUSCULAR; INTRAVENOUS at 00:12

## 2021-09-08 RX ADMIN — INSULIN ASPART 2 UNITS: 100 INJECTION, SOLUTION INTRAVENOUS; SUBCUTANEOUS at 17:03

## 2021-09-08 RX ADMIN — METHYLPREDNISOLONE SODIUM SUCCINATE 60 MG: 125 INJECTION, POWDER, FOR SOLUTION INTRAMUSCULAR; INTRAVENOUS at 06:27

## 2021-09-08 RX ADMIN — ALBUTEROL SULFATE 2.5 MG: 2.5 SOLUTION RESPIRATORY (INHALATION) at 23:09

## 2021-09-08 RX ADMIN — SODIUM CHLORIDE, PRESERVATIVE FREE 10 ML: 5 INJECTION INTRAVENOUS at 09:05

## 2021-09-08 RX ADMIN — ALPRAZOLAM 0.25 MG: 0.25 TABLET ORAL at 08:57

## 2021-09-08 RX ADMIN — METHYLPREDNISOLONE SODIUM SUCCINATE 60 MG: 125 INJECTION, POWDER, FOR SOLUTION INTRAMUSCULAR; INTRAVENOUS at 17:02

## 2021-09-08 RX ADMIN — ALPRAZOLAM 0.25 MG: 0.25 TABLET ORAL at 21:16

## 2021-09-08 RX ADMIN — ASPIRIN 81 MG: 81 TABLET, CHEWABLE ORAL at 08:57

## 2021-09-08 RX ADMIN — PAROXETINE HYDROCHLORIDE 20 MG: 20 TABLET, FILM COATED ORAL at 06:27

## 2021-09-08 RX ADMIN — MONTELUKAST 10 MG: 10 TABLET, FILM COATED ORAL at 08:56

## 2021-09-08 RX ADMIN — ENOXAPARIN SODIUM 40 MG: 40 INJECTION SUBCUTANEOUS at 12:16

## 2021-09-08 RX ADMIN — ONDANSETRON HYDROCHLORIDE 4 MG: 2 INJECTION, SOLUTION INTRAMUSCULAR; INTRAVENOUS at 13:10

## 2021-09-08 RX ADMIN — Medication 100 MG: at 08:57

## 2021-09-08 RX ADMIN — CEFTRIAXONE SODIUM 2 G: 2 INJECTION, POWDER, FOR SOLUTION INTRAMUSCULAR; INTRAVENOUS at 11:31

## 2021-09-08 RX ADMIN — METFORMIN HYDROCHLORIDE 500 MG: 500 TABLET ORAL at 08:56

## 2021-09-08 RX ADMIN — FUROSEMIDE 20 MG: 20 INJECTION, SOLUTION INTRAMUSCULAR; INTRAVENOUS at 06:49

## 2021-09-08 RX ADMIN — INSULIN DETEMIR 10 UNITS: 100 INJECTION, SOLUTION SUBCUTANEOUS at 21:12

## 2021-09-08 RX ADMIN — BUDESONIDE AND FORMOTEROL FUMARATE DIHYDRATE 2 PUFF: 160; 4.5 AEROSOL RESPIRATORY (INHALATION) at 20:07

## 2021-09-08 NOTE — PROGRESS NOTES
MD at night:   microbiology department reported that patient's blood culture growing Haemophilus influenza.  Patient is on Rocephin and Zithromax for treatment of pneumonia.  Increase Rocephin to 2 g daily for treatment of Haemophilus influenza bacteremia.

## 2021-09-08 NOTE — PLAN OF CARE
Goal Outcome Evaluation:  Plan of Care Reviewed With: patient            Initial PT evaluation complete, co-evaluation with OT. Patient is alert and very cooperative.  She demonstrates (I) with bed mobility and transfers, requires SPV with gait, ambulating 30'x1 without an AD, no LOB but limited by medical lines/leads.  Tinetti assessed: 25/28 or low fall risk, but demonstrates poor stepping response to perturbation, bed alarm activated.  Patient would benefit from HHPT to improve balance and further lower fall risk. Goals established, continue skilled PT.

## 2021-09-08 NOTE — PROGRESS NOTES
Item 0 Points 1 Point 2 Points 3 Points 4 Points Subtotal   Mental Status Alert, oriented, cooperative Lethargic, follows commands Confused, not following commands Obtunded or Somnolent Comatose 0   Respiratory Pattern Regular RR 8-16 breaths/minute Increased RR 18-25 breaths/minute Dyspnea on exertion, irregular RR 26-30 breaths/minute Shortness of breath,  RR 31-35 breaths/minute Accessory muscle use, severe SOB  RR > 35 breaths/minute 1   Breath Sounds Clear Decreased unilaterally Decreased bilaterally Basilar crackles Wheezing and/or rhonchi 4   Cough Strong, spontaneous, non-productive Strong productive Weak, non-productive Weak, productive or weak with rhonchi Absent or may require suctioning 1   Pulmonary Status Nonsmoker, no previous history, >1 year quit < 1 PPD  <1 year quit > or = 1 PPD Diagnosed pulmonary disease (severe or chronic) Severe or chronic pulmonary disease with exacerbation 3   Surgical Status None General surgery (non-abdominal or non-thoracic) Lower abdominal Thoracic or upper abdominal Thoracic with pulmonary disease 0   Chest X-ray Clear Chronic changes Infiltrates, atelectasis or pleural effusion Infiltrates in > 1 lobe Diffuse infiltrates and atelectasis and/or effusions 2   Activity   Level Ambulatory Ambulatory with assistance Non-ambulatory Paraplegic Quadriplegic 0        Total Score   11   Score    Drug Therapy Frequency 20 or >    Q4 Duoneb with Q2 Albuterol PRN 15-19    Q6 Duoneb with Q4 Albuterol PRN 10-14    QID Duoneb with Q4 Albuterol PRN 5-9    TID Duoneb with Q6 Albuterol PRN 0-4    Q4 PRN Duoneb                  Lung Expansion Therapy (PEP) Bronchopulmonary Hygiene (CPT)   Q4 & PRN - Severe atelectasis, poor oxygenation Q4 - Copious secretions, dyspnea, unable to sleep   QID - High risk for persistent atelectasis, existence of atelectasis QID & Q4 PRN - Moderate secretion production   TID - At risk for developing atelectasis TID - Small amounts of secretions with poor  cough   BID - Prevention of atelectasis BID - Unable to breathe deeply and cough spontaneously     RT Comments / Recommendations:  Duoneb QID ith Albuterol Q4 PRN recommended at this time. RT will reassess in 48 hours.

## 2021-09-08 NOTE — THERAPY EVALUATION
Acute Care - Occupational Therapy Initial Evaluation  [facility]     Patient Name: Monisha Brasher  : 1955  MRN: [id number]  Today's Date: 2021  Onset of Illness/Injury or Date of Surgery: 21  Date of Referral to OT: 21  Referring Physician: VICTOR M Goode MD    Admit Date: 2021       ICD-10-CM ICD-9-CM   1. Pneumonia of both lungs due to infectious organism, unspecified part of lung  J18.9 483.8   2. Acute respiratory failure with hypoxia (CMS/HCC)  J96.01 518.81   3. Sinus tachycardia  R00.0 427.89   4. Impaired functional mobility, balance, gait, and endurance  Z74.09 V49.89   5. Impaired mobility and activities of daily living  Z74.09 V49.89    Z78.9      Patient Active Problem List   Diagnosis   • COPD bronchitis   • Cellulitis of left leg   • Asthma   • SOB (shortness of breath)   • Chronic bronchitis with acute exacerbation (CMS/HCC)   • Nicotine abuse   • Essential hypertension   • Screening for osteoporosis   • Panlobular emphysema (CMS/HCC)   • Anxiety   • General medical exam   • Malaise   • Hyperglycemia   • Acute ST elevation myocardial infarction (STEMI) (CMS/HCC)   • CAD (coronary artery disease)   • Acute respiratory failure with hypoxia (CMS/HCC)   • Depression   • Chronic alcohol abuse   • Pneumonia of both upper lobes   • Paroxysmal atrial fibrillation (CMS/HCC)   • Snoring   • Rash   • Chest pain   • Atherosclerosis of native coronary artery of native heart with unstable angina pectoris (CMS/HCC)   • Encounter for screening mammogram for malignant neoplasm of breast   • Encounter for screening colonoscopy   • Nocturnal hypoxia   • NARCISO (obstructive sleep apnea)   • Prediabetes   • Constipation   • Skin tear of left elbow without complication   • Dog scratch   • Axillary lump, right   • Cough   • Postmenopausal   • Need for vaccination   • Osteoporosis with current pathological fracture   • Stress fracture of left foot   • Long term (current) use of inhaled  steroids   • Long term (current) use of systemic steroids   • Adjustment disorder with mixed emotional features   • Acute bilateral low back pain with right-sided sciatica   • Right-sided low back pain with right-sided sciatica   • Wound of left leg   • Personal history of tobacco use, presenting hazards to health   • Chronic hypoxemic respiratory failure (CMS/[facility])   • Stage 4 very severe COPD by GOLD classification (CMS/[facility])   • Pneumonia of both lungs due to infectious organism     Past Medical History:   Diagnosis Date   • Acute bronchitis    • Acute upper respiratory infection    • Adjustment disorder with mixed emotional features    • Agoraphobia with panic attacks    • Allergic rhinitis due to pollen    • Anxiety    • Asthma    • Asthma    • Backache    • Blood in urine    • Breast cyst    • Candidiasis of mouth    • Chronic bronchitis (CMS/[facility])    • Chronic obstructive lung disease (CMS/[facility])    • COPD (chronic obstructive pulmonary disease) (CMS/[facility])    • Diabetes (CMS/[facility])    • Diabetes (CMS/[facility])    • Emphysema (subcutaneous) (surgical) resulting from a procedure    • Emphysema lung (CMS/[facility])    • Essential hypertension    • Extrinsic asthma with status asthmaticus    • Fatigue    • H/O echocardiogram     EF 55-60%. LV systolic function normal. Impaired LV relaxation. [facility]   • Heart attack (CMS/[facility]) 11/2015   • Heart problem    • High blood pressure    • Hypoxemia    • Malaise and fatigue    • Non-IgE mediated allergic asthma    • Nonvenomous insect bite    • Pneumonia    • PONV (postoperative nausea and vomiting)    • Postmenopausal state    • Rheumatoid arthritis (CMS/[facility])    • Urinary tract infectious disease      Past Surgical History:   Procedure Laterality Date   • BREAST BIOPSY     • CARDIAC CATHETERIZATION N/A 11/15/2018    Procedure: Left Heart Cath;  Surgeon: Thaddeus Huber MD;  Location: [facility] INVASIVE LOCATION;  Service: Cardiology   • CARDIAC CATHETERIZATION N/A  2019    Procedure: Left Heart Cath 2019 @ 8:00 AM;  Surgeon: Thaddeus Huber MD;  Location: [facility] CATH INVASIVE LOCATION;  Service: Cardiology   •  SECTION     • HYSTERECTOMY     • INJECTION OF MEDICATION  2015    celestone(1)   • INJECTION OF MEDICATION  2016    DEPO MEDROL(16)   • LEG SURGERY Bilateral             OT ASSESSMENT FLOWSHEET (last 12 hours)      OT Evaluation and Treatment     Row Name 21 1300 21 1124                OT Time and Intention    Subjective Information  --  complains of;dizziness  -RB       Document Type  --  evaluation  -RB       Mode of Treatment  --  co-treatment;occupational therapy  -RB       Patient Effort  --  good  -RB          General Information    Patient Profile Reviewed  --  yes  -RB       Onset of Illness/Injury or Date of Surgery  --  21  -RB       Referring Physician  --  VICTOR M Goode MD  -RB       Prior Level of Function  --  independent:;all household mobility;bathing;dressing;cooking;dependent:;driving;cleaning  -RB       Existing Precautions/Restrictions  --  oxygen therapy device and L/min  -RB       Risks Reviewed  --  patient:;LOB  -RB       Benefits Reviewed  --  patient:;improve function;increase balance;increase independence  -RB       Barriers to Rehab  --  previous functional deficit  -RB          Living Environment    Lives With  --  spouse  -RB          Home Main Entrance    Number of Stairs, Main Entrance  --  four  -RB       Stair Railings, Main Entrance  --  none  -RB          Stairs Within Home, Primary    Number of Stairs, Within Home, Primary  --  none  -RB          Home Use of Assistive/Adaptive Equipment    Equipment Currently Used at Home  --  oxygen;shower chair;wheelchair, motorized  -RB          Sensory Assessment (Somatosensory)    Sensory Assessment (Somatosensory)  --  UE sensation intact  -RB          Cognition    Affect/Mental Status (Cognitive)  --  WNL  -RB       Orientation Status (Cognition)   --  oriented x 4  -RB          Pain Assessment    Additional Documentation  --  Pain Scale: Numbers Pre/Post-Treatment (Group)  -RB          Pain Scale: Numbers Pre/Post-Treatment    Pretreatment Pain Rating  --  0/10 - no pain  -RB       Posttreatment Pain Rating  --  0/10 - no pain  -RB          Range of Motion Comprehensive    General Range of Motion  --  bilateral upper extremity ROM WNL  -RB          Strength Comprehensive (MMT)    General Manual Muscle Testing (MMT) Assessment  --  other (see comments)  -RB       Comment, General Manual Muscle Testing (MMT) Assessment  --  4/5 for B shld flex and 4+/5 for rest of B UE strength.  -RB          Bed Mobility    Bed Mobility  --  bed mobility (all) activities;supine-sit;sit-supine  -RB       All Activities, [place] (Bed Mobility)  --  modified independence  -RB       Supine-Sit [place] (Bed Mobility)  --  modified independence  -RB       Sit-Supine [place] (Bed Mobility)  --  modified independence  -RB       Assistive Device (Bed Mobility)  --  bed rails;head of bed elevated  -RB          Functional Mobility    Functional Mobility- Ind. Level  supervision required  -RB  supervision required;contact guard assist  -RB       Functional Mobility-Distance (Feet)  --  30  -RB       Functional Mobility- Safety Issues  --  step length decreased;supplemental O2  -RB          Transfer Assessment/Treatment    Transfers  --  stand-sit transfer;toilet transfer;sit-stand transfer  -RB          Transfers    Sit-Stand [place] (Transfers)  --  supervision  -RB       Stand-Sit [place] (Transfers)  --  supervision  -RB       [place] Level (Toilet Transfer)  --  supervision;contact guard  -RB          Toilet Transfer    Type (Toilet Transfer)  --  sit-stand;stand-sit  -RB          Safety Issues, Functional Mobility    Safety Issues Affecting Function (Mobility)  --  impulsivity  -RB       Impairments Affecting Function (Mobility)  --   strength;endurance/activity tolerance;balance  -RB          Wound 09/07/21 1833 Left distal leg Other (comment)    Wound - Properties Group Placement Date: 09/07/21  -RG Placement Time: 1833 -RG Present on Hospital Admission: Y  -RG Side: Left  -RG Orientation: distal  -RG Location: leg  -RG Primary Wound Type: Other  -RG, scab     Retired Wound - Properties Group Date first assessed: 09/07/21  -RG Time first assessed: 1833 -RG Present on Hospital Admission: Y  -RG Side: Left  -RG Location: leg  -RG Primary Wound Type: Other  -RG, scab        Wound Left anterior ankle Other (comment)    Wound - Properties Group Present on Hospital Admission: Y  -RG Side: Left  -RG Orientation: anterior  -RG Location: ankle  -RG Primary Wound Type: Other  -RG, scab  Stage, Pressure Injury : other (see comments)  -RG    Retired Wound - Properties Group Present on Hospital Admission: Y  -RG Side: Left  -RG Location: ankle  -RG Primary Wound Type: Other  -RG, scab        Wound 09/07/21 1831 Right distal leg Other (comment)    Wound - Properties Group Placement Date: 09/07/21  -RG Placement Time: 1831  -RG Present on Hospital Admission: Y  -RG Side: Right  -RG Orientation: distal  -RG Location: leg  -RG Primary Wound Type: Other  -RG, scab     Retired Wound - Properties Group Date first assessed: 09/07/21  -RG Time first assessed: 1831 -RG Present on Hospital Admission: Y  -RG Side: Right  -RG Location: leg  -RG Primary Wound Type: Other  -RG, scab        Wound 09/07/21 1832 Right anterior knee Other (comment)    Wound - Properties Group Placement Date: 09/07/21  -RG Placement Time: 1832 -RG Present on Hospital Admission: Y  -RG Side: Right  -RG Orientation: anterior  -RG Location: knee  -RG Primary Wound Type: Other  -RG, scab     Retired Wound - Properties Group Date first assessed: 09/07/21  -RG Time first assessed: 1832  -RG Present on Hospital Admission: Y  -RG Side: Right  -RG Location: knee  -RG Primary Wound Type: Other  -RG,  scab        Wound 09/07/21 1833 Right anterior fourth toe Blisters    Wound - Properties Group Placement Date: 09/07/21  -RG Placement Time: 1833  -RG Present on Hospital Admission: Y  -RG Side: Right  -RG Orientation: anterior  -RG Location: fourth toe  -RG Primary Wound Type: Blisters  -RG, Blood blister     Retired Wound - Properties Group Date first assessed: 09/07/21  -RG Time first assessed: 1833  -RG Present on Hospital Admission: Y  -RG Side: Right  -RG Location: fourth toe  -RG Primary Wound Type: Blisters  -RG, Blood blister        Vital Signs    Pre Systolic BP Rehab  --  183  -RB       Pre Treatment Diastolic BP  --  97  -RB       Intra Systolic BP Rehab  --  220  -RB       Intra Treatment Diastolic BP  --  120  -RB       Post Systolic BP Rehab  --  168  -RB       Post Treatment Diastolic BP  --  84  -RB       Pretreatment Heart Rate (beats/min)  --  94  -RB       Intratreatment Heart Rate (beats/min)  --  120  -RB       Posttreatment Heart Rate (beats/min)  --  117  -RB       Pre SpO2 (%)  --  97  -RB       O2 Delivery Pre Treatment  --  supplemental O2 5 L  -RB       Intra SpO2 (%)  --  93  -RB       O2 Delivery Intra Treatment  --  supplemental O2  -RB       Post SpO2 (%)  --  96  -RB       O2 Delivery Post Treatment  --  supplemental O2  -RB       Pre Patient Position  --  Supine  -RB       Intra Patient Position  --  Standing  -RB       Post Patient Position  --  Supine  -RB          Positioning and Restraints    Pre-Treatment Position  --  in bed  -RB       Post Treatment Position  --  bed  -RB       In Bed  --  supine;call light within reach;encouraged to call for assist;exit alarm on  -RB          Therapy Assessment/Plan (OT)    Date of Referral to OT  --  09/08/21  -RB       Functional Level at Time of Evaluation (OT)  --  Imp mob and ADLs.  -RB       Rehab Potential (OT)  --  good, to achieve stated therapy goals  -RB       Criteria for Skilled Therapeutic Interventions Met (OT)  --  yes;meets  criteria  -RB       Therapy Frequency (OT)  --  other (see comments) 5-7 days/wk  -RB       Predicted Duration of Therapy Intervention (OT)  --  Until D/C or goals met.  -RB       Problem List (OT)  --  problems related to;balance;inability to direct their own care;mobility  -RB       Planned Therapy Interventions (OT)  --  activity tolerance training;adaptive equipment training;BADL retraining;functional balance retraining;occupation/activity based interventions;patient/caregiver education/training;ROM/therapeutic exercise;strengthening exercise;transfer/mobility retraining  -RB          Evaluation Complexity (OT)    Review Occupational Profile/Medical/Therapy History Complexity  --  moderate complexity  -RB       Assessment, Occupational Performance/Identification of Deficit Complexity  --  moderate complexity  -RB       Clinical Decision Making Complexity (OT)  --  moderate complexity  -RB       Overall Complexity of Evaluation (OT)  --  moderate complexity  -RB          Therapy Plan Review/Discharge Plan (OT)    Anticipated Discharge Disposition (OT)  --  home with home health  -RB         User Key  (r) = Recorded By, (t) = Taken By, (c) = Cosigned By    Initials Name Effective Dates    RB Ricardo Paige, OT 06/16/21 -     Anam Angel RN 06/16/21 -            Occupational Therapy Education                 Title: PT OT SLP Therapies (In Progress)     Topic: Occupational Therapy (In Progress)     Point: ADL training (Not Started)     Description:   Instruct learner(s) on proper safety adaptation and remediation techniques during self care or transfers.   Instruct in proper use of assistive devices.              Learner Progress:  Not documented in this visit.          Point: Home exercise program (Not Started)     Description:   Instruct learner(s) on appropriate technique for monitoring, assisting and/or progressing therapeutic exercises/activities.              Learner Progress:  Not documented in this  visit.          Point: Precautions (Done)     Description:   Instruct learner(s) on prescribed precautions during self-care and functional transfers.              Learning Progress Summary           Patient Acceptance, E, VU by NEELA at 9/8/2021 1323    Comment: Edu pt on use of gait belt and non skid socks when OOB and no OOB without assist.                   Point: Body mechanics (Not Started)     Description:   Instruct learner(s) on proper positioning and spine alignment during self-care, functional mobility activities and/or exercises.              Learner Progress:  Not documented in this visit.                      User Key     Initials Effective Dates Name Provider Type Discipline    NEELA 06/16/21 -  Ricardo Paige, OT Occupational Therapist OT                  OT Recommendation and Plan  Planned Therapy Interventions (OT): activity tolerance training, adaptive equipment training, BADL retraining, functional balance retraining, occupation/activity based interventions, patient/caregiver education/training, ROM/therapeutic exercise, strengthening exercise, transfer/mobility retraining  Therapy Frequency (OT): other (see comments) (5-7 days/wk)  Plan of Care Review  Plan of Care Reviewed With: patient  Outcome Summary: OT eval on this date as co-eval with PT.  Pt was modified independent with bed mobility.  She was supervision for sit to stand and supervision to CGA for toilet transfer.  Set up for donning socks.  OT down to 90% with minimal activity.  Pt could benefit from OT services to increase safety, endurance and independence with ADLs and functional mobility.  Plan of Care Reviewed With: patient  Outcome Summary: OT eval on this date as co-eval with PT.  Pt was modified independent with bed mobility.  She was supervision for sit to stand and supervision to CGA for toilet transfer.  Set up for donning socks.  OT down to 90% with minimal activity.  Pt could benefit from OT services to increase safety, endurance  and independence with ADLs and functional mobility.    Outcome Measures     Row Name 09/08/21 1124             How much help from another is currently needed...    Putting on and taking off regular lower body clothing?  3  -RB      Bathing (including washing, rinsing, and drying)  3  -RB      Toileting (which includes using toilet bed pan or urinal)  3  -RB      Putting on and taking off regular upper body clothing  4  -RB      Taking care of personal grooming (such as brushing teeth)  4  -RB      Eating meals  4  -RB      AM-PAC 6 Clicks Score (OT)  21  -RB         Functional Assessment    Outcome Measure Options  AM-PAC 6 Clicks Daily Activity (OT)  -RB        User Key  (r) = Recorded By, (t) = Taken By, (c) = Cosigned By    Initials Name Provider Type    Ricardo Gordon OT Occupational Therapist          Time Calculation:   Time Calculation- OT     Row Name 09/08/21 1330             Time Calculation- OT    OT Start Time  1124  -RB      OT Stop Time  1210  -RB      OT Time Calculation (min)  46 min  -RB      OT Received On  09/08/21  -RB      OT Goal Re-Cert Due Date  09/21/21  -RB        User Key  (r) = Recorded By, (t) = Taken By, (c) = Cosigned By    Initials Name Provider Type    Ricardo Gordon OT Occupational Therapist        Therapy Charges for Today     Code Description Service Date Service Provider Modifiers Qty    [id number]  OT EVAL MOD COMPLEXITY 3 9/8/2021 Ricardo Paige OT GO 1               Ricardo Paige OT  9/8/2021

## 2021-09-08 NOTE — PROGRESS NOTES
[facility] Medicine Services  INPATIENT PROGRESS NOTE    Length of Stay: 1  Date of Admission: 9/7/2021  Primary Care Physician: Tiffanie Mccauley APRN    Subjective   Chief Complaint: No new complaints.    HPI: Patient is seen for follow-up.  She is doing better, less short of air, less deconditioned, tolerating her diet and on supplemental oxygen of 5 L nasal prongs with saturation in the low 90s.  She voices no new complaints.    Review of Systems   Constitutional: Positive for activity change and fatigue. Negative for appetite change, chills, diaphoresis and fever.   HENT: Negative for trouble swallowing and voice change.    Eyes: Negative for photophobia and visual disturbance.   Respiratory: Positive for shortness of breath. Negative for cough, choking, chest tightness, wheezing and stridor.    Cardiovascular: Negative for chest pain, palpitations and leg swelling.   Gastrointestinal: Negative for abdominal distention, abdominal pain, blood in stool, constipation, diarrhea, nausea and vomiting.   Endocrine: Negative for cold intolerance, heat intolerance, polydipsia, polyphagia and polyuria.   Genitourinary: Negative for decreased urine volume, difficulty urinating, dysuria, enuresis, flank pain, frequency, hematuria and urgency.   Musculoskeletal: Negative for arthralgias, gait problem, myalgias, neck pain and neck stiffness.   Skin: Positive for color change. Negative for pallor, rash and wound.   Neurological: Negative for dizziness, tremors, seizures, syncope, facial asymmetry, speech difficulty, weakness, light-headedness, numbness and headaches.   Hematological: Does not bruise/bleed easily.   Psychiatric/Behavioral: Negative for agitation, behavioral problems and confusion.       Objective    Temp:  [97.1 °F (36.2 °C)-98.2 °F (36.8 °C)] 98 °F (36.7 °C)  Heart Rate:  [] 91  Resp:  [20-22] 22  BP: (113-178)/() 174/87    Physical Exam  Vitals and  nursing note reviewed.   Constitutional:       General: She is not in acute distress.     Appearance: She is well-developed. She is not diaphoretic.   HENT:      Head: Normocephalic and atraumatic.      Right Ear: External ear normal.      Left Ear: External ear normal.      Nose: Nose normal.   Eyes:      Conjunctiva/sclera: Conjunctivae normal.      Pupils: Pupils are equal, round, and reactive to light.   Neck:      Thyroid: No thyromegaly.      Vascular: No JVD.   Cardiovascular:      Rate and Rhythm: Normal rate and regular rhythm.      Heart sounds: Normal heart sounds. No murmur heard.   No friction rub. No gallop.    Pulmonary:      Effort: Pulmonary effort is normal. No respiratory distress.      Breath sounds: No stridor. Decreased breath sounds and rhonchi present. No wheezing or rales.   Chest:      Chest wall: No tenderness.   Abdominal:      General: Bowel sounds are normal. There is no distension.      Palpations: Abdomen is soft. There is no mass.      Tenderness: There is no abdominal tenderness. There is no guarding or rebound.      Hernia: No hernia is present.   Musculoskeletal:         General: No swelling, tenderness or deformity. Normal range of motion.      Cervical back: Normal range of motion and neck supple.      Right lower leg: No edema.      Left lower leg: No edema.   Skin:     General: Skin is warm and dry.      Coloration: Skin is not jaundiced or pale.      Findings: Bruising present. No erythema, lesion or rash.      Comments: She has bruising and ecchymosis on both upper and lower extremities secondary to chronic steroid use.   Neurological:      General: No focal deficit present.      Mental Status: She is alert and oriented to person, place, and time.      Cranial Nerves: No cranial nerve deficit.      Sensory: No sensory deficit.      Motor: No weakness.      Coordination: Coordination normal.      Gait: Gait normal.      Deep Tendon Reflexes: Reflexes are normal and symmetric.  Reflexes normal.   Psychiatric:         Mood and Affect: Mood normal.         Behavior: Behavior normal. Behavior is cooperative.         Thought Content: Thought content normal.         Judgment: Judgment normal.           Medication Review:    Current Facility-Administered Medications:   •  acetaminophen (TYLENOL) tablet 650 mg, 650 mg, Oral, Q4H PRN **OR** acetaminophen (TYLENOL) 160 MG/5ML solution 650 mg, 650 mg, Oral, Q4H PRN **OR** acetaminophen (TYLENOL) suppository 650 mg, 650 mg, Rectal, Q4H PRN, Bobo Medina MD  •  albuterol (PROVENTIL) nebulizer solution 0.083% 2.5 mg/3mL, 2.5 mg, Nebulization, Q4H PRN, Bobo Medina MD  •  ALPRAZolam (XANAX) tablet 0.25 mg, 0.25 mg, Oral, TID PRN, Bobo Medina MD, 0.25 mg at 09/08/21 0857  •  aluminum-magnesium hydroxide-simethicone (MAALOX MAX) 400-400-40 MG/5ML suspension 15 mL, 15 mL, Oral, Q6H PRN, Bobo Medina MD  •  amiodarone (PACERONE) half tablet 100 mg, 100 mg, Oral, BID, Bobo Medina MD, 100 mg at 09/08/21 0857  •  aspirin chewable tablet 81 mg, 81 mg, Oral, Daily, Bobo Medina MD, 81 mg at 09/08/21 0857  •  atorvastatin (LIPITOR) tablet 40 mg, 40 mg, Oral, Daily, Bobo Medina MD, 40 mg at 09/08/21 0856  •  AZITHROMYCIN 500 MG/250 ML 0.9% NS IVPB (vial-mate), 500 mg, Intravenous, Q24H, Bobo Medina MD  •  budesonide-formoterol (SYMBICORT) 160-4.5 MCG/ACT inhaler 2 puff, 2 puff, Inhalation, BID, Bobo Medina MD, 2 puff at 09/08/21 0725  •  cefTRIAXone (ROCEPHIN) 2 g/100 mL 0.9% NS IVPB (MBP), 2 g, Intravenous, Q24H, Behroozi, Saeid, MD  •  cyclobenzaprine (FLEXERIL) tablet 10 mg, 10 mg, Oral, TID PRN, Bobo Medina MD  •  dextrose (D50W) 25 g/ 50mL Intravenous Solution 25 g, 25 g, Intravenous, Q15 Min PRN, Bobo Medina MD  •  dextrose (GLUTOSE) oral gel 15 g, 15 g, Oral, Q15 Min PRN, Bobo Medina MD  •  enoxaparin (LOVENOX) syringe 40 mg, 40 mg, Subcutaneous, Q24H, Adam  Bobo STANFORD MD, 40 mg at 09/07/21 1331  •  furosemide (LASIX) injection 20 mg, 20 mg, Intravenous, Daily, Bobo Medina MD, 20 mg at 09/08/21 0649  •  glucagon (human recombinant) (GLUCAGEN DIAGNOSTIC) injection 1 mg, 1 mg, Subcutaneous, Q15 Min PRN, Bobo Medina MD  •  hydrOXYzine (ATARAX) tablet 12.5 mg, 12.5 mg, Oral, Q8H PRN, Bobo Medina MD  •  insulin aspart (novoLOG) injection 0-7 Units, 0-7 Units, Subcutaneous, TID AC, Bobo Medina MD, 2 Units at 09/08/21 0857  •  ipratropium-albuterol (DUO-NEB) nebulizer solution 3 mL, 3 mL, Nebulization, 4x Daily - RT, Bobo Medina MD, 3 mL at 09/08/21 0725  •  losartan (COZAAR) tablet 25 mg, 25 mg, Oral, Daily, Bobo Medina MD, 25 mg at 09/08/21 0649  •  metFORMIN (GLUCOPHAGE) tablet 500 mg, 500 mg, Oral, Daily With Breakfast, Bobo Medina MD, 500 mg at 09/08/21 0856  •  methylPREDNISolone sodium succinate (SOLU-Medrol) injection 60 mg, 60 mg, Intravenous, Q8H, Bobo Medina MD, 60 mg at 09/08/21 0627  •  montelukast (SINGULAIR) tablet 10 mg, 10 mg, Oral, Daily, Bobo Medina MD, 10 mg at 09/08/21 0856  •  nitroglycerin (NITROSTAT) SL tablet 0.4 mg, 0.4 mg, Sublingual, Q5 Min PRN, Bobo Medina MD  •  ondansetron (ZOFRAN) tablet 4 mg, 4 mg, Oral, Q6H PRN **OR** ondansetron (ZOFRAN) injection 4 mg, 4 mg, Intravenous, Q6H PRN, Bobo Medina MD, 4 mg at 09/07/21 1340  •  PARoxetine (PAXIL) tablet 20 mg, 20 mg, Oral, QAM, Bobo Medina MD, 20 mg at 09/08/21 0627  •  prasugrel (EFFIENT) tablet 10 mg, 10 mg, Oral, Daily, Bobo Medina MD, 10 mg at 09/08/21 0856  •  sodium chloride 0.9 % flush 10 mL, 10 mL, Intravenous, PRN, Bobo Medina MD  •  sodium chloride 0.9 % flush 10 mL, 10 mL, Intravenous, Q12H, Bobo Medina MD, 10 mL at 09/08/21 0905  •  sodium chloride 0.9 % flush 10 mL, 10 mL, Intravenous, PRN, Bobo Medina MD    Results Review:  I have reviewed the labs, radiology  results, and diagnostic studies.    Laboratory Data:   Results from last 7 days   Lab Units 09/08/21  0609 09/07/21  1654 09/07/21  0917   SODIUM mmol/L 136 134* 136   POTASSIUM mmol/L 4.3 4.0 4.0   CHLORIDE mmol/L 97* 98 96*   CO2 mmol/L 31.0* 23.0 28.0   BUN mg/dL 12 10 10   CREATININE mg/dL 0.49* 0.72 0.58   GLUCOSE mg/dL 173* 317* 148*   CALCIUM mg/dL 9.0 8.4* 9.4   BILIRUBIN mg/dL  --   --  0.8   ALK PHOS U/L  --   --  93   ALT (SGPT) U/L  --   --  22   AST (SGOT) U/L  --   --  19   ANION GAP mmol/L 8.0 13.0 12.0     Estimated Creatinine Clearance: 105.7 mL/min (A) (by C-G formula based on SCr of 0.49 mg/dL (L)).          Results from last 7 days   Lab Units 09/08/21  0609 09/07/21  1654 09/07/21  0917   WBC 10*3/mm3 11.47* 12.59* 10.27   HEMOGLOBIN g/dL 12.0 12.3 14.4   HEMATOCRIT % 36.6 38.1 45.0   PLATELETS 10*3/mm3 180 189 226           Culture Data:   Blood Culture   Date Value Ref Range Status   09/07/2021 Abnormal Stain (C)  Preliminary     No results found for: URINECX  No results found for: RESPCX  No results found for: WOUNDCX  No results found for: STOOLCX  No components found for: BODYFLD    Radiology Data:   Imaging Results (Last 24 Hours)     ** No results found for the last 24 hours. **          I have reviewed the patient's current medications.     Assessment/Plan     Hospital Problem List:  Active Problems:    Pneumonia of both lungs due to infectious organism    Acute on chronic respiratory failure with hypoxia secondary to COPD exacerbation/pneumonia: Much improved.  Continue noninvasive respiratory therapy, IV antibiotics, steroids and bronchodilators.  COVID-19 PCR is negative, Legionella and strep screens were negative and blood culture is positive for Haemophilus influenza.  Repeat blood culture in 2 to 3 days.  Reactive leukocytosis is improving.   Elevated D-dimer is likely due to acute illness.  CT angiogram of the chest was negative for pulmonary embolism.       Diabetes mellitus:  Stable.  Continue antiglycemic with Accu-Cheks and sliding scale insulin.     Coronary artery disease status post PCI: Continue home medications.     Hypertension: Continue Cozaar and Coreg and make adjustments as needed for optimal blood pressure control..     Depressive/disorder: Continue home medications.     Continue  GI and DVT prophylaxis.    Deconditioning: Consult PT and OT.    Discharge Planning: In progress.    I confirmed that the patient's Advance Care Plan is present, code status is documented, or surrogate decision maker is listed in the patient's medical record.     I have utilized all available immediate resources to obtain, update, or review the patient's current medications      Bobo Medina MD   09/08/21   10:41 CDT

## 2021-09-08 NOTE — PLAN OF CARE
Goal Outcome Evaluation:           Progress: no change  Outcome Summary: Pt has had no new complaints so far this shift; pictures taken of wounds to KAREN legs. Will continue to monitor.

## 2021-09-08 NOTE — NURSING NOTE
Patient has a hx of PVD and was following up with outpatient wound care.  Patient has left anterior shin ulcer measuring 1.5cm x 1.7 cm.  Wound bed has small amount of slough with red granulation.  Edges intact.  Cut polymem to fit wound bed and cover with medipore tape for comfort.  Left medial ankle ulcer measuring 1cm x 1cm.  Dry and edges intact.  Right anterior shin ulcer measuring 0.5cm x 0.8cm dry with edges intact.  Right lateral knee ulcer 0.8cm x 0.9cm which is also dry and intact.  Followup with outpatient wound clinic.

## 2021-09-08 NOTE — NURSING NOTE
Upon entering room, pt states she is feeling dizzy and numbness. Upon assessment BP elevated at 178/100. Called MD on call and okay per MD to give 0900 lasix dose and 0900 losartan dose early. Bed alarm set at this time.

## 2021-09-08 NOTE — PLAN OF CARE
Goal Outcome Evaluation:  Plan of Care Reviewed With: patient        Progress: no change  Outcome Summary: Patient doing well today, walked with therapy, no complaints noted at this time

## 2021-09-08 NOTE — THERAPY EVALUATION
Patient Name: Monisha Brasher  : 1955    MRN: [id number]                              Today's Date: 2021       Admit Date: 2021    Visit Dx:     ICD-10-CM ICD-9-CM   1. Pneumonia of both lungs due to infectious organism, unspecified part of lung  J18.9 483.8   2. Acute respiratory failure with hypoxia (CMS/HCC)  J96.01 518.81   3. Sinus tachycardia  R00.0 427.89   4. Impaired functional mobility, balance, gait, and endurance  Z74.09 V49.89     Patient Active Problem List   Diagnosis   • COPD bronchitis   • Cellulitis of left leg   • Asthma   • SOB (shortness of breath)   • Chronic bronchitis with acute exacerbation (CMS/[facility])   • Nicotine abuse   • Essential hypertension   • Screening for osteoporosis   • Panlobular emphysema (CMS/[facility])   • Anxiety   • General medical exam   • Malaise   • Hyperglycemia   • Acute ST elevation myocardial infarction (STEMI) (CMS/[facility])   • CAD (coronary artery disease)   • Acute respiratory failure with hypoxia (CMS/[facility])   • Depression   • Chronic alcohol abuse   • Pneumonia of both upper lobes   • Paroxysmal atrial fibrillation (CMS/[facility])   • Snoring   • Rash   • Chest pain   • Atherosclerosis of native coronary artery of native heart with unstable angina pectoris (CMS/[facility])   • Encounter for screening mammogram for malignant neoplasm of breast   • Encounter for screening colonoscopy   • Nocturnal hypoxia   • NARCISO (obstructive sleep apnea)   • Prediabetes   • Constipation   • Skin tear of left elbow without complication   • Dog scratch   • Axillary lump, right   • Cough   • Postmenopausal   • Need for vaccination   • Osteoporosis with current pathological fracture   • Stress fracture of left foot   • Long term (current) use of inhaled steroids   • Long term (current) use of systemic steroids   • Adjustment disorder with mixed emotional features   • Acute bilateral low back pain with right-sided sciatica   • Right-sided low back pain with right-sided sciatica   • Wound of  left leg   • Personal history of tobacco use, presenting hazards to health   • Chronic hypoxemic respiratory failure (CMS/[facility])   • Stage 4 very severe COPD by GOLD classification (CMS/HCC)   • Pneumonia of both lungs due to infectious organism     Past Medical History:   Diagnosis Date   • Acute bronchitis    • Acute upper respiratory infection    • Adjustment disorder with mixed emotional features    • Agoraphobia with panic attacks    • Allergic rhinitis due to pollen    • Anxiety    • Asthma    • Asthma    • Backache    • Blood in urine    • Breast cyst    • Candidiasis of mouth    • Chronic bronchitis (CMS/HCC)    • Chronic obstructive lung disease (CMS/HCC)    • COPD (chronic obstructive pulmonary disease) (CMS/HCC)    • Diabetes (CMS/HCC)    • Diabetes (CMS/HCC)    • Emphysema (subcutaneous) (surgical) resulting from a procedure    • Emphysema lung (CMS/HCC)    • Essential hypertension    • Extrinsic asthma with status asthmaticus    • Fatigue    • H/O echocardiogram     EF 55-60%. LV systolic function normal. Impaired LV relaxation. [facility]   • Heart attack (CMS/HCC) 2015   • Heart problem    • High blood pressure    • Hypoxemia    • Malaise and fatigue    • Non-IgE mediated allergic asthma    • Nonvenomous insect bite    • Pneumonia    • PONV (postoperative nausea and vomiting)    • Postmenopausal state    • Rheumatoid arthritis (CMS/[facility])    • Urinary tract infectious disease      Past Surgical History:   Procedure Laterality Date   • BREAST BIOPSY     • CARDIAC CATHETERIZATION N/A 11/15/2018    Procedure: Left Heart Cath;  Surgeon: Thaddeus Huber MD;  Location: [facility] INVASIVE LOCATION;  Service: Cardiology   • CARDIAC CATHETERIZATION N/A 2019    Procedure: Left Heart Cath 2019 @ 8:00 AM;  Surgeon: Thaddeus Huber MD;  Location: [facility] INVASIVE LOCATION;  Service: Cardiology   •  SECTION     • HYSTERECTOMY     • INJECTION OF MEDICATION  2015     celestone(1)   • INJECTION OF MEDICATION  04/05/2016    DEPO MEDROL(16)   • LEG SURGERY Bilateral      General Information     Row Name 09/08/21 1130          Physical Therapy Time and Intention    Document Type  evaluation  -     Mode of Treatment  co-treatment;occupational therapy;physical therapy  -     Row Name 09/08/21 1130          General Information    Patient Profile Reviewed  yes  -CZ     Prior Level of Function  independent:;all household mobility  -CZ     Existing Precautions/Restrictions  oxygen therapy device and L/min;fall  -     Barriers to Rehab  medically complex  -     Row Name 09/08/21 1130          Living Environment    Lives With  spouse  -     Row Name 09/08/21 1130          Home Main Entrance    Number of Stairs, Main Entrance  four  -CZ     Stair Railings, Main Entrance  none  -     Row Name 09/08/21 1130          Stairs Within Home, Primary    Stairs, Within Home, Primary  Has a 4WW, does not normally use.  Has scooter for outside appointments.  Has shower chair. 4 LPM at home.  -CZ     Number of Stairs, Within Home, Primary  none  -CZ     Row Name 09/08/21 1130          Cognition    Orientation Status (Cognition)  oriented x 4  -CZ     Row Name 09/08/21 1130          Safety Issues, Functional Mobility    Impairments Affecting Function (Mobility)  strength;endurance/activity tolerance;balance  -       User Key  (r) = Recorded By, (t) = Taken By, (c) = Cosigned By    Initials Name Provider Type    CZ Edson Gordillo, PT Physical Therapist        Mobility     Row Name 09/08/21 1130          Bed Mobility    Bed Mobility  supine-sit;sit-supine  -     Supine-Sit [place] (Bed Mobility)  modified independence  -     Sit-Supine [place] (Bed Mobility)  modified independence  -     Assistive Device (Bed Mobility)  head of bed elevated;bed rails  -     Row Name 09/08/21 1130          Sit-Stand Transfer    Sit-Stand [place] (Transfers)  supervision  -     Row  Name 09/08/21 1130          Gait/Stairs (Locomotion)    [place] Level (Gait)  supervision  -CZ     Distance in Feet (Gait)  5'x1, 30'x1.  -CZ     Comment (Gait/Stairs)  No AD, no LOB; limited by medical lines/leads.  -CZ       User Key  (r) = Recorded By, (t) = Taken By, (c) = Cosigned By    Initials Name Provider Type    CZ Edson Gordillo, PT Physical Therapist        Obj/Interventions     Row Name 09/08/21 1130          Range of Motion Comprehensive    General Range of Motion  bilateral lower extremity ROM WFL  -CZ     Row Name 09/08/21 1130          Strength Comprehensive (MMT)    General Manual Muscle Testing (MMT) Assessment  other (see comments)  -CZ     Comment, General Manual Muscle Testing (MMT) Assessment  BLEs: 3+/5 grossly.  -CZ     Row Name 09/08/21 1130          Sensory Assessment (Somatosensory)    Sensory Assessment (Somatosensory)  LE sensation intact  -CZ       User Key  (r) = Recorded By, (t) = Taken By, (c) = Cosigned By    Initials Name Provider Type    CZ Edson Gordillo, PT Physical Therapist        Goals/Plan     Row Name 09/08/21 1130          Bed Mobility Goal 1 (PT)    Activity/Assistive Device (Bed Mobility Goal 1, PT)  sit to supine/supine to sit;bed rails  -CZ     [place] Level/Cues Needed (Bed Mobility Goal 1, PT)  independent  -CZ     Time Frame (Bed Mobility Goal 1, PT)  by discharge  -CZ     Strategies/Barriers (Bed Mobility Goal 1, PT)  HOB flat, no bed rails.  -CZ     Progress/Outcomes (Bed Mobility Goal 1, PT)  goal not met  -CZ     Row Name 09/08/21 1130          Transfer Goal 1 (PT)    Activity/Assistive Device (Transfer Goal 1, PT)  sit-to-stand/stand-to-sit;bed-to-chair/chair-to-bed  -CZ     Time Frame (Transfer Goal 1, PT)  by discharge  -CZ     Strategies/Barriers (Transfers Goal 1, PT)  Maintain SpO2 > 90%  -CZ     Progress/Outcome (Transfer Goal 1, PT)  goal not met  -CZ     Row Name 09/08/21 1130          Gait Training Goal 1 (PT)    Activity/Assistive  Device (Gait Training Goal 1, PT)  gait (walking locomotion)  -CZ     [place] Level (Gait Training Goal 1, PT)  independent  -CZ     Distance (Gait Training Goal 1, PT)  50'x2.  -CZ     Strategies/Barriers (Gait Training Goal 1, PT)  Maintain SpO2 > 90%  -CZ     Progress/Outcome (Gait Training Goal 1, PT)  goal not met  -CZ     Row Name 09/08/21 1130          Stairs Goal 1 (PT)    Activity/Assistive Device (Stairs Goal 1, PT)  ascending stairs;descending stairs  -CZ     [place] Level/Cues Needed (Stairs Goal 1, PT)  contact guard assist  -CZ     Number of Stairs (Stairs Goal 1, PT)  4 steps, no rails.  -CZ     Time Frame (Stairs Goal 1, PT)  by discharge  -CZ     Strategies/Barriers (Stairs Goal 1, PT)  Maintain SpO2 > 90%  -CZ     Progress/Outcome (Stairs Goal 1, PT)  goal not met  -CZ       User Key  (r) = Recorded By, (t) = Taken By, (c) = Cosigned By    Initials Name Provider Type    CZ Esdon Gordillo, PT Physical Therapist        Clinical Impression     Row Name 09/08/21 1170          Pain    Additional Documentation  Pain Scale: Numbers Pre/Post-Treatment (Group)  -[facility] Name 09/08/21 0390          Pain Scale: Numbers Pre/Post-Treatment    Pretreatment Pain Rating  0/10 - no pain  -CZ     Posttreatment Pain Rating  0/10 - no pain  -CZ     Pre/Posttreatment Pain Comment  Initial PT evaluation complete, co-evaluation with OT. Patient is alert and very cooperative.  She demonstrates (I) with bed mobility and transfers, requires SPV with gait, ambulating 30'x1 without an AD, no LOB but limited by medical lines/leads.  Tinetti assessed: 25/28 or low fall risk, but demonstrates poor stepping response to perturbation, bed alarm activated.  Patient would benefit from HHPT to improve balance and further lower fall risk. Goals established, continue skilled PT.  -     Row Name 09/08/21 1130          Plan of Care Review    Plan of Care Reviewed With  patient  -[facility] Name 09/08/21 5146           Therapy Assessment/Plan (PT)    Rehab Potential (PT)  good, to achieve stated therapy goals  -CZ     Criteria for Skilled Interventions Met (PT)  yes;skilled treatment is necessary  -CZ     Row Name 09/08/21 1130          Vital Signs    Pre Systolic BP Rehab  183  -CZ     Pre Treatment Diastolic BP  97 Nurse aware.  -CZ     Intra Systolic BP Rehab  220  -CZ     Intra Treatment Diastolic BP  120 DinaMap  -CZ     Post Systolic BP Rehab  168  -CZ     Post Treatment Diastolic BP  84 Manually.  -CZ     Pretreatment Heart Rate (beats/min)  94  -CZ     Intratreatment Heart Rate (beats/min)  120  -CZ     Posttreatment Heart Rate (beats/min)  117  -CZ     Pre SpO2 (%)  97  -CZ     O2 Delivery Pre Treatment  nasal cannula 5 LPM  -CZ     Intra SpO2 (%)  93 after gait.  -CZ     O2 Delivery Intra Treatment  nasal cannula  -CZ     Post SpO2 (%)  96  -CZ     O2 Delivery Post Treatment  nasal cannula  -CZ     Pre Patient Position  Supine  -CZ     Intra Patient Position  Sitting  -CZ     Post Patient Position  Sitting  -CZ     Row Name 09/08/21 1130          Positioning and Restraints    Pre-Treatment Position  in bed  -CZ     Post Treatment Position  bed  -CZ     In Bed  supine;call light within reach;encouraged to call for assist;exit alarm on  -CZ       User Key  (r) = Recorded By, (t) = Taken By, (c) = Cosigned By    Initials Name Provider Type    CZ Edson Gordillo, PT Physical Therapist        Outcome Measures     Row Name 09/08/21 1130          How much help from another person do you currently need...    Turning from your back to your side while in flat bed without using bedrails?  4  -CZ     Moving from lying on back to sitting on the side of a flat bed without bedrails?  4  -CZ     Moving to and from a bed to a chair (including a wheelchair)?  3  -CZ     Standing up from a chair using your arms (e.g., wheelchair, bedside chair)?  3  -CZ     Climbing 3-5 steps with a railing?  3  -CZ     To walk in hospital room?  3  -CZ   "   AM-PAC 6 Clicks Score (PT)  20  -CZ     Row Name 09/08/21 1130          Tinetti Assessment    Tinetti Assessment  yes  -CZ     Sitting Balance  1  -CZ     Arises  2  -CZ     Attempts to Rise  2  -CZ     Immediate Standing Balance (first 5 sec)  2  -CZ     Standing Balance  1  -CZ     Sternal Nudge (feet close together)  1  -CZ     Eyes Closed (feet close together)  1  -CZ     Turning 360 Degrees- Steps  1  -CZ     Turning 360 Degrees- Steadiness  1  -CZ     Sitting Down  2  -CZ     Tinetti Balance Score  14  -CZ     Gait Initiation (immediate after told \"go\")  1  -CZ     Step Length- Right Swing  1  -CZ     Step Length- Left Swing  1  -CZ     Foot Clearance- Right Foot  1  -CZ     Foot Clearance- Left Foot  1  -CZ     Step Symmetry  1  -CZ     Step Continuity  1  -CZ     Path (excursion)  1  -CZ     Trunk  2  -CZ     Base of Support  1  -CZ     Gait Score  11  -CZ     Tinetti Total Score  25  -CZ     Row Name 09/08/21 1130 09/08/21 1124       Functional Assessment    Outcome Measure Options  AM-PAC 6 Clicks Basic Mobility (PT);Tinetti  -CZ  AM-PAC 6 Clicks Daily Activity (OT)  -RB      User Key  (r) = Recorded By, (t) = Taken By, (c) = Cosigned By    Initials Name Provider Type    RB Ricardo Paige, OT Occupational Therapist    CZ Edson Gordillo, PT Physical Therapist                       Physical Therapy Education                 Title: PT OT SLP Therapies (In Progress)     Topic: Physical Therapy (In Progress)     Point: Mobility training (Not Started)     Learner Progress:  Not documented in this visit.          Point: Home exercise program (Not Started)     Learner Progress:  Not documented in this visit.          Point: Body mechanics (Not Started)     Learner Progress:  Not documented in this visit.          Point: Precautions (Done)     Learning Progress Summary           Patient Acceptance, E, VU,NR by CZ at 9/8/2021 1207    Comment: Anirudh assessed: 25/28 or low fall risk, FWW not necessary but " decreased stepping response to perturbation.                               User Key     Initials Effective Dates Name Provider Type Discipline    CZ 06/16/21 -  Edson Gordillo, PT Physical Therapist PT              PT Recommendation and Plan  Planned Therapy Interventions (PT): balance training, bed mobility training, gait training, patient/family education, transfer training, stair training, strengthening, stretching  Plan of Care Reviewed With: patient     Initial PT evaluation complete, co-evaluation with OT. Patient is alert and very cooperative.  She demonstrates (I) with bed mobility and transfers, requires SPV with gait, ambulating 30'x1 without an AD, no LOB but limited by medical lines/leads.  Tinetti assessed: 25/28 or low fall risk, but demonstrates poor stepping response to perturbation, bed alarm activated.  Patient would benefit from HHPT to improve balance and further lower fall risk. Goals established, continue skilled PT.     Time Calculation:   PT Charges     Row Name 09/08/21 1327             Time Calculation    Start Time  1130  -CZ      Stop Time  1210  -CZ      Time Calculation (min)  40 min  -CZ      PT Received On  09/08/21  -CZ      PT Goal Re-Cert Due Date  09/21/21  -CZ         Untimed Charges    PT Eval/Re-eval Minutes  40  -CZ         Total Minutes    Untimed Charges Total Minutes  40  -CZ       Total Minutes  40  -CZ        User Key  (r) = Recorded By, (t) = Taken By, (c) = Cosigned By    Initials Name Provider Type    CZ Edson Gordillo, PT Physical Therapist        Therapy Charges for Today     Code Description Service Date Service Provider Modifiers Qty    [id number] HC PT EVAL MOD COMPLEXITY 3 9/8/2021 Edson Gordillo, PT GP 1          PT G-Codes  Outcome Measure Options: AM-PAC 6 Clicks Basic Mobility (PT), Tinetti  AM-PAC 6 Clicks Score (PT): 20  AM-PAC 6 Clicks Score (OT): 21  Tinetti Total Score: 25    Edson Gordillo PT  9/8/2021

## 2021-09-08 NOTE — PLAN OF CARE
Goal Outcome Evaluation:  Plan of Care Reviewed With: patient           Outcome Summary: OT eval on this date as co-eval with PT.  Pt was modified independent with bed mobility.  She was supervision for sit to stand and supervision to [facility] for toilet transfer.  Set up for donning socks.  OT down to 90% with minimal activity.  Pt could benefit from OT services to increase safety, endurance and independence with ADLs and functional mobility.

## 2021-09-09 LAB
ANION GAP SERPL CALCULATED.3IONS-SCNC: 9 MMOL/L (ref 5–15)
BACTERIA SPEC AEROBE CULT: ABNORMAL
BACTERIA SPEC RESP CULT: NORMAL
BASOPHILS # BLD AUTO: 0.01 10*3/MM3 (ref 0–0.2)
BASOPHILS NFR BLD AUTO: 0.1 % (ref 0–1.5)
BUN SERPL-MCNC: 16 MG/DL (ref 8–23)
BUN/CREAT SERPL: 34.8 (ref 7–25)
CALCIUM SPEC-SCNC: 9 MG/DL (ref 8.6–10.5)
CHLORIDE SERPL-SCNC: 93 MMOL/L (ref 98–107)
CO2 SERPL-SCNC: 30 MMOL/L (ref 22–29)
CREAT SERPL-MCNC: 0.46 MG/DL (ref 0.57–1)
DEPRECATED RDW RBC AUTO: 47.7 FL (ref 37–54)
EOSINOPHIL # BLD AUTO: 0 10*3/MM3 (ref 0–0.4)
EOSINOPHIL NFR BLD AUTO: 0 % (ref 0.3–6.2)
ERYTHROCYTE [DISTWIDTH] IN BLOOD BY AUTOMATED COUNT: 13.6 % (ref 12.3–15.4)
GFR SERPL CREATININE-BSD FRML MDRD: 137 ML/MIN/1.73
GLUCOSE BLDC GLUCOMTR-MCNC: 153 MG/DL (ref 70–130)
GLUCOSE BLDC GLUCOMTR-MCNC: 163 MG/DL (ref 70–130)
GLUCOSE BLDC GLUCOMTR-MCNC: 187 MG/DL (ref 70–130)
GLUCOSE BLDC GLUCOMTR-MCNC: 191 MG/DL (ref 70–130)
GLUCOSE SERPL-MCNC: 152 MG/DL (ref 65–99)
GRAM STN SPEC: ABNORMAL
GRAM STN SPEC: ABNORMAL
GRAM STN SPEC: NORMAL
GRAM STN SPEC: NORMAL
HCT VFR BLD AUTO: 34.8 % (ref 34–46.6)
HGB BLD-MCNC: 11.6 G/DL (ref 12–15.9)
HOLD SPECIMEN: NORMAL
IMM GRANULOCYTES # BLD AUTO: 0.08 10*3/MM3 (ref 0–0.05)
IMM GRANULOCYTES NFR BLD AUTO: 0.8 % (ref 0–0.5)
LYMPHOCYTES # BLD AUTO: 0.51 10*3/MM3 (ref 0.7–3.1)
LYMPHOCYTES NFR BLD AUTO: 5.2 % (ref 19.6–45.3)
MCH RBC QN AUTO: 31.6 PG (ref 26.6–33)
MCHC RBC AUTO-ENTMCNC: 33.3 G/DL (ref 31.5–35.7)
MCV RBC AUTO: 94.8 FL (ref 79–97)
MONOCYTES # BLD AUTO: 0.36 10*3/MM3 (ref 0.1–0.9)
MONOCYTES NFR BLD AUTO: 3.6 % (ref 5–12)
NEUTROPHILS NFR BLD AUTO: 8.92 10*3/MM3 (ref 1.7–7)
NEUTROPHILS NFR BLD AUTO: 90.3 % (ref 42.7–76)
NRBC BLD AUTO-RTO: 0 /100 WBC (ref 0–0.2)
PLATELET # BLD AUTO: 199 10*3/MM3 (ref 140–450)
PMV BLD AUTO: 9.2 FL (ref 6–12)
POTASSIUM SERPL-SCNC: 3.9 MMOL/L (ref 3.5–5.2)
RBC # BLD AUTO: 3.67 10*6/MM3 (ref 3.77–5.28)
SODIUM SERPL-SCNC: 132 MMOL/L (ref 136–145)
WBC # BLD AUTO: 9.88 10*3/MM3 (ref 3.4–10.8)
WHOLE BLOOD HOLD SPECIMEN: NORMAL

## 2021-09-09 PROCEDURE — 25010000002 FUROSEMIDE PER 20 MG: Performed by: INTERNAL MEDICINE

## 2021-09-09 PROCEDURE — 25010000002 ENOXAPARIN PER 10 MG: Performed by: INTERNAL MEDICINE

## 2021-09-09 PROCEDURE — 25010000002 AZITHROMYCIN PER 500 MG: Performed by: INTERNAL MEDICINE

## 2021-09-09 PROCEDURE — 87205 SMEAR GRAM STAIN: CPT | Performed by: INTERNAL MEDICINE

## 2021-09-09 PROCEDURE — 25010000002 HYDRALAZINE PER 20 MG: Performed by: INTERNAL MEDICINE

## 2021-09-09 PROCEDURE — 85025 COMPLETE CBC W/AUTO DIFF WBC: CPT | Performed by: INTERNAL MEDICINE

## 2021-09-09 PROCEDURE — 94799 UNLISTED PULMONARY SVC/PX: CPT

## 2021-09-09 PROCEDURE — 63710000001 INSULIN DETEMIR PER 5 UNITS: Performed by: INTERNAL MEDICINE

## 2021-09-09 PROCEDURE — 82962 GLUCOSE BLOOD TEST: CPT

## 2021-09-09 PROCEDURE — 63710000001 ONDANSETRON PER 8 MG: Performed by: INTERNAL MEDICINE

## 2021-09-09 PROCEDURE — 80048 BASIC METABOLIC PNL TOTAL CA: CPT | Performed by: INTERNAL MEDICINE

## 2021-09-09 PROCEDURE — 25010000002 CEFTRIAXONE PER 250 MG: Performed by: INTERNAL MEDICINE

## 2021-09-09 PROCEDURE — 87040 BLOOD CULTURE FOR BACTERIA: CPT | Performed by: NURSE PRACTITIONER

## 2021-09-09 PROCEDURE — 63710000001 INSULIN ASPART PER 5 UNITS: Performed by: INTERNAL MEDICINE

## 2021-09-09 PROCEDURE — 25010000002 METHYLPREDNISOLONE PER 125 MG: Performed by: INTERNAL MEDICINE

## 2021-09-09 PROCEDURE — 94760 N-INVAS EAR/PLS OXIMETRY 1: CPT

## 2021-09-09 RX ORDER — LABETALOL HYDROCHLORIDE 5 MG/ML
20 INJECTION, SOLUTION INTRAVENOUS EVERY 6 HOURS PRN
Status: DISCONTINUED | OUTPATIENT
Start: 2021-09-09 | End: 2021-09-10 | Stop reason: HOSPADM

## 2021-09-09 RX ORDER — HYDRALAZINE HYDROCHLORIDE 20 MG/ML
10 INJECTION INTRAMUSCULAR; INTRAVENOUS EVERY 6 HOURS PRN
Status: DISCONTINUED | OUTPATIENT
Start: 2021-09-09 | End: 2021-09-10 | Stop reason: HOSPADM

## 2021-09-09 RX ADMIN — SODIUM CHLORIDE, PRESERVATIVE FREE 10 ML: 5 INJECTION INTRAVENOUS at 20:47

## 2021-09-09 RX ADMIN — ATORVASTATIN CALCIUM 40 MG: 40 TABLET, FILM COATED ORAL at 07:21

## 2021-09-09 RX ADMIN — IPRATROPIUM BROMIDE AND ALBUTEROL SULFATE 3 ML: 2.5; .5 SOLUTION RESPIRATORY (INHALATION) at 11:41

## 2021-09-09 RX ADMIN — ALPRAZOLAM 0.25 MG: 0.25 TABLET ORAL at 21:09

## 2021-09-09 RX ADMIN — ONDANSETRON HYDROCHLORIDE 4 MG: 4 TABLET, FILM COATED ORAL at 07:20

## 2021-09-09 RX ADMIN — HYDRALAZINE HYDROCHLORIDE 10 MG: 20 INJECTION INTRAMUSCULAR; INTRAVENOUS at 21:09

## 2021-09-09 RX ADMIN — AZITHROMYCIN 500 MG: 500 INJECTION, POWDER, LYOPHILIZED, FOR SOLUTION INTRAVENOUS at 12:12

## 2021-09-09 RX ADMIN — ALBUTEROL SULFATE 2.5 MG: 2.5 SOLUTION RESPIRATORY (INHALATION) at 03:50

## 2021-09-09 RX ADMIN — INSULIN ASPART 2 UNITS: 100 INJECTION, SOLUTION INTRAVENOUS; SUBCUTANEOUS at 12:13

## 2021-09-09 RX ADMIN — METHYLPREDNISOLONE SODIUM SUCCINATE 60 MG: 125 INJECTION, POWDER, FOR SOLUTION INTRAMUSCULAR; INTRAVENOUS at 06:27

## 2021-09-09 RX ADMIN — ENALAPRILAT 1.25 MG: 1.25 INJECTION INTRAVENOUS at 03:37

## 2021-09-09 RX ADMIN — CARVEDILOL 6.25 MG: 6.25 TABLET, FILM COATED ORAL at 20:47

## 2021-09-09 RX ADMIN — METHYLPREDNISOLONE SODIUM SUCCINATE 60 MG: 125 INJECTION, POWDER, FOR SOLUTION INTRAMUSCULAR; INTRAVENOUS at 16:54

## 2021-09-09 RX ADMIN — BUDESONIDE AND FORMOTEROL FUMARATE DIHYDRATE 2 PUFF: 160; 4.5 AEROSOL RESPIRATORY (INHALATION) at 07:54

## 2021-09-09 RX ADMIN — PRASUGREL 10 MG: 10 TABLET, FILM COATED ORAL at 07:21

## 2021-09-09 RX ADMIN — INSULIN DETEMIR 10 UNITS: 100 INJECTION, SOLUTION SUBCUTANEOUS at 20:47

## 2021-09-09 RX ADMIN — BUDESONIDE AND FORMOTEROL FUMARATE DIHYDRATE 2 PUFF: 160; 4.5 AEROSOL RESPIRATORY (INHALATION) at 20:09

## 2021-09-09 RX ADMIN — SODIUM CHLORIDE, PRESERVATIVE FREE 10 ML: 5 INJECTION INTRAVENOUS at 07:22

## 2021-09-09 RX ADMIN — INSULIN ASPART 2 UNITS: 100 INJECTION, SOLUTION INTRAVENOUS; SUBCUTANEOUS at 07:22

## 2021-09-09 RX ADMIN — ALBUTEROL SULFATE 2.5 MG: 2.5 SOLUTION RESPIRATORY (INHALATION) at 23:49

## 2021-09-09 RX ADMIN — PAROXETINE HYDROCHLORIDE 20 MG: 20 TABLET, FILM COATED ORAL at 06:27

## 2021-09-09 RX ADMIN — INSULIN ASPART 2 UNITS: 100 INJECTION, SOLUTION INTRAVENOUS; SUBCUTANEOUS at 16:55

## 2021-09-09 RX ADMIN — CEFTRIAXONE SODIUM 2 G: 2 INJECTION, POWDER, FOR SOLUTION INTRAMUSCULAR; INTRAVENOUS at 08:32

## 2021-09-09 RX ADMIN — METFORMIN HYDROCHLORIDE 500 MG: 500 TABLET ORAL at 07:20

## 2021-09-09 RX ADMIN — LOSARTAN POTASSIUM 25 MG: 25 TABLET, FILM COATED ORAL at 07:20

## 2021-09-09 RX ADMIN — FUROSEMIDE 20 MG: 20 INJECTION, SOLUTION INTRAMUSCULAR; INTRAVENOUS at 07:21

## 2021-09-09 RX ADMIN — IPRATROPIUM BROMIDE AND ALBUTEROL SULFATE 3 ML: 2.5; .5 SOLUTION RESPIRATORY (INHALATION) at 14:56

## 2021-09-09 RX ADMIN — MONTELUKAST 10 MG: 10 TABLET, FILM COATED ORAL at 07:21

## 2021-09-09 RX ADMIN — Medication 100 MG: at 20:47

## 2021-09-09 RX ADMIN — ENOXAPARIN SODIUM 40 MG: 40 INJECTION SUBCUTANEOUS at 12:12

## 2021-09-09 RX ADMIN — ASPIRIN 81 MG: 81 TABLET, CHEWABLE ORAL at 07:21

## 2021-09-09 RX ADMIN — IPRATROPIUM BROMIDE AND ALBUTEROL SULFATE 3 ML: 2.5; .5 SOLUTION RESPIRATORY (INHALATION) at 20:09

## 2021-09-09 RX ADMIN — IPRATROPIUM BROMIDE AND ALBUTEROL SULFATE 3 ML: 2.5; .5 SOLUTION RESPIRATORY (INHALATION) at 07:53

## 2021-09-09 NOTE — SIGNIFICANT NOTE
09/09/21 1434   OTHER   Discipline physical therapy assistant   Rehab Time/Intention   Session Not Performed patient/family declined treatment  (pt. nauseated this am and pt. still not feeling well this pm and wants to be checked on in am)

## 2021-09-09 NOTE — PROGRESS NOTES
Item 0 Points 1 Point 2 Points 3 Points 4 Points Subtotal   Mental Status Alert, oriented, cooperative Lethargic, follows commands Confused, not following commands Obtunded or Somnolent Comatose 0   Respiratory Pattern Regular RR 8-16 breaths/minute Increased RR 18-25 breaths/minute Dyspnea on exertion, irregular RR 26-30 breaths/minute Shortness of breath,  RR 31-35 breaths/minute Accessory muscle use, severe SOB  RR > 35 breaths/minute 1   Breath Sounds Clear Decreased unilaterally Decreased bilaterally Basilar crackles Wheezing and/or rhonchi 4   Cough Strong, spontaneous, non-productive Strong productive Weak, non-productive Weak, productive or weak with rhonchi Absent or may require suctioning 1   Pulmonary Status Nonsmoker, no previous history, >1 year quit < 1 PPD  <1 year quit > or = 1 PPD Diagnosed pulmonary disease (severe or chronic) Severe or chronic pulmonary disease with exacerbation 3   Surgical Status None General surgery (non-abdominal or non-thoracic) Lower abdominal Thoracic or upper abdominal Thoracic with pulmonary disease 0   Chest X-ray Clear Chronic changes Infiltrates, atelectasis or pleural effusion Infiltrates in > 1 lobe Diffuse infiltrates and atelectasis and/or effusions N/A   Activity   Level Ambulatory Ambulatory with assistance Non-ambulatory Paraplegic Quadriplegic 0          Total Score   9   Score    Drug Therapy Frequency 20 or >    Q4 Duoneb with Q2 Albuterol PRN 15-19    Q6 Duoneb with Q4 Albuterol PRN 10-14    QID Duoneb with Q4 Albuterol PRN 5-9    TID Duoneb with Q6 Albuterol PRN 0-4    Q4 PRN Duoneb                  Lung Expansion Therapy (PEP) Bronchopulmonary Hygiene (CPT)   Q4 & PRN - Severe atelectasis, poor oxygenation Q4 - Copious secretions, dyspnea, unable to sleep   QID - High risk for persistent atelectasis, existence of atelectasis QID & Q4 PRN - Moderate secretion production   TID - At risk for developing atelectasis TID - Small amounts of secretions with poor  cough   BID - Prevention of atelectasis BID - Unable to breathe deeply and cough spontaneously     RT Comments / Recommendations:    RT eval follow up complete.  Patient continues to be SOA.  We will continue with current orders at this time.

## 2021-09-09 NOTE — PROGRESS NOTES
[facility] Medicine Services  INPATIENT PROGRESS NOTE    Length of Stay: 2  Date of Admission: 9/7/2021  Primary Care Physician: Tiffanie Mccauley APRN    Subjective   Chief Complaint: nausea, shortness of breath   HPI:  66 year old  female with past medical history of chronic hypoxic respiratory failure with home oxygen dependence of 4 liters, CAD s/p PCI, tobacco dependence, HTN, DM who is currently admitted for acute hypoxic respiratory failure related to COPD exacerbation.  During today's visit, she reports nausea and shortness of breath but notes her breathing is improving.     Review of Systems   Constitutional: Positive for fatigue. Negative for chills and fever.   HENT: Negative for congestion, rhinorrhea and sore throat.    Respiratory: Positive for shortness of breath. Negative for cough, chest tightness and wheezing.    Cardiovascular: Negative for chest pain, palpitations and leg swelling.   Gastrointestinal: Positive for nausea. Negative for abdominal pain, diarrhea and vomiting.   Musculoskeletal: Negative for back pain and neck pain.   Skin: Negative for pallor.   Neurological: Negative for weakness and headaches.   Psychiatric/Behavioral: Negative for confusion. The patient is not nervous/anxious.         All pertinent negatives and positives are as above. All other systems have been reviewed and are negative unless otherwise stated.     Objective    Temp:  [97.1 °F (36.2 °C)-98 °F (36.7 °C)] 97.4 °F (36.3 °C)  Heart Rate:  [] 87  Resp:  [18-24] 20  BP: (156-180)/() 156/62    Physical Exam  Vitals and nursing note reviewed.   Constitutional:       General: She is not in acute distress.     Appearance: Normal appearance. She is ill-appearing.      Comments: Chronically ill appearing   HENT:      Head: Normocephalic and atraumatic.      Right Ear: External ear normal.      Left Ear: External ear normal.      Nose: Nose normal.       Mouth/Throat:      Mouth: Mucous membranes are moist.      Pharynx: Oropharynx is clear.   Eyes:      General: No scleral icterus.        Right eye: No discharge.         Left eye: No discharge.      Conjunctiva/sclera: Conjunctivae normal.   Cardiovascular:      Rate and Rhythm: Normal rate and regular rhythm.      Pulses: Normal pulses.      Heart sounds: Normal heart sounds. No murmur heard.   No friction rub. No gallop.    Pulmonary:      Effort: No respiratory distress.      Breath sounds: Examination of the right-lower field reveals decreased breath sounds. Examination of the left-lower field reveals decreased breath sounds. Decreased breath sounds present. No wheezing, rhonchi or rales.   Abdominal:      General: Bowel sounds are normal. There is no distension.      Palpations: Abdomen is soft.      Tenderness: There is no abdominal tenderness.   Musculoskeletal:         General: No swelling. Normal range of motion.      Cervical back: Normal range of motion and neck supple.   Skin:     General: Skin is warm and dry.   Neurological:      General: No focal deficit present.      Mental Status: She is alert and oriented to person, place, and time.   Psychiatric:         Mood and Affect: Mood normal.         Behavior: Behavior normal.             Results Review:  I have reviewed the labs, radiology results, and diagnostic studies.    Laboratory Data:   Results from last 7 days   Lab Units 09/09/21  0706 09/08/21  0609 09/07/21  1654 09/07/21  0917 09/07/21  0917   SODIUM mmol/L 132* 136 134*   < > 136   POTASSIUM mmol/L 3.9 4.3 4.0   < > 4.0   CHLORIDE mmol/L 93* 97* 98   < > 96*   CO2 mmol/L 30.0* 31.0* 23.0   < > 28.0   BUN mg/dL 16 12 10   < > 10   CREATININE mg/dL 0.46* 0.49* 0.72   < > 0.58   GLUCOSE mg/dL 152* 173* 317*   < > 148*   CALCIUM mg/dL 9.0 9.0 8.4*   < > 9.4   BILIRUBIN mg/dL  --   --   --   --  0.8   ALK PHOS U/L  --   --   --   --  93   ALT (SGPT) U/L  --   --   --   --  22   AST (SGOT) U/L  --    --   --   --  19   ANION GAP mmol/L 9.0 8.0 13.0   < > 12.0    < > = values in this interval not displayed.     Estimated Creatinine Clearance: 114.5 mL/min (A) (by C-G formula based on SCr of 0.46 mg/dL (L)).          Results from last 7 days   Lab Units 09/09/21  0706 09/08/21  0609 09/07/21  1654 09/07/21  0917   WBC 10*3/mm3 9.88 11.47* 12.59* 10.27   HEMOGLOBIN g/dL 11.6* 12.0 12.3 14.4   HEMATOCRIT % 34.8 36.6 38.1 45.0   PLATELETS 10*3/mm3 199 180 189 226           Culture Data:   Blood Culture   Date Value Ref Range Status   09/07/2021 No growth at 24 hours  Preliminary   09/07/2021 Haemophilus influenzae (C)  Final     Comment:     Beta Lactamase Positive    Beta lactamase positive confers resistance to ampcillin, amoxicillin, penicillin, ticarcillin, and piperacillin but NOT amoxicillin-clavulanate, ampicillin-sulbactam, or piperacillin-tazobactam.       No results found for: URINECX  Respiratory Culture   Date Value Ref Range Status   09/09/2021 Rejected  Final     No results found for: WOUNDCX  No results found for: STOOLCX  No components found for: BODYFLD    Radiology Data:   Imaging Results (Last 24 Hours)     ** No results found for the last 24 hours. **          I have reviewed the patient's current medications.     Assessment/Plan     Active Hospital Problems    Diagnosis    • Pneumonia of both lungs due to infectious organism    • Stage 4 very severe COPD by GOLD classification (CMS/[facility])    • Paroxysmal atrial fibrillation (CMS/[facility])    • CAD (coronary artery disease)    • Essential hypertension        Plan:    1. Acute on chronic hypoxic respiratory failure related to pneumonia and COPD exacerbation: continue o2 support (currently on 5 liters and baseline is 4 liters), continue Symbicort, Duonebs, Solumedrol. COVID and flu tests negative.   2. Haemophilus bacteremia: continue Rocephin, Azithromycin.  Repeat blood culture is ordered.   3. CAD: continue ASA, statin, Coreg, Losartan, Effient.   4.  HTN: continue Coreg, Losartan.   5. Type 2 DM: FSBS AC and HS with SSI, Levemir QHS.   6. Deconditioning: PT/OT     Discharge planning: possible discharge in 24-48 hours.     I confirmed that the patient's Advance Care Plan is present, code status is documented, or surrogate decision maker is listed in the patient's medical record.            This document has been electronically signed by HEATHER Gordon on September 9, 2021 12:57 CDT

## 2021-09-09 NOTE — SIGNIFICANT NOTE
Pt c/o nausea and defers OT this afternoon.   09/09/21 1440   OTHER   Discipline occupational therapy assistant   Rehab Time/Intention   Session Not Performed patient/family declined treatment

## 2021-09-09 NOTE — PLAN OF CARE
Goal Outcome Evaluation:  Plan of Care Reviewed With: patient        Progress: improving  Outcome Summary: Pt did not feel well this morning, but gradually felt better as day progressed.  No c/o pain or discomfort.  Will continue to monitor.

## 2021-09-09 NOTE — CONSULTS
"Adult Nutrition  Assessment    Patient Name:  Monisha Brasher  YOB: 1955  MRN: [id number]  Admit Date:  9/7/2021    Assessment Date:  9/9/2021    Comments:  RD consult r/t pt noted w/wounds---these appear to be lower extremity wounds noted as scabs/blisters r/t PVD. Pt indicates she usually eats well, but notes her appetite is low at this time. No gi distress. Pt will take milk w/meals, no commercial supplement. Weight is stable. RD will monitor course.     Reason for Assessment     Row Name 09/09/21 1058          Reason for Assessment    Reason For Assessment  identified at risk by screening criteria     Diagnosis  pulmonary disease     Identified At Risk by Screening Criteria  -- wounds noted         Nutrition/Diet History     Row Name 09/09/21 1058          Nutrition/Diet History    Typical Food/Fluid Intake  Pt says her appetite is \"so so \". Denies gi distress. She does not like supplements but will drink milk.         Anthropometrics     Row Name 09/09/21 0430          Anthropometrics    Weight  71.7 kg (158 lb 0.6 oz)         Labs/Tests/Procedures/Meds     Row Name 09/09/21 1059          Labs/Procedures/Meds    Lab Results Reviewed  reviewed, pertinent     Lab Results Comments  Na 132, Gl 163        Medications    Pertinent Medications Reviewed  reviewed         Physical Findings     Row Name 09/09/21 1100          Physical Findings    Skin  non-healing wound(s)         Estimated/Assessed Needs     Row Name 09/09/21 1100          Calculation Measurements    Weight Used For Calculations  49.9 kg (110 lb)        Estimated/Assessed Needs    Additional Documentation  KCAL/KG (Group);Protein Requirements (Group);Fluid Requirements (Group)        KCAL/KG    KCAL/KG  25 Kcal/Kg (kcal)     25 Kcal/Kg (kcal)  1247.4        Protein Requirements    Weight Used For Protein Calculations  49.9 kg (110 lb)     Est Protein Requirement Amount (gms/kg)  1.0 gm protein     Estimated Protein Requirements " (gms/day)  49.9        Fluid Requirements    Fluid Requirements (mL/day)  1500     RDA Method (mL)  1500         Nutrition Prescription Ordered     Row Name 09/09/21 1101          Nutrition Prescription PO    Current PO Diet  Regular     Fluid Consistency  Thin     Common Modifiers  Cardiac;Consistent Carbohydrate         Evaluation of Received Nutrient/Fluid Intake     Row Name 09/09/21 1101          PO Evaluation    Number of Days PO Intake Evaluated  Insufficient Data     Number of Meals  1     % PO Intake  25               Electronically signed by:  Jennifer Porter RD  09/09/21 11:02 CDT

## 2021-09-10 ENCOUNTER — READMISSION MANAGEMENT (OUTPATIENT)
Dept: CALL CENTER | Facility: HOSPITAL | Age: 66
End: 2021-09-10

## 2021-09-10 ENCOUNTER — HOME HEALTH ADMISSION (OUTPATIENT)
Dept: HOME HEALTH SERVICES | Facility: HOME HEALTHCARE | Age: 66
End: 2021-09-10

## 2021-09-10 VITALS
HEIGHT: 62 IN | DIASTOLIC BLOOD PRESSURE: 89 MMHG | RESPIRATION RATE: 18 BRPM | HEART RATE: 80 BPM | TEMPERATURE: 97.2 F | BODY MASS INDEX: 28.89 KG/M2 | SYSTOLIC BLOOD PRESSURE: 178 MMHG | WEIGHT: 157 LBS | OXYGEN SATURATION: 96 %

## 2021-09-10 LAB
GLUCOSE BLDC GLUCOMTR-MCNC: 174 MG/DL (ref 70–130)
GLUCOSE BLDC GLUCOMTR-MCNC: 255 MG/DL (ref 70–130)

## 2021-09-10 PROCEDURE — 63710000001 ONDANSETRON PER 8 MG: Performed by: INTERNAL MEDICINE

## 2021-09-10 PROCEDURE — 25010000002 METHYLPREDNISOLONE PER 125 MG: Performed by: INTERNAL MEDICINE

## 2021-09-10 PROCEDURE — 94760 N-INVAS EAR/PLS OXIMETRY 1: CPT

## 2021-09-10 PROCEDURE — 94799 UNLISTED PULMONARY SVC/PX: CPT

## 2021-09-10 PROCEDURE — 97530 THERAPEUTIC ACTIVITIES: CPT

## 2021-09-10 PROCEDURE — 63710000001 INSULIN ASPART PER 5 UNITS: Performed by: INTERNAL MEDICINE

## 2021-09-10 PROCEDURE — 25010000002 FUROSEMIDE PER 20 MG: Performed by: INTERNAL MEDICINE

## 2021-09-10 PROCEDURE — 25010000002 CEFTRIAXONE PER 250 MG: Performed by: INTERNAL MEDICINE

## 2021-09-10 PROCEDURE — 82962 GLUCOSE BLOOD TEST: CPT

## 2021-09-10 RX ORDER — IPRATROPIUM BROMIDE AND ALBUTEROL SULFATE 2.5; .5 MG/3ML; MG/3ML
3 SOLUTION RESPIRATORY (INHALATION)
Qty: 360 ML | Refills: 5 | Status: SHIPPED | OUTPATIENT
Start: 2021-09-10 | End: 2022-05-11

## 2021-09-10 RX ORDER — PREDNISONE 10 MG/1
40 TABLET ORAL DAILY
Qty: 20 TABLET | Refills: 0 | Status: SHIPPED | OUTPATIENT
Start: 2021-09-10 | End: 2021-09-15

## 2021-09-10 RX ORDER — CEFDINIR 300 MG/1
300 CAPSULE ORAL 2 TIMES DAILY
Qty: 8 CAPSULE | Refills: 0 | Status: SHIPPED | OUTPATIENT
Start: 2021-09-10 | End: 2021-09-14

## 2021-09-10 RX ORDER — CARVEDILOL 6.25 MG/1
6.25 TABLET ORAL NIGHTLY
Qty: 30 TABLET | Refills: 0 | Status: SHIPPED | OUTPATIENT
Start: 2021-09-10 | End: 2022-06-23

## 2021-09-10 RX ADMIN — LOSARTAN POTASSIUM 25 MG: 25 TABLET, FILM COATED ORAL at 09:09

## 2021-09-10 RX ADMIN — BUDESONIDE AND FORMOTEROL FUMARATE DIHYDRATE 2 PUFF: 160; 4.5 AEROSOL RESPIRATORY (INHALATION) at 07:22

## 2021-09-10 RX ADMIN — Medication 100 MG: at 09:13

## 2021-09-10 RX ADMIN — ONDANSETRON HYDROCHLORIDE 4 MG: 4 TABLET, FILM COATED ORAL at 10:03

## 2021-09-10 RX ADMIN — METHYLPREDNISOLONE SODIUM SUCCINATE 60 MG: 125 INJECTION, POWDER, FOR SOLUTION INTRAMUSCULAR; INTRAVENOUS at 07:47

## 2021-09-10 RX ADMIN — INSULIN ASPART 4 UNITS: 100 INJECTION, SOLUTION INTRAVENOUS; SUBCUTANEOUS at 11:44

## 2021-09-10 RX ADMIN — IPRATROPIUM BROMIDE AND ALBUTEROL SULFATE 3 ML: 2.5; .5 SOLUTION RESPIRATORY (INHALATION) at 10:34

## 2021-09-10 RX ADMIN — PAROXETINE HYDROCHLORIDE 20 MG: 20 TABLET, FILM COATED ORAL at 06:38

## 2021-09-10 RX ADMIN — PRASUGREL 10 MG: 10 TABLET, FILM COATED ORAL at 09:09

## 2021-09-10 RX ADMIN — IPRATROPIUM BROMIDE AND ALBUTEROL SULFATE 3 ML: 2.5; .5 SOLUTION RESPIRATORY (INHALATION) at 07:21

## 2021-09-10 RX ADMIN — ALBUTEROL SULFATE 2.5 MG: 2.5 SOLUTION RESPIRATORY (INHALATION) at 03:58

## 2021-09-10 RX ADMIN — CEFTRIAXONE SODIUM 2 G: 2 INJECTION, POWDER, FOR SOLUTION INTRAMUSCULAR; INTRAVENOUS at 09:09

## 2021-09-10 RX ADMIN — ASPIRIN 81 MG: 81 TABLET, CHEWABLE ORAL at 09:09

## 2021-09-10 RX ADMIN — METHYLPREDNISOLONE SODIUM SUCCINATE 60 MG: 125 INJECTION, POWDER, FOR SOLUTION INTRAMUSCULAR; INTRAVENOUS at 00:41

## 2021-09-10 RX ADMIN — ATORVASTATIN CALCIUM 40 MG: 40 TABLET, FILM COATED ORAL at 09:10

## 2021-09-10 RX ADMIN — SODIUM CHLORIDE, PRESERVATIVE FREE 10 ML: 5 INJECTION INTRAVENOUS at 09:13

## 2021-09-10 RX ADMIN — MONTELUKAST 10 MG: 10 TABLET, FILM COATED ORAL at 09:09

## 2021-09-10 RX ADMIN — LABETALOL HYDROCHLORIDE 20 MG: 5 INJECTION, SOLUTION INTRAVENOUS at 04:22

## 2021-09-10 RX ADMIN — FUROSEMIDE 20 MG: 20 INJECTION, SOLUTION INTRAMUSCULAR; INTRAVENOUS at 09:09

## 2021-09-10 NOTE — DISCHARGE SUMMARY
[facility] Medicine Services  DISCHARGE SUMMARY       Date of Admission: 9/7/2021  Date of Discharge:  9/10/2021  Primary Care Physician: Tiffanie Mccauley APRN    Presenting Problem/History of Present Illness:  Sinus tachycardia [R00.0]  Acute respiratory failure with hypoxia (CMS/[facility]) [J96.01]  Pneumonia of both lungs due to infectious organism, unspecified part of lung [J18.9]       Final Discharge Diagnoses:  Active Hospital Problems    Diagnosis    • Pneumonia of both lungs due to infectious organism    • Stage 4 very severe COPD by GOLD classification (CMS/[facility])    • Paroxysmal atrial fibrillation (CMS/[facility])    • CAD (coronary artery disease)    • Essential hypertension        Consults:   Consults     No orders found from 8/9/2021 to 9/8/2021.          Procedures Performed:                 Pertinent Test Results:   Lab Results (last 24 hours)     Procedure Component Value Units Date/Time    POC Glucose Once [[id number]]  (Abnormal) Collected: 09/10/21 1019    Specimen: Blood Updated: 09/10/21 1045     Glucose 255 mg/dL      Comment: RN NotifiedOperator: [id number] SETH MADISONMeter ID: [id number]       Blood Culture - Blood, Hand, Right [[id number]] Collected: 09/09/21 1017    Specimen: Blood from Hand, Right Updated: 09/10/21 1030     Blood Culture No growth at 24 hours    Blood Culture - Blood, Arm, Left [[id number]] Collected: 09/09/21 1017    Specimen: Blood from Arm, Left Updated: 09/10/21 1030     Blood Culture No growth at 24 hours    POC Glucose Once [[id number]]  (Abnormal) Collected: 09/10/21 0607    Specimen: Blood Updated: 09/10/21 0627     Glucose 174 mg/dL      Comment: RN NotifiedOperator: [id number] BRIAN FELIXIEMeter ID: [id number]       POC Glucose Once [[id number]]  (Abnormal) Collected: 09/09/21 2055    Specimen: Blood Updated: 09/09/21 2108     Glucose 187 mg/dL      Comment: RN NotifiedOperator: [id number] RIDDHI VERDUZCOMeter ID: [id number]            Imaging Results (All)     Procedure Component Value Units Date/Time    CT Angiogram Chest [[id number]] Collected: 09/07/21 1443     Updated: 09/07/21 1455    Narrative:        PROCEDURE: CT -CTA Thorax/PE with contrast     REASON FOR EXAM: pe, J18.9 Pneumonia, unspecified organism J96.01  Acute respiratory failure with hypoxia R00.0 Tachycardia,  unspecified    FINDINGS: Comparison study dated  November 20, 2018. Axial  computer tomography sequential imaging was performed from the  thoracic inlet through the diaphragms after administration of IV  contrast .3-D chest sagittal and coronal reformates was  performed. This exam was performed according to our departmental  dose optimization program, which includes automated exposure  control, adjustment of the mA and/or KV according to patient size  and/or use of iterative reconstruction technique.     The pulmonary arteries are normal. There is no filling defect  identified to suggest pulmonary embolus. Streak artifact from the  contrast within the SVC. Mediastinal structures of the chest are  otherwise unremarkable. There is no lymphadenopathy or  pericardial effusion. Mild to moderate centrilobular  emphysematous changes. Right upper lobe posterior segment patchy  opacity with air bronchograms. Right upper lobe anterior segment  perihilar patchy opacity with air bronchograms. Left upper lobe  anterior segment patchy opacity with air bronchograms. Left upper  lobe anterior segment inferior small patchy opacities with air  bronchograms. Right lower lobe superior segment peripheral small  patchy opacities. Right middle lobe small somewhat linear  interstitial opacity. Bibasilar posterior basilar small  peripheral patchy opacities. Lungs are otherwise clear. Pleural  spaces are normal. Visualized upper abdominal structures are  unremarkable. No acute osseous abnormality..      Impression:      1. No evidence of pulmonary embolus.  2. Mild to moderate centrilobular  emphysematous changes.  3. Right upper lobe posterior segment patchy opacity with air  bronchograms. Right upper lobe anterior segment perihilar patchy  opacity with air bronchograms. Left upper lobe anterior segment  patchy opacity with air bronchograms. Left upper lobe anterior  segment inferior small patchy opacities with air bronchograms.  Right lower lobe superior segment peripheral small patchy  opacities. Right middle lobe small somewhat linear interstitial  opacity. Bibasilar posterior basilar small peripheral patchy  opacities. The differential for these opacities would include  multifocal pneumonia including possible atypical viral etiology  and/or pulmonary edema.   4. Remainder CT chest exam is unremarkable..    Electronically signed by:  Cale Burger MD  9/7/2021 2:54 PM CDT  Workstation: [id number]    XR Chest 1 View [[id number]] Collected: 09/07/21 0856     Updated: 09/07/21 0915    Narrative:      EXAMINATION:  XR CHEST 1 VW    CLINICAL HISTORY:  66 years Female,CHF/COPD Protocol    COMPARISON:  Chest x-ray dated 9/18/2020    FINDINGS:  Interstitial prominence in the mid and lower lungs  with bilateral airspace disease, most focal in the periphery of  the right greater than left midlung. No pleural effusion or  pneumothorax. Normal heart size.      Impression:      Interstitial and airspace disease bilaterally likely  reflects at least in part infection, though edema could also be  contributing to this appearance.    Electronically signed by:  Kamaljit Waite MD  9/7/2021 9:14 AM CDT  Workstation: [id number]            Chief Complaint on Day of Discharge: none    Hospital Course:  66 year old  female with past medical history of chronic hypoxic respiratory failure with home oxygen dependence of 4 liters, CAD s/p PCI, tobacco dependence, HTN, DM who presented on 9-7-21 with dyspnea.  She was admitted for acute hypoxic respiratory failure related to COPD exacerbation and pneumonia.  She also had  "blood culture result as positive for haemophilus influenza.  Patient received Rocephin and Azithromycin while hospitalized and continues Omnicef at discharge for completion of treatment.  Repeat blood cultures are negative at discharge.  Patient has been weaned to her home oxygen setting of 4 liters and denies complaints on day of discharge.  She will discharge home in stable condition with instructions for one week PCP follow up.  Home health services have been arranged at discharge.         Condition on Discharge:  Stable     Physical Exam on Discharge:  /89 (BP Location: Right leg, Patient Position: Sitting)   Pulse 80   Temp 97.2 °F (36.2 °C) (Oral)   Resp 18   Ht 157.5 cm (62\")   Wt 71.2 kg (157 lb)   SpO2 96%   BMI 28.72 kg/m²   Physical Exam  Vitals and nursing note reviewed.   Constitutional:       General: She is not in acute distress.     Appearance: Normal appearance. She is ill-appearing.      Comments: Chronically ill appearing    HENT:      Head: Normocephalic and atraumatic.      Right Ear: External ear normal.      Left Ear: External ear normal.      Nose: Nose normal.      Mouth/Throat:      Mouth: Mucous membranes are moist.      Pharynx: Oropharynx is clear.   Eyes:      General: No scleral icterus.        Right eye: No discharge.         Left eye: No discharge.      Conjunctiva/sclera: Conjunctivae normal.   Cardiovascular:      Rate and Rhythm: Normal rate and regular rhythm.      Pulses: Normal pulses.      Heart sounds: Normal heart sounds. No murmur heard.   No friction rub. No gallop.    Pulmonary:      Effort: Pulmonary effort is normal. No respiratory distress.      Breath sounds: Normal breath sounds. No stridor. No wheezing, rhonchi or rales.   Abdominal:      General: Bowel sounds are normal. There is no distension.      Palpations: Abdomen is soft.      Tenderness: There is no abdominal tenderness.   Musculoskeletal:         General: No swelling. Normal range of motion.    "   Cervical back: Normal range of motion and neck supple.   Skin:     General: Skin is warm and dry.      Findings: Bruising present.   Neurological:      General: No focal deficit present.      Mental Status: She is alert and oriented to person, place, and time.   Psychiatric:         Mood and Affect: Mood normal.         Behavior: Behavior normal.           Discharge Disposition:  Home-Health Care [facility]    Discharge Medications:     Discharge Medications      New Medications      Instructions Start Date   cefdinir 300 MG capsule  Commonly known as: OMNICEF  Notes to patient: 09/10/2021   300 mg, Oral, 2 Times Daily      predniSONE 10 MG tablet  Commonly known as: DELTASONE  Notes to patient: 09/11/2021   40 mg, Oral, Daily         Changes to Medications      Instructions Start Date   carvedilol 6.25 MG tablet  Commonly known as: COREG  What changed:   · medication strength  · how much to take  Notes to patient: 09/10/2021   6.25 mg, Oral, Nightly         Continue These Medications      Instructions Start Date   albuterol sulfate  (90 Base) MCG/ACT inhaler  Commonly known as: PROVENTIL HFA;VENTOLIN HFA;PROAIR HFA  Notes to patient: As needed   INHALE 2 PUFFS EVERY 4 HOURS AS NEEDED      ALPRAZolam 0.25 MG tablet  Commonly known as: Xanax  Notes to patient: 09/10/2021   0.25 mg, Oral, 3 Times Daily PRN      amiodarone 100 MG tablet  Commonly known as: PACERONE  Notes to patient: 09/10/2021   100 mg, Oral, 2 Times Daily      aspirin 81 MG chewable tablet  Notes to patient: 09/10/2021   81 mg, Oral, Daily      atorvastatin 40 MG tablet  Commonly known as: LIPITOR  Notes to patient: 09/11/2021   Oral, Daily      budesonide-formoterol 160-4.5 MCG/ACT inhaler  Commonly known as: SYMBICORT  Notes to patient: 09/10/2021   INHALE 2 PUFFS BY MOUTH TWICE DAILY      buPROPion 75 MG tablet  Commonly known as: Wellbutrin  Notes to patient: 09/11/2021   75 mg, Oral, Daily      cyclobenzaprine 10 MG tablet  Commonly known as:  FLEXERIL  Notes to patient: As needed   TAKE 1 TABLET BY MOUTH THREE TIMES DAILY AS NEEDED FOR MUSCLE SPASMS      diclofenac 50 MG EC tablet  Commonly known as: VOLTAREN  Notes to patient: As needed   Bid prn      freestyle lancets   USE AS DIRECTED TO TEST BLOOD SUGAR ONE DAILY      furosemide 20 MG tablet  Commonly known as: Lasix  Notes to patient: 09/10/2021   20 mg, Oral, 2 Times Daily      glucose blood test strip  Commonly known as: FREESTYLE LITE   USE AS DIRECTED TO TEST BLOOD SUGAR ONCE DAILY      glucose monitor monitoring kit   1 each, Does not apply, Daily, Testing blood sugar once a day  ICD10 - R73.9      hydrOXYzine 10 MG tablet  Commonly known as: ATARAX  Notes to patient: As needed   10 mg, Oral, Every 8 Hours PRN      ipratropium-albuterol 0.5-2.5 mg/3 ml nebulizer  Commonly known as: DUO-NEB  Notes to patient: 09/10/201   3 mL, Nebulization, 4 Times Daily - RT      losartan 25 MG tablet  Commonly known as: COZAAR  Notes to patient: 09/11/2021   25 mg, Oral, Daily      metFORMIN 500 MG tablet  Commonly known as: GLUCOPHAGE  Notes to patient: 09/11/2021   500 mg, Oral, Daily With Breakfast      montelukast 10 MG tablet  Commonly known as: SINGULAIR  Notes to patient: 10/11/2021   TAKE 1 TABLET BY MOUTH DAILY      mupirocin 2 % ointment  Commonly known as: BACTROBAN  Notes to patient: 09/10/2021   Topical, 2 Times Daily      nitroglycerin 0.4 MG SL tablet  Commonly known as: NITROSTAT  Notes to patient: As needed   Take 1 tablet under tongue every 5 minutes for a maximum of three doses per episode of chest pain      nystatin 628103 UNIT/ML suspension  Commonly known as: MYCOSTATIN  Notes to patient: As needed   500,000 Units, Oral, 4 Times Daily, As needed for mouth sores.      ondansetron 4 MG tablet  Commonly known as: ZOFRAN  Notes to patient: As needed   TAKE 1 TABLET BY MOUTH EVERY 8 HOURS AS NEEDED FOR NAUSEA OR VOMITING      PARoxetine 20 MG tablet  Commonly known as: PAXIL  Notes to patient:  09/11/2021   20 mg, Oral, Every Morning      prasugrel 10 MG tablet  Commonly known as: EFFIENT  Notes to patient: 09/11/2021   10 mg, Oral, Daily         Stop These Medications    methylPREDNISolone 8 MG tablet  Commonly known as: MEDROL            Discharge Diet:   Diet Instructions     Diet: Consistent Carbohydrate, Cardiac; Thin      Discharge Diet:  Consistent Carbohydrate  Cardiac       Fluid Consistency: Thin          Activity at Discharge:   Activity Instructions     Activity as Tolerated            Discharge Care Plan/Instructions: Follow up with PCP within one week.     Follow-up Appointments:   Additional Instructions for the Follow-ups that You Need to Schedule     Ambulatory Referral to Home Health   As directed      Face to Face Visit Date: 9/9/2021    Follow-up provider for Plan of Care?: I treated the patient in an acute care facility and will not continue treatment after discharge.    Follow-up provider: TIFFANIE KELLER [[id number]]    Reason/Clinical Findings: deconditioning r/t respiratory failure, copd    Describe mobility limitations that make leaving home difficult: deconditioning r/t respiratory failure, copd    Nursing/Therapeutic Services Requested: Skilled Nursing Physical Therapy Occupational Therapy    Skilled nursing orders: COPD management    PT orders: Strengthening    Occupational orders: Strengthening    Frequency: 1 Week 1         Discharge Follow-up with PCP   As directed       Currently Documented PCP:    Tiffanie Keller APRN    PCP Phone Number:    [phone]     Follow Up Details: one week            Contact information for follow-up providers     Tiffanie Keller APRN .    Specialty: Family Medicine  Why: one week  Contact information:  [address]  [address] [place] [id number]  [phone]                   Contact information for after-discharge care     Home Medical Care     [facility] .    Service: Home Health  Services  Contact information:  [address] [facility] [address] [phone]                             Test Results Pending at Discharge:   Pending Labs     Order Current Status    Blood Culture - Blood, Arm, Left Preliminary result    Blood Culture - Blood, Arm, Left Preliminary result    Blood Culture - Blood, Hand, Right Preliminary result                This document has been electronically signed by HEATHER Gordon on September 10, 2021 14:51 CDT        Time: 30 minutes spent on assessment, discussion, management and discharge planning for this patient.

## 2021-09-10 NOTE — PLAN OF CARE
Goal Outcome Evaluation:  Plan of Care Reviewed With: patient        Progress: improving  Outcome Summary: pt not feeling well but does agree to recliner but want t o go to bathroom . sba /ind to eob and stands with ind. gt into bathroom ind and ind with pericare. gt around bed to recliner and pt is fatigued and wants to rest. needs in reach.

## 2021-09-10 NOTE — THERAPY TREATMENT NOTE
Acute Care - Physical Therapy Treatment Note  [facility]     Patient Name: Monisha Brasher  : 1955  MRN: [id number]  Today's Date: 9/10/2021   Onset of Illness/Injury or Date of Surgery: 21  Visit Dx:     ICD-10-CM ICD-9-CM   1. Pneumonia of both lungs due to infectious organism, unspecified part of lung  J18.9 483.8   2. Acute respiratory failure with hypoxia (CMS/HCC)  J96.01 518.81   3. Sinus tachycardia  R00.0 427.89   4. Impaired functional mobility, balance, gait, and endurance  Z74.09 V49.89   5. Impaired mobility and activities of daily living  Z74.09 V49.89    Z78.9      Patient Active Problem List   Diagnosis   • COPD bronchitis   • Cellulitis of left leg   • Asthma   • SOB (shortness of breath)   • Chronic bronchitis with acute exacerbation (CMS/[facility])   • Nicotine abuse   • Essential hypertension   • Screening for osteoporosis   • Panlobular emphysema (CMS/[facility])   • Anxiety   • General medical exam   • Malaise   • Hyperglycemia   • Acute ST elevation myocardial infarction (STEMI) (CMS/[facility])   • CAD (coronary artery disease)   • Acute respiratory failure with hypoxia (CMS/[facility])   • Depression   • Chronic alcohol abuse   • Pneumonia of both upper lobes   • Paroxysmal atrial fibrillation (CMS/[facility])   • Snoring   • Rash   • Chest pain   • Atherosclerosis of native coronary artery of native heart with unstable angina pectoris (CMS/[facility])   • Encounter for screening mammogram for malignant neoplasm of breast   • Encounter for screening colonoscopy   • Nocturnal hypoxia   • NARCISO (obstructive sleep apnea)   • Prediabetes   • Constipation   • Skin tear of left elbow without complication   • Dog scratch   • Axillary lump, right   • Cough   • Postmenopausal   • Need for vaccination   • Osteoporosis with current pathological fracture   • Stress fracture of left foot   • Long term (current) use of inhaled steroids   • Long term (current) use of systemic steroids   • Adjustment disorder with mixed  emotional features   • Acute bilateral low back pain with right-sided sciatica   • Right-sided low back pain with right-sided sciatica   • Wound of left leg   • Personal history of tobacco use, presenting hazards to health   • Chronic hypoxemic respiratory failure (CMS/[facility])   • Stage 4 very severe COPD by GOLD classification (CMS/[facility])   • Pneumonia of both lungs due to infectious organism     Past Medical History:   Diagnosis Date   • Acute bronchitis    • Acute upper respiratory infection    • Adjustment disorder with mixed emotional features    • Agoraphobia with panic attacks    • Allergic rhinitis due to pollen    • Anxiety    • Asthma    • Asthma    • Backache    • Blood in urine    • Breast cyst    • Candidiasis of mouth    • Chronic bronchitis (CMS/[facility])    • Chronic obstructive lung disease (CMS/[facility])    • COPD (chronic obstructive pulmonary disease) (CMS/[facility])    • Diabetes (CMS/[facility])    • Diabetes (CMS/[facility])    • Emphysema (subcutaneous) (surgical) resulting from a procedure    • Emphysema lung (CMS/[facility])    • Essential hypertension    • Extrinsic asthma with status asthmaticus    • Fatigue    • H/O echocardiogram     EF 55-60%. LV systolic function normal. Impaired LV relaxation. [facility]   • Heart attack (CMS/[facility]) 11/2015   • Heart problem    • High blood pressure    • Hypoxemia    • Malaise and fatigue    • Non-IgE mediated allergic asthma    • Nonvenomous insect bite    • Pneumonia    • PONV (postoperative nausea and vomiting)    • Postmenopausal state    • Rheumatoid arthritis (CMS/[facility])    • Urinary tract infectious disease      Past Surgical History:   Procedure Laterality Date   • BREAST BIOPSY     • CARDIAC CATHETERIZATION N/A 11/15/2018    Procedure: Left Heart Cath;  Surgeon: Thaddeus Huber MD;  Location: [facility] INVASIVE LOCATION;  Service: Cardiology   • CARDIAC CATHETERIZATION N/A 1/21/2019    Procedure: Left Heart Cath 1/21/2019 @ 8:00 AM;  Surgeon: Thaddeus Huber MD;   Location: [facility] INVASIVE LOCATION;  Service: Cardiology   •  SECTION     • HYSTERECTOMY     • INJECTION OF MEDICATION  2015    celestone(1)   • INJECTION OF MEDICATION  2016    DEPO MEDROL(16)   • LEG SURGERY Bilateral         PT Assessment (last 12 hours)      PT Evaluation and Treatment     Row Name 09/10/21 09          Physical Therapy Time and Intention    Subjective Information  complains of;weakness  -RW     Document Type  evaluation  -RW     Mode of Treatment  physical therapy  -RW     Patient Effort  good  -RW     Row Name 09/10/21 0947          General Information    Patient Profile Reviewed  yes  -RW     Existing Precautions/Restrictions  oxygen therapy device and L/min;fall  -RW     Row Name 09/10/21 0947          Cognition    Orientation Status (Cognition)  oriented x 4  -RW     Row Name 09/10/21 0947          Pain Scale: Numbers Pre/Post-Treatment    Pretreatment Pain Rating  0/10 - no pain  -RW     Posttreatment Pain Rating  0/10 - no pain  -RW     Row Name 09/10/21 0947          Range of Motion Comprehensive    General Range of Motion  bilateral lower extremity ROM WFL  -RW     Row Name 09/10/21 0947          Strength Comprehensive (MMT)    General Manual Muscle Testing (MMT) Assessment  other (see comments)  -RW     Row Name 09/10/21 09          Bed Mobility    Bed Mobility  supine-sit;sit-supine  -RW     Supine-Sit [place] (Bed Mobility)  modified independence  -RW     Sit-Supine [place] (Bed Mobility)  modified independence  -RW     Assistive Device (Bed Mobility)  head of bed elevated;bed rails  -RW     Row Name 09/10/21 0947          Transfers    Sit-Stand [place] (Transfers)  independent  -RW     Stand-Sit [place] (Transfers)  independent  -RW     [place] Level (Toilet Transfer)  independent  -RW     Row Name 09/10/21 0947          Gait/Stairs (Locomotion)    [place] Level (Gait)  supervision  -RW     Distance in Feet (Gait)  16  -      Pattern (Gait)  step-through  -RW     Row Name 09/10/21 0947          Safety Issues, Functional Mobility    Impairments Affecting Function (Mobility)  strength;endurance/activity tolerance;balance  -RW     Row Name 09/10/21 0947          Motor Skills    Therapeutic Exercise  hip;knee;ankle  -RW     Row Name 09/10/21 0947          Ankle (Therapeutic Exercise)    Ankle (Therapeutic Exercise)  strengthening exercise  -RW     Ankle Strengthening (Therapeutic Exercise)  dorsiflexion;plantarflexion;10 repetitions;5 repetitions  -RW     Row Name             Wound 09/07/21 1833 Left distal leg Other (comment)    Wound - Properties Group Placement Date: 09/07/21  -RG Placement Time: 1833  -RG Present on Hospital Admission: Y  -RG Side: Left  -RG Orientation: distal  -RG Location: leg  -RG Primary Wound Type: Other  -RG, scab     Retired Wound - Properties Group Date first assessed: 09/07/21  -RG Time first assessed: 1833 -RG Present on Hospital Admission: Y  -RG Side: Left  -RG Location: leg  -RG Primary Wound Type: Other  -RG, scab     Row Name             Wound Left anterior ankle Other (comment)    Wound - Properties Group Present on Hospital Admission: Y  -RG Side: Left  -RG Orientation: anterior  -RG Location: ankle  -RG Primary Wound Type: Other  -RG, scab  Stage, Pressure Injury : other (see comments)  -RG    Retired Wound - Properties Group Present on Hospital Admission: Y  -RG Side: Left  -RG Location: ankle  -RG Primary Wound Type: Other  -RG, scab     Row Name             Wound 09/07/21 1831 Right distal leg Other (comment)    Wound - Properties Group Placement Date: 09/07/21  -RG Placement Time: 1831  -RG Present on Hospital Admission: Y  -RG Side: Right  -RG Orientation: distal  -RG Location: leg  -RG Primary Wound Type: Other  -RG, scab     Retired Wound - Properties Group Date first assessed: 09/07/21  -RG Time first assessed: 1831  -RG Present on Hospital Admission: Y  -RG Side: Right  -RG Location: leg   -RG Primary Wound Type: Other  -RG, scab     Row Name             Wound 09/07/21 1832 Right anterior knee Other (comment)    Wound - Properties Group Placement Date: 09/07/21  -RG Placement Time: 1832  -RG Present on Hospital Admission: Y  -RG Side: Right  -RG Orientation: anterior  -RG Location: knee  -RG Primary Wound Type: Other  -RG, scab     Retired Wound - Properties Group Date first assessed: 09/07/21  -RG Time first assessed: 1832  -RG Present on Hospital Admission: Y  -RG Side: Right  -RG Location: knee  -RG Primary Wound Type: Other  -RG, scab     Row Name             Wound 09/07/21 1833 Right anterior fourth toe Blisters    Wound - Properties Group Placement Date: 09/07/21  -RG Placement Time: 1833  -RG Present on Hospital Admission: Y  -RG Side: Right  -RG Orientation: anterior  -RG Location: fourth toe  -RG Primary Wound Type: Blisters  -RG, Blood blister     Retired Wound - Properties Group Date first assessed: 09/07/21  -RG Time first assessed: 1833  -RG Present on Hospital Admission: Y  -RG Side: Right  -RG Location: fourth toe  -RG Primary Wound Type: Blisters  -RG, Blood blister     Row Name 09/10/21 0947          Vital Signs    Pre Systolic BP Rehab  165  -RW     Pre Treatment Diastolic BP  80  -RW     Pretreatment Heart Rate (beats/min)  100  -RW     Pre SpO2 (%)  90  -RW     O2 Delivery Pre Treatment  supplemental O2  -RW     Post SpO2 (%)  90  -RW     O2 Delivery Post Treatment  supplemental O2  -RW     Row Name 09/10/21 0947          Bed Mobility Goal 1 (PT)    Activity/Assistive Device (Bed Mobility Goal 1, PT)  sit to supine/supine to sit;bed rails  -RW     [place] Level/Cues Needed (Bed Mobility Goal 1, PT)  independent  -RW     Time Frame (Bed Mobility Goal 1, PT)  by discharge  -RW     Strategies/Barriers (Bed Mobility Goal 1, PT)  HOB flat, no bed rails.  -RW     Progress/Outcomes (Bed Mobility Goal 1, PT)  goal not met  -RW     Row Name 09/10/21 0947          Transfer Goal 1 (PT)     Activity/Assistive Device (Transfer Goal 1, PT)  sit-to-stand/stand-to-sit;bed-to-chair/chair-to-bed  -RW     Time Frame (Transfer Goal 1, PT)  by discharge  -RW     Strategies/Barriers (Transfers Goal 1, PT)  Maintain SpO2 > 90%  -RW     Progress/Outcome (Transfer Goal 1, PT)  goal partially met  -RW     Row Name 09/10/21 0947          Gait Training Goal 1 (PT)    Activity/Assistive Device (Gait Training Goal 1, PT)  gait (walking locomotion)  -RW     [place] Level (Gait Training Goal 1, PT)  independent  -RW     Distance (Gait Training Goal 1, PT)  50'x2.  -RW     Strategies/Barriers (Gait Training Goal 1, PT)  Maintain SpO2 > 90%  -RW     Progress/Outcome (Gait Training Goal 1, PT)  goal not met  -RW     Row Name 09/10/21 0947          Stairs Goal 1 (PT)    Activity/Assistive Device (Stairs Goal 1, PT)  ascending stairs;descending stairs  -RW     [place] Level/Cues Needed (Stairs Goal 1, PT)  contact guard assist  -RW     Number of Stairs (Stairs Goal 1, PT)  4 steps, no rails.  -RW     Time Frame (Stairs Goal 1, PT)  by discharge  -RW     Strategies/Barriers (Stairs Goal 1, PT)  Maintain SpO2 > 90%  -RW     Progress/Outcome (Stairs Goal 1, PT)  goal not met  -RW     Row Name 09/10/21 0947          Positioning and Restraints    Pre-Treatment Position  in bed  -RW     Post Treatment Position  chair  -RW     In Chair  exit alarm on;encouraged to call for assist;call light within reach;reclined  -RW     Row Name 09/10/21 0947          Therapy Assessment/Plan (PT)    Rehab Potential (PT)  good, to achieve stated therapy goals  -RW     Criteria for Skilled Interventions Met (PT)  yes;skilled treatment is necessary  -RW       User Key  (r) = Recorded By, (t) = Taken By, (c) = Cosigned By    Initials Name Provider Type    Anam Angel, RN Registered Nurse    Ayaan Shen PTA Physical Therapy Assistant        Physical Therapy Education                 Title: PT OT SLP Therapies (In Progress)      Topic: Physical Therapy (In Progress)     Point: Mobility training (Not Started)     Learner Progress:  Not documented in this visit.          Point: Home exercise program (Not Started)     Learner Progress:  Not documented in this visit.          Point: Body mechanics (Not Started)     Learner Progress:  Not documented in this visit.          Point: Precautions (Done)     Learning Progress Summary           Patient Acceptance, E, VU,NR by DEREJE at 9/8/2021 3548    Comment: Tinetti assessed: 25/28 or low fall risk, FWW not necessary but decreased stepping response to perturbation.                               User Key     Initials Effective Dates Name Provider Type Discipline     06/16/21 -  Edson Gordillo, PT Physical Therapist PT              PT Recommendation and Plan  Anticipated Discharge Disposition (PT): home with home health  Therapy Frequency (PT): other (see comments) (5-7 days/week)  Plan of Care Reviewed With: patient  Progress: improving  Outcome Summary: pt not feeling well but does agree to recliner but want t o go to bathroom . sba /ind to eob and stands with ind. gt into bathroom ind and ind with pericare. gt around bed to recliner and pt is fatigued and wants to rest. needs in reach.  Outcome Measures     Row Name 09/08/21 1124             How much help from another is currently needed...    Putting on and taking off regular lower body clothing?  3  -RB      Bathing (including washing, rinsing, and drying)  3  -RB      Toileting (which includes using toilet bed pan or urinal)  3  -RB      Putting on and taking off regular upper body clothing  4  -RB      Taking care of personal grooming (such as brushing teeth)  4  -RB      Eating meals  4  -RB      AM-PAC 6 Clicks Score (OT)  21  -RB         Functional Assessment    Outcome Measure Options  AM-PAC 6 Clicks Daily Activity (OT)  -RB        User Key  (r) = Recorded By, (t) = Taken By, (c) = Cosigned By    Initials Name Provider Type    RB Royal  MOHINI Silva Occupational Therapist           Time Calculation:   PT Charges     Row Name 09/10/21 1111             Time Calculation    Start Time  0947  -RW      Stop Time  1011  -RW      Time Calculation (min)  24 min  -RW         Time Calculation- PT    Total Timed Code Minutes- PT  24 minute(s)  -RW         Timed Charges    47172 - PT Therapeutic Activity Minutes  24  -RW         Total Minutes    Timed Charges Total Minutes  24  -RW       Total Minutes  24  -RW        User Key  (r) = Recorded By, (t) = Taken By, (c) = Cosigned By    Initials Name Provider Type    Ayaan Shen PTA Physical Therapy Assistant        Therapy Charges for Today     Code Description Service Date Service Provider Modifiers Qty    17167407395 HC PT THERAPEUTIC ACT EA 15 MIN 9/10/2021 Ayaan Acuna PTA GP 2          PT G-Codes  Outcome Measure Options: AM-PAC 6 Clicks Basic Mobility (PT), Tinetti  AM-PAC 6 Clicks Score (PT): 20  AM-PAC 6 Clicks Score (OT): 21  Tinetti Total Score: 25    Ayaan Acuna PTA  9/10/2021

## 2021-09-10 NOTE — OUTREACH NOTE
Prep Survey      Responses   [facility] facility patient discharged from?  [facility]   Is LACE score < 7 ?  No   Emergency Room discharge w/ pulse ox?  No   Eligibility  [facility]   Date of Admission  09/07/21   Date of Discharge  09/10/21   Discharge Disposition  Home-Health Care Svc   Discharge diagnosis  Acute hypoxic resp failure d/t bilateral PNA & COPD exacerbation   Does the patient have one of the following disease processes/diagnoses(primary or secondary)?  COPD/Pneumonia   Does the patient have Home health ordered?  No   Is there a DME ordered?  No   Prep survey completed?  Yes          Carmela Huffman RN         Self

## 2021-09-10 NOTE — DISCHARGE PLACEMENT REQUEST
"Monisha Brasher (66 y.o. Female)     Date of Birth Social Security Number Address Home Phone MRN    1955  [address] [address] [phone] [id number]    [employer] Marital Status          [employer]        Admission Date Admission Type Admitting Provider Attending Provider Department, Room/Bed    9/7/21 Emergency Bobo Medina MD Echendu, Anthony W, MD [address], [address]/1    Discharge Date Discharge Disposition Discharge Destination                       Attending Provider: Bobo eMdina MD    Allergies: No Known Allergies    Isolation: None   Infection: None   Code Status: CPR    Ht: 157.5 cm (62\")   Wt: 71.2 kg (157 lb)    Admission Cmt: None   Principal Problem: None                Active Insurance as of 9/7/2021     Primary Coverage     Payor Plan Insurance Group Employer/Plan Group    MEDICARE MEDICARE A & B      Payor Plan Address Payor Plan Phone Number Payor Plan Fax Number Effective Dates    [address] [phone]  6/1/2021 - None Entered    [facility] [id number]       Subscriber Name Subscriber Birth Date Member ID       MONISHA BRASHER 1955 [id number]           Secondary Coverage     Payor Plan Insurance Group Employer/Plan Group    [facility]     Payor Plan Address Payor Plan Phone Number Payor Plan Fax Number Effective Dates    [address] [address]   1/1/2017 - None Entered    [facility] [address]       Subscriber Name Subscriber Birth Date Member ID       SURJIT BRASHER 5/11/1954 [id number]                 Emergency Contacts      (Rel.) Home Phone Work Phone Mobile Phone    Parish Brasher (Spouse) [phone] -- [phone]              "

## 2021-09-12 ENCOUNTER — HOME CARE VISIT (OUTPATIENT)
Dept: HOME HEALTH SERVICES | Facility: CLINIC | Age: 66
End: 2021-09-12

## 2021-09-12 VITALS
SYSTOLIC BLOOD PRESSURE: 148 MMHG | OXYGEN SATURATION: 95 % | TEMPERATURE: 98.2 F | WEIGHT: 165 LBS | HEART RATE: 82 BPM | DIASTOLIC BLOOD PRESSURE: 42 MMHG | RESPIRATION RATE: 22 BRPM | HEIGHT: 64 IN | BODY MASS INDEX: 28.17 KG/M2

## 2021-09-12 LAB — BACTERIA SPEC AEROBE CULT: NORMAL

## 2021-09-12 PROCEDURE — G0299 HHS/HOSPICE OF RN EA 15 MIN: HCPCS

## 2021-09-13 ENCOUNTER — TRANSITIONAL CARE MANAGEMENT TELEPHONE ENCOUNTER (OUTPATIENT)
Dept: CALL CENTER | Facility: HOSPITAL | Age: 66
End: 2021-09-13

## 2021-09-13 ENCOUNTER — HOME CARE VISIT (OUTPATIENT)
Dept: HOME HEALTH SERVICES | Facility: HOME HEALTHCARE | Age: 66
End: 2021-09-13

## 2021-09-13 ENCOUNTER — TELEPHONE (OUTPATIENT)
Dept: FAMILY MEDICINE CLINIC | Facility: CLINIC | Age: 66
End: 2021-09-13

## 2021-09-13 ENCOUNTER — HOME CARE VISIT (OUTPATIENT)
Dept: HOME HEALTH SERVICES | Facility: CLINIC | Age: 66
End: 2021-09-13

## 2021-09-13 PROCEDURE — G0151 HHCP-SERV OF PT,EA 15 MIN: HCPCS

## 2021-09-13 RX ORDER — ONDANSETRON 4 MG/1
4 TABLET, ORALLY DISINTEGRATING ORAL EVERY 8 HOURS PRN
Qty: 15 TABLET | Refills: 0 | Status: SHIPPED | OUTPATIENT
Start: 2021-09-13 | End: 2021-10-11 | Stop reason: SDUPTHER

## 2021-09-13 NOTE — OUTREACH NOTE
Call Center TCM Note      Responses   [facility] patient discharged from?  [place]   Does the patient have one of the following disease processes/diagnoses(primary or secondary)?  COPD/Pneumonia   Was the primary reason for admission:  Pneumonia   TCM attempt successful?  Yes   Call start time  1019   Call end time  1022   Discharge diagnosis  Acute hypoxic resp failure d/t bilateral PNA & COPD exacerbation   Is patient permission given to speak with other caregiver?  Yes   Person spoke with today (if not patient) and relationship  Parish/    Meds reviewed with patient/caregiver?  Yes   Is the patient having any side effects they believe may be caused by any medication additions or changes?  No   Does the patient have all medications ordered at discharge?  Yes   Is the patient taking all medications as directed (includes completed medication regime)?  Yes   Does the patient have a primary care provider?   Yes   Does the patient have an appointment with their PCP or specialist within 7 days of discharge?  Yes   Comments regarding PCP  Has a hospital followup scheduled on 9/15/2021   Has the patient kept scheduled appointments due by today?  N/A   Has home health visited the patient within 72 hours of discharge?  N/A   Psychosocial issues?  No   Did the patient receive a copy of their discharge instructions?  Yes   Nursing interventions  Reviewed instructions with patient   What is the patient's perception of their health status since discharge?  Improving   Nursing Interventions  Nurse provided patient education   If the patient is a current smoker, are they able to teach back resources for cessation?  Not a smoker   Is the patient/caregiver able to teach back the hierarchy of who to call/visit for symptoms/problems? PCP, Specialist, Home health nurse, Urgent Care, ED, 911  Yes   Is the patient able to teach back COPD zones?  Yes   Nursing interventions  Education provided on various zones   Patient  reports what zone on this call?  Yellow Zone   Yellow Zone  Increased shortness of air, Unable to complete daily activities, Poor Appetite   Yellow interventions  Continue to use daily medications, Use quick relief inhaler as ordered, Call provider immediatly if symptoms do not improve   Is the patient/caregiver able to teach back signs and symptoms of worsening condition:  Fever/chills, Shortness of breath, Chest pain   Is the patient/caregiver able to teach back importance of completing antibiotic course of treatment?  Yes   TCM call completed?  Yes          Iker Gipson RN    9/13/2021, 10:23 EDT

## 2021-09-13 NOTE — TELEPHONE ENCOUNTER
Home Health called and asked to change her apt on Wednesday to a [place] and asking if you will call Zofran in for her to [facility]?

## 2021-09-13 NOTE — PAYOR COMM NOTE
"Chelle Hay  Case Management Extender  [phone] phone  [phone] fax    Monisha Brasher (66 y.o. Female)     Date of Birth Social Security Number Address Home Phone MRN    1955  [address] [place] [phone] [id number]    Yarsani Marital Status          Gnosticist        Admission Date Admission Type Admitting Provider Attending Provider Department, Room/Bed    9/7/21 Emergency Echendu, Bobo STANFORD MD  [address], 374/1    Discharge Date Discharge Disposition Discharge Destination        9/10/2021 Home-Health Care Sv              Attending Provider: (none)   Allergies: No Known Allergies    Isolation: None   Infection: None   Code Status: CPR    Ht: 157.5 cm (62\")   Wt: 71.2 kg (157 lb)    Admission Cmt: None   Principal Problem: None                Active Insurance as of 9/7/2021     Primary Coverage     Payor Plan Insurance Group Employer/Plan Group    MEDICARE MEDICARE A & B      Payor Plan Address Payor Plan Phone Number Payor Plan Fax Number Effective Dates    [address] [phone]  6/1/2021 - None Entered    [facility] [id number]       Subscriber Name Subscriber Birth Date Member ID       MONISHA BRASHER 1955 [id number]           Secondary Coverage     Payor Plan Insurance Group Employer/Plan Group    [facility]     Payor Plan Address Payor Plan Phone Number Payor Plan Fax Number Effective Dates    [address] [address]   1/1/2017 - None Entered    [facility] [phone]       Subscriber Name Subscriber Birth Date Member ID       SURJIT BRASHER [birth year]1954 [id number]                 Emergency Contacts      (Rel.) Home Phone Work Phone Mobile Phone    BrasherJosee (Spouse) [phone] -- [phone]               Discharge Summary      Loree Brasher APRN at 09/10/21 1437     Attestation signed by Alex Lomax MD at 09/10/21 1457    I have discussed the case with Loree Brasher NP and reviewed " the note. I agree with the clinical decision as outlined in this note.      Alex Lomax MD  09/10/21  14:57 CDT                               [facility] Medicine Services  DISCHARGE SUMMARY       Date of Admission: 9/7/2021  Date of Discharge:  9/10/2021  Primary Care Physician: Tiffanie Mccauley APRN    Presenting Problem/History of Present Illness:  Sinus tachycardia [R00.0]  Acute respiratory failure with hypoxia (CMS/[facility]) [J96.01]  Pneumonia of both lungs due to infectious organism, unspecified part of lung [J18.9]       Final Discharge Diagnoses:  Active Hospital Problems    Diagnosis    • Pneumonia of both lungs due to infectious organism    • Stage 4 very severe COPD by GOLD classification (CMS/[facility])    • Paroxysmal atrial fibrillation (CMS/[facility])    • CAD (coronary artery disease)    • Essential hypertension        Consults:   Consults     No orders found from 8/9/2021 to 9/8/2021.          Procedures Performed:                 Pertinent Test Results:   Lab Results (last 24 hours)     Procedure Component Value Units Date/Time    POC Glucose Once [[id number]]  (Abnormal) Collected: 09/10/21 1019    Specimen: Blood Updated: 09/10/21 1045     Glucose 255 mg/dL      Comment: RN NotifiedOperator: [id number] SETH MADISONMeter ID: [id number]       Blood Culture - Blood, Hand, Right [[id number]] Collected: 09/09/21 1017    Specimen: Blood from Hand, Right Updated: 09/10/21 1030     Blood Culture No growth at 24 hours    Blood Culture - Blood, Arm, Left [[id number]] Collected: 09/09/21 1017    Specimen: Blood from Arm, Left Updated: 09/10/21 1030     Blood Culture No growth at 24 hours    POC Glucose Once [[id number]]  (Abnormal) Collected: 09/10/21 0607    Specimen: Blood Updated: 09/10/21 0627     Glucose 174 mg/dL      Comment: RN NotifiedOperator: [id number] BRIAN FELIXIEMeter ID: [id number]       POC Glucose Once [[id number]]  (Abnormal) Collected: 09/09/21 2055     Specimen: Blood Updated: 09/09/21 2108     Glucose 187 mg/dL      Comment: RN NotifiedOperator: [id number] RIDDHI Erazo ID: [id number]           Imaging Results (All)     Procedure Component Value Units Date/Time    CT Angiogram Chest [[id number]] Collected: 09/07/21 1443     Updated: 09/07/21 1455    Narrative:        PROCEDURE: CT -CTA Thorax/PE with contrast     REASON FOR EXAM: pe, J18.9 Pneumonia, unspecified organism J96.01  Acute respiratory failure with hypoxia R00.0 Tachycardia,  unspecified    FINDINGS: Comparison study dated  November 20, 2018. Axial  computer tomography sequential imaging was performed from the  thoracic inlet through the diaphragms after administration of IV  contrast .3-D chest sagittal and coronal reformates was  performed. This exam was performed according to our departmental  dose optimization program, which includes automated exposure  control, adjustment of the mA and/or KV according to patient size  and/or use of iterative reconstruction technique.     The pulmonary arteries are normal. There is no filling defect  identified to suggest pulmonary embolus. Streak artifact from the  contrast within the SVC. Mediastinal structures of the chest are  otherwise unremarkable. There is no lymphadenopathy or  pericardial effusion. Mild to moderate centrilobular  emphysematous changes. Right upper lobe posterior segment patchy  opacity with air bronchograms. Right upper lobe anterior segment  perihilar patchy opacity with air bronchograms. Left upper lobe  anterior segment patchy opacity with air bronchograms. Left upper  lobe anterior segment inferior small patchy opacities with air  bronchograms. Right lower lobe superior segment peripheral small  patchy opacities. Right middle lobe small somewhat linear  interstitial opacity. Bibasilar posterior basilar small  peripheral patchy opacities. Lungs are otherwise clear. Pleural  spaces are normal. Visualized upper abdominal structures  are  unremarkable. No acute osseous abnormality..      Impression:      1. No evidence of pulmonary embolus.  2. Mild to moderate centrilobular emphysematous changes.  3. Right upper lobe posterior segment patchy opacity with air  bronchograms. Right upper lobe anterior segment perihilar patchy  opacity with air bronchograms. Left upper lobe anterior segment  patchy opacity with air bronchograms. Left upper lobe anterior  segment inferior small patchy opacities with air bronchograms.  Right lower lobe superior segment peripheral small patchy  opacities. Right middle lobe small somewhat linear interstitial  opacity. Bibasilar posterior basilar small peripheral patchy  opacities. The differential for these opacities would include  multifocal pneumonia including possible atypical viral etiology  and/or pulmonary edema.   4. Remainder CT chest exam is unremarkable..    Electronically signed by:  Cale Burger MD  9/7/2021 2:54 PM CDT  Workstation: [id number]    XR Chest 1 View [[id number]] Collected: 09/07/21 0856     Updated: 09/07/21 0915    Narrative:      EXAMINATION:  XR CHEST 1 VW    CLINICAL HISTORY:  66 years Female,CHF/COPD Protocol    COMPARISON:  Chest x-ray dated 9/18/2020    FINDINGS:  Interstitial prominence in the mid and lower lungs  with bilateral airspace disease, most focal in the periphery of  the right greater than left midlung. No pleural effusion or  pneumothorax. Normal heart size.      Impression:      Interstitial and airspace disease bilaterally likely  reflects at least in part infection, though edema could also be  contributing to this appearance.    Electronically signed by:  Kamaljit Waite MD  9/7/2021 9:14 AM CDT  Workstation: [id number]            Chief Complaint on Day of Discharge: none    Hospital Course:  66 year old  female with past medical history of chronic hypoxic respiratory failure with home oxygen dependence of 4 liters, CAD s/p PCI, tobacco dependence, HTN, DM who  "presented on 9-7-21 with dyspnea.  She was admitted for acute hypoxic respiratory failure related to COPD exacerbation and pneumonia.  She also had blood culture result as positive for haemophilus influenza.  Patient received Rocephin and Azithromycin while hospitalized and continues Omnicef at discharge for completion of treatment.  Repeat blood cultures are negative at discharge.  Patient has been weaned to her home oxygen setting of 4 liters and denies complaints on day of discharge.  She will discharge home in stable condition with instructions for one week PCP follow up.  Home health services have been arranged at discharge.         Condition on Discharge:  Stable     Physical Exam on Discharge:  /89 (BP Location: Right leg, Patient Position: Sitting)   Pulse 80   Temp 97.2 °F (36.2 °C) (Oral)   Resp 18   Ht 157.5 cm (62\")   Wt 71.2 kg (157 lb)   SpO2 96%   BMI 28.72 kg/m²   Physical Exam  Vitals and nursing note reviewed.   Constitutional:       General: She is not in acute distress.     Appearance: Normal appearance. She is ill-appearing.      Comments: Chronically ill appearing    HENT:      Head: Normocephalic and atraumatic.      Right Ear: External ear normal.      Left Ear: External ear normal.      Nose: Nose normal.      Mouth/Throat:      Mouth: Mucous membranes are moist.      Pharynx: Oropharynx is clear.   Eyes:      General: No scleral icterus.        Right eye: No discharge.         Left eye: No discharge.      Conjunctiva/sclera: Conjunctivae normal.   Cardiovascular:      Rate and Rhythm: Normal rate and regular rhythm.      Pulses: Normal pulses.      Heart sounds: Normal heart sounds. No murmur heard.   No friction rub. No gallop.    Pulmonary:      Effort: Pulmonary effort is normal. No respiratory distress.      Breath sounds: Normal breath sounds. No stridor. No wheezing, rhonchi or rales.   Abdominal:      General: Bowel sounds are normal. There is no distension.      " Palpations: Abdomen is soft.      Tenderness: There is no abdominal tenderness.   Musculoskeletal:         General: No swelling. Normal range of motion.      Cervical back: Normal range of motion and neck supple.   Skin:     General: Skin is warm and dry.      Findings: Bruising present.   Neurological:      General: No focal deficit present.      Mental Status: She is alert and oriented to person, place, and time.   Psychiatric:         Mood and Affect: Mood normal.         Behavior: Behavior normal.           Discharge Disposition:  Home-Health Care [facility]    Discharge Medications:     Discharge Medications      New Medications      Instructions Start Date   cefdinir 300 MG capsule  Commonly known as: OMNICEF  Notes to patient: 09/10/2021   300 mg, Oral, 2 Times Daily      predniSONE 10 MG tablet  Commonly known as: DELTASONE  Notes to patient: 09/11/2021   40 mg, Oral, Daily         Changes to Medications      Instructions Start Date   carvedilol 6.25 MG tablet  Commonly known as: COREG  What changed:   · medication strength  · how much to take  Notes to patient: 09/10/2021   6.25 mg, Oral, Nightly         Continue These Medications      Instructions Start Date   albuterol sulfate  (90 Base) MCG/ACT inhaler  Commonly known as: PROVENTIL HFA;VENTOLIN HFA;PROAIR HFA  Notes to patient: As needed   INHALE 2 PUFFS EVERY 4 HOURS AS NEEDED      ALPRAZolam 0.25 MG tablet  Commonly known as: Xanax  Notes to patient: 09/10/2021   0.25 mg, Oral, 3 Times Daily PRN      amiodarone 100 MG tablet  Commonly known as: PACERONE  Notes to patient: 09/10/2021   100 mg, Oral, 2 Times Daily      aspirin 81 MG chewable tablet  Notes to patient: 09/10/2021   81 mg, Oral, Daily      atorvastatin 40 MG tablet  Commonly known as: LIPITOR  Notes to patient: 09/11/2021   Oral, Daily      budesonide-formoterol 160-4.5 MCG/ACT inhaler  Commonly known as: SYMBICORT  Notes to patient: 09/10/2021   INHALE 2 PUFFS BY MOUTH TWICE DAILY       buPROPion 75 MG tablet  Commonly known as: Wellbutrin  Notes to patient: 09/11/2021   75 mg, Oral, Daily      cyclobenzaprine 10 MG tablet  Commonly known as: FLEXERIL  Notes to patient: As needed   TAKE 1 TABLET BY MOUTH THREE TIMES DAILY AS NEEDED FOR MUSCLE SPASMS      diclofenac 50 MG EC tablet  Commonly known as: VOLTAREN  Notes to patient: As needed   Bid prn      freestyle lancets   USE AS DIRECTED TO TEST BLOOD SUGAR ONE DAILY      furosemide 20 MG tablet  Commonly known as: Lasix  Notes to patient: 09/10/2021   20 mg, Oral, 2 Times Daily      glucose blood test strip  Commonly known as: FREESTYLE LITE   USE AS DIRECTED TO TEST BLOOD SUGAR ONCE DAILY      glucose monitor monitoring kit   1 each, Does not apply, Daily, Testing blood sugar once a day  ICD10 - R73.9      hydrOXYzine 10 MG tablet  Commonly known as: ATARAX  Notes to patient: As needed   10 mg, Oral, Every 8 Hours PRN      ipratropium-albuterol 0.5-2.5 mg/3 ml nebulizer  Commonly known as: DUO-NEB  Notes to patient: 09/10/201   3 mL, Nebulization, 4 Times Daily - RT      losartan 25 MG tablet  Commonly known as: COZAAR  Notes to patient: 09/11/2021   25 mg, Oral, Daily      metFORMIN 500 MG tablet  Commonly known as: GLUCOPHAGE  Notes to patient: 09/11/2021   500 mg, Oral, Daily With Breakfast      montelukast 10 MG tablet  Commonly known as: SINGULAIR  Notes to patient: 10/11/2021   TAKE 1 TABLET BY MOUTH DAILY      mupirocin 2 % ointment  Commonly known as: BACTROBAN  Notes to patient: 09/10/2021   Topical, 2 Times Daily      nitroglycerin 0.4 MG SL tablet  Commonly known as: NITROSTAT  Notes to patient: As needed   Take 1 tablet under tongue every 5 minutes for a maximum of three doses per episode of chest pain      nystatin 636909 UNIT/ML suspension  Commonly known as: MYCOSTATIN  Notes to patient: As needed   500,000 Units, Oral, 4 Times Daily, As needed for mouth sores.      ondansetron 4 MG tablet  Commonly known as: ZOFRAN  Notes to  patient: As needed   TAKE 1 TABLET BY MOUTH EVERY 8 HOURS AS NEEDED FOR NAUSEA OR VOMITING      PARoxetine 20 MG tablet  Commonly known as: PAXIL  Notes to patient: 09/11/2021   20 mg, Oral, Every Morning      prasugrel 10 MG tablet  Commonly known as: EFFIENT  Notes to patient: 09/11/2021   10 mg, Oral, Daily         Stop These Medications    methylPREDNISolone 8 MG tablet  Commonly known as: MEDROL            Discharge Diet:   Diet Instructions     Diet: Consistent Carbohydrate, Cardiac; Thin      Discharge Diet:  Consistent Carbohydrate  Cardiac       Fluid Consistency: Thin          Activity at Discharge:   Activity Instructions     Activity as Tolerated            Discharge Care Plan/Instructions: Follow up with PCP within one week.     Follow-up Appointments:   Additional Instructions for the Follow-ups that You Need to Schedule     Ambulatory Referral to Home Health   As directed      Face to Face Visit Date: 9/9/2021    Follow-up provider for Plan of Care?: I treated the patient in an acute care facility and will not continue treatment after discharge.    Follow-up provider: TIFFANIE KELLER [[id number]]    Reason/Clinical Findings: deconditioning r/t respiratory failure, copd    Describe mobility limitations that make leaving home difficult: deconditioning r/t respiratory failure, copd    Nursing/Therapeutic Services Requested: Skilled Nursing Physical Therapy Occupational Therapy    Skilled nursing orders: COPD management    PT orders: Strengthening    Occupational orders: Strengthening    Frequency: 1 Week 1         Discharge Follow-up with PCP   As directed       Currently Documented PCP:    Tiffanie Keller APRN    PCP Phone Number:    [phone]     Follow Up Details: one week            Contact information for follow-up providers     Tiffanie Keller APRN .    Specialty: Family Medicine  Why: one week  Contact information:  [address] [facility] [address]  [facility]  [address]  [phone]                   Contact information for after-discharge care     Home Medical Care     [facility] .    Service: Home Health Services  Contact information:  [address]   [place] [address] [phone]                             Test Results Pending at Discharge:   Pending Labs     Order Current Status    Blood Culture - Blood, Arm, Left Preliminary result    Blood Culture - Blood, Arm, Left Preliminary result    Blood Culture - Blood, Hand, Right Preliminary result                This document has been electronically signed by HEATHER Gordon on September 10, 2021 14:51 CDT        Time: 30 minutes spent on assessment, discussion, management and discharge planning for this patient.                 Electronically signed by Alex Lomax MD at 09/10/21 9408

## 2021-09-14 ENCOUNTER — HOME CARE VISIT (OUTPATIENT)
Dept: HOME HEALTH SERVICES | Facility: CLINIC | Age: 66
End: 2021-09-14

## 2021-09-14 VITALS
TEMPERATURE: 98.3 F | DIASTOLIC BLOOD PRESSURE: 68 MMHG | HEART RATE: 93 BPM | OXYGEN SATURATION: 97 % | SYSTOLIC BLOOD PRESSURE: 130 MMHG | RESPIRATION RATE: 20 BRPM

## 2021-09-14 LAB
BACTERIA SPEC AEROBE CULT: NORMAL
BACTERIA SPEC AEROBE CULT: NORMAL

## 2021-09-14 PROCEDURE — G0494 LPN CARE EA 15MIN HH/HOSPICE: HCPCS

## 2021-09-14 NOTE — HOME HEALTH
PATIENT WAS RECENTLY ADMITTED TO [facility] TO BILATERAL PNEUMONIA. SHE WAS ADMITTED TO THE HOSPITAL ON 9/7/2021 - 9/10/2021. SHE WAS HAVING SOME INCREASED SHORTNESS OF AIR, FEVER .8. SHE TESTED NEGATIVE FOR COVID BUT DID HAVE BILATERAL PNEUMOINA. SHE ISN'T FEELING WELL SINCE COMING HOME AND FEELS VERY WEAK.     PMHX: DM, HTN, COPD    PATIENT GOAL: GET BETTER, GET STRONGER    PRIOR LEVEL OF FUNCTION: PATIENT IS INDEPENDENT WITH ALL ACTIVITY

## 2021-09-15 ENCOUNTER — TELEMEDICINE (OUTPATIENT)
Dept: FAMILY MEDICINE CLINIC | Facility: CLINIC | Age: 66
End: 2021-09-15

## 2021-09-15 VITALS — DIASTOLIC BLOOD PRESSURE: 88 MMHG | OXYGEN SATURATION: 97 % | SYSTOLIC BLOOD PRESSURE: 120 MMHG

## 2021-09-15 VITALS
TEMPERATURE: 97.1 F | HEART RATE: 76 BPM | OXYGEN SATURATION: 92 % | DIASTOLIC BLOOD PRESSURE: 72 MMHG | SYSTOLIC BLOOD PRESSURE: 138 MMHG | RESPIRATION RATE: 18 BRPM

## 2021-09-15 DIAGNOSIS — J44.1 COPD WITH EXACERBATION (HCC): ICD-10-CM

## 2021-09-15 DIAGNOSIS — R05.9 COUGH: Primary | ICD-10-CM

## 2021-09-15 PROCEDURE — 99496 TRANSJ CARE MGMT HIGH F2F 7D: CPT | Performed by: NURSE PRACTITIONER

## 2021-09-15 RX ORDER — CLARITHROMYCIN 500 MG/1
500 TABLET, COATED ORAL 2 TIMES DAILY
Qty: 20 TABLET | Refills: 0 | Status: SHIPPED | OUTPATIENT
Start: 2021-09-15 | End: 2021-09-22

## 2021-09-15 NOTE — PROGRESS NOTES
No chief complaint on file.    Subjective   Monisha Brasher is a 66 y.o. female.           Presents with hospital recheck from recent pneumonia-reports feeling better but still producing thick yellow sputum, no fever at this time -telemedicine visit     Cough  This is a new problem. The current episode started 1 to 4 weeks ago. The problem has been waxing and waning. The problem occurs every few minutes. The cough is productive of sputum. Associated symptoms include myalgias, nasal congestion, postnasal drip, rhinorrhea, a sore throat, shortness of breath and wheezing. Pertinent negatives include no chest pain, chills, ear pain, eye redness, fever, headaches, heartburn, hemoptysis, rash, sweats or weight loss. She has tried oral steroids, prescription cough suppressant and steroid inhaler (iv antibiotics and nebs ) for the symptoms. The treatment provided mild relief. Her past medical history is significant for bronchitis, COPD, emphysema and pneumonia. There is no history of asthma, bronchiectasis or environmental allergies.   COPD  She complains of cough, shortness of breath and wheezing. There is no hemoptysis. This is a chronic problem. The problem has been waxing and waning. The cough is productive of sputum, harsh and hacking. Associated symptoms include appetite change, malaise/fatigue, myalgias, nasal congestion, postnasal drip, rhinorrhea and a sore throat. Pertinent negatives include no chest pain, ear pain, fever, headaches, heartburn, sneezing, sweats, trouble swallowing or weight loss. Her symptoms are aggravated by pollen. Her symptoms are alleviated by steroid inhaler and oral steroids (steroids ). She reports moderate improvement on treatment. Her past medical history is significant for bronchitis, COPD, emphysema and pneumonia. There is no history of asthma or bronchiectasis.        The following portions of the patient's history were reviewed and updated as appropriate: allergies, current  medications, past social history and problem list.    Review of Systems   Constitutional: Positive for activity change, appetite change, fatigue and malaise/fatigue. Negative for chills, diaphoresis, fever, unexpected weight change and weight loss.   HENT: Positive for congestion, postnasal drip, rhinorrhea, sinus pressure, sinus pain and sore throat. Negative for dental problem, drooling, ear discharge, ear pain, facial swelling, hearing loss, mouth sores, nosebleeds, sneezing, tinnitus, trouble swallowing and voice change.    Eyes: Negative.  Negative for photophobia, pain, discharge, redness, itching and visual disturbance.   Respiratory: Positive for cough, shortness of breath and wheezing. Negative for apnea, hemoptysis, choking, chest tightness and stridor.    Cardiovascular: Negative for chest pain, palpitations and leg swelling.   Gastrointestinal: Positive for constipation. Negative for abdominal distention, abdominal pain, anal bleeding, blood in stool, diarrhea, heartburn, nausea, rectal pain and vomiting.   Endocrine: Positive for cold intolerance. Negative for heat intolerance, polydipsia, polyphagia and polyuria.   Genitourinary: Negative for difficulty urinating, dyspareunia, enuresis, flank pain and frequency.   Musculoskeletal: Positive for arthralgias, gait problem, joint swelling and myalgias. Negative for neck pain and neck stiffness.        Lumbar pain with radiation of pain to right hip    Skin: Negative for color change, pallor, rash and wound.   Allergic/Immunologic: Negative.  Negative for environmental allergies, food allergies and immunocompromised state.   Neurological: Negative for dizziness, tremors, seizures, syncope, facial asymmetry, speech difficulty, weakness, light-headedness, numbness and headaches.   Hematological: Negative.  Negative for adenopathy. Does not bruise/bleed easily.   Psychiatric/Behavioral: Positive for sleep disturbance. Negative for agitation, behavioral  problems, confusion, decreased concentration, dysphoric mood, hallucinations, self-injury and suicidal ideas. The patient is nervous/anxious. The patient is not hyperactive.    All other systems reviewed and are negative.      Objective   /88   SpO2 97%   Physical Exam  Vitals and nursing note reviewed.   Constitutional:       Appearance: Normal appearance.      Comments: Limited exam due to telemedicine visit    Pulmonary:      Comments: Coughing during exam  Neurological:      Mental Status: She is alert.              Assessment/Plan     Problems Addressed this Visit        Pulmonary and Pneumonias    Cough - Primary      Other Visit Diagnoses     COPD with exacerbation (CMS/Formerly [facility])          Diagnoses       Codes Comments    Cough    -  Primary ICD-10-CM: R05  ICD-9-CM: 786.2     COPD with exacerbation (CMS/Formerly [facility])     ICD-10-CM: J44.1  ICD-9-CM: 491.21            New Medications Ordered This Visit   Medications   • clarithromycin (Biaxin) 500 MG tablet     Sig: Take 1 tablet by mouth 2 (Two) Times a Day.     Dispense:  20 tablet     Refill:  0     Current Outpatient Medications on File Prior to Visit   Medication Sig Dispense Refill   • albuterol sulfate  (90 Base) MCG/ACT inhaler INHALE 2 PUFFS EVERY 4 HOURS AS NEEDED 18 g 11   • ALPRAZolam (Xanax) 0.25 MG tablet Take 1 tablet by mouth 3 (Three) Times a Day As Needed for Anxiety. 90 tablet 0   • amiodarone (PACERONE) 100 MG tablet Take 1 tablet by mouth 2 (Two) Times a Day. 60 tablet 1   • aspirin 81 MG chewable tablet Chew 1 tablet Daily.     • atorvastatin (LIPITOR) 40 MG tablet Take  by mouth Daily.     • budesonide-formoterol (SYMBICORT) 160-4.5 MCG/ACT inhaler INHALE 2 PUFFS BY MOUTH TWICE DAILY 10.2 g 5   • buPROPion (WELLBUTRIN) 75 MG tablet Take 1 tablet by mouth Daily. 30 tablet 11   • carvedilol (COREG) 6.25 MG tablet Take 1 tablet by mouth Every Night. 30 tablet 0   • [] cefdinir (OMNICEF) 300 MG capsule Take 1 capsule by mouth 2 (Two)  Times a Day for 4 days. 8 capsule 0   • cyclobenzaprine (FLEXERIL) 10 MG tablet TAKE 1 TABLET BY MOUTH THREE TIMES DAILY AS NEEDED FOR MUSCLE SPASMS 90 tablet 5   • diclofenac (VOLTAREN) 50 MG EC tablet Bid prn 60 tablet 5   • furosemide (LASIX) 20 MG tablet Take 1 tablet by mouth 2 (Two) Times a Day. 60 tablet 1   • glucose blood (FREESTYLE LITE) test strip USE AS DIRECTED TO TEST BLOOD SUGAR ONCE DAILY 50 each 11   • glucose monitor monitoring kit 1 each Daily. Testing blood sugar once a day    ICD10 - R73.9 1 each 0   • hydrOXYzine (ATARAX) 10 MG tablet Take 1 tablet by mouth Every 8 (Eight) Hours As Needed for Anxiety. 90 tablet 5   • ipratropium-albuterol (DUO-NEB) 0.5-2.5 mg/3 ml nebulizer Take 3 mL by nebulization 4 (Four) Times a Day. 360 mL 5   • Lancets (FREESTYLE) lancets USE AS DIRECTED TO TEST BLOOD SUGAR ONE DAILY 100 each 5   • losartan (COZAAR) 25 MG tablet TAKE 1 TABLET BY MOUTH DAILY 30 tablet 11   • metFORMIN (GLUCOPHAGE) 500 MG tablet TAKE 1 TABLET BY MOUTH DAILY WITH BREAKFAST 30 tablet 11   • montelukast (SINGULAIR) 10 MG tablet TAKE 1 TABLET BY MOUTH DAILY 90 tablet 4   • mupirocin (BACTROBAN) 2 % ointment Apply  topically to the appropriate area as directed 2 (Two) Times a Day. 30 g 5   • nitroglycerin (NITROSTAT) 0.4 MG SL tablet Take 1 tablet under tongue every 5 minutes for a maximum of three doses per episode of chest pain 15 tablet 0   • nystatin (MYCOSTATIN) 298728 UNIT/ML suspension Take 5 mL by mouth 4 (Four) Times a Day. As needed for mouth sores. 280 mL 0   • ondansetron (ZOFRAN) 4 MG tablet TAKE 1 TABLET BY MOUTH EVERY 8 HOURS AS NEEDED FOR NAUSEA OR VOMITING 20 tablet 2   • ondansetron ODT (Zofran ODT) 4 MG disintegrating tablet Place 1 tablet on the tongue Every 8 (Eight) Hours As Needed for Nausea or Vomiting. 15 tablet 0   • PARoxetine (PAXIL) 20 MG tablet TAKE 1 TABLET BY MOUTH EVERY MORNING 30 tablet 11   • prasugrel (EFFIENT) 10 MG tablet Take 1 tablet by mouth Daily. 30  tablet 11   • predniSONE (DELTASONE) 10 MG tablet Take 4 tablets by mouth Daily for 5 days. 20 tablet 0   • [DISCONTINUED] budesonide-formoterol (Symbicort) 160-4.5 MCG/ACT inhaler INHALE 2 PUFFS BY MOUTH TWICE DAILY 10.2 g 5   • [DISCONTINUED] methylPREDNISolone (Medrol) 8 MG tablet Take 1 tablet by mouth Daily. 30 tablet 11     No current facility-administered medications on file prior to visit.       15 minutes   Follow Up   No follow-ups on file.     You have chosen to receive care through a telehealth visit.  Do you consent to use a video/audio connection for your medical care today? Yes        The use of a video visit has been reviewed with the patient and verbal informed consent has been obtained.    Continue current meds, biaxin as directed, follow up if worsen, patient agrees, no changes otherwise     See back in 4 weeks in office as directed

## 2021-09-17 ENCOUNTER — HOME CARE VISIT (OUTPATIENT)
Dept: HOME HEALTH SERVICES | Facility: CLINIC | Age: 66
End: 2021-09-17

## 2021-09-17 VITALS
OXYGEN SATURATION: 96 % | SYSTOLIC BLOOD PRESSURE: 146 MMHG | HEART RATE: 84 BPM | RESPIRATION RATE: 20 BRPM | DIASTOLIC BLOOD PRESSURE: 86 MMHG | TEMPERATURE: 98.2 F

## 2021-09-17 PROCEDURE — G0494 LPN CARE EA 15MIN HH/HOSPICE: HCPCS

## 2021-09-17 NOTE — HOME HEALTH
FOCUS OF CARE F2F: f2f with jesus manuel black 9/15/21 televisit. pt who is formily oxygen dependent, developed soa and went to er. found to have bilateral pne with copd excerbation. pt is covid negative. pt is deconditioned post hospital and now requires 4l oxygen due to soa at rest. sn medcially necessary to assess respiratory status. pt/ot to assist with retruning to of    TEACHING.-oxygen safety, falls, meds, copd, iss, s/s infection, risk for skin breakdown    HOME SAFETY ISSUES: NONE      MD IS AGREEABLE WITH THIS POC jacinto black aprn    HOMEBOUND STATUS:soa at rest, oxygen dependent

## 2021-09-20 ENCOUNTER — READMISSION MANAGEMENT (OUTPATIENT)
Dept: CALL CENTER | Facility: HOSPITAL | Age: 66
End: 2021-09-20

## 2021-09-20 ENCOUNTER — HOME CARE VISIT (OUTPATIENT)
Dept: HOME HEALTH SERVICES | Facility: CLINIC | Age: 66
End: 2021-09-20

## 2021-09-20 VITALS
HEART RATE: 87 BPM | OXYGEN SATURATION: 99 % | SYSTOLIC BLOOD PRESSURE: 150 MMHG | RESPIRATION RATE: 20 BRPM | TEMPERATURE: 97.6 F | DIASTOLIC BLOOD PRESSURE: 94 MMHG

## 2021-09-20 PROCEDURE — G0180 MD CERTIFICATION HHA PATIENT: HCPCS | Performed by: NURSE PRACTITIONER

## 2021-09-20 PROCEDURE — G0157 HHC PT ASSISTANT EA 15: HCPCS

## 2021-09-20 PROCEDURE — G0494 LPN CARE EA 15MIN HH/HOSPICE: HCPCS

## 2021-09-20 NOTE — OUTREACH NOTE
COPD/PN Week 2 Survey      Responses   [facility] patient discharged from?  [place]   Does the patient have one of the following disease processes/diagnoses(primary or secondary)?  COPD/Pneumonia   Was the primary reason for admission:  Pneumonia   Week 2 attempt successful?  Yes   Call start time  1333   Call end time  1343   Discharge diagnosis  Acute hypoxic resp failure d/t bilateral PNA & COPD exacerbation   Meds reviewed with patient/caregiver?  Yes   Is the patient having any side effects they believe may be caused by any medication additions or changes?  No   Does the patient have all medications ordered at discharge?  Yes   Is the patient taking all medications as directed (includes completed medication regime)?  Yes   Does the patient have a primary care provider?   Yes   Does the patient have an appointment with their PCP or specialist within 7 days of discharge?  Yes   Has the patient kept scheduled appointments due by today?  Yes   What is the Home health agency?   [facility]   Has home health visited the patient within 72 hours of discharge?  Yes   Home health comments   for PT and nsg   Pulse Ox monitoring  Intermittent   Pulse Ox device source  Patient   O2 Sat comments  96% on RA, does not use O2   O2 Sat: education provided  Sat levels, Monitoring frequency, When to seek care   O2 Sat education comments  will go to ED for O2 sats <90% with increasing SOB   Psychosocial issues?  No   Comments  states still has dizziness from BP issues which is pt's baseline   Nursing interventions  Reviewed instructions with patient   What is the patient's perception of their health status since discharge?  Improving   Nursing Interventions  Nurse provided patient education   Is the patient/caregiver able to teach back the hierarchy of who to call/visit for symptoms/problems? PCP, Specialist, Home health nurse, Urgent Care, ED, 911  Yes   Is the patient/caregiver able to teach back signs and symptoms of  worsening condition:  Fever/chills, Shortness of breath, Chest pain   Is the patient/caregiver able to teach back importance of completing antibiotic course of treatment?  Yes   Week 2 call completed?  Yes          Lisa Mcclellan RN

## 2021-09-21 VITALS
HEART RATE: 87 BPM | DIASTOLIC BLOOD PRESSURE: 100 MMHG | TEMPERATURE: 97.6 F | OXYGEN SATURATION: 99 % | RESPIRATION RATE: 18 BRPM | SYSTOLIC BLOOD PRESSURE: 140 MMHG

## 2021-09-22 RX ORDER — AZITHROMYCIN 250 MG/1
TABLET, FILM COATED ORAL
Qty: 6 TABLET | Refills: 0 | Status: SHIPPED | OUTPATIENT
Start: 2021-09-22 | End: 2021-10-11

## 2021-09-23 ENCOUNTER — HOME CARE VISIT (OUTPATIENT)
Dept: HOME HEALTH SERVICES | Facility: CLINIC | Age: 66
End: 2021-09-23

## 2021-09-23 NOTE — CASE COMMUNICATION
Pt missed a physical therapy appt on 9/23/21 due to patient not feeling well and elevated BP per pt. reports. Pt. requested no visits this date. Follow up PT appointment scheduled the week of 9/27/21.

## 2021-09-24 ENCOUNTER — HOME CARE VISIT (OUTPATIENT)
Dept: HOME HEALTH SERVICES | Facility: CLINIC | Age: 66
End: 2021-09-24

## 2021-09-24 VITALS
DIASTOLIC BLOOD PRESSURE: 92 MMHG | TEMPERATURE: 98.4 F | RESPIRATION RATE: 20 BRPM | SYSTOLIC BLOOD PRESSURE: 160 MMHG | OXYGEN SATURATION: 99 % | HEART RATE: 74 BPM

## 2021-09-24 PROCEDURE — G0494 LPN CARE EA 15MIN HH/HOSPICE: HCPCS

## 2021-09-24 NOTE — CASE COMMUNICATION
Patient missed a  visit from [facility] initial eval on 9/13/21.     Reason: Pt request      For your records only.   As per home health protocol, MD must be notified of missed/cancelled visits; therefore the prescribed frequency was not met.

## 2021-09-27 ENCOUNTER — HOME CARE VISIT (OUTPATIENT)
Dept: HOME HEALTH SERVICES | Facility: CLINIC | Age: 66
End: 2021-09-27

## 2021-09-27 VITALS
RESPIRATION RATE: 20 BRPM | DIASTOLIC BLOOD PRESSURE: 90 MMHG | TEMPERATURE: 97.2 F | OXYGEN SATURATION: 97 % | HEART RATE: 82 BPM | SYSTOLIC BLOOD PRESSURE: 160 MMHG

## 2021-09-27 PROCEDURE — G0157 HHC PT ASSISTANT EA 15: HCPCS

## 2021-09-28 ENCOUNTER — HOME CARE VISIT (OUTPATIENT)
Dept: HOME HEALTH SERVICES | Facility: CLINIC | Age: 66
End: 2021-09-28

## 2021-09-28 VITALS
SYSTOLIC BLOOD PRESSURE: 156 MMHG | HEART RATE: 94 BPM | DIASTOLIC BLOOD PRESSURE: 106 MMHG | OXYGEN SATURATION: 93 % | RESPIRATION RATE: 20 BRPM | TEMPERATURE: 98.1 F

## 2021-09-28 PROCEDURE — G0493 RN CARE EA 15 MIN HH/HOSPICE: HCPCS

## 2021-09-29 ENCOUNTER — HOME CARE VISIT (OUTPATIENT)
Dept: HOME HEALTH SERVICES | Facility: CLINIC | Age: 66
End: 2021-09-29

## 2021-09-29 PROCEDURE — G0157 HHC PT ASSISTANT EA 15: HCPCS

## 2021-09-29 NOTE — HOME HEALTH
"Pt reports BP has been elevated all weekend, SUnday was \" 212/100 something\"  Talked to [facility] office on Friday but medication increase has not resolved BP. Has not been doing HEP or much walking due to BP and wanting to keep levels down."

## 2021-09-30 VITALS — SYSTOLIC BLOOD PRESSURE: 150 MMHG | HEART RATE: 80 BPM | DIASTOLIC BLOOD PRESSURE: 98 MMHG

## 2021-09-30 NOTE — HOME HEALTH
PT seated in Recliner feet elevated.  Pt states her BP is better right now.  But at 9am it was 170/120.  Pt has communicated with MD.  Meds are being adjusted.  Pt states she has some dizziness as well.

## 2021-10-01 ENCOUNTER — HOME CARE VISIT (OUTPATIENT)
Dept: HOME HEALTH SERVICES | Facility: CLINIC | Age: 66
End: 2021-10-01

## 2021-10-01 VITALS
HEART RATE: 82 BPM | DIASTOLIC BLOOD PRESSURE: 102 MMHG | TEMPERATURE: 98.4 F | OXYGEN SATURATION: 94 % | SYSTOLIC BLOOD PRESSURE: 158 MMHG | RESPIRATION RATE: 20 BRPM

## 2021-10-01 PROCEDURE — G0493 RN CARE EA 15 MIN HH/HOSPICE: HCPCS

## 2021-10-07 DIAGNOSIS — J20.9 CHRONIC BRONCHITIS WITH ACUTE EXACERBATION (HCC): Chronic | ICD-10-CM

## 2021-10-07 DIAGNOSIS — J42 CHRONIC BRONCHITIS WITH ACUTE EXACERBATION (HCC): Chronic | ICD-10-CM

## 2021-10-07 RX ORDER — ALPRAZOLAM 0.25 MG/1
0.25 TABLET ORAL 3 TIMES DAILY PRN
Qty: 12 TABLET | Refills: 0 | Status: SHIPPED | OUTPATIENT
Start: 2021-10-07 | End: 2021-10-11 | Stop reason: SDUPTHER

## 2021-10-08 ENCOUNTER — HOME CARE VISIT (OUTPATIENT)
Dept: HOME HEALTH SERVICES | Facility: HOME HEALTHCARE | Age: 66
End: 2021-10-08

## 2021-10-08 ENCOUNTER — HOME CARE VISIT (OUTPATIENT)
Dept: HOME HEALTH SERVICES | Facility: CLINIC | Age: 66
End: 2021-10-08

## 2021-10-08 VITALS
TEMPERATURE: 98 F | HEART RATE: 80 BPM | SYSTOLIC BLOOD PRESSURE: 186 MMHG | OXYGEN SATURATION: 96 % | DIASTOLIC BLOOD PRESSURE: 118 MMHG | RESPIRATION RATE: 20 BRPM

## 2021-10-08 PROCEDURE — G0493 RN CARE EA 15 MIN HH/HOSPICE: HCPCS

## 2021-10-11 ENCOUNTER — HOME CARE VISIT (OUTPATIENT)
Dept: HOME HEALTH SERVICES | Facility: HOME HEALTHCARE | Age: 66
End: 2021-10-11

## 2021-10-11 ENCOUNTER — TELEMEDICINE (OUTPATIENT)
Dept: FAMILY MEDICINE CLINIC | Facility: CLINIC | Age: 66
End: 2021-10-11

## 2021-10-11 VITALS — SYSTOLIC BLOOD PRESSURE: 120 MMHG | DIASTOLIC BLOOD PRESSURE: 88 MMHG

## 2021-10-11 DIAGNOSIS — F41.9 ANXIETY: ICD-10-CM

## 2021-10-11 DIAGNOSIS — R53.83 MALAISE AND FATIGUE: ICD-10-CM

## 2021-10-11 DIAGNOSIS — R06.02 SHORTNESS OF BREATH: Primary | ICD-10-CM

## 2021-10-11 DIAGNOSIS — J42 CHRONIC BRONCHITIS WITH ACUTE EXACERBATION (HCC): Chronic | ICD-10-CM

## 2021-10-11 DIAGNOSIS — J20.9 CHRONIC BRONCHITIS WITH ACUTE EXACERBATION (HCC): Chronic | ICD-10-CM

## 2021-10-11 DIAGNOSIS — R53.81 MALAISE AND FATIGUE: ICD-10-CM

## 2021-10-11 PROCEDURE — 99212 OFFICE O/P EST SF 10 MIN: CPT | Performed by: NURSE PRACTITIONER

## 2021-10-11 RX ORDER — ALPRAZOLAM 0.25 MG/1
0.25 TABLET ORAL 3 TIMES DAILY PRN
Qty: 90 TABLET | Refills: 0 | Status: SHIPPED | OUTPATIENT
Start: 2021-10-11 | End: 2021-12-09 | Stop reason: SDUPTHER

## 2021-10-11 RX ORDER — ONDANSETRON 4 MG/1
4 TABLET, ORALLY DISINTEGRATING ORAL EVERY 8 HOURS PRN
Qty: 15 TABLET | Refills: 11 | Status: SHIPPED | OUTPATIENT
Start: 2021-10-11 | End: 2022-09-16

## 2021-10-11 NOTE — PROGRESS NOTES
No chief complaint on file.    Subjective   Monisha Brasher is a 66 y.o. female.           Presents with needing refill of meds, reports anxiety and fatigue related to covid pandemic and family concerns-scared to leave the house and when she does she has a panic attack. -telemedicine visit     Shortness of Breath  This is a recurrent problem. The current episode started more than 1 year ago. The problem has been gradually worsening. Associated symptoms include wheezing. Pertinent negatives include no abdominal pain, chest pain, fever, leg swelling, neck pain, rhinorrhea, swollen glands or vomiting. The treatment provided mild relief. Her past medical history is significant for allergies. There is no history of a heart failure or PE.   Fatigue  This is a recurrent problem. The current episode started more than 1 month ago. The problem occurs intermittently. The problem has been gradually worsening. Associated symptoms include arthralgias, coughing, fatigue and joint swelling. Pertinent negatives include no abdominal pain, anorexia, change in bowel habit, chest pain, chills, congestion, diaphoresis, fever, myalgias, nausea, neck pain, numbness, swollen glands, urinary symptoms, vertigo, visual change, vomiting or weakness. She has tried acetaminophen and NSAIDs for the symptoms. The treatment provided mild relief.   Anxiety  Presents for follow-up visit. Symptoms include excessive worry, hyperventilation, irritability and nervous/anxious behavior. Patient reports no chest pain, compulsions, confusion, decreased concentration, dizziness, feeling of choking, impotence, malaise, nausea, obsessions, palpitations, panic, restlessness or suicidal ideas. The quality of sleep is good. Nighttime awakenings: none.     Compliance with medications is %.        The following portions of the patient's history were reviewed and updated as appropriate: allergies, current medications, past social history and problem  list.    Review of Systems   Constitutional: Positive for activity change, appetite change, fatigue and irritability. Negative for chills, diaphoresis, fever and unexpected weight change.   HENT: Positive for postnasal drip. Negative for congestion, dental problem, drooling, ear discharge, facial swelling, hearing loss, mouth sores, nosebleeds, rhinorrhea, sinus pressure, sinus pain, sneezing, tinnitus, trouble swallowing and voice change.    Eyes: Negative.  Negative for photophobia, pain, discharge, redness, itching and visual disturbance.   Respiratory: Positive for cough and wheezing. Negative for apnea, choking, chest tightness and stridor.    Cardiovascular: Negative for chest pain, palpitations and leg swelling.   Gastrointestinal: Positive for constipation. Negative for abdominal distention, abdominal pain, anal bleeding, anorexia, blood in stool, change in bowel habit, diarrhea, nausea, rectal pain and vomiting.   Endocrine: Positive for cold intolerance. Negative for heat intolerance, polydipsia, polyphagia and polyuria.   Genitourinary: Negative.  Negative for difficulty urinating, dyspareunia, enuresis, flank pain, frequency and impotence.   Musculoskeletal: Positive for arthralgias, gait problem and joint swelling. Negative for myalgias, neck pain and neck stiffness.        Lumbar pain with radiation of pain to right hip    Skin: Positive for wound. Negative for color change and pallor.   Allergic/Immunologic: Negative.  Negative for environmental allergies, food allergies and immunocompromised state.   Neurological: Negative for dizziness, vertigo, tremors, seizures, syncope, facial asymmetry, speech difficulty, weakness, light-headedness and numbness.   Hematological: Negative.  Negative for adenopathy. Does not bruise/bleed easily.   Psychiatric/Behavioral: Positive for sleep disturbance. Negative for agitation, behavioral problems, confusion, decreased concentration, dysphoric mood, hallucinations,  self-injury and suicidal ideas. The patient is nervous/anxious. The patient is not hyperactive.          recently had a heart attack -she is very stressed        Objective   /88   Physical Exam  Vitals and nursing note reviewed.   Constitutional:       Appearance: Normal appearance.      Comments: Limited exam due to telemedicine visit    Neurological:      Mental Status: She is alert.              Assessment/Plan     Problems Addressed this Visit        Mental Health    Anxiety       Pulmonary and Pneumonias    Chronic bronchitis with acute exacerbation (HCC) (Chronic)    Relevant Medications    ALPRAZolam (Xanax) 0.25 MG tablet      Other Visit Diagnoses     Shortness of breath    -  Primary    Malaise and fatigue          Diagnoses       Codes Comments    Shortness of breath    -  Primary ICD-10-CM: R06.02  ICD-9-CM: 786.05     Malaise and fatigue     ICD-10-CM: R53.81, R53.83  ICD-9-CM: 780.79     Anxiety     ICD-10-CM: F41.9  ICD-9-CM: 300.00     Chronic bronchitis with acute exacerbation (HCC)     ICD-10-CM: J20.9, J42  ICD-9-CM: 466.0, 491.9            New Medications Ordered This Visit   Medications   • ALPRAZolam (Xanax) 0.25 MG tablet     Sig: Take 1 tablet by mouth 3 (Three) Times a Day As Needed for Anxiety.     Dispense:  90 tablet     Refill:  0   • ondansetron ODT (Zofran ODT) 4 MG disintegrating tablet     Sig: Place 1 tablet on the tongue Every 8 (Eight) Hours As Needed for Nausea or Vomiting.     Dispense:  15 tablet     Refill:  11     Current Outpatient Medications on File Prior to Visit   Medication Sig Dispense Refill   • albuterol sulfate  (90 Base) MCG/ACT inhaler INHALE 2 PUFFS EVERY 4 HOURS AS NEEDED 18 g 11   • amiodarone (PACERONE) 100 MG tablet Take 1 tablet by mouth 2 (Two) Times a Day. 60 tablet 1   • aspirin 81 MG chewable tablet Chew 1 tablet Daily.     • atorvastatin (LIPITOR) 40 MG tablet Take  by mouth Daily.     • budesonide-formoterol (SYMBICORT) 160-4.5  MCG/ACT inhaler INHALE 2 PUFFS BY MOUTH TWICE DAILY 10.2 g 5   • buPROPion (WELLBUTRIN) 75 MG tablet Take 1 tablet by mouth Daily. 30 tablet 11   • carvedilol (COREG) 6.25 MG tablet Take 1 tablet by mouth Every Night. (Patient taking differently: Take 1 tablet by mouth 2 (Two) Times a Day With Meals.) 30 tablet 0   • cyclobenzaprine (FLEXERIL) 10 MG tablet TAKE 1 TABLET BY MOUTH THREE TIMES DAILY AS NEEDED FOR MUSCLE SPASMS 90 tablet 5   • diclofenac (VOLTAREN) 50 MG EC tablet Bid prn 60 tablet 5   • furosemide (LASIX) 20 MG tablet Take 1 tablet by mouth 2 (Two) Times a Day. 60 tablet 1   • glucose blood (FREESTYLE LITE) test strip USE AS DIRECTED TO TEST BLOOD SUGAR ONCE DAILY 50 each 11   • glucose monitor monitoring kit 1 each Daily. Testing blood sugar once a day    ICD10 - R73.9 1 each 0   • hydrOXYzine (ATARAX) 10 MG tablet Take 1 tablet by mouth Every 8 (Eight) Hours As Needed for Anxiety. 90 tablet 5   • ipratropium-albuterol (DUO-NEB) 0.5-2.5 mg/3 ml nebulizer Take 3 mL by nebulization 4 (Four) Times a Day. 360 mL 5   • Lancets (FREESTYLE) lancets USE AS DIRECTED TO TEST BLOOD SUGAR ONE DAILY 100 each 5   • losartan (COZAAR) 25 MG tablet TAKE 1 TABLET BY MOUTH DAILY (Patient taking differently: Take 50 mg by mouth 2 (Two) Times a Day. dr. sanchez increased on 10/1) 30 tablet 11   • metFORMIN (GLUCOPHAGE) 500 MG tablet TAKE 1 TABLET BY MOUTH DAILY WITH BREAKFAST 30 tablet 11   • montelukast (SINGULAIR) 10 MG tablet TAKE 1 TABLET BY MOUTH DAILY 90 tablet 4   • mupirocin (BACTROBAN) 2 % ointment Apply  topically to the appropriate area as directed 2 (Two) Times a Day. 30 g 5   • nitroglycerin (NITROSTAT) 0.4 MG SL tablet Take 1 tablet under tongue every 5 minutes for a maximum of three doses per episode of chest pain 15 tablet 0   • nystatin (MYCOSTATIN) 982169 UNIT/ML suspension Take 5 mL by mouth 4 (Four) Times a Day. As needed for mouth sores. 280 mL 0   • O2 (OXYGEN) Inhale 4 L/min Continuous.     •  ondansetron (ZOFRAN) 4 MG tablet TAKE 1 TABLET BY MOUTH EVERY 8 HOURS AS NEEDED FOR NAUSEA OR VOMITING 20 tablet 2   • PARoxetine (PAXIL) 20 MG tablet TAKE 1 TABLET BY MOUTH EVERY MORNING 30 tablet 11   • prasugrel (EFFIENT) 10 MG tablet Take 1 tablet by mouth Daily. 30 tablet 11   • [DISCONTINUED] ALPRAZolam (Xanax) 0.25 MG tablet Take 1 tablet by mouth 3 (Three) Times a Day As Needed for Anxiety. 12 tablet 0   • [DISCONTINUED] azithromycin (Zithromax Z-Eduar) 250 MG tablet Take 2 tablets by mouth on day 1, then 1 tablet daily on days 2-5 6 tablet 0   • [DISCONTINUED] budesonide-formoterol (Symbicort) 160-4.5 MCG/ACT inhaler INHALE 2 PUFFS BY MOUTH TWICE DAILY 10.2 g 5   • [DISCONTINUED] methylPREDNISolone (Medrol) 8 MG tablet Take 1 tablet by mouth Daily. 30 tablet 11   • [DISCONTINUED] ondansetron ODT (Zofran ODT) 4 MG disintegrating tablet Place 1 tablet on the tongue Every 8 (Eight) Hours As Needed for Nausea or Vomiting. 15 tablet 0     No current facility-administered medications on file prior to visit.       20 minutes   Follow Up   Return in about 3 months (around 1/11/2022).        It's not just what you eat, but when you eat  Eat breakfast, and eat smaller meals throughout the day. A healthy breakfast can jumpstart your metabolism, while eating small, healthy meals (rather than the standard three large meals) keeps your energy up.   Avoid eating at night. Try to eat dinner earlier and fast for 14-16 hours until breakfast the next morning. Studies suggest that eating only when you’re most active and giving your digestive system a long break each day may help to regulate weight.     Patient understands the risks associated with this controlled medication, including tolerance and addiction.  she also agrees to only obtain this medication from me, and not from a another provider, unless that provider is covering for me in my absence.  she also agrees to be compliant in dosing, and not self adjust the dose of  medication.  A signed controlled substance agreement is on file, and she has received a controlled substance education sheet at this a previous visit.  she has also signed a consent for treatment with a controlled substance as per [facility] policy. KAYLAN was obtained.      You have chosen to receive care through a telehealth visit.  Do you consent to use a video/audio connection for your medical care today? Yes    The use of a video visit has been reviewed with the patient and verbal informed consent has been obtained.    Follow up with cardio and pulmoanry as directed, see back sooner if needed

## 2021-10-12 NOTE — CASE COMMUNICATION
SN CONTACTED DR. LÓPEZ'S OFFICE ON FRIDAY DUE TO PATIENT CONTINUING TO HAVE AN ELEVATED BLOOD PRESSURE DESPITE MEDICATION CHANGE.  DR. LÓPEZ'S OFFICE RETURNED CALL TODAY AND INCREASED PATIENT'S COREG TO 12.5 MG BID.  SN WILL MAKE A VISIT LATER THIS WEEK TO ASSESS EFFECTIVENESS OF THIS MEDICATION CHANGE. PATIENT PERSONALLY RECEIVED THE CALL WITH INSTRUCTIONS FROM DR. LÓPEZ'S OFFICE.

## 2021-10-13 ENCOUNTER — HOME CARE VISIT (OUTPATIENT)
Dept: HOME HEALTH SERVICES | Facility: CLINIC | Age: 66
End: 2021-10-13

## 2021-10-13 ENCOUNTER — HOME CARE VISIT (OUTPATIENT)
Dept: HOME HEALTH SERVICES | Facility: HOME HEALTHCARE | Age: 66
End: 2021-10-13

## 2021-10-13 VITALS
RESPIRATION RATE: 20 BRPM | SYSTOLIC BLOOD PRESSURE: 158 MMHG | DIASTOLIC BLOOD PRESSURE: 100 MMHG | TEMPERATURE: 97.7 F | HEART RATE: 82 BPM

## 2021-10-13 PROCEDURE — G0493 RN CARE EA 15 MIN HH/HOSPICE: HCPCS

## 2021-10-15 ENCOUNTER — HOME CARE VISIT (OUTPATIENT)
Dept: HOME HEALTH SERVICES | Facility: CLINIC | Age: 66
End: 2021-10-15

## 2021-10-15 PROCEDURE — G0300 HHS/HOSPICE OF LPN EA 15 MIN: HCPCS

## 2021-10-17 VITALS
RESPIRATION RATE: 18 BRPM | HEART RATE: 74 BPM | SYSTOLIC BLOOD PRESSURE: 120 MMHG | DIASTOLIC BLOOD PRESSURE: 84 MMHG | OXYGEN SATURATION: 97 % | TEMPERATURE: 96.6 F

## 2021-10-20 ENCOUNTER — HOME CARE VISIT (OUTPATIENT)
Dept: HOME HEALTH SERVICES | Facility: CLINIC | Age: 66
End: 2021-10-20

## 2021-10-20 PROCEDURE — G0299 HHS/HOSPICE OF RN EA 15 MIN: HCPCS

## 2021-10-21 VITALS
SYSTOLIC BLOOD PRESSURE: 142 MMHG | OXYGEN SATURATION: 96 % | DIASTOLIC BLOOD PRESSURE: 90 MMHG | TEMPERATURE: 96.1 F | RESPIRATION RATE: 18 BRPM | HEART RATE: 71 BPM

## 2021-10-28 ENCOUNTER — HOME CARE VISIT (OUTPATIENT)
Dept: HOME HEALTH SERVICES | Facility: HOME HEALTHCARE | Age: 66
End: 2021-10-28

## 2021-10-28 ENCOUNTER — HOME CARE VISIT (OUTPATIENT)
Dept: HOME HEALTH SERVICES | Facility: CLINIC | Age: 66
End: 2021-10-28

## 2021-10-28 VITALS
DIASTOLIC BLOOD PRESSURE: 80 MMHG | RESPIRATION RATE: 18 BRPM | TEMPERATURE: 97.3 F | HEART RATE: 66 BPM | SYSTOLIC BLOOD PRESSURE: 138 MMHG | OXYGEN SATURATION: 94 %

## 2021-10-28 PROCEDURE — G0493 RN CARE EA 15 MIN HH/HOSPICE: HCPCS

## 2021-11-04 ENCOUNTER — HOME CARE VISIT (OUTPATIENT)
Dept: HOME HEALTH SERVICES | Facility: CLINIC | Age: 66
End: 2021-11-04

## 2021-11-04 VITALS
HEART RATE: 87 BPM | SYSTOLIC BLOOD PRESSURE: 140 MMHG | TEMPERATURE: 97.5 F | DIASTOLIC BLOOD PRESSURE: 88 MMHG | RESPIRATION RATE: 18 BRPM

## 2021-11-04 PROCEDURE — G0493 RN CARE EA 15 MIN HH/HOSPICE: HCPCS

## 2021-11-15 DIAGNOSIS — J44.9 STAGE 4 VERY SEVERE COPD BY GOLD CLASSIFICATION (HCC): ICD-10-CM

## 2021-11-15 RX ORDER — ALBUTEROL SULFATE 90 UG/1
AEROSOL, METERED RESPIRATORY (INHALATION)
Qty: 18 G | Refills: 11 | Status: SHIPPED | OUTPATIENT
Start: 2021-11-15 | End: 2022-08-29

## 2021-11-18 ENCOUNTER — TELEPHONE (OUTPATIENT)
Dept: FAMILY MEDICINE CLINIC | Facility: CLINIC | Age: 66
End: 2021-11-18

## 2021-11-18 RX ORDER — AZITHROMYCIN 250 MG/1
TABLET, FILM COATED ORAL
Qty: 6 TABLET | Refills: 0 | Status: SHIPPED | OUTPATIENT
Start: 2021-11-18 | End: 2022-06-23

## 2021-12-09 DIAGNOSIS — J42 CHRONIC BRONCHITIS WITH ACUTE EXACERBATION (HCC): Chronic | ICD-10-CM

## 2021-12-09 DIAGNOSIS — J20.9 CHRONIC BRONCHITIS WITH ACUTE EXACERBATION (HCC): Chronic | ICD-10-CM

## 2021-12-09 RX ORDER — ALPRAZOLAM 0.25 MG/1
0.25 TABLET ORAL 3 TIMES DAILY PRN
Qty: 90 TABLET | Refills: 0 | Status: SHIPPED | OUTPATIENT
Start: 2021-12-09 | End: 2021-12-10 | Stop reason: SDUPTHER

## 2021-12-10 DIAGNOSIS — J42 CHRONIC BRONCHITIS WITH ACUTE EXACERBATION (HCC): Chronic | ICD-10-CM

## 2021-12-10 DIAGNOSIS — J20.9 CHRONIC BRONCHITIS WITH ACUTE EXACERBATION (HCC): Chronic | ICD-10-CM

## 2021-12-10 RX ORDER — ALPRAZOLAM 0.25 MG/1
0.25 TABLET ORAL 3 TIMES DAILY PRN
Qty: 90 TABLET | Refills: 0 | Status: SHIPPED | OUTPATIENT
Start: 2021-12-10 | End: 2022-03-24 | Stop reason: SDUPTHER

## 2022-01-31 RX ORDER — PAROXETINE HYDROCHLORIDE 20 MG/1
20 TABLET, FILM COATED ORAL EVERY MORNING
Qty: 30 TABLET | Refills: 11 | Status: SHIPPED | OUTPATIENT
Start: 2022-01-31 | End: 2023-02-09

## 2022-02-02 RX ORDER — METHYLPREDNISOLONE 4 MG/1
TABLET ORAL
Qty: 21 TABLET | Refills: 0 | Status: SHIPPED | OUTPATIENT
Start: 2022-02-02 | End: 2022-05-31 | Stop reason: SDUPTHER

## 2022-02-02 RX ORDER — CLARITHROMYCIN 500 MG/1
500 TABLET, COATED ORAL 2 TIMES DAILY
Qty: 20 TABLET | Refills: 0 | Status: SHIPPED | OUTPATIENT
Start: 2022-02-02 | End: 2022-06-23

## 2022-03-24 DIAGNOSIS — J42 CHRONIC BRONCHITIS WITH ACUTE EXACERBATION: Chronic | ICD-10-CM

## 2022-03-24 DIAGNOSIS — J20.9 CHRONIC BRONCHITIS WITH ACUTE EXACERBATION: Chronic | ICD-10-CM

## 2022-03-24 RX ORDER — ALPRAZOLAM 0.25 MG/1
0.25 TABLET ORAL 3 TIMES DAILY PRN
Qty: 10 TABLET | Refills: 0 | Status: SHIPPED | OUTPATIENT
Start: 2022-03-24 | End: 2022-03-28 | Stop reason: SDUPTHER

## 2022-03-28 ENCOUNTER — TELEMEDICINE (OUTPATIENT)
Dept: FAMILY MEDICINE CLINIC | Facility: CLINIC | Age: 67
End: 2022-03-28

## 2022-03-28 VITALS — DIASTOLIC BLOOD PRESSURE: 88 MMHG | SYSTOLIC BLOOD PRESSURE: 120 MMHG

## 2022-03-28 DIAGNOSIS — F41.9 ANXIETY: Primary | ICD-10-CM

## 2022-03-28 DIAGNOSIS — R53.81 MALAISE AND FATIGUE: ICD-10-CM

## 2022-03-28 DIAGNOSIS — R53.83 MALAISE AND FATIGUE: ICD-10-CM

## 2022-03-28 DIAGNOSIS — J20.9 CHRONIC BRONCHITIS WITH ACUTE EXACERBATION: Chronic | ICD-10-CM

## 2022-03-28 DIAGNOSIS — J42 CHRONIC BRONCHITIS WITH ACUTE EXACERBATION: Chronic | ICD-10-CM

## 2022-03-28 PROCEDURE — 99213 OFFICE O/P EST LOW 20 MIN: CPT | Performed by: NURSE PRACTITIONER

## 2022-03-28 RX ORDER — ALPRAZOLAM 0.25 MG/1
0.25 TABLET ORAL 3 TIMES DAILY PRN
Qty: 90 TABLET | Refills: 0 | Status: SHIPPED | OUTPATIENT
Start: 2022-03-28 | End: 2022-06-01 | Stop reason: SDUPTHER

## 2022-03-28 NOTE — PROGRESS NOTES
No chief complaint on file.    Subjective   Monisha Brasher is a 66 y.o. female.           Presents with needing refill of meds, reports anxiety and fatigue related to covid pandemic and family concerns-scared to leave the house and when she does she has a panic attack. --telemedicine visit -history of copd     Shortness of Breath  This is a recurrent problem. The current episode started more than 1 year ago. The problem has been gradually worsening. Pertinent negatives include no abdominal pain, leg swelling, neck pain, swollen glands or vomiting. The treatment provided mild relief. Her past medical history is significant for allergies. There is no history of a heart failure or PE.   Fatigue  This is a recurrent problem. The current episode started more than 1 month ago. The problem occurs intermittently. The problem has been gradually worsening. Associated symptoms include arthralgias, fatigue and joint swelling. Pertinent negatives include no abdominal pain, anorexia, change in bowel habit, congestion, diaphoresis, nausea, neck pain, numbness, swollen glands, urinary symptoms, vertigo, visual change, vomiting or weakness. She has tried acetaminophen and NSAIDs for the symptoms. The treatment provided mild relief.   Anxiety  Presents for follow-up visit. Symptoms include excessive worry, hyperventilation, irritability and nervous/anxious behavior. Patient reports no compulsions, confusion, decreased concentration, dizziness, feeling of choking, impotence, malaise, nausea, obsessions, palpitations, panic, restlessness or suicidal ideas. The quality of sleep is good. Nighttime awakenings: none.     Compliance with medications is %.        The following portions of the patient's history were reviewed and updated as appropriate: allergies, current medications, past social history and problem list.    Review of Systems   Constitutional: Positive for activity change, appetite change, fatigue and  irritability. Negative for diaphoresis and unexpected weight change.   HENT: Negative for congestion, dental problem, drooling, ear discharge, facial swelling, hearing loss, mouth sores, nosebleeds, sinus pressure, sinus pain, sneezing, tinnitus, trouble swallowing and voice change.    Eyes: Negative.  Negative for photophobia, pain, discharge, redness, itching and visual disturbance.   Respiratory: Negative for apnea, choking, chest tightness and stridor.    Cardiovascular: Negative for palpitations and leg swelling.   Gastrointestinal: Positive for constipation. Negative for abdominal distention, abdominal pain, anal bleeding, anorexia, blood in stool, change in bowel habit, diarrhea, nausea, rectal pain and vomiting.   Endocrine: Positive for cold intolerance. Negative for heat intolerance, polydipsia, polyphagia and polyuria.   Genitourinary: Negative.  Negative for difficulty urinating, dyspareunia, dysuria, enuresis, flank pain, frequency and impotence.   Musculoskeletal: Positive for arthralgias, gait problem and joint swelling. Negative for neck pain and neck stiffness.        Lumbar pain with radiation of pain to right hip    Skin: Positive for wound. Negative for color change and pallor.   Allergic/Immunologic: Negative.  Negative for food allergies and immunocompromised state.   Neurological: Negative for dizziness, vertigo, tremors, seizures, syncope, facial asymmetry, speech difficulty, weakness, light-headedness and numbness.   Hematological: Negative.  Negative for adenopathy. Does not bruise/bleed easily.   Psychiatric/Behavioral: Positive for sleep disturbance. Negative for agitation, behavioral problems, confusion, decreased concentration, dysphoric mood, hallucinations, self-injury and suicidal ideas. The patient is nervous/anxious. The patient is not hyperactive.          recently had a heart attack -she is very stressed        Objective   /88   Physical Exam  Vitals and nursing note  reviewed.   Constitutional:       Appearance: Normal appearance.      Comments: Limited exam due to telemedicine    Neurological:      Mental Status: She is alert.              Assessment/Plan     Problems Addressed this Visit        Pulmonary and Pneumonias    Chronic bronchitis with acute exacerbation (HCC) (Chronic)    Relevant Medications    ALPRAZolam (Xanax) 0.25 MG tablet    Other Relevant Orders    CBC & Differential    Comprehensive Metabolic Panel    Iron    Lipid Panel    Hemoglobin A1c    Vitamin D 25 Hydroxy    Vitamin B12    TSH      Diagnoses       Codes Comments    Chronic bronchitis with acute exacerbation (HCC)     ICD-10-CM: J20.9, J42  ICD-9-CM: 466.0, 491.9            New Medications Ordered This Visit   Medications   • ALPRAZolam (Xanax) 0.25 MG tablet     Sig: Take 1 tablet by mouth 3 (Three) Times a Day As Needed for Anxiety.     Dispense:  90 tablet     Refill:  0     Current Outpatient Medications on File Prior to Visit   Medication Sig Dispense Refill   • albuterol sulfate  (90 Base) MCG/ACT inhaler INHALE 2 PUFFS EVERY 4 HOURS AS NEEDED 18 g 11   • amiodarone (PACERONE) 100 MG tablet Take 1 tablet by mouth 2 (Two) Times a Day. 60 tablet 1   • amLODIPine (Norvasc) 5 MG tablet Take 5 mg by mouth Daily As Needed (HTN). IF BP IS >170/100.     • aspirin 81 MG chewable tablet Chew 1 tablet Daily.     • atorvastatin (LIPITOR) 40 MG tablet Take  by mouth Daily.     • azithromycin (Zithromax Z-Eduar) 250 MG tablet Take 2 tablets by mouth on day 1, then 1 tablet daily on days 2-5 6 tablet 0   • budesonide-formoterol (SYMBICORT) 160-4.5 MCG/ACT inhaler INHALE 2 PUFFS BY MOUTH TWICE DAILY 10.2 g 5   • buPROPion (WELLBUTRIN) 75 MG tablet Take 1 tablet by mouth Daily. 30 tablet 11   • carvedilol (COREG) 6.25 MG tablet Take 1 tablet by mouth Every Night. (Patient taking differently: Take 12.5 mg by mouth 2 (Two) Times a Day With Meals.) 30 tablet 0   • clarithromycin (Biaxin) 500 MG tablet Take 1  tablet by mouth 2 (Two) Times a Day. 20 tablet 0   • cyclobenzaprine (FLEXERIL) 10 MG tablet TAKE 1 TABLET BY MOUTH THREE TIMES DAILY AS NEEDED FOR MUSCLE SPASMS 90 tablet 5   • diclofenac (VOLTAREN) 50 MG EC tablet Bid prn 60 tablet 5   • furosemide (LASIX) 20 MG tablet Take 1 tablet by mouth 2 (Two) Times a Day. 60 tablet 1   • glucose blood (FREESTYLE LITE) test strip USE AS DIRECTED TO TEST BLOOD SUGAR ONCE DAILY 50 each 11   • glucose monitor monitoring kit 1 each Daily. Testing blood sugar once a day    ICD10 - R73.9 1 each 0   • guaiFENesin (MUCINEX PO) Take 600 mg by mouth 2 (two) times a day.     • hydroCHLOROthiazide (HYDRODIURIL) 12.5 MG tablet Take 12.5 mg by mouth Daily.     • hydrOXYzine (ATARAX) 10 MG tablet Take 1 tablet by mouth Every 8 (Eight) Hours As Needed for Anxiety. 90 tablet 5   • ipratropium-albuterol (DUO-NEB) 0.5-2.5 mg/3 ml nebulizer Take 3 mL by nebulization 4 (Four) Times a Day. 360 mL 5   • Lancets (FREESTYLE) lancets USE AS DIRECTED TO TEST BLOOD SUGAR ONE DAILY 100 each 5   • losartan (COZAAR) 25 MG tablet TAKE 1 TABLET BY MOUTH DAILY (Patient taking differently: Take 50 mg by mouth 2 (Two) Times a Day. dr. sanchez increased on 10/1) 30 tablet 11   • metFORMIN (GLUCOPHAGE) 500 MG tablet TAKE 1 TABLET BY MOUTH DAILY WITH BREAKFAST 30 tablet 11   • methylPREDNISolone (MEDROL) 4 MG dose pack Take as directed on package instructions. 21 tablet 0   • montelukast (SINGULAIR) 10 MG tablet TAKE 1 TABLET BY MOUTH DAILY 90 tablet 4   • mupirocin (BACTROBAN) 2 % ointment Apply  topically to the appropriate area as directed 2 (Two) Times a Day. 30 g 5   • nitroglycerin (NITROSTAT) 0.4 MG SL tablet Take 1 tablet under tongue every 5 minutes for a maximum of three doses per episode of chest pain 15 tablet 0   • nystatin (MYCOSTATIN) 100,000 unit/mL suspension Take 5 mL by mouth 4 (Four) Times a Day. As needed for mouth sores. 280 mL 0   • O2 (OXYGEN) Inhale 4 L/min Continuous.     • ondansetron  (ZOFRAN) 4 MG tablet TAKE 1 TABLET BY MOUTH EVERY 8 HOURS AS NEEDED FOR NAUSEA OR VOMITING 20 tablet 2   • ondansetron ODT (Zofran ODT) 4 MG disintegrating tablet Place 1 tablet on the tongue Every 8 (Eight) Hours As Needed for Nausea or Vomiting. 15 tablet 11   • PARoxetine (PAXIL) 20 MG tablet Take 1 tablet by mouth Every Morning. 30 tablet 11   • prasugrel (EFFIENT) 10 MG tablet Take 1 tablet by mouth Daily. 30 tablet 11   • [DISCONTINUED] ALPRAZolam (Xanax) 0.25 MG tablet Take 1 tablet by mouth 3 (Three) Times a Day As Needed for Anxiety. 10 tablet 0     No current facility-administered medications on file prior to visit.       15 minutes  Follow Up   Return in about 3 months (around 6/28/2022) for Next scheduled follow up.     You have chosen to receive care through a telehealth visit.  Do you consent to use a video/audio connection for your medical care today? Yes          Labs as directed, meds as directed, see back in 3 months sooner if needed   The use of a video visit has been reviewed with the patient and verbal informed consent has been obtained.     Patient understands the risks associated with this controlled medication, including tolerance and addiction.  she also agrees to only obtain this medication from me, and not from a another provider, unless that provider is covering for me in my absence.  she also agrees to be compliant in dosing, and not self adjust the dose of medication.  A signed controlled substance agreement is on file, and she has received a controlled substance education sheet at this a previous visit.  she has also signed a consent for treatment with a controlled substance as per [facility] policy. KAYLAN was obtained.

## 2022-03-31 RX ORDER — METHYLPREDNISOLONE 8 MG/1
8 TABLET ORAL DAILY
Qty: 30 TABLET | Refills: 1 | Status: SHIPPED | OUTPATIENT
Start: 2022-03-31 | End: 2022-05-31 | Stop reason: SDUPTHER

## 2022-05-11 DIAGNOSIS — J44.9 STAGE 4 VERY SEVERE COPD BY GOLD CLASSIFICATION: ICD-10-CM

## 2022-05-11 RX ORDER — IPRATROPIUM BROMIDE AND ALBUTEROL SULFATE 2.5; .5 MG/3ML; MG/3ML
SOLUTION RESPIRATORY (INHALATION)
Qty: 360 ML | Refills: 5 | Status: SHIPPED | OUTPATIENT
Start: 2022-05-11 | End: 2022-10-31 | Stop reason: SDUPTHER

## 2022-05-11 NOTE — TELEPHONE ENCOUNTER
Next Yovana 06/23/2022    ----- Message from Monisha Brasher sent at 5/11/2022 11:36 AM CDT -----  Regarding: Prescription   [facility] is sending in a request for iipratropium bromide albuterol sulfate. They suggested I send a message since I am going out of town on Saturday.   Thank you   Monisha Brasher

## 2022-05-12 ENCOUNTER — TELEPHONE (OUTPATIENT)
Dept: FAMILY MEDICINE CLINIC | Facility: CLINIC | Age: 67
End: 2022-05-12

## 2022-05-13 ENCOUNTER — CLINICAL SUPPORT (OUTPATIENT)
Dept: FAMILY MEDICINE CLINIC | Facility: CLINIC | Age: 67
End: 2022-05-13

## 2022-05-13 DIAGNOSIS — T78.40XA ALLERGY, INITIAL ENCOUNTER: Primary | ICD-10-CM

## 2022-05-13 PROCEDURE — 96372 THER/PROPH/DIAG INJ SC/IM: CPT | Performed by: NURSE PRACTITIONER

## 2022-05-13 RX ORDER — TRIAMCINOLONE ACETONIDE 40 MG/ML
80 INJECTION, SUSPENSION INTRA-ARTICULAR; INTRAMUSCULAR ONCE
Status: COMPLETED | OUTPATIENT
Start: 2022-05-13 | End: 2022-05-13

## 2022-05-13 RX ADMIN — TRIAMCINOLONE ACETONIDE 80 MG: 40 INJECTION, SUSPENSION INTRA-ARTICULAR; INTRAMUSCULAR at 13:26

## 2022-05-31 RX ORDER — METHYLPREDNISOLONE 8 MG/1
8 TABLET ORAL DAILY
Qty: 30 TABLET | Refills: 1 | Status: SHIPPED | OUTPATIENT
Start: 2022-05-31 | End: 2022-07-27 | Stop reason: SDUPTHER

## 2022-05-31 NOTE — TELEPHONE ENCOUNTER
Please Advise on refill, Is this a long term tayo for her?     Next Appt  With Family Medicine (Tiffanie Elise, APRN)  06/23/2022 at 1:45 PM      Last OV 03/28/2022    Last Script 03/31/2022  #30, 2 refills

## 2022-06-01 DIAGNOSIS — J20.9 CHRONIC BRONCHITIS WITH ACUTE EXACERBATION: Chronic | ICD-10-CM

## 2022-06-01 DIAGNOSIS — J42 CHRONIC BRONCHITIS WITH ACUTE EXACERBATION: Chronic | ICD-10-CM

## 2022-06-02 RX ORDER — ALPRAZOLAM 0.25 MG/1
0.25 TABLET ORAL 3 TIMES DAILY PRN
Qty: 90 TABLET | Refills: 0 | Status: SHIPPED | OUTPATIENT
Start: 2022-06-02 | End: 2022-08-04 | Stop reason: SDUPTHER

## 2022-06-02 NOTE — TELEPHONE ENCOUNTER
Last Script  03/28/2022  #90, NR    Next Yovana OV 06/23/2022    Last OV 03/28/2022 Telemed    ----- Message from Monisha Brasher sent at 6/1/2022  6:50 PM CDT -----  Regarding: Rojelio Harvey could you send in a renewal for Xanax for me?  Thank you  Monisha Brasher

## 2022-06-13 RX ORDER — CLARITHROMYCIN 500 MG/1
500 TABLET, COATED ORAL 2 TIMES DAILY
Qty: 20 TABLET | Refills: 0 | Status: SHIPPED | OUTPATIENT
Start: 2022-06-13 | End: 2022-08-22 | Stop reason: SDUPTHER

## 2022-06-23 ENCOUNTER — LAB (OUTPATIENT)
Dept: LAB | Facility: HOSPITAL | Age: 67
End: 2022-06-23

## 2022-06-23 ENCOUNTER — OFFICE VISIT (OUTPATIENT)
Dept: FAMILY MEDICINE CLINIC | Facility: CLINIC | Age: 67
End: 2022-06-23

## 2022-06-23 VITALS
HEART RATE: 128 BPM | HEIGHT: 62 IN | DIASTOLIC BLOOD PRESSURE: 70 MMHG | WEIGHT: 148 LBS | BODY MASS INDEX: 27.23 KG/M2 | OXYGEN SATURATION: 94 % | SYSTOLIC BLOOD PRESSURE: 120 MMHG

## 2022-06-23 DIAGNOSIS — R53.83 MALAISE AND FATIGUE: ICD-10-CM

## 2022-06-23 DIAGNOSIS — R53.81 MALAISE AND FATIGUE: ICD-10-CM

## 2022-06-23 DIAGNOSIS — R00.0 TACHYCARDIA: ICD-10-CM

## 2022-06-23 DIAGNOSIS — R06.02 SHORTNESS OF BREATH: Primary | ICD-10-CM

## 2022-06-23 DIAGNOSIS — R42 DIZZINESS: ICD-10-CM

## 2022-06-23 LAB
QT INTERVAL: 318 MS
QTC INTERVAL: 467 MS

## 2022-06-23 PROCEDURE — 84443 ASSAY THYROID STIM HORMONE: CPT | Performed by: NURSE PRACTITIONER

## 2022-06-23 PROCEDURE — 80061 LIPID PANEL: CPT | Performed by: NURSE PRACTITIONER

## 2022-06-23 PROCEDURE — 83540 ASSAY OF IRON: CPT | Performed by: NURSE PRACTITIONER

## 2022-06-23 PROCEDURE — 93010 ELECTROCARDIOGRAM REPORT: CPT | Performed by: INTERNAL MEDICINE

## 2022-06-23 PROCEDURE — 83036 HEMOGLOBIN GLYCOSYLATED A1C: CPT | Performed by: NURSE PRACTITIONER

## 2022-06-23 PROCEDURE — 93005 ELECTROCARDIOGRAM TRACING: CPT | Performed by: NURSE PRACTITIONER

## 2022-06-23 PROCEDURE — 36415 COLL VENOUS BLD VENIPUNCTURE: CPT | Performed by: NURSE PRACTITIONER

## 2022-06-23 PROCEDURE — 85025 COMPLETE CBC W/AUTO DIFF WBC: CPT | Performed by: NURSE PRACTITIONER

## 2022-06-23 PROCEDURE — 82306 VITAMIN D 25 HYDROXY: CPT | Performed by: NURSE PRACTITIONER

## 2022-06-23 PROCEDURE — 80053 COMPREHEN METABOLIC PANEL: CPT | Performed by: NURSE PRACTITIONER

## 2022-06-23 PROCEDURE — 99214 OFFICE O/P EST MOD 30 MIN: CPT | Performed by: NURSE PRACTITIONER

## 2022-06-23 PROCEDURE — 82607 VITAMIN B-12: CPT | Performed by: NURSE PRACTITIONER

## 2022-06-23 RX ORDER — CARVEDILOL 12.5 MG/1
12.5 TABLET ORAL 2 TIMES DAILY WITH MEALS
Qty: 60 TABLET | Refills: 11 | Status: SHIPPED | OUTPATIENT
Start: 2022-06-23

## 2022-06-23 RX ORDER — CLOPIDOGREL BISULFATE 75 MG/1
75 TABLET ORAL DAILY
Qty: 30 TABLET | Refills: 11 | Status: SHIPPED | OUTPATIENT
Start: 2022-06-23

## 2022-06-24 LAB
25(OH)D3 SERPL-MCNC: 16.8 NG/ML (ref 30–100)
ALBUMIN SERPL-MCNC: 4.1 G/DL (ref 3.5–5.2)
ALBUMIN/GLOB SERPL: 1.8 G/DL
ALP SERPL-CCNC: 78 U/L (ref 39–117)
ALT SERPL W P-5'-P-CCNC: 18 U/L (ref 1–33)
ANION GAP SERPL CALCULATED.3IONS-SCNC: 12 MMOL/L (ref 5–15)
AST SERPL-CCNC: 15 U/L (ref 1–32)
BASOPHILS # BLD AUTO: 0.03 10*3/MM3 (ref 0–0.2)
BASOPHILS NFR BLD AUTO: 0.3 % (ref 0–1.5)
BILIRUB SERPL-MCNC: 0.4 MG/DL (ref 0–1.2)
BUN SERPL-MCNC: 12 MG/DL (ref 8–23)
BUN/CREAT SERPL: 21.4 (ref 7–25)
CALCIUM SPEC-SCNC: 8.9 MG/DL (ref 8.6–10.5)
CHLORIDE SERPL-SCNC: 96 MMOL/L (ref 98–107)
CHOLEST SERPL-MCNC: 156 MG/DL (ref 0–200)
CO2 SERPL-SCNC: 25 MMOL/L (ref 22–29)
CREAT SERPL-MCNC: 0.56 MG/DL (ref 0.57–1)
DEPRECATED RDW RBC AUTO: 43.7 FL (ref 37–54)
EGFRCR SERPLBLD CKD-EPI 2021: 101.4 ML/MIN/1.73
EOSINOPHIL # BLD AUTO: 0 10*3/MM3 (ref 0–0.4)
EOSINOPHIL NFR BLD AUTO: 0 % (ref 0.3–6.2)
ERYTHROCYTE [DISTWIDTH] IN BLOOD BY AUTOMATED COUNT: 13.2 % (ref 12.3–15.4)
GLOBULIN UR ELPH-MCNC: 2.3 GM/DL
GLUCOSE SERPL-MCNC: 140 MG/DL (ref 65–99)
HBA1C MFR BLD: 7 % (ref 4.8–5.6)
HCT VFR BLD AUTO: 40.7 % (ref 34–46.6)
HDLC SERPL-MCNC: 81 MG/DL (ref 40–60)
HGB BLD-MCNC: 13.5 G/DL (ref 12–15.9)
IMM GRANULOCYTES # BLD AUTO: 0.15 10*3/MM3 (ref 0–0.05)
IMM GRANULOCYTES NFR BLD AUTO: 1.4 % (ref 0–0.5)
IRON 24H UR-MRATE: 60 MCG/DL (ref 37–145)
LDLC SERPL CALC-MCNC: 59 MG/DL (ref 0–100)
LDLC/HDLC SERPL: 0.7 {RATIO}
LYMPHOCYTES # BLD AUTO: 0.38 10*3/MM3 (ref 0.7–3.1)
LYMPHOCYTES NFR BLD AUTO: 3.6 % (ref 19.6–45.3)
MCH RBC QN AUTO: 30.5 PG (ref 26.6–33)
MCHC RBC AUTO-ENTMCNC: 33.2 G/DL (ref 31.5–35.7)
MCV RBC AUTO: 91.9 FL (ref 79–97)
MONOCYTES # BLD AUTO: 0.3 10*3/MM3 (ref 0.1–0.9)
MONOCYTES NFR BLD AUTO: 2.8 % (ref 5–12)
NEUTROPHILS NFR BLD AUTO: 9.81 10*3/MM3 (ref 1.7–7)
NEUTROPHILS NFR BLD AUTO: 91.9 % (ref 42.7–76)
NRBC BLD AUTO-RTO: 0 /100 WBC (ref 0–0.2)
PLATELET # BLD AUTO: 288 10*3/MM3 (ref 140–450)
PMV BLD AUTO: 9.2 FL (ref 6–12)
POTASSIUM SERPL-SCNC: 4.2 MMOL/L (ref 3.5–5.2)
PROT SERPL-MCNC: 6.4 G/DL (ref 6–8.5)
RBC # BLD AUTO: 4.43 10*6/MM3 (ref 3.77–5.28)
SODIUM SERPL-SCNC: 133 MMOL/L (ref 136–145)
TRIGL SERPL-MCNC: 90 MG/DL (ref 0–150)
TSH SERPL DL<=0.05 MIU/L-ACNC: 0.2 UIU/ML (ref 0.27–4.2)
VIT B12 BLD-MCNC: 364 PG/ML (ref 211–946)
VLDLC SERPL-MCNC: 16 MG/DL (ref 5–40)
WBC NRBC COR # BLD: 10.67 10*3/MM3 (ref 3.4–10.8)

## 2022-07-27 RX ORDER — METHYLPREDNISOLONE 8 MG/1
8 TABLET ORAL DAILY
Qty: 30 TABLET | Refills: 1 | Status: SHIPPED | OUTPATIENT
Start: 2022-07-27 | End: 2022-10-04

## 2022-07-27 RX ORDER — CYCLOBENZAPRINE HCL 10 MG
10 TABLET ORAL 3 TIMES DAILY PRN
Qty: 90 TABLET | Refills: 5 | Status: SHIPPED | OUTPATIENT
Start: 2022-07-27 | End: 2022-11-14

## 2022-07-27 NOTE — TELEPHONE ENCOUNTER
Tiffanie,     Is it OK to refill the Prednisone 8 mg.      Please advise   LISET Gambino      ----- Message from Monisha Brasher sent at 7/27/2022 10:33 AM CDT -----  Regarding: Prescription refill  Prescription refills  I need refills for methyprednisolone 8 mg and  Cyclobenzaprine 10 mg  I also wanted to let you know I broke my left ankle.   Thanks Leidy Brasher

## 2022-08-04 DIAGNOSIS — J20.9 CHRONIC BRONCHITIS WITH ACUTE EXACERBATION: Chronic | ICD-10-CM

## 2022-08-04 DIAGNOSIS — J42 CHRONIC BRONCHITIS WITH ACUTE EXACERBATION: Chronic | ICD-10-CM

## 2022-08-04 RX ORDER — ALPRAZOLAM 0.25 MG/1
0.25 TABLET ORAL 3 TIMES DAILY PRN
Qty: 90 TABLET | Refills: 0 | Status: SHIPPED | OUTPATIENT
Start: 2022-08-04 | End: 2022-10-06 | Stop reason: SDUPTHER

## 2022-08-22 RX ORDER — CLARITHROMYCIN 500 MG/1
500 TABLET, COATED ORAL 2 TIMES DAILY
Qty: 20 TABLET | Refills: 0 | Status: SHIPPED | OUTPATIENT
Start: 2022-08-22 | End: 2022-10-04

## 2022-08-29 DIAGNOSIS — J44.9 STAGE 4 VERY SEVERE COPD BY GOLD CLASSIFICATION: ICD-10-CM

## 2022-08-29 RX ORDER — ALBUTEROL SULFATE 90 UG/1
AEROSOL, METERED RESPIRATORY (INHALATION)
Qty: 18 G | Refills: 11 | Status: SHIPPED | OUTPATIENT
Start: 2022-08-29

## 2022-08-29 RX ORDER — MONTELUKAST SODIUM 10 MG/1
TABLET ORAL
Qty: 90 TABLET | Refills: 4 | Status: SHIPPED | OUTPATIENT
Start: 2022-08-29

## 2022-08-31 DIAGNOSIS — R42 DIZZINESS: Primary | ICD-10-CM

## 2022-09-06 RX ORDER — METHYLPREDNISOLONE 4 MG/1
TABLET ORAL
Qty: 21 EACH | OUTPATIENT
Start: 2022-09-06

## 2022-09-16 RX ORDER — ONDANSETRON 4 MG/1
TABLET, ORALLY DISINTEGRATING ORAL
Qty: 15 TABLET | Refills: 11 | Status: SHIPPED | OUTPATIENT
Start: 2022-09-16

## 2022-10-04 RX ORDER — METHYLPREDNISOLONE 8 MG/1
8 TABLET ORAL DAILY
Qty: 30 TABLET | Refills: 1 | Status: SHIPPED | OUTPATIENT
Start: 2022-10-04 | End: 2022-10-05 | Stop reason: SDUPTHER

## 2022-10-04 NOTE — TELEPHONE ENCOUNTER
Incoming Refill Request      Medication requested (name and dose): methylPREDNISolone (MEDROL) 8 MG tablet    Pharmacy where request should be sent: [facility]     Additional details provided by patient: out     Best call back number: [phone]    Does the patient have less than a 3 day supply:  [x] Yes  [] No    Jason Colon Rep  10/04/22, 14:13 CDT

## 2022-10-05 RX ORDER — METHYLPREDNISOLONE 8 MG/1
8 TABLET ORAL DAILY
Qty: 30 TABLET | Refills: 1 | Status: SHIPPED | OUTPATIENT
Start: 2022-10-05 | End: 2023-01-27 | Stop reason: SDUPTHER

## 2022-10-06 DIAGNOSIS — J20.9 CHRONIC BRONCHITIS WITH ACUTE EXACERBATION: Chronic | ICD-10-CM

## 2022-10-06 DIAGNOSIS — J42 CHRONIC BRONCHITIS WITH ACUTE EXACERBATION: Chronic | ICD-10-CM

## 2022-10-06 RX ORDER — ALPRAZOLAM 0.25 MG/1
0.25 TABLET ORAL 3 TIMES DAILY PRN
Qty: 90 TABLET | Refills: 0 | Status: SHIPPED | OUTPATIENT
Start: 2022-10-06 | End: 2022-12-09 | Stop reason: SDUPTHER

## 2022-10-31 DIAGNOSIS — J44.9 STAGE 4 VERY SEVERE COPD BY GOLD CLASSIFICATION: ICD-10-CM

## 2022-10-31 RX ORDER — IPRATROPIUM BROMIDE AND ALBUTEROL SULFATE 2.5; .5 MG/3ML; MG/3ML
3 SOLUTION RESPIRATORY (INHALATION) 4 TIMES DAILY
Qty: 360 ML | Refills: 5 | Status: SHIPPED | OUTPATIENT
Start: 2022-10-31 | End: 2022-10-31 | Stop reason: SDUPTHER

## 2022-10-31 RX ORDER — IPRATROPIUM BROMIDE AND ALBUTEROL SULFATE 2.5; .5 MG/3ML; MG/3ML
3 SOLUTION RESPIRATORY (INHALATION) 4 TIMES DAILY
Qty: 360 ML | Refills: 5 | Status: SHIPPED | OUTPATIENT
Start: 2022-10-31 | End: 2023-03-06

## 2022-11-09 ENCOUNTER — OFFICE VISIT (OUTPATIENT)
Dept: OTOLARYNGOLOGY | Facility: CLINIC | Age: 67
End: 2022-11-09

## 2022-11-09 VITALS
WEIGHT: 143.6 LBS | SYSTOLIC BLOOD PRESSURE: 151 MMHG | BODY MASS INDEX: 26.43 KG/M2 | HEIGHT: 62 IN | DIASTOLIC BLOOD PRESSURE: 93 MMHG

## 2022-11-09 DIAGNOSIS — R42 DIZZINESS: Primary | ICD-10-CM

## 2022-11-09 DIAGNOSIS — R55 SYNCOPE, UNSPECIFIED SYNCOPE TYPE: ICD-10-CM

## 2022-11-09 PROCEDURE — 99203 OFFICE O/P NEW LOW 30 MIN: CPT | Performed by: OTOLARYNGOLOGY

## 2022-11-09 RX ORDER — LIFITEGRAST 50 MG/ML
1 SOLUTION/ DROPS OPHTHALMIC 2 TIMES DAILY
COMMUNITY
Start: 2022-10-18

## 2022-11-09 RX ORDER — LOSARTAN POTASSIUM 25 MG/1
25 TABLET ORAL
COMMUNITY

## 2022-11-09 RX ORDER — TIOTROPIUM BROMIDE INHALATION SPRAY 3.12 UG/1
2 SPRAY, METERED RESPIRATORY (INHALATION)
COMMUNITY

## 2022-11-09 RX ORDER — GUAIFENESIN 600 MG/1
600 TABLET, EXTENDED RELEASE ORAL 3 TIMES DAILY
COMMUNITY
Start: 2022-10-17

## 2022-11-10 ENCOUNTER — OFFICE VISIT (OUTPATIENT)
Dept: FAMILY MEDICINE CLINIC | Facility: CLINIC | Age: 67
End: 2022-11-10

## 2022-11-10 VITALS
DIASTOLIC BLOOD PRESSURE: 82 MMHG | WEIGHT: 143 LBS | HEART RATE: 88 BPM | OXYGEN SATURATION: 94 % | BODY MASS INDEX: 26.31 KG/M2 | SYSTOLIC BLOOD PRESSURE: 122 MMHG | HEIGHT: 62 IN

## 2022-11-10 DIAGNOSIS — M54.41 ACUTE RIGHT-SIDED LOW BACK PAIN WITH RIGHT-SIDED SCIATICA: Primary | ICD-10-CM

## 2022-11-10 DIAGNOSIS — M54.50 LUMBAR PAIN: ICD-10-CM

## 2022-11-10 PROCEDURE — 99213 OFFICE O/P EST LOW 20 MIN: CPT | Performed by: NURSE PRACTITIONER

## 2022-11-10 PROCEDURE — 96372 THER/PROPH/DIAG INJ SC/IM: CPT | Performed by: NURSE PRACTITIONER

## 2022-11-10 RX ORDER — TRAMADOL HYDROCHLORIDE 50 MG/1
50 TABLET ORAL EVERY 6 HOURS PRN
Qty: 60 TABLET | Refills: 0 | Status: SHIPPED | OUTPATIENT
Start: 2022-11-10

## 2022-11-10 RX ORDER — ONDANSETRON 4 MG/1
4 TABLET, ORALLY DISINTEGRATING ORAL EVERY 8 HOURS PRN
Qty: 15 TABLET | Refills: 11 | Status: SHIPPED | OUTPATIENT
Start: 2022-11-10

## 2022-11-10 RX ORDER — TRIAMCINOLONE ACETONIDE 40 MG/ML
80 INJECTION, SUSPENSION INTRA-ARTICULAR; INTRAMUSCULAR ONCE
Status: COMPLETED | OUTPATIENT
Start: 2022-11-10 | End: 2022-11-10

## 2022-11-10 RX ORDER — BACLOFEN 10 MG/1
10 TABLET ORAL 2 TIMES DAILY
Qty: 60 TABLET | Refills: 11 | Status: SHIPPED | OUTPATIENT
Start: 2022-11-10

## 2022-11-10 RX ADMIN — TRIAMCINOLONE ACETONIDE 80 MG: 40 INJECTION, SUSPENSION INTRA-ARTICULAR; INTRAMUSCULAR at 16:09

## 2022-11-10 NOTE — PROGRESS NOTES
Chief Complaint   Patient presents with   • Back Pain     Flared up      Subjective   Monisha Brasher is a 67 y.o. female.           Back Pain  This is a recurrent problem. The current episode started 1 to 4 weeks ago. The problem occurs daily. The problem has been gradually worsening since onset. The pain is present in the sacro-iliac. The pain radiates to the right foot, right knee and right thigh. The pain is at a severity of 3/10. The pain is mild. The pain is the same all the time. Stiffness is present all day. Associated symptoms include numbness, paresis and paresthesias. Pertinent negatives include no abdominal pain, chest pain, dysuria, headaches or weakness. Risk factors include sedentary lifestyle. She has tried analgesics, bed rest, home exercises, heat, ice, muscle relaxant and NSAIDs for the symptoms. The treatment provided mild relief.        The following portions of the patient's history were reviewed and updated as appropriate: allergies, current medications, past social history and problem list.    Review of Systems   Constitutional: Positive for activity change, appetite change and fatigue. Negative for diaphoresis and unexpected weight change.   HENT: Negative for congestion, dental problem, drooling, ear discharge, facial swelling, hearing loss, mouth sores, nosebleeds, postnasal drip, rhinorrhea, sinus pressure, sinus pain, sneezing, tinnitus, trouble swallowing and voice change.    Eyes: Negative.  Negative for photophobia, pain, discharge, redness, itching and visual disturbance.   Respiratory: Positive for shortness of breath. Negative for apnea, choking, chest tightness and stridor.    Cardiovascular: Positive for palpitations and leg swelling. Negative for chest pain.   Gastrointestinal: Positive for constipation. Negative for abdominal distention, abdominal pain, anal bleeding, blood in stool, diarrhea, nausea, rectal pain and vomiting.   Endocrine: Positive for cold intolerance.  "Negative for heat intolerance, polydipsia, polyphagia and polyuria.   Genitourinary: Negative.  Negative for decreased urine volume, difficulty urinating, dyspareunia, dysuria, enuresis, flank pain, frequency and urgency.   Musculoskeletal: Positive for arthralgias, back pain, gait problem, joint swelling and myalgias. Negative for neck pain and neck stiffness.        Lumbar pain with radiation of pain to right hip    Skin: Positive for wound. Negative for color change and pallor.   Allergic/Immunologic: Negative.  Negative for food allergies and immunocompromised state.   Neurological: Positive for dizziness, numbness and paresthesias. Negative for tremors, seizures, syncope, facial asymmetry, speech difficulty, weakness, light-headedness and headaches.        Radiation of pain with numbness and tingling right leg    Hematological: Negative.  Negative for adenopathy. Does not bruise/bleed easily.   Psychiatric/Behavioral: Positive for sleep disturbance. Negative for agitation, behavioral problems, confusion, decreased concentration, dysphoric mood, hallucinations, self-injury and suicidal ideas. The patient is nervous/anxious. The patient is not hyperactive.        Objective   /82   Pulse 88   Ht 157.5 cm (62\")   Wt 64.9 kg (143 lb)   SpO2 94%   BMI 26.16 kg/m²   Physical Exam  Vitals and nursing note reviewed.   Constitutional:       Appearance: Normal appearance. She is ill-appearing.   HENT:      Head: Normocephalic.      Right Ear: Tympanic membrane normal.      Left Ear: Tympanic membrane normal.      Nose: Nose normal.      Mouth/Throat:      Mouth: Mucous membranes are moist.   Eyes:      Pupils: Pupils are equal, round, and reactive to light.   Cardiovascular:      Rate and Rhythm: Normal rate.      Pulses: Normal pulses.      Heart sounds: Normal heart sounds.      Comments: Home 02 4 liters     ekg sinus tachy 130    Pulmonary:      Effort: Pulmonary effort is normal. No respiratory distress. "      Breath sounds: No stridor. No wheezing or rhonchi.   Abdominal:      General: Abdomen is flat. There is no distension.      Palpations: There is no mass.      Tenderness: There is no abdominal tenderness.      Hernia: No hernia is present.   Musculoskeletal:         General: Tenderness present. No swelling or deformity. Normal range of motion.      Cervical back: Normal range of motion.      Lumbar back: Spasms, tenderness and bony tenderness present.        Back:       Right lower leg: No edema.      Comments: Lumbar pain with radiation of pain to right hip    Skin:     General: Skin is warm and dry.   Neurological:      General: No focal deficit present.      Mental Status: She is alert and oriented to person, place, and time.      Cranial Nerves: No cranial nerve deficit.      Sensory: No sensory deficit.      Motor: No weakness.      Coordination: Coordination normal.   Psychiatric:         Mood and Affect: Mood normal.              Assessment & Plan     Problems Addressed this Visit        Musculoskeletal and Injuries    Right-sided low back pain with right-sided sciatica - Primary   Other Visit Diagnoses     Lumbar pain        Relevant Medications    traMADol (ULTRAM) 50 MG tablet      Diagnoses       Codes Comments    Acute right-sided low back pain with right-sided sciatica    -  Primary ICD-10-CM: M54.41  ICD-9-CM: 724.2, 724.3     Lumbar pain     ICD-10-CM: M54.50  ICD-9-CM: 724.2            New Medications Ordered This Visit   Medications   • ondansetron ODT (Zofran ODT) 4 MG disintegrating tablet     Sig: Place 1 tablet on the tongue Every 8 (Eight) Hours As Needed for Nausea or Vomiting.     Dispense:  15 tablet     Refill:  11   • traMADol (ULTRAM) 50 MG tablet     Sig: Take 1 tablet by mouth Every 6 (Six) Hours As Needed for Moderate Pain.     Dispense:  60 tablet     Refill:  0   • baclofen (LIORESAL) 10 MG tablet     Sig: Take 1 tablet by mouth 2 (Two) Times a Day.     Dispense:  60 tablet      Refill:  11   • triamcinolone acetonide (KENALOG-40) injection 80 mg     Current Outpatient Medications on File Prior to Visit   Medication Sig Dispense Refill   • albuterol sulfate  (90 Base) MCG/ACT inhaler INHALE 2 PUFFS BY MOUTH EVERY 4 HOURS AS NEEDED 18 g 11   • ALPRAZolam (Xanax) 0.25 MG tablet Take 1 tablet by mouth 3 (Three) Times a Day As Needed for Anxiety. 90 tablet 0   • amiodarone (PACERONE) 100 MG tablet Take 1 tablet by mouth 2 (Two) Times a Day. 60 tablet 1   • amLODIPine (NORVASC) 5 MG tablet Take 5 mg by mouth Daily As Needed (HTN). IF BP IS >170/100.     • aspirin 81 MG chewable tablet Chew 1 tablet Daily.     • atorvastatin (LIPITOR) 40 MG tablet Take  by mouth Daily.     • budesonide-formoterol (SYMBICORT) 160-4.5 MCG/ACT inhaler INHALE 2 PUFFS BY MOUTH TWICE DAILY 10.2 g 5   • carvedilol (Coreg) 12.5 MG tablet Take 1 tablet by mouth 2 (Two) Times a Day With Meals. 60 tablet 11   • clopidogrel (Plavix) 75 MG tablet Take 1 tablet by mouth Daily. 30 tablet 11   • cyclobenzaprine (FLEXERIL) 10 MG tablet Take 1 tablet by mouth 3 (Three) Times a Day As Needed for Muscle Spasms. for muscle spams 90 tablet 5   • furosemide (LASIX) 20 MG tablet Take 1 tablet by mouth 2 (Two) Times a Day. 60 tablet 1   • glucose blood (FREESTYLE LITE) test strip USE AS DIRECTED TO TEST BLOOD SUGAR ONCE DAILY 50 each 11   • glucose monitor monitoring kit 1 each Daily. Testing blood sugar once a day    ICD10 - R73.9 1 each 0   • guaiFENesin (MUCINEX) 600 MG 12 hr tablet Take 1 tablet by mouth 3 (Three) Times a Day.     • hydroCHLOROthiazide (HYDRODIURIL) 12.5 MG tablet Take 12.5 mg by mouth Daily.     • ipratropium-albuterol (DUO-NEB) 0.5-2.5 mg/3 ml nebulizer Take 3 mL by nebulization 4 (Four) Times a Day. 360 mL 5   • Lancets (FREESTYLE) lancets USE AS DIRECTED TO TEST BLOOD SUGAR ONE DAILY 100 each 5   • losartan (COZAAR) 25 MG tablet Take 1 tablet by mouth.     • metFORMIN (GLUCOPHAGE) 500 MG tablet TAKE 1  TABLET BY MOUTH DAILY WITH BREAKFAST 30 tablet 11   • methylPREDNISolone (MEDROL) 8 MG tablet Take 1 tablet by mouth Daily. 30 tablet 1   • montelukast (SINGULAIR) 10 MG tablet TAKE 1 TABLET BY MOUTH DAILY 90 tablet 4   • mupirocin (BACTROBAN) 2 % ointment Apply  topically to the appropriate area as directed 2 (Two) Times a Day. 30 g 5   • nitroglycerin (NITROSTAT) 0.4 MG SL tablet Take 1 tablet under tongue every 5 minutes for a maximum of three doses per episode of chest pain 15 tablet 0   • nystatin (MYCOSTATIN) 100,000 unit/mL suspension Take 5 mL by mouth 4 (Four) Times a Day. As needed for mouth sores. 280 mL 0   • O2 (OXYGEN) Inhale 4 L/min Continuous.     • ondansetron ODT (ZOFRAN-ODT) 4 MG disintegrating tablet DISSOLVE 1 TABLET ON THE TONGUE EVERY 8 HOURS AS NEEDED FOR NAUSEA OR VOMITING 15 tablet 11   • PARoxetine (PAXIL) 20 MG tablet Take 1 tablet by mouth Every Morning. 30 tablet 11   • tiotropium bromide monohydrate (Spiriva Respimat) 2.5 MCG/ACT aerosol solution inhaler Inhale 2 puffs Daily.     • Xiidra 5 % ophthalmic solution Administer 1 drop to both eyes 2 (Two) Times a Day.       No current facility-administered medications on file prior to visit.       15 minutes   Follow Up   No follow-ups on file.     meds as directed  Follow up if worsen   kaylan reviewed  Do not take xanax and tramadol together as directed    Patient understands the risks associated with this controlled medication, including tolerance and addiction.  she also agrees to only obtain this medication from me, and not from a another provider, unless that provider is covering for me in my absence.  she also agrees to be compliant in dosing, and not self adjust the dose of medication.  A signed controlled substance agreement is on file, and she has received a controlled substance education sheet at this a previous visit.  she has also signed a consent for treatment with a controlled substance as per [facility] policy. KAYLAN  was obtained.

## 2022-11-15 NOTE — PROGRESS NOTES
Subjective   Monisha Brasher is a 67 y.o. female.       History of Present Illness   Patient is here for evaluation of dizziness.  Says that in September 2021 she was treated for pneumonia and she has been having dizziness ever since then.  Sometimes this is spinning but more often than not it is a lightheaded/syncopal feeling and she has passed out, most recently in August.  She does have some numbness of her legs and feet.  No otorrhea.  She has the dizziness even when she is standing still.      The following portions of the patient's history were reviewed and updated as appropriate: allergies, current medications, past family history, past medical history, past social history, past surgical history and problem list.     reports that she has been smoking cigarettes and electronic cigarette. She started smoking about 53 years ago. She has a 48.00 pack-year smoking history. She has never used smokeless tobacco. She reports current alcohol use. She reports that she does not use drugs.   Patient is a tobacco user and has been counseled for use of tobacco products      Review of Systems        Objective   Physical Exam  Ears: External ears no deformity, canals no discharge, tympanic membranes intact clear and mobile bilaterally.  Nares no discharge or purulence  Oral cavity no masses or lesions  Pharynx: No erythema or exudate  Neck: No adenopathy or mass  [place]-Hallpike maneuvers produce no vertigo and no nystagmus but significant symptoms upon arising from supine         Assessment and Plan   Diagnoses and all orders for this visit:    1. Dizziness (Primary)    2. Syncope, unspecified syncope type             Plan: History, symptoms, and physical findings are not consistent with vestibular pathology.  This is almost certainly a circulatory phenomenon.  Discussed precautions to prevent falls including using a walking stick and maintaining adequate lighting and avoiding uneven surfaces.  Recommended follow-up with  her cardiologist.  May follow-up with me as needed.

## 2022-12-05 ENCOUNTER — TELEPHONE (OUTPATIENT)
Dept: FAMILY MEDICINE CLINIC | Facility: CLINIC | Age: 67
End: 2022-12-05

## 2022-12-05 NOTE — TELEPHONE ENCOUNTER
Patient called back and said she will take the paxlovid and [facility] has it. Patient ask if the script can be sent to [facility]

## 2022-12-05 NOTE — TELEPHONE ENCOUNTER
Susanne doesn't have paxlovid and says she is scared to take it anyway so can they just treat symptoms     Needs ipratropium-albuterol (DUO-NEB) 0.5-2.5 mg/3 ml nebulizer [58243] (Order [id number])     is almost out and needs more     Cough loose when uses the nebulizer   Head congested runny nose headache lungs hurt diurea nauseated fever over 101 at times and body aches     [facility]   Pt [phone]

## 2022-12-09 DIAGNOSIS — J42 CHRONIC BRONCHITIS WITH ACUTE EXACERBATION: Chronic | ICD-10-CM

## 2022-12-09 DIAGNOSIS — J20.9 CHRONIC BRONCHITIS WITH ACUTE EXACERBATION: Chronic | ICD-10-CM

## 2022-12-09 RX ORDER — ALPRAZOLAM 0.25 MG/1
0.25 TABLET ORAL 3 TIMES DAILY PRN
Qty: 90 TABLET | Refills: 0 | Status: SHIPPED | OUTPATIENT
Start: 2022-12-09 | End: 2023-02-10

## 2023-01-06 ENCOUNTER — TELEPHONE (OUTPATIENT)
Dept: FAMILY MEDICINE CLINIC | Facility: CLINIC | Age: 68
End: 2023-01-06

## 2023-01-06 NOTE — TELEPHONE ENCOUNTER
Patient called and said that she has Thrush and wanted to see if you could call in the Nystatin liquid to [facility]

## 2023-01-27 RX ORDER — METHYLPREDNISOLONE 8 MG/1
8 TABLET ORAL DAILY
Qty: 30 TABLET | Refills: 1 | Status: SHIPPED | OUTPATIENT
Start: 2023-01-27 | End: 2023-03-27 | Stop reason: SDUPTHER

## 2023-02-09 DIAGNOSIS — J20.9 CHRONIC BRONCHITIS WITH ACUTE EXACERBATION: Chronic | ICD-10-CM

## 2023-02-09 DIAGNOSIS — J42 CHRONIC BRONCHITIS WITH ACUTE EXACERBATION: Chronic | ICD-10-CM

## 2023-02-09 RX ORDER — PAROXETINE HYDROCHLORIDE 20 MG/1
20 TABLET, FILM COATED ORAL EVERY MORNING
Qty: 30 TABLET | Refills: 11 | Status: SHIPPED | OUTPATIENT
Start: 2023-02-09

## 2023-02-10 RX ORDER — ALPRAZOLAM 0.25 MG/1
TABLET ORAL
Qty: 90 TABLET | Refills: 2 | Status: SHIPPED | OUTPATIENT
Start: 2023-02-10

## 2023-03-04 DIAGNOSIS — J44.9 STAGE 4 VERY SEVERE COPD BY GOLD CLASSIFICATION: ICD-10-CM

## 2023-03-06 RX ORDER — IPRATROPIUM BROMIDE AND ALBUTEROL SULFATE 2.5; .5 MG/3ML; MG/3ML
SOLUTION RESPIRATORY (INHALATION)
Qty: 360 ML | Refills: 5 | Status: SHIPPED | OUTPATIENT
Start: 2023-03-06

## 2023-03-27 RX ORDER — METHYLPREDNISOLONE 8 MG/1
8 TABLET ORAL DAILY
Qty: 30 TABLET | Refills: 1 | Status: SHIPPED | OUTPATIENT
Start: 2023-03-27

## 2023-04-25 DIAGNOSIS — J42 CHRONIC BRONCHITIS WITH ACUTE EXACERBATION: Chronic | ICD-10-CM

## 2023-04-25 DIAGNOSIS — J20.9 CHRONIC BRONCHITIS WITH ACUTE EXACERBATION: Chronic | ICD-10-CM

## 2023-04-26 RX ORDER — ALPRAZOLAM 0.25 MG/1
0.25 TABLET ORAL 3 TIMES DAILY PRN
Qty: 90 TABLET | Refills: 2 | Status: SHIPPED | OUTPATIENT
Start: 2023-04-26

## 2023-04-26 NOTE — TELEPHONE ENCOUNTER
Last Rx 02/10/2023  #90, 2 refills  She should not need this for another couple of weeks.    No Future OV    Last OV 11/10/2022

## 2023-05-24 RX ORDER — METHYLPREDNISOLONE 8 MG/1
8 TABLET ORAL DAILY
Qty: 30 TABLET | Refills: 1 | Status: SHIPPED | OUTPATIENT
Start: 2023-05-24

## 2023-07-25 ENCOUNTER — TELEPHONE (OUTPATIENT)
Dept: FAMILY MEDICINE CLINIC | Facility: CLINIC | Age: 68
End: 2023-07-25
Payer: COMMERCIAL

## 2023-07-25 NOTE — TELEPHONE ENCOUNTER
Patient called running fever and feels like she may have pneumonia again and is asking for antibiotics to head off the pneumonia     [facility]   Pt [phone]

## 2023-07-26 RX ORDER — DOXYCYCLINE HYCLATE 100 MG/1
100 CAPSULE ORAL 2 TIMES DAILY
Qty: 20 CAPSULE | Refills: 0 | Status: SHIPPED | OUTPATIENT
Start: 2023-07-26

## 2023-09-05 RX ORDER — METHYLPREDNISOLONE 8 MG/1
8 TABLET ORAL DAILY
Qty: 30 TABLET | Refills: 1 | Status: SHIPPED | OUTPATIENT
Start: 2023-09-05

## 2023-09-20 RX ORDER — ONDANSETRON 4 MG/1
TABLET, ORALLY DISINTEGRATING ORAL
Qty: 15 TABLET | Refills: 11 | Status: SHIPPED | OUTPATIENT
Start: 2023-09-20

## 2023-09-28 DIAGNOSIS — J44.9 STAGE 4 VERY SEVERE COPD BY GOLD CLASSIFICATION: ICD-10-CM

## 2023-09-29 RX ORDER — IPRATROPIUM BROMIDE AND ALBUTEROL SULFATE 2.5; .5 MG/3ML; MG/3ML
3 SOLUTION RESPIRATORY (INHALATION)
Qty: 360 ML | Refills: 5 | Status: SHIPPED | OUTPATIENT
Start: 2023-09-29

## 2023-10-02 NOTE — TELEPHONE ENCOUNTER
Last OV 01/29/2021    No Future OV with Provider   Patient is sitting in her living room this morning with her . Patient states that she feels much better than she did last week and her breathing has been better. No new needs or concerns at this time. Pain is controlled on current morphine dose. Medication refills ordered this visit: No refills needed    Medications reconciled and all medications are available in the home this visit. The following education was provided regarding medications, medication interactions, and look alike medications: Medication side effects, dosages, purposes, frequencies. Response to teaching: Verbalized understanding. Medications are effective at this time. Supplies by type and quantity ordered this visit include: None needed. Consulted medical director/attending physician regarding: Not needed this visit. Instructed patient/family/caregiver on 24-hour hospice availability and phone number. Plan for next visit:  Continue end of life education and support caregiver.

## 2024-01-30 ENCOUNTER — HOSPITAL ENCOUNTER (OUTPATIENT)
Age: 69
Discharge: HOME | End: 2024-01-30
Payer: COMMERCIAL

## 2024-01-30 PROCEDURE — 88305 TISSUE EXAM BY PATHOLOGIST: CPT

## 2024-02-15 ENCOUNTER — HOSPITAL ENCOUNTER (OUTPATIENT)
Age: 69
Setting detail: SPECIMEN
Discharge: HOME | End: 2024-02-15
